# Patient Record
Sex: FEMALE | Race: BLACK OR AFRICAN AMERICAN | Employment: PART TIME | URBAN - METROPOLITAN AREA
[De-identification: names, ages, dates, MRNs, and addresses within clinical notes are randomized per-mention and may not be internally consistent; named-entity substitution may affect disease eponyms.]

---

## 2017-06-01 ENCOUNTER — OFFICE VISIT (OUTPATIENT)
Dept: FAMILY MEDICINE CLINIC | Age: 32
End: 2017-06-01

## 2017-06-01 VITALS
HEART RATE: 75 BPM | DIASTOLIC BLOOD PRESSURE: 82 MMHG | TEMPERATURE: 97.2 F | OXYGEN SATURATION: 99 % | RESPIRATION RATE: 20 BRPM | BODY MASS INDEX: 45.08 KG/M2 | HEIGHT: 62 IN | SYSTOLIC BLOOD PRESSURE: 119 MMHG | WEIGHT: 245 LBS

## 2017-06-01 DIAGNOSIS — E10.9 TYPE 1 DIABETES MELLITUS WITHOUT COMPLICATION (HCC): ICD-10-CM

## 2017-06-01 DIAGNOSIS — T45.1X5A CARDIOMYOPATHY DUE TO CHEMOTHERAPY (HCC): ICD-10-CM

## 2017-06-01 DIAGNOSIS — L30.9 DERMATITIS: ICD-10-CM

## 2017-06-01 DIAGNOSIS — C50.919 MALIGNANT NEOPLASM OF FEMALE BREAST, UNSPECIFIED LATERALITY, UNSPECIFIED SITE OF BREAST: ICD-10-CM

## 2017-06-01 DIAGNOSIS — I42.7 CARDIOMYOPATHY DUE TO CHEMOTHERAPY (HCC): ICD-10-CM

## 2017-06-01 DIAGNOSIS — Z76.89 ENCOUNTER TO ESTABLISH CARE: Primary | ICD-10-CM

## 2017-06-01 RX ORDER — TRIAMCINOLONE ACETONIDE 1 MG/G
CREAM TOPICAL 2 TIMES DAILY
Qty: 45 G | Refills: 3 | Status: SHIPPED | OUTPATIENT
Start: 2017-06-01 | End: 2017-09-16 | Stop reason: ALTCHOICE

## 2017-06-01 RX ORDER — PEN NEEDLE, DIABETIC 32GX 5/32"
NEEDLE, DISPOSABLE MISCELLANEOUS
Refills: 2 | COMMUNITY
Start: 2017-04-28 | End: 2019-01-24 | Stop reason: SDUPTHER

## 2017-06-01 RX ORDER — INSULIN LISPRO 100 [IU]/ML
INJECTION, SOLUTION INTRAVENOUS; SUBCUTANEOUS
Refills: 1 | COMMUNITY
Start: 2017-03-15 | End: 2017-06-01 | Stop reason: SDUPTHER

## 2017-06-01 RX ORDER — INSULIN LISPRO 100 [IU]/ML
INJECTION, SOLUTION INTRAVENOUS; SUBCUTANEOUS
Qty: 1 PACKAGE | Refills: 1 | COMMUNITY
Start: 2017-06-01 | End: 2018-02-20

## 2017-06-01 RX ORDER — CHLORHEXIDINE GLUCONATE 1.2 MG/ML
RINSE ORAL
Refills: 2 | COMMUNITY
Start: 2017-05-07 | End: 2017-06-01 | Stop reason: ALTCHOICE

## 2017-06-01 RX ORDER — LISINOPRIL AND HYDROCHLOROTHIAZIDE 10; 12.5 MG/1; MG/1
TABLET ORAL
Refills: 4 | COMMUNITY
Start: 2017-05-16 | End: 2018-07-20

## 2017-06-01 RX ORDER — INSULIN GLARGINE 300 U/ML
INJECTION, SOLUTION SUBCUTANEOUS
Refills: 3 | COMMUNITY
Start: 2017-05-04 | End: 2018-04-30 | Stop reason: SDUPTHER

## 2017-06-01 RX ORDER — CLINDAMYCIN PHOSPHATE 10 MG/G
GEL TOPICAL
Refills: 3 | COMMUNITY
Start: 2017-04-23 | End: 2018-01-04

## 2017-06-01 NOTE — PATIENT INSTRUCTIONS

## 2017-06-01 NOTE — PROGRESS NOTES
HISTORY OF PRESENT ILLNESS  Avni Hernandez is a 27 y.o. female. HPI  Patient comes in today to reestablish care. Hx breast cancer. Followed at [facility] - Dr. Monik Adams (Dr. Cyndee Zamora). Last visit last week. Has not had MRI since diagnosed, has mammograms yearly, last Oct 2016. Did chemo and radiation treatments. Took ~1 year to finish treatments. Did not have menses for 1-2 years. Menses returned 2-3 years ago. Has \"chemo brain\" or \"chemo fog\". States she now has breast pain during menses. [place] [facility] [place] be switching back to Dr. Briana Cardozo. Last visit with Jass Tavarez in 2017, A1c 7.9%, prior to that she was in 9s%. States her insulin was changed in April. Areas on legs that itch. Uses Dove soap, dye free detergent. No Known Allergies    Past Medical History:   Diagnosis Date    Breast cancer [facility]) 2012    infiltrating ductal carcinoma    Cancer [facility])     breast    Cardiomyopathy due to chemotherapy ([address]     EF 20 %    Diabetes ([address]     Diabetes mellitus     Essential hypertension     H/O mammogram 2012    [facility] [phone]    History of sepsis 2013    after chemo    Hx of difficult intubation     resulting from sepsis in 2013.  Hypertension     Ne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 ([address]     Type I (juvenile type) diabetes mellitus without mention of complication, uncontrolled ([address]     diagnosed age 4    Unspecified vitamin D deficiency 2011       Past Surgical History:   Procedure Laterality Date    HX CYST INCISION AND DRAINAGE  2013    perirectal abscess    HX GYN       in     HX MASTECTOMY      HX OTHER SURGICAL      cyst removed under left arm    HX OTHER SURGICAL      port placement for chemo       Social History     Social History    Marital status:      Spouse name: N/A    Number of children: N/A    Years of education: N/A     Occupational History    Not on file.      Social History Main Topics  Smoking status: Never Smoker    Smokeless tobacco: Not on file    Alcohol use No    Drug use: No    Sexual activity: Yes     Partners: Male     Other Topics Concern    Not on file     Social History Narrative    No narrative on file       Family History   Problem Relation Age of Onset    Diabetes Brother      borderline Type 2    Diabetes Paternal Uncle     Diabetes Paternal Grandfather     Heart Disease Paternal Grandfather      MI in his 62s    Stroke Neg Hx        Current Outpatient Prescriptions   Medication Sig    TOUJEO SOLOSTAR 300 unit/mL (1.5 mL) inpn INJECT 42 UNITS UNDER THE SKIN ONCE DAILY    HUMALOG KWIKPEN 100 unit/mL kwikpen INJECT 10UNITS THREE TIMES DAILY BEFORE EACH MEAL. ADD ON CORRECTION FACTOR. MAX 15UNITS W/ EACH MEAL    clindamycin (CLINDAGEL) 1 % topical gel APPLY A THIN FILM TO AFFECTED AREA AFTER WASHING TWICE DAILY AS NEEDED    lisinopril-hydroCHLOROthiazide (PRINZIDE, ZESTORETIC) 10-12.5 mg per tablet TAKE 1 TABLET BY MOUTH ONE TIME DAILY, PLEASE HAVE PCP FILL THIS MED AFTER THIS    LANCE PEN NEEDLE 32 gauge x 5/32\" ndle USE AS DIRECTED TO INJECT INSULIN 4 TIMES DAILY    ibuprofen (ADVIL) 200 mg tablet Take  by mouth as needed.  ACCU-CHEK MARVIN PLUS METER Misc Use as directed    MULTIVIT WITH CALCIUM,IRON,MIN (ONE-A-DAY WOMENS FORMULA PO) Take  by mouth.  hydrocortisone (CORTAID) 0.5 % topical cream Apply  to affected area four (4) times daily as needed. use thin layer      No current facility-administered medications for this visit. Review of Systems   Constitutional: Negative for chills, diaphoresis, fever, malaise/fatigue and weight loss. HENT: Negative for congestion, ear pain, sore throat and tinnitus. Eyes: Negative for blurred vision and double vision. Respiratory: Negative for cough, sputum production, shortness of breath and wheezing. Cardiovascular: Negative for chest pain, palpitations and leg swelling.    Gastrointestinal: Negative for abdominal pain, blood in stool, constipation, diarrhea, nausea and vomiting. Genitourinary: Negative for dysuria, flank pain, frequency, hematuria and urgency. Musculoskeletal: Negative for back pain, joint pain and myalgias. Skin: Positive for itching and rash. Neurological: Negative for dizziness, tingling, sensory change, speech change, focal weakness and headaches. Psychiatric/Behavioral: Negative for depression. The patient is not nervous/anxious and does not have insomnia. Vitals:    06/01/17 1509   BP: 119/82   Pulse: 75   Resp: 20   Temp: 97.2 °F (36.2 °C)   TempSrc: Oral   SpO2: 99%   Weight: 245 lb (111.1 kg)   Height: 5' 2\" (1.575 m)     Physical Exam   Constitutional: She is oriented to person, place, and time. Vital signs are normal. She appears well-developed and well-nourished. She is cooperative. HENT:   Right Ear: Hearing, tympanic membrane, external ear and ear canal normal.   Left Ear: Hearing, tympanic membrane, external ear and ear canal normal.   Nose: Nose normal. Right sinus exhibits no maxillary sinus tenderness and no frontal sinus tenderness. Left sinus exhibits no maxillary sinus tenderness and no frontal sinus tenderness. Mouth/Throat: Uvula is midline, oropharynx is clear and moist and mucous membranes are normal. Mucous membranes are not pale and not dry. No oropharyngeal exudate, posterior oropharyngeal edema or posterior oropharyngeal erythema. Neck: No thyroid mass and no thyromegaly present. Cardiovascular: Normal rate, regular rhythm, S1 normal, S2 normal and normal heart sounds. No murmur heard. Pulses:       Radial pulses are 2+ on the right side, and 2+ on the left side. Dorsalis pedis pulses are 2+ on the right side, and 2+ on the left side. Posterior tibial pulses are 2+ on the right side, and 2+ on the left side. Pulmonary/Chest: Effort normal and breath sounds normal. She has no decreased breath sounds. She has no wheezes.  She has no rhonchi. She has no rales. Abdominal: Soft. Normal appearance and bowel sounds are normal. There is no hepatosplenomegaly. There is no tenderness. There is no CVA tenderness. Lymphadenopathy:        Head (right side): No submental, no submandibular, no tonsillar, no preauricular and no posterior auricular adenopathy present. Head (left side): No submental, no submandibular, no tonsillar, no preauricular and no posterior auricular adenopathy present. She has no cervical adenopathy. Right: No supraclavicular adenopathy present. Left: No supraclavicular adenopathy present. Neurological: She is alert and oriented to person, place, and time. Skin: Skin is warm, dry and intact. Rash (scaly papular rash noted on right lower leg) noted. Psychiatric: She has a normal mood and affect. Her speech is normal and behavior is normal. Thought content normal.   Vitals reviewed. ASSESSMENT and PLAN    ICD-10-CM ICD-9-CM    1. Encounter to establish care Z76.89 V65.8    2. Dermatitis L30.9 692.9 triamcinolone acetonide (KENALOG) 0.1 % topical cream   3. Type 1 diabetes mellitus without complication (HCC) V23.5 250.01    4. Malignant neoplasm of female breast, unspecified laterality, unspecified site of breast ([address] C50.919 174.9    5. Cardiomyopathy due to chemotherapy ([address] I42.7 425.9     T45. 1X5A E933.1      Encounter Diagnoses   Name Primary?     Encounter to establish care Yes    Dermatitis     Type 1 diabetes mellitus without complication (HCC)     Malignant neoplasm of female breast, unspecified laterality, unspecified site of breast ([address]     Cardiomyopathy due to chemotherapy ([address]      Orders Placed This Encounter    TOUJEO SOLOSTAR 300 unit/mL (1.5 mL) inpn    DISCONTD: HUMALOG KWIKPEN 100 unit/mL kwikpen    clindamycin (CLINDAGEL) 1 % topical gel    DISCONTD: chlorhexidine (PERIDEX) 0.12 % solution    lisinopril-hydroCHLOROthiazide (PRINZIDE, ZESTORETIC) 10-12.5 mg per tablet    LANCE PEN NEEDLE 32 gauge x 5/32\" ndle    HUMALOG KWIKPEN 100 unit/mL kwikpen    triamcinolone acetonide (KENALOG) 0.1 % topical cream     Diagnoses and all orders for this visit:    Encounter to establish care - will obtain [place] and [place] [facility]    Dermatitis  -     triamcinolone acetonide (KENALOG) 0.1 % topical cream; Apply  to affected area two (2) times a day. use thin layer    Type 1 diabetes mellitus without complication ([facility] - followed by [facility]    Malignant neoplasm of female breast, unspecified laterality, unspecified site of breast ([facility] - followed by heme/onc at [facility]    Cardiomyopathy due to chemotherapy [facility]) - followed by cardiology at [facility]      Follow-up Disposition:  Return in about 1 year (around 6/1/2018). I have reviewed the patient's allergies and made any necessary changes. Medical, procedural, social and family histories have been reviewed and updated as medically indicated. I have reconciled and/or revised patient medications in the EMR. I have discussed each diagnosis listed in this note with Jennifer Goldberg and/or their family. I have discussed treatment options and the risk/benefit analysis of those options, including safe use of medications and possible medication side effects. Through the use of shared decision making we have agreed to the above plan. The patient has received an after-visit summary and questions were answered concerning future plans. Ellen Ding, NATALI-C    This note will not be viewable in [employer].

## 2017-06-05 PROBLEM — E10.9 TYPE 1 DIABETES MELLITUS WITHOUT COMPLICATION (HCC): Status: ACTIVE | Noted: 2017-06-05

## 2017-09-16 ENCOUNTER — HOSPITAL ENCOUNTER (EMERGENCY)
Age: 32
Discharge: HOME | End: 2017-09-16
Attending: EMERGENCY MEDICINE | Admitting: EMERGENCY MEDICINE
Payer: COMMERCIAL

## 2017-09-16 VITALS
HEIGHT: 63 IN | DIASTOLIC BLOOD PRESSURE: 72 MMHG | BODY MASS INDEX: 43.91 KG/M2 | WEIGHT: 247.8 LBS | TEMPERATURE: 97.9 F | OXYGEN SATURATION: 94 % | SYSTOLIC BLOOD PRESSURE: 118 MMHG | RESPIRATION RATE: 16 BRPM | HEART RATE: 71 BPM

## 2017-09-16 DIAGNOSIS — L02.419 CELLULITIS AND ABSCESS OF LEG, EXCEPT FOOT: Primary | ICD-10-CM

## 2017-09-16 DIAGNOSIS — L03.119 CELLULITIS AND ABSCESS OF LEG, EXCEPT FOOT: Primary | ICD-10-CM

## 2017-09-16 PROCEDURE — 74011000250 HC RX REV CODE- 250: Performed by: NURSE PRACTITIONER

## 2017-09-16 PROCEDURE — 75810000289 HC I&D ABSCESS SIMP/COMP/MULT

## 2017-09-16 PROCEDURE — 74011250636 HC RX REV CODE- 250/636: Performed by: NURSE PRACTITIONER

## 2017-09-16 PROCEDURE — 96375 TX/PRO/DX INJ NEW DRUG ADDON: CPT

## 2017-09-16 PROCEDURE — 96365 THER/PROPH/DIAG IV INF INIT: CPT

## 2017-09-16 PROCEDURE — 77030019895 HC PCKNG STRP IODO -A

## 2017-09-16 PROCEDURE — 77030018836 HC SOL IRR NACL ICUM -A

## 2017-09-16 PROCEDURE — 74011000258 HC RX REV CODE- 258: Performed by: NURSE PRACTITIONER

## 2017-09-16 PROCEDURE — 99283 EMERGENCY DEPT VISIT LOW MDM: CPT

## 2017-09-16 RX ORDER — KETOROLAC TROMETHAMINE 30 MG/ML
15 INJECTION, SOLUTION INTRAMUSCULAR; INTRAVENOUS
Status: COMPLETED | OUTPATIENT
Start: 2017-09-16 | End: 2017-09-16

## 2017-09-16 RX ORDER — DOXYCYCLINE HYCLATE 100 MG
100 TABLET ORAL 2 TIMES DAILY
Qty: 20 TAB | Refills: 0 | Status: SHIPPED | OUTPATIENT
Start: 2017-09-16 | End: 2017-09-26

## 2017-09-16 RX ORDER — BUPIVACAINE HYDROCHLORIDE 5 MG/ML
10 INJECTION, SOLUTION EPIDURAL; INTRACAUDAL
Status: COMPLETED | OUTPATIENT
Start: 2017-09-16 | End: 2017-09-16

## 2017-09-16 RX ORDER — HYDROCODONE BITARTRATE AND ACETAMINOPHEN 5; 325 MG/1; MG/1
1 TABLET ORAL
Qty: 20 TAB | Refills: 0 | Status: SHIPPED | OUTPATIENT
Start: 2017-09-16 | End: 2018-01-04

## 2017-09-16 RX ADMIN — CEFTRIAXONE SODIUM 1 G: 1 INJECTION, POWDER, FOR SOLUTION INTRAMUSCULAR; INTRAVENOUS at 18:52

## 2017-09-16 RX ADMIN — BUPIVACAINE HYDROCHLORIDE 50 MG: 5 INJECTION, SOLUTION EPIDURAL; INTRACAUDAL at 17:58

## 2017-09-16 RX ADMIN — KETOROLAC TROMETHAMINE 15 MG: 30 INJECTION, SOLUTION INTRAMUSCULAR at 18:51

## 2017-09-16 NOTE — ED NOTES
Bedside shift change report given to Emily Newman RN (oncoming nurse) by Paulette Zacarias RN (offgoing nurse). Report included the following information ED Summary.

## 2017-09-16 NOTE — ED TRIAGE NOTES
PT ambulates to treatment room with steady gait. PT reports boil on inner left thigh that developed two days ago. PT reports HX of boils.  PT reports lump in groin area on left side as well

## 2017-09-16 NOTE — ED NOTES
Hourly rounding completed. IV placed by Aung Gorman. Toradol given via IV; antibiotic infusing via gravity. Toileting offered, ongoing plan of care discussed and pts concerns/questions addressed. Fay Iyer NP into perform procedure. Call bell within reach; will continue to monitor.

## 2017-09-16 NOTE — ED PROVIDER NOTES
HPI Comments: Simi Cason is a 28 yo BF presenting to ED via car with c/o car x boil on inner left thigh that developed two days ago. PT reports HX of boils. PT reports lump in groin area on left side as well. PCP: Tahir Banks, NP    There were no other complaints, changes, physical findings at this time. Past Medical History:  2012: Breast cancer [facility])      Comment: infiltrating ductal carcinoma  2012: Cancer ([address]      Comment: breast  No date: Cardiomyopathy due to chemotherapy ([address]      Comment: EF 20 %  No date: Diabetes ([address]  No date: Diabetes mellitus  No date: Essential hypertension  2012: H/O mammogram      Comment: [facility] [phone]  2013: History of sepsis      Comment: after chemo  No date: Hx of difficult intubation      Comment: resulting from sepsis in 2013. No date: Hypertension  No date: Ne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 ([address]  No date: Type I (juvenile type) diabetes mellitus witho*      Comment: diagnosed age 4  2011: Unspecified vitamin D deficiency      The history is provided by the patient. No  was used. Past Medical History:   Diagnosis Date    Breast cancer [facility]) 2012    infiltrating ductal carcinoma    Cancer [facility])     breast    Cardiomyopathy due to chemotherapy ([address]     EF 20 %    Diabetes ([address]     Diabetes mellitus     Essential hypertension     H/O mammogram 2012    [facility] [phone]    History of sepsis 2013    after chemo    Hx of difficult intubation     resulting from sepsis in 2013.       Hypertension     Ne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 ([address]     Type I (juvenile type) diabetes mellitus without mention of complication, uncontrolled     diagnosed age 4    Unspecified vitamin D deficiency 2011       Past Surgical History:   Procedure Laterality Date    HX CYST INCISION AND DRAINAGE  2013    perirectal abscess    HX GYN       in     HX MASTECTOMY      HX OTHER SURGICAL      cyst removed under left arm    HX OTHER SURGICAL      port placement for chemo         Family History:   Problem Relation Age of Onset    Diabetes Brother      borderline Type 2    Diabetes Paternal Uncle     Diabetes Paternal Grandfather     Heart Disease Paternal Grandfather      MI in his 62s    Stroke Neg Hx        Social History     Social History    Marital status:      Spouse name: N/A    Number of children: N/A    Years of education: N/A     Occupational History    Not on file. Social History Main Topics    Smoking status: Never Smoker    Smokeless tobacco: Not on file    Alcohol use No    Drug use: No    Sexual activity: Yes     Partners: Male     Other Topics Concern    Not on file     Social History Narrative         ALLERGIES: Review of patient's allergies indicates no known allergies. Review of Systems   Constitutional: Negative. Negative for chills, diaphoresis and fever. HENT: Negative. Negative for congestion, rhinorrhea and trouble swallowing. Eyes: Negative. Respiratory: Negative for shortness of breath. Cardiovascular: Negative. Gastrointestinal: Negative. Negative for abdominal pain, nausea and vomiting. Endocrine: Negative. Musculoskeletal: Negative for arthralgias, myalgias, neck pain and neck stiffness. Skin: Positive for wound. Negative for rash. Large abscess and cellulitic area to upper left inner thigh   Allergic/Immunologic: Negative. Neurological: Negative. Negative for dizziness, syncope, weakness and headaches. Hematological: Negative. Psychiatric/Behavioral: Negative. Vitals:    09/16/17 1804 09/16/17 1911   BP: (!) 182/100 118/72   Pulse: 92 71   Resp: 17 16   Temp: 98.3 °F (36.8 °C) 97.9 °F (36.6 °C)   SpO2: 98% 94%   Weight: 112.4 kg (247 lb 12.8 oz)    Height: 5' 3\" (1.6 m)             Physical Exam   Constitutional: She is oriented to person, place, and time.  Vital signs are normal. She appears well-developed and well-nourished. Non-toxic appearance. She does not have a sickly appearance. She does not appear ill. HENT:   Head: Normocephalic and atraumatic. Eyes: Conjunctivae, EOM and lids are normal. Pupils are equal, round, and reactive to light. Neck: Trachea normal, normal range of motion and full passive range of motion without pain. Neck supple. Cardiovascular: Normal rate, regular rhythm, normal heart sounds and normal pulses. Pulmonary/Chest: Effort normal and breath sounds normal.   Abdominal: Soft. Normal appearance and bowel sounds are normal.   Musculoskeletal: Normal range of motion. Left upper leg: She exhibits tenderness, swelling and edema. Legs:  Lymphadenopathy:        Left: Inguinal adenopathy present. Neurological: She is alert and oriented to person, place, and time. She has normal strength. GCS eye subscore is 4. GCS verbal subscore is 5. GCS motor subscore is 6. Skin: Skin is warm, dry and intact. Psychiatric: She has a normal mood and affect. Her speech is normal and behavior is normal. Judgment and thought content normal. Cognition and memory are normal.   Nursing note and vitals reviewed. MDM  Number of Diagnoses or Management Options  Cellulitis and abscess of leg, except foot: minor  Risk of Complications, Morbidity, and/or Mortality  Presenting problems: minimal  Diagnostic procedures: low  Management options: minimal    Patient Progress  Patient progress: stable    ED Course       Procedures      Procedure Note - Incision and Drainage:   7:08 PM  Performed by: Jodi Freeman FNP-BC  Complexity: complex  Skin prepped with Betadine. Sterile field established. Anesthesia achieved with 10 mLs of Lidocaine 1% with epinephrine and 0.5% Marcaine using a local infiltration. Abscess to left upper leg: left was incised with # 11 blade, and 5 mLs of bloody drainage was expressed. Wound probed and irrigated.  Area was packed using 1/2 inch iodoform gauze. Sterile dressing applied. Estimated blood loss: 10 ml's  The procedure took 1-15 minutes, and pt tolerated well. LABORATORY TESTS:  No results found for this or any previous visit (from the past 12 hour(s)). IMAGING RESULTS:    MEDICATIONS GIVEN:  Medications   cefTRIAXone (ROCEPHIN) 1 g in 0.9% sodium chloride (MBP/ADV) 50 mL (0 g IntraVENous IV Completed 9/16/17 1922)   bupivacaine (PF) (MARCAINE) 0.5 % (5 mg/mL) injection 50 mg (50 mg Peripheral Nerve Block Given by Provider 9/16/17 4778)   ketorolac (TORADOL) injection 15 mg (15 mg IntraVENous Given 9/16/17 4681)       IMPRESSION:  1. Cellulitis and abscess of leg, except foot        PLAN:  1. Doxycycline 100 mg PO x 10 days  2. Packing removal in 2 days (PCP or RTED)  3. F/U with PCP  Return to ED if worse    Discharge Note  7:10 PM  The patient is ready for discharge. The patient's signs, symptoms, diagnosis, and discharge instructions have been discussed and the patient has conveyed their understanding. The patient is to follow up as recommended or return to the ER should their symptoms worsen. Plan has been discussed and the patient is in agreement. [place] Freeman FNP-BC.

## 2017-09-16 NOTE — ED NOTES
The patient was discharged home by Linh James NP  in stable condition. The patient is alert and oriented, in no respiratory distress and discharge vital signs obtained. The patient's diagnosis, condition and treatment were explained. The patient expressed understanding. Two prescriptions given. No work/school note given. A discharge plan has been developed. A  was not involved in the process. Aftercare instructions were given. Pt ambulatory out of the ED.

## 2017-09-16 NOTE — DISCHARGE INSTRUCTIONS
Skin Abscess: Care Instructions  Your Care Instructions    A skin abscess is a bacterial infection that forms a pocket of pus. A boil is a kind of skin abscess. The doctor may have cut an opening in the abscess so that the pus can drain out. You may have gauze in the cut so that the abscess will stay open and keep draining. You may need antibiotics. You will need to follow up with your doctor to make sure the infection has gone away. The doctor has checked you carefully, but problems can develop later. If you notice any problems or new symptoms, get medical treatment right away. Follow-up care is a key part of your treatment and safety. Be sure to make and go to all appointments, and call your doctor if you are having problems. It's also a good idea to know your test results and keep a list of the medicines you take. How can you care for yourself at home? · Apply warm and dry compresses, a heating pad set on low, or a hot water bottle 3 or 4 times a day for pain. Keep a cloth between the heat source and your skin. · If your doctor prescribed antibiotics, take them as directed. Do not stop taking them just because you feel better. You need to take the full course of antibiotics. · Take pain medicines exactly as directed. ¨ If the doctor gave you a prescription medicine for pain, take it as prescribed. ¨ If you are not taking a prescription pain medicine, ask your doctor if you can take an over-the-counter medicine. · Keep your bandage clean and dry. Change the bandage whenever it gets wet or dirty, or at least one time a day. · If the abscess was packed with gauze:  ¨ Keep follow-up appointments to have the gauze changed or removed. If the doctor instructed you to remove the gauze, gently pull out all of the gauze when your doctor tells you to. ¨ After the gauze is removed, soak the area in warm water for 15 to 20 minutes 2 times a day, until the wound closes. When should you call for help?   Call your doctor now or seek immediate medical care if:  · You have signs of worsening infection, such as:  ¨ Increased pain, swelling, warmth, or redness. ¨ Red streaks leading from the infected skin. ¨ Pus draining from the wound. ¨ A fever. Watch closely for changes in your health, and be sure to contact your doctor if:  · You do not get better as expected. Where can you learn more? Go to http://kelly-wili.info/. Enter W141 in the search box to learn more about \"Skin Abscess: Care Instructions. \"  Current as of: October 13, 2016  Content Version: 11.3  © 2543-4930 [employer]. Care instructions adapted under license by [employer] (which disclaims liability or warranty for this information). If you have questions about a medical condition or this instruction, always ask your healthcare professional. [place] [address] any warranty or liability for your use of this information. Cellulitis: Care Instructions  Your Care Instructions    Cellulitis is a skin infection. It often occurs after a break in the skin from a scrape, cut, bite, or puncture, or after a rash. The doctor has checked you carefully, but problems can develop later. If you notice any problems or new symptoms, get medical treatment right away. Follow-up care is a key part of your treatment and safety. Be sure to make and go to all appointments, and call your doctor if you are having problems. It's also a good idea to know your test results and keep a list of the medicines you take. How can you care for yourself at home? · Take your antibiotics as directed. Do not stop taking them just because you feel better. You need to take the full course of antibiotics. · Prop up the infected area on pillows to reduce pain and swelling. Try to keep the area above the level of your heart as often as you can.   · If your doctor told you how to care for your wound, follow your doctor's instructions. If you did not get instructions, follow this general advice:  ¨ Wash the wound with clean water 2 times a day. Don't use hydrogen peroxide or alcohol, which can slow healing. ¨ You may cover the wound with a thin layer of petroleum jelly, such as Vaseline, and a nonstick bandage. ¨ Apply more petroleum jelly and replace the bandage as needed. · Be safe with medicines. Take pain medicines exactly as directed. ¨ If the doctor gave you a prescription medicine for pain, take it as prescribed. ¨ If you are not taking a prescription pain medicine, ask your doctor if you can take an over-the-counter medicine. To prevent cellulitis in the future  · Try to prevent cuts, scrapes, or other injuries to your skin. Cellulitis most often occurs where there is a break in the skin. · If you get a scrape, cut, mild burn, or bite, wash the wound with clean water as soon as you can to help avoid infection. Don't use hydrogen peroxide or alcohol, which can slow healing. · If you have swelling in your legs (edema), support stockings and good skin care may help prevent leg sores and cellulitis. · Take care of your feet, especially if you have diabetes or other conditions that increase the risk of infection. Wear shoes and socks. Do not go barefoot. If you have athlete's foot or other skin problems on your feet, talk to your doctor about how to treat them. When should you call for help? Call your doctor now or seek immediate medical care if:  · You have signs that your infection is getting worse, such as:  ¨ Increased pain, swelling, warmth, or redness. ¨ Red streaks leading from the area. ¨ Pus draining from the area. ¨ A fever. · You get a rash. Watch closely for changes in your health, and be sure to contact your doctor if:  · You are not getting better after 1 day (24 hours). · You do not get better as expected. Where can you learn more? Go to http://andrew.info/.   Nicholas Amaya in the search box to learn more about \"Cellulitis: Care Instructions. \"  Current as of: October 13, 2016  Content Version: 11.3  © 6787-0009 [employer]. Care instructions adapted under license by [employer] (which disclaims liability or warranty for this information). If you have questions about a medical condition or this instruction, always ask your healthcare professional. [address] any warranty or liability for your use of this information. We hope that we have addressed all of your medical concerns. The examination and treatment you received in the Emergency Department were for an emergent problem and were not intended as complete care. It is important that you follow up with your healthcare provider(s) for ongoing care. If your symptoms worsen or do not improve as expected, and you are unable to reach your usual health care provider(s), you should return to the Emergency Department. Today's healthcare is undergoing tremendous change, and patient satisfaction surveys are one of the many tools to assess the quality of medical care. You may receive a survey from the [employer] regarding your experience in the Emergency Department. I hope that your experience has been completely positive, particularly the medical care that I provided. As such, please participate in the survey; anything less than excellent does not meet my expectations or intentions. [address] and [facility] participate in nationally recognized quality of care measures. If your blood pressure is greater than 120/80, as reported below, we urge that you seek medical care to address the potential of high blood pressure, commonly known as hypertension. Hypertension can be hereditary or can be caused by certain medical conditions, pain, stress, or \"white coat syndrome. \"       Please make an appointment with your health care provider(s) for follow up of your Emergency Department visit. VITALS:   Patient Vitals for the past 8 hrs:   Temp Pulse Resp BP SpO2   09/16/17 1804 98.3 °F (36.8 °C) 92 17 (!) 182/100 98 %          Thank you for allowing us to provide you with medical care today. We realize that you have many choices for your emergency care needs. Please choose us in the future for any continued health care needs. ELMER Shepard [facility] 70: [phone]            No results found for this or any previous visit (from the past 24 hour(s)). No results found.

## 2017-09-18 ENCOUNTER — OFFICE VISIT (OUTPATIENT)
Dept: FAMILY MEDICINE CLINIC | Age: 32
End: 2017-09-18

## 2017-09-18 VITALS
OXYGEN SATURATION: 99 % | SYSTOLIC BLOOD PRESSURE: 111 MMHG | HEART RATE: 80 BPM | WEIGHT: 244.2 LBS | DIASTOLIC BLOOD PRESSURE: 66 MMHG | TEMPERATURE: 98.4 F | BODY MASS INDEX: 43.27 KG/M2 | HEIGHT: 63 IN | RESPIRATION RATE: 16 BRPM

## 2017-09-18 DIAGNOSIS — L02.91 ABSCESS: Primary | ICD-10-CM

## 2017-09-18 DIAGNOSIS — Z23 ENCOUNTER FOR IMMUNIZATION: ICD-10-CM

## 2017-09-18 RX ORDER — AMOXICILLIN 500 MG/1
CAPSULE ORAL
Refills: 0 | COMMUNITY
Start: 2017-06-16 | End: 2017-09-18 | Stop reason: ALTCHOICE

## 2017-09-18 NOTE — PROGRESS NOTES
Chief Complaint   Patient presents with    Wound Check     LEFT THIGH     Patient here for wound check - abscess [place] [facility] on 9/16/17.

## 2017-09-18 NOTE — PATIENT INSTRUCTIONS

## 2017-09-18 NOTE — PROGRESS NOTES
HISTORY OF PRESENT ILLNESS  Bee Busby is a 27 y.o. female. HPI    Pt of [facility] with diagnoses of hypertension, type 1 diabetes, and breast cancer, here for an acute visit. Patient was seen in the ED on 17 for a large abscess with surrounding cellulitis to anterior left thigh. The abscess with incised and drained and packing was placed, and patient was started on doxycycline 100mg BID x 10 days and advised to return/follow up in 2 days for packing removal.     Has had a history of recurrent abscesses, but last one this size was several years ago. Denies any fevers or chills since her visit to the ER and starting the doxycycline. Taking the doxycyline as prescribed and taking norco as needed for pain. States that drainage has been tapering off, though her  has been changing the dressing about 3 - 4x daily. Past Medical History:   Diagnosis Date    Breast cancer [facility]) 2012    infiltrating ductal carcinoma    Cancer [facility])     breast    Cardiomyopathy due to chemotherapy ([address]     EF 20 %    Diabetes ([address]     Diabetes mellitus     Essential hypertension     H/O mammogram 2012    [facility] [phone]    History of sepsis 2013    after chemo    Hx of difficult intubation     resulting from sepsis in 2013.       Hypertension     Ne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 ([address]     Type I (juvenile type) diabetes mellitus without mention of complication, uncontrolled     diagnosed age 4    Unspecified vitamin D deficiency 2011     Past Surgical History:   Procedure Laterality Date    HX CYST INCISION AND DRAINAGE  2013    perirectal abscess    HX GYN       in     HX MASTECTOMY      HX OTHER SURGICAL      cyst removed under left arm    HX OTHER SURGICAL      port placement for chemo     Family History   Problem Relation Age of Onset    Diabetes Brother      borderline Type 2    Diabetes Paternal Uncle     Diabetes Paternal Grandfather     Heart Disease Paternal Grandfather      MI in his 62s    Stroke Neg Hx      Social History     Social History    Marital status:      Spouse name: N/A    Number of children: N/A    Years of education: N/A     Social History Main Topics    Smoking status: Never Smoker    Smokeless tobacco: Never Used    Alcohol use No    Drug use: No    Sexual activity: Yes     Partners: Male     Other Topics Concern    None     Social History Narrative     Patient Active Problem List   Diagnosis Code    Uncontrolled type 1 diabetes mellitus ([facility] E10.65    Essential hypertension, benign I10    Encounter for long-term (current) use of other medications Z79.899    Encounter for long-term (current) use of insulin ([facility] Z79.4    Unspecified vitamin D deficiency E55.9    Abnormal thyroid blood test R94.6    Ne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 ([facility] G62.0, T45.1X5A    Cardiomyopathy due to chemotherapy ([facility] I42.7, T45.1X5A    Breast cancer ([facility] C50.919    Type 1 diabetes mellitus without complication ([facility] J77.5     Outpatient Encounter Prescriptions as of 9/18/2017   Medication Sig Dispense Refill    HYDROcodone-acetaminophen (NORCO) 5-325 mg per tablet Take 1 Tab by mouth every four (4) hours as needed for Pain. Max Daily Amount: 6 Tabs. 20 Tab 0    doxycycline (VIBRA-TABS) 100 mg tablet Take 1 Tab by mouth two (2) times a day for 10 days.  20 Tab 0    TOUJEO SOLOSTAR 300 unit/mL (1.5 mL) inpn INJECT 42 UNITS UNDER THE SKIN ONCE DAILY  3    clindamycin (CLINDAGEL) 1 % topical gel APPLY A THIN FILM TO AFFECTED AREA AFTER WASHING TWICE DAILY AS NEEDED  3    lisinopril-hydroCHLOROthiazide (PRINZIDE, ZESTORETIC) 10-12.5 mg per tablet TAKE 1 TABLET BY MOUTH ONE TIME DAILY, PLEASE HAVE PCP FILL THIS MED AFTER THIS  4    LANCE PEN NEEDLE 32 gauge x 5/32\" ndle USE AS DIRECTED TO INJECT INSULIN 4 TIMES DAILY  2    HUMALOG KWIKPEN 100 unit/mL kwikpen 3 units at breakfast, 3 units at lunch, 6 units at dinner *(with some correction) 1 Package 1    ibuprofen (ADVIL) 200 mg tablet Take 200 mg by mouth every six (6) hours as needed.  ACCU-CHEK MARVIN PLUS METER Misc Use as directed 1 Each 0    hydrocortisone (CORTAID) 0.5 % topical cream Apply  to affected area four (4) times daily as needed. use thin layer       [DISCONTINUED] amoxicillin (AMOXIL) 500 mg capsule TAKE 1 TABLET BY MOUTH EVERY 8 HOURS UNTIL GONE  0     No facility-administered encounter medications on file as of 9/18/2017. Visit Vitals    /66 (BP 1 Location: Left arm, BP Patient Position: Sitting)    Pulse 80    Temp 98.4 °F (36.9 °C) (Oral)    Resp 16    Ht 5' 3\" (1.6 m)    Wt 244 lb 3.2 oz (110.8 kg)    LMP 08/31/2017    SpO2 99%    BMI 43.26 kg/m2         Review of Systems   Constitutional: Negative for chills, fever and malaise/fatigue. Musculoskeletal: Positive for myalgias. Skin:        Draining abscess   Neurological: Negative for tingling. Physical Exam   Constitutional: She appears well-developed and well-nourished. No distress. HENT:   Head: Normocephalic and atraumatic. Cardiovascular: Normal rate, regular rhythm and normal heart sounds. Pulmonary/Chest: Effort normal.   Skin: She is not diaphoretic. Nursing note and vitals reviewed. ASSESSMENT and PLAN    ICD-10-CM ICD-9-CM    1. Abscess L02.91 682.9    2. Encounter for immunization Z23 V03.89 [place] IMMUNIZ ADMIN,1 SINGLE/COMB VAC/TOXOID      PNEUMOCOCCAL POLYSACCHARIDE VACCINE, 23-VALENT, ADULT OR IMMUNOSUPPRESSED PT DOSE,      INFLUENZA VIRUS VAC QUAD,SPLIT,PRESV FREE SYRINGE IM     1. Abscess  Packing removed from wound without incident; moderate amount of serosanguinous drainage present. Wound repacked with sterile packing and covered with gauze; patient tolerated well. Patient advised to finish her course of doxycycline, continue dressing changes as needed, and follow up in 2 days for packing removal/wound re-evaluation. May consider referral to surgery for evaluation for recurrent abscesses. Follow-up Disposition:  Return in about 2 days (around 9/20/2017) for packing removal & recheck.

## 2017-09-18 NOTE — MR AVS SNAPSHOT
Visit Information Date & Time Provider Department Dept. Phone Encounter #  
 9/18/2017  2:00 PM Vasyl Tim NP [facility] [phone] [id number] Follow-up Instructions Return in about 2 days (around 9/20/2017) for packing removal & recheck. Upcoming Health Maintenance Date Due Pneumococcal 19-64 Highest Risk (1 of 3 - PCV13) 1/19/2002 HEMOGLOBIN A1C Q6M 7/3/2012 MICROALBUMIN Q1 9/26/2012 LIPID PANEL Q1 9/26/2012 EYE EXAM RETINAL OR DILATED Q1 4/17/2014 PAP AKA CERVICAL CYTOLOGY 6/7/2014 FOOT EXAM Q1 7/17/2014 INFLUENZA AGE 9 TO ADULT 8/1/2017 DTaP/Tdap/Td series (2 - Td) 6/30/2020 Allergies as of 9/18/2017  Review Complete On: 9/18/2017 By: Vasyl Tim NP Severity Noted Reaction Type Reactions Codeine Medium 09/16/2017   Intolerance Nausea and Vomiting Current Immunizations  Reviewed on 1/5/2012 Name Date Influenza Vaccine (Quad) PF  Incomplete Pneumococcal Polysaccharide (PPSV-23)  Incomplete Not reviewed this visit You Were Diagnosed With   
  
 Codes Comments Abscess    -  Primary ICD-10-CM: L02.91 
ICD-9-CM: 682.9 Encounter for immunization     ICD-10-CM: T68 ICD-9-CM: V03.89 Vitals BP Pulse Temp Resp Height(growth percentile) Weight(growth percentile) 111/66 (BP 1 Location: Left arm, BP Patient Position: Sitting) 80 98.4 °F (36.9 °C) (Oral) 16 5' 3\" (1.6 m) 244 lb 3.2 oz (110.8 kg) LMP SpO2 BMI OB Status Smoking Status 08/31/2017 99% 43.26 kg/m2 Having regular periods Never Smoker BMI and BSA Data Body Mass Index Body Surface Area  
 43.26 kg/m 2 2.22 m 2 Preferred Pharmacy Pharmacy Name Phone [id number] [address] [phone] Your Updated Medication List  
  
   
This list is accurate as of: 9/18/17  2:33 PM.  Always use your most recent med list.  
  
  
  
  
 [address] Generic drug:  Blood-Glucose Meter Use as directed ADVIL 200 mg tablet Generic drug:  ibuprofen Take 200 mg by mouth every six (6) hours as needed. clindamycin 1 % topical gel Commonly known as:  CLINDAGEL  
APPLY A THIN FILM TO AFFECTED AREA AFTER WASHING TWICE DAILY AS NEEDED  
  
 doxycycline 100 mg tablet Commonly known as:  VIBRA-TABS Take 1 Tab by mouth two (2) times a day for 10 days. HumaLOG KwikPen 100 unit/mL kwikpen Generic drug:  insulin lispro 3 units at breakfast, 3 units at lunch, 6 units at dinner *(with some correction) HYDROcodone-acetaminophen 5-325 mg per tablet Commonly known as:  Maggie Fore Take 1 Tab by mouth every four (4) hours as needed for Pain. Max Daily Amount: 6 Tabs. hydrocortisone 0.5 % topical cream  
Commonly known as:  CORTAID Apply  to affected area four (4) times daily as needed. use thin layer  
  
 lisinopril-hydroCHLOROthiazide 10-12.5 mg per tablet Commonly known as:  PRINZIDE, ZESTORETIC  
TAKE 1 TABLET BY MOUTH ONE TIME DAILY, PLEASE HAVE PCP FILL THIS MED AFTER THIS Zandra Pen Needle 32 gauge x 5/32\" Ndle Generic drug:  Insulin Needles (Disposable) USE AS DIRECTED TO INJECT INSULIN 4 TIMES DAILY TOUJEO SOLOSTAR 300 unit/mL (1.5 mL) Inpn Generic drug:  insulin glargine INJECT 42 UNITS UNDER THE SKIN ONCE DAILY We Performed the Following INFLUENZA VIRUS VAC QUAD,SPLIT,PRESV FREE SYRINGE IM [id number] CPT(R)] PNEUMOCOCCAL POLYSACCHARIDE VACCINE, 23-VALENT, ADULT OR IMMUNOSUPPRESSED PT DOSE, [66890 CPT(R)] [place] IMMUNIZ ADMIN,1 SINGLE/COMB VAC/TOXOID [id number] CPT(R)] Follow-up Instructions Return in about 2 days (around 9/20/2017) for packing removal & recheck. Patient Instructions Skin Abscess: Care Instructions Your Care Instructions A skin abscess is a bacterial infection that forms a pocket of pus.  A boil is a kind of skin abscess. The doctor may have cut an opening in the abscess so that the pus can drain out. You may have gauze in the cut so that the abscess will stay open and keep draining. You may need antibiotics. You will need to follow up with your doctor to make sure the infection has gone away. The doctor has checked you carefully, but problems can develop later. If you notice any problems or new symptoms, get medical treatment right away. Follow-up care is a key part of your treatment and safety. Be sure to make and go to all appointments, and call your doctor if you are having problems. It's also a good idea to know your test results and keep a list of the medicines you take. How can you care for yourself at home? · Apply warm and dry compresses, a heating pad set on low, or a hot water bottle 3 or 4 times a day for pain. Keep a cloth between the heat source and your skin. · If your doctor prescribed antibiotics, take them as directed. Do not stop taking them just because you feel better. You need to take the full course of antibiotics. · Take pain medicines exactly as directed. ¨ If the doctor gave you a prescription medicine for pain, take it as prescribed. ¨ If you are not taking a prescription pain medicine, ask your doctor if you can take an over-the-counter medicine. · Keep your bandage clean and dry. Change the bandage whenever it gets wet or dirty, or at least one time a day. · If the abscess was packed with gauze: 
¨ Keep follow-up appointments to have the gauze changed or removed. If the doctor instructed you to remove the gauze, gently pull out all of the gauze when your doctor tells you to. ¨ After the gauze is removed, soak the area in warm water for 15 to 20 minutes 2 times a day, until the wound closes. When should you call for help? Call your doctor now or seek immediate medical care if: 
· You have signs of worsening infection, such as: ¨ Increased pain, swelling, warmth, or redness. ¨ Red streaks leading from the infected skin. ¨ Pus draining from the wound. ¨ A fever. Watch closely for changes in your health, and be sure to contact your doctor if: 
· You do not get better as expected. Where can you learn more? Go to http://kelly-wili.info/. Enter J037 in the search box to learn more about \"Skin Abscess: Care Instructions. \" Current as of: October 13, 2016 Content Version: 11.3 © 0046-4066 [employer]. Care instructions adapted under license by [employer] (which disclaims liability or warranty for this information). If you have questions about a medical condition or this instruction, always ask your healthcare professional. [address] any warranty or liability for your use of this information. Introducing [facility]! Dear Ginny Palma: Thank you for requesting a [employer] account. Our records indicate that you already have an active [employer] account. You can access your account anytime at [email or web address] Did you know that you can access your hospital and ER discharge instructions at any time in [employer]? You can also review all of your test results from your hospital stay or ER visit. Additional Information If you have questions, please visit the Frequently Asked Questions section of the [employer] website at [email or web address] Remember, [employer] is NOT to be used for urgent needs. For medical emergencies, dial 911. Now available from your iPhone and Android! Please provide this summary of care documentation to your next provider. Your primary care clinician is listed as [address] Ursula Pederson. If you have any questions after today's visit, please call [phone].

## 2017-09-20 ENCOUNTER — OFFICE VISIT (OUTPATIENT)
Dept: FAMILY MEDICINE CLINIC | Age: 32
End: 2017-09-20

## 2017-09-20 VITALS
SYSTOLIC BLOOD PRESSURE: 119 MMHG | HEIGHT: 63 IN | BODY MASS INDEX: 43.23 KG/M2 | RESPIRATION RATE: 20 BRPM | OXYGEN SATURATION: 98 % | HEART RATE: 77 BPM | DIASTOLIC BLOOD PRESSURE: 81 MMHG | WEIGHT: 244 LBS | TEMPERATURE: 97.5 F

## 2017-09-20 DIAGNOSIS — L72.3 INFECTED SEBACEOUS CYST: ICD-10-CM

## 2017-09-20 DIAGNOSIS — L08.9 INFECTED SEBACEOUS CYST: ICD-10-CM

## 2017-09-20 DIAGNOSIS — L02.91 ABSCESS: Primary | ICD-10-CM

## 2017-09-20 NOTE — PATIENT INSTRUCTIONS

## 2017-09-20 NOTE — PROGRESS NOTES
HISTORY OF PRESENT ILLNESS  Breanna Astudillo is a 27 y.o. female. HPI  Patient comes in today for follow up  Per Ernst Fischer NP note on 9/18/17: \"Patient was seen in the ED on 9/16/17 for a large abscess with surrounding cellulitis to anterior left thigh. The abscess with incised and drained and packing was placed, and patient was started on doxycycline 100mg BID x 10 days and advised to return/follow up in 2 days for packing removal.   Has had a history of recurrent abscesses, but last one this size was several years ago. Denies any fevers or chills since her visit to the ER and starting the doxycycline. Taking the doxycyline as prescribed and taking norco as needed for pain. States that drainage has been tapering off, though her  has been changing the dressing about 3 - 4x daily. \"  Packing was removed at that visit and repacked. Patient returns today for further evaluation of wound and repacking. Patient states she has not been doing warm compresses. Is taking doxycycline as prescribed. Discussed with patient referral to surgeon - patient interested. States she has a history of multiple cysts, most around inner/upper thighs. Allergies   Allergen Reactions    Codeine Nausea and Vomiting       Past Medical History:   Diagnosis Date    Breast cancer [facility]) Nov 2012    infiltrating ductal carcinoma    Cancer [facility]) 2012    breast    Cardiomyopathy due to chemotherapy ([address]     EF 20 %    Diabetes ([address]     Diabetes mellitus     Essential hypertension     H/O [facility] 11/2/2012    [facility] [phone]    History of sepsis 1/2013    after chemo    Hx of difficult intubation     resulting from sepsis in january 2013.       Hypertension     Ne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 ([address]     Type I (juvenile type) diabetes mellitus without mention of complication, uncontrolled     diagnosed age 4    Unspecified vitamin D deficiency 11/7/2011       Past Surgical History:   Procedure Laterality Date  HX CYST INCISION AND DRAINAGE  2013    perirectal abscess    HX GYN       in     HX MASTECTOMY      HX OTHER SURGICAL      cyst removed under left arm    HX OTHER SURGICAL      port placement for chemo       Social History     Social History    Marital status:      Spouse name: N/A    Number of children: N/A    Years of education: N/A     Occupational History    Not on file. Social History Main Topics    Smoking status: Never Smoker    Smokeless tobacco: Never Used    Alcohol use No    Drug use: No    Sexual activity: Yes     Partners: Male     Other Topics Concern    Not on file     Social History Narrative       Family History   Problem Relation Age of Onset    Diabetes Brother      borderline Type 2    Diabetes Paternal Uncle     Diabetes Paternal Grandfather     Heart Disease Paternal Grandfather      MI in his 62s    Stroke Neg Hx        Current Outpatient Prescriptions   Medication Sig    HYDROcodone-acetaminophen (NORCO) 5-325 mg per tablet Take 1 Tab by mouth every four (4) hours as needed for Pain. Max Daily Amount: 6 Tabs.  doxycycline (VIBRA-TABS) 100 mg tablet Take 1 Tab by mouth two (2) times a day for 10 days.  TOUJEO SOLOSTAR 300 unit/mL (1.5 mL) inpn INJECT 42 UNITS UNDER THE SKIN ONCE DAILY    clindamycin (CLINDAGEL) 1 % topical gel APPLY A THIN FILM TO AFFECTED AREA AFTER WASHING TWICE DAILY AS NEEDED    lisinopril-hydroCHLOROthiazide (PRINZIDE, ZESTORETIC) 10-12.5 mg per tablet TAKE 1 TABLET BY MOUTH ONE TIME DAILY, PLEASE HAVE PCP FILL THIS MED AFTER THIS    LANCE PEN NEEDLE 32 gauge x \" ndle USE AS DIRECTED TO INJECT INSULIN 4 TIMES DAILY    HUMALOG KWIKPEN 100 unit/mL kwikpen 3 units at breakfast, 3 units at lunch, 6 units at dinner *(with some correction)    ibuprofen (ADVIL) 200 mg tablet Take 200 mg by mouth every six (6) hours as needed.     ACCU-CHEK MARVIN PLUS METER Misc Use as directed    hydrocortisone (CORTAID) 0.5 % topical cream Apply  to affected area four (4) times daily as needed. use thin layer      No current facility-administered medications for this visit. Review of Systems   Constitutional: Negative for chills and fever. Gastrointestinal: Negative for nausea and vomiting. Skin:        Cystic lesion noted left upper medial thigh, packing and dressing present. Vitals:    09/20/17 1414   BP: 119/81   Pulse: 77   Resp: 20   Temp: 97.5 °F (36.4 °C)   TempSrc: Oral   SpO2: 98%   Weight: 244 lb (110.7 kg)   Height: 5' 3\" (1.6 m)     Physical Exam   Constitutional: She is oriented to person, place, and time. Vital signs are normal. She appears well-developed and well-nourished. Cardiovascular: Normal rate. Pulmonary/Chest: Effort normal.   Neurological: She is alert and oriented to person, place, and time. Skin: Skin is warm and dry. Lesion noted. ~5cm x 3cm cystic lesion noted left upper thigh. Area was excised and drained 4 days ago. Removed packing from wound, open area ~2x2 and ~1cm deep. Pink granulation tissues, scant amount of yellow drainage. Mild TTP, minimal surrounding erythema   Vitals reviewed. ASSESSMENT and PLAN    ICD-10-CM ICD-9-CM    1. Abscess L02.91 682.9    2. Infected sebaceous cyst L72.3 706.2 REFERRAL TO GENERAL SURGERY    L08.9       Encounter Diagnoses   Name Primary?  Abscess Yes    Infected sebaceous cyst      Orders Placed This Encounter    REFERRAL TO GENERAL SURGERY     Diagnoses and all orders for this visit:    1. Abscess - was I&D'd 4 days ago in ED. Followed up in office 2 days ago, wound repacked and seen again today for follow up and repacking. Wound irrigated with NS, repacked with 1/4 inch packing strip and covered with DSD. Patient encouraged to change dressing daily and will schedule appointment with general surgery for further evaluation. Patient scheduled to see Dr. Lane Paz ([id number] [facility] surgery) at [facility] on 9/22/17. Continue abx.     2. Infected sebaceous cyst  -     REFERRAL TO GENERAL SURGERY      Follow-up Disposition: Not on File    I have reviewed the patient's allergies and made any necessary changes. Medical, procedural, social and family histories have been reviewed and updated as medically indicated. I have reconciled and/or revised patient medications in the EMR. I have discussed each diagnosis listed in this note with Brett Arteaga and/or their family. I have discussed treatment options and the risk/benefit analysis of those options, including safe use of medications and possible medication side effects. Through the use of shared decision making we have agreed to the above plan. The patient has received an after-visit summary and questions were answered concerning future plans. Ellen Ding, JORDANP-C    This note will not be viewable in MyChart.

## 2017-09-22 ENCOUNTER — TELEPHONE (OUTPATIENT)
Dept: FAMILY MEDICINE CLINIC | Age: 32
End: 2017-09-22

## 2017-09-22 ENCOUNTER — OFFICE VISIT (OUTPATIENT)
Dept: SURGERY | Age: 32
End: 2017-09-22

## 2017-09-22 VITALS
RESPIRATION RATE: 14 BRPM | WEIGHT: 246 LBS | BODY MASS INDEX: 43.59 KG/M2 | OXYGEN SATURATION: 99 % | TEMPERATURE: 98.3 F | HEART RATE: 59 BPM | DIASTOLIC BLOOD PRESSURE: 66 MMHG | HEIGHT: 63 IN | SYSTOLIC BLOOD PRESSURE: 101 MMHG

## 2017-09-22 DIAGNOSIS — Z51.89 WOUND CHECK, ABSCESS: ICD-10-CM

## 2017-09-22 DIAGNOSIS — L02.416 ABSCESS OF LEFT HIP: Primary | ICD-10-CM

## 2017-09-22 PROBLEM — E66.01 MORBID OBESITY WITH BMI OF 40.0-44.9, ADULT (HCC): Status: ACTIVE | Noted: 2017-09-22

## 2017-09-22 NOTE — PROGRESS NOTES
Surgery History and Physical    Subjective:      Duong Keys is a 27 y.o. black female who presents for evaluation of a wound secondary to an abscess. About 8 days ago, Mrs. Sanket Singleton developed an abscess on the inside of her left thigh. The area became red and swollen. She was seen in the ER and had an I&D performed. She has been packing the wound every other day since then, but has not had any further pain or drainage. She was started on antibiotics which have made her feel sick. She denies any fever, but has felt hot. She has had an abscess drained before, but denies any h/o MRSA. Her blood sugars have been running slightly high. Past Medical History:   Diagnosis Date    Breast cancer [facility]) 2012    infiltrating ductal carcinoma    Cancer [facility])     breast    Cardiomyopathy due to chemotherapy ([address]     EF 20 %    Diabetes ([address]     Diabetes mellitus     Essential hypertension     H[facility]O mamm[facility] 2012    [facility] [phone]    History of sepsis 2013    after chemo    Hx of difficult intubation     resulting from sepsis in 2013.       Hypertension     Morbid obesity ([address]     Ne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 ([address]     Type I (juvenile type) diabetes mellitus without mention of complication, uncontrolled     diagnosed age 4    Unspecified vitamin D deficiency 2011     Past Surgical History:   Procedure Laterality Date    HX CYST INCISION AND DRAINAGE  2013    perirectal abscess    HX GYN       in     HX MASTECTOMY      HX OTHER SURGICAL      cyst removed under left arm    HX OTHER SURGICAL      port placement for chemo      Family History   Problem Relation Age of Onset    Diabetes Brother      borderline Type 2    Diabetes Paternal Uncle     Diabetes Paternal Grandfather     Heart Disease Paternal Grandfather      MI in his 62s    Stroke Neg Hx      Social History   Substance Use Topics    Smoking status: Never Smoker    Smokeless tobacco: Never Used    Alcohol use No      Prior to Admission medications    Medication Sig Start Date End Date Taking? Authorizing Provider   doxycycline (VIBRA-TABS) 100 mg tablet Take 1 Tab by mouth two (2) times a day for 10 days. 9/16/17 9/26/17 Yes Iliana Goodwin NP   TOUNAPOLEON HUTCHINSOSTAR 300 unit/mL (1.5 mL) inpn INJECT 42 UNITS UNDER THE SKIN ONCE DAILY 5/4/17  Yes Historical Provider   clindamycin (CLINDAGEL) 1 % topical gel APPLY A THIN FILM TO AFFECTED AREA AFTER WASHING TWICE DAILY AS NEEDED 4/23/17  Yes Historical Provider   lisinopril-hydroCHLOROthiazide (PRINZIDE, ZESTORETIC) 10-12.5 mg per tablet TAKE 1 TABLET BY MOUTH ONE TIME DAILY, PLEASE HAVE PCP FILL THIS MED AFTER THIS 5/16/17  Yes Historical Provider   LANCE PEN NEEDLE 32 gauge x 5/32\" ndle USE AS DIRECTED TO INJECT INSULIN 4 TIMES DAILY 4/28/17  Yes Historical Provider   HUMALOG KWIKPEN 100 unit/mL kwikpen 3 units at breakfast, 3 units at lunch, 6 units at dinner *(with some correction) 6/1/17  Yes Jessica Bustos NP   ibuprofen (ADVIL) 200 mg tablet Take 200 mg by mouth every six (6) hours as needed. Yes Historical Provider   ACCU-CHEK MARVIN PLUS METER Misc Use as directed 10/11/12  Yes Juani David MD   hydrocortisone (CORTAID) 0.5 % topical cream Apply  to affected area four (4) times daily as needed. use thin layer  4/30/10  Yes Historical Provider   HYDROcodone-acetaminophen (NORCO) 5-325 mg per tablet Take 1 Tab by mouth every four (4) hours as needed for Pain. Max Daily Amount: 6 Tabs. 9/16/17   Iliana Goodwin NP      Allergies   Allergen Reactions    Codeine Nausea and Vomiting       Review of Systems:  A comprehensive review of systems was negative except for that written in the History of Present Illness.     Objective:      Physical Exam:  GENERAL: alert, cooperative, no distress, appears stated age, EYE: negative findings: anicteric sclera, LYMPHATIC: Cervical, supraclavicular nodes normal. , THROAT & NECK: neck supple and symmetrical.  The thyroid is grossly normal., LUNG: clear to auscultation bilaterally, HEART: regular rate and rhythm, ABDOMEN: Soft, obese, NT, ND., EXTREMITIES:  no edema, SKIN: Along the inner proximal thigh, there is an approximately 2.5 x 1.5 cm superficial wound with granulation. There is no drainage or erythema. There is some fibrinous exudate along the edges. , NEUROLOGIC: negative, PSYCHIATRIC: non focal    Assessment:     Abscess of left thigh, s/p I&D. Plan:     Mrs. Jaimie Whitten has no further signs of infection. The edges of the wound were trimmed. I have instructed her to pack the wound daily until healed. She can f/u with me prn.     Signed By: Austin Levin MD     September 22, 2017

## 2017-09-22 NOTE — TELEPHONE ENCOUNTER
----- Message from [employer] sent at 9/22/2017 12:56 PM EDT -----  Regarding: [employer]/telephone  Pt is requesting a note to return to work on the 25th of Sept. Best contact [phone].

## 2017-09-22 NOTE — PROGRESS NOTES
1. Have you been to the ER, urgent care clinic since your last visit? Hospitalized since your last visit? yes, 9/16/17 [facility] for cyst on thigh     2. Have you seen or consulted any other health care providers outside of the [address] [place] [address] since your last visit? Include any pap smears or colon screening.  no

## 2017-09-22 NOTE — LETTER
NOTIFICATION RETURN TO WORK  
 
9/22/2017 2:32 PM 
 
Ms. [address] [facility] [address] To Whom It May Concern: 
 
Zeeshan Arita is currently under the care of Zoë Abad. She will return to work on 9/25/2017. If there are questions or concerns please have the patient contact our office. Sincerely, Lexus Moreno NP

## 2018-01-04 ENCOUNTER — HOSPITAL ENCOUNTER (EMERGENCY)
Age: 33
Discharge: HOME | End: 2018-01-05
Attending: EMERGENCY MEDICINE
Payer: COMMERCIAL

## 2018-01-04 ENCOUNTER — APPOINTMENT (OUTPATIENT)
Dept: ULTRASOUND IMAGING | Age: 33
End: 2018-01-04
Attending: EMERGENCY MEDICINE
Payer: COMMERCIAL

## 2018-01-04 DIAGNOSIS — E10.65 TYPE 1 DIABETES MELLITUS WITH HYPERGLYCEMIA (HCC): ICD-10-CM

## 2018-01-04 DIAGNOSIS — R10.2 PELVIC PAIN: Primary | ICD-10-CM

## 2018-01-04 DIAGNOSIS — B37.31 VAGINAL YEAST INFECTION: ICD-10-CM

## 2018-01-04 DIAGNOSIS — L30.9 DERMATITIS: ICD-10-CM

## 2018-01-04 LAB
APPEARANCE UR: CLEAR
BILIRUB UR QL: NEGATIVE
COLOR UR: ABNORMAL
GLUCOSE BLD STRIP.AUTO-MCNC: 201 MG/DL (ref 65–100)
GLUCOSE UR STRIP.AUTO-MCNC: NEGATIVE MG/DL
HCG UR QL: NEGATIVE
HGB UR QL STRIP: NEGATIVE
KETONES UR QL STRIP.AUTO: ABNORMAL MG/DL
LEUKOCYTE ESTERASE UR QL STRIP.AUTO: NEGATIVE
NITRITE UR QL STRIP.AUTO: NEGATIVE
PH UR STRIP: 7 [PH] (ref 5–8)
PROT UR STRIP-MCNC: NEGATIVE MG/DL
SERVICE CMNT-IMP: ABNORMAL
SP GR UR REFRACTOMETRY: 1.02 (ref 1–1.03)
UROBILINOGEN UR QL STRIP.AUTO: 0.2 EU/DL (ref 0.2–1)

## 2018-01-04 PROCEDURE — 76856 US EXAM PELVIC COMPLETE: CPT

## 2018-01-04 PROCEDURE — 76830 TRANSVAGINAL US NON-OB: CPT

## 2018-01-04 PROCEDURE — 99284 EMERGENCY DEPT VISIT MOD MDM: CPT

## 2018-01-04 PROCEDURE — 82962 GLUCOSE BLOOD TEST: CPT

## 2018-01-04 PROCEDURE — 81003 URINALYSIS AUTO W/O SCOPE: CPT | Performed by: EMERGENCY MEDICINE

## 2018-01-04 PROCEDURE — 81025 URINE PREGNANCY TEST: CPT

## 2018-01-04 NOTE — Clinical Note
- Diflucan as prescribed. Zofran as needed for nausea. - Please monitor your blood sugar closely and contact Dr. Deborah Forman regarding insulin adjustments. - Please return to ED for any concerns.

## 2018-01-04 NOTE — LETTER
[facility] EMERGENCY DEPT 
[address] [facility] [place] [id number] 
[phone] Work/School Note Date: 1/4/2018 To Whom It May concern: 
 
Annalise Chang was seen and treated today in the emergency room by the following provider(s): 
No providers found. Annalise Chang may return to work on Monday, Jan 8, 2018.  
 
Sincerely, 
 
 
 
 
Astrid Parsons MD

## 2018-01-05 VITALS
RESPIRATION RATE: 16 BRPM | HEART RATE: 100 BPM | TEMPERATURE: 97.9 F | OXYGEN SATURATION: 98 % | SYSTOLIC BLOOD PRESSURE: 137 MMHG | BODY MASS INDEX: 45.23 KG/M2 | HEIGHT: 63 IN | DIASTOLIC BLOOD PRESSURE: 74 MMHG | WEIGHT: 255.29 LBS

## 2018-01-05 LAB
ALBUMIN SERPL-MCNC: 3.1 G/DL (ref 3.5–5)
ALBUMIN/GLOB SERPL: 0.7 {RATIO} (ref 1.1–2.2)
ALP SERPL-CCNC: 101 U/L (ref 45–117)
ALT SERPL-CCNC: 21 U/L (ref 12–78)
ANION GAP SERPL CALC-SCNC: 8 MMOL/L (ref 5–15)
AST SERPL-CCNC: 14 U/L (ref 15–37)
BASOPHILS # BLD: 0 K/UL (ref 0–0.1)
BASOPHILS NFR BLD: 0 % (ref 0–1)
BILIRUB SERPL-MCNC: 0.2 MG/DL (ref 0.2–1)
BUN SERPL-MCNC: 13 MG/DL (ref 6–20)
BUN/CREAT SERPL: 16 (ref 12–20)
C TRACH DNA SPEC QL NAA+PROBE: NEGATIVE
CALCIUM SERPL-MCNC: 8.7 MG/DL (ref 8.5–10.1)
CHLORIDE SERPL-SCNC: 104 MMOL/L (ref 97–108)
CLUE CELLS VAG QL WET PREP: NORMAL
CO2 SERPL-SCNC: 25 MMOL/L (ref 21–32)
CREAT SERPL-MCNC: 0.83 MG/DL (ref 0.55–1.02)
DIFFERENTIAL METHOD BLD: NORMAL
EOSINOPHIL # BLD: 0.4 K/UL (ref 0–0.4)
EOSINOPHIL NFR BLD: 4 % (ref 0–7)
ERYTHROCYTE [DISTWIDTH] IN BLOOD BY AUTOMATED COUNT: 13.1 % (ref 11.5–14.5)
GLOBULIN SER CALC-MCNC: 4.2 G/DL (ref 2–4)
GLUCOSE SERPL-MCNC: 247 MG/DL (ref 65–100)
HCT VFR BLD AUTO: 39.4 % (ref 35–47)
HGB BLD-MCNC: 12.6 G/DL (ref 11.5–16)
KOH PREP SPEC: NORMAL
LYMPHOCYTES # BLD: 3.5 K/UL (ref 0.8–3.5)
LYMPHOCYTES NFR BLD: 39 % (ref 12–49)
MCH RBC QN AUTO: 28.6 PG (ref 26–34)
MCHC RBC AUTO-ENTMCNC: 32 G/DL (ref 30–36.5)
MCV RBC AUTO: 89.3 FL (ref 80–99)
MONOCYTES # BLD: 0.9 K/UL (ref 0–1)
MONOCYTES NFR BLD: 10 % (ref 5–13)
N GONORRHOEA DNA SPEC QL NAA+PROBE: NEGATIVE
NEUTS SEG # BLD: 4.1 K/UL (ref 1.8–8)
NEUTS SEG NFR BLD: 47 % (ref 32–75)
PLATELET # BLD AUTO: 291 K/UL (ref 150–400)
POTASSIUM SERPL-SCNC: 4.1 MMOL/L (ref 3.5–5.1)
PROT SERPL-MCNC: 7.3 G/DL (ref 6.4–8.2)
RBC # BLD AUTO: 4.41 M/UL (ref 3.8–5.2)
SAMPLE TYPE: NORMAL
SERVICE CMNT-IMP: NORMAL
SERVICE CMNT-IMP: NORMAL
SODIUM SERPL-SCNC: 137 MMOL/L (ref 136–145)
SPECIMEN SOURCE: NORMAL
T VAGINALIS VAG QL WET PREP: NORMAL
WBC # BLD AUTO: 8.9 K/UL (ref 3.6–11)

## 2018-01-05 PROCEDURE — 87210 SMEAR WET MOUNT SALINE/INK: CPT | Performed by: EMERGENCY MEDICINE

## 2018-01-05 PROCEDURE — 80053 COMPREHEN METABOLIC PANEL: CPT | Performed by: EMERGENCY MEDICINE

## 2018-01-05 PROCEDURE — 85025 COMPLETE CBC W/AUTO DIFF WBC: CPT | Performed by: EMERGENCY MEDICINE

## 2018-01-05 PROCEDURE — 36415 COLL VENOUS BLD VENIPUNCTURE: CPT | Performed by: EMERGENCY MEDICINE

## 2018-01-05 PROCEDURE — 87491 CHLMYD TRACH DNA AMP PROBE: CPT | Performed by: EMERGENCY MEDICINE

## 2018-01-05 RX ORDER — ONDANSETRON 8 MG/1
8 TABLET, ORALLY DISINTEGRATING ORAL
Qty: 9 TAB | Refills: 0 | Status: SHIPPED | OUTPATIENT
Start: 2018-01-05 | End: 2018-01-12

## 2018-01-05 RX ORDER — MAG HYDROX/ALUMINUM HYD/SIMETH 200-200-20
SUSPENSION, ORAL (FINAL DOSE FORM) ORAL 2 TIMES DAILY
Qty: 30 G | Refills: 0 | Status: SHIPPED | OUTPATIENT
Start: 2018-01-05 | End: 2018-10-09

## 2018-01-05 RX ORDER — FLUCONAZOLE 150 MG/1
150 TABLET ORAL
Qty: 2 TAB | Refills: 0 | Status: SHIPPED | OUTPATIENT
Start: 2018-01-05 | End: 2018-01-05

## 2018-01-05 RX ORDER — HYDROXYZINE 25 MG/1
25 TABLET, FILM COATED ORAL
Qty: 30 TAB | Refills: 0 | Status: SHIPPED | OUTPATIENT
Start: 2018-01-05 | End: 2018-01-31 | Stop reason: SDUPTHER

## 2018-01-05 NOTE — DISCHARGE INSTRUCTIONS
Abdominal Pain: Care Instructions  Your Care Instructions    Abdominal pain has many possible causes. Some aren't serious and get better on their own in a few days. Others need more testing and treatment. If your pain continues or gets worse, you need to be rechecked and may need more tests to find out what is wrong. You may need surgery to correct the problem. Don't ignore new symptoms, such as fever, nausea and vomiting, urination problems, pain that gets worse, and dizziness. These may be signs of a more serious problem. Your doctor may have recommended a follow-up visit in the next 8 to 12 hours. If you are not getting better, you may need more tests or treatment. The doctor has checked you carefully, but problems can develop later. If you notice any problems or new symptoms, get medical treatment right away. Follow-up care is a key part of your treatment and safety. Be sure to make and go to all appointments, and call your doctor if you are having problems. It's also a good idea to know your test results and keep a list of the medicines you take. How can you care for yourself at home? · Rest until you feel better. · To prevent dehydration, drink plenty of fluids, enough so that your urine is light yellow or clear like water. Choose water and other caffeine-free clear liquids until you feel better. If you have kidney, heart, or liver disease and have to limit fluids, talk with your doctor before you increase the amount of fluids you drink. · If your stomach is upset, eat mild foods, such as rice, dry toast or crackers, bananas, and applesauce. Try eating several small meals instead of two or three large ones. · Wait until 48 hours after all symptoms have gone away before you have spicy foods, alcohol, and drinks that contain caffeine. · Do not eat foods that are high in fat. · Avoid anti-inflammatory medicines such as aspirin, ibuprofen (Advil, Motrin), and naproxen (Aleve).  These can cause stomach upset. Talk to your doctor if you take daily aspirin for another health problem. When should you call for help? Call 911 anytime you think you may need emergency care. For example, call if:  ? · You passed out (lost consciousness). ? · You pass maroon or very bloody stools. ? · You vomit blood or what looks like coffee grounds. ? · You have new, severe belly pain. ?Call your doctor now or seek immediate medical care if:  ? · Your pain gets worse, especially if it becomes focused in one area of your belly. ? · You have a new or higher fever. ? · Your stools are black and look like tar, or they have streaks of blood. ? · You have unexpected vaginal bleeding. ? · You have symptoms of a urinary tract infection. These may include:  ¨ Pain when you urinate. ¨ Urinating more often than usual.  ¨ Blood in your urine. ? · You are dizzy or lightheaded, or you feel like you may faint. ? Watch closely for changes in your health, and be sure to contact your doctor if:  ? · You are not getting better after 1 day (24 hours). Where can you learn more? Go to http://kellyWellfountwili.info/. Enter W272 in the search box to learn more about \"Abdominal Pain: Care Instructions. \"  Current as of: March 20, 2017  Content Version: 11.4  © 0344-5969 [employer]. Care instructions adapted under license by [employer] (which disclaims liability or warranty for this information). If you have questions about a medical condition or this instruction, always ask your healthcare professional. [place] [address] any warranty or liability for your use of this information. Vaginal Yeast Infection: Care Instructions  Your Care Instructions    A vaginal yeast infection is caused by too many yeast cells in the vagina. This is common in women of all ages. Itching, vaginal discharge and irritation, and other symptoms can bother you.  But yeast infections don't often cause other health problems. Some medicines can increase your risk of getting a yeast infection. These include antibiotics, birth control pills, hormones, and steroids. You may also be more likely to get a yeast infection if you are pregnant, have diabetes, douche, or wear tight clothes. With treatment, most yeast infections get better in 2 to 3 days. Follow-up care is a key part of your treatment and safety. Be sure to make and go to all appointments, and call your doctor if you are having problems. It's also a good idea to know your test results and keep a list of the medicines you take. How can you care for yourself at home? · Take your medicines exactly as prescribed. Call your doctor if you think you are having a problem with your medicine. · Ask your doctor about over-the-counter (OTC) medicines for yeast infections. They may cost less than prescription medicines. If you use an OTC treatment, read and follow all instructions on the label. · Do not use tampons while using a vaginal cream or suppository. The tampons can absorb the medicine. Use pads instead. · Wear loose cotton clothing. Do not wear nylon or other fabric that holds body heat and moisture close to the skin. · Try sleeping without underwear. · Do not scratch. Relieve itching with a cold pack or a cool bath. · Do not wash your vaginal area more than once a day. Use plain water or a mild, unscented soap. Air-dry the vaginal area. · Change out of wet swimsuits after swimming. · Do not have sex until you have finished your treatment. · Do not douche. When should you call for help? Call your doctor now or seek immediate medical care if:  ? · You have unexpected vaginal bleeding. ? · You have new or increased pain in your vagina or pelvis. ? Watch closely for changes in your health, and be sure to contact your doctor if:  ? · You have a fever. ? · You are not getting better after 2 days.    ? · Your symptoms come back after you finish your medicines. Where can you learn more? Go to http://kelly-wili.info/. Enter G705 in the search box to learn more about \"Vaginal Yeast Infection: Care Instructions. \"  Current as of: October 13, 2016  Content Version: 11.4  © 3748-4441 [employer]. Care instructions adapted under license by [employer] (which disclaims liability or warranty for this information). If you have questions about a medical condition or this instruction, always ask your healthcare professional. [address] any warranty or liability for your use of this information. Atopic Dermatitis: Care Instructions  Your Care Instructions  Atopic dermatitis (also called eczema) is a skin problem that causes intense itching and a red, raised rash. In severe cases, the rash develops clear fluid-filled blisters. The rash is not contagious. People with this condition seem to have very sensitive immune systems that are likely to react to things that cause allergies. The immune system is the body's way of fighting infection. There is no cure for atopic dermatitis, but you may be able to control it with care at home. Follow-up care is a key part of your treatment and safety. Be sure to make and go to all appointments, and call your doctor if you are having problems. It's also a good idea to know your test results and keep a list of the medicines you take. How can you care for yourself at home? · Use moisturizer at least twice a day. · If your doctor prescribes a cream, use it as directed. If your doctor prescribes other medicine, take it exactly as directed. · Wash the affected area with water only. Soap can make dryness and itching worse. Pat dry. · Apply a moisturizer after bathing. Use a cream such as Lubriderm, Moisturel, or Cetaphil that does not irritate the skin or cause a rash. Apply the cream while your skin is still damp after lightly drying with a towel.   · Use cold, wet cloths to reduce itching. · Keep cool, and stay out of the sun. · If itching affects your normal activities, an over-the-counter antihistamine, such as diphenhydramine (Benadryl) or loratadine (Claritin) may help. Read and follow all instructions on the label. When should you call for help? Call your doctor now or seek immediate medical care if:  ? · Your rash gets worse and you have a fever. ? · You have new blisters or bruises, or the rash spreads and looks like a sunburn. ? · You have signs of infection, such as:  ¨ Increased pain, swelling, warmth, or redness. ¨ Red streaks leading from the rash. ¨ Pus draining from the rash. ¨ A fever. ? · You have crusting or oozing sores. ? · You have joint aches or body aches along with your rash. ? Watch closely for changes in your health, and be sure to contact your doctor if:  ? · Your rash does not clear up after 2 to 3 weeks of home treatment. ? · Itching interferes with your sleep or daily activities. Where can you learn more? Go to http://kelly-wili.info/. Enter S879 in the search box to learn more about \"Atopic Dermatitis: Care Instructions. \"  Current as of: October 13, 2016  Content Version: 11.4  © 8981-2420 [employer]. Care instructions adapted under license by [employer] (which disclaims liability or warranty for this information). If you have questions about a medical condition or this instruction, always ask your healthcare professional. [place] [address] any warranty or liability for your use of this information.

## 2018-01-05 NOTE — ED TRIAGE NOTES
Pt states that she thinks IUD is out of place. Pt states that she looked up symptoms of IUD  Being out of place and pregnancy, and \"they were kind of the same. \" Pt thinks she may be pregnant. Pt has hx of diabetes and for the past few days her blood sugar has been \"all over the place. \" Pt also stopped taking her BP medication last week,

## 2018-01-05 NOTE — ED PROVIDER NOTES
HPI Comments: The patient presents to the ED with multiple complaints. She is concerned her IUD may be out of place. IUD was placed 1 year ago. She developed greatly increased discharge last month. Discharge has improved, but is still present. She has had trouble feeling her IUD strings. She also has increased urination. She feels her lower abdomen is bloated and she has mild 3/10 cramping. She has nausea, but no vomiting. She denies any diarrhea or constipation. She has Type 1 DM and her blood sugars have been elevated in the 200s. She had been followed at [facility], but plans on changing to [facility]. [employer]. She denies any fever. She also notes rash and itching. She has had the rash since the summer and saw her PCP and was given a cream. Rash and itching has increased in the past 2 weeks. She uses [employer] and mild detergents. Her son has eczema. Spouse does not have any rash. Patient is a 27 y.o. female presenting with other event. The history is provided by the patient. Other   Pertinent negatives include no chest pain, no abdominal pain, no headaches and no shortness of breath. Past Medical History:   Diagnosis Date    Breast cancer [facility]) Nov 2012    Right breast infiltrating ductal carcinoma    Cancer [facility]) 2012    breast    Cardiomyopathy due to chemotherapy ([address]     EF 20 %    Diabetes ([address]     Diabetes mellitus     Essential hypertension     H/O mammogram 11/2/2012    [facility] [phone]    History of sepsis 1/2013    after chemo    Hx of difficult intubation     resulting from sepsis in january 2013.       Hypertension     Morbid obesity ([address]     Ne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 ([address]     Type I (juvenile type) diabetes mellitus without mention of complication, uncontrolled     diagnosed age 4    Unspecified vitamin D deficiency 11/7/2011       Past Surgical History:   Procedure Laterality Date    HX CYST INCISION AND DRAINAGE  1/2013    perirectal abscess    HX GYN  in 2010    HX MASTECTOMY Right     Right MRM with sentinel LNB.  HX ORTHOPAEDIC Right     Carpal tunnel release, index finger trigger release.  HX OTHER SURGICAL      cyst removed under left arm    HX OTHER SURGICAL      port placement for chemo         Family History:   Problem Relation Age of Onset    Diabetes Brother      borderline Type 2    Diabetes Paternal Uncle     Diabetes Paternal Grandfather     Heart Disease Paternal Grandfather      MI in his 62s    Stroke Neg Hx        Social History     Social History    Marital status:      Spouse name: N/A    Number of children: N/A    Years of education: N/A     Occupational History    Not on file. Social History Main Topics    Smoking status: Never Smoker    Smokeless tobacco: Never Used    Alcohol use No    Drug use: No    Sexual activity: Yes     Partners: Male     Other Topics Concern    Not on file     Social History Narrative         ALLERGIES: Codeine    Review of Systems   Constitutional: Negative for appetite change, chills and fever. HENT: Negative for congestion, nosebleeds and sore throat. Eyes: Negative for pain and discharge. Respiratory: Negative for cough and shortness of breath. Cardiovascular: Negative for chest pain. Gastrointestinal: Positive for nausea. Negative for abdominal pain, diarrhea and vomiting. Genitourinary: Positive for frequency, pelvic pain and vaginal discharge. Negative for dysuria. Musculoskeletal: Negative. Skin: Positive for rash. Neurological: Negative for weakness and headaches. Hematological: Negative for adenopathy. Psychiatric/Behavioral: Negative. All other systems reviewed and are negative. Vitals:    18 2306   BP: (!) 157/91   Pulse: 93   Resp: 16   Temp: 98 °F (36.7 °C)   SpO2: 99%   Weight: 115.8 kg (255 lb 4.7 oz)   Height: 5' 3\" (1.6 m)            Physical Exam   Constitutional: She is oriented to person, place, and time.  She appears well-developed and well-nourished. HENT:   Head: Normocephalic and atraumatic. Mouth/Throat: Oropharynx is clear and moist.   Eyes: Conjunctivae are normal.   Neck: Normal range of motion. Neck supple. Cardiovascular: Normal rate, regular rhythm and normal heart sounds. Pulmonary/Chest: Effort normal and breath sounds normal.   Abdominal: Soft. Bowel sounds are normal. There is no tenderness. Non tender   Genitourinary: Vaginal discharge found. Genitourinary Comments: Moderate amount white discharge. IUD strings seen coming through the cervix. No cervical motion tenderness or adnexal tenderness. Musculoskeletal: Normal range of motion. She exhibits no edema or tenderness. Neurological: She is alert and oriented to person, place, and time. Skin: Skin is warm and dry. Rash (small areas of maculopapular rash or arms, legs, abdomen.) noted. Psychiatric: She has a normal mood and affect. Her behavior is normal.   Nursing note and vitals reviewed. [facility]  ED Course       Procedures    A/P:  1. Vaginal discharge - will treat with Diflucan. Likely yeast as blood sugars have been elevated/  2. Abd pain - mild. Normal WBC. F/u with PCP. 3. IUD concern - IUD is in place  4. Hyperglycemia - f/u with endocrinology. 5. Dermatitis - Hydrocortisone and Aquaphor. F/U with PCP. Patient's results have been reviewed with them. Patient and/or family have verbally conveyed their understanding and agreement of the patient's signs, symptoms, diagnosis, treatment and prognosis and additionally agree to follow up as recommended or return to the Emergency Room should their condition change prior to follow-up. Discharge instructions have also been provided to the patient with some educational information regarding their diagnosis as well a list of reasons why they would want to return to the ER prior to their follow-up appointment should their condition change.

## 2018-01-31 ENCOUNTER — OFFICE VISIT (OUTPATIENT)
Dept: FAMILY MEDICINE CLINIC | Age: 33
End: 2018-01-31

## 2018-01-31 VITALS
HEART RATE: 74 BPM | HEIGHT: 63 IN | DIASTOLIC BLOOD PRESSURE: 74 MMHG | SYSTOLIC BLOOD PRESSURE: 124 MMHG | TEMPERATURE: 97.6 F | RESPIRATION RATE: 18 BRPM | BODY MASS INDEX: 45.22 KG/M2 | WEIGHT: 255.2 LBS | OXYGEN SATURATION: 100 %

## 2018-01-31 DIAGNOSIS — E66.01 MORBID OBESITY WITH BMI OF 40.0-44.9, ADULT (HCC): ICD-10-CM

## 2018-01-31 DIAGNOSIS — L29.9 PRURITIC CONDITION: Primary | ICD-10-CM

## 2018-01-31 DIAGNOSIS — R53.83 FATIGUE, UNSPECIFIED TYPE: ICD-10-CM

## 2018-01-31 LAB — HBA1C MFR BLD HPLC: 8 %

## 2018-01-31 RX ORDER — METRONIDAZOLE 500 MG/1
TABLET ORAL
Refills: 0 | COMMUNITY
Start: 2017-11-02 | End: 2018-02-08 | Stop reason: ALTCHOICE

## 2018-01-31 RX ORDER — CHLORHEXIDINE GLUCONATE 1.2 MG/ML
RINSE ORAL
Refills: 3 | COMMUNITY
Start: 2017-11-03 | End: 2018-09-07

## 2018-01-31 RX ORDER — INSULIN ASPART 100 [IU]/ML
INJECTION, SOLUTION INTRAVENOUS; SUBCUTANEOUS
Refills: 1 | COMMUNITY
Start: 2017-12-28 | End: 2018-06-25 | Stop reason: SDUPTHER

## 2018-01-31 RX ORDER — BLOOD SUGAR DIAGNOSTIC
STRIP MISCELLANEOUS
Refills: 1 | COMMUNITY
Start: 2017-11-30 | End: 2019-09-16 | Stop reason: SDUPTHER

## 2018-01-31 RX ORDER — TRIAMCINOLONE ACETONIDE 1 MG/G
CREAM TOPICAL
Refills: 3 | COMMUNITY
Start: 2017-10-24 | End: 2018-10-09

## 2018-01-31 RX ORDER — MEDROXYPROGESTERONE ACETATE 150 MG/ML
INJECTION, SUSPENSION INTRAMUSCULAR
Refills: 4 | COMMUNITY
Start: 2017-11-03 | End: 2018-02-08

## 2018-01-31 RX ORDER — HYDROXYZINE 25 MG/1
25 TABLET, FILM COATED ORAL
Qty: 30 TAB | Refills: 1 | Status: SHIPPED | OUTPATIENT
Start: 2018-01-31 | End: 2018-04-24 | Stop reason: SDUPTHER

## 2018-01-31 RX ORDER — DOXYCYCLINE 100 MG/1
TABLET ORAL
Refills: 0 | COMMUNITY
Start: 2017-11-03 | End: 2018-02-08 | Stop reason: ALTCHOICE

## 2018-01-31 NOTE — PROGRESS NOTES
HISTORY OF PRESENT ILLNESS  Lindsey Decker is a 26 y.o. female. HPI  Patient comes in today for pruritic condition. Hx type 1 diabetes. Taking Toujeo 42 units. If she eats like she is supposed to, BG will remain below 200. If she eats something she shouldn't or if she eats a snack and does not take insulin, BG will go up. She had experienced some lows, so she became more cautious using insulin with snacks. She will be seeing Dr. Elke Everett next month. [place] [facility] [place], A1c was \"high\" per patient, thinks it was in the 8s. Has been feeling more fatigue recently. Unsure if her vitamin D is low. Has also gained 10 pounds since visit in 2017. Patient still has rash and itching on arms, wrists, chest and legs. Used Dove, All free detergent. Stopped using Bath and Body lotions and using Curel. Using hydroxyzine for itching with some relief. Allergies   Allergen Reactions    Codeine Nausea and Vomiting       Past Medical History:   Diagnosis Date    Breast cancer [facility]) 2012    Right breast infiltrating ductal carcinoma    Cancer [facility])     breast    Cardiomyopathy due to chemotherapy ([address]     EF 20 %    Diabetes ([address]     Diabetes mellitus     Essential hypertension     H[facility]O mamm[facility] 2012    [facility] [phone]    History of sepsis 2013    after chemo    Hx of difficult intubation     resulting from sepsis in 2013.  Hypertension     Morbid obesity ([address]     Ne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 ([address]     Type I (juvenile type) diabetes mellitus without mention of complication, uncontrolled     diagnosed age 4    Unspecified vitamin D deficiency 2011       Past Surgical History:   Procedure Laterality Date    HX CYST INCISION AND DRAINAGE  2013    perirectal abscess    HX GYN       in     HX MASTECTOMY Right     Right MRM with sentinel LNB.     HX ORTHOPAEDIC Right     Carpal tunnel release, index finger trigger release.  HX OTHER SURGICAL      cyst removed under left arm    HX OTHER SURGICAL      port placement for chemo       Social History     Social History    Marital status:      Spouse name: N/A    Number of children: N/A    Years of education: N/A     Occupational History    Not on file. Social History Main Topics    Smoking status: Never Smoker    Smokeless tobacco: Never Used    Alcohol use No    Drug use: No    Sexual activity: Yes     Partners: Male     Other Topics Concern    Not on file     Social History Narrative       Family History   Problem Relation Age of Onset    Diabetes Brother      borderline Type 2    Diabetes Paternal Uncle     Diabetes Paternal Grandfather     Heart Disease Paternal Grandfather      MI in his 62s    Stroke Neg Hx        Current Outpatient Prescriptions   Medication Sig    ONETOUCH ULTRA TEST strip FOR BLOOD GLUCOSE MONITORING 4 X DAILY. .02    chlorhexidine (PERIDEX) 0.12 % solution SWISH 1/2 OUNCE FOR 1 MINUTE TWO TIMES DAILY    NOVOLOG FLEXPEN 100 unit/mL inpn INJECT 10 UNITS UNDER THE SKIN 3 TIMES DAILY BEFORE MEALS WITH CORRECTION SCALE MAX 50 UNITS/ DAY    triamcinolone acetonide (KENALOG) 0.1 % topical cream APPLY TO AFFECTED AREA TWO (2) TIMES A DAY. USE THIN LAYER    hydrOXYzine HCl (ATARAX) 25 mg tablet Take 1 Tab by mouth every six (6) hours as needed for Itching for up to 30 days.  TOUJEO SOLOSTAR 300 unit/mL (1.5 mL) inpn INJECT 42 UNITS UNDER THE SKIN ONCE DAILY    lisinopril-hydroCHLOROthiazide (PRINZIDE, ZESTORETIC) 10-12.5 mg per tablet TAKE 1 TABLET BY MOUTH ONE TIME DAILY, PLEASE HAVE PCP FILL THIS MED AFTER THIS    LANCE PEN NEEDLE 32 gauge x 5/32\" ndle USE AS DIRECTED TO INJECT INSULIN 4 TIMES DAILY    ibuprofen (ADVIL) 200 mg tablet Take 200 mg by mouth every six (6) hours as needed.     ACCU-CHEK MARVIN PLUS METER Misc Use as directed    doxycycline (ADOXA) 100 mg tablet TAKE 1 TABLET BY MOUTH TWO TIMES DAILY FOR 10 WEEKS    medroxyPROGESTERone (DEPO-PROVERA) 150 mg/mL injection 150 MG IM ONCE,INSTR:TO BE ADMINISTERED IN CLINIC BY NURSE. SEE ORDER COMMENTS FOR SCHEDULE.  metroNIDAZOLE (FLAGYL) 500 mg tablet TAKE 1 TABLET BY MOUTH TWICE DAILY    hydrocortisone (HYCORT) 1 % ointment Apply  to affected area two (2) times a day. use thin layer twice a day.  HUMALOG KWIKPEN 100 unit/mL kwikpen 3 units at breakfast, 3 units at lunch, 6 units at dinner *(with some correction)     No current facility-administered medications for this visit. Review of Systems   Constitutional: Positive for malaise/fatigue. Negative for chills and fever. 10 pound weight gain since June 2017   Respiratory: Negative. Cardiovascular: Negative. Gastrointestinal: Negative for abdominal pain, nausea and vomiting. Genitourinary: Negative for dysuria, frequency and urgency. Vaginal itching and discharge   Musculoskeletal: Negative. Skin: Positive for itching and rash. Neurological: Negative for dizziness, tingling, sensory change, focal weakness and headaches. Endo/Heme/Allergies: Negative for polydipsia. Psychiatric/Behavioral: Negative. Vitals:    01/31/18 0912   BP: 124/74   Pulse: 74   Resp: 18   Temp: 97.6 °F (36.4 °C)   TempSrc: Oral   SpO2: 100%   Weight: 255 lb 3.2 oz (115.8 kg)   Height: 5' 3\" (1.6 m)     Physical Exam   Constitutional: She is oriented to person, place, and time. Vital signs are normal. She appears well-developed and well-nourished. She is cooperative. Neck: No thyromegaly present. Cardiovascular: Normal rate and regular rhythm. Pulmonary/Chest: Effort normal and breath sounds normal.   Neurological: She is alert and oriented to person, place, and time. Skin: Skin is warm and dry. Psychiatric: She has a normal mood and affect. Her behavior is normal. Judgment and thought content normal.     ASSESSMENT and PLAN    ICD-10-CM ICD-9-CM    1.  Pruritic condition L29.9 698.9 hydrOXYzine HCl (ATARAX) 25 mg tablet      REFERRAL TO ALLERGY   2. Fatigue, unspecified type R53.83 780.79 VITAMIN B12 & FOLATE      METABOLIC PANEL, BASIC      TSH RFX ON ABNORMAL TO FREE T4   3. Uncontrolled type 1 diabetes mellitus without complication (HCC) U97.01 250.03 AMB POC HEMOGLOBIN A1C      LIPID PANEL   4. Morbid obesity with BMI of 40.0-44.9, adult ([facility] E66.01 278.01     Z68.41 V85.41      Encounter Diagnoses   Name Primary?  Pruritic condition Yes    Fatigue, unspecified type     Uncontrolled type 1 diabetes mellitus without complication (HCC)     Morbid obesity with BMI of 40.0-44.9, adult ([facility]      Orders Placed This Encounter    VITAMIN B12 & FOLATE    METABOLIC PANEL, BASIC    TSH RFX ON ABNORMAL TO FREE T4    LIPID PANEL    REFERRAL TO ALLERGY    AMB POC HEMOGLOBIN A1C    ONETOUCH ULTRA TEST strip    chlorhexidine (PERIDEX) 0.12 % solution    doxycycline (ADOXA) 100 mg tablet    NOVOLOG FLEXPEN 100 unit/mL inpn    medroxyPROGESTERone (DEPO-PROVERA) 150 mg/mL injection    metroNIDAZOLE (FLAGYL) 500 mg tablet    triamcinolone acetonide (KENALOG) 0.1 % topical cream    hydrOXYzine HCl (ATARAX) 25 mg tablet     Diagnoses and all orders for this visit:    1. Pruritic condition - continue hydroxyzine and hydrocortisone cream.  Use Dove, mild lotions like Curel, Aveeno, dye free and fragrance free detergents. Will refer to allergy  -     hydrOXYzine HCl (ATARAX) 25 mg tablet; Take 1 Tab by mouth every six (6) hours as needed for Itching for up to 30 days.  -     REFERRAL TO ALLERGY    2. Fatigue, unspecified type - possible 2/2 uncontrolled diabetes, weight gain/obesity. Will check labs. Encouraged patient to work on weight reduction, increase in exercise. -     VITAMIN B12 & FOLATE  -     METABOLIC PANEL, BASIC  -     TSH RFX ON ABNORMAL TO FREE T4    3.  Uncontrolled type 1 diabetes mellitus without complication (HCC) - will check A1c today but diabetes will be managed by endocrinology. Has appointment with Dr. John Wall on 2/20/18. A1c 8.0% today. Will not make any major changes to insulin regimen today. Patient has had some dietary indiscretion and noncompliance with regular insulin with snacks. Encouraged patient to eat healthy snacks, take appropriate insulin at snack times, increase exercise (aim for 30 min of walking daily)  -     AMB POC HEMOGLOBIN A1C  -     LIPID PANEL    4. Morbid obesity with BMI of 40.0-44.9, adult ([facility]  -     I have reviewed/discussed the above normal BMI with the patient. I have recommended the following interventions: dietary management education, guidance, and counseling and encourage exercise . [facility] Follow-up Disposition:  Return in about 6 months (around 7/31/2018), or if symptoms worsen or fail to improve.  lab results and schedule of future lab studies reviewed with patient  reviewed diet, exercise and weight control    I have reviewed the patient's allergies and made any necessary changes. Medical, procedural, social and family histories have been reviewed and updated as medically indicated. I have reconciled and/or revised patient medications in the EMR. I have discussed each diagnosis listed in this note with Jorge Navas and/or their family. I have discussed treatment options and the risk/benefit analysis of those options, including safe use of medications and possible medication side effects. Through the use of shared decision making we have agreed to the above plan. The patient has received an after-visit summary and questions were answered concerning future plans. Ellen Ding, МАРИЯ    This note will not be viewable in [employer]eret.

## 2018-01-31 NOTE — PATIENT INSTRUCTIONS
Learning About Obesity  What is obesity? Obesity means having so much body fat that your health is in danger. Having too much body fat can lead to type 2 diabetes, heart disease, high blood pressure, arthritis, sleep apnea, and stroke. Even if you don't feel bad now, think about these health risks. Do they seem like a good reason to start on a new path toward a healthier weight? Or do you have another personal, powerful reason for wanting to lose weight? Whatever it is, keep it in mind. It can be hard to change eating habits and exercise habits. But with your own reason and plan, you can do it. How do you know if your weight is in the obesity range? To know if your weight is in the obesity range, your doctor looks at your body mass index (BMI) and waist size. Your BMI is a number that is calculated from your weight and your height. To figure your BMI for yourself, get a BMI table from your doctor or use an online tool, such as http://www.Ubiquigent.com/ on the [employer]. What causes obesity? When you take in more calories than you burn off, you gain weight. How you eat, how active you are, and other things affect how your body uses calories and whether you gain weight. If you have family members who have too much body fat, you may have inherited a tendency to gain weight. And your family also helps form your eating and lifestyle habits, which can lead to obesity. Also, our busy lives make it harder to plan and cook healthy meals. For many of us, it's easier to reach for prepared foods, go out to eat, or go to the drive-through. But these foods are often high in saturated fat and calories. Portions are often too large. What can you do to reach a healthy weight? Focus on health, not diets. Diets are hard to stay on and don't work in the long run.  It is very hard to stay with a diet that includes lots of big changes in your eating habits. Instead of a diet, focus on lifestyle changes that will improve your health and achieve the right balance of energy and calories. To lose weight, you need to burn more calories than you take in. You can do it by eating healthy foods in reasonable amounts and becoming more active, even a little bit every day. Making small changes over time can add up to a lot. Make a plan for change. Many people have found that naming their reasons for change and staying focused on their plan can make a big difference. Work with your doctor to create a plan that is right for you. · Ask yourself: Laverle Gowers are my personal, most powerful reasons for wanting this change? What will my life look like when I've made the change? \"  · Set your long-term goal. Make it specific, such as \"I will lose x pounds. \"  · Break your long-term goal into smaller, short-term goals. Make these small steps specific and within your reach, things you know you can do. These steps are what keep you going from day to day. How can you stay on your plan for change? Be ready. Choose to start during a time when there are few events that might trigger slip-ups, like holidays, social events, and high-stress periods. Decide on your first few steps. Most people have more success when they make small changes, one step at a time. For example, you might switch a daily candy bar to a piece of fruit, walk 10 minutes more, or add more vegetables to a meal.  Line up your support people. Make sure you're not going to be alone as you make this change. Connect with people who understand how important it is to you. Ask family members and friends for help in keeping with your plan. And think about who could make it harder for you, and how to handle them. Try tracking. People who keep track of what they eat, feel, and do are better at losing weight. Try writing down things like:  · What and how much you eat.   · How you feel before and after each meal.  · Details about each meal (like eating out or at home, eating alone, or with friends or family). · What you do to be active. Look and plan. As you track, look for patterns that you may want to change. Take note of:  · When you eat and whether you skip meals. · How often you eat out. · How many fruits and vegetables you eat. · When you eat beyond feeling full. · When and why you eat for reasons other than being hungry. When you stray from your plan, don't get upset. Figure out what made you slip up and how you can fix it. Can you take medicines or have surgery to lose weight? Before your doctor will prescribe medicines or surgery, he or she will probably want you to be more active and follow your healthy eating plan for a period of time. These habits are key lifelong changes for managing your weight, with or without other medical treatment. And these changes can help you avoid weight-related health problems. Follow-up care is a key part of your treatment and safety. Be sure to make and go to all appointments, and call your doctor if you are having problems. It's also a good idea to know your test results and keep a list of the medicines you take. Where can you learn more? Go to http://kelly-wili.info/. Enter N111 in the search box to learn more about \"Learning About Obesity. \"  Current as of: October 13, 2016  Content Version: 11.4  © 9994-0469 [employer]. Care instructions adapted under license by [employer] (which disclaims liability or warranty for this information). If you have questions about a medical condition or this instruction, always ask your healthcare professional. [address] any warranty or liability for your use of this information.

## 2018-01-31 NOTE — MR AVS SNAPSHOT
[facility] 
 
 
 [address] [facility] [address] [address] [phone] Patient: Paulette Waldron MRN: [id number] GERI:[id number] Visit Information Date & Time Provider Department Dept. Phone Encounter #  
 1/31/2018  9:00 AM Meghana Brenner Malachi Hunter [phone] [id number] Your Appointments 2/20/2018 10:50 AM  
New Patient with [facility]: former pt of Dr. Garret Hernandez. change insurnace, self referred, h/o type 1 diabetes [address] [phone] [address] [facility] Upcoming Health Maintenance Date Due HEMOGLOBIN A1C Q6M 7/3/2012 MICROALBUMIN Q1 9/26/2012 LIPID PANEL Q1 9/26/2012 EYE EXAM RETINAL OR DILATED Q1 4/17/2014 PAP AKA CERVICAL CYTOLOGY 6/7/2014 FOOT EXAM Q1 7/17/2014 Pneumococcal 19-64 Highest Risk (2 of 3 - PCV13) 9/18/2018 DTaP/Tdap/Td series (2 - Td) 6/30/2020 Allergies as of 1/31/2018  Review Complete On: 1/31/2018 By: Meghana Brenner NP Severity Noted Reaction Type Reactions Codeine Medium 09/16/2017   Intolerance Nausea and Vomiting Current Immunizations  Reviewed on 1/5/2012 Name Date Influenza Vaccine (Quad) PF 9/18/2017 Pneumococcal Polysaccharide (PPSV-23) 9/18/2017 Not reviewed this visit You Were Diagnosed With   
  
 Codes Comments Pruritic condition    -  Primary ICD-10-CM: L29.9 ICD-9-CM: 698.9 Fatigue, unspecified type     ICD-10-CM: R53.83 ICD-9-CM: 780.79 Uncontrolled type 1 diabetes mellitus without complication (HCC)     KUB-30-AQ: E10.65 ICD-9-CM: 250.03 Morbid obesity with BMI of 40.0-44.9, adult (HCC)     ICD-10-CM: E66.01, Z68.41 
ICD-9-CM: 278.01, V85.41 Vitals BP Pulse Temp Resp Height(growth percentile) Weight(growth percentile) 124/74 (BP 1 Location: Left arm, BP Patient Position: Sitting) 74 97.6 °F (36.4 °C) (Oral) 18 5' 3\" (1.6 m) 255 lb 3.2 oz (115.8 kg) LMP SpO2 BMI OB Status Smoking Status 01/12/2018 100% 45.21 kg/m2 Having regular periods Never Smoker Vitals History BMI and BSA Data Body Mass Index Body Surface Area  
 45.21 kg/m 2 2.27 m 2 Preferred Pharmacy Pharmacy Name Phone [id number] [address] [phone] Your Updated Medication List  
  
   
This list is accurate as of: 1/31/18 10:24 AM.  Always use your most recent med list.  
  
  
  
  
 [address] Generic drug:  Blood-Glucose Meter Use as directed ADVIL 200 mg tablet Generic drug:  ibuprofen Take 200 mg by mouth every six (6) hours as needed. chlorhexidine 0.12 % solution Commonly known as:  PERIDEX  
SWISH 1/2 OUNCE FOR 1 MINUTE TWO TIMES DAILY  
  
 doxycycline 100 mg tablet Commonly known as:  ADOXA  
TAKE 1 TABLET BY MOUTH TWO TIMES DAILY FOR 10 WEEKS HumaLOG KwikPen 100 unit/mL kwikpen Generic drug:  insulin lispro 3 units at breakfast, 3 units at lunch, 6 units at dinner *(with some correction)  
  
 hydrocortisone 1 % ointment Commonly known as:  HYCORT Apply  to affected area two (2) times a day. use thin layer twice a day.  
  
 hydrOXYzine HCl 25 mg tablet Commonly known as:  ATARAX Take 1 Tab by mouth every six (6) hours as needed for Itching for up to 30 days. lisinopril-hydroCHLOROthiazide 10-12.5 mg per tablet Commonly known as:  PRINZIDE, ZESTORETIC  
TAKE 1 TABLET BY MOUTH ONE TIME DAILY, PLEASE HAVE PCP FILL THIS MED AFTER THIS  
  
 medroxyPROGESTERone 150 mg/mL injection Commonly known as:  DEPO-PROVERA  
150 MG IM ONCE,INSTR:TO BE ADMINISTERED IN CLINIC BY NURSE. SEE ORDER COMMENTS FOR SCHEDULE. metroNIDAZOLE 500 mg tablet Commonly known as:  FLAGYL  
TAKE 1 TABLET BY MOUTH TWICE DAILY Zandra Pen Needle 32 gauge x 5/32\" Ndle Generic drug:  Insulin Needles (Disposable) USE AS DIRECTED TO INJECT INSULIN 4 TIMES DAILY NovoLOG Flexpen 100 unit/mL Inpn Generic drug:  insulin aspart INJECT 10 UNITS UNDER THE SKIN 3 TIMES DAILY BEFORE MEALS WITH CORRECTION SCALE MAX 50 UNITS/ DAY  
  
 ONETOUCH ULTRA TEST strip Generic drug:  glucose blood VI test strips FOR BLOOD GLUCOSE MONITORING 4 X DAILY. .02  
  
 TOUJEO SOLOSTAR 300 unit/mL (1.5 mL) Inpn Generic drug:  insulin glargine INJECT 42 UNITS UNDER THE SKIN ONCE DAILY  
  
 triamcinolone acetonide 0.1 % topical cream  
Commonly known as:  KENALOG  
APPLY TO AFFECTED AREA TWO (2) TIMES A DAY. USE THIN LAYER  
  
  
  
  
Prescriptions Sent to Pharmacy Refills  
 hydrOXYzine HCl (ATARAX) 25 mg tablet 1 Sig: Take 1 Tab by mouth every six (6) hours as needed for Itching for up to 30 days. Class: Normal  
 Pharmacy: [facility]/pharmacy [address] [place] [address], [address] [place] [address] Ph #: [phone] Route: Oral  
  
We Performed the Following AMB POC HEMOGLOBIN A1C [88348 CPT(R)] LIPID PANEL [82321 CPT(R)] METABOLIC PANEL, BASIC [87742 CPT(R)] REFERRAL TO ALLERGY [REF5 Custom] Comments:  
 pruritis TSH RFX ON ABNORMAL TO FREE T4 [[id number] Custom] VITAMIN B12 & FOLATE [90567 CPT(R)] Referral Information Referral ID Referred By Referred To [id number] Katie SOL Not Available Visits Status Start Date End Date 1 New Request 1/31/18 1/31/19 If your referral has a status of pending review or denied, additional information will be sent to support the outcome of this decision. Patient Instructions Learning About Obesity What is obesity? Obesity means having so much body fat that your health is in danger. Having too much body fat can lead to type 2 diabetes, heart disease, high blood pressure, arthritis, sleep apnea, and stroke. Even if you don't feel bad now, think about these health risks. Do they seem like a good reason to start on a new path toward a healthier weight? Or do you have another personal, powerful reason for wanting to lose weight? Whatever it is, keep it in mind. It can be hard to change eating habits and exercise habits. But with your own reason and plan, you can do it. How do you know if your weight is in the obesity range? To know if your weight is in the obesity range, your doctor looks at your body mass index (BMI) and waist size. Your BMI is a number that is calculated from your weight and your height. To figure your BMI for yourself, get a BMI table from your doctor or use an online tool, such as http://www.tadeo.com/ on the [employer] of [facility]. What causes obesity? When you take in more calories than you burn off, you gain weight. How you eat, how active you are, and other things affect how your body uses calories and whether you gain weight. If you have family members who have too much body fat, you may have inherited a tendency to gain weight. And your family also helps form your eating and lifestyle habits, which can lead to obesity. Also, our busy lives make it harder to plan and cook healthy meals. For many of us, it's easier to reach for prepared foods, go out to eat, or go to the drive-through. But these foods are often high in saturated fat and calories. Portions are often too large. What can you do to reach a healthy weight? Focus on health, not diets. Diets are hard to stay on and don't work in the long run. It is very hard to stay with a diet that includes lots of big changes in your eating habits.  
Instead of a diet, focus on lifestyle changes that will improve your health and achieve the right balance of energy and calories. To lose weight, you need to burn more calories than you take in. You can do it by eating healthy foods in reasonable amounts and becoming more active, even a little bit every day. Making small changes over time can add up to a lot. Make a plan for change. Many people have found that naming their reasons for change and staying focused on their plan can make a big difference. Work with your doctor to create a plan that is right for you. · Ask yourself: Ness Hopping are my personal, most powerful reasons for wanting this change? What will my life look like when I've made the change? \" · Set your long-term goal. Make it specific, such as \"I will lose x pounds. \" 
· Break your long-term goal into smaller, short-term goals. Make these small steps specific and within your reach, things you know you can do. These steps are what keep you going from day to day. How can you stay on your plan for change? Be ready. Choose to start during a time when there are few events that might trigger slip-ups, like holidays, social events, and high-stress periods. Decide on your first few steps. Most people have more success when they make small changes, one step at a time. For example, you might switch a daily candy bar to a piece of fruit, walk 10 minutes more, or add more vegetables to a meal. 
Line up your support people. Make sure you're not going to be alone as you make this change. Connect with people who understand how important it is to you. Ask family members and friends for help in keeping with your plan. And think about who could make it harder for you, and how to handle them. Try tracking. People who keep track of what they eat, feel, and do are better at losing weight. Try writing down things like: · What and how much you eat. · How you feel before and after each meal. 
· Details about each meal (like eating out or at home, eating alone, or with friends or family). · What you do to be active. Look and plan. As you track, look for patterns that you may want to change. Take note of: · When you eat and whether you skip meals. · How often you eat out. · How many fruits and vegetables you eat. · When you eat beyond feeling full. · When and why you eat for reasons other than being hungry. When you stray from your plan, don't get upset. Figure out what made you slip up and how you can fix it. Can you take medicines or have surgery to lose weight? Before your doctor will prescribe medicines or surgery, he or she will probably want you to be more active and follow your healthy eating plan for a period of time. These habits are key lifelong changes for managing your weight, with or without other medical treatment. And these changes can help you avoid weight-related health problems. Follow-up care is a key part of your treatment and safety. Be sure to make and go to all appointments, and call your doctor if you are having problems. It's also a good idea to know your test results and keep a list of the medicines you take. Where can you learn more? Go to http://kelly-wili.info/. Enter N111 in the search box to learn more about \"Learning About Obesity. \" Current as of: October 13, 2016 Content Version: 11.4 © 5181-1846 Healthwise, Incorporated. Care instructions adapted under license by Fancloud (which disclaims liability or warranty for this information). If you have questions about a medical condition or this instruction, always ask your healthcare professional. [place] [address] any warranty or liability for your use of this information. Introducing [facility] & HEALTH SERVICES! Dear Don Garza: Thank you for requesting a [employer] account. Our records indicate that you already have an active [employer] account. You can access your account anytime at [email or web address] Did you know that you can access your hospital and ER discharge instructions at any time in Punchh? You can also review all of your test results from your hospital stay or ER visit. Additional Information If you have questions, please visit the Frequently Asked Questions section of the Punchh website at [email or web address] Remember, Punchh is NOT to be used for urgent needs. For medical emergencies, dial 911. Now available from your iPhone and Android! Please provide this summary of care documentation to your next provider. Your primary care clinician is listed as Via Ursula Pederson. If you have any questions after today's visit, please call [phone].

## 2018-01-31 NOTE — PROGRESS NOTES
Chief Complaint   Patient presents with    Itching     rash on chest, legs, arms, wrist     Pt also stated that itching occurs near her varicose veins. Pt has been using cortisone and atarax for itching. Pt states had recent eye examination at [facility]. RONAL signed.

## 2018-02-01 LAB
BUN SERPL-MCNC: 10 MG/DL (ref 6–20)
BUN/CREAT SERPL: 13 (ref 9–23)
CALCIUM SERPL-MCNC: 8.9 MG/DL (ref 8.7–10.2)
CHLORIDE SERPL-SCNC: 100 MMOL/L (ref 96–106)
CHOLEST SERPL-MCNC: 161 MG/DL (ref 100–199)
CO2 SERPL-SCNC: 23 MMOL/L (ref 18–29)
CREAT SERPL-MCNC: 0.75 MG/DL (ref 0.57–1)
FOLATE SERPL-MCNC: 16.3 NG/ML
GFR SERPLBLD CREATININE-BSD FMLA CKD-EPI: 104 ML/MIN/1.73
GFR SERPLBLD CREATININE-BSD FMLA CKD-EPI: 119 ML/MIN/1.73
GLUCOSE SERPL-MCNC: 215 MG/DL (ref 65–99)
HDLC SERPL-MCNC: 63 MG/DL
INTERPRETATION, 910389: NORMAL
LDLC SERPL CALC-MCNC: 85 MG/DL (ref 0–99)
POTASSIUM SERPL-SCNC: 4.4 MMOL/L (ref 3.5–5.2)
SODIUM SERPL-SCNC: 138 MMOL/L (ref 134–144)
TRIGL SERPL-MCNC: 63 MG/DL (ref 0–149)
TSH SERPL DL<=0.005 MIU/L-ACNC: 0.66 UIU/ML (ref 0.45–4.5)
VIT B12 SERPL-MCNC: 673 PG/ML (ref 232–1245)
VLDLC SERPL CALC-MCNC: 13 MG/DL (ref 5–40)

## 2018-02-08 ENCOUNTER — OFFICE VISIT (OUTPATIENT)
Dept: FAMILY MEDICINE CLINIC | Age: 33
End: 2018-02-08

## 2018-02-08 VITALS
WEIGHT: 257 LBS | SYSTOLIC BLOOD PRESSURE: 121 MMHG | HEIGHT: 63 IN | RESPIRATION RATE: 16 BRPM | TEMPERATURE: 97.6 F | BODY MASS INDEX: 45.54 KG/M2 | DIASTOLIC BLOOD PRESSURE: 80 MMHG | HEART RATE: 72 BPM | OXYGEN SATURATION: 98 %

## 2018-02-08 DIAGNOSIS — S16.1XXA STRAIN OF NECK MUSCLE, INITIAL ENCOUNTER: Primary | ICD-10-CM

## 2018-02-08 NOTE — MR AVS SNAPSHOT
[address] [facility] 
 
 
 [address] [facility] [address] [address] 
[phone] Patient: Milagro Camilo MRN: [id number] Visit Information Date & Time Provider Department Dept. Phone Encounter #  
 2/8/2018 12:00 PM Lea Britt NP [facility] [phone] [id number] Follow-up Instructions Return if symptoms worsen or fail to improve. Your Appointments 2/20/2018 10:50 AM  
New Patient with Wynn Siemens, MD [facility] Dandy Adriana) Appt Note: former pt of Dr. Garrett Bartlett. change insurnace, self referred, h/o type 1 diabetes [address] [address] [address] [facility] Upcoming Health Maintenance Date Due MICROALBUMIN Q1 9/26/2012 EYE EXAM RETINAL OR DILATED Q1 4/17/2014 PAP AKA CERVICAL CYTOLOGY 6/7/2014 FOOT EXAM Q1 7/17/2014 HEMOGLOBIN A1C Q6M 7/31/2018 Pneumococcal 19-64 Highest Risk (2 of 3 - PCV13) 9/18/2018 LIPID PANEL Q1 1/31/2019 DTaP/Tdap/Td series (2 - Td) 6/30/2020 Allergies as of 2/8/2018  Review Complete On: 2/8/2018 By: Lea Britt NP Severity Noted Reaction Type Reactions Codeine Medium 09/16/2017   Intolerance Nausea and Vomiting Current Immunizations  Reviewed on 1/5/2012 Name Date Influenza Vaccine (Quad) PF 9/18/2017 Pneumococcal Polysaccharide (PPSV-23) 9/18/2017 Not reviewed this visit You Were Diagnosed With   
  
 Codes Comments Strain of neck muscle, initial encounter    -  Primary ICD-10-CM: S16Missael Reynolds ICD-9-CM: 162. 0 Vitals BP Pulse Temp Resp Height(growth percentile) Weight(growth percentile) 121/80 (BP 1 Location: Left arm, BP Patient Position: Sitting) 72 97.6 °F (36.4 °C) (Oral) 16 5' 3\" (1.6 m) 257 lb (116.6 kg) LMP SpO2 BMI OB Status Smoking Status 01/12/2018 (Exact Date) 98% 45.53 kg/m2 Having regular periods Never Smoker Vitals History BMI and BSA Data Body Mass Index Body Surface Area 45.53 kg/m 2 2.28 m 2 Preferred Pharmacy Pharmacy Name Phone [id number] [place], [address] [place] [address] [phone] Your Updated Medication List  
  
   
This list is accurate as of: 2/8/18 12:34 PM.  Always use your most recent med list.  
  
  
  
  
 [address] Generic drug:  Blood-Glucose Meter Use as directed ADVIL 200 mg tablet Generic drug:  ibuprofen Take 200 mg by mouth every six (6) hours as needed. chlorhexidine 0.12 % solution Commonly known as:  PERIDEX  
SWISH 1/2 OUNCE FOR 1 MINUTE TWO TIMES DAILY HumaLOG KwikPen 100 unit/mL kwikpen Generic drug:  insulin lispro 3 units at breakfast, 3 units at lunch, 6 units at dinner *(with some correction)  
  
 hydrocortisone 1 % ointment Commonly known as:  HYCORT Apply  to affected area two (2) times a day. use thin layer twice a day.  
  
 hydrOXYzine HCl 25 mg tablet Commonly known as:  ATARAX Take 1 Tab by mouth every six (6) hours as needed for Itching for up to 30 days. lisinopril-hydroCHLOROthiazide 10-12.5 mg per tablet Commonly known as:  PRINZIDE, ZESTORETIC  
TAKE 1 TABLET BY MOUTH ONE TIME DAILY, PLEASE HAVE PCP FILL THIS MED AFTER THIS Zandra Pen Needle 32 gauge x 5/32\" Ndle Generic drug:  Insulin Needles (Disposable) USE AS DIRECTED TO INJECT INSULIN 4 TIMES DAILY NovoLOG Flexpen 100 unit/mL Inpn Generic drug:  insulin aspart INJECT 10 UNITS UNDER THE SKIN 3 TIMES DAILY BEFORE MEALS WITH CORRECTION SCALE MAX 50 UNITS/ DAY  
  
 ONETOUCH ULTRA TEST strip Generic drug:  glucose blood VI test strips FOR BLOOD GLUCOSE MONITORING 4 X DAILY. .02  
  
 TOUJEO SOLOSTAR 300 unit/mL (1.5 mL) Inpn Generic drug:  insulin glargine INJECT 42 UNITS UNDER THE SKIN ONCE DAILY  
  
 triamcinolone acetonide 0.1 % topical cream  
Commonly known as:  KENALOG  
APPLY TO AFFECTED AREA TWO (2) TIMES A DAY. USE THIN LAYER Follow-up Instructions Return if symptoms worsen or fail to improve. Patient Instructions Neck Strain or Sprain: Rehab Exercises Your Care Instructions Here are some examples of typical rehabilitation exercises for your condition. Start each exercise slowly. Ease off the exercise if you start to have pain. Your doctor or physical therapist will tell you when you can start these exercises and which ones will work best for you. How to do the exercises Neck rotation 1. Sit in a firm chair, or stand up straight. 2. Keeping your chin level, turn your head to the right, and hold for 15 to 30 seconds. 3. Turn your head to the left and hold for 15 to 30 seconds. 4. Repeat 2 to 4 times to each side. Neck stretches 1. Look straight ahead, and tip your right ear to your right shoulder. Do not let your left shoulder rise up as you tip your head to the right. 2. Hold for 15 to 30 seconds. 3. Tilt your head to the left. Do not let your right shoulder rise up as you tip your head to the left. 4. Hold for 15 to 30 seconds. 5. Repeat 2 to 4 times to each side. Forward neck flexion 1. Sit in a firm chair, or stand up straight. 2. Bend your head forward. 3. Hold for 15 to 30 seconds. 4. Repeat 2 to 4 times. Lateral (side) bend strengthening 1. With your right hand, place your first two fingers on your right temple. 2. Start to bend your head to the side while using gentle pressure from your fingers to keep your head from bending. 3. Hold for about 6 seconds. 4. Repeat 8 to 12 times. 5. Switch hands and repeat the same exercise on your left side. Forward bend strengthening 1. Place your first two fingers of either hand on your forehead. 2. Start to bend your head forward while using gentle pressure from your fingers to keep your head from bending. 3. Hold for about 6 seconds. 4. Repeat 8 to 12 times. Neutral position strengthening 1. Using one hand, place your fingertips on the back of your head at the top of your neck. 2. Start to bend your head backward while using gentle pressure from your fingers to keep your head from bending. 3. Hold for about 6 seconds. 4. Repeat 8 to 12 times. Chin tuck 1. Lie on the floor with a rolled-up towel under your neck. Your head should be touching the floor. 2. Slowly bring your chin toward your chest. 
3. Hold for a count of 6, and then relax for up to 10 seconds. 4. Repeat 8 to 12 times. Follow-up care is a key part of your treatment and safety. Be sure to make and go to all appointments, and call your doctor if you are having problems. It's also a good idea to know your test results and keep a list of the medicines you take. Where can you learn more? Go to http://kelly-wili.info/. Enter M679 in the search box to learn more about \"Neck Strain or Sprain: Rehab Exercises. \" Current as of: March 21, 2017 Content Version: 11.4 © 3577-1569 Healthwise, Incorporated. Care instructions adapted under license by Ulabox (which disclaims liability or warranty for this information). If you have questions about a medical condition or this instruction, always ask your healthcare professional. [place] [address] any warranty or liability for your use of this information. Introducing [facility] & HEALTH SERVICES! Dear Santiagoisra Locke: Thank you for requesting a Pufetto account. Our records indicate that you already have an active Pufetto account. You can access your account anytime at [email or web address] Did you know that you can access your hospital and ER discharge instructions at any time in Pufetto?   You can also review all of your test results from your hospital stay or ER visit. Additional Information If you have questions, please visit the Frequently Asked Questions section of the Hypios website at [email or web address] Remember, Hypios is NOT to be used for urgent needs. For medical emergencies, dial 911. Now available from your iPhone and Android! Please provide this summary of care documentation to your next provider. Your primary care clinician is listed as Via Ursula Pederson. If you have any questions after today's visit, please call [phone].

## 2018-02-08 NOTE — PATIENT INSTRUCTIONS
Neck Strain or Sprain: Rehab Exercises  Your Care Instructions  Here are some examples of typical rehabilitation exercises for your condition. Start each exercise slowly. Ease off the exercise if you start to have pain. Your doctor or physical therapist will tell you when you can start these exercises and which ones will work best for you. How to do the exercises  Neck rotation    1. Sit in a firm chair, or stand up straight. 2. Keeping your chin level, turn your head to the right, and hold for 15 to 30 seconds. 3. Turn your head to the left and hold for 15 to 30 seconds. 4. Repeat 2 to 4 times to each side. Neck stretches    1. Look straight ahead, and tip your right ear to your right shoulder. Do not let your left shoulder rise up as you tip your head to the right. 2. Hold for 15 to 30 seconds. 3. Tilt your head to the left. Do not let your right shoulder rise up as you tip your head to the left. 4. Hold for 15 to 30 seconds. 5. Repeat 2 to 4 times to each side. Forward neck flexion    1. Sit in a firm chair, or stand up straight. 2. Bend your head forward. 3. Hold for 15 to 30 seconds. 4. Repeat 2 to 4 times. Lateral (side) bend strengthening    1. With your right hand, place your first two fingers on your right temple. 2. Start to bend your head to the side while using gentle pressure from your fingers to keep your head from bending. 3. Hold for about 6 seconds. 4. Repeat 8 to 12 times. 5. Switch hands and repeat the same exercise on your left side. Forward bend strengthening    1. Place your first two fingers of either hand on your forehead. 2. Start to bend your head forward while using gentle pressure from your fingers to keep your head from bending. 3. Hold for about 6 seconds. 4. Repeat 8 to 12 times. Neutral position strengthening    1. Using one hand, place your fingertips on the back of your head at the top of your neck.   2. Start to bend your head backward while using gentle pressure from your fingers to keep your head from bending. 3. Hold for about 6 seconds. 4. Repeat 8 to 12 times. Chin tuck    1. Lie on the floor with a rolled-up towel under your neck. Your head should be touching the floor. 2. Slowly bring your chin toward your chest.  3. Hold for a count of 6, and then relax for up to 10 seconds. 4. Repeat 8 to 12 times. Follow-up care is a key part of your treatment and safety. Be sure to make and go to all appointments, and call your doctor if you are having problems. It's also a good idea to know your test results and keep a list of the medicines you take. Where can you learn more? Go to http://kelly-wili.info/. Enter M679 in the search box to learn more about \"Neck Strain or Sprain: Rehab Exercises. \"  Current as of: March 21, 2017  Content Version: 11.4  © 4043-1220 Healthwise, Incorporated. Care instructions adapted under license by Ziegler (which disclaims liability or warranty for this information). If you have questions about a medical condition or this instruction, always ask your healthcare professional. [address] any warranty or liability for your use of this information.

## 2018-02-08 NOTE — PROGRESS NOTES
HISTORY OF PRESENT ILLNESS  Shaina Felton is a 26 y.o. female. HPI  Pt of [facility]rientos here for an acute visit with CC of left neck/head pain. Had a Paragard inserted January 2017. Since that time, she has had a number of \"strange\" body issues. She has had a lot of itching all over, and is going to see an allergist for this next week. Her periods have also been \"off\" for the last several months. Was supposed to see her OB/GYN (Dr. Marleen Mann) today, but her period came on 2 days ago, so she cancelled her appt. Has been having nausea, abdominal pain, dizziness, and a \"brain fog\" along with some pain in the back of her neck and back pain, which started a couple of days ago. She is wondering if she has a copper allergy. Her symptoms started a couple of months after getting her IUD inserted. Has taken home pregnancy tests, which have all been negative. Had an ultrasound in the ED which showed that her IUD was in place and her UPT was negative. Rescheduled with her OB/GYN for next week. Pain in her neck started yesterday, which has progressed to a headache; however, she had stopped taking her blood pressure medication for a few days. Has restarted taking her BP medication, and it has improved. Only painful now when she touches it. No known trauma to the area. No numbness or tingling. NO visual changes. No fevers or chills. Has some postural lightheadedness. Past Medical History:   Diagnosis Date    Breast cancer [facility]) Nov 2012    Right breast infiltrating ductal carcinoma    Cancer [facility]) 2012    breast    Cardiomyopathy due to chemotherapy ([address]     EF 20 %    Diabetes ([address]     Diabetes mellitus     Essential hypertension     H/O mammogram 11/2/2012    [facility] [phone]    History of sepsis 1/2013    after chemo    Hx of difficult intubation     resulting from sepsis in january 2013.       Hypertension     Morbid obesity ([address]     Ne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 ([address]     Type I (juvenile type) diabetes mellitus without mention of complication, uncontrolled     diagnosed age 4    Unspecified vitamin D deficiency 2011     Past Surgical History:   Procedure Laterality Date    HX CYST INCISION AND DRAINAGE  2013    perirectal abscess    HX GYN       in     HX MASTECTOMY Right     Right MRM with sentinel LNB.  HX ORTHOPAEDIC Right     Carpal tunnel release, index finger trigger release.  HX OTHER SURGICAL      cyst removed under left arm    HX OTHER SURGICAL      port placement for chemo     Family History   Problem Relation Age of Onset    Diabetes Brother      borderline Type 2    Diabetes Paternal Uncle     Diabetes Paternal Grandfather     Heart Disease Paternal Grandfather      MI in his 62s    Stroke Neg Hx      Social History     Social History    Marital status:      Spouse name: N/A    Number of children: N/A    Years of education: N/A     Social History Main Topics    Smoking status: Never Smoker    Smokeless tobacco: Never Used    Alcohol use No    Drug use: No    Sexual activity: Yes     Partners: Male     Other Topics Concern    None     Social History Narrative    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   Will be attending school full-time     Patient Active Problem List   Diagnosis Code    Uncontrolled type 1 diabetes mellitus ([facility] E10.65    Essential hypertension, benign I10    Encounter for long-term (current) use of other medications Z79.899    Encounter for long-term (current) use of insulin ([facility] Z79.4    Unspecified vitamin D deficiency E55.9    Abnormal thyroid blood test R94.6    Ne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 ([address] G62.0, T45.1X5A    Cardiomyopathy due to chemotherapy ([facility] I42.7, T45.1X5A    Breast cancer ([facility] C50.919    Type 1 diabetes mellitus without complication ([address] M38.2    Morbid obesity with BMI of 40.0-44.9, adult ([facility] E66.01, Z68.41    Wound check, abscess Z51.89 Outpatient Encounter Prescriptions as of 2/8/2018   Medication Sig Dispense Refill    ONETOUCH ULTRA TEST strip FOR BLOOD GLUCOSE MONITORING 4 X DAILY. .02  1    NOVOLOG FLEXPEN 100 unit/mL inpn INJECT 10 UNITS UNDER THE SKIN 3 TIMES DAILY BEFORE MEALS WITH CORRECTION SCALE MAX 50 UNITS/ DAY  1    TOUJEO SOLOSTAR 300 unit/mL (1.5 mL) inpn INJECT 42 UNITS UNDER THE SKIN ONCE DAILY  3    lisinopril-hydroCHLOROthiazide (PRINZIDE, ZESTORETIC) 10-12.5 mg per tablet TAKE 1 TABLET BY MOUTH ONE TIME DAILY, PLEASE HAVE PCP FILL THIS MED AFTER THIS  4    LANCE PEN NEEDLE 32 gauge x 5/32\" ndle USE AS DIRECTED TO INJECT INSULIN 4 TIMES DAILY  2    ACCU-CHEK MARVIN PLUS METER Misc Use as directed 1 Each 0    chlorhexidine (PERIDEX) 0.12 % solution SWISH 1/2 OUNCE FOR 1 MINUTE TWO TIMES DAILY  3    triamcinolone acetonide (KENALOG) 0.1 % topical cream APPLY TO AFFECTED AREA TWO (2) TIMES A DAY. USE THIN LAYER  3    hydrOXYzine HCl (ATARAX) 25 mg tablet Take 1 Tab by mouth every six (6) hours as needed for Itching for up to 30 days. 30 Tab 1    [DISCONTINUED] doxycycline (ADOXA) 100 mg tablet TAKE 1 TABLET BY MOUTH TWO TIMES DAILY FOR 10 WEEKS  0    [DISCONTINUED] medroxyPROGESTERone (DEPO-PROVERA) 150 mg/mL injection 150 MG IM ONCE,INSTR:TO BE ADMINISTERED IN CLINIC BY NURSE. SEE ORDER COMMENTS FOR SCHEDULE.  4    [DISCONTINUED] metroNIDAZOLE (FLAGYL) 500 mg tablet TAKE 1 TABLET BY MOUTH TWICE DAILY  0    hydrocortisone (HYCORT) 1 % ointment Apply  to affected area two (2) times a day. use thin layer twice a day. 30 g 0    HUMALOG KWIKPEN 100 unit/mL kwikpen 3 units at breakfast, 3 units at lunch, 6 units at dinner *(with some correction) 1 Package 1    ibuprofen (ADVIL) 200 mg tablet Take 200 mg by mouth every six (6) hours as needed. No facility-administered encounter medications on file as of 2/8/2018.       Visit Vitals    /80 (BP 1 Location: Left arm, BP Patient Position: Sitting)    Pulse 72    Temp 97.6 °F (36.4 °C) (Oral)    Resp 16    Ht 5' 3\" (1.6 m)    Wt 257 lb (116.6 kg)    LMP 01/12/2018 (Exact Date)  Comment: IUD    SpO2 98%    BMI 45.53 kg/m2         Review of Systems   Constitutional: Negative for chills, fever and malaise/fatigue. HENT: Negative for congestion, ear pain and sore throat. Eyes: Negative for blurred vision, double vision, photophobia, pain, discharge and redness. Musculoskeletal: Positive for neck pain. Negative for back pain, falls and myalgias. Neurological: Positive for headaches. Negative for tingling, tremors, sensory change, speech change and weakness. Physical Exam   Constitutional: She is oriented to person, place, and time. She appears well-developed and well-nourished. No distress. HENT:   Head: Normocephalic and atraumatic. Cardiovascular: Normal rate, regular rhythm and normal heart sounds. Pulmonary/Chest: Effort normal and breath sounds normal. No respiratory distress. She has no wheezes. Musculoskeletal:   Left paraspinal cervical muscle mildly TTP; full ROM of neck; negative meningeal signs   Neurological: She is alert and oriented to person, place, and time. No cranial nerve deficit. Coordination normal.   Skin: Skin is warm and dry. She is not diaphoretic. Psychiatric: She has a normal mood and affect. Her behavior is normal. Judgment normal.   Nursing note and vitals reviewed. ASSESSMENT and PLAN    ICD-10-CM ICD-9-CM    1. Strain of neck muscle, initial encounter S16. 1XXA 847.0      Neck pain has largely resolved, but current symptoms sound like muscle strain; normal exam today. Will treat symptomatically with stretches, topical therapy, massage, heat. Patient advised to call her OB/GYN and try to follow up sooner if possible. If patient is unable to get in and/or is concerned that her IUD is out of place, she is to contact the office and I will order an ultrasound.      Follow up if symptoms worsen or fail to improve. Follow-up Disposition:  Return if symptoms worsen or fail to improve.

## 2018-02-20 ENCOUNTER — OFFICE VISIT (OUTPATIENT)
Dept: ENDOCRINOLOGY | Age: 33
End: 2018-02-20

## 2018-02-20 VITALS
HEART RATE: 83 BPM | HEIGHT: 63 IN | DIASTOLIC BLOOD PRESSURE: 93 MMHG | WEIGHT: 257.2 LBS | BODY MASS INDEX: 45.57 KG/M2 | SYSTOLIC BLOOD PRESSURE: 154 MMHG

## 2018-02-20 DIAGNOSIS — I10 ESSENTIAL HYPERTENSION, BENIGN: ICD-10-CM

## 2018-02-20 DIAGNOSIS — R79.89 ABNORMAL THYROID BLOOD TEST: ICD-10-CM

## 2018-02-20 DIAGNOSIS — E55.9 VITAMIN D DEFICIENCY: ICD-10-CM

## 2018-02-20 DIAGNOSIS — E10.9 TYPE 1 DIABETES MELLITUS WITHOUT COMPLICATION (HCC): Primary | ICD-10-CM

## 2018-02-20 NOTE — LETTER
5/29/2018 8:54 AM 
 
Ms. [address] [facility] [id number] Please go to [facility] and have your labs repeated sometime in the 1-2 weeks prior to your upcoming visit with me. Elli Newberry to allow at least 2 days at the minimum to ensure I get the results in time for your visit. Marry dhillon is already in their system and be sure to ask to have labs drawn that are under Dr. Skinny Brannon name. Lynn Lynch will review the results at your follow up visit.   
 
 
 
 
Sincerely, 
 
 
Arianne Perez MD

## 2018-02-20 NOTE — PROGRESS NOTES
Chief Complaint   Patient presents with    Diabetes     pcp and pharmacy confirmed    Diabetic Foot Exam     due    Other     consent form signed to obtain eye report     History of Present Illness: Sonja Riojas is a 26 y.o. female who is a new patient for evaluation of diabetes but I previously saw her for diabetes with last visit in Nov 2012 when she was Deanna Moura (got  in July 2017). Since that visit, has not had any recurrence of her breast cancer. Was getting all of her DM care at [facility] over the past 5 years but now that her  has [employer] she is coming back to see me. Current regimen is toujeo 42 units in the morning and novolog 1:8 for carbs and adds 1:50> 150 for correction. Checks blood sugars 3-4 times per day and readings run  as long as she is counting carbs properly and not overdoing on carbs but can have readings in the 60-70s several times a week in the morning. Most recent Hgb A1c was 8% in 1/18 and had been in the 7% range for a lot of the past 5 years. A typical day is as follows:  - breakfast: eggs, hubbard or a smoothie with berries and OJ and yogurt = 40 grams  - lunch: salad with grilled chicken and tangerines or chili but no crackers, burger wrapped and lettuce and cheese  - dinner: beef roast with potatoes and carrots, greens, corn bread, not much rice, has cut out tomatoes and peanuts after seeing an allergist, can have fettuccine with chicken and broccoli  - beverages: water, diet tea, diet coke  - snacks: almonds and cheese and sunflower seeds, occ berries  Not currently exercising but hopes to get back to this. Hasn't been taking her lisinopril/hctz on a regular basis for fear that she was possibly pregnant but she has had negative pregnancy tests. Still having frequent urination and nausea and dizzy when standing and feeling bloated. No history of vascular disease. No history of retinopathy, neuropathy, or nephropathy.   Last eye exam was 2017     Past Medical History:   Diagnosis Date    Breast cancer [facility]) 2012    Right breast infiltrating ductal carcinoma    Cardiomyopathy due to chemotherapy ([facility]     EF 20 %    Essential hypertension     History of sepsis 2013    after chemo    Hx of difficult intubation     resulting from sepsis in 2013.  Hypertension     Morbid obesity ([facility]     Ne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 ([facility]     Type I (juvenile type) diabetes mellitus without mention of complication, uncontrolled     diagnosed age 4    Unspecified vitamin D deficiency 2011     Past Surgical History:   Procedure Laterality Date    HX CYST INCISION AND DRAINAGE  2013    perirectal abscess    HX GYN       in     HX MASTECTOMY Right     Right MRM with sentinel LNB.  HX ORTHOPAEDIC Right     Carpal tunnel release, index finger trigger release.  HX OTHER SURGICAL      cyst removed under left arm    HX OTHER SURGICAL      port placement for chemo     Current Outpatient Prescriptions   Medication Sig    ONETOUCH ULTRA TEST strip FOR BLOOD GLUCOSE MONITORING 4 X DAILY. .02    chlorhexidine (PERIDEX) 0.12 % solution SWISH 1/2 OUNCE FOR 1 MINUTE TWO TIMES DAILY    NOVOLOG FLEXPEN 100 unit/mL inpn INJECT 10 UNITS UNDER THE SKIN 3 TIMES DAILY BEFORE MEALS WITH CORRECTION SCALE MAX 50 UNITS/ DAY    triamcinolone acetonide (KENALOG) 0.1 % topical cream APPLY TO AFFECTED AREA TWO (2) TIMES A DAY. USE THIN LAYER    hydrOXYzine HCl (ATARAX) 25 mg tablet Take 1 Tab by mouth every six (6) hours as needed for Itching for up to 30 days.  hydrocortisone (HYCORT) 1 % ointment Apply  to affected area two (2) times a day. use thin layer twice a day.     TOUJEO SOLOSTAR 300 unit/mL (1.5 mL) inpn INJECT 42 UNITS UNDER THE SKIN ONCE DAILY    lisinopril-hydroCHLOROthiazide (PRINZIDE, ZESTORETIC) 10-12.5 mg per tablet TAKE 1 TABLET BY MOUTH ONE TIME DAILY, PLEASE HAVE PCP FILL THIS MED AFTER THIS  LANCE PEN NEEDLE 32 gauge x 5/32\" ndle USE AS DIRECTED TO INJECT INSULIN 4 TIMES DAILY    ibuprofen (ADVIL) 200 mg tablet Take 200 mg by mouth every six (6) hours as needed. No current facility-administered medications for this visit. Allergies   Allergen Reactions    Codeine Nausea and Vomiting     Family History   Problem Relation Age of Onset    Diabetes Brother      borderline Type 2    Diabetes Paternal Uncle     Diabetes Paternal Grandfather     Heart Disease Paternal Grandfather      MI in his 62s    Hypertension Mother     Heart Disease Father     Stroke Neg Hx      Social History     Social History    Marital status:      Spouse name: N/A    Number of children: N/A    Years of education: N/A     Occupational History    Not on file. Social History Main Topics    Smoking status: Never Smoker    Smokeless tobacco: Never Used    Alcohol use No    Drug use: No    Sexual activity: Yes     Partners: Male     Other Topics Concern    Not on file     Social History Narrative    Lives in [place] with her  and 10 yo son. Got  in July 2017. Currently in [address] [place] [address] for a masters in [employer] [employer]. Review of Systems:  - Constitutional Symptoms: no fevers, chills, (+) 30 lb weight gain over the past 5 years  - Eyes: no blurry vision or double vision  - Cardiovascular: no chest pain or palpitations  - Respiratory: no cough or shortness of breath  - Gastrointestinal: no dysphagia, some abdominal pain and bloating and nausea  - Musculoskeletal: (+) back pain  - Integumentary: no rashes  - Neurological: no numbness, tingling, some headaches with being off lisinopril  - Psychiatric: no depression or anxiety  - Endocrine: no heat or cold intolerance, no polyuria, (+) polydipsia    Physical Examination:  Blood pressure (!) 154/93, pulse 83, height 5' 3\" (1.6 m), weight 257 lb 3.2 oz (116.7 kg).   - General: pleasant, no distress, good eye contact  - HEENT: no exopthalmos, no periorbital edema, no scleral/conjunctival injection, EOMI, no lid lag or stare  - Neck: supple, no thyromegaly, masses, lymph nodes, or carotid bruits, no supraclavicular or dorsocervical fat pads  - Cardiovascular: regular, normal rate, normal S1 and S2, no murmurs/rubs/gallops,  - Respiratory: clear to auscultation bilaterally  - Gastrointestinal: soft, nontender, nondistended, no masses, no hepatosplenomegaly  - Musculoskeletal: no proximal muscle weakness in upper or lower extremities  - Integumentary: no acanthosis nigricans, no abdominal striae, no rashes, no edema, no foot ulcers  - Neurological: see foot exam  - Psychiatric: normal mood and affect         Diabetic foot exam performed by Nakul Duran MD     Measurement  Response Nurse Comment Physician Comment   Monofilament  R - normal sensation with micro filament  L - normal sensation with micro filament     Pulse DP R - 2+ (normal)  L - 2+ (normal)     Vibration R - normal  L - normal     Structural deformity R - None  L - None     Skin Integrity / Deformity R - Mild - callus  L - Mild - callus        Reviewed by:               Data Reviewed:   Component      Latest Ref Rng & Units 1/31/2018 1/31/2018 1/31/2018 1/31/2018          10:06 AM 10:06 AM 10:06 AM 10:06 AM   Glucose      65 - 99 mg/dL   215 (H)    BUN      6 - 20 mg/dL   10    Creatinine      0.57 - 1.00 mg/dL   0.75    GFR est non-AA      >59 mL/min/1.73   104    GFR est AA      >59 mL/min/1.73   119    BUN/Creatinine ratio      9 - 23   13    Sodium      134 - 144 mmol/L   138    Potassium      3.5 - 5.2 mmol/L   4.4    Chloride      96 - 106 mmol/L   100    CO2      18 - 29 mmol/L   23    Calcium      8.7 - 10.2 mg/dL   8.9    Cholesterol, total      100 - 199 mg/dL 161      Triglyceride      0 - 149 mg/dL 63      HDL Cholesterol      >39 mg/dL 63      VLDL, calculated      5 - 40 mg/dL 13      LDL, calculated      0 - 99 mg/dL 85      Hemoglobin A1c (POC)      %    8.0   TSH 0.450 - 4.500 uIU/mL  0.660         Assessment/Plan:     1) Type 1 DM uncontrolled: Her most recent Hgb A1c was 8% in 1/18 (she was at [facility] from 11/12 to 1/18 and states A1c values were in the 7-8% range) down from 8.8% in January 2012 stable from 8.9% in September 2011 up from 7.5% in May 2010. I think she is on too much basal insulin leading to fasting hypoglycemia so will decrease her toujeo. - decrease toujeo to 38 units in the morning  - Take Novolog 1:8 for CHO ratio with meal  - Take Novolog 1:50 for > 150 during the day   - Check bs 4x/day  - optho UTD 7/17--will obtain report  - foot exam done 2/18  - microalbumin nl 9/11--repeat today  - LDL 85 in 1/18  - check Hgb A1c and bmp prior to next visit      2) HTN NOS (401.9): her BP was above goal < 140/90 but hasn't been taking meds regularly   - cont lisinopril/HCTZ 10/12.5 mg daily    3) Abnormal thyroid blood test: her TSH was slightly low at 0.293 in September 2011. Clinically no symptoms of hyperthyroidism and repeat was normal at 0.453 in January 2012 and 0.66 in 1/18.  - repeat TSH in 1/19    4) Vitamin D deficiency: Level was 11.1 in 9/11. Started on ergo at that time and level up to 24 in January 2012. Hasn't been on any vitamin D recently  - check Vitamin D 25-OH level today and prior to next visit      Patient Instructions   1) Please call [phone] to reset your [employer] password. 2) Decrease your toujeo to 38 units daily to see if this keeps your morning sugars between . If still having readings under 80 more than 2 times a week, cut back by 2 more units as needed. 3) Your blood pressure is elevated off the lisinopril so I would go back on this. 4) Let me know when you need refills on any of your meds. 5) I will check your vitamin D and urine protein and e-mail you the results.     6) I will send you a reminder e-mail 3-4 weeks prior to your next visit and you will have the order already in the [employer] system so you can just go sometime in the 3-7 days before the next visit to have your labs drawn. Follow-up Disposition:  Return in about 4 months (around 6/20/2018). Copy sent to: Yury Hartley NP as PCP - General (Nurse Practitioner)  Srikanth Barrientos MD (Obstetrics & Gynecology)  Raul Mortensen MD (Surgical Oncology)  Baljit Baldwin MD (Hematology and Oncology)    Lab follow up: 2/21/18  Component      Latest Ref Rng & Units 2/20/2018 2/20/2018           3:41 PM  3:41 PM   Creatinine, urine      Not Estab. mg/dL 148.8    Microalbumin, urine      Not Estab. ug/mL 3.6    Microalbumin/Creat. Ratio      0.0 - 30.0 2.4    VITAMIN D, 25-HYDROXY      30.0 - 100.0 ng/mL  25.9 (L)     Sent her the following message through [employer]:  Microalbumin/creatinine ratio is a marker of the amount of protein in your urine. Goal is less than 30. Your value is normal. This indicates that your kidneys are not being affected by your uncontrolled diabetes and/or blood pressure. Continue to take lisinopril/hctz to help protect your kidneys from the effects of diabetes and high blood pressure.  ========  Your vitamin D level is 25.9 which is low. Goal is over 30.   Please  a bottle of 2000 units of over the counter vitamin D and take 2 tabs daily or if you can find a bottle of 5000 units, take one daily to get your level to goal.

## 2018-02-20 NOTE — PATIENT INSTRUCTIONS
1) Please call [phone] to reset your [employer] password. 2) Decrease your toujeo to 38 units daily to see if this keeps your morning sugars between . If still having readings under 80 more than 2 times a week, cut back by 2 more units as needed. 3) Your blood pressure is elevated off the lisinopril so I would go back on this. 4) Let me know when you need refills on any of your meds. 5) I will check your vitamin D and urine protein and e-mail you the results. 6) I will send you a reminder e-mail 3-4 weeks prior to your next visit and you will have the order already in the [employer] system so you can just go sometime in the 3-7 days before the next visit to have your labs drawn.

## 2018-02-20 NOTE — MR AVS SNAPSHOT
[address] [facility] [address] [address] [phone] Patient: Colt Can MRN:  [id number] Visit Information Date & Time Provider Department Dept. Phone Encounter #  
 2/20/2018 10:50 AM Willi Jean-Baptiste MD [facility] [id number] Follow-up Instructions Return in about 4 months (around 6/20/2018). Your Appointments 8/9/2018 11:00 AM  
ROUTINE CARE with Simona Garcia NP [facility] Appt Note: routine follow up  
 [address] [facility] [address] [address] [phone] [address] [facility] [address] Upcoming Health Maintenance Date Due MICROALBUMIN Q1 9/26/2012 EYE EXAM RETINAL OR DILATED Q1 4/17/2014 PAP AKA CERVICAL CYTOLOGY 6/7/2014 FOOT EXAM Q1 7/17/2014 HEMOGLOBIN A1C Q6M 7/31/2018 Pneumococcal 19-64 Highest Risk (2 of 3 - PCV13) 9/18/2018 LIPID PANEL Q1 1/31/2019 DTaP/Tdap/Td series (2 - Td) 6/30/2020 Allergies as of 2/20/2018  Review Complete On: 2/20/2018 By: Willi Jean-Baptiste MD  
  
 Severity Noted Reaction Type Reactions Codeine Medium 09/16/2017   Intolerance Nausea and Vomiting Current Immunizations  Reviewed on 1/5/2012 Name Date Influenza Vaccine (Quad) PF 9/18/2017 Pneumococcal Polysaccharide (PPSV-23) 9/18/2017 Not reviewed this visit You Were Diagnosed With   
  
 Codes Comments Type 1 diabetes mellitus without complication (HCC)    -  Primary ICD-10-CM: E10.9 ICD-9-CM: 250.01 Essential hypertension, benign     ICD-10-CM: I10 
ICD-9-CM: 401.1 Vitamin D deficiency     ICD-10-CM: E55.9 ICD-9-CM: 268.9 Abnormal thyroid blood test     ICD-10-CM: R94.6 ICD-9-CM: 794.5 Vitals BP Pulse Height(growth percentile) Weight(growth percentile) BMI OB Status (!) 154/93 83 5' 3\" (1.6 m) 257 lb 3.2 oz (116.7 kg) 45.56 kg/m2 Having regular periods Smoking Status Never Smoker Vitals History BMI and BSA Data Body Mass Index Body Surface Area 45.56 kg/m 2 2.28 m 2 Preferred Pharmacy Pharmacy Name Phone [id number] [address] [phone] Your Updated Medication List  
  
   
This list is accurate as of: 2/20/18 12:13 PM.  Always use your most recent med list. ADVIL 200 mg tablet Generic drug:  ibuprofen Take 200 mg by mouth every six (6) hours as needed. chlorhexidine 0.12 % solution Commonly known as:  PERIDEX  
SWISH 1/2 OUNCE FOR 1 MINUTE TWO TIMES DAILY  
  
 hydrocortisone 1 % ointment Commonly known as:  HYCORT Apply  to affected area two (2) times a day. use thin layer twice a day.  
  
 hydrOXYzine HCl 25 mg tablet Commonly known as:  ATARAX Take 1 Tab by mouth every six (6) hours as needed for Itching for up to 30 days. lisinopril-hydroCHLOROthiazide 10-12.5 mg per tablet Commonly known as:  PRINZIDE, ZESTORETIC  
TAKE 1 TABLET BY MOUTH ONE TIME DAILY, PLEASE HAVE PCP FILL THIS MED AFTER THIS Zandra Pen Needle 32 gauge x 5/32\" Ndle Generic drug:  Insulin Needles (Disposable) USE AS DIRECTED TO INJECT INSULIN 4 TIMES DAILY NovoLOG Flexpen U-100 Insulin 100 unit/mL Inpn Generic drug:  insulin aspart U-100 Inject 1:8 for carbs + 1 unit for 50 > 150 for correction ONETOUCH ULTRA TEST strip Generic drug:  glucose blood VI test strips FOR BLOOD GLUCOSE MONITORING 4 X DAILY. .02  
  
 TOUJEO SOLOSTAR U-300 INSULIN 300 unit/mL (1.5 mL) Inpn Generic drug:  insulin glargine INJECT 38 UNITS UNDER THE SKIN ONCE DAILY  
  
 triamcinolone acetonide 0.1 % topical cream  
Commonly known as:  KENALOG  
APPLY TO AFFECTED AREA TWO (2) TIMES A DAY. USE THIN LAYER We Performed the Following COLLECTION VENOUS BLOOD,VENIPUNCTURE X8781989 CPT(R)]  DIABETES FOOT EXAM [7 Custom] MICROALBUMIN, UR, RAND W/ MICROALBUMIN/CREA RATIO [id number] CPT(R)] NM HANDLG&/OR CONVEY OF SPEC FOR TR OFFICE TO LAB [52032 CPT(R)] VITAMIN D, 25 HYDROXY [id number] CPT(R)] Follow-up Instructions Return in about 4 months (around 6/20/2018). Patient Instructions 1) Please call [phone] to reset your Moreboats password and let me know who your oncologist is so I can send them a note. 2) Decrease your toujeo to 38 units daily to see if this keeps your morning sugars between . If still having readings under 80 more than 2 times a week, cut back by 2 more units as needed. 3) Your blood pressure is elevated off the lisinopril so I would go back on this. 4) Let me know when you need refills on any of your meds. 5) I will check your vitamin D and urine protein and e-mail you the results. 6) I will send you a reminder e-mail 3-4 weeks prior to your next visit and you will have the order already in the labcoflikdate system so you can just go sometime in the 3-7 days before the next visit to have your labs drawn. Introducing [facility] & HEALTH SERVICES! Dear Edith Byers: Thank you for requesting a [employer] Project account. Our records indicate that you already have an active Cord Project account. You can access your account anytime at [email or web address] Did you know that you can access your hospital and ER discharge instructions at any time in Cord Project? You can also review all of your test results from your hospital stay or ER visit. Additional Information If you have questions, please visit the Frequently Asked Questions section of the Cord Project website at [email or web address] Remember, Cord Project is NOT to be used for urgent needs. For medical emergencies, dial 911. Now available from your iPhone and Android! Please provide this summary of care documentation to your next provider. Your primary care clinician is listed as Via Ursula Pederson. If you have any questions after today's visit, please call [phone].

## 2018-02-21 LAB
25(OH)D3+25(OH)D2 SERPL-MCNC: 25.9 NG/ML (ref 30–100)
ALBUMIN/CREAT UR: 2.4 (ref 0–30)
CREAT UR-MCNC: 148.8 MG/DL
MICROALBUMIN UR-MCNC: 3.6 UG/ML

## 2018-02-21 RX ORDER — CHOLECALCIFEROL (VITAMIN D3) 125 MCG
4000 CAPSULE ORAL DAILY
COMMUNITY
End: 2021-04-23

## 2018-04-24 DIAGNOSIS — L29.9 PRURITIC CONDITION: ICD-10-CM

## 2018-04-24 RX ORDER — HYDROXYZINE 25 MG/1
TABLET, FILM COATED ORAL
Qty: 30 TAB | Refills: 1 | Status: SHIPPED | OUTPATIENT
Start: 2018-04-24 | End: 2018-07-05 | Stop reason: SDUPTHER

## 2018-04-30 RX ORDER — INSULIN GLARGINE 300 U/ML
INJECTION, SOLUTION SUBCUTANEOUS
Qty: 4.5 ML | Refills: 3 | Status: SHIPPED | OUTPATIENT
Start: 2018-04-30 | End: 2018-05-01 | Stop reason: CLARIF

## 2018-05-01 ENCOUNTER — TELEPHONE (OUTPATIENT)
Dept: ENDOCRINOLOGY | Age: 33
End: 2018-05-01

## 2018-05-01 RX ORDER — INSULIN DEGLUDEC 100 U/ML
INJECTION, SOLUTION SUBCUTANEOUS
Qty: 15 ML | Refills: 11 | Status: SHIPPED | OUTPATIENT
Start: 2018-05-01 | End: 2018-06-25 | Stop reason: SDUPTHER

## 2018-05-01 NOTE — TELEPHONE ENCOUNTER
Sent her the following message through [employer]: We received word that the tresiba was covered in place of the toujeo. You can go to this website to get a co-pay card to hopefully get the prescription for $15 for 2 years:    [email or web address]

## 2018-05-01 NOTE — TELEPHONE ENCOUNTER
----- Message from Jooix. Junie Eric sent at 4/30/2018  5:36 PM EDT -----  Regarding: RE: Prescription Question  Contact: [phone]  Hehubert Pantoja,  My insurance is requiring that you fill out a form so that I can get the tuojeo. My other diabetes doctor had to do this as well so that my insurance would approve of me getting this medicine. So I will not be able to get the medicine today. The pharmacy said it normally takes about 2 days to process after you send the form back to the insurance. What should I do until I get the insulin? [place]uylkill Primes  ----- Message -----  From: Sydney Mariscal MD  Sent: 4/30/2018  4:46 PM EDT  To: Dee Lopez  Subject: RE: Prescription Question    I just signed this so you should be able to get this from the pharmacy today.    ----- Message -----     From: Jooix. John     Sent: 4/30/2018  2:49 PM EDT       To: Sydney Mariscal MD  Subject: Prescription Question    Good afternoon Dr. Abraham Pantoja,  I ran out of my toujeo yesterday. I took 15 units yesterday before eating breakfast and forgot about taking the rest when I got home. I was at [employer] at the time. Then this morning I found out I had none at all. Could you send it to my [facility] pharmacy on [address]? I have not had any toujeo today at all. I called the pharmacy this morning and they said they would call ELMER Galvan but I haven't heard anything yet.  Sorry, I know I should have emailed you first!    Thanks,  Fermín Millan

## 2018-06-14 ENCOUNTER — TELEPHONE (OUTPATIENT)
Dept: ENDOCRINOLOGY | Age: 33
End: 2018-06-14

## 2018-06-14 NOTE — TELEPHONE ENCOUNTER
----- Message from Oriana Lemus sent at 6/14/2018  2:37 PM EDT -----  Patient called me back this afternoon and scheduled her non-fasting lab for 6/21/18 at 2:00 PM.  Thank you.      ----- Message -----     From: Ryan Taylor MD     Sent: 6/10/2018   5:13 PM       To: Oriana Lemus    Please call her to arrange a non-fasting lab appointment prior to her visit on 6/25/18 as she owes a bill at [address] [place] [address].  Thanks.

## 2018-06-21 ENCOUNTER — LAB ONLY (OUTPATIENT)
Dept: ENDOCRINOLOGY | Age: 33
End: 2018-06-21

## 2018-06-21 NOTE — PROGRESS NOTES
Several of the nurses were unable to obtain blood so will have Mini Babcock try on 6/25/18 at her visit.

## 2018-06-25 ENCOUNTER — OFFICE VISIT (OUTPATIENT)
Dept: ENDOCRINOLOGY | Age: 33
End: 2018-06-25

## 2018-06-25 VITALS
DIASTOLIC BLOOD PRESSURE: 87 MMHG | BODY MASS INDEX: 46 KG/M2 | HEART RATE: 79 BPM | SYSTOLIC BLOOD PRESSURE: 134 MMHG | WEIGHT: 259.6 LBS | OXYGEN SATURATION: 100 % | HEIGHT: 63 IN

## 2018-06-25 DIAGNOSIS — I10 ESSENTIAL HYPERTENSION, BENIGN: ICD-10-CM

## 2018-06-25 DIAGNOSIS — E55.9 VITAMIN D DEFICIENCY: ICD-10-CM

## 2018-06-25 DIAGNOSIS — R79.89 ABNORMAL THYROID BLOOD TEST: ICD-10-CM

## 2018-06-25 LAB — HBA1C MFR BLD HPLC: 8.2 %

## 2018-06-25 RX ORDER — INSULIN ASPART 100 [IU]/ML
INJECTION, SOLUTION INTRAVENOUS; SUBCUTANEOUS
Refills: 1 | COMMUNITY
Start: 2018-06-25 | End: 2018-07-20 | Stop reason: SDUPTHER

## 2018-06-25 RX ORDER — INSULIN DEGLUDEC 100 U/ML
INJECTION, SOLUTION SUBCUTANEOUS
Qty: 15 ML | Refills: 11
Start: 2018-06-25 | End: 2018-07-20 | Stop reason: SDUPTHER

## 2018-06-25 NOTE — PROGRESS NOTES
Chief Complaint   Patient presents with    Follow-up     4 month f/u    Diabetes     History of Present Illness: Geronimo Joe is a 26 y.o. female here for follow up of diabetes. Weight up 2 lbs since last visit in 2/18. We had to change from toujeo to tresiba for insurance in 5/18 and with this switch hasn't had any overnight lows that were unexpected and still using 38 units daily. Checking 3 times daily and fasting sugars are between 100-150 but more are between 130-150. Does have some snacks during the day and doesn't dose for these and then when it's meal time, has seen readings over 200 before the meal.  Sometimes will give more insulin than she needs based on the carbs and this can lead to lows. Has been dosing 1:6 for carbs since last visit but has been correcting 2:50 >150. Has been taking 4000 units of D daily. Has been taking lis/hctz daily. Having more itching that started prior to insulin switch and has started using zyrtec but not consistently so advised her to try the gel caps to use every night at bedtime. Current Outpatient Prescriptions   Medication Sig    NOVOLOG FLEXPEN U-100 INSULIN 100 unit/mL inpn Inject 1:6 for carbs + 1 unit for 50 > 150 for correction    insulin degludec (TRESIBA FLEXTOUCH U-100) 100 unit/mL (3 mL) inpn Inject 38 units once daily--replaces toujeo    hydrOXYzine HCl (ATARAX) 25 mg tablet TAKE 1 TAB BY MOUTH EVERY SIX (6) HOURS AS NEEDED FOR ITCHING FOR UP TO 30 DAYS.  cholecalciferol, vitamin D3, (VITAMIN D3) 2,000 unit tab Take 4,000-5,000 Units by mouth daily.  ONETOUCH ULTRA TEST strip FOR BLOOD GLUCOSE MONITORING 4 X DAILY. .02    chlorhexidine (PERIDEX) 0.12 % solution SWISH 1/2 OUNCE FOR 1 MINUTE TWO TIMES DAILY    triamcinolone acetonide (KENALOG) 0.1 % topical cream APPLY TO AFFECTED AREA TWO (2) TIMES A DAY. USE THIN LAYER    hydrocortisone (HYCORT) 1 % ointment Apply  to affected area two (2) times a day.  use thin layer twice a day.    lisinopril-hydroCHLOROthiazide (PRINZIDE, ZESTORETIC) 10-12.5 mg per tablet TAKE 1 TABLET BY MOUTH ONE TIME DAILY, PLEASE HAVE PCP FILL THIS MED AFTER THIS    LANCE PEN NEEDLE 32 gauge x 5/32\" ndle USE AS DIRECTED TO INJECT INSULIN 4 TIMES DAILY    ibuprofen (ADVIL) 200 mg tablet Take 200 mg by mouth every six (6) hours as needed. No current facility-administered medications for this visit. Allergies   Allergen Reactions    Codeine Nausea and Vomiting     Review of Systems:  - Eyes: no blurry vision or double vision  - Cardiovascular: no chest pain  - Respiratory: no shortness of breath  - Musculoskeletal: no myalgias  - Neurological: no numbness/tingling in extremities    Physical Examination:  Blood pressure 134/87, pulse 79, height 5' 3\" (1.6 m), weight 259 lb 9.6 oz (117.8 kg), last menstrual period 06/21/2018, SpO2 100 %. - General: pleasant, no distress, good eye contact   - Neck: no carotid bruits  - Cardiovascular: regular, normal rate, nl s1 and s2, no m/r/g,   - Respiratory: clear bilaterally  - Integumentary: no edema,   - Psychiatric: normal mood and affect    Data Reviewed:   - POC A1c 8.2%    Assessment/Plan:     1) Type 1 DM uncontrolled: Her most recent Hgb A1c was 8.2% in 6/18 up from 8% in 1/18 (she was at [facility] from 11/12 to 1/18 and states A1c values were in the 7-8% range) down from 8.8% in January 2012 stable from 8.9% in September 2011 up from 7.5% in May 2010. Her fasting sugars are too high so will slightly increase her tresiba.   Also made sure she is not overcorrecting and printed out a new scale to follow for novolog  - increase tresiba to 40 units in the morning  - Take Novolog 1:8 for CHO ratio with meal  - Take Novolog 1:50 for > 150 during the day   - Check bs 4x/day  - optho UTD 7/17--will obtain report  - foot exam done 2/18  - microalbumin nl 2/18  - LDL 85 in 1/18  - check Hgb A1c and cmp and microalbumin prior to next visit      2) HTN NOS (401.9): her BP was at goal < 140/90  - cont lisinopril/HCTZ 10/12.5 mg daily    3) Abnormal thyroid blood test: her TSH was slightly low at 0.293 in September 2011. Clinically no symptoms of hyperthyroidism and repeat was normal at 0.453 in January 2012 and 0.66 in 1/18.  - repeat TSH prior to next visit    4) Vitamin D deficiency: Level was 11.1 in 9/11. Started on ergo at that time and level up to 24 in January 2012. Still 25.9 in 2/18 so advised to take 4000 units daily  - check Vitamin D 25-OH level prior to next visit  - cont vitamin D 4000 units daily        Patient Instructions   1) Try taking novolog for high sugars as listed below:  Blood sugar            151-200  1 units      201-250  2 units     251-300  3 units      301-350  4 units      351-400  5 units  401-450  6 units  451-500  7 units  501-550  8 units  Over 5500  9 units    2) Your A1c is 8.2%, up slightly from 8% at last check. 3) Increase the tresiba to 40 units daily to see if this keeps your fasting sugar between .    4) Try not to snack on anything with sugar between meals and after dinner as this will cause your blood sugar to be higher the next morning and before meals. You can try sugar free jello or pudding or raw veggies or nuts as these won't cause your sugar to spike overnight. 5) Consider using zytec liquid gels 1 at bedtime for itching. Follow-up Disposition:  Return in about 5 months (around 11/25/2018) for fasting labs. Copy sent to:   Yashira Hernandez NP as PCP - General (Nurse Practitioner)  Patrice Fitzpatrick MD (Obstetrics & Gynecology)  Jennifer Talavera MD (Surgical Oncology)  Aimee Brady MD (Hematology and Oncology)

## 2018-06-25 NOTE — PROGRESS NOTES
Chief Complaint   Patient presents with    Follow-up     4 month f/u    Diabetes       1. Have you been to the ER, urgent care clinic since your last visit? Hospitalized since your last visit? no    2. Have you seen or consulted any other health care providers outside of the [address] [place] [address] since your last visit? Include any pap smears or colon screening.  no

## 2018-06-25 NOTE — MR AVS SNAPSHOT
[address] [facility] [address] [id number] [phone] Patient: Audrey Ovalle MRN:  [id number] Visit Information Date & Time Provider Department Dept. Phone Encounter #  
 6/25/2018  3:30 PM Sydney Mariscal, [address] [place] [address] Diabetes and Endocrinology [phone] [id number] Follow-up Instructions Return in about 5 months (around 11/25/2018) for fasting labs. Your Appointments 8/9/2018 11:00 AM  
ROUTINE CARE with Saskia Cantor NP [facility]) Appt Note: routine follow up  
 [address] [facility] [id number] [phone] [address] Upcoming Health Maintenance Date Due  
 PAP AKA CERVICAL CYTOLOGY 6/7/2014 EYE EXAM RETINAL OR DILATED Q1 7/6/2018 HEMOGLOBIN A1C Q6M 7/31/2018 Influenza Age 5 to Adult 8/1/2018 Pneumococcal 19-64 Highest Risk (2 of 3 - PCV13) 9/18/2018 LIPID PANEL Q1 1/31/2019 FOOT EXAM Q1 2/20/2019 MICROALBUMIN Q1 2/20/2019 DTaP/Tdap/Td series (2 - Td) 6/30/2020 Allergies as of 6/25/2018  Review Complete On: 6/25/2018 By: Sydney Mariscal MD  
  
 Severity Noted Reaction Type Reactions Codeine Medium 09/16/2017   Intolerance Nausea and Vomiting Current Immunizations  Reviewed on 1/5/2012 Name Date Influenza Vaccine (Quad) PF 9/18/2017 Pneumococcal Polysaccharide (PPSV-23) 9/18/2017 Not reviewed this visit You Were Diagnosed With   
  
 Codes Comments Uncontrolled type [facility] diabetes mellitus without complication [facility]    -  Primary ICD-10-CM: E10.65 ICD-9-CM: 250.03 Essential hypertension, benign     ICD-10-CM: I10 
ICD-9-CM: 401.1 Vitamin D deficiency     ICD-10-CM: E55.9 ICD-9-CM: 268.9 Abnormal thyroid blood test     ICD-10-CM: R94.6 ICD-9-CM: 794.5 Vitals BP Pulse Height(growth percentile) Weight(growth percentile) LMP SpO2  
 134/87 (BP 1 Location: Left arm, BP Patient Position: Sitting) 79 5' 3\" (1.6 m) 259 lb 9.6 oz (117.8 kg) 06/21/2018 100% BMI OB Status Smoking Status 45.99 kg/m2 Having regular periods Never Smoker Vitals History BMI and BSA Data Body Mass Index Body Surface Area 45.99 kg/m 2 2.29 m 2 Preferred Pharmacy Pharmacy Name Phone [id number]- [address] [phone] Your Updated Medication List  
  
   
This list is accurate as of 6/25/18  5:17 PM.  Always use your most recent med list. ADVIL 200 mg tablet Generic drug:  ibuprofen Take 200 mg by mouth every six (6) hours as needed. chlorhexidine 0.12 % solution Commonly known as:  PERIDEX  
SWISH 1/2 OUNCE FOR 1 MINUTE TWO TIMES DAILY  
  
 hydrocortisone 1 % ointment Commonly known as:  HYCORT Apply  to affected area two (2) times a day. use thin layer twice a day.  
  
 hydrOXYzine HCl 25 mg tablet Commonly known as:  ATARAX TAKE 1 TAB BY MOUTH EVERY SIX (6) HOURS AS NEEDED FOR ITCHING FOR UP TO 30 DAYS. insulin degludec 100 unit/mL (3 mL) Inpn Commonly known as:  TRESIBA FLEXTOUCH U-100 Inject 40 units once daily--replaces toujeo--Dose change 6/25/18--updated med list--did not send prescription to the pharmacy  
  
 lisinopril-hydroCHLOROthiazide 10-12.5 mg per tablet Commonly known as:  PRINZIDE, ZESTORETIC  
TAKE 1 TABLET BY MOUTH ONE TIME DAILY, PLEASE HAVE PCP FILL THIS MED AFTER THIS Zandra Pen Needle 32 gauge x 5/32\" Ndle Generic drug:  Insulin Needles (Disposable) USE AS DIRECTED TO INJECT INSULIN 4 TIMES DAILY NovoLOG Flexpen U-100 Insulin 100 unit/mL Inpn Generic drug:  insulin aspart U-100 Inject 1:6 for carbs + 1 unit for 50 > 150 for correction ONETOUCH ULTRA TEST strip Generic drug:  glucose blood VI test strips FOR BLOOD GLUCOSE MONITORING 4 X DAILY. .02  
  
 triamcinolone acetonide 0.1 % topical cream  
Commonly known as:  KENALOG  
APPLY TO AFFECTED AREA TWO (2) TIMES A DAY. USE THIN LAYER  
  
 VITAMIN D3 2,000 unit Tab Generic drug:  cholecalciferol (vitamin D3) Take 4,000-5,000 Units by mouth daily. We Performed the Following AMB POC HEMOGLOBIN A1C [71708 CPT(R)] Follow-up Instructions Return in about 5 months (around 11/25/2018) for fasting labs. Patient Instructions 1) Try taking novolog for high sugars as listed below: 
Blood sugar 151-200  1 units 201-250  2 units 251-300  3 units 301-350  4 units 351-400  5 units 401-450  6 units 451-500  7 units 501-550  8 units Over 5500  9 units 2) Your A1c is 8.2%, up slightly from 8% at last check. 3) Increase the tresiba to 40 units daily to see if this keeps your fasting sugar between . 
 
4) Try not to snack on anything with sugar between meals and after dinner as this will cause your blood sugar to be higher the next morning and before meals. You can try sugar free jello or pudding or raw veggies or nuts as these won't cause your sugar to spike overnight. 5) Consider using zytec liquid gels 1 at bedtime for itching. Introducing \[facility]\"" & HEALTH SERVICES! Dear Sunita Oliva: Thank you for requesting a Beststudy account. Our records indicate that you already have an active Beststudy account. You can access your account anytime at [email or web address] Did you know that you can access your hospital and ER discharge instructions at any time in Beststudy? You can also review all of your test results from your hospital stay or ER visit. Additional Information If you have questions, please visit the Frequently Asked Questions section of the Beststudy website at [email or web address] Remember, [employer] is NOT to be used for urgent needs. For medical emergencies, dial 911. Now available from your iPhone and Android! Please provide this summary of care documentation to your next provider. Your primary care clinician is listed as Via Ursula Pederson. If you have any questions after today's visit, please call [phone].

## 2018-06-25 NOTE — PATIENT INSTRUCTIONS
1) Try taking novolog for high sugars as listed below:  Blood sugar            151-200  1 units      201-250  2 units     251-300  3 units      301-350  4 units      351-400  5 units  401-450  6 units  451-500  7 units  501-550  8 units  Over 5500  9 units    2) Your A1c is 8.2%, up slightly from 8% at last check. 3) Increase the tresiba to 40 units daily to see if this keeps your fasting sugar between .    4) Try not to snack on anything with sugar between meals and after dinner as this will cause your blood sugar to be higher the next morning and before meals. You can try sugar free jello or pudding or raw veggies or nuts as these won't cause your sugar to spike overnight. 5) Consider using zytec liquid gels 1 at bedtime for itching.

## 2018-07-05 DIAGNOSIS — L29.9 PRURITIC CONDITION: ICD-10-CM

## 2018-07-05 RX ORDER — HYDROXYZINE 25 MG/1
TABLET, FILM COATED ORAL
Qty: 30 TAB | Refills: 1 | Status: SHIPPED | OUTPATIENT
Start: 2018-07-05 | End: 2018-10-09

## 2018-07-05 NOTE — TELEPHONE ENCOUNTER
Last Visit: 2/8/18-Barry  Next Appt: 8/9/18Junaid  Previous Refill Encounter: 4/24/18-30-1 refill    Requested Prescriptions     Pending Prescriptions Disp Refills    hydrOXYzine HCl (ATARAX) 25 mg tablet 30 Tab 1

## 2018-07-20 ENCOUNTER — OFFICE VISIT (OUTPATIENT)
Dept: ENDOCRINOLOGY | Age: 33
End: 2018-07-20

## 2018-07-20 ENCOUNTER — TELEPHONE (OUTPATIENT)
Dept: ENDOCRINOLOGY | Age: 33
End: 2018-07-20

## 2018-07-20 VITALS
BODY MASS INDEX: 46.32 KG/M2 | DIASTOLIC BLOOD PRESSURE: 73 MMHG | SYSTOLIC BLOOD PRESSURE: 129 MMHG | WEIGHT: 261.4 LBS | HEIGHT: 63 IN | HEART RATE: 74 BPM

## 2018-07-20 DIAGNOSIS — R79.89 ABNORMAL THYROID BLOOD TEST: ICD-10-CM

## 2018-07-20 DIAGNOSIS — I10 ESSENTIAL HYPERTENSION, BENIGN: ICD-10-CM

## 2018-07-20 DIAGNOSIS — E55.9 VITAMIN D DEFICIENCY: ICD-10-CM

## 2018-07-20 RX ORDER — INSULIN DEGLUDEC 100 U/ML
INJECTION, SOLUTION SUBCUTANEOUS
Qty: 15 ML | Refills: 11
Start: 2018-07-20 | End: 2018-07-25 | Stop reason: SDUPTHER

## 2018-07-20 RX ORDER — INSULIN ASPART 100 [IU]/ML
INJECTION, SOLUTION INTRAVENOUS; SUBCUTANEOUS
Refills: 1 | COMMUNITY
Start: 2018-07-20 | End: 2018-09-11 | Stop reason: SDUPTHER

## 2018-07-20 NOTE — PROGRESS NOTES
Chief Complaint   Patient presents with    Diabetes     History of Present Illness: Tonja Grant is a 26 y.o. female here for follow up of diabetes. Weight up 2 lbs since last visit in 6/18. She is here for a sooner visit as she just found out she is about 4 weeks pregnant and saw Dr. Felipe Haney yesterday. Had been taking the 38 units of tresiba and this dose was working after last visit to keep her sugars under 100 fasting but starting on Monday was having higher sugars and some nausea likely due to the pregnancy and she went back to 40 units every morning for the past few days. Fasting sugar was 206 this morning. Had chicken and shrimp and 3 small slices of orange for dinner and 2 sugar free popsicles last night. Had eggs and turkey sausage and a small bowl of berries and took 9 units of novolog as she went back to 1:25 > 150 for correction but her sugar was still 254 by lunch. Had some leftovers for lunch and a cereal bar and took 10 units of novolog. Stopped her lisinopril/hctz and was changed to some dose of labetalol by Dr. Felipe Haney yesterday and will let me know the dose. Current Outpatient Prescriptions   Medication Sig    hydrOXYzine HCl (ATARAX) 25 mg tablet TAKE 1 TAB BY MOUTH EVERY SIX (6) HOURS AS NEEDED FOR ITCHING FOR UP TO 30 DAYS.    NOVOLOG FLEXPEN U-100 INSULIN 100 unit/mL inpn Inject 1:6 for carbs + 1 unit for 50 > 150 for correction    insulin degludec (TRESIBA FLEXTOUCH U-100) 100 unit/mL (3 mL) inpn Inject 40 units once daily--replaces toujeo--Dose change 6/25/18--updated med list--did not send prescription to the pharmacy    cholecalciferol, vitamin D3, (VITAMIN D3) 2,000 unit tab Take 4,000-5,000 Units by mouth daily.  ONETOUCH ULTRA TEST strip FOR BLOOD GLUCOSE MONITORING 4 X DAILY. .02    triamcinolone acetonide (KENALOG) 0.1 % topical cream APPLY TO AFFECTED AREA TWO (2) TIMES A DAY.  USE THIN LAYER    LANCE PEN NEEDLE 32 gauge x 5/32\" ndle USE AS DIRECTED TO INJECT INSULIN 4 TIMES DAILY    chlorhexidine (PERIDEX) 0.12 % solution SWISH 1/2 OUNCE FOR 1 MINUTE TWO TIMES DAILY    hydrocortisone (HYCORT) 1 % ointment Apply  to affected area two (2) times a day. use thin layer twice a day.  ibuprofen (ADVIL) 200 mg tablet Take 200 mg by mouth every six (6) hours as needed. No current facility-administered medications for this visit. Allergies   Allergen Reactions    Codeine Nausea and Vomiting     Review of Systems:  - Eyes: no blurry vision or double vision  - Cardiovascular: no chest pain  - Respiratory: no shortness of breath  - Musculoskeletal: no myalgias  - Neurological: no numbness/tingling in extremities    Physical Examination:  Blood pressure 129/73, pulse 74, height 5' 3\" (1.6 m), weight 261 lb 6.4 oz (118.6 kg), last menstrual period 06/21/2018.  - General: pleasant, no distress, good eye contact   - Neck: no carotid bruits  - Cardiovascular: regular, normal rate, nl s1 and s2, no m/r/g,   - Respiratory: clear bilaterally  - Integumentary: no edema,   - Psychiatric: normal mood and affect    Data Reviewed:   - none new for review    Assessment/Plan:     1) Type 1 DM uncontrolled: Her most recent Hgb A1c was 8.2% in 6/18 up from 8% in 1/18 (she was at [facility] to 1/18 and states A1c values were in the 7-8% range) down from 8.8% in January 2012 stable from 8.9% in September 2011 up from 7.5% in May 2010. We discussed goals for Type 1 in pregnancy to try and get her fasting sugars  as best as possible and not over 130 during the day. Will increase her tresiba and novolog as below.   - increase tresiba to 42 units in the morning  - Take Novolog 1:5 for CHO ratio with meal  - Take Novolog 1:25 for > 125 during the day   - Check bs 4x/day  - optho UTD 7/17  - foot exam done 2/18  - microalbumin nl 2/18  - LDL 85 in 1/18  - check Hgb A1c at next visit      2) HTN NOS (401.9): her BP was at goal < 140/90 while on labetalol and she'll clarify the dose  - cont labetalol bid    3) Abnormal thyroid blood test: her TSH was slightly low at 0.293 in September 2011. Clinically no symptoms of hyperthyroidism and repeat was normal at 0.453 in January 2012 and 0.66 in 1/18.  - repeat TSH at next visit if not done by Dr. Thomas Lopez    4) Vitamin D deficiency: Level was 11.1 in 9/11. Started on ergo at that time and level up to 24 in January 2012. Still 25.9 in 2/18 so advised to take 4000 units daily  - check Vitamin D 25-OH level at next visit  - cont vitamin D 4000 units daily        Patient Instructions   1) Tomorrow try 42 units of tresiba. 2) For food, try 1 unit for 5 grams of carbs. 3) For correction, try 1 unit for 25 points over 125. Blood sugar            126-150 1  151-175 2  176-200 3  201-225 4  226-250 5  251-275 6  276-300 7    4) Give me an update on Tuesday with how this going over [facility]. 5) Let me know what dose of labetalol you are taking so I can add to your med list.        Follow-up Disposition:  Return for 8/21/18 at 8:50am.    Copy sent to:   Rob Hollis NP as PCP - General (Nurse Practitioner)  Dallas Wilks MD (Obstetrics & Gynecology)  Jodi Delgadillo MD (Surgical Oncology)  Adalid Jefferson MD (Hematology and Oncology)

## 2018-07-20 NOTE — MR AVS SNAPSHOT
[address] [facility] [address] [id number] [phone] Patient: Hero Birmingham MRN:  [id number] Visit Information Date & Time Provider Department Dept. Phone Encounter #  
 7/20/2018  1:50 PM Javier Kohler [facility] Diabetes and Endocrinology [id number] Follow-up Instructions Return for 8/21/18 at 8:50am.  
  
Your Appointments 8/9/2018 11:00 AM  
ROUTINE CARE with Cecelia William NP [address]) Appt Note: routine follow up  
 [address] [facility] [id number]  
[phone] [address] [facility] [address]  
  
    
 11/27/2018  9:00 AM  
LAB with MD Raine Kohler Diabetes and Endocrinology [address] [place] [address]) [facility] [address] [id number] [address] [place] [address]  
  
    
 12/7/2018  9:50 AM  
Follow Up with MD Raine Kohler Diabetes and Endocrinology [address] [place] [address]) Appt Note: 5 month f/u    [facility] [address] [id number] [address] [place] [address] Upcoming Health Maintenance Date Due  
 PAP AKA CERVICAL CYTOLOGY 6/7/2014 EYE EXAM RETINAL OR DILATED Q1 7/6/2018 Influenza Age 5 to Adult 8/1/2018 Pneumococcal 19-64 Highest Risk (2 of 3 - PCV13) 9/18/2018 HEMOGLOBIN A1C Q6M 12/25/2018 LIPID PANEL Q1 1/31/2019 FOOT EXAM Q1 2/20/2019 MICROALBUMIN Q1 2/20/2019 DTaP/Tdap/Td series (2 - Td) 6/30/2020 Allergies as of 7/20/2018  Review Complete On: 7/20/2018 By: Germaine Birmingham MD  
  
 Severity Noted Reaction Type Reactions Codeine Medium 09/16/2017   Intolerance Nausea and Vomiting Current Immunizations  Reviewed on 1/5/2012 Name Date Influenza Vaccine (Quad) PF 9/18/2017 Pneumococcal Polysaccharide (PPSV-23) 9/18/2017 Not reviewed this visit You Were Diagnosed With   
  
 Codes Comments Uncontrolled type 1 diabetes mellitus without complication ([facility]    -  Primary ICD-10-CM: E10.65 ICD-9-CM: 250.03 Essential hypertension, benign     ICD-10-CM: I10 
ICD-9-CM: 401.1 Vitamin D deficiency     ICD-10-CM: E55.9 ICD-9-CM: 268.9 Abnormal thyroid blood test     ICD-10-CM: R94.6 ICD-9-CM: 794.5 Vitals BP Pulse Height(growth percentile) Weight(growth percentile) LMP BMI  
 129/73 (BP 1 Location: Left arm, BP Patient Position: Sitting) 74 5' 3\" (1.6 m) 261 lb 6.4 oz (118.6 kg) 06/21/2018 46.3 kg/m2 OB Status Smoking Status Having regular periods Never Smoker BMI and BSA Data Body Mass Index Body Surface Area  
 46.3 kg/m 2 2.3 m 2 Preferred Pharmacy Pharmacy Name Phone [id number]- [address] [phone] Your Updated Medication List  
  
   
This list is accurate as of 7/20/18  2:42 PM.  Always use your most recent med list. ADVIL 200 mg tablet Generic drug:  ibuprofen Take 200 mg by mouth every six (6) hours as needed. chlorhexidine 0.12 % solution Commonly known as:  PERIDEX  
SWISH 1/2 OUNCE FOR 1 MINUTE TWO TIMES DAILY  
  
 hydrocortisone 1 % ointment Commonly known as:  HYCORT Apply  to affected area two (2) times a day. use thin layer twice a day.  
  
 hydrOXYzine HCl 25 mg tablet Commonly known as:  ATARAX TAKE 1 TAB BY MOUTH EVERY SIX (6) HOURS AS NEEDED FOR ITCHING FOR UP TO 30 DAYS. insulin degludec 100 unit/mL (3 mL) Inpn Commonly known as:  TRESIBA FLEXTOUCH U-100 Inject 42 units once daily--replaces toujeo--Dose change 7/20/18--updated med list--did not send prescription to the pharmacy Zandra Pen Needle 32 gauge x 5/32\" Ndletty Generic drug:  Insulin Needles (Disposable) USE AS DIRECTED TO INJECT INSULIN 4 TIMES DAILY NovoLOG Flexpen U-100 Insulin 100 unit/mL Inpn Generic drug:  insulin aspart U-100 Inject 1:5 for carbs + 1 unit for 25 > 125 for correction ONETOUCH ULTRA TEST strip Generic drug:  glucose blood VI test strips FOR BLOOD GLUCOSE MONITORING 4 X DAILY. .02  
  
 triamcinolone acetonide 0.1 % topical cream  
Commonly known as:  KENALOG  
APPLY TO AFFECTED AREA TWO (2) TIMES A DAY. USE THIN LAYER  
  
 VITAMIN D3 2,000 unit Tab Generic drug:  cholecalciferol (vitamin D3) Take 4,000-5,000 Units by mouth daily. Follow-up Instructions Return for 8/21/18 at 8:50am.  
  
  
Patient Instructions 1) Tomorrow try 42 units of tresiba. 2) For food, try 1 unit for 5 grams of carbs. 3) For correction, try 1 unit for 25 points over 125. Blood sugar 126-150 1 
151-175 2 
176-200 3 
201-225 4 
226-250 5 251-275 6 
276-300 7 4) Give me an update on Tuesday with how this going over [facility]. 5) Let me know what dose of labetalol you are taking so I can add to your med list. 
 
 
 
 
  
Introducing [facility]! Dear Vanesa Jaramillo: Thank you for requesting a [facility] account. Our records indicate that you already have an active [facility] account. You can access your account anytime at [email or web address] Did you know that you can access your hospital and ER discharge instructions at any time in [facility]? You can also review all of your test results from your hospital stay or ER visit. Additional Information If you have questions, please visit the Frequently Asked Questions section of the [employer] website at [email or web address] Remember, [facility] is NOT to be used for urgent needs. For medical emergencies, dial 911. Now available from your iPhone and Android! Please provide this summary of care documentation to your next provider. Your primary care clinician is listed as [facility] Ursula Pederson. If you have any questions after today's visit, please call [phone].

## 2018-07-20 NOTE — TELEPHONE ENCOUNTER
Saira Later,     Please call her at [phone] and see if she can come at 12:10, 1:50 or 3:10 today for a visit so we can discuss this more. Thanks.

## 2018-07-20 NOTE — TELEPHONE ENCOUNTER
----- Message from [employer]. Nat Cerda sent at 7/20/2018 10:02 AM EDT -----  Regarding: Prescription Question  Contact: [phone]  Yinka Mr. Rachael Sims I found out I am pregnant this week. I saw Dr. Stacy Kilgore yesterday. I am 4 weeks along. I am struggling with high blood sugars in the 200s which I know is bad for the baby. What can I do to change my insulin regiment? I will be away from my computer for a while. Please call my cell at [phone]. Thanks!     Patricia Morley

## 2018-07-20 NOTE — PATIENT INSTRUCTIONS
1) Tomorrow try 42 units of tresiba. 2) For food, try 1 unit for 5 grams of carbs. 3) For correction, try 1 unit for 25 points over 125. Blood sugar            126-150 1  151-175 2  176-200 3  201-225 4  226-250 5  251-275 6  276-300 7    4) Give me an update on Tuesday with how this going over [facility].       5) Let me know what dose of labetalol you are taking so I can add to your med list.

## 2018-07-25 RX ORDER — LABETALOL 100 MG/1
TABLET, FILM COATED ORAL
Refills: 5 | COMMUNITY
Start: 2018-07-19 | End: 2018-12-13

## 2018-07-25 RX ORDER — PRENATAL 56/IRON/FOLIC AC/DHA 35-5-1 MG
CAPSULE ORAL
Refills: 5 | COMMUNITY
Start: 2018-07-19 | End: 2018-09-07

## 2018-07-25 RX ORDER — ONDANSETRON 8 MG/1
TABLET, ORALLY DISINTEGRATING ORAL
Refills: 5 | COMMUNITY
Start: 2018-07-19 | End: 2019-03-14

## 2018-07-25 RX ORDER — INSULIN DEGLUDEC 100 U/ML
INJECTION, SOLUTION SUBCUTANEOUS
Qty: 15 ML | Refills: 11 | COMMUNITY
Start: 2018-07-25 | End: 2018-08-28 | Stop reason: SDUPTHER

## 2018-08-28 ENCOUNTER — TELEPHONE (OUTPATIENT)
Dept: ENDOCRINOLOGY | Age: 33
End: 2018-08-28

## 2018-08-28 RX ORDER — INSULIN DEGLUDEC 100 U/ML
INJECTION, SOLUTION SUBCUTANEOUS
Qty: 15 ML | Refills: 11
Start: 2018-08-28 | End: 2018-09-07 | Stop reason: SDUPTHER

## 2018-08-28 NOTE — TELEPHONE ENCOUNTER
----- Message from Chapishawa Tejada sent at 8/28/2018  3:59 PM EDT -----  Regarding: FW: Visit Follow-Up Question  Contact: [phone]      ----- Message -----     From: Melissa Thomas     Sent: 8/28/2018   2:44 PM       To: [facility] Nurse Pool  Subject: Visit Dustin Jade,  I an not financially able to see you right away but I want you to know I had a really bad insulin attack yesterday. The ambulance came to the house after my son went for help. I thank God for him because if he did not get help I could be in a worse state. I remember checking my sugar before laying down and it was 279. So I took 7 units and I went to sleep. I'm currently taking 52 Tresiba and 1:5 carb ratio. Yet i took 48 [place] today. What should I do? ... I'm definitely going to make sure I check my sugar and eat something before I lay down for now on.       Inessarafael Davis

## 2018-08-28 NOTE — TELEPHONE ENCOUNTER
Called and spoke with pt. She is currently 10 weeks pregnant. A few weeks ago she was having a lot of pain from her tooth and thins this was raising up her sugar. She increased her tresiba up to 52 units but did find that the carb ratio of 1:4 that Dr. Fozia Lomeli recommended over mychart was too aggressive so she went back to 1:5.  Yesterday her sugar was 257 and she was correcting for this without food and based on her scale she should need 6.1 units but she took 7 units and it's possible that this extra unit plus her body now being more sensitive to insulin after her teeth issue has resolved led to the low. I advised her to stay on 50 units of tresiba the next few days and if still having low sugars, then go down to 48 units but keep her novolog the same and bring a detailed log to her visit on 9/7/18. she voiced understanding of this plan.

## 2018-08-29 ENCOUNTER — OFFICE VISIT (OUTPATIENT)
Dept: FAMILY MEDICINE CLINIC | Age: 33
End: 2018-08-29

## 2018-08-29 VITALS
OXYGEN SATURATION: 99 % | SYSTOLIC BLOOD PRESSURE: 109 MMHG | HEART RATE: 82 BPM | TEMPERATURE: 97.5 F | WEIGHT: 271.6 LBS | DIASTOLIC BLOOD PRESSURE: 74 MMHG | BODY MASS INDEX: 48.12 KG/M2 | RESPIRATION RATE: 18 BRPM | HEIGHT: 63 IN

## 2018-08-29 DIAGNOSIS — I10 ESSENTIAL HYPERTENSION, BENIGN: Primary | ICD-10-CM

## 2018-08-29 DIAGNOSIS — Z23 ENCOUNTER FOR IMMUNIZATION: ICD-10-CM

## 2018-08-29 DIAGNOSIS — Z85.3 HISTORY OF BREAST CANCER: ICD-10-CM

## 2018-08-29 DIAGNOSIS — E10.9 TYPE 1 DIABETES MELLITUS WITHOUT COMPLICATION (HCC): ICD-10-CM

## 2018-08-29 DIAGNOSIS — Z3A.10 10 WEEKS GESTATION OF PREGNANCY: ICD-10-CM

## 2018-08-29 DIAGNOSIS — E66.01 MORBID OBESITY WITH BMI OF 40.0-44.9, ADULT (HCC): ICD-10-CM

## 2018-08-29 RX ORDER — CLINDAMYCIN PHOSPHATE 10 MG/G
GEL TOPICAL
COMMUNITY
Start: 2017-01-31 | End: 2018-10-09

## 2018-08-29 RX ORDER — BISACODYL 5 MG
TABLET, DELAYED RELEASE (ENTERIC COATED) ORAL
Refills: 1 | COMMUNITY
Start: 2018-08-09 | End: 2018-11-08

## 2018-08-29 NOTE — MR AVS SNAPSHOT
[address] [facility], [place], operated by [facility] 
 
 
 [address] [facility] [address] [address] 
[phone] Patient: Brandon Henderson MRN: [id number] NLO:[id number] Visit Information Date & Time Provider Department Dept. Phone Encounter #  
 8/29/2018 10:00 AM Jovanni Khan [phone] [id number] Follow-up Instructions Return in about 1 year (around 8/29/2019), or if symptoms worsen or fail to improve. Your Appointments 9/7/2018  4:10 PM  
Follow Up with Bradley Scott MD  
[facility] [address] [place] [address]) Appt Note: f/u   ok per provider; f/u ok per provider [employer] [phone] [address] [place] [address] Road  
  
    
 11/27/2018  9:00 AM  
LAB with Bradley Scott MD  
[facility] [address] [place] [address]) [employer] [address] [phone] [address] [place] [address] Road  
  
    
 12/7/2018  9:50 AM  
Follow Up with Bradley Scott MD  
[facility] [address] [place] [address]) Appt Note: 5 month f/u    Di[facility] [employer] [address] [phone] Upcoming Health Maintenance Date Due  
 PAP AKA CERVICAL CYTOLOGY 6/7/2014 EYE EXAM RETINAL OR DILATED Q1 7/6/2018 Influenza Age 5 to Adult 8/1/2018 Pneumococcal 19-64 Highest Risk (2 of 3 - PCV13) 9/18/2018 HEMOGLOBIN A1C Q6M 12/25/2018 LIPID PANEL Q1 1/31/2019 FOOT EXAM Q1 2/20/2019 MICROALBUMIN Q1 2/20/2019 DTaP/Tdap/Td series (2 - Td) 6/30/2020 Allergies as of 8/29/2018  Review Complete On: 8/29/2018 By: Jaymie Ornelas Severity Noted Reaction Type Reactions Codeine Medium 09/16/2017   Intolerance Nausea and Vomiting Current Immunizations  Reviewed on 1/5/2012 Name Date Influenza Vaccine (Quad) PF  Incomplete, 9/18/2017 Pneumococcal Polysaccharide (PPSV-23) 9/18/2017 Not reviewed this visit You Were Diagnosed With   
  
 Codes Comments Encounter for immunization    -  Primary ICD-10-CM: G59 ICD-9-CM: V03.89 Vitals BP Pulse Temp Resp Height(growth percentile) Weight(growth percentile) 109/74 (BP 1 Location: Left arm, BP Patient Position: Sitting) 82 97.5 °F (36.4 °C) (Oral) 18 5' 3\" (1.6 m) 271 lb 9.6 oz (123.2 kg) SpO2 BMI OB Status Smoking Status 99% 48.11 kg/m2 Having regular periods Never Smoker Vitals History BMI and BSA Data Body Mass Index Body Surface Area  
 48.11 kg/m 2 2.34 m 2 Preferred Pharmacy Pharmacy Name Phone [id number] [phone] Your Updated Medication List  
  
   
This list is accurate as of 8/29/18 11:35 AM.  Always use your most recent med list. ADVIL 200 mg tablet Generic drug:  ibuprofen Take 200 mg by mouth every six (6) hours as needed. bisacodyl 5 mg EC tablet Commonly known as:  DULCOLAX TAKE 1 TABLET BY MOUTH EVERY DAY  
  
 chlorhexidine 0.12 % solution Commonly known as:  PERIDEX  
SWISH 1/2 OUNCE FOR 1 MINUTE TWO TIMES DAILY  
  
 clindamycin 1 % topical gel Commonly known as:  CLINDAGEL Apply  to affected area. hydrocortisone 1 % ointment Commonly known as:  HYCORT Apply  to affected area two (2) times a day. use thin layer twice a day.  
  
 hydrOXYzine HCl 25 mg tablet Commonly known as:  ATARAX TAKE 1 TAB BY MOUTH EVERY SIX (6) HOURS AS NEEDED FOR ITCHING FOR UP TO 30 DAYS. insulin degludec 100 unit/mL (3 mL) Inpn Commonly known as:  TRESIBA FLEXTOUCH U-100 Inject 50 units once daily--replaces toujeo--Dose change 8/28/18--updated med list--did not send prescription to the pharmacy labetalol 100 mg tablet Commonly known as:  NORMODYNE  
TAKE 1 TABLET BY MOUTH TWICE A DAY Zandra Pen Needle 32 gauge x 5/32\" Ndle Generic drug:  Insulin Needles (Disposable) USE AS DIRECTED TO INJECT INSULIN 4 TIMES DAILY NovoLOG Flexpen U-100 Insulin 100 unit/mL Inpn Generic drug:  insulin aspart U-100 Inject 1:5 for carbs + 1 unit for 25 > 125 for correction OB COMPLETE PETITE 35 mg iron-5 mg iron-1 mg Cap Generic drug:  PNV #56-iron-folic acid-dha  
TAKE 1 CAPSULE BY MOUTH EVERY DAY  
  
 ondansetron 8 mg disintegrating tablet Commonly known as:  ZOFRAN ODT  
TAKE AS DIRECTED * ONETOUCH ULTRA TEST strip Generic drug:  glucose blood VI test strips FOR BLOOD GLUCOSE MONITORING 4 X DAILY. .02  
  
 * blood glucose test strip Generic drug:  glucose blood VI test strips  
blood glucose strips, See Instructions, #: 400 EA, 1 Refill(s), Pharmacy: [place] [id number] [facility]  
  
 TOUJEO SOLOSTAR U-300 INSULIN 300 unit/mL (1.5 mL) Inpn Generic drug:  insulin glargine 42 Units, SQ, daily, # 12 mL, 3 Refills, Pharmacy: [facility]  
  
 triamcinolone acetonide 0.1 % topical cream  
Commonly known as:  KENALOG  
APPLY TO AFFECTED AREA TWO (2) TIMES A DAY. USE THIN LAYER  
  
 VITAMIN D3 2,000 unit Tab Generic drug:  cholecalciferol (vitamin D3) Take 4,000-5,000 Units by mouth daily. * Notice: This list has 2 medication(s) that are the same as other medications prescribed for you. Read the directions carefully, and ask your doctor or other care provider to review them with you. We Performed the Following INFLUENZA VIRUS VAC QUAD,SPLIT,PRESV FREE SYRINGE IM [id number] CPT(R)] Follow-up Instructions Return in about 1 year (around 8/29/2019), or if symptoms worsen or fail to improve. Patient Instructions Vaccine Information Statement Influenza (Flu) Vaccine (Inactivated or Recombinant): What you need to know Many Vaccine Information Statements are available in [place]ien and other languages. See www.immunize.org/vis Hojas de Información Sobre Vacunas están disponibles en Español y en muchos otros idiomas. Visite www.immunize.org/vis 1. Why get vaccinated? Influenza (flu) is a contagious disease that spreads around the [place] every year, usually between October and May. Flu is caused by influenza viruses, and is spread mainly by coughing, sneezing, and close contact. Anyone can get flu. Flu strikes suddenly and can last several days. Symptoms vary by age, but can include: 
 fever/chills  sore throat  muscle aches  fatigue  cough  headache  runny or stuffy nose Flu can also lead to pneumonia and blood infections, and cause diarrhea and seizures in children. If you have a medical condition, such as heart or lung disease, flu can make it worse. Flu is more dangerous for some people. Infants and young children, people 72years of age and older, pregnant women, and people with certain health conditions or a weakened immune system are at greatest risk. Each year thousands of people in the [facility] die from flu, and many more are hospitalized. Flu vaccine can: 
 keep you from getting flu, 
 make flu less severe if you do get it, and 
 keep you from spreading flu to your family and other people. 2. Inactivated and recombinant flu vaccines A dose of flu vaccine is recommended every flu season. Children 6 months through 6years of age may need two doses during the same flu season. Everyone else needs only one dose each flu season. Some inactivated flu vaccines contain a very small amount of a mercury-based preservative called thimerosal. Studies have not shown thimerosal in vaccines to be harmful, but flu vaccines that do not contain thimerosal are available. There is no live flu virus in flu shots. They cannot cause the flu. There are many flu viruses, and they are always changing. Each year a new flu vaccine is made to protect against three or four viruses that are likely to cause disease in the upcoming flu season. But even when the vaccine doesnt exactly match these viruses, it may still provide some protection Flu vaccine cannot prevent: 
 flu that is caused by a virus not covered by the vaccine, or 
 illnesses that look like flu but are not. It takes about 2 weeks for protection to develop after vaccination, and protection lasts through the flu season. 3. Some people should not get this vaccine Tell the person who is giving you the vaccine:  If you have any severe, life-threatening allergies. If you ever had a life-threatening allergic reaction after a dose of flu vaccine, or have a severe allergy to any part of this vaccine, you may be advised not to get vaccinated. Most, but not all, types of flu vaccine contain a small amount of egg protein.  If you ever had Guillain-Barré Syndrome (also called GBS). Some people with a history of GBS should not get this vaccine. This should be discussed with your doctor.  If you are not feeling well. It is usually okay to get flu vaccine when you have a mild illness, but you might be asked to come back when you feel better. 4. Risks of a vaccine reaction With any medicine, including vaccines, there is a chance of reactions. These are usually mild and go away on their own, but serious reactions are also possible. Most people who get a flu shot do not have any problems with it. Minor problems following a flu shot include:  
 soreness, redness, or swelling where the shot was given  hoarseness  sore, red or itchy eyes  cough  fever  aches  headache  itching  fatigue If these problems occur, they usually begin soon after the shot and last 1 or 2 days. More serious problems following a flu shot can include the following:  There may be a small increased risk of Guillain-Barré Syndrome (GBS) after inactivated flu vaccine. This risk has been estimated at 1 or 2 additional cases per million people vaccinated. This is much lower than the risk of severe complications from flu, which can be prevented by flu vaccine.  Young children who get the flu shot along with pneumococcal vaccine (PCV13) and/or DTaP vaccine at the same time might be slightly more likely to have a seizure caused by fever. Ask your doctor for more information. Tell your doctor if a child who is getting flu vaccine has ever had a seizure. Problems that could happen after any injected vaccine:  People sometimes faint after a medical procedure, including vaccination. Sitting or lying down for about 15 minutes can help prevent fainting, and injuries caused by a fall. Tell your doctor if you feel dizzy, or have vision changes or ringing in the ears.  Some people get severe pain in the shoulder and have difficulty moving the arm where a shot was given. This happens very rarely.  Any medication can cause a severe allergic reaction. Such reactions from a vaccine are very rare, estimated at about 1 in a million doses, and would happen within a few minutes to a few hours after the vaccination. As with any medicine, there is a very remote chance of a vaccine causing a serious injury or death. The safety of vaccines is always being monitored. For more information, visit: www.cdc.gov/vaccinesafety/ 
 
5. What if there is a serious reaction? What should I look for?  Look for anything that concerns you, such as signs of a severe allergic reaction, very high fever, or unusual behavior. Signs of a severe allergic reaction can include hives, swelling of the face and throat, difficulty breathing, a fast heartbeat, dizziness, and weakness  usually within a few minutes to a few hours after the vaccination. What should I do?  If you think it is a severe allergic reaction or other emergency that cant wait, call 9-1-1 and get the person to the nearest hospital. Otherwise, call your doctor.  Reactions should be reported to the Vaccine Adverse Event Reporting System (VAERS). Your doctor should file this report, or you can do it yourself through  the VAERS web site at www.vaers. [facility].gov, or by calling 7-126.154.4251. VAERS does not give medical advice. 6. The National Vaccine Injury Compensation Program 
 
The [facility] Vaccine Injury Compensation Program (VICP) is a federal program that was created to compensate people who may have been injured by certain vaccines. Persons who believe they may have been injured by a vaccine can learn about the program and about filing a claim by calling 2-538.685.5262 or visiting the [employer] website at www.[facility].gov/vaccinecompensation. There is a time limit to file a claim for compensation. 7. How can I learn more?  Ask your healthcare provider. He or she can give you the vaccine package insert or suggest other sources of information.  Call your local or state health department.  Contact the Centers for Disease Control and Prevention (CDC): 
- Call 8-246.832.7708 (1-800-CDC-INFO) or 
- Visit CDCs website at www.cdc.gov/flu Vaccine Information Statement Inactivated Influenza Vaccine 8/7/2015 
[address] [address] [facility] and [employer] Centers for Disease Control and Prevention Office Use Only [facility]! Dear Latonya Whipple: Thank you for requesting a [employer] account. Our records indicate that you already have an active [employer] account. You can access your account anytime at https://[email or web address]. [email or web address] Did you know that you can access your hospital and ER discharge instructions at any time in [employer]? You can also review all of your test results from your hospital stay or ER visit. Additional Information If you have questions, please visit the Frequently Asked Questions section of the [employer] website at [email or web address] Remember, [employer] is NOT to be used for urgent needs. For medical emergencies, dial 911. Now available from your iPhone and Android! Please provide this summary of care documentation to your next provider. Your primary care clinician is listed as Via Ursula Pederson. If you have any questions after today's visit, please call [phone].

## 2018-08-29 NOTE — PROGRESS NOTES
Chief Complaint   Patient presents with    Hypertension     Patient is 10 weeks pregnant currently. 1. Have you been to the ER, urgent care clinic since your last visit? Hospitalized since your last visit? No    2. Have you seen or consulted any other health care providers outside of the [facility] since your last visit? Include any pap smears or colon screening.  No

## 2018-08-29 NOTE — PROGRESS NOTES
HISTORY OF PRESENT ILLNESS  Calixto Mehta is a 26 y.o. female. HPI  Patient comes in today for follow up HTN. Patient is ~10 weeks pregnant with second child. Has a son who is 10yo. States she has been having more difficulty with this pregnancy, more nausea. Taking Zofran for nausea. GYN - Dr. Sanket Young. [facility] 3/23/19. Hx HTN, used to take Prinizide. Was discontinued due to pregnancy and she was started on Labetalol. States she is tolerating medication, but only taking once daily. Does not monitor BP at home. Had cavity in wisdom tooth, eventually had removed. States infection drove blood sugars up temporarily. Did have a hypoglycemic episode where her son called EMS, BG was in 25s. She is followed by Dr. Berny Gallego for diabetes, has been in touch with him regarding blood sugars. Last A1c reading was 8.2% in 2018. 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 No complaints today. Allergies   Allergen Reactions    Codeine Nausea and Vomiting       Past Medical History:   Diagnosis Date    Breast cancer [facility]) 2012    Right breast infiltrating ductal carcinoma    Cardiomyopathy due to chemotherapy ([address]     EF 20 %    Essential hypertension     History of sepsis 2013    after chemo    Hx of difficult intubation     resulting from sepsis in 2013.  Hypertension     Morbid obesity ([address]     Ne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 ([address]     Type I (juvenile type) diabetes mellitus without mention of complication, uncontrolled     diagnosed age 4    Unspecified vitamin D deficiency 2011       Past Surgical History:   Procedure Laterality Date    HX CYST INCISION AND DRAINAGE  2013    perirectal abscess    HX GYN       in     HX MASTECTOMY Right     Right MRM with sentinel LNB.  HX ORTHOPAEDIC Right     Carpal tunnel release, index finger trigger release.     HX OTHER SURGICAL      cyst removed under left arm    HX OTHER SURGICAL      port placement for chemo    HX WISDOM TEETH EXTRACTION  08/15/2018       Social History     Social History    Marital status:      Spouse name: N/A    Number of children: N/A    Years of education: N/A     Occupational History    Not on file. Social History Main Topics    Smoking status: Never Smoker    Smokeless tobacco: Never Used    Alcohol use No    Drug use: No    Sexual activity: Yes     Partners: Male     Other Topics Concern    Not on file     Social History Narrative    Lives in [place] with her  and 8 yo son. Got  in July 2017. Currently in [address] [place] [address],[place] [address] for a masters in [place]. Family History   Problem Relation Age of Onset    Diabetes Brother      borderline Type 2    Diabetes Paternal Uncle     Diabetes Paternal Grandfather     Heart Disease Paternal Grandfather      MI in his 62s    Hypertension Mother     Heart Disease Father     Stroke Neg Hx        Current Outpatient Prescriptions   Medication Sig    glucose blood VI test strips (BLOOD GLUCOSE TEST) strip   blood glucose strips, See Instructions, #: 400 EA, 1 Refill(s), Pharmacy: [place] [id number] [facility]    clindamycin (CLINDAGEL) 1 % topical gel Apply  to affected area.  bisacodyl (DULCOLAX) 5 mg EC tablet TAKE 1 TABLET BY MOUTH EVERY DAY    insulin degludec (TRESIBA FLEXTOUCH U-100) 100 unit/mL (3 mL) inpn Inject 50 units once daily--replaces toujeo--Dose change 8/28/18--updated med list--did not send prescription to the pharmacy    labetalol (NORMODYNE) 100 mg tablet TAKE 1 TABLET BY MOUTH TWICE A DAY    ondansetron (ZOFRAN ODT) 8 mg disintegrating tablet TAKE AS DIRECTED    NOVOLOG FLEXPEN U-100 INSULIN 100 unit/mL inpn Inject 1:5 for carbs + 1 unit for 25 > 125 for correction    cholecalciferol, vitamin D3, (VITAMIN D3) 2,000 unit tab Take 4,000-5,000 Units by mouth daily.  ONETOUCH ULTRA TEST strip FOR BLOOD GLUCOSE MONITORING 4 X DAILY.  .02    hydrocortisone (HYCORT) 1 % ointment Apply  to affected area two (2) times a day. use thin layer twice a day.  LANCE PEN NEEDLE 32 gauge x 5/32\" ndle USE AS DIRECTED TO INJECT INSULIN 4 TIMES DAILY    insulin glargine (TOUJEO SOLOSTAR U-300 INSULIN) 300 unit/mL (1.5 mL) inpn   42 Units, SQ, daily, # 12 mL, 3 Refills, Pharmacy: [place] [id number] [facility]    OB COMPLETE PETITE 35 mg iron-5 mg iron-1 mg cap TAKE 1 CAPSULE BY MOUTH EVERY DAY    hydrOXYzine HCl (ATARAX) 25 mg tablet TAKE 1 TAB BY MOUTH EVERY SIX (6) HOURS AS NEEDED FOR ITCHING FOR UP TO 30 DAYS.  chlorhexidine (PERIDEX) 0.12 % solution SWISH 1/2 OUNCE FOR 1 MINUTE TWO TIMES DAILY    triamcinolone acetonide (KENALOG) 0.1 % topical cream APPLY TO AFFECTED AREA TWO (2) TIMES A DAY. USE THIN LAYER    ibuprofen (ADVIL) 200 mg tablet Take 200 mg by mouth every six (6) hours as needed. No current facility-administered medications for this visit. Review of Systems   Constitutional: Negative for chills, diaphoresis, fever, malaise/fatigue and weight loss. HENT: Negative for congestion, ear pain, sore throat and tinnitus. Eyes: Negative for blurred vision and double vision. Respiratory: Negative for cough, sputum production, shortness of breath and wheezing. Cardiovascular: Negative for chest pain, palpitations and leg swelling. Gastrointestinal: Positive for nausea. Negative for abdominal pain, blood in stool, constipation, diarrhea and vomiting. Genitourinary: Negative for dysuria, flank pain, frequency, hematuria and urgency. Musculoskeletal: Negative for back pain, joint pain and myalgias. Skin: Negative. Neurological: Negative for dizziness, tingling, sensory change, speech change, focal weakness and headaches. Psychiatric/Behavioral: Negative for depression. The patient is not nervous/anxious and does not have insomnia.       Vitals:    08/29/18 1036   BP: 109/74   Pulse: 82   Resp: 18   Temp: 97.5 °F (36.4 °C)   TempSrc: Oral   SpO2: 99%   Weight: 271 lb 9.6 oz (123.2 kg)   Height: 5' 3\" (1.6 m)     Physical Exam   Constitutional: She is oriented to person, place, and time. Vital signs are normal. She appears well-developed and well-nourished. She is cooperative. HENT:   Right Ear: Hearing, tympanic membrane, external ear and ear canal normal.   Left Ear: Hearing, tympanic membrane, external ear and ear canal normal.   Nose: Nose normal. Right sinus exhibits no maxillary sinus tenderness and no frontal sinus tenderness. Left sinus exhibits no maxillary sinus tenderness and no frontal sinus tenderness. Mouth/Throat: Uvula is midline, oropharynx is clear and moist and mucous membranes are normal. Mucous membranes are not pale and not dry. No oropharyngeal exudate, posterior oropharyngeal edema or posterior oropharyngeal erythema. Neck: No thyroid mass and no thyromegaly present. Cardiovascular: Normal rate, regular rhythm, S1 normal, S2 normal and normal heart sounds. No murmur heard. Pulses:       Radial pulses are 2+ on the right side, and 2+ on the left side. Dorsalis pedis pulses are 2+ on the right side, and 2+ on the left side. Posterior tibial pulses are 2+ on the right side, and 2+ on the left side. Pulmonary/Chest: Effort normal and breath sounds normal. She has no decreased breath sounds. She has no wheezes. She has no rhonchi. She has no rales. Abdominal: Soft. Normal appearance and bowel sounds are normal. There is no hepatosplenomegaly. There is no tenderness. There is no CVA tenderness. abd obese, exam limited by body habitus   Lymphadenopathy:        Head (right side): No submental, no submandibular, no tonsillar, no preauricular and no posterior auricular adenopathy present. Head (left side): No submental, no submandibular, no tonsillar, no preauricular and no posterior auricular adenopathy present. She has no cervical adenopathy.         Right: No supraclavicular adenopathy present. Left: No supraclavicular adenopathy present. Neurological: She is alert and oriented to person, place, and time. Skin: Skin is warm, dry and intact. Psychiatric: She has a normal mood and affect. Her speech is normal and behavior is normal. Thought content normal.   Vitals reviewed. ASSESSMENT and PLAN    ICD-10-CM ICD-9-CM    1. Essential hypertension, benign I10 401.1    2. Type 1 diabetes mellitus without complication (HCC) D36.3 250.01    3. 10 weeks gestation of pregnancy Z3A.10 V22.2    4. Morbid obesity with BMI of 40.0-44.9, adult ([facility] E66.01 278.01     Z68.41 V85.41    5. History of breast cancer Z85.3 V10.3    6. Encounter for immunization Z23 V03.89 INFLUENZA VIRUS [address]chez IM     Encounter Diagnoses   Name Primary?  Essential hypertension, benign Yes    Type 1 diabetes mellitus without complication (HCC)     10 weeks gestation of pregnancy     Morbid obesity with BMI of 40.0-44.9, adult ([facility])     History of breast cancer     Encounter for immunization      Orders Placed This Encounter    Influenza virus vaccine (QUADRIVALENT PRES FREE SYRINGE) IM (05722)    insulin glargine (TOUJEO SOLOSTAR U-300 INSULIN) 300 unit/mL (1.5 mL) inpn    glucose blood VI test strips (BLOOD GLUCOSE TEST) strip    clindamycin (CLINDAGEL) 1 % topical gel    bisacodyl (DULCOLAX) 5 mg EC tablet     Diagnoses and all orders for this visit:    1. Essential hypertension, benign - stable. On labetalol due to pregnancy. Only taking once daily. Discussed duration of medication, encouraged patient to take second dose of med daily, obtain BP cuff for home BP monitoring. 2. Type 1 diabetes mellitus without complication ([facility] - managed by Dr. Kiran Paul. Patient has appointment in Sept 2018    3. 10 weeks gestation of pregnancy - managed by OB/GYN    4. Morbid obesity with BMI of 40.0-44.9, adult ([facility]  -     I have reviewed/discussed the above normal BMI with the patient. I have recommended the following interventions: dietary management education, guidance, and counseling . .      5. History of breast cancer    6. Encounter for immunization  -     Influenza virus vaccine (QUADRIVALENT PRES FREE SYRINGE) IM (87770)      Follow-up Disposition:  Return in about 1 year (around 8/29/2019), or if symptoms worsen or fail to improve. reviewed diet, exercise and weight control    I have reviewed the patient's allergies and made any necessary changes. Medical, procedural, social and family histories have been reviewed and updated as medically indicated. I have reconciled and/or revised patient medications in the EMR. I have discussed each diagnosis listed in this note with Gabriella Kunz and/or their family. I have discussed treatment options and the risk/benefit analysis of those options, including safe use of medications and possible medication side effects. Through the use of shared decision making we have agreed to the above plan. The patient has received an after-visit summary and questions were answered concerning future plans. Ellen Ding, NATALI-C    This note will not be viewable in [employer].

## 2018-08-29 NOTE — PATIENT INSTRUCTIONS
Vaccine Information Statement    Influenza (Flu) Vaccine (Inactivated or Recombinant): What you need to know    Many Vaccine Information Statements are available in Syriac and other languages. See www.immunize.org/vis  Hojas de Información Sobre Vacunas están disponibles en Español y en muchos otros idiomas. Visite www.immunize.org/vis    1. Why get vaccinated? Influenza (flu) is a contagious disease that spreads around the [place] every year, usually between October and May. Flu is caused by influenza viruses, and is spread mainly by coughing, sneezing, and close contact. Anyone can get flu. Flu strikes suddenly and can last several days. Symptoms vary by age, but can include:   fever/chills   sore throat   muscle aches   fatigue   cough   headache    runny or stuffy nose    Flu can also lead to pneumonia and blood infections, and cause diarrhea and seizures in children. If you have a medical condition, such as heart or lung disease, flu can make it worse. Flu is more dangerous for some people. Infants and young children, people 72years of age and older, pregnant women, and people with certain health conditions or a weakened immune system are at greatest risk. Each year thousands of people in the [facility] die from flu, and many more are hospitalized. Flu vaccine can:   keep you from getting flu,   make flu less severe if you do get it, and   keep you from spreading flu to your family and other people. 2. Inactivated and recombinant flu vaccines    A dose of flu vaccine is recommended every flu season. Children 6 months through 6years of age may need two doses during the same flu season. Everyone else needs only one dose each flu season.        Some inactivated flu vaccines contain a very small amount of a mercury-based preservative called thimerosal. Studies have not shown thimerosal in vaccines to be harmful, but flu vaccines that do not contain thimerosal are available. There is no live flu virus in flu shots. They cannot cause the flu. There are many flu viruses, and they are always changing. Each year a new flu vaccine is made to protect against three or four viruses that are likely to cause disease in the upcoming flu season. But even when the vaccine doesnt exactly match these viruses, it may still provide some protection    Flu vaccine cannot prevent:   flu that is caused by a virus not covered by the vaccine, or   illnesses that look like flu but are not. It takes about 2 weeks for protection to develop after vaccination, and protection lasts through the flu season. 3. Some people should not get this vaccine    Tell the person who is giving you the vaccine:     If you have any severe, life-threatening allergies. If you ever had a life-threatening allergic reaction after a dose of flu vaccine, or have a severe allergy to any part of this vaccine, you may be advised not to get vaccinated. Most, but not all, types of flu vaccine contain a small amount of egg protein.  If you ever had Guillain-Barré Syndrome (also called GBS). Some people with a history of GBS should not get this vaccine. This should be discussed with your doctor.  If you are not feeling well. It is usually okay to get flu vaccine when you have a mild illness, but you might be asked to come back when you feel better. 4. Risks of a vaccine reaction    With any medicine, including vaccines, there is a chance of reactions. These are usually mild and go away on their own, but serious reactions are also possible. Most people who get a flu shot do not have any problems with it.      Minor problems following a flu shot include:    soreness, redness, or swelling where the shot was given     hoarseness   sore, red or itchy eyes   cough   fever   aches   headache   itching   fatigue  If these problems occur, they usually begin soon after the shot and last 1 or 2 days. More serious problems following a flu shot can include the following:     There may be a small increased risk of Guillain-Barré Syndrome (GBS) after inactivated flu vaccine. This risk has been estimated at 1 or 2 additional cases per million people vaccinated. This is much lower than the risk of severe complications from flu, which can be prevented by flu vaccine.  Young children who get the flu shot along with pneumococcal vaccine (PCV13) and/or DTaP vaccine at the same time might be slightly more likely to have a seizure caused by fever. Ask your doctor for more information. Tell your doctor if a child who is getting flu vaccine has ever had a seizure. Problems that could happen after any injected vaccine:      People sometimes faint after a medical procedure, including vaccination. Sitting or lying down for about 15 minutes can help prevent fainting, and injuries caused by a fall. Tell your doctor if you feel dizzy, or have vision changes or ringing in the ears.  Some people get severe pain in the shoulder and have difficulty moving the arm where a shot was given. This happens very rarely.  Any medication can cause a severe allergic reaction. Such reactions from a vaccine are very rare, estimated at about 1 in a million doses, and would happen within a few minutes to a few hours after the vaccination. As with any medicine, there is a very remote chance of a vaccine causing a serious injury or death. The safety of vaccines is always being monitored. For more information, visit: www.cdc.gov/vaccinesafety/    5. What if there is a serious reaction? What should I look for?  Look for anything that concerns you, such as signs of a severe allergic reaction, very high fever, or unusual behavior.     Signs of a severe allergic reaction can include hives, swelling of the face and throat, difficulty breathing, a fast heartbeat, dizziness, and weakness - usually within a few minutes to a few hours after the vaccination. What should I do?  If you think it is a severe allergic reaction or other emergency that cant wait, call 9-1-1 and get the person to the nearest hospital. Otherwise, call your doctor.  Reactions should be reported to the Vaccine Adverse Event Reporting System (VAERS). Your doctor should file this report, or you can do it yourself through  the VAERS web site at www.vaers. [facility].gov, or by calling 1-997.652.6965. VAERS does not give medical advice. 6. The National Vaccine Injury Compensation Program    The [facility] Vaccine Injury Compensation Program (VICP) is a federal program that was created to compensate people who may have been injured by certain vaccines. Persons who believe they may have been injured by a vaccine can learn about the program and about filing a claim by calling 1-628.436.5897 or visiting the [employer] website at www.[facility].gov/vaccinecompensation. There is a time limit to file a claim for compensation. 7. How can I learn more?  Ask your healthcare provider. He or she can give you the vaccine package insert or suggest other sources of information.  Call your local or state health department.  Contact the Centers for Disease Control and Prevention (CDC):  - Call 1-941.324.2351 (1-800-CDC-INFO) or  - Visit CDCs website at www.cdc.gov/flu    Vaccine Information Statement   Inactivated Influenza Vaccine   8/7/2015  [address] [place] [id number]    Department of Health and Human Services  Centers for Disease Control and Prevention    Office Use Only

## 2018-09-07 ENCOUNTER — OFFICE VISIT (OUTPATIENT)
Dept: ENDOCRINOLOGY | Age: 33
End: 2018-09-07

## 2018-09-07 VITALS
HEIGHT: 63 IN | WEIGHT: 266.2 LBS | BODY MASS INDEX: 47.17 KG/M2 | HEART RATE: 87 BPM | SYSTOLIC BLOOD PRESSURE: 130 MMHG | DIASTOLIC BLOOD PRESSURE: 81 MMHG

## 2018-09-07 DIAGNOSIS — I10 ESSENTIAL HYPERTENSION, BENIGN: ICD-10-CM

## 2018-09-07 DIAGNOSIS — E55.9 VITAMIN D DEFICIENCY: ICD-10-CM

## 2018-09-07 DIAGNOSIS — R79.89 ABNORMAL THYROID BLOOD TEST: ICD-10-CM

## 2018-09-07 DIAGNOSIS — E10.9 TYPE 1 DIABETES MELLITUS WITHOUT COMPLICATION (HCC): Primary | ICD-10-CM

## 2018-09-07 LAB — HBA1C MFR BLD HPLC: 7.5 %

## 2018-09-07 RX ORDER — FLASH GLUCOSE SENSOR
KIT MISCELLANEOUS
Qty: 3 EACH | Refills: 11 | Status: SHIPPED | OUTPATIENT
Start: 2018-09-07 | End: 2018-10-05 | Stop reason: SDUPTHER

## 2018-09-07 RX ORDER — INSULIN DEGLUDEC 100 U/ML
INJECTION, SOLUTION SUBCUTANEOUS
Qty: 15 ML | Refills: 11
Start: 2018-09-07 | End: 2018-10-09 | Stop reason: SDUPTHER

## 2018-09-07 RX ORDER — FLASH GLUCOSE SENSOR
KIT MISCELLANEOUS
Qty: 1 KIT | Refills: 0 | Status: SHIPPED | OUTPATIENT
Start: 2018-09-07 | End: 2018-11-16 | Stop reason: SDUPTHER

## 2018-09-07 NOTE — PATIENT INSTRUCTIONS
1) Your A1c is 7.5% down from 8.2% in June and goal will be as close to 6.5% or less during pregnancy without having a lot of low sugars. 2) Take an additional 5 units of tresiba today and then tomorrow go up to 55 units in the morning. Stay on the same doses of novolog. Give me an update on Tuesday with your sugars. 3) You can try the freestyle cassi to help monitor your blood sugar more closely if your insurance will cover this. You will apply a new sensor every 10 days and be sure to try and wave the reader over the sensor 3 times a day if possible to know how your sugar is running over the previous 8 hours and the sensor will store up to 90 days of data. Try to check up to 6 times daily before and 2 hours after each meal so you can see the effect of food on your sugar.

## 2018-09-07 NOTE — PROGRESS NOTES
Chief Complaint   Patient presents with    Diabetes     pcp and pharmacy confirmed    Other     consent form signed to obtain eye report     History of Present Illness: Marleny Hill is a 26 y.o. female here for follow up of diabetes. Weight up 5 lbs since last visit in 7/18. Has been taking labetalol 100 mg bid. Will be 12 weeks pregnant tomorrow. Since our phone call last week, has stayed on 50 units of tresiba in the morning and dosing 1:5 for carbs along with 1:25 > 125 for correction. Her fasting sugars remain elevated in the 150-200 range and during the day are mostly 140-230. Has had a few lose in the 60-80s but not consistently. Current Outpatient Prescriptions   Medication Sig    pedi multivit no.19/folic acid (CHILDREN'S MULTI-VIT GUMMIES PO) Take  by mouth. 2 daily    glucose blood VI test strips (BLOOD GLUCOSE TEST) strip   blood glucose strips, See Instructions, #: 400 EA, 1 Refill(s), Pharmacy: [place] [id number] [facility]    clindamycin (CLINDAGEL) 1 % topical gel Apply  to affected area.  bisacodyl (DULCOLAX) 5 mg EC tablet TAKE 1 TABLET BY MOUTH EVERY DAY    insulin degludec (TRESIBA FLEXTOUCH U-100) 100 unit/mL (3 mL) inpn Inject 50 units once daily--replaces toujeo--Dose change 8/28/18--updated med list--did not send prescription to the pharmacy    labetalol (NORMODYNE) 100 mg tablet TAKE 1 TABLET BY MOUTH TWICE A DAY    ondansetron (ZOFRAN ODT) 8 mg disintegrating tablet TAKE AS DIRECTED    NOVOLOG FLEXPEN U-100 INSULIN 100 unit/mL inpn Inject 1:5 for carbs + 1 unit for 25 > 125 for correction    hydrOXYzine HCl (ATARAX) 25 mg tablet TAKE 1 TAB BY MOUTH EVERY SIX (6) HOURS AS NEEDED FOR ITCHING FOR UP TO 30 DAYS.  cholecalciferol, vitamin D3, (VITAMIN D3) 2,000 unit tab Take 4,000-5,000 Units by mouth daily.  ONETOUCH ULTRA TEST strip FOR BLOOD GLUCOSE MONITORING 4 X DAILY.  .02    triamcinolone acetonide (KENALOG) 0.1 % topical cream APPLY TO AFFECTED AREA TWO (2) TIMES A DAY. USE THIN LAYER    hydrocortisone (HYCORT) 1 % ointment Apply  to affected area two (2) times a day. use thin layer twice a day.  LANCE PEN NEEDLE 32 gauge x 5/32\" ndle USE AS DIRECTED TO INJECT INSULIN 4 TIMES DAILY     No current facility-administered medications for this visit. Allergies   Allergen Reactions    Codeine Nausea and Vomiting     Review of Systems:  - Eyes: no blurry vision or double vision  - Cardiovascular: no chest pain  - Respiratory: no shortness of breath  - Musculoskeletal: no myalgias  - Neurological: no numbness/tingling in extremities    Physical Examination:  Blood pressure 130/81, pulse 87, height 5' 3\" (1.6 m), weight 266 lb 3.2 oz (120.7 kg). - General: pleasant, no distress, good eye contact   - Neck: no carotid bruits  - Cardiovascular: regular, normal rate, nl s1 and s2, no m/r/g,   - Respiratory: clear bilaterally  - Integumentary: no edema,   - Psychiatric: normal mood and affect    Data Reviewed:   Component      Latest Ref Rng & Units 9/7/2018          11:08 AM   Hemoglobin A1c (POC)      % 7.5       Assessment/Plan:     1) Type 1 DM uncontrolled: Her most recent Hgb A1c was 7.5% in 9/18 down from 8.2% in 6/18 up from 8% in 1/18 (she was at [facility] from [facility]12 to 1/18 and states A1c values were in the 7-8% range) down from 8.8% in January 2012 stable from 8.9% in September 2011 up from 7.5% in May 2010. We discussed goals for Type 1 in pregnancy to try and get her fasting sugars  as best as possible and not over 130 during the day. Will increase her tresiba and send me an update next week.   - increase tresiba to 55 units in the morning  - Take Novolog 1:5 for CHO ratio with meal  - Take Novolog 1:25 for > 125 during the day   - Check bs 4x/day  - optho UTD 7/17  - foot exam done 2/18  - microalbumin nl 2/18  - LDL 85 in 1/18  - check Hgb A1c at next visit      2) HTN NOS (401.9): her BP was at goal < 140/90   - cont labetalol 100 mg bid    3) Abnormal thyroid blood test: her TSH was slightly low at 0.293 in September 2011. Clinically no symptoms of hyperthyroidism and repeat was normal at 0.453 in January 2012 and 0.66 in 1/18.  - repeat TSH at next visit since not done by Dr. Juancarlos Martinez on 8/19/18    4) Vitamin D deficiency: Level was 11.1 in 9/11. Started on ergo at that time and level up to 24 in January 2012. Still 25.9 in 2/18 so advised to take 4000 units daily  - check Vitamin D 25-OH level at next visit  - cont vitamin D 4000 units daily      Patient Instructions   1) Your A1c is 7.5% down from 8.2% in June and goal will be as close to 6.5% or less during pregnancy without having a lot of low sugars. 2) Take an additional 5 units of tresiba today and then tomorrow go up to 55 units in the morning. Stay on the same doses of novolog. Give me an update on Tuesday with your sugars. 3) You can try the freestyle cassi to help monitor your blood sugar more closely if your insurance will cover this. You will apply a new sensor every 10 days and be sure to try and wave the reader over the sensor 3 times a day if possible to know how your sugar is running over the previous 8 hours and the sensor will store up to 90 days of data. Try to check up to 6 times daily before and 2 hours after each meal so you can see the effect of food on your sugar. Follow-up Disposition:  Return for 10/9/18 at 8:50am.    Copy sent to:   Isela Tolbert NP as PCP - General (Nurse Practitioner)  Eva Guadalupe MD (Obstetrics & Gynecology)  Anirudh Gipson MD (Surgical Oncology)  Luis Kirby MD (Hematology and Oncology)

## 2018-09-07 NOTE — MR AVS SNAPSHOT
[address] [facility] [address] [id number] [phone] Patient: Deepika Pedroza MRN:  [id number] Visit Information Date & Time Provider Department Dept. Phone Encounter #  
 9/7/2018 10:30 AM Paulette Cano, [facility] Diabetes and Endocrinology [phone] Follow-up Instructions Return for 10/9/18 at 8:50am.  
  
Your Appointments 11/27/2018  9:00 AM  
LAB with MD Hebert Tolbert Diabetes and Endocrinology [facility]) [address] [address] [address]  
  
    
 12/7/2018  9:50 AM  
Follow Up with MD Nery Tolbertton Diabetes and Endocrinology [facility]) Appt Note: 5 month f/u    Diabetes [address] [address] [address] Upcoming Health Maintenance Date Due  
 PAP AKA CERVICAL CYTOLOGY 6/7/2014 EYE EXAM RETINAL OR DILATED Q1 7/6/2018 HEMOGLOBIN A1C Q6M 12/25/2018 LIPID PANEL Q1 1/31/2019 FOOT EXAM Q1 2/20/2019 MICROALBUMIN Q1 2/20/2019 DTaP/Tdap/Td series (2 - Td) 6/30/2020 Allergies as of 9/7/2018  Review Complete On: 9/7/2018 By: Paulette Cano MD  
  
 Severity Noted Reaction Type Reactions Codeine Medium 09/16/2017   Intolerance Nausea and Vomiting Current Immunizations  Reviewed on 1/5/2012 Name Date Influenza Vaccine 9/15/2017 12:00 AM, 12/16/2015 12:00 AM, 10/10/2014 12:00 AM, 12/4/2013 12:00 AM  
 Influenza Vaccine (Quad) PF 8/29/2018, 9/18/2017, 11/11/2016 12:00 AM  
 Pneumococcal Polysaccharide (PPSV-23) 9/18/2017 Not reviewed this visit You Were Diagnosed With   
  
 Codes Comments Type 1 diabetes mellitus without complication (HCC)    -  Primary ICD-10-CM: E10.9 ICD-9-CM: 250.01 Vitals BP Pulse Height(growth percentile) Weight(growth percentile) BMI OB Status 130/81 87 5' 3\" (1.6 m) 266 lb 3.2 oz (120.7 kg) 47.16 kg/m2 Having regular periods Smoking Status Never Smoker BMI and BSA Data Body Mass Index Body Surface Area  
 47.16 kg/m 2 2.32 m 2 Preferred Pharmacy Pharmacy Name Phone [facility]/PHARMACY [id number]- [address] [phone] Your Updated Medication List  
  
   
This list is accurate as of 9/7/18 11:20 AM.  Always use your most recent med list.  
  
  
  
  
 bisacodyl 5 mg EC tablet Commonly known as:  DULCOLAX TAKE 1 TABLET BY [address] MULTI-VIT GUMMIES PO Take  by mouth. 2 daily  
  
 clindamycin 1 % topical gel Commonly known as:  CLINDAGEL Apply  to affected area. FREESTYLE NASREEN READER Misc Generic drug:  flash glucose scanning reader Use as directed with freestyle sensors FREESTYLE NASREEN SENSOR Kit Generic drug:  flash glucose sensor Use 1 sensor every 10 days as directed  
  
 hydrocortisone 1 % ointment Commonly known as:  HYCORT Apply  to affected area two (2) times a day. use thin layer twice a day.  
  
 hydrOXYzine HCl 25 mg tablet Commonly known as:  ATARAX TAKE 1 TAB BY MOUTH EVERY SIX (6) HOURS AS NEEDED FOR ITCHING FOR UP TO 30 DAYS. insulin degludec 100 unit/mL (3 mL) Inpn Commonly known as:  TRESIBA FLEXTOUCH U-100 Inject 50 units once daily--replaces toujeo--Dose change 8/28/18--updated med list--did not send prescription to the pharmacy  
  
 labetalol 100 mg tablet Commonly known as:  NORMODYNE  
TAKE 1 TABLET BY MOUTH TWICE A DAY Zandra Pen Needle 32 gauge x 5/32\" Ndle Generic drug:  Insulin Needles (Disposable) USE AS DIRECTED TO INJECT INSULIN 4 TIMES DAILY NovoLOG Flexpen U-100 Insulin 100 unit/mL Inpn Generic drug:  insulin aspart U-100 Inject 1:5 for carbs + 1 unit for 25 > 125 for correction  
  
 ondansetron 8 mg disintegrating tablet Commonly known as:  ZOFRAN ODT  
TAKE AS DIRECTED * ONETOUCH ULTRA TEST strip Generic drug:  glucose blood VI test strips FOR BLOOD GLUCOSE MONITORING 4 X DAILY. .02  
  
 * blood glucose test strip Generic drug:  glucose blood VI test strips  
blood glucose strips, See Instructions, #: 400 EA, 1 Refill(s), Pharmacy: [place] [id number] [facility]  
  
 triamcinolone acetonide 0.1 % topical cream  
Commonly known as:  KENALOG  
APPLY TO AFFECTED AREA TWO (2) TIMES A DAY. USE THIN LAYER  
  
 VITAMIN D3 2,000 unit Tab Generic drug:  cholecalciferol (vitamin D3) Take 4,000-5,000 Units by mouth daily. * Notice: This list has 2 medication(s) that are the same as other medications prescribed for you. Read the directions carefully, and ask your doctor or other care provider to review them with you. Prescriptions Sent to Pharmacy Refills FREESTYLE NASREEN SENSOR kit 11 Sig: Use 1 sensor every 10 days as directed Class: Normal  
 [id number] [address] Ph #: [phone] FREESTYLE NASREEN READER misc 0 Sig: Use as directed with freestyle sensors Class: Normal  
 [id number] [address] Ph #: [phone] We Performed the Following AMB POC HEMOGLOBIN A1C [02288 CPT(R)] Follow-up Instructions Return for 10/9/18 at 8:50am.  
  
  
Patient Instructions 1) Your A1c is 7.5% down from 8.2% in June and goal will be as close to 6.5% or less during pregnancy without having a lot of low sugars. 2) Take an additional 5 units of tresiba today and then tomorrow go up to 55 units in the morning. Stay on the same doses of novolog. Give me an update on Tuesday with your sugars. 3) You can try the freestyle cassi to help monitor your blood sugar more closely if your insurance will cover this. You will apply a new sensor every 10 days and be sure to try and wave the reader over the sensor 3 times a day if possible to know how your sugar is running over the previous 8 hours and the sensor will store up to 90 days of data. Try to check up to 6 times daily before and 2 hours after each meal so you can see the effect of food on your sugar. Introducing [facility] & HEALTH SERVICES! Dear Cecilio Parra: Thank you for requesting a [employer] account. Our records indicate that you already have an active [employer] account. You can access your account anytime at [email or web address] Did you know that you can access your hospital and ER discharge instructions at any time in [employer]? You can also review all of your test results from your hospital stay or ER visit. Additional Information If you have questions, please visit the Frequently Asked Questions section of the [employer] website at [email or web address] Remember, [employer] is NOT to be used for urgent needs. For medical emergencies, dial 911. Now available from your iPhone and Android! Please provide this summary of care documentation to your next provider. Your primary care clinician is listed as [facility] Ursula Pederson. If you have any questions after today's visit, please call [phone].

## 2018-09-10 ENCOUNTER — PATIENT MESSAGE (OUTPATIENT)
Dept: ENDOCRINOLOGY | Age: 33
End: 2018-09-10

## 2018-09-11 RX ORDER — INSULIN ASPART 100 [IU]/ML
INJECTION, SOLUTION INTRAVENOUS; SUBCUTANEOUS
Qty: 15 ML | Refills: 11 | Status: SHIPPED | OUTPATIENT
Start: 2018-09-11 | End: 2018-10-09 | Stop reason: SDUPTHER

## 2018-09-11 NOTE — TELEPHONE ENCOUNTER
From: Rain Ambrose  To: Billy Peter MD  Sent: 9/10/2018 7:30 PM EDT  Subject: Chick Ambroserika Cano. I am out of refills for my novolog. I need this medicine as soon as possible. I only have one pen with less than half full. Thanks! Mrs. Mathur Cousin    Also I will send you my sugars tomorrow. I love my new meter!

## 2018-10-05 DIAGNOSIS — E10.9 TYPE 1 DIABETES MELLITUS WITHOUT COMPLICATION (HCC): ICD-10-CM

## 2018-10-05 RX ORDER — FLASH GLUCOSE SENSOR
KIT MISCELLANEOUS
Qty: 3 KIT | Refills: 11 | Status: SHIPPED | OUTPATIENT
Start: 2018-10-05 | End: 2019-03-19 | Stop reason: ALTCHOICE

## 2018-10-09 ENCOUNTER — OFFICE VISIT (OUTPATIENT)
Dept: ENDOCRINOLOGY | Age: 33
End: 2018-10-09

## 2018-10-09 VITALS
WEIGHT: 275.2 LBS | HEART RATE: 88 BPM | HEIGHT: 63 IN | BODY MASS INDEX: 48.76 KG/M2 | DIASTOLIC BLOOD PRESSURE: 74 MMHG | SYSTOLIC BLOOD PRESSURE: 132 MMHG

## 2018-10-09 DIAGNOSIS — R79.89 ABNORMAL THYROID BLOOD TEST: ICD-10-CM

## 2018-10-09 DIAGNOSIS — E55.9 VITAMIN D DEFICIENCY: ICD-10-CM

## 2018-10-09 DIAGNOSIS — E10.9 TYPE 1 DIABETES MELLITUS WITHOUT COMPLICATION (HCC): Primary | ICD-10-CM

## 2018-10-09 DIAGNOSIS — I10 ESSENTIAL HYPERTENSION, BENIGN: ICD-10-CM

## 2018-10-09 LAB — HBA1C MFR BLD HPLC: 6.8 %

## 2018-10-09 RX ORDER — INSULIN DEGLUDEC 100 U/ML
INJECTION, SOLUTION SUBCUTANEOUS
Qty: 15 ML | Refills: 11
Start: 2018-10-09 | End: 2018-11-08

## 2018-10-09 RX ORDER — INSULIN ASPART 100 [IU]/ML
INJECTION, SOLUTION INTRAVENOUS; SUBCUTANEOUS
Qty: 30 ML | Refills: 11 | Status: SHIPPED | OUTPATIENT
Start: 2018-10-09 | End: 2018-10-24

## 2018-10-09 NOTE — PATIENT INSTRUCTIONS
1) Your A1c is down to 6.8% from 7.5%. 2) Try dosing the novolog 1 unit for 4 grams of carbs to help with spikes after meals. If you are having lows in between meals or overnight, then decrease the tresiba back to 54 units as you may not need as much.

## 2018-10-09 NOTE — MR AVS SNAPSHOT
[address] [facility] [address] [id number] [phone] Patient: Griselda Hewitt MRN:  [id number] Visit Information Date & Time Provider Department Dept. Phone Encounter #  
 10/9/2018  8:50 AM MD [facility] Endocrinology [phone] [id number] Follow-up Instructions Return for 11/15/18 at 1:30pm.  
  
Your Appointments 12/7/2018  9:50 AM  
Follow Up with MD [facility] Diabetes and Endocrinology [address]) Appt Note: 5 month f/u    Diabetes [address] [address] [address] [facility] Upcoming Health Maintenance Date Due  
 PAP AKA CERVICAL CYTOLOGY 6/7/2014 EYE EXAM RETINAL OR DILATED Q1 7/6/2018 LIPID PANEL Q1 1/31/2019 FOOT EXAM Q1 2/20/2019 MICROALBUMIN Q1 2/20/2019 HEMOGLOBIN A1C Q6M 3/7/2019 DTaP/Tdap/Td series (2 - Td) 6/30/2020 Allergies as of 10/9/2018  Review Complete On: 10/9/2018 By: Rae Castañeda MD  
  
 Severity Noted Reaction Type Reactions Codeine Medium 09/16/2017   Intolerance Nausea and Vomiting Current Immunizations  Reviewed on 1/5/2012 Name Date Influenza Vaccine 9/15/2017 12:00 AM, 12/16/2015 12:00 AM, 10/10/2014 12:00 AM, 12/4/2013 12:00 AM  
 Influenza Vaccine (Quad) PF 8/29/2018, 9/18/2017, 11/11/2016 12:00 AM  
 Pneumococcal Polysaccharide (PPSV-23) 9/18/2017 Not reviewed this visit You Were Diagnosed With   
  
 Codes Comments Type 1 diabetes mellitus without complication (HCC)    -  Primary ICD-10-CM: E10.9 ICD-9-CM: 250.01 Essential hypertension, benign     ICD-10-CM: I10 
ICD-9-CM: 401.1 Vitamin D deficiency     ICD-10-CM: E55.9 ICD-9-CM: 268.9 Abnormal thyroid blood test     ICD-10-CM: R79.89 ICD-9-CM: 790.6 Vitals BP Pulse Height(growth percentile) Weight(growth percentile) BMI OB Status 132/74 88 5' 3\" (1.6 m) 275 lb 3.2 oz (124.8 kg) 48.75 kg/m2 Having regular periods Smoking Status Never Smoker Vitals History BMI and BSA Data Body Mass Index Body Surface Area 48.75 kg/m 2 2.36 m 2 Preferred Pharmacy Pharmacy Name Phone [id number]- [address] [phone] Your Updated Medication List  
  
   
This list is accurate as of 10/9/18  9:59 AM.  Always use your most recent med list.  
  
  
  
  
 bisacodyl 5 mg EC tablet Commonly known as:  DULCOLAX TAKE 1 TABLET BY [address] MULTI-VIT GUMMIES PO Take  by mouth. 2 daily FREESTYLE NASREEN 10 DAY READER Misc Generic drug:  flash glucose scanning reader Use as directed with freestyle sensors FREESTYLE NASREEN 10 DAY SENSOR Kit Generic drug:  flash glucose sensor USE 1 SENSOR EVERY 10 DAYS AS DIRECTED  
  
 insulin degludec 100 unit/mL (3 mL) Inpn Commonly known as:  TRESIBA FLEXTOUCH U-100 Inject 57 units once daily--replaces toujeo--Dose change 10/9/18--updated med list--did not send prescription to the pharmacy  
  
 labetalol 100 mg tablet Commonly known as:  NORMODYNE  
TAKE 1 TABLET BY MOUTH TWICE A DAY Zandra Pen Needle 32 gauge x 5/32\" Ndle Generic drug:  Insulin Needles (Disposable) USE AS DIRECTED TO INJECT INSULIN 4 TIMES DAILY NovoLOG Flexpen U-100 Insulin 100 unit/mL Inpn Generic drug:  insulin aspart U-100 Inject 1:4 for carbs + 1 unit for 25 > 125 for correction. Max 100 units per day now that she is pregnant  
  
 ondansetron 8 mg disintegrating tablet Commonly known as:  ZOFRAN ODT  
TAKE AS DIRECTED * ONETOUCH ULTRA TEST strip Generic drug:  glucose blood VI test strips FOR BLOOD GLUCOSE MONITORING 4 X DAILY. .02  
  
 * blood glucose test strip Generic drug:  glucose blood VI test strips  
blood glucose strips, See Instructions, #: 400 EA, 1 Refill(s), Pharmacy: [facility] [id number] [facility]  
  
 VITAMIN D3 2,000 unit Tab Generic drug:  cholecalciferol (vitamin D3) Take 4,000-5,000 Units by mouth daily. * Notice: This list has 2 medication(s) that are the same as other medications prescribed for you. Read the directions carefully, and ask your doctor or other care provider to review them with you. Prescriptions Sent to Pharmacy Refills NOVOLOG FLEXPEN U-100 INSULIN 100 unit/mL inpn 11 Sig: Inject 1:4 for carbs + 1 unit for 25 > 125 for correction. Max 100 units per day now that she is pregnant Class: Normal  
 Pharmacy: [facility]/pharmacy #[id number]- [place], [address] [place] [address] Ph #: [phone] We Performed the Following AMB POC HEMOGLOBIN A1C [78013 CPT(R)] TSH 3RD GENERATION [16666 CPT(R)] VITAMIN D, 25 HYDROXY [id number] CPT(R)] Follow-up Instructions Return for 11/15/18 at 1:30pm. To-Do List   
 10/19/2018 9:30 AM  
  Appointment with ULTRASOUND 1 [facility] at [address] [place] [address] [phone] ([phone]) Patient Instructions 1) Your A1c is down to 6.8% from 7.5%. 2) Try dosing the novolog 1 unit for 4 grams of carbs to help with spikes after meals. If you are having lows in between meals or overnight, then decrease the tresiba back to 54 units as you may not need as much. Introducing [facility] & HEALTH SERVICES! Dear Heather Stevenson: Thank you for requesting a [employer] account. Our records indicate that you already have an active [employer] account. You can access your account anytime at [email or web address] Did you know that you can access your hospital and ER discharge instructions at any time in [employer]? You can also review all of your test results from your hospital stay or ER visit. Additional Information If you have questions, please visit the Frequently Asked Questions section of the [employer] website at [email or web address] Remember, [employer] is NOT to be used for urgent needs. For medical emergencies, dial 911. Now available from your iPhone and Android! Please provide this summary of care documentation to your next provider. Your primary care clinician is listed as [facility] Ursula Pederson. If you have any questions after today's visit, please call [phone].

## 2018-10-09 NOTE — PROGRESS NOTES
Chief Complaint   Patient presents with    Diabetes     pcp and pharmacy confirmed    Other     eye exam is due     History of Present Illness: Arti Holly is a 26 y.o. female here for follow up of diabetes. Weight up 9 lbs since last visit in 9/18. Currently is 16 weeks pregnant with a girl and found out through blood testing. Will be having a gender reveal party next week. Has been using the freestyle nasreen to help her gain better control of her sugars. Has been checking her sugar against her nasreen reader and sometimes it is right on and other times her meter will read higher than the reader. Review of her reader shows she is often having spikes over 150 after meals and other times can keep her sugars between  during the day. Does have some intermittent lows. Has not been taking any vitamin D and is just taking a pnv. Current Outpatient Prescriptions   Medication Sig    insulin degludec (TRESIBA FLEXTOUCH U-100) 100 unit/mL (3 mL) inpn Inject 57 units once daily--replaces toujeo--Dose change 10/9/18--updated med list--did not send prescription to the pharmacy    FREESTYLE NASREEN 10 DAY SENSOR kit USE 1 SENSOR EVERY 10 DAYS AS DIRECTED    NOVOLOG FLEXPEN U-100 INSULIN 100 unit/mL inpn Inject 1:5 for carbs + 1 unit for 25 > 125 for correction. Max 50 units per day    pedi multivit no.19/folic acid (CHILDREN'S MULTI-VIT GUMMIES PO) Take  by mouth. 2 daily    FREESTYLE NASREEN READER misc Use as directed with freestyle sensors    glucose blood VI test strips (BLOOD GLUCOSE TEST) strip   blood glucose strips, See Instructions, #: 400 EA, 1 Refill(s), Pharmacy: [facility] [id number] [facility]    bisacodyl (DULCOLAX) 5 mg EC tablet TAKE 1 TABLET BY MOUTH EVERY DAY    labetalol (NORMODYNE) 100 mg tablet TAKE 1 TABLET BY MOUTH TWICE A DAY    ondansetron (ZOFRAN ODT) 8 mg disintegrating tablet TAKE AS DIRECTED    ONETOUCH ULTRA TEST strip FOR BLOOD GLUCOSE MONITORING 4 X DAILY.  .02    LANCE PEN NEEDLE 32 gauge x 5/32\" ndle USE AS DIRECTED TO INJECT INSULIN 4 TIMES DAILY    cholecalciferol, vitamin D3, (VITAMIN D3) 2,000 unit tab Take 4,000-5,000 Units by mouth daily. No current facility-administered medications for this visit. Allergies   Allergen Reactions    Codeine Nausea and Vomiting     Review of Systems:  - Eyes: no blurry vision or double vision  - Cardiovascular: no chest pain  - Respiratory: no shortness of breath  - Musculoskeletal: no myalgias  - Neurological: no numbness/tingling in extremities    Physical Examination:  Blood pressure 132/74, pulse 88, height 5' 3\" (1.6 m), weight 275 lb 3.2 oz (124.8 kg). - General: pleasant, no distress, good eye contact   - Neck: no carotid bruits  - Cardiovascular: regular, normal rate, nl s1 and s2, no m/r/g,   - Respiratory: clear bilaterally  - Integumentary: no edema,   - Psychiatric: normal mood and affect    Data Reviewed:   Component      Latest Ref Rng & Units 10/9/2018           9:42 AM   Hemoglobin A1c (POC)      % 6.8       Assessment/Plan:     1) Type 1 DM uncontrolled: Her most recent Hgb A1c was 6.8% in 10/18 down from 7.5% in 9/18 down from 8.2% in 6/18 up from 8% in 1/18 (she was at [facility] from 11/12 to 1/18 and states A1c values were in the 7-8% range) down from 8.8% in January 2012 stable from 8.9% in September 2011 up from 7.5% in May 2010. We discussed goals for Type 1 in pregnancy to try and get her fasting sugars  as best as possible and not over 130 during the day. She is spiking too much after meals so will make her carb ratio more aggressive.   - cont tresiba 57 units in the morning  - Take Novolog 1:4 for CHO ratio with meal  - Take Novolog 1:25 for > 125 during the day   - Check bs 4x/day  - optho UTD 7/17  - foot exam done 2/18  - microalbumin nl 2/18  - LDL 85 in 1/18  - check Hgb A1c at next visit      2) HTN NOS (401.9): her BP was at goal < 140/90   - cont labetalol 100 mg bid    3) Abnormal thyroid blood test: her TSH was slightly low at 0.293 in September 2011. Clinically no symptoms of hyperthyroidism and repeat was normal at 0.453 in January 2012 and 0.66 in 1/18.  - repeat TSH today since not done by Dr. Freddie Dave on 8/19/18    4) Vitamin D deficiency: Level was 11.1 in 9/11. Started on ergo at that time and level up to 24 in January 2012. Still 25.9 in 2/18 so advised to take 4000 units daily but has not been doing this  - check Vitamin D 25-OH level today  - off vitamin D 4000 units daily        Patient Instructions   1) Your A1c is down to 6.8% from 7.5%. 2) Try dosing the novolog 1 unit for 4 grams of carbs to help with spikes after meals. If you are having lows in between meals or overnight, then decrease the tresiba back to 54 units as you may not need as much. Follow-up Disposition:  Return for 11/15/18 at 1:30pm.    Copy sent to: Lisandro Purcell NP as PCP - General (Nurse Practitioner)  Coni Mcdermott MD (Obstetrics & Gynecology)  Kaitlin Boyd MD (Surgical Oncology)  Ann Garcia MD (Hematology and Oncology)      Lab follow up: 10/11/18  Component      Latest Ref Rng & Units 10/9/2018 10/9/2018          10:00 AM 10:00 AM   TSH      0.450 - 4.500 uIU/mL  0.703   VITAMIN D, 25-HYDROXY      30.0 - 100.0 ng/mL 27.4 (L)      Sent her the following message through [employer]:  TSH is a thyroid test.  Your level is normal so you don't have any problem with your thyroid at this time that needs further evaluation or treatment.   -------------------------------------------------------------------------------------------------------------------  Your vitamin D level is 27 which is low. Goal is over 30.   Please be sure to take 4000 units of vitamin D daily to get your levels to goal.

## 2018-10-10 LAB
25(OH)D3+25(OH)D2 SERPL-MCNC: 27.4 NG/ML (ref 30–100)
TSH SERPL DL<=0.005 MIU/L-ACNC: 0.7 UIU/ML (ref 0.45–4.5)

## 2018-10-15 ENCOUNTER — TELEPHONE (OUTPATIENT)
Dept: ENDOCRINOLOGY | Age: 33
End: 2018-10-15

## 2018-10-15 NOTE — TELEPHONE ENCOUNTER
Please call pt to let her know she has an unread message in [address]     TSH is a thyroid test.  Your level is normal so you don't have any problem with your thyroid at this time that needs further evaluation or treatment.   -------------------------------------------------------------------------------------------------------------------   Your vitamin D level is 27 which is low.  Goal is over 30.  Please be sure to take 4000 units of vitamin D daily to get your levels to goal.

## 2018-10-19 ENCOUNTER — HOSPITAL ENCOUNTER (OUTPATIENT)
Dept: PERINATAL CARE | Age: 33
Discharge: HOME | End: 2018-10-19
Attending: OBSTETRICS & GYNECOLOGY
Payer: COMMERCIAL

## 2018-10-19 PROCEDURE — 76815 OB US LIMITED FETUS(S): CPT | Performed by: OBSTETRICS & GYNECOLOGY

## 2018-10-24 ENCOUNTER — TELEPHONE (OUTPATIENT)
Dept: ENDOCRINOLOGY | Age: 33
End: 2018-10-24

## 2018-10-24 RX ORDER — INSULIN ASPART 100 [IU]/ML
INJECTION, SOLUTION INTRAVENOUS; SUBCUTANEOUS
Qty: 30 ML | Refills: 11
Start: 2018-10-24 | End: 2018-11-08

## 2018-10-24 NOTE — TELEPHONE ENCOUNTER
Pt notified of message per Dr. aCtie Hogue and voiced understanding of what was read to her. She stated that she is doing better.

## 2018-10-24 NOTE — TELEPHONE ENCOUNTER
Mrs Delores Morales paged last night to update Dr Sierra Ambrose that she had a severe hypoglycemic episode yesterday afternoon. She was taken to [facility] via ambulance. Last week carb ratio was lowered from 5 to 4. She reports glucoses being lower in the afternoon for several days leading up to yesterday    Yesterday she was busy in the morning doing  work, taking patients to appointments, etc.  Glucose was 84 prior to lunch. She describes eating a ham sandwich, yogurt and some chips for lunch, then having a Cuban around 1 pm on her way home. When she got home she was tired and went to take a nap. Her son came home from school and could not get in the house (she typically opens door for him). Police were called and had to break in the house. She recalls waking up in ambulance    Recommendations:  Recommend she change carb ratio back to 5 at lunchtime. For now, continue carb ratio of 4 with breakfast and supper. Will forward this to Dr Sierra Ambrose so he is aware. He may recommend additional changes.

## 2018-10-24 NOTE — TELEPHONE ENCOUNTER
Can you call to check on her and let her know I agree with Dr. Raj Madden recommendation to change her carb ratio at lunch back to 5. Have her update me in the next week over mychart with how her sugars are doing.

## 2018-11-08 ENCOUNTER — OFFICE VISIT (OUTPATIENT)
Dept: ENDOCRINOLOGY | Age: 33
End: 2018-11-08

## 2018-11-08 VITALS
HEIGHT: 63 IN | BODY MASS INDEX: 50.71 KG/M2 | HEART RATE: 86 BPM | SYSTOLIC BLOOD PRESSURE: 126 MMHG | DIASTOLIC BLOOD PRESSURE: 70 MMHG | WEIGHT: 286.2 LBS

## 2018-11-08 DIAGNOSIS — I10 ESSENTIAL HYPERTENSION, BENIGN: ICD-10-CM

## 2018-11-08 DIAGNOSIS — E55.9 VITAMIN D DEFICIENCY: ICD-10-CM

## 2018-11-08 DIAGNOSIS — R79.89 ABNORMAL THYROID BLOOD TEST: ICD-10-CM

## 2018-11-08 DIAGNOSIS — E10.9 TYPE 1 DIABETES MELLITUS WITHOUT COMPLICATION (HCC): Primary | ICD-10-CM

## 2018-11-08 LAB — HBA1C MFR BLD HPLC: 7.2 %

## 2018-11-08 RX ORDER — INSULIN DEGLUDEC 100 U/ML
INJECTION, SOLUTION SUBCUTANEOUS
Qty: 15 ML | Refills: 11
Start: 2018-11-08 | End: 2018-12-13

## 2018-11-08 RX ORDER — INSULIN ASPART 100 [IU]/ML
INJECTION, SOLUTION INTRAVENOUS; SUBCUTANEOUS
Qty: 30 ML | Refills: 11
Start: 2018-11-08 | End: 2018-12-13

## 2018-11-08 NOTE — PROGRESS NOTES
Chief Complaint   Patient presents with    Diabetes     PCP and Pharmacy confirmed    Other     Eye exam complete. Awaiting paperwork. History of Present Illness: Salena Payan is a 26 y.o. female here for follow up of diabetes. Weight up 11 lbs since last visit in 10/18. Currently 20 5/7 weeks pregnant with a girl. She has been slowly going up on her tresiba and is up to 58 units daily. She has adjusted her carb ratio back to 1:4 for all meals as she had been doing 1:5 for lunch after having a low after lunch on 10/23/18 that resulted in an ER visit to [facility].  Has not had any other severe lows aside from this one since last visit. She is still using the freestyle cassi to help see trends with her sugars. She does have times where she will overcorrect for lows that then lead to spikes over 200. Majority of her readings are between . Has been taking 2000 of D3 2 tabs daily since last visit. Current Outpatient Medications   Medication Sig    insulin degludec (TRESIBA FLEXTOUCH U-100) 100 unit/mL (3 mL) inpn Inject 58 units once daily--Dose change 11/8/18--updated med list--did not send prescription to the pharmacy    NOVOLOG FLEXPEN U-100 INSULIN 100 unit/mL inpn Inject 1:4 for carbs for breakfast, lunch, dinner + 1 unit for 25 > 125 for correction. Max 100 units per day now that she is pregnant--Dose change 11/8/18--updated med list--did not send prescription to the pharmacy    FREESTYLE CASSI 10 DAY SENSOR kit USE 1 SENSOR EVERY 10 DAYS AS DIRECTED    pedi multivit no.19/folic acid (CHILDREN'S MULTI-VIT GUMMIES PO) Take  by mouth.  2 daily    FREESTYLE CASSI READER misc Use as directed with freestyle sensors    glucose blood VI test strips (BLOOD GLUCOSE TEST) strip   blood glucose strips, See Instructions, #: 400 EA, 1 Refill(s), Pharmacy: [facility] [id number] [facility]    labetalol (NORMODYNE) 100 mg tablet TAKE 1 TABLET BY MOUTH TWICE A DAY    ondansetron (ZOFRAN ODT) 8 mg disintegrating tablet TAKE AS DIRECTED    cholecalciferol, vitamin D3, (VITAMIN D3) 2,000 unit tab Take 4,000 Units by mouth daily.  ONETOUCH ULTRA TEST strip FOR BLOOD GLUCOSE MONITORING 4 X DAILY. .02    LANCE PEN NEEDLE 32 gauge x 5/32\" ndle USE AS DIRECTED TO INJECT INSULIN 4 TIMES DAILY     No current facility-administered medications for this visit. Allergies   Allergen Reactions    Codeine Nausea and Vomiting     Review of Systems:  - Eyes: no blurry vision or double vision  - Cardiovascular: no chest pain  - Respiratory: no shortness of breath  - Musculoskeletal: no myalgias  - Neurological: no numbness/tingling in extremities    Physical Examination:  Blood pressure 126/70, pulse 86, height 5' 3\" (1.6 m), weight 286 lb 3.2 oz (129.8 kg). - General: pleasant, no distress, good eye contact   - Neck: no carotid bruits  - Cardiovascular: regular, normal rate, nl s1 and s2, no m/r/g,   - Respiratory: clear bilaterally  - Integumentary: no edema,   - Psychiatric: normal mood and affect    Data Reviewed:   Component      Latest Ref Rng & Units 11/8/2018           3:40 PM   Hemoglobin A1c (POC)      % 7.2       Assessment/Plan:     1) Type 1 DM uncontrolled: Her most recent Hgb A1c was 7.2% in 11/18 up from 6.8% in 10/18 down from 7.5% in 9/18 down from 8.2% in 6/18 up from 8% in 1/18 (she was at [facility] from 11/12 to 1/18 and states A1c values were in the 7-8% range) down from 8.8% in January 2012 stable from 8.9% in September 2011 up from 7.5% in May 2010. We discussed goals for Type 1 in pregnancy to try and get her fasting sugars  as best as possible and not over 130 during the day. She will need to have progressive increases in her tresiba during pregnancy due to insulin resistance so gave her parameters on how to titrate as below.   - cont tresiba 58 units in the morning  - Take Novolog 1:4 for CHO ratio with meal  - Take Novolog 1:25 for > 125 during the day   - Check bs 4x/day  - optho UTD 7/17  - foot exam done 2/18  - microalbumin nl 2/18  - LDL 85 in 1/18  - check Hgb A1c at next visit      2) HTN NOS (401.9): her BP was at goal < 140/90   - cont labetalol 100 mg bid    3) Abnormal thyroid blood test: her TSH was slightly low at 0.293 in September 2011. Clinically no symptoms of hyperthyroidism and repeat was normal at 0.453 in January 2012 and 0.66 in 1/18 and 0.70 in 10/18  - repeat TSH today since not done by Dr. Gregory Mackey on 8/19/18    4) Vitamin D deficiency: Level was 11.1 in 9/11. Started on ergo at that time and level up to 24 in January 2012. Still 25.9 in 2/18 so advised to take 4000 units daily but has not been doing this and level was 27.4 in 10/18  - check Vitamin D 25-OH level in 1/19  - off vitamin D 4000 units daily        Patient Instructions   1) Your A1c was 7.2% up slightly from 6.8% last month. 2) Keep your tresiba and novolog the same for now. 3) If you are finding that your fasting sugar is staying over 110 more than 3 days in a row, then go up by 2 units of tresiba every 3 days to get to the dose that keeps you under 110. You will need to slowly increase this as the pregnancy goes on due to insulin resistance from the hormones in pregnancy. Follow-up Disposition:  Return for 12/13/18 at 2:30pm.    Copy sent to:   Jovan Colon NP as PCP - General (Nurse Practitioner)  Abhijeet Lei MD (Obstetrics & Gynecology)  Anayeli Lee MD (Surgical Oncology)  Vikas Fortune MD (Hematology and Oncology)

## 2018-11-08 NOTE — PATIENT INSTRUCTIONS
1) Your A1c was 7.2% up slightly from 6.8% last month. 2) Keep your tresiba and novolog the same for now. 3) If you are finding that your fasting sugar is staying over 110 more than 3 days in a row, then go up by 2 units of tresiba every 3 days to get to the dose that keeps you under 110. You will need to slowly increase this as the pregnancy goes on due to insulin resistance from the hormones in pregnancy.

## 2018-11-16 ENCOUNTER — HOSPITAL ENCOUNTER (OUTPATIENT)
Dept: PERINATAL CARE | Age: 33
Discharge: HOME | End: 2018-11-16
Attending: OBSTETRICS & GYNECOLOGY
Payer: COMMERCIAL

## 2018-11-16 DIAGNOSIS — E10.9 TYPE 1 DIABETES MELLITUS WITHOUT COMPLICATION (HCC): ICD-10-CM

## 2018-11-16 PROCEDURE — 76811 OB US DETAILED SNGL FETUS: CPT | Performed by: OBSTETRICS & GYNECOLOGY

## 2018-11-16 RX ORDER — FLASH GLUCOSE SENSOR
KIT MISCELLANEOUS
Qty: 1 EACH | Refills: 0 | Status: SHIPPED | OUTPATIENT
Start: 2018-11-16 | End: 2019-03-19 | Stop reason: ALTCHOICE

## 2018-12-13 ENCOUNTER — OFFICE VISIT (OUTPATIENT)
Dept: ENDOCRINOLOGY | Age: 33
End: 2018-12-13

## 2018-12-13 VITALS
BODY MASS INDEX: 51.6 KG/M2 | WEIGHT: 291.2 LBS | SYSTOLIC BLOOD PRESSURE: 123 MMHG | HEART RATE: 101 BPM | DIASTOLIC BLOOD PRESSURE: 63 MMHG | HEIGHT: 63 IN

## 2018-12-13 DIAGNOSIS — R79.89 ABNORMAL THYROID BLOOD TEST: ICD-10-CM

## 2018-12-13 DIAGNOSIS — E10.9 TYPE 1 DIABETES MELLITUS WITHOUT COMPLICATION (HCC): ICD-10-CM

## 2018-12-13 DIAGNOSIS — E10.65 UNCONTROLLED TYPE 1 DIABETES MELLITUS WITH HYPERGLYCEMIA (HCC): Primary | ICD-10-CM

## 2018-12-13 DIAGNOSIS — E55.9 VITAMIN D DEFICIENCY: ICD-10-CM

## 2018-12-13 DIAGNOSIS — I10 ESSENTIAL HYPERTENSION, BENIGN: ICD-10-CM

## 2018-12-13 LAB — HBA1C MFR BLD HPLC: 7.1 %

## 2018-12-13 RX ORDER — NIFEDIPINE 60 MG/1
60 TABLET, EXTENDED RELEASE ORAL DAILY
Refills: 5 | COMMUNITY
Start: 2018-11-30 | End: 2019-12-03

## 2018-12-13 RX ORDER — INSULIN DEGLUDEC 100 U/ML
INJECTION, SOLUTION SUBCUTANEOUS
Qty: 15 ML | Refills: 11
Start: 2018-12-13 | End: 2019-01-17

## 2018-12-13 RX ORDER — INSULIN ASPART 100 [IU]/ML
INJECTION, SOLUTION INTRAVENOUS; SUBCUTANEOUS
Qty: 30 ML | Refills: 11
Start: 2018-12-13 | End: 2019-02-25

## 2018-12-13 NOTE — PROGRESS NOTES
Chief Complaint   Patient presents with    Diabetes     pcp and pharmacy confirmed     History of Present Illness: Katie Ireland is a 26 y.o. female here for follow up of diabetes. Weight up 5 lbs since last visit in 11/18. Dr. Freddie Dave changed her from labetalol to nifedipine as she was forgetting the evening dose of labetalol frequently and BP is controlled today. Will be 26 weeks pregnant with a girl this weekend. Due to see Dr. Freddie Dave next week. Has progressively increased her tresiba from 58 units at last visit up to 68 units daily as of about a week ago and still dosing novolog 1:4 for carbs. Still using the freestyle cassi. Does have a lot of nausea but not much vomiting. Still using freestyle cassi and scans her sensor 3-5 times per day. Fasting sugars are mostly in the  range. Can have some lows under 80 in the afternoon especially when she is working more and plans to stop working sometime in the next month as Dr. Freddie Dave has told her he wants her to stop working. Does still have some rebound highs over 180 after correcting for lows. Current Outpatient Medications   Medication Sig    PNV ZF.11/[id number] fum/folic ac (PRENATAL PO) Take  by mouth.  insulin degludec (TRESIBA FLEXTOUCH U-100) 100 unit/mL (3 mL) inpn Inject 68 units once daily--Dose change 12/13/18--updated med list--did not send prescription to the pharmacy    [employer] Flush CASSI 10 DAY READER misc USE AS DIRECTED WITH FREESTYLE SENSORS    NOVOLOG FLEXPEN U-100 INSULIN 100 unit/mL inpn Inject 1:4 for carbs for breakfast, lunch, dinner + 1 unit for 25 > 125 for correction.   Max 100 units per day now that she is pregnant--Dose change 11/8/18--updated med list--did not send prescription to the pharmacy    [email or web address] CASSI 10 DAY SENSOR kit USE 1 SENSOR EVERY 10 DAYS AS DIRECTED    ondansetron (ZOFRAN ODT) 8 mg disintegrating tablet TAKE AS DIRECTED    cholecalciferol, vitamin D3, (VITAMIN D3) 2,000 unit tab Take 4,000 Units by mouth daily.  LANCE PEN NEEDLE 32 gauge x 5/32\" ndle USE AS DIRECTED TO INJECT INSULIN 4 TIMES DAILY    NIFEdipine ER (PROCARDIA XL) 60 mg ER tablet TAKE 1 TABLET BY MOUTH EVERY DAY    ONETOUCH ULTRA TEST strip FOR BLOOD GLUCOSE MONITORING 4 X DAILY. .02     No current facility-administered medications for this visit. Allergies   Allergen Reactions    Codeine Nausea and Vomiting     Review of Systems:  - Eyes: no blurry vision or double vision  - Cardiovascular: no chest pain  - Respiratory: no shortness of breath  - Musculoskeletal: no myalgias  - Neurological: no numbness/tingling in extremities    Physical Examination:  Blood pressure 123/63, pulse (!) 101, height 5' 3\" (1.6 m), weight 291 lb 3.2 oz (132.1 kg). - General: pleasant, no distress, good eye contact   - Neck: no carotid bruits  - Cardiovascular: regular, normal rate, nl s1 and s2, no m/r/g,   - Respiratory: clear bilaterally  - Integumentary: no edema,   - Psychiatric: normal mood and affect    Data Reviewed:   Component      Latest Ref Rng & Units 12/13/2018           3:15 PM   Hemoglobin A1c (POC)      % 7.1       Assessment/Plan:     1) Type 1 DM uncontrolled: Her most recent Hgb A1c was 7.1% in 12/18 down from 7.2% in 11/18 up from 6.8% in 10/18 down from 7.5% in 9/18 down from 8.2% in 6/18 up from 8% in 1/18 (she was at [facility] from 11/12 to 1/18 and states A1c values were in the 7-8% range) down from 8.8% in January 2012 stable from 8.9% in September 2011 up from 7.5% in May 2010. We discussed goals for Type 1 in pregnancy to try and get her fasting sugars  as best as possible and not over 130 during the day. She will need to have progressive increases in her tresiba during pregnancy due to insulin resistance so gave her parameters on how to titrate as below. Will make her novolog dose at lunch less aggressive to avoid lows.   - cont tresiba 58 units in the morning  - Take Novolog 1:4 for CHO ratio for breakfast and dinner and 1:5 for lunch  - Take Novolog 1:25 for > 125 during the day   - Check bs 4x/day  - optho UTD 7/17  - foot exam done 2/18  - microalbumin nl 2/18  - LDL 85 in 1/18  - check Hgb A1c at next visit      2) HTN NOS (401.9): her BP was at goal < 140/90   - cont labetalol 100 mg bid    3) Abnormal thyroid blood test: her TSH was slightly low at 0.293 in September 2011. Clinically no symptoms of hyperthyroidism and repeat was normal at 0.453 in January 2012 and 0.66 in 1/18 and 0.70 in 10/18  - repeat TSH after delivery    4) Vitamin D deficiency: Level was 11.1 in 9/11. Started on ergo at that time and level up to 24 in January 2012. Still 25.9 in 2/18 so advised to take 4000 units daily but has not been doing this and level was 27.4 in 10/18  - check Vitamin D 25-OH level in 1/19  - cont vitamin D 4000 units daily    Patient Instructions   1) Your A1c is 7.1% down from 7.2% at last check. 2) Your tresiba dose looks good for now but you are having more lows in the afternoon. Plan on dosing 1:5 for lunch to avoid lows in the afternoon. 3) If you are finding that your fasting sugar is staying over 110 more than 3 days in a row, then go up by 2 units of tresiba every 3 days to get to the dose that keeps you under 110. You will need to slowly increase this as the pregnancy goes on due to insulin resistance from the hormones in pregnancy. Follow-up Disposition:  Return for 1/1719 at 10:30am.    Copy sent to:   Joe Muñoz NP as PCP - General (Nurse Practitioner)  Cinthia Springer MD (Obstetrics & Gynecology)  Ion Smith MD (Surgical Oncology)  Hailey Espinoza MD (Hematology and Oncology)

## 2018-12-13 NOTE — PATIENT INSTRUCTIONS
1) Your A1c is 7.1% down from 7.2% at last check. 2) Your tresiba dose looks good for now but you are having more lows in the afternoon. Plan on dosing 1:5 for lunch to avoid lows in the afternoon. 3) If you are finding that your fasting sugar is staying over 110 more than 3 days in a row, then go up by 2 units of tresiba every 3 days to get to the dose that keeps you under 110. You will need to slowly increase this as the pregnancy goes on due to insulin resistance from the hormones in pregnancy.

## 2018-12-14 ENCOUNTER — HOSPITAL ENCOUNTER (OUTPATIENT)
Dept: PERINATAL CARE | Age: 33
Discharge: HOME | End: 2018-12-14
Attending: OBSTETRICS & GYNECOLOGY
Payer: COMMERCIAL

## 2018-12-14 PROCEDURE — 76816 OB US FOLLOW-UP PER FETUS: CPT | Performed by: OBSTETRICS & GYNECOLOGY

## 2019-01-16 ENCOUNTER — HOSPITAL ENCOUNTER (OUTPATIENT)
Dept: PERINATAL CARE | Age: 34
Discharge: HOME | End: 2019-01-16
Attending: OBSTETRICS & GYNECOLOGY
Payer: COMMERCIAL

## 2019-01-16 PROCEDURE — 76816 OB US FOLLOW-UP PER FETUS: CPT | Performed by: OBSTETRICS & GYNECOLOGY

## 2019-01-17 ENCOUNTER — OFFICE VISIT (OUTPATIENT)
Dept: ENDOCRINOLOGY | Age: 34
End: 2019-01-17

## 2019-01-17 VITALS
BODY MASS INDEX: 51.77 KG/M2 | HEART RATE: 88 BPM | SYSTOLIC BLOOD PRESSURE: 119 MMHG | WEIGHT: 292.2 LBS | DIASTOLIC BLOOD PRESSURE: 67 MMHG | HEIGHT: 63 IN

## 2019-01-17 DIAGNOSIS — R79.89 ABNORMAL THYROID BLOOD TEST: ICD-10-CM

## 2019-01-17 DIAGNOSIS — I10 ESSENTIAL HYPERTENSION, BENIGN: ICD-10-CM

## 2019-01-17 DIAGNOSIS — E55.9 VITAMIN D DEFICIENCY: ICD-10-CM

## 2019-01-17 DIAGNOSIS — E10.65 UNCONTROLLED TYPE 1 DIABETES MELLITUS WITH HYPERGLYCEMIA (HCC): Primary | ICD-10-CM

## 2019-01-17 LAB — HBA1C MFR BLD HPLC: 7.1 %

## 2019-01-17 RX ORDER — INSULIN DEGLUDEC 100 U/ML
INJECTION, SOLUTION SUBCUTANEOUS
Qty: 30 ML | Refills: 11 | Status: SHIPPED | OUTPATIENT
Start: 2019-01-17 | End: 2019-02-18

## 2019-01-17 RX ORDER — INSULIN DEGLUDEC 100 U/ML
INJECTION, SOLUTION SUBCUTANEOUS
Qty: 15 ML | Refills: 11
Start: 2019-01-17 | End: 2019-01-17

## 2019-01-17 RX ORDER — BISACODYL 5 MG
5 TABLET, DELAYED RELEASE (ENTERIC COATED) ORAL
COMMUNITY
Start: 2018-11-30 | End: 2019-03-14

## 2019-01-17 NOTE — PATIENT INSTRUCTIONS
1) Your A1c is stable at 7.1% but we want to get this as close to 6.5% or less and you are having more spikes over 200 that are keeping your A1c up.    2) Go home and take an additional 4 units of tresiba and starting tomorrow, increase to 76 units daily. 3) Keep your novolog the same for now and do your best to not underestimate carb counting which will lead to spikes. 4) Your blood pressure looks great.

## 2019-01-17 NOTE — PROGRESS NOTES
Chief Complaint   Patient presents with    Diabetes     History of Present Illness: Abel Rae is a 26 y.o. female here for follow up of diabetes. Weight up 1 lbs since last visit in 12/18. Currently almost 31 weeks pregnant with a girl. Has been up to 72 units of tresiba the past 2 weeks. Still taking procardia everyday. Went to the high risk doctor yesterday, Dr. Cindy Clark, and her growth is at the 75th %. He recommended an ECHO due her history of CHF with adriamycin for her breast cancer and is waiting to have this scheduled. Has been dosing novolog 1:4 for breakfast and dinner but 1:5 for lunch and this has helped with frequency of lows in the afternoon. Fasting sugars are mostly in the  range. Does have spikes over 200 frequently after meals that may be due to not counting carbs quite right as other times still can have some lows in the afternoon. Stopped working on 12/15/18. Current Outpatient Medications   Medication Sig    pediatric multivitamin no. 101 (KIDS' GUMMY PO) Take  by mouth.  bisacodyl (GENTLE LAXATIVE) 5 mg EC tablet Take 5 mg by mouth.  insulin degludec (TRESIBA FLEXTOUCH U-100) 100 unit/mL (3 mL) inpn Inject 72 units once daily--Dose change 1/17/19--updated med list--did not send prescription to the pharmacy    NIFEdipine ER (PROCARDIA XL) 60 mg ER tablet TAKE 1 TABLET BY MOUTH EVERY DAY    NOVOLOG FLEXPEN U-100 INSULIN 100 unit/mL inpn Inject 1:4 for carbs for breakfast and dinner and 1:5 for lunch + 1 unit for 25 > 125 for correction.   Max 100 units per day now that she is pregnant--Dose change 12/13/18--updated med list--did not send prescription to the pharmacy    FREESTYLE NASREEN 10 DAY READER misc USE AS DIRECTED WITH FREESTYLE SENSORS    FREESTYLE NASREEN 10 DAY SENSOR kit USE 1 SENSOR EVERY 10 DAYS AS DIRECTED    ondansetron (ZOFRAN ODT) 8 mg disintegrating tablet TAKE AS DIRECTED    cholecalciferol, vitamin D3, (VITAMIN D3) 2,000 unit tab Take 4,000 Units by mouth daily.  ONETOUCH ULTRA TEST strip FOR BLOOD GLUCOSE MONITORING 4 X DAILY. .02    LANCE PEN NEEDLE 32 gauge x 5/32\" ndle USE AS DIRECTED TO INJECT INSULIN 4 TIMES DAILY     No current facility-administered medications for this visit. Allergies   Allergen Reactions    Codeine Nausea and Vomiting     Review of Systems:  - Eyes: no blurry vision or double vision  - Cardiovascular: no chest pain  - Respiratory: no shortness of breath  - Musculoskeletal: no myalgias  - Neurological: no numbness/tingling in extremities    Physical Examination:  Blood pressure 119/67, pulse 88, height 5' 3\" (1.6 m), weight 292 lb 3.2 oz (132.5 kg). - General: pleasant, no distress, good eye contact   - Neck: no carotid bruits  - Cardiovascular: regular, normal rate, nl s1 and s2, no m/r/g,   - Respiratory: clear bilaterally  - Integumentary: no edema,   - Psychiatric: normal mood and affect    Data Reviewed:   Component      Latest Ref Rng & Units 1/17/2019          10:58 AM   Hemoglobin A1c (POC)      % 7.1       Assessment/Plan:     1) Type 1 DM uncontrolled: Her most recent Hgb A1c was 7.1% in 1/19 stable from 12/18 down from 7.2% in 11/18 up from 6.8% in 10/18 down from 7.5% in 9/18 down from 8.2% in 6/18 up from 8% in 1/18 (she was at [facility] from 11/12 to 1/18 and states A1c values were in the 7-8% range) down from 8.8% in January 2012 stable from 8.9% in September 2011 up from 7.5% in May 2010. We discussed goals for Type 1 in pregnancy to try and get her fasting sugars  as best as possible and not over 130 during the day. She is still having more spikes than I would like so will further increase her tresiba.   - increase tresiba to 76 units in the morning  - Take Novolog 1:4 for CHO ratio for breakfast and dinner and 1:5 for lunch  - Take Novolog 1:25 for > 125 during the day   - Check bs 4x/day  - optho UTD 7/17  - foot exam done 2/18  - microalbumin nl 2/18  - LDL 85 in 1/18  - check Hgb A1c at next visit      2) HTN NOS (401.9): her BP was at goal < 140/90   - cont procardia 60 mg daily    3) Abnormal thyroid blood test: her TSH was slightly low at 0.293 in September 2011. Clinically no symptoms of hyperthyroidism and repeat was normal at 0.453 in January 2012 and 0.66 in 1/18 and 0.70 in 10/18  - repeat TSH after delivery    4) Vitamin D deficiency: Level was 11.1 in 9/11. Started on ergo at that time and level up to 24 in January 2012. Still 25.9 in 2/18 so advised to take 4000 units daily but has not been doing this and level was 27.4 in 10/18  - check Vitamin D 25-OH level in 1/19  - cont vitamin D 4000 units daily    Patient Instructions   1) Your A1c is stable at 7.1% but we want to get this as close to 6.5% or less and you are having more spikes over 200 that are keeping your A1c up.    2) Go home and take an additional 4 units of tresiba and starting tomorrow, increase to 76 units daily. 3) Keep your novolog the same for now and do your best to not underestimate carb counting which will lead to spikes. 4) Your blood pressure looks great. Follow-up Disposition:  Return for 2/21/19 at 10:30am.    Copy sent to:   Siomara Vasques NP as PCP - General (Nurse Practitioner)  Patience Gonsalez MD (Obstetrics & Gynecology)  Lj Tomas MD (Surgical Oncology)  Eleazar Rincon MD (Hematology and Oncology)  Dr. Caroline Fitzpatrick

## 2019-01-24 RX ORDER — PEN NEEDLE, DIABETIC 32GX 5/32"
NEEDLE, DISPOSABLE MISCELLANEOUS
Qty: 400 PEN NEEDLE | Refills: 3 | Status: SHIPPED | OUTPATIENT
Start: 2019-01-24 | End: 2021-01-15 | Stop reason: SDUPTHER

## 2019-01-29 ENCOUNTER — TELEPHONE (OUTPATIENT)
Dept: ENDOCRINOLOGY | Age: 34
End: 2019-01-29

## 2019-01-29 ENCOUNTER — HOSPITAL ENCOUNTER (OUTPATIENT)
Age: 34
Setting detail: OBSERVATION
Discharge: HOME | End: 2019-01-30
Attending: SPECIALIST | Admitting: SPECIALIST
Payer: COMMERCIAL

## 2019-01-29 PROBLEM — O13.3 PREGNANCY INDUCED HYPERTENSION, THIRD TRIMESTER: Status: ACTIVE | Noted: 2019-01-29

## 2019-01-29 LAB
ALBUMIN SERPL-MCNC: 2.2 G/DL (ref 3.5–5)
ALBUMIN/GLOB SERPL: 0.5 {RATIO} (ref 1.1–2.2)
ALP SERPL-CCNC: 141 U/L (ref 45–117)
ALT SERPL-CCNC: 19 U/L (ref 12–78)
ANION GAP SERPL CALC-SCNC: 6 MMOL/L (ref 5–15)
AST SERPL-CCNC: 23 U/L (ref 15–37)
BILIRUB SERPL-MCNC: 0.3 MG/DL (ref 0.2–1)
BUN SERPL-MCNC: 6 MG/DL (ref 6–20)
BUN/CREAT SERPL: 9 (ref 12–20)
CALCIUM SERPL-MCNC: 8.5 MG/DL (ref 8.5–10.1)
CHLORIDE SERPL-SCNC: 106 MMOL/L (ref 97–108)
CO2 SERPL-SCNC: 24 MMOL/L (ref 21–32)
CREAT SERPL-MCNC: 0.66 MG/DL (ref 0.55–1.02)
CREAT UR-MCNC: 294 MG/DL
ERYTHROCYTE [DISTWIDTH] IN BLOOD BY AUTOMATED COUNT: 13.6 % (ref 11.5–14.5)
GLOBULIN SER CALC-MCNC: 4.6 G/DL (ref 2–4)
GLUCOSE BLD STRIP.AUTO-MCNC: 108 MG/DL (ref 65–100)
GLUCOSE BLD STRIP.AUTO-MCNC: 130 MG/DL (ref 65–100)
GLUCOSE SERPL-MCNC: 132 MG/DL (ref 65–100)
HCT VFR BLD AUTO: 33.2 % (ref 35–47)
HGB BLD-MCNC: 10.8 G/DL (ref 11.5–16)
MCH RBC QN AUTO: 28.9 PG (ref 26–34)
MCHC RBC AUTO-ENTMCNC: 32.5 G/DL (ref 30–36.5)
MCV RBC AUTO: 88.8 FL (ref 80–99)
NRBC # BLD: 0 K/UL (ref 0–0.01)
NRBC BLD-RTO: 0 PER 100 WBC
PLATELET # BLD AUTO: 256 K/UL (ref 150–400)
PMV BLD AUTO: 10.9 FL (ref 8.9–12.9)
POTASSIUM SERPL-SCNC: 3.8 MMOL/L (ref 3.5–5.1)
PROT SERPL-MCNC: 6.8 G/DL (ref 6.4–8.2)
PROT UR-MCNC: 45 MG/DL (ref 0–11.9)
PROT/CREAT UR-RTO: 0.2
RBC # BLD AUTO: 3.74 M/UL (ref 3.8–5.2)
SERVICE CMNT-IMP: ABNORMAL
SERVICE CMNT-IMP: ABNORMAL
SODIUM SERPL-SCNC: 136 MMOL/L (ref 136–145)
WBC # BLD AUTO: 8.6 K/UL (ref 3.6–11)

## 2019-01-29 PROCEDURE — 85027 COMPLETE CBC AUTOMATED: CPT

## 2019-01-29 PROCEDURE — 74011250637 HC RX REV CODE- 250/637: Performed by: SPECIALIST

## 2019-01-29 PROCEDURE — 80053 COMPREHEN METABOLIC PANEL: CPT

## 2019-01-29 PROCEDURE — 84156 ASSAY OF PROTEIN URINE: CPT

## 2019-01-29 PROCEDURE — 59025 FETAL NON-STRESS TEST: CPT

## 2019-01-29 PROCEDURE — 99218 HC RM OBSERVATION: CPT

## 2019-01-29 PROCEDURE — 36415 COLL VENOUS BLD VENIPUNCTURE: CPT

## 2019-01-29 PROCEDURE — 82962 GLUCOSE BLOOD TEST: CPT

## 2019-01-29 RX ORDER — ACETAMINOPHEN 500 MG
1000 TABLET ORAL
COMMUNITY
End: 2019-06-25

## 2019-01-29 RX ORDER — DEXTROSE 50 % IN WATER (D50W) INTRAVENOUS SYRINGE
12.5-25 AS NEEDED
Status: DISCONTINUED | OUTPATIENT
Start: 2019-01-29 | End: 2019-01-30 | Stop reason: HOSPADM

## 2019-01-29 RX ORDER — INSULIN GLARGINE 100 [IU]/ML
76 INJECTION, SOLUTION SUBCUTANEOUS DAILY
Status: DISCONTINUED | OUTPATIENT
Start: 2019-01-30 | End: 2019-01-30 | Stop reason: HOSPADM

## 2019-01-29 RX ORDER — MAGNESIUM SULFATE 100 %
4 CRYSTALS MISCELLANEOUS AS NEEDED
Status: DISCONTINUED | OUTPATIENT
Start: 2019-01-29 | End: 2019-01-30 | Stop reason: HOSPADM

## 2019-01-29 RX ORDER — SODIUM CHLORIDE 0.9 % (FLUSH) 0.9 %
5-40 SYRINGE (ML) INJECTION EVERY 8 HOURS
Status: DISCONTINUED | OUTPATIENT
Start: 2019-01-29 | End: 2019-01-30 | Stop reason: HOSPADM

## 2019-01-29 RX ORDER — SODIUM CHLORIDE 0.9 % (FLUSH) 0.9 %
5-40 SYRINGE (ML) INJECTION AS NEEDED
Status: DISCONTINUED | OUTPATIENT
Start: 2019-01-29 | End: 2019-01-30 | Stop reason: HOSPADM

## 2019-01-29 RX ORDER — ACETAMINOPHEN 500 MG
1000 TABLET ORAL
Status: DISCONTINUED | OUTPATIENT
Start: 2019-01-29 | End: 2019-01-30 | Stop reason: HOSPADM

## 2019-01-29 RX ORDER — INSULIN LISPRO 100 [IU]/ML
INJECTION, SOLUTION INTRAVENOUS; SUBCUTANEOUS
Status: DISCONTINUED | OUTPATIENT
Start: 2019-01-29 | End: 2019-01-30 | Stop reason: HOSPADM

## 2019-01-29 RX ADMIN — ACETAMINOPHEN 1000 MG: 500 TABLET ORAL at 17:48

## 2019-01-29 RX ADMIN — Medication 10 ML: at 18:00

## 2019-01-29 NOTE — ROUTINE PROCESS
1- Pt was sent from the office by Dr Arnoldo Santacruz for preeclampsia work up. Pt is  GA 32/3. Pt has Type 1 diabetes and breast CA survivor in . VS stable. C/O H/A 6/10 denies blurred vision and epigastric pain. Labs collected and sent. - Waiting for Dr Nico Ochoa consult. Dr Arnoldo Santacruz was called lab results given. Order obtained for Tylenol and diet. 200- Dr Antonio Silva called, pt's SBAR given. MD talked to pt and called me back and orders given to hold insulin tonight since pt is nauseated and not eating much. Coverage order given for 2 hrs PP if >130.  1900- Pt had an emesis after eating dinner. Offered Ginger ale and crackers. 2100- Pt is resting. No further N/V, mild H/A off and on.  no need for coverage. 24 hour urine collection in progress. 2300- SBAR report to Sweta Cordoba RN care turned over at this time.

## 2019-01-29 NOTE — CONSULTS
Endocrinology Consult    Asked by Dr. Henri Ramirez to see this clinic patient of mine for management of diabetes. This consult is purely from chart review and speaking with patient over the phone and speaking with her nurse, Josh Jang. She states she took her normal dose of tresiba 76 units this morning and didn't feel like eating very much for breakfast so she didn't take any novolog and her sugar went up to 156 after eating so she took 2 units of novolog for correction at that time. She has had a headache and nausea this afternoon and apparently her BP was up at Dr. Kristine Hsu office so [facility] So far her labs and urine have been normal and her BP has been < 130/70 but she does still have a headache and some nausea. Her sugar was 108 prior to dinner and she ordered some salmon and broccoli and cream of chicken soup and apples but isn't sure if she'll be able to eat this. I told her to be safe, I won't give her any humalog for the meal and will just check her sugar 2 hours after she eats to see if it's over 130 and if so, will give her some correction humalog at that time. Brianna told me they plan on watching her overnight so I will order her 76 units of lantus to be given tomorrow morning. I will check in my phone tomorrow morning as I'll be in the [facility]. My partner, Dr. Semaj Torres, is on call for me tonight should any questions arise. Please don't hesitate to call [phone] with further questions.     Leonard Urbina MD

## 2019-01-29 NOTE — TELEPHONE ENCOUNTER
Brianna, with [facility], called in a hospital consult for this patient. Brianna can be reached at:  [phone].     Room # 4762  Seen For:  To rule out [facility]  Referring Doctor:    Dr. Constantine Carvalho

## 2019-01-30 VITALS
HEIGHT: 63 IN | DIASTOLIC BLOOD PRESSURE: 72 MMHG | RESPIRATION RATE: 18 BRPM | OXYGEN SATURATION: 99 % | WEIGHT: 293 LBS | TEMPERATURE: 98.5 F | BODY MASS INDEX: 51.91 KG/M2 | HEART RATE: 92 BPM | SYSTOLIC BLOOD PRESSURE: 127 MMHG

## 2019-01-30 LAB
COLLECT DURATION TIME UR: 24 HR
PROT 24H UR-MRATE: 360 MG/24HR
SPECIMEN VOL ?TM UR: 2400 ML

## 2019-01-30 PROCEDURE — 59025 FETAL NON-STRESS TEST: CPT

## 2019-01-30 PROCEDURE — 74011250637 HC RX REV CODE- 250/637: Performed by: SPECIALIST

## 2019-01-30 PROCEDURE — 74011636637 HC RX REV CODE- 636/637: Performed by: INTERNAL MEDICINE

## 2019-01-30 PROCEDURE — 74011250637 HC RX REV CODE- 250/637: Performed by: OBSTETRICS & GYNECOLOGY

## 2019-01-30 PROCEDURE — 99218 HC RM OBSERVATION: CPT

## 2019-01-30 RX ORDER — PROMETHAZINE HYDROCHLORIDE 25 MG/1
25 TABLET ORAL
Status: DISCONTINUED | OUTPATIENT
Start: 2019-01-30 | End: 2019-01-30 | Stop reason: CLARIF

## 2019-01-30 RX ORDER — INSULIN LISPRO 100 [IU]/ML
6 INJECTION, SOLUTION INTRAVENOUS; SUBCUTANEOUS ONCE
Status: COMPLETED | OUTPATIENT
Start: 2019-01-30 | End: 2019-01-30

## 2019-01-30 RX ORDER — ONDANSETRON 4 MG/1
8 TABLET, ORALLY DISINTEGRATING ORAL
Status: DISCONTINUED | OUTPATIENT
Start: 2019-01-30 | End: 2019-01-30 | Stop reason: HOSPADM

## 2019-01-30 RX ORDER — PROMETHAZINE HYDROCHLORIDE 25 MG/1
25 TABLET ORAL
Status: DISCONTINUED | OUTPATIENT
Start: 2019-01-30 | End: 2019-01-30 | Stop reason: HOSPADM

## 2019-01-30 RX ADMIN — INSULIN LISPRO 5 UNITS: 100 INJECTION, SOLUTION INTRAVENOUS; SUBCUTANEOUS at 14:18

## 2019-01-30 RX ADMIN — INSULIN LISPRO 6 UNITS: 100 INJECTION, SOLUTION INTRAVENOUS; SUBCUTANEOUS at 12:03

## 2019-01-30 RX ADMIN — ONDANSETRON 8 MG: 4 TABLET, ORALLY DISINTEGRATING ORAL at 08:57

## 2019-01-30 RX ADMIN — ACETAMINOPHEN 1000 MG: 500 TABLET ORAL at 00:17

## 2019-01-30 RX ADMIN — INSULIN GLARGINE 76 UNITS: 100 INJECTION, SOLUTION SUBCUTANEOUS at 08:57

## 2019-01-30 NOTE — DISCHARGE INSTRUCTIONS
Weeks 30 to 32 of Your Pregnancy: Care Instructions  Your Care Instructions    You have made it to the final months of your pregnancy. By now, your baby is really starting to look like a baby, with hair and plump skin. As you enter the final weeks of pregnancy, the reality of having a baby may start to set in. This is the time to settle on a name, get your household in order, set up a safe nursery, and find quality  if needed. Doing these things in advance will allow you to focus on caring for and enjoying your new baby. You may also want to have a tour of your hospital's labor and delivery unit to get a better idea of what to expect while you are in the hospital.  During these last months, it is very important to take good care of yourself and pay attention to what your body needs. If your doctor says it is okay for you to work, don't push yourself too hard. Use the tips provided in this care sheet to ease heartburn and care for varicose veins. If you haven't already had the Tdap shot during this pregnancy, talk to your doctor about getting it. It will help protect your  against pertussis infection. Follow-up care is a key part of your treatment and safety. Be sure to make and go to all appointments, and call your doctor if you are having problems. It's also a good idea to know your test results and keep a list of the medicines you take. How can you care for yourself at home? Pay attention to your baby's movements  · You should feel your baby move several times every day. · Your baby now turns less, and kicks and jabs more. · Your baby sleeps 20 to 45 minutes at a time and is more active at certain times of day. · If your doctor wants you to count your baby's kicks:  ? Empty your bladder, and lie on your side or relax in a comfortable chair. ? Write down your start time. ? Pay attention only to your baby's movements. Count any movement except hiccups. ?  After you have counted 10 movements, write down your stop time. ? Write down how many minutes it took for your baby to move 10 times. ? If an hour goes by and you have not recorded 10 movements, have something to eat or drink and then count for another hour. If you do not record 10 movements in either hour, call your doctor. Ease heartburn  · Eat small, frequent meals. · Do not eat chocolate, peppermint, or very spicy foods. Avoid drinks with caffeine, such as coffee, tea, and sodas. · Avoid bending over or lying down after meals. · Talk a short walk after you eat. · If heartburn is a problem at night, do not eat for 2 hours before bedtime. · Take antacids like Mylanta, Maalox, Rolaids, or Tums. Do not take antacids that have sodium bicarbonate. Care for varicose veins  · Varicose veins are blood vessels that stretch out with the extra blood during pregnancy. Your legs may ache or throb. Most varicose veins will go away after the birth. · Avoid standing for long periods of time. Sit with your legs crossed at the ankles, not the knees. · Sit with your feet propped up. · Avoid tight clothing or stockings. Wear support hose. · Exercise regularly. Try walking for at least 30 minutes a day. Where can you learn more? Go to http://kelly-wili.info/. Enter Q544 in the search box to learn more about \"Weeks 30 to 32 of Your Pregnancy: Care Instructions. \"  Current as of: September 5, 2018  Content Version: 11.9  © 9465-3324 [employer]. Care instructions adapted under license by [employer] (which disclaims liability or warranty for this information). If you have questions about a medical condition or this instruction, always ask your healthcare professional. [place] [address] any warranty or liability for your use of this information.        High Blood Pressure in Pregnancy: Care Instructions  Your Care Instructions    High blood pressure (hypertension) means that the force of blood against your artery walls is too strong. Mild high blood pressure during pregnancy is not usually dangerous. Your doctor will probably just want to watch you closely. But when blood pressure is very high, it can reduce oxygen to your baby. This can affect how well your baby grows. High blood pressure also means that you are at higher risk for:  · Preeclampsia. This is a problem that includes high blood pressure and damage to your liver or kidneys. It can also reduce how much oxygen your baby gets. In some cases, it leads to eclampsia. Eclampsia causes seizures. · Placental abruption. This is a problem when the placenta separates from the uterus before birth. It prevents the baby from getting enough oxygen and nutrients. Sometimes it can cause death for the baby and the mother. To prevent problems for you or your baby, you will have to check your blood pressure often. You will do this until after your baby is born. If your blood pressure rises suddenly or is very high during your pregnancy, your doctor may prescribe medicines. They can usually control blood pressure. If your blood pressure affects your or your baby's health, your doctor may need to deliver your baby early. After your baby is born, your blood pressure will probably improve. But sometimes blood pressure problems continue after birth. Follow-up care is a key part of your treatment and safety. Be sure to make and go to all appointments, and call your doctor if you are having problems. It's also a good idea to know your test results and keep a list of the medicines you take. How can you care for yourself at home? · Take and write down your blood pressure at home if your doctor tells you to. · Take your medicines exactly as prescribed. Call your doctor if you think you are having a problem with your medicine. · Do not smoke. If you need help quitting, talk to your doctor about stop-smoking programs and medicines.  These can increase your chances of quitting for good. · Do not gain too much weight during your pregnancy. Talk to your doctor about how much weight gain is healthy. · Eat a healthy diet. · If your doctor says it's okay, get regular exercise. Walking or swimming several times a week can be healthy for you and your baby. · Reduce stress, and find time to relax. This is very important if you continue to work or have a busy schedule. It's also important if you have small children at home. When should you call for help? Call 911 anytime you think you may need emergency care. For example, call if:    · You passed out (lost consciousness).     · You have a seizure.    Call your doctor now or seek immediate medical care if:    · You have symptoms of preeclampsia, such as:  ? Sudden swelling of your face, hands, or feet. ? New vision problems (such as dimness or blurring). ? A severe headache.     · Your blood pressure is higher than it should be or rises suddenly.     · You have new nausea or vomiting.     · You think that you are in labor.     · You have pain in your belly or pelvis.    Watch closely for changes in your health, and be sure to contact your doctor if:    · You gain weight rapidly. Where can you learn more? Go to http://kelly-wili.info/. Enter [id number] in the search box to learn more about \"High Blood Pressure in Pregnancy: Care Instructions. \"  Current as of: September 5, 2018  Content Version: 11.9  © 4216-2320 [employer]. Care instructions adapted under license by [employer] (which disclaims liability or warranty for this information). If you have questions about a medical condition or this instruction, always ask your healthcare professional. [place] [address] any warranty or liability for your use of this information.

## 2019-01-30 NOTE — PROGRESS NOTES
1530: 24 urine complete. I have reviewed discharge instructions with the patient and spouse. The patient and spouse verbalized understanding. Pt to discharge in NAD with spouse.

## 2019-01-30 NOTE — ROUTINE PROCESS
1145- SBAR report obtained from Blu SOL RN assumed care. BS 2hours . Dr Lieutenant Abbasi was notified will order 6 units of Humalog for coverage. 1300- Pt is C/O SOB after talking on the phone, eating. O2 sat 99% lungs clear. Dr Aarti Jiménez ordered for pt to ambulate in the hallway with Pulse Ox and monitor saturation. 1300- Pt ambulated in hallway with pulse ox. saturation remained at 95-98%. No SOB noted or reported. Pt enc to sit in chair for comfort.

## 2019-01-30 NOTE — H&P
History & Physical    Name: Nara Sousa MRN: [id number]  SSN: [id number]    YOB: 1985  Age: 37 y.o. Sex: female      Subjective:     Reason for Admission:  Pregnancy and PIH    History of Present Illness: Ms. Veronica Rinaldi is a 37 y.o.  female with an estimated gestational age of 28w1d with Estimated Date of Delivery: 3/23/19. Patient complains of mild nausea/vomiting and headache for 1 days. Pregnancy has been complicated by see prenatal; hx of chf, dm, obesity. Patient denies abdominal pain  , contractions, fever, pelvic pressure, right upper quadrant pain   and swelling. OB History    Para Term  AB Living   2 1       1   SAB TAB Ectopic Molar Multiple Live Births                    # Outcome Date GA Lbr Jovanni/2nd Weight Sex Delivery Anes PTL Lv   2 Current            1 Para                 Past Medical History:   Diagnosis Date    Breast cancer ([address] 2012    Right breast infiltrating ductal carcinoma    Cardiomyopathy due to chemotherapy ([address]     EF 20 %    Essential hypertension     Gestational hypertension     History of sepsis 2013    after chemo    Hx of difficult intubation     resulting from sepsis in 2013.  Hypertension     Morbid obesity ([address]     Ne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 ([address]     Type I (juvenile type) diabetes mellitus without mention of complication, uncontrolled     diagnosed age 4    Unspecified vitamin D deficiency 2011     Past Surgical History:   Procedure Laterality Date    BREAST SURGERY PROCEDURE UNLISTED      R Breast Mastectomy    HX  SECTION      HX CYST INCISION AND DRAINAGE  2013    perirectal abscess    HX GYN       in     HX MASTECTOMY Right     Right MRM with sentinel LNB.  HX ORTHOPAEDIC Right     Carpal tunnel release, index finger trigger release.     HX OTHER SURGICAL      cyst removed under left arm    HX OTHER SURGICAL      port placement for chemo  HX WISDOM TEETH EXTRACTION  08/15/2018     Social History     Occupational History    Not on file   Tobacco Use    Smoking status: Never Smoker    Smokeless tobacco: Never Used   Substance and Sexual Activity    Alcohol use: No    Drug use: No    Sexual activity: Yes     Partners: Male      Family History   Problem Relation Age of Onset    Diabetes Brother         borderline Type 2    Diabetes Paternal Uncle     Diabetes Paternal Grandfather     Heart Disease Paternal Grandfather         MI in his 62s    Hypertension Mother     Heart Disease Father     Stroke Neg Hx        Allergies   Allergen Reactions    Codeine Nausea and Vomiting     Prior to Admission medications    Medication Sig Start Date End Date Taking? Authorizing Provider   acetaminophen (TYLENOL EXTRA STRENGTH) 500 mg tablet Take 1,000 mg by mouth every six (6) hours as needed for Pain. Yes Provider, Historical   LANCE PEN NEEDLE 32 gauge x 5/32\" ndle USE AS DIRECTED TO INJECT INSULIN 4 TIMES DAILY 1/24/19  Yes Leanne Khalil MD   pediatric multivitamin no. 101 (KIDS' GUMMY PO) Take  by mouth. Yes Provider, Historical   bisacodyl (GENTLE LAXATIVE) 5 mg EC tablet Take 5 mg by mouth. 11/30/18  Yes Provider, Historical   insulin degludec (TRESIBA FLEXTOUCH U-100) 100 unit/mL (3 mL) inpn Inject 76 units once daily and increase as directed up to 100 units per day as she is pregnant 1/17/19  Yes Leanne Khalil MD   NIFEdipine ER (PROCARDIA XL) 60 mg ER tablet TAKE 1 TABLET BY MOUTH EVERY DAY 11/30/18  Yes Provider, Historical   NOVOLOG FLEXPEN U-100 INSULIN 100 unit/mL inpn Inject 1:4 for carbs for breakfast and dinner and 1:5 for lunch + 1 unit for 25 > 125 for correction.   Max 100 units per day now that she is pregnant--Dose change 12/13/18--updated med list--did not send prescription to the pharmacy 12/13/18  Yes Leanne Khalil MD   ondansetron (ZOFRAN ODT) 8 mg disintegrating tablet TAKE AS DIRECTED 7/19/18  Yes Provider, Historical   cholecalciferol, vitamin D3, (VITAMIN D3) 2,000 unit tab Take 4,000 Units by mouth daily. Yes Provider, Historical   ONETOUCH ULTRA TEST strip FOR BLOOD GLUCOSE MONITORING 4 X DAILY. .02 17  Yes Provider, Historical   FREESTYLE NASREEN 10 DAY READER misc USE AS DIRECTED WITH FREESTYLE SENSORS 18   Rosamaria Trujillo MD   FREESTYLE NASREEN 10 DAY SENSOR kit USE 1 SENSOR EVERY 10 DAYS AS DIRECTED 10/5/18   Rosamaria Trujillo MD        Review of Systems:  A comprehensive review of systems was negative except for that written in the History of Present Illness. Objective:     Vitals:    Vitals:    19 2234 19 0007 19 0012 19 0017   BP: 124/69 102/53     Pulse: 81 88     Resp:  18     Temp:  97.8 °F (36.6 °C)     SpO2: 98% 98% 98% 98%   Weight:       Height:          Temp (24hrs), Av.9 °F (36.6 °C), Min:97.7 °F (36.5 °C), Max:98.3 °F (36.8 °C)    BP  Min: 102/53  Max: 126/69     Physical Exam:  Patient without distress. Abdomen: soft, nontender  Fundus: soft and non tender  Lower Extremities:  - Edema 1+     Membranes:  Intact  Uterine Activity:  None  Fetal Heart Rate:  Reactive       Lab/Data Review:  Recent Results (from the past 24 hour(s))   METABOLIC PANEL, COMPREHENSIVE    Collection Time: 19  3:56 PM   Result Value Ref Range    Sodium 136 136 - 145 mmol/L    Potassium 3.8 3.5 - 5.1 mmol/L    Chloride 106 97 - 108 mmol/L    CO2 24 21 - 32 mmol/L    Anion gap 6 5 - 15 mmol/L    Glucose 132 (H) 65 - 100 mg/dL    BUN 6 6 - 20 MG/DL    Creatinine 0.66 0.55 - 1.02 MG/DL    BUN/Creatinine ratio 9 (L) 12 - 20      GFR est AA >60 >60 ml/min/1.73m2    GFR est non-AA >60 >60 ml/min/1.73m2    Calcium 8.5 8.5 - 10.1 MG/DL    Bilirubin, total 0.3 0.2 - 1.0 MG/DL    ALT (SGPT) 19 12 - 78 U/L    AST (SGOT) 23 15 - 37 U/L    Alk.  phosphatase 141 (H) 45 - 117 U/L    Protein, total 6.8 6.4 - 8.2 g/dL    Albumin 2.2 (L) 3.5 - 5.0 g/dL    Globulin 4.6 (H) 2.0 - 4.0 g/dL    A-G Ratio 0.5 (L) 1.1 - 2.2     CBC W/O DIFF    Collection Time: 01/29/19  3:56 PM   Result Value Ref Range    WBC 8.6 3.6 - 11.0 K/uL    RBC 3.74 (L) 3.80 - 5.20 M/uL    HGB 10.8 (L) 11.5 - 16.0 g/dL    HCT 33.2 (L) 35.0 - 47.0 %    MCV 88.8 80.0 - 99.0 FL    MCH 28.9 26.0 - 34.0 PG    MCHC 32.5 30.0 - 36.5 g/dL    RDW 13.6 11.5 - 14.5 %    PLATELET 798 702 - 622 K/uL    MPV 10.9 8.9 - 12.9 FL    NRBC 0.0 0  WBC    ABSOLUTE NRBC 0.00 0.00 - 0.01 K/uL   PROTEIN/CREATININE RATIO, URINE    Collection Time: 01/29/19  3:56 PM   Result Value Ref Range    Protein, urine random 45 (H) 0.0 - 11.9 mg/dL    Creatinine, urine 294.00 mg/dL    Protein/Creat. urine Ratio 0.2     GLUCOSE, POC    Collection Time: 01/29/19  5:02 PM   Result Value Ref Range    Glucose (POC) 108 (H) 65 - 100 mg/dL    Performed by Jan Melton, POC    Collection Time: 01/29/19  9:06 PM   Result Value Ref Range    Glucose (POC) 130 (H) 65 - 100 mg/dL    Performed by Cheikh Mention and Plan: Active Problems:    Pregnancy induced hypertension, third trimester (1/29/2019)       Rule out pre-e  1. Labs as above and wnl. Normal protein cr ratio. 24 hour urine in progress. Bps wnl normal limits. Discussed likely discharge after 24 hour urine is complete  2. DM - see endocrine notes; good control inpatient. Continue endocrine's recommendations  3. SOB - hx of CHF s/p medication (chemo / hx of breast cancer); pt has a cardiology appt today (will reschedule). Some complaints of SOB. Normal pulse ox. Will have pt ambulate. Will check sats will walking. Possible consult if abnormal.  No present complaints of chest pain. No cough or respiratory symptoms and aerating without distress this morning. May be a normal symptom of 3rd trimester pregnancy.   4.  Pt to follow up with MFM and attending ob/gyn within a week upon discharge      Signed By:  Esperanza Park MD     January 30, 2019

## 2019-01-30 NOTE — PROGRESS NOTES
0425 Pt called out stating blood sugar 65 per pt own meter. Orange juice, peanut butter and crackers given. Pt states feels better with snack. Will continue to monitor. 0455 Pt called out recheck of blood sugar 80 per pt own meter. Pt requesting ice. Ice taken to pt. Pt states she feels better. Will continue to monitor.

## 2019-01-30 NOTE — PROGRESS NOTES
0710: Bedside report received from QUINTON Navarrete. Care assumed at this time. Pt sleeping in bed.    0800: Assessment performed. Plan discussed for the day. 8292: Pt states blood sugar was 151 with home glucometer. Advised with use our glucometer to record next time. Understanding voiced. Meal tray at bedside. Zofran 8 mg given. & Pt admin lantus 76 units in abdomen. No needs voiced at this time. 1035: Placed on EFM for NST. .    1130: 2 hr PP blood sugar was 259. LM with Rycarley to advise. 1135: Bedside shift change report given to ITZ Cary RN by MAAME Guerra RN. Care turned over at this time. Hourly Rounding performed by QUINTON.   Rosi Sena RN

## 2019-01-30 NOTE — PROGRESS NOTES
Endocrinology Progress Note    Called into pt's room and spoke with her. She states she ended up throwing up her dinner last night and her sugar was 130 last night and didn't require any correction humalog. She ended up having a low sugar down to 65 as recorded on her Hanson and was treated with OJ and pb crackers and then up to 80 and felt better. This morning it was up to 151 on her cassi. She was given some zofran and this has helped with her nausea. She was given the 76 units of lantus this morning. She is completing the 24 hour urine and will be done around 3:30pm today and then his hopeful to be discharged. She rescheduled her ECHO at [facility] from today until Monday. I told her her that I won't make any changes to her home insulin regimen and she should continue this as directed if she is discharged later today and to call me with any questions. she voiced understanding of this plan. Please don't hesitate to call [phone] with further questions.     Mason Aleman MD

## 2019-01-30 NOTE — PROGRESS NOTES
Assumed care. poc explained. FOB in. Rm. No complaint at this time. 2355. C/o frontal h/a. Requested for tylenol. 0000. poc explained. Assess. Done. nst started. 0017. poc explained. Tylenol given. 0600. Resting quietly in bed with eyes closed, asleep.    0730. Bedside report given to oncoming nurse and care turned over.

## 2019-01-30 NOTE — PROGRESS NOTES
Endocrinology Progress Note    Received a call from Josh Jang that pt's pp glucose is 259 so I ordered for her to receive 6 units of humalog now.     Elisabeth Winn MD

## 2019-01-31 ENCOUNTER — HOSPITAL ENCOUNTER (OUTPATIENT)
Dept: PERINATAL CARE | Age: 34
Discharge: HOME | End: 2019-01-31
Attending: OBSTETRICS & GYNECOLOGY
Payer: COMMERCIAL

## 2019-01-31 PROCEDURE — 76818 FETAL BIOPHYS PROFILE W/NST: CPT | Performed by: OBSTETRICS & GYNECOLOGY

## 2019-02-07 ENCOUNTER — HOSPITAL ENCOUNTER (OUTPATIENT)
Dept: PERINATAL CARE | Age: 34
Discharge: HOME | End: 2019-02-07
Attending: OBSTETRICS & GYNECOLOGY
Payer: COMMERCIAL

## 2019-02-07 PROCEDURE — 76818 FETAL BIOPHYS PROFILE W/NST: CPT | Performed by: OBSTETRICS & GYNECOLOGY

## 2019-02-12 ENCOUNTER — HOSPITAL ENCOUNTER (EMERGENCY)
Age: 34
Discharge: HOME | End: 2019-02-12
Attending: SPECIALIST | Admitting: SPECIALIST
Payer: COMMERCIAL

## 2019-02-12 VITALS
HEIGHT: 63 IN | HEART RATE: 76 BPM | DIASTOLIC BLOOD PRESSURE: 59 MMHG | RESPIRATION RATE: 16 BRPM | BODY MASS INDEX: 51.91 KG/M2 | OXYGEN SATURATION: 100 % | WEIGHT: 293 LBS | SYSTOLIC BLOOD PRESSURE: 115 MMHG | TEMPERATURE: 98.2 F

## 2019-02-12 PROCEDURE — 75810000275 HC EMERGENCY DEPT VISIT NO LEVEL OF CARE

## 2019-02-12 PROCEDURE — 99283 EMERGENCY DEPT VISIT LOW MDM: CPT

## 2019-02-12 PROCEDURE — 59025 FETAL NON-STRESS TEST: CPT

## 2019-02-12 NOTE — DISCHARGE INSTRUCTIONS
Patient Education   Patient Education   Patient Education        Pregnancy Precautions: Care Instructions  Your Care Instructions    There is no sure way to prevent labor before your due date ( labor) or to prevent most other pregnancy problems. But there are things you can do to increase your chances of a healthy pregnancy. Go to your appointments, follow your doctor's advice, and take good care of yourself. Eat well, and exercise (if your doctor agrees). And make sure to drink plenty of water. Follow-up care is a key part of your treatment and safety. Be sure to make and go to all appointments, and call your doctor if you are having problems. It's also a good idea to know your test results and keep a list of the medicines you take. How can you care for yourself at home? · Make sure you go to your prenatal appointments. At each visit, your doctor will check your blood pressure. Your doctor will also check to see if you have protein in your urine. High blood pressure and protein in urine are signs of preeclampsia. This condition can be dangerous for you and your baby. · Drink plenty of fluids, enough so that your urine is light yellow or clear like water. Dehydration can cause contractions. If you have kidney, heart, or liver disease and have to limit fluids, talk with your doctor before you increase the amount of fluids you drink. · Tell your doctor right away if you notice any symptoms of an infection, such as:  ? Burning when you urinate. ? A foul-smelling discharge from your vagina. ? Vaginal itching. ? Unexplained fever. ? Unusual pain or soreness in your uterus or lower belly. · Eat a balanced diet. Include plenty of foods that are high in calcium and iron. ? Foods high in calcium include milk, cheese, yogurt, almonds, and broccoli. ? Foods high in iron include red meat, shellfish, poultry, eggs, beans, raisins, whole-grain bread, and leafy green vegetables. · Do not smoke.  If you need help quitting, talk to your doctor about stop-smoking programs and medicines. These can increase your chances of quitting for good. · Do not drink alcohol or use illegal drugs. · Follow your doctor's directions about activity. Your doctor will let you know how much, if any, exercise you can do. · Ask your doctor if you can have sex. If you are at risk for early labor, your doctor may ask you to not have sex. · Take care to prevent falls. During pregnancy, your joints are loose, and your balance is off. Sports such as bicycling, skiing, or in-line skating can increase your risk of falling. And don't ride horses or motorcycles, dive, water ski, scuba dive, or parachute jump while you are pregnant. · Avoid getting very hot. Do not use saunas or hot tubs. Avoid staying out in the sun in hot weather for long periods. Take acetaminophen (Tylenol) to lower a high fever. · Do not take any over-the-counter or herbal medicines or supplements without talking to your doctor or pharmacist first.  When should you call for help? Call 911 anytime you think you may need emergency care. For example, call if:    · You passed out (lost consciousness).     · You have severe vaginal bleeding.     · You have severe pain in your belly or pelvis.     · You have had fluid gushing or leaking from your vagina and you know or think the umbilical cord is bulging into your vagina. If this happens, immediately get down on your knees so your rear end (buttocks) is higher than your head. This will decrease the pressure on the cord until help arrives.   [facility] your doctor now or seek immediate medical care if:    · You have signs of preeclampsia, such as:  ? Sudden swelling of your face, hands, or feet. ? New vision problems (such as dimness or blurring).   ? A severe headache.     · You have any vaginal bleeding.     · You have belly pain or cramping.     · You have a fever.     · You have had regular contractions (with or without pain) for an hour. This means that you have 8 or more within 1 hour or 4 or more in 20 minutes after you change your position and drink fluids.     · You have a sudden release of fluid from your vagina.     · You have low back pain or pelvic pressure that does not go away.     · You notice that your baby has stopped moving or is moving much less than normal.    Watch closely for changes in your health, and be sure to contact your doctor if you have any problems. Where can you learn more? Go to http://kelly-wili.info/. Enter [id number] in the search box to learn more about \"Pregnancy Precautions: Care Instructions. \"  Current as of: September 5, 2018  Content Version: 11.9  © 9828-2847 [employer]. Care instructions adapted under license by [employer] (which disclaims liability or warranty for this information). If you have questions about a medical condition or this instruction, always ask your healthcare professional. [place] [address] any warranty or liability for your use of this information. Counting Your Baby's Kicks: Care Instructions  Your Care Instructions    Counting your baby's kicks is one way your doctor can tell that your baby is healthy. Most women--especially in a first pregnancy--feel their baby move for the first time between 16 and 22 weeks. The movement may feel like flutters rather than kicks. Your baby may move more at certain times of the day. When you are active, you may notice less kicking than when you are resting. At your prenatal visits, your doctor will ask whether the baby is active. In your last trimester, your doctor may ask you to count the number of times you feel your baby move. Follow-up care is a key part of your treatment and safety. Be sure to make and go to all appointments, and call your doctor if you are having problems. It's also a good idea to know your test results and keep a list of the medicines you take.   How do you count fetal kicks? · A common method of checking your baby's movement is to count the number of kicks or moves you feel in 1 hour. Ten movements (such as kicks, flutters, or rolls) in 1 hour are normal. Some doctors suggest that you count in the morning until you get to 10 movements. Then you can quit for that day and start again the next day. · Pick your baby's most active time of day to count. This may be any time from morning to evening. · If you do not feel 10 movements in an hour, your baby may be sleeping. Wait for the next hour and count again. When should you call for help? Call your doctor now or seek immediate medical care if:    · You noticed that your baby has stopped moving or is moving much less than normal.    Watch closely for changes in your health, and be sure to contact your doctor if you have any problems. Where can you learn more? Go to http://kelly-wili.info/. Enter X062 in the search box to learn more about \"Counting Your Baby's Kicks: Care Instructions. \"  Current as of: September 5, 2018  Content Version: 11.9  © 7907-3658 [employer]. Care instructions adapted under license by [employer] (which disclaims liability or warranty for this information). If you have questions about a medical condition or this instruction, always ask your healthcare professional. [place] [address] any warranty or liability for your use of this information. High Blood Pressure in Pregnancy: Care Instructions  Your Care Instructions    High blood pressure (hypertension) means that the force of blood against your artery walls is too strong. Mild high blood pressure during pregnancy is not usually dangerous. Your doctor will probably just want to watch you closely. But when blood pressure is very high, it can reduce oxygen to your baby. This can affect how well your baby grows.   High blood pressure also means that you are at higher risk for:  · Preeclampsia. This is a problem that includes high blood pressure and damage to your liver or kidneys. It can also reduce how much oxygen your baby gets. In some cases, it leads to eclampsia. Eclampsia causes seizures. · Placental abruption. This is a problem when the placenta separates from the uterus before birth. It prevents the baby from getting enough oxygen and nutrients. Sometimes it can cause death for the baby and the mother. To prevent problems for you or your baby, you will have to check your blood pressure often. You will do this until after your baby is born. If your blood pressure rises suddenly or is very high during your pregnancy, your doctor may prescribe medicines. They can usually control blood pressure. If your blood pressure affects your or your baby's health, your doctor may need to deliver your baby early. After your baby is born, your blood pressure will probably improve. But sometimes blood pressure problems continue after birth. Follow-up care is a key part of your treatment and safety. Be sure to make and go to all appointments, and call your doctor if you are having problems. It's also a good idea to know your test results and keep a list of the medicines you take. How can you care for yourself at home? · Take and write down your blood pressure at home if your doctor tells you to. · Take your medicines exactly as prescribed. Call your doctor if you think you are having a problem with your medicine. · Do not smoke. If you need help quitting, talk to your doctor about stop-smoking programs and medicines. These can increase your chances of quitting for good. · Do not gain too much weight during your pregnancy. Talk to your doctor about how much weight gain is healthy. · Eat a healthy diet. · If your doctor says it's okay, get regular exercise. Walking or swimming several times a week can be healthy for you and your baby. · Reduce stress, and find time to relax. This is very important if you continue to work or have a busy schedule. It's also important if you have small children at home. When should you call for help? Call 911 anytime you think you may need emergency care. For example, call if:    · You passed out (lost consciousness).     · You have a seizure.    Call your doctor now or seek immediate medical care if:    · You have symptoms of preeclampsia, such as:  ? Sudden swelling of your face, hands, or feet. ? New vision problems (such as dimness or blurring). ? A severe headache.     · Your blood pressure is higher than it should be or rises suddenly.     · You have new nausea or vomiting.     · You think that you are in labor.     · You have pain in your belly or pelvis.    Watch closely for changes in your health, and be sure to contact your doctor if:    · You gain weight rapidly. Where can you learn more? Go to http://kelly-wili.info/. Enter [phone] in the search box to learn more about \"High Blood Pressure in Pregnancy: Care Instructions. \"  Current as of: September 5, 2018  Content Version: 11.9  © 9757-5125 [employer]. Care instructions adapted under license by [employer] (which disclaims liability or warranty for this information). If you have questions about a medical condition or this instruction, always ask your healthcare professional. [address] any warranty or liability for your use of this information.

## 2019-02-12 NOTE — ED TRIAGE NOTES
Hypertension reading from [facility], 35 weeks pregnant, bilateral feet and hand swelling. Dr. Maggie Schmidt MD sent for re eval. Back pain x 1 day. Denies d/c.

## 2019-02-12 NOTE — PROGRESS NOTES
Pt arrived from ED via w/c @ 34.3 weeks gestation. Brien Ojeda Has multiple c/o; elevated BP @ [facility], swelling and hands and feet, and generalized aches and pains. Has bilateral groin pain when walking. Denies h/a, visual disturbances, or epigastric pain. Pt does minimal activity at home due to \"difficulty moving around and SOB. BMI = 53  Chronic HTN  Pt takes Nifedipine daily, took this am  Type 1 DM  Hx Breast CA with right sided mastectomy  Hx cardiac failure. Has not had recent cardiac echo. Cancelled appt. Hx sepsis  Voided urine dark yellow. /63  , accels to 160+, no decels  [place]:  No contractions and none reported by pt. Above reported to Dr. Taya Antonio. Order received that pt can go home and follow-up in office when NST is reactive. Also discussed with Dr. Taya Antonio where pt will have scheduled repeat c/s. MD stated that he will most likely arrange for her to have c/s at [facility] or [facility], possibly [facility]  NST reviewed by Dr. Keren Emmanuel. Pt has scheduled appt with Dr. Taya Antonio on 2/15 and will discuss where she will have repeat c/s. Discharge instructions reviewed re:  Kick counts, s/s labor, s/s pre e. Discharged via w/c to car.

## 2019-02-13 ENCOUNTER — HOSPITAL ENCOUNTER (OUTPATIENT)
Dept: PERINATAL CARE | Age: 34
Discharge: HOME | End: 2019-02-13
Attending: OBSTETRICS & GYNECOLOGY
Payer: COMMERCIAL

## 2019-02-13 PROCEDURE — 76818 FETAL BIOPHYS PROFILE W/NST: CPT | Performed by: OBSTETRICS & GYNECOLOGY

## 2019-02-13 PROCEDURE — 76816 OB US FOLLOW-UP PER FETUS: CPT | Performed by: OBSTETRICS & GYNECOLOGY

## 2019-02-18 ENCOUNTER — TELEPHONE (OUTPATIENT)
Dept: ENDOCRINOLOGY | Age: 34
End: 2019-02-18

## 2019-02-18 RX ORDER — INSULIN DEGLUDEC 100 U/ML
INJECTION, SOLUTION SUBCUTANEOUS
Qty: 30 ML | Refills: 11
Start: 2019-02-18 | End: 2019-02-25

## 2019-02-18 NOTE — TELEPHONE ENCOUNTER
Patient is having blood sugar issues and is scheduled to have a  this 19. She would appreciate a call back please at  [phone] before she has the baby.

## 2019-02-18 NOTE — TELEPHONE ENCOUNTER
Patient stated that she is having her  this Wednesday and she was calling to let Dr. Jigna Merchant that she has been having some low sugar readings so she reduced her Tresiba to 74 units. She wanted to know if she should do anything else. Prior to the decrease she was having lows every day but since the decrease it is mostly in the mornings. This morning her sugar was 51 so she drank some orange juice and it went up to 66 and now it is 128      158 pm  203 5:46 pm  151 12:43 pm  69 8:00 am      164 5 pm  127 1:19  139 7:26 am  31 5:15 am  71 5:38      215  35 2:02 am  58 at 7:00 pm    gabby

## 2019-02-18 NOTE — TELEPHONE ENCOUNTER
No I'm sorry about the confusion, she can start the lower dose of 66 units tomorrow morning. If she is having a  on Wednesday, then she can hold her dose on Wednesday morning. We will cancel her appointment on 19 and I will see her in the hospital to manage her diabetes after she delivers and we'll reschedule it once she is discharged.

## 2019-02-18 NOTE — TELEPHONE ENCOUNTER
Patient stated she takes I the tresiba in the am and would like to know if you want her to switch it to pm.

## 2019-02-19 ENCOUNTER — HOSPITAL ENCOUNTER (OUTPATIENT)
Dept: OTHER | Age: 34
Discharge: HOME | End: 2019-02-19
Payer: COMMERCIAL

## 2019-02-19 LAB
ALBUMIN SERPL-MCNC: 2.1 G/DL (ref 3.5–5)
ALBUMIN/GLOB SERPL: 0.5 {RATIO} (ref 1.1–2.2)
ALP SERPL-CCNC: 167 U/L (ref 45–117)
ALT SERPL-CCNC: 14 U/L (ref 12–78)
ANION GAP SERPL CALC-SCNC: 11 MMOL/L (ref 5–15)
AST SERPL-CCNC: 18 U/L (ref 15–37)
BILIRUB SERPL-MCNC: 0.2 MG/DL (ref 0.2–1)
BUN SERPL-MCNC: 8 MG/DL (ref 6–20)
BUN/CREAT SERPL: 11 (ref 12–20)
CALCIUM SERPL-MCNC: 8.2 MG/DL (ref 8.5–10.1)
CHLORIDE SERPL-SCNC: 106 MMOL/L (ref 97–108)
CO2 SERPL-SCNC: 20 MMOL/L (ref 21–32)
CREAT SERPL-MCNC: 0.74 MG/DL (ref 0.55–1.02)
ERYTHROCYTE [DISTWIDTH] IN BLOOD BY AUTOMATED COUNT: 13.6 % (ref 11.5–14.5)
GLOBULIN SER CALC-MCNC: 3.9 G/DL (ref 2–4)
GLUCOSE SERPL-MCNC: 176 MG/DL (ref 65–100)
HCT VFR BLD AUTO: 30.7 % (ref 35–47)
HGB BLD-MCNC: 9.8 G/DL (ref 11.5–16)
MCH RBC QN AUTO: 28.6 PG (ref 26–34)
MCHC RBC AUTO-ENTMCNC: 31.9 G/DL (ref 30–36.5)
MCV RBC AUTO: 89.5 FL (ref 80–99)
NRBC # BLD: 0 K/UL (ref 0–0.01)
NRBC BLD-RTO: 0 PER 100 WBC
PLATELET # BLD AUTO: 269 K/UL (ref 150–400)
PMV BLD AUTO: 11.2 FL (ref 8.9–12.9)
POTASSIUM SERPL-SCNC: 3.9 MMOL/L (ref 3.5–5.1)
PROT SERPL-MCNC: 6 G/DL (ref 6.4–8.2)
RBC # BLD AUTO: 3.43 M/UL (ref 3.8–5.2)
SODIUM SERPL-SCNC: 137 MMOL/L (ref 136–145)
WBC # BLD AUTO: 9.6 K/UL (ref 3.6–11)

## 2019-02-19 PROCEDURE — 86900 BLOOD TYPING SEROLOGIC ABO: CPT

## 2019-02-19 PROCEDURE — 85027 COMPLETE CBC AUTOMATED: CPT

## 2019-02-19 PROCEDURE — 36415 COLL VENOUS BLD VENIPUNCTURE: CPT

## 2019-02-19 PROCEDURE — 80053 COMPREHEN METABOLIC PANEL: CPT

## 2019-02-20 ENCOUNTER — ANESTHESIA (OUTPATIENT)
Dept: LABOR AND DELIVERY | Age: 34
End: 2019-02-20
Payer: COMMERCIAL

## 2019-02-20 ENCOUNTER — ANESTHESIA EVENT (OUTPATIENT)
Dept: LABOR AND DELIVERY | Age: 34
End: 2019-02-20
Payer: COMMERCIAL

## 2019-02-20 ENCOUNTER — HOSPITAL ENCOUNTER (INPATIENT)
Age: 34
LOS: 3 days | Discharge: HOME | End: 2019-02-23
Attending: SPECIALIST | Admitting: SPECIALIST
Payer: COMMERCIAL

## 2019-02-20 DIAGNOSIS — Z98.891 S/P CESAREAN SECTION: Primary | ICD-10-CM

## 2019-02-20 PROBLEM — O13.9 PREGNANCY INDUCED HYPERTENSION: Status: ACTIVE | Noted: 2019-02-20

## 2019-02-20 LAB
ABO + RH BLD: NORMAL
BLOOD GROUP ANTIBODIES SERPL: NORMAL
GLUCOSE BLD STRIP.AUTO-MCNC: 101 MG/DL (ref 65–100)
GLUCOSE BLD STRIP.AUTO-MCNC: 105 MG/DL (ref 65–100)
SERVICE CMNT-IMP: ABNORMAL
SERVICE CMNT-IMP: ABNORMAL
SPECIMEN EXP DATE BLD: NORMAL

## 2019-02-20 PROCEDURE — A4300 CATH IMPL VASC ACCESS PORTAL: HCPCS

## 2019-02-20 PROCEDURE — 65410000002 HC RM PRIVATE OB

## 2019-02-20 PROCEDURE — 36415 COLL VENOUS BLD VENIPUNCTURE: CPT

## 2019-02-20 PROCEDURE — 74011250636 HC RX REV CODE- 250/636: Performed by: SPECIALIST

## 2019-02-20 PROCEDURE — 74011250636 HC RX REV CODE- 250/636

## 2019-02-20 PROCEDURE — 74011000250 HC RX REV CODE- 250: Performed by: SPECIALIST

## 2019-02-20 PROCEDURE — 74011250637 HC RX REV CODE- 250/637: Performed by: SPECIALIST

## 2019-02-20 PROCEDURE — 74011250636 HC RX REV CODE- 250/636: Performed by: ANESTHESIOLOGY

## 2019-02-20 PROCEDURE — 77030034849

## 2019-02-20 PROCEDURE — 77030012890

## 2019-02-20 PROCEDURE — 76010000392 HC C SECN EA ADDL 0.5 HR: Performed by: SPECIALIST

## 2019-02-20 PROCEDURE — 74011000250 HC RX REV CODE- 250

## 2019-02-20 PROCEDURE — 76060000078 HC EPIDURAL ANESTHESIA: Performed by: SPECIALIST

## 2019-02-20 PROCEDURE — 76010000391 HC C SECN FIRST 1 HR: Performed by: SPECIALIST

## 2019-02-20 PROCEDURE — 82962 GLUCOSE BLOOD TEST: CPT

## 2019-02-20 PROCEDURE — 75410000003 HC RECOV DEL/VAG/CSECN EA 0.5 HR: Performed by: SPECIALIST

## 2019-02-20 PROCEDURE — 4A0HXCZ MEASUREMENT OF PRODUCTS OF CONCEPTION, CARDIAC RATE, EXTERNAL APPROACH: ICD-10-PCS | Performed by: SPECIALIST

## 2019-02-20 RX ORDER — EPHEDRINE SULFATE 50 MG/ML
INJECTION, SOLUTION INTRAVENOUS
Status: DISPENSED
Start: 2019-02-20 | End: 2019-02-20

## 2019-02-20 RX ORDER — IBUPROFEN 400 MG/1
800 TABLET ORAL EVERY 8 HOURS
Status: DISCONTINUED | OUTPATIENT
Start: 2019-02-21 | End: 2019-02-23 | Stop reason: HOSPADM

## 2019-02-20 RX ORDER — NALOXONE HYDROCHLORIDE 0.4 MG/ML
0.4 INJECTION, SOLUTION INTRAMUSCULAR; INTRAVENOUS; SUBCUTANEOUS AS NEEDED
Status: DISCONTINUED | OUTPATIENT
Start: 2019-02-20 | End: 2019-02-23 | Stop reason: HOSPADM

## 2019-02-20 RX ORDER — CEFAZOLIN SODIUM 1 G/3ML
INJECTION, POWDER, FOR SOLUTION INTRAMUSCULAR; INTRAVENOUS AS NEEDED
Status: DISCONTINUED | OUTPATIENT
Start: 2019-02-20 | End: 2019-02-20 | Stop reason: HOSPADM

## 2019-02-20 RX ORDER — MORPHINE SULFATE 10 MG/ML
6 INJECTION, SOLUTION INTRAMUSCULAR; INTRAVENOUS
Status: ACTIVE | OUTPATIENT
Start: 2019-02-20 | End: 2019-02-21

## 2019-02-20 RX ORDER — OXYTOCIN/RINGER'S LACTATE 20/1000 ML
PLASTIC BAG, INJECTION (ML) INTRAVENOUS
Status: DISCONTINUED | OUTPATIENT
Start: 2019-02-20 | End: 2019-02-20 | Stop reason: HOSPADM

## 2019-02-20 RX ORDER — SODIUM CHLORIDE 0.9 % (FLUSH) 0.9 %
5-40 SYRINGE (ML) INJECTION EVERY 8 HOURS
Status: DISCONTINUED | OUTPATIENT
Start: 2019-02-20 | End: 2019-02-20 | Stop reason: HOSPADM

## 2019-02-20 RX ORDER — HYDROCHLOROTHIAZIDE 25 MG/1
25 TABLET ORAL 2 TIMES DAILY
Status: DISCONTINUED | OUTPATIENT
Start: 2019-02-20 | End: 2019-02-23 | Stop reason: HOSPADM

## 2019-02-20 RX ORDER — ZOLPIDEM TARTRATE 5 MG/1
5 TABLET ORAL
Status: DISCONTINUED | OUTPATIENT
Start: 2019-02-20 | End: 2019-02-23 | Stop reason: HOSPADM

## 2019-02-20 RX ORDER — NALBUPHINE HYDROCHLORIDE 10 MG/ML
2.5 INJECTION, SOLUTION INTRAMUSCULAR; INTRAVENOUS; SUBCUTANEOUS
Status: DISPENSED | OUTPATIENT
Start: 2019-02-20 | End: 2019-02-21

## 2019-02-20 RX ORDER — PHENYLEPHRINE 10 MG/250 ML(40 MCG/ML)IN 0.9 % SOD.CHLORIDE INTRAVENOUS
Status: DISPENSED
Start: 2019-02-20 | End: 2019-02-20

## 2019-02-20 RX ORDER — SODIUM CHLORIDE, SODIUM LACTATE, POTASSIUM CHLORIDE, CALCIUM CHLORIDE 600; 310; 30; 20 MG/100ML; MG/100ML; MG/100ML; MG/100ML
140 INJECTION, SOLUTION INTRAVENOUS CONTINUOUS
Status: DISCONTINUED | OUTPATIENT
Start: 2019-02-20 | End: 2019-02-21

## 2019-02-20 RX ORDER — SODIUM CHLORIDE 0.9 % (FLUSH) 0.9 %
5-40 SYRINGE (ML) INJECTION EVERY 8 HOURS
Status: DISCONTINUED | OUTPATIENT
Start: 2019-02-20 | End: 2019-02-23 | Stop reason: HOSPADM

## 2019-02-20 RX ORDER — OXYTOCIN/RINGER'S LACTATE 20/1000 ML
PLASTIC BAG, INJECTION (ML) INTRAVENOUS
Status: COMPLETED
Start: 2019-02-20 | End: 2019-02-20

## 2019-02-20 RX ORDER — CEFAZOLIN SODIUM/WATER 2 G/20 ML
2 SYRINGE (ML) INTRAVENOUS ONCE
Status: DISCONTINUED | OUTPATIENT
Start: 2019-02-20 | End: 2019-02-20 | Stop reason: HOSPADM

## 2019-02-20 RX ORDER — PROPOFOL 10 MG/ML
INJECTION, EMULSION INTRAVENOUS AS NEEDED
Status: DISCONTINUED | OUTPATIENT
Start: 2019-02-20 | End: 2019-02-20 | Stop reason: HOSPADM

## 2019-02-20 RX ORDER — LIDOCAINE HYDROCHLORIDE AND EPINEPHRINE 20; 5 MG/ML; UG/ML
INJECTION, SOLUTION EPIDURAL; INFILTRATION; INTRACAUDAL; PERINEURAL
Status: COMPLETED
Start: 2019-02-20 | End: 2019-02-20

## 2019-02-20 RX ORDER — MORPHINE SULFATE 0.5 MG/ML
INJECTION, SOLUTION EPIDURAL; INTRATHECAL; INTRAVENOUS AS NEEDED
Status: DISCONTINUED | OUTPATIENT
Start: 2019-02-20 | End: 2019-02-20 | Stop reason: HOSPADM

## 2019-02-20 RX ORDER — KETOROLAC TROMETHAMINE 30 MG/ML
30 INJECTION, SOLUTION INTRAMUSCULAR; INTRAVENOUS
Status: DISPENSED | OUTPATIENT
Start: 2019-02-20 | End: 2019-02-21

## 2019-02-20 RX ORDER — MORPHINE SULFATE 10 MG/ML
10 INJECTION, SOLUTION INTRAMUSCULAR; INTRAVENOUS
Status: ACTIVE | OUTPATIENT
Start: 2019-02-20 | End: 2019-02-21

## 2019-02-20 RX ORDER — CEFAZOLIN SODIUM/WATER 2 G/20 ML
SYRINGE (ML) INTRAVENOUS
Status: DISPENSED
Start: 2019-02-20 | End: 2019-02-20

## 2019-02-20 RX ORDER — SODIUM CHLORIDE 0.9 % (FLUSH) 0.9 %
5-40 SYRINGE (ML) INJECTION AS NEEDED
Status: DISCONTINUED | OUTPATIENT
Start: 2019-02-20 | End: 2019-02-20 | Stop reason: HOSPADM

## 2019-02-20 RX ORDER — LIDOCAINE HYDROCHLORIDE AND EPINEPHRINE 20; 5 MG/ML; UG/ML
INJECTION, SOLUTION EPIDURAL; INFILTRATION; INTRACAUDAL; PERINEURAL AS NEEDED
Status: DISCONTINUED | OUTPATIENT
Start: 2019-02-20 | End: 2019-02-20 | Stop reason: HOSPADM

## 2019-02-20 RX ORDER — OXYCODONE AND ACETAMINOPHEN 5; 325 MG/1; MG/1
1 TABLET ORAL
Status: DISCONTINUED | OUTPATIENT
Start: 2019-02-20 | End: 2019-02-23 | Stop reason: HOSPADM

## 2019-02-20 RX ORDER — SODIUM CHLORIDE 0.9 % (FLUSH) 0.9 %
5-40 SYRINGE (ML) INJECTION AS NEEDED
Status: DISCONTINUED | OUTPATIENT
Start: 2019-02-20 | End: 2019-02-23 | Stop reason: HOSPADM

## 2019-02-20 RX ORDER — ONDANSETRON 2 MG/ML
INJECTION INTRAMUSCULAR; INTRAVENOUS AS NEEDED
Status: DISCONTINUED | OUTPATIENT
Start: 2019-02-20 | End: 2019-02-20 | Stop reason: HOSPADM

## 2019-02-20 RX ORDER — SIMETHICONE 80 MG
80 TABLET,CHEWABLE ORAL AS NEEDED
Status: DISCONTINUED | OUTPATIENT
Start: 2019-02-20 | End: 2019-02-23 | Stop reason: HOSPADM

## 2019-02-20 RX ORDER — ONDANSETRON 2 MG/ML
4 INJECTION INTRAMUSCULAR; INTRAVENOUS
Status: DISCONTINUED | OUTPATIENT
Start: 2019-02-20 | End: 2019-02-20

## 2019-02-20 RX ORDER — FENTANYL CITRATE 50 UG/ML
INJECTION, SOLUTION INTRAMUSCULAR; INTRAVENOUS AS NEEDED
Status: DISCONTINUED | OUTPATIENT
Start: 2019-02-20 | End: 2019-02-20 | Stop reason: HOSPADM

## 2019-02-20 RX ORDER — KETOROLAC TROMETHAMINE 30 MG/ML
INJECTION, SOLUTION INTRAMUSCULAR; INTRAVENOUS AS NEEDED
Status: DISCONTINUED | OUTPATIENT
Start: 2019-02-20 | End: 2019-02-20 | Stop reason: HOSPADM

## 2019-02-20 RX ORDER — ACETAMINOPHEN 10 MG/ML
INJECTION, SOLUTION INTRAVENOUS AS NEEDED
Status: DISCONTINUED | OUTPATIENT
Start: 2019-02-20 | End: 2019-02-20 | Stop reason: HOSPADM

## 2019-02-20 RX ORDER — ONDANSETRON 2 MG/ML
4 INJECTION INTRAMUSCULAR; INTRAVENOUS
Status: DISCONTINUED | OUTPATIENT
Start: 2019-02-20 | End: 2019-02-23 | Stop reason: HOSPADM

## 2019-02-20 RX ORDER — SODIUM CHLORIDE, SODIUM LACTATE, POTASSIUM CHLORIDE, CALCIUM CHLORIDE 600; 310; 30; 20 MG/100ML; MG/100ML; MG/100ML; MG/100ML
INJECTION, SOLUTION INTRAVENOUS
Status: DISCONTINUED | OUTPATIENT
Start: 2019-02-20 | End: 2019-02-20 | Stop reason: HOSPADM

## 2019-02-20 RX ORDER — MISOPROSTOL 100 UG/1
TABLET ORAL AS NEEDED
Status: DISCONTINUED | OUTPATIENT
Start: 2019-02-20 | End: 2019-02-20 | Stop reason: HOSPADM

## 2019-02-20 RX ORDER — SODIUM CHLORIDE, SODIUM LACTATE, POTASSIUM CHLORIDE, CALCIUM CHLORIDE 600; 310; 30; 20 MG/100ML; MG/100ML; MG/100ML; MG/100ML
1000 INJECTION, SOLUTION INTRAVENOUS CONTINUOUS
Status: DISCONTINUED | OUTPATIENT
Start: 2019-02-20 | End: 2019-02-20 | Stop reason: HOSPADM

## 2019-02-20 RX ADMIN — PROCHLORPERAZINE EDISYLATE 10 MG: 5 INJECTION INTRAMUSCULAR; INTRAVENOUS at 22:40

## 2019-02-20 RX ADMIN — Medication 999 ML/HR: at 10:57

## 2019-02-20 RX ADMIN — PROPOFOL 30 MG: 10 INJECTION, EMULSION INTRAVENOUS at 11:15

## 2019-02-20 RX ADMIN — NALBUPHINE HYDROCHLORIDE 2.5 MG: 10 INJECTION, SOLUTION INTRAMUSCULAR; INTRAVENOUS; SUBCUTANEOUS at 22:40

## 2019-02-20 RX ADMIN — ACETAMINOPHEN 1000 MG: 10 INJECTION, SOLUTION INTRAVENOUS at 10:57

## 2019-02-20 RX ADMIN — KETOROLAC TROMETHAMINE 30 MG: 30 INJECTION, SOLUTION INTRAMUSCULAR; INTRAVENOUS at 11:26

## 2019-02-20 RX ADMIN — ONDANSETRON 4 MG: 2 INJECTION INTRAMUSCULAR; INTRAVENOUS at 18:42

## 2019-02-20 RX ADMIN — SODIUM CHLORIDE, SODIUM LACTATE, POTASSIUM CHLORIDE, CALCIUM CHLORIDE: 600; 310; 30; 20 INJECTION, SOLUTION INTRAVENOUS at 10:31

## 2019-02-20 RX ADMIN — HYDROCHLOROTHIAZIDE 25 MG: 25 TABLET ORAL at 12:34

## 2019-02-20 RX ADMIN — ONDANSETRON 4 MG: 2 INJECTION INTRAMUSCULAR; INTRAVENOUS at 13:56

## 2019-02-20 RX ADMIN — KETOROLAC TROMETHAMINE 30 MG: 30 INJECTION, SOLUTION INTRAMUSCULAR at 17:31

## 2019-02-20 RX ADMIN — LIDOCAINE HYDROCHLORIDE AND EPINEPHRINE 5 ML: 20; 5 INJECTION, SOLUTION EPIDURAL; INFILTRATION; INTRACAUDAL; PERINEURAL at 09:55

## 2019-02-20 RX ADMIN — SODIUM CHLORIDE, SODIUM LACTATE, POTASSIUM CHLORIDE, AND CALCIUM CHLORIDE 150 ML/HR: 600; 310; 30; 20 INJECTION, SOLUTION INTRAVENOUS at 23:38

## 2019-02-20 RX ADMIN — CEFAZOLIN SODIUM 2 G: 1 INJECTION, POWDER, FOR SOLUTION INTRAMUSCULAR; INTRAVENOUS at 10:41

## 2019-02-20 RX ADMIN — SODIUM CHLORIDE, SODIUM LACTATE, POTASSIUM CHLORIDE, AND CALCIUM CHLORIDE 150 ML/HR: 600; 310; 30; 20 INJECTION, SOLUTION INTRAVENOUS at 11:58

## 2019-02-20 RX ADMIN — PROPOFOL 30 MG: 10 INJECTION, EMULSION INTRAVENOUS at 11:12

## 2019-02-20 RX ADMIN — PROPOFOL 30 MG: 10 INJECTION, EMULSION INTRAVENOUS at 11:18

## 2019-02-20 RX ADMIN — LIDOCAINE HYDROCHLORIDE AND EPINEPHRINE 3 ML: 20; 5 INJECTION, SOLUTION EPIDURAL; INFILTRATION; INTRACAUDAL; PERINEURAL at 10:07

## 2019-02-20 RX ADMIN — LIDOCAINE HYDROCHLORIDE AND EPINEPHRINE 5 ML: 20; 5 INJECTION, SOLUTION EPIDURAL; INFILTRATION; INTRACAUDAL; PERINEURAL at 09:49

## 2019-02-20 RX ADMIN — ONDANSETRON 4 MG: 2 INJECTION INTRAMUSCULAR; INTRAVENOUS at 10:57

## 2019-02-20 RX ADMIN — PROPOFOL 30 MG: 10 INJECTION, EMULSION INTRAVENOUS at 11:06

## 2019-02-20 RX ADMIN — FENTANYL CITRATE 100 MCG: 50 INJECTION, SOLUTION INTRAMUSCULAR; INTRAVENOUS at 10:39

## 2019-02-20 RX ADMIN — LIDOCAINE HYDROCHLORIDE AND EPINEPHRINE 5 ML: 20; 5 INJECTION, SOLUTION EPIDURAL; INFILTRATION; INTRACAUDAL; PERINEURAL at 09:52

## 2019-02-20 RX ADMIN — PROPOFOL 30 MG: 10 INJECTION, EMULSION INTRAVENOUS at 11:08

## 2019-02-20 RX ADMIN — MORPHINE SULFATE 5 MG: 0.5 INJECTION, SOLUTION EPIDURAL; INTRATHECAL; INTRAVENOUS at 11:26

## 2019-02-20 NOTE — ROUTINE PROCESS
TRANSFER - IN REPORT:    Verbal report received from NOE Ortega RN(name) on Gianna Devon  being received from L&D(unit) for routine progression of care      Report consisted of patients Situation, Background, Assessment and   Recommendations(SBAR). Information from the following report(s) SBAR, Kardex, Procedure Summary, Intake/Output, MAR and Recent Results was reviewed with the receiving nurse. Opportunity for questions and clarification was provided. Assessment completed upon patients arrival to unit and care assumed.

## 2019-02-20 NOTE — ANESTHESIA PROCEDURE NOTES
Epidural Block    Start time: 2/20/2019 9:40 AM  End time: 2/20/2019 9:52 AM  Performed by: Tuan Ramos MD  Authorized by: Tuan Ramos MD     Pre-Procedure  Indication: primary anesthetic    Preanesthetic Checklist: patient identified, risks and benefits discussed, anesthesia consent, site marked, patient being monitored, timeout performed and anesthesia consent    Timeout Time: 09:40        Epidural:   Patient position:  Seated  Prep region:  Lumbar  Prep: Patient draped and DuraPrep    Location:  L2-3    Needle and Epidural Catheter:   Needle Type:  Tuohy  Needle Gauge:  17 G  Injection Technique:  Loss of resistance using air  Attempts:  1  Catheter Size:  19 G  Events: no blood with aspiration, no cerebrospinal fluid with aspiration, no paresthesia and negative aspiration test    Test Dose:  Bupivacaine 0.25% and negative    Assessment:   Catheter Secured:  Tegaderm and tape  Insertion:  Uncomplicated  Patient tolerance:  Patient tolerated the procedure well with no immediate complications

## 2019-02-20 NOTE — LACTATION NOTE
This note was copied from a baby's chart. Mother has history of diabetes, obesity, breast cancer in right breast with mastectomy. Mother has left breast.  She has stated that she would like to attempt breastfeeding and hopes to continue as long as possible. Breastfeeding attempt made with 35 week infant. Baby became stressed before attempting to latch, having nasal flaring, increased work of breathing, spitting, and coughing. Baby taken back to warmer pulse ox 88, rising only to 90 with very mild retractions and nasal flaring. Labor and delivery nurse and charge nurse called to assess. Baby examined and NICU nurse called to assess. Mother and I discussed the feeding goals and challenges of breastfeeding  infant and with one breast.  We discussed setting up a pump as well. Mother does not feel well enough at this time to attempt pumping.

## 2019-02-20 NOTE — ANESTHESIA PREPROCEDURE EVALUATION
Anesthetic History     Increased risk of difficult airway          Review of Systems / Medical History  Patient summary reviewed, nursing notes reviewed and pertinent labs reviewed    Pulmonary  Within defined limits                 Neuro/Psych   Within defined limits           Cardiovascular    Hypertension      CHF          Comments: EF 20%  LE swelling  SOB   GI/Hepatic/Renal  Within defined limits              Endo/Other    Diabetes    Morbid obesity and cancer     Other Findings            Physical Exam    Airway  Mallampati: II  TM Distance: > 6 cm  Neck ROM: normal range of motion   Mouth opening: Normal     Cardiovascular  Regular rate and rhythm,  S1 and S2 normal,  no murmur, click, rub, or gallop             Dental  No notable dental hx       Pulmonary  Breath sounds clear to auscultation               Abdominal  GI exam deferred       Other Findings            Anesthetic Plan    ASA: 4  Anesthesia type: epidural      Post-op pain plan if not by surgeon: epidural opioid      Anesthetic plan and risks discussed with: Patient

## 2019-02-20 NOTE — LACTATION NOTE
This note was copied from a baby's chart. Mom continues to be nauseous and have vomiting. I assisted with waking infant and holding infant at breast while she latched. Infant latched deeply (with use of sweat ease) and sucked consistently for 5 minutes before falling asleep. Mom continued to do skin to skin with infant for a few minutes before vomiting again. She was dosed with zofran for nausea by her nurse. I discussed with mom the benefits of pumping following nursing to stimulate lactogenesis II. Mom unable to pump at this time due to nausea. Pump placed in room to be set up and use instructions provided when mom feels better. Reviewed effects/risks of late  birth on initiation of breastfeeding including infant's sleepiness, ineffective or missed breastfeedings, infant's decreased stamina to sustain prolonged latch and effective breastfeeding, decreased energy reserves related to low birth wt and inability to stimulate milk supply. Recommended interventions include skin to skin bonding at breast, hand expression of colostrum as infant rests at breast and initiation of breastfeeding on demand as infant is able, initiation of pumping regimen as mom is able; complement/supplement feeding if medically indicated and ordered by pediatrician. 2100 Assisted mom with latching infant with mom lying back, due to her continued nausea. Infant latched in a semi prone position, (requiring full support at breast) due to mom's positioning. Infant latched readily, rhythmic, consistent sucking noted. Following the nursing session, I set mom up with the single breast pump. She will pump for 15 minutes following nursing sessions for additional stimulation to facilitate lactogenesis II.  I suggested that mom skip a pumping session during the night so that she can try to get some rest.

## 2019-02-20 NOTE — PROGRESS NOTES
0830 pt arrived for scheduled C/S. Pt states blood sugar is 89 based off her test.    0940 epidural being placed in labor room by Dr Kyle Piper. 1000 Dr Geoff Rodarte on phone, states he is not in hospital and will be late. He is informed that pt is already dosed with her epidural. States that Dr Artem Messina will try to come up to unit as soon as possible. 1025 No doctor present at this time but Dr Geoff Rodarte called and states he will be on unit in 5 minutes and to head back to OR. No orders are in system at this time. Standard dose of ancef pulled out of system and taken back to OR to be given by anesthesia. 1130 incision closed and Dr Geoff Rodarte preparing to exit the OR. Nurses request to use a wound vac on pt's incision and he declines. Orders also requested for blood sugars and insulin for pt.    1247 orders noted for blood sugars on pt but no insulin. Dr Geoff Rodarte called and insulin orders requested again. He states that he needs to see more blood sugars on pt before he puts in orders for insulin.

## 2019-02-20 NOTE — H&P
History & Physical    Name: Sherwin Terry MRN: [id number]  SSN: [id number]    YOB: 1985  Age: 37 y.o. Sex: female      Subjective:     Estimated Date of Delivery: 3/23/19  OB History    Para Term  AB Living   2 1       1   SAB TAB Ectopic Molar Multiple Live Births                    # Outcome Date GA Lbr Jovanni/2nd Weight Sex Delivery Anes PTL Lv   2 Current            1 Para                   Ms. Thom Lopez admitted with pregnancy at 35w4d for  section due to [facility], uncontrolled DM. Prenatal course was complicated by PIH, DM, obesity, previous c/s. Please see prenatal records for details. Past Medical History:   Diagnosis Date    Breast cancer [facility]) 2012    Right breast infiltrating ductal carcinoma    Cardiomyopathy due to chemotherapy ([address]     EF 20 %    Essential hypertension     Gestational hypertension     History of sepsis 2013    after chemo    Hx of difficult intubation     resulting from sepsis in 2013.  Hypertension     Morbid obesity ([address]     Ne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 ([address]     Type I (juvenile type) diabetes mellitus without mention of complication, uncontrolled     diagnosed age 4    Unspecified vitamin D deficiency 2011     Past Surgical History:   Procedure Laterality Date    BREAST SURGERY PROCEDURE UNLISTED      R Breast Mastectomy    HX  SECTION      HX CYST INCISION AND DRAINAGE  2013    perirectal abscess    HX GYN       in     HX MASTECTOMY Right     Right MRM with sentinel LNB.  HX ORTHOPAEDIC Right     Carpal tunnel release, index finger trigger release.     HX OTHER SURGICAL      cyst removed under left arm    HX OTHER SURGICAL      port placement for chemo    HX WISDOM TEETH EXTRACTION  08/15/2018     Social History     Occupational History    Not on file   Tobacco Use    Smoking status: Never Smoker    Smokeless tobacco: Never Used   Substance and Sexual Activity  Alcohol use: No    Drug use: No    Sexual activity: Yes     Partners: Male     Family History   Problem Relation Age of Onset    Diabetes Brother         borderline Type 2    Diabetes Paternal Uncle     Diabetes Paternal Grandfather     Heart Disease Paternal Grandfather         MI in his 62s    Hypertension Mother     Heart Disease Father     Stroke Neg Hx        Allergies   Allergen Reactions    Codeine Nausea and Vomiting     Prior to Admission medications    Medication Sig Start Date End Date Taking? Authorizing Provider   insulin degludec (TRESIBA FLEXTOUCH U-100) 100 unit/mL (3 mL) inpn Inject 66 units once daily and increase as directed up to 100 units per day as she is pregnant--Dose change 2/18/19--updated med list--did not send prescription to the pharmacy 2/18/19   Sandrine Shah MD   acetaminophen (TYLENOL EXTRA STRENGTH) 500 mg tablet Take 1,000 mg by mouth every six (6) hours as needed for Pain. Provider, Historical   LANCE PEN NEEDLE 32 gauge x 5/32\" ndle USE AS DIRECTED TO INJECT INSULIN 4 TIMES DAILY 1/24/19   Sandrine Shah MD   pediatric multivitamin no. 101 (KIDS' GUMMY PO) Take  by mouth. Provider, Historical   bisacodyl (GENTLE LAXATIVE) 5 mg EC tablet Take 5 mg by mouth. 11/30/18   Provider, Historical   NIFEdipine ER (PROCARDIA XL) 60 mg ER tablet TAKE 1 TABLET BY MOUTH EVERY DAY 11/30/18   Provider, Historical   NOVOLOG FLEXPEN U-100 INSULIN 100 unit/mL inpn Inject 1:4 for carbs for breakfast and dinner and 1:5 for lunch + 1 unit for 25 > 125 for correction.   Max 100 units per day now that she is pregnant--Dose change 12/13/18--updated med list--did not send prescription to the pharmacy 12/13/18   Sandrine Shah MD   FREESTYLE NASREEN 10 DAY READER misc USE AS DIRECTED WITH FREESTYLE SENSORS 11/16/18   Sandrine Shah MD   FREESTYLE NASREEN 10 DAY SENSOR kit USE 1 SENSOR EVERY 10 DAYS AS DIRECTED 10/5/18   Sandrine Shah MD   ondansetron (ZOFRAN ODT) 8 mg disintegrating tablet TAKE AS DIRECTED 18   Provider, Historical   cholecalciferol, vitamin D3, (VITAMIN D3) 2,000 unit tab Take 4,000 Units by mouth daily. Provider, Historical   ONETOUCH ULTRA TEST strip FOR BLOOD GLUCOSE MONITORING 4 X DAILY. .02 17   Provider, Historical        Review of Systems: A comprehensive review of systems was negative except for that written in the History of Present Illness. Objective:     Vitals:  Vitals:    19 0837   Temp: 98.1 °F (36.7 °C)        Physical Exam:  Deferred  Membranes:  Intact  Fetal Heart Rate: Reactive    Prenatal Labs:   Lab Results   Component Value Date/Time    ABO/Rh(D) B POSITIVE 2019 11:58 AM    Rubella, External Immune 2018    GrBStrep, External neg 2009    HBsAg, External Negative 2018    HIV, External Negative 2018    RPR, External Negative 2018    Gonorrhea, External neg 12/10/2009    Chlamydia, External neg 12/10/2009    ABO,Rh b pos 12/10/2009         Impression/Plan:     Plan:  Admit for  section. Group B Strep was see prenatal. Discussed the risks of surgery including the risks of bleeding, infection, deep vein thrombosis, and surgical injuries to internal organs including but not limited to the bowels, bladder, rectum, and female reproductive organs. The patient understands the risks; any and all questions were answered to the patient's satisfaction.     Signed By:  David Hernandez MD     2019

## 2019-02-20 NOTE — ANESTHESIA POSTPROCEDURE EVALUATION
Post-Anesthesia Evaluation and Assessment    Patient: Yecenia Ramirez MRN: [id number]  SSN: [id number]    YOB: 1985  Age: 37 y.o. Sex: female      I have evaluated the patient and they are stable and ready for discharge from the PACU. Cardiovascular Function/Vital Signs  Visit Vitals  /75   Pulse 81   Temp 36.7 °C (98.1 °F)   Resp 22   SpO2 100%   Breastfeeding? No       Patient is status post Spinal anesthesia for Procedure(s):   SECTION. Nausea/Vomiting: None    Postoperative hydration reviewed and adequate. Pain:  Pain Scale 1: Numeric (0 - 10) (19 1028)  Pain Intensity 1: 0 (19 1028)   Managed    Neurological Status: At baseline    Mental Status, Level of Consciousness: Alert and  oriented to person, place, and time    Pulmonary Status:   O2 Device: Room air (19 1132)   Adequate oxygenation and airway patent    Complications related to anesthesia: None    Post-anesthesia assessment completed. No concerns    Signed By: Byron Brown MD     2019              Procedure(s):   SECTION. <BSHSIANPOST>    Visit Vitals  /75   Pulse 81   Temp 36.7 °C (98.1 °F)   Resp 22   SpO2 100%   Breastfeeding?  No

## 2019-02-20 NOTE — ROUTINE PROCESS
Bedside and Verbal shift change report given to ITZ Unger RN (oncoming nurse) by Lazaor Galeas RN (offgoing nurse). Report included the following information SBAR, Intake/Output, MAR and Recent Results.

## 2019-02-21 ENCOUNTER — APPOINTMENT (OUTPATIENT)
Dept: GENERAL RADIOLOGY | Age: 34
End: 2019-02-21
Attending: HOSPITALIST
Payer: COMMERCIAL

## 2019-02-21 LAB
APPEARANCE UR: CLEAR
BACTERIA URNS QL MICRO: NEGATIVE /HPF
BASOPHILS # BLD: 0 K/UL (ref 0–0.1)
BASOPHILS NFR BLD: 0 % (ref 0–1)
BILIRUB UR QL: NEGATIVE
COLOR UR: ABNORMAL
DIFFERENTIAL METHOD BLD: ABNORMAL
EOSINOPHIL # BLD: 0 K/UL (ref 0–0.4)
EOSINOPHIL NFR BLD: 0 % (ref 0–7)
EPITH CASTS URNS QL MICRO: ABNORMAL /LPF
ERYTHROCYTE [DISTWIDTH] IN BLOOD BY AUTOMATED COUNT: 13.5 % (ref 11.5–14.5)
GLUCOSE BLD STRIP.AUTO-MCNC: 100 MG/DL (ref 65–100)
GLUCOSE BLD STRIP.AUTO-MCNC: 172 MG/DL (ref 65–100)
GLUCOSE BLD STRIP.AUTO-MCNC: 173 MG/DL (ref 65–100)
GLUCOSE BLD STRIP.AUTO-MCNC: 205 MG/DL (ref 65–100)
GLUCOSE BLD STRIP.AUTO-MCNC: 40 MG/DL (ref 65–100)
GLUCOSE BLD STRIP.AUTO-MCNC: 41 MG/DL (ref 65–100)
GLUCOSE BLD STRIP.AUTO-MCNC: 48 MG/DL (ref 65–100)
GLUCOSE BLD STRIP.AUTO-MCNC: 54 MG/DL (ref 65–100)
GLUCOSE BLD STRIP.AUTO-MCNC: 55 MG/DL (ref 65–100)
GLUCOSE BLD STRIP.AUTO-MCNC: 56 MG/DL (ref 65–100)
GLUCOSE BLD STRIP.AUTO-MCNC: 57 MG/DL (ref 65–100)
GLUCOSE BLD STRIP.AUTO-MCNC: 61 MG/DL (ref 65–100)
GLUCOSE BLD STRIP.AUTO-MCNC: 63 MG/DL (ref 65–100)
GLUCOSE BLD STRIP.AUTO-MCNC: 69 MG/DL (ref 65–100)
GLUCOSE BLD STRIP.AUTO-MCNC: 82 MG/DL (ref 65–100)
GLUCOSE BLD STRIP.AUTO-MCNC: 91 MG/DL (ref 65–100)
GLUCOSE BLD STRIP.AUTO-MCNC: 97 MG/DL (ref 65–100)
GLUCOSE UR STRIP.AUTO-MCNC: NEGATIVE MG/DL
HCT VFR BLD AUTO: 31.6 % (ref 35–47)
HGB BLD-MCNC: 10.3 G/DL (ref 11.5–16)
HGB UR QL STRIP: ABNORMAL
HYALINE CASTS URNS QL MICRO: ABNORMAL /LPF (ref 0–5)
IMM GRANULOCYTES # BLD AUTO: 0.1 K/UL (ref 0–0.04)
IMM GRANULOCYTES NFR BLD AUTO: 0 % (ref 0–0.5)
KETONES UR QL STRIP.AUTO: NEGATIVE MG/DL
LEUKOCYTE ESTERASE UR QL STRIP.AUTO: NEGATIVE
LYMPHOCYTES # BLD: 1.3 K/UL (ref 0.8–3.5)
LYMPHOCYTES NFR BLD: 10 % (ref 12–49)
MCH RBC QN AUTO: 28.9 PG (ref 26–34)
MCHC RBC AUTO-ENTMCNC: 32.6 G/DL (ref 30–36.5)
MCV RBC AUTO: 88.8 FL (ref 80–99)
MONOCYTES # BLD: 1 K/UL (ref 0–1)
MONOCYTES NFR BLD: 9 % (ref 5–13)
NEUTS SEG # BLD: 9.7 K/UL (ref 1.8–8)
NEUTS SEG NFR BLD: 80 % (ref 32–75)
NITRITE UR QL STRIP.AUTO: NEGATIVE
NRBC # BLD: 0 K/UL (ref 0–0.01)
NRBC BLD-RTO: 0 PER 100 WBC
PH UR STRIP: 7 [PH] (ref 5–8)
PLATELET # BLD AUTO: 273 K/UL (ref 150–400)
PMV BLD AUTO: 11.1 FL (ref 8.9–12.9)
PROT UR STRIP-MCNC: NEGATIVE MG/DL
RBC # BLD AUTO: 3.56 M/UL (ref 3.8–5.2)
RBC #/AREA URNS HPF: ABNORMAL /HPF (ref 0–5)
SERVICE CMNT-IMP: ABNORMAL
SERVICE CMNT-IMP: NORMAL
SP GR UR REFRACTOMETRY: 1.01 (ref 1–1.03)
UROBILINOGEN UR QL STRIP.AUTO: 1 EU/DL (ref 0.2–1)
WBC # BLD AUTO: 12 K/UL (ref 3.6–11)
WBC URNS QL MICRO: ABNORMAL /HPF (ref 0–4)

## 2019-02-21 PROCEDURE — 85025 COMPLETE CBC W/AUTO DIFF WBC: CPT

## 2019-02-21 PROCEDURE — 74011250636 HC RX REV CODE- 250/636: Performed by: SPECIALIST

## 2019-02-21 PROCEDURE — 65410000002 HC RM PRIVATE OB

## 2019-02-21 PROCEDURE — 74018 RADEX ABDOMEN 1 VIEW: CPT

## 2019-02-21 PROCEDURE — 74011258636 HC RX REV CODE- 258/636: Performed by: HOSPITALIST

## 2019-02-21 PROCEDURE — 81001 URINALYSIS AUTO W/SCOPE: CPT

## 2019-02-21 PROCEDURE — 74011250637 HC RX REV CODE- 250/637: Performed by: SPECIALIST

## 2019-02-21 PROCEDURE — 82962 GLUCOSE BLOOD TEST: CPT

## 2019-02-21 PROCEDURE — 74011250636 HC RX REV CODE- 250/636: Performed by: ANESTHESIOLOGY

## 2019-02-21 PROCEDURE — 74011250637 HC RX REV CODE- 250/637: Performed by: ADVANCED PRACTICE MIDWIFE

## 2019-02-21 PROCEDURE — 74011636637 HC RX REV CODE- 636/637: Performed by: ADVANCED PRACTICE MIDWIFE

## 2019-02-21 PROCEDURE — 83036 HEMOGLOBIN GLYCOSYLATED A1C: CPT

## 2019-02-21 PROCEDURE — 74011000250 HC RX REV CODE- 250: Performed by: SPECIALIST

## 2019-02-21 PROCEDURE — 36415 COLL VENOUS BLD VENIPUNCTURE: CPT

## 2019-02-21 RX ORDER — INSULIN GLARGINE 100 [IU]/ML
33 INJECTION, SOLUTION SUBCUTANEOUS
Status: DISCONTINUED | OUTPATIENT
Start: 2019-02-21 | End: 2019-02-23

## 2019-02-21 RX ORDER — DEXTROSE 50 % IN WATER (D50W) INTRAVENOUS SYRINGE
12.5-25 AS NEEDED
Status: DISCONTINUED | OUTPATIENT
Start: 2019-02-21 | End: 2019-02-21 | Stop reason: SDUPTHER

## 2019-02-21 RX ORDER — INSULIN LISPRO 100 [IU]/ML
INJECTION, SOLUTION INTRAVENOUS; SUBCUTANEOUS
Status: DISCONTINUED | OUTPATIENT
Start: 2019-02-21 | End: 2019-02-21

## 2019-02-21 RX ORDER — INSULIN LISPRO 100 [IU]/ML
INJECTION, SOLUTION INTRAVENOUS; SUBCUTANEOUS
Status: DISCONTINUED | OUTPATIENT
Start: 2019-02-21 | End: 2019-02-22

## 2019-02-21 RX ORDER — SODIUM CHLORIDE, SODIUM LACTATE, POTASSIUM CHLORIDE, CALCIUM CHLORIDE 600; 310; 30; 20 MG/100ML; MG/100ML; MG/100ML; MG/100ML
140 INJECTION, SOLUTION INTRAVENOUS CONTINUOUS
Status: DISCONTINUED | OUTPATIENT
Start: 2019-02-21 | End: 2019-02-21

## 2019-02-21 RX ORDER — DEXTROSE 50 % IN WATER (D50W) INTRAVENOUS SYRINGE
12.5-25 AS NEEDED
Status: DISCONTINUED | OUTPATIENT
Start: 2019-02-21 | End: 2019-02-23 | Stop reason: HOSPADM

## 2019-02-21 RX ORDER — MAGNESIUM SULFATE 100 %
4 CRYSTALS MISCELLANEOUS AS NEEDED
Status: DISCONTINUED | OUTPATIENT
Start: 2019-02-21 | End: 2019-02-21 | Stop reason: SDUPTHER

## 2019-02-21 RX ORDER — DEXTROSE MONOHYDRATE AND SODIUM CHLORIDE 5; .9 G/100ML; G/100ML
100 INJECTION, SOLUTION INTRAVENOUS CONTINUOUS
Status: DISCONTINUED | OUTPATIENT
Start: 2019-02-21 | End: 2019-02-22

## 2019-02-21 RX ORDER — SODIUM CHLORIDE, SODIUM LACTATE, POTASSIUM CHLORIDE, CALCIUM CHLORIDE 600; 310; 30; 20 MG/100ML; MG/100ML; MG/100ML; MG/100ML
125 INJECTION, SOLUTION INTRAVENOUS CONTINUOUS
Status: DISCONTINUED | OUTPATIENT
Start: 2019-02-21 | End: 2019-02-22

## 2019-02-21 RX ORDER — MAGNESIUM SULFATE 100 %
4 CRYSTALS MISCELLANEOUS AS NEEDED
Status: DISCONTINUED | OUTPATIENT
Start: 2019-02-21 | End: 2019-02-23 | Stop reason: HOSPADM

## 2019-02-21 RX ADMIN — ONDANSETRON 4 MG: 2 INJECTION INTRAMUSCULAR; INTRAVENOUS at 12:47

## 2019-02-21 RX ADMIN — SODIUM CHLORIDE, SODIUM LACTATE, POTASSIUM CHLORIDE, AND CALCIUM CHLORIDE 150 ML/HR: 600; 310; 30; 20 INJECTION, SOLUTION INTRAVENOUS at 11:52

## 2019-02-21 RX ADMIN — NALBUPHINE HYDROCHLORIDE 2.5 MG: 10 INJECTION, SOLUTION INTRAMUSCULAR; INTRAVENOUS; SUBCUTANEOUS at 04:25

## 2019-02-21 RX ADMIN — KETOROLAC TROMETHAMINE 30 MG: 30 INJECTION, SOLUTION INTRAMUSCULAR at 06:48

## 2019-02-21 RX ADMIN — Medication 16 G: at 15:05

## 2019-02-21 RX ADMIN — INSULIN LISPRO 13.75 UNITS: 100 INJECTION, SOLUTION INTRAVENOUS; SUBCUTANEOUS at 11:12

## 2019-02-21 RX ADMIN — INSULIN GLARGINE 33 UNITS: 100 INJECTION, SOLUTION SUBCUTANEOUS at 11:43

## 2019-02-21 RX ADMIN — Medication 10 ML: at 17:11

## 2019-02-21 RX ADMIN — ONDANSETRON 4 MG: 2 INJECTION INTRAMUSCULAR; INTRAVENOUS at 17:11

## 2019-02-21 RX ADMIN — HYDROCHLOROTHIAZIDE 25 MG: 25 TABLET ORAL at 08:59

## 2019-02-21 RX ADMIN — Medication 16 G: at 15:20

## 2019-02-21 RX ADMIN — HYDROCHLOROTHIAZIDE 25 MG: 25 TABLET ORAL at 21:50

## 2019-02-21 RX ADMIN — DEXTROSE MONOHYDRATE AND SODIUM CHLORIDE 100 ML/HR: 5; .9 INJECTION, SOLUTION INTRAVENOUS at 19:49

## 2019-02-21 RX ADMIN — IBUPROFEN 800 MG: 400 TABLET ORAL at 21:50

## 2019-02-21 RX ADMIN — PROCHLORPERAZINE EDISYLATE 10 MG: 5 INJECTION INTRAMUSCULAR; INTRAVENOUS at 06:47

## 2019-02-21 NOTE — OP NOTES
[address]   OPERATIVE REPORT    Name:  Sunita Tracy  MR#:  [id number]  :  1985  ACCOUNT #:  [de-identified]  DATE OF SERVICE:  2019      PREOPERATIVE DIAGNOSES:  Pregnancy-induced hypertension, pre-gestational diabetes, 35  weeks and 4 days gestation. POSTOPERATIVE DIAGNOSIS:  _____. PROCEDURE PERFORMED:  Repeat low-transverse  section. SURGEON:  Maribel Santiago MD.    ASSISTANT:  _____    ANESTHESIA:  _____. COMPLICATIONS:  _____. SPECIMENS REMOVED:  _____. IMPLANTS:  _____. ESTIMATED BLOOD LOSS:  500 mL. FINDINGS:  There was delivery of a viable infant. The uterus, tubes, ovaries, and  placenta all appeared grossly normal.    DESCRIPTION OF PROCEDURE:  The patient was taken to the operating room and placed on  the operating room table. After adequate anesthesia was obtained, she was placed in  the left lateral tilt position. A Mclean catheter was placed. The abdomen was  prepped. The patient was draped in the usual sterile fashion. A low-transverse  incision was made in the skin with the knife and carried down to the fascia with the  knife. The fascia was nicked in the midline and the fascial incision was extended  bilaterally using Dawson scissors. The fascia was  from the rectus muscles  using a combination of sharp and blunt dissection. The peritoneum was entered  bluntly. A bladder flap was created and a low-transverse incision was made in the  uterus with Metzenbaum scissors. This incision was extended bilaterally using blunt  dissection. The head was delivered without difficulty as well as the shoulders and  the rest of the body. The cord was clamped and cut. The infant was handed to  awaiting personnel. The placenta was delivered manually with the findings as above. The uterus was exteriorized and the uterine cavity was swept with a moist laparotomy  sponge.   The uterine incision was closed using a running interlocking suture of 0  Vicryl. 600 mcg of Cytotec was placed intrauterine before uterine closure. The  second layer was used to imbricate. There was good hemostasis. The uterus was  placed back into the pelvis. Hemostasis was assured. Attention was turned to the  peritoneum, which was closed using 2-0 Vicryl. The fascia was closed using a running  suture of #1 Vicryl. The subcutaneous layer was re-approximated using interrupted  sutures of 2-0 chromic. Bleeders were controlled using the Bovie. The incision was  irrigated. The skin was closed using Insorb staples and a sterile dressing was  applied. There was clear urine draining from the Mclean at the end of the procedure. All sponge, needle, and instrument counts were correct x2 at the end of the  procedure. The patient tolerated the procedure well and was taken to the  postanesthesia care unit in satisfactory condition. PATHOLOGY:  None.         MD CHRISTOPHER Delong/[id number]  D:  02/20/2019 13:03  T:  02/21/2019 0:30  JOB #:  [id number]

## 2019-02-21 NOTE — PROGRESS NOTES
Post-Operative Day Number 1 Progress Note    Patient doing well post-op day 1 from  delivery without significant complaints. Pain controlled on current medication. Voiding without difficulty, normal lochia. She adalberto very sleepy this morning but states   N/v and pain are improved. Vitals:    Patient Vitals for the past 8 hrs:   BP Temp Pulse Resp   19 0613 129/79 98.7 °F (37.1 °C) 88 14   19 0243 113/71 98.9 °F (37.2 °C) 96 14     Temp (24hrs), Av.3 °F (36.8 °C), Min:98 °F (36.7 °C), Max:98.9 °F (37.2 °C)      Vital signs stable, afebrile. Exam:  Patient without distress. Abdomen soft, fundus firm at level of umbilicus, non tender. Incision dry and clean without erythema. Lower extremities are negative for swelling, cords or tenderness. Lab/Data Review: All lab results for the last 24 hours reviewed. Assessment and Plan:  Patient appears to be having uncomplicated post- course. Continue routine post-op care and maternal education. Pt type 1 DM, will get patient's home regimen when she is more lucid.

## 2019-02-21 NOTE — DIABETES MGMT
DTC Consult Note    Recommendations/ Comments:     Spoke with Dr. Agnieszka Gregory in regards hypoglycemia today and plan is to d/c her mealtime insulin and add Humalog for correction, high sensitivity scale     If appropriate, consider:  Decreasing Lantus to 20 units to prevent hypoglycemia, due to poor po intake, n/v    Current hospital DM medication: Humalog ICR 1:4 with breakfast and dinner and 1:5 with lunch and Lantus 33 units daily with breakfast      Consult received for:  [x]             Assessment of home management    Consult initiated today, I was unable to complete consult due to patient feeling nauseous. DTC will complete consult tomorrow. Chart reviewed and initial evaluation complete on [place] K John. Patient is a 37 y.o. female with hx Type 1 Diabetes (diagnosed at age 4) on Tresiba 66 units daily (last dose taken on Tuesday morning), Novolog ICR 1:4 with breakfast and dinner and 1:5 with lunch, BG target 125 mg/dl, ISF 1:25 at home. DM medications before pregnancy: Tresiba (40 units but she is not sure), Novolog ICR 1:6 with all meals   She sees an endocrinologist, Dr. Saira Elizondo     BG monitoring at home 4 times per day. Pt reports to eat 3 meals a day, avoids sweet beverages. States that her A1c before pregnancy was 7.7%    DTC will see pt again tomorrow to complete consult as able. A1c:   Lab Results   Component Value Date/Time    Hemoglobin A1c 8.8 (H) 01/03/2012 03:56 PM       Recent Glucose Results:   Lab Results   Component Value Date/Time    GLUCPOC 173 (H) 02/21/2019 11:11 AM    GLUCPOC 172 (H) 02/21/2019 08:55 AM    GLUCPOC 91 02/21/2019 04:57 AM        Lab Results   Component Value Date/Time    Creatinine 0.74 02/19/2019 11:58 AM     CrCl cannot be calculated (Unknown ideal weight. ). Active Orders   Diet    DIET GESTATIONAL DIABETIC 2500KCAL        PO intake: No data found. Will continue to follow as needed. Thank you.   Talita Restrepo RD, [address]  Pager: [phone]     Time spent: 15 min

## 2019-02-21 NOTE — PROGRESS NOTES
Called by RN who states that patient now POD 1 s/p LTCS has been n/v during the day and hasn't eaten. She reports she checked her BG and it was 56. Patient was given 4oz of orange juice, which she tolerated. Repeat BG after 15 minutes was 57. RN advised no insulin protocol order for patient. D/w Dr. Dale Crump and will order corrective protocol for now and to follow unit protocol for corrective measures. F/u with RN to advise of protocol and to treat BG to 80 then give peanut butter/crackers and encourage patient to eat as patient has not eaten all day. RN verbalized understanding.

## 2019-02-21 NOTE — ROUTINE PROCESS
Bedside SBAR received from [facility], RN.    1146: Patient blood sugar 48. Gave 4 oz of orange juice for patient to drink. Will recheck blood sugar in 15 mintues at 1925.    1930: Blood sugar 55. Gave patient 4 oz of juice and will recheck in 15 minutes at 80.

## 2019-02-21 NOTE — ROUTINE PROCESS
1930:Bedside and Verbal shift change report given to DAVION Buckley (oncoming nurse) by ITZ Silva (offgoing nurse). Report included the following information SBAR.     0233: Pt BG 56; pt given 4 oz orange juice and will recheck BG in ~ 15 min.    0312: Pt BG still 57; per protocol will give another 4 oz of juice and recheck sugar. 0319: Spoke with Brett Montaño; CNM to contact MD and have insulin/diabetes order set placed. **0348: Brett Montaño called; orders placed for D50, glucose tablets,and glucagon. Per Eusebio Sanches will pass on information about pt BG to primary MD and then they can place additional orders as needed. 0330: Pt given another 4 oz of juice. 0355: Pt BG 63; pt given another 4 oz of juice vs milk as she states she cannot have cow's milk r/t her \"HS\".    0413: Pt BG 69; will give another 4oz of juice and recheck sugar. 0457: Pt BG 91; pt currently sleeping (likely r/t nubain), but given peanut butter w/saltine crackers at bedside to eat. Will wake pt up by 0620.    0730: Due to pt N+V and low BG; pt has been unable to ambulate to bathroom or have Mclean removed. Spoke with Olga oSmmers; pt is to keep Mclean until she is more alert and able to get OOB. Will notify day RN. Also, spoke with Tanner Elizondo regarding insulin orders for pt. Per Deloise Moritz note \"will get patient's home regimen when she is more lucid\".

## 2019-02-21 NOTE — LACTATION NOTE
This note was copied from a baby's chart. Asked to help this mom get the baby latched. Mom states the baby had been latching and nursing well and at the last feeding she fed for 30 minutes. Mom was not feeling well yesterday and required a lot of help getting baby latched and then holding the baby at the breast.     This morning mom had taken medication for nausea and itching and so she was very sleepy. Baby was very sleepy on mom. I took baby off of mom and woke her up. I was then able to get her latched deeply in the football hold. Baby had a strong rhythmic suck and I heard a couple swallows. Mom kept falling asleep with the baby at the breast. I stood by her side for the whole feeding. She nursed for 14 minutes. I recommended that mom attempt to feed every 2-3 hours and if she can she should pump after nursing to stimulate her breasts. Mom will call out as needed for breast feeding assistance. 36 - Mom is still very sleepy and needs a lot of help with breast feeding. Baby nursed for a couple minutes around 0930 and then I was able to get the baby to nurse 5 more minutes at [phone]. I helped mom get the pump on and recommended that she pump for 15 minutes after nursing.

## 2019-02-21 NOTE — ROUTINE PROCESS
0800:  Bedside and Verbal shift change report given to Binh Jeronimo RN  (oncoming nurse) by Magdi Rodrigez. Adriana Reynolds RN (offgoing nurse). Report included the following information SBAR.     0945:  Patient's glucose 172. Patient able to give RN information about her home insulin regimen. Notified Blanca Rubin of glucose and that there are no current insulin orders for patient. Per midwife request, copy of patients home regimen faxed to [facility] office so that orders can be made. Currently awaiting orders. 1112:  Patient given 13.75 units of Humalog for 1:4 carb ratio with total of 55gm of carbs. 1130: Spoke with Dr. Lizette Felix for Hospitalist regarding consult. Dr. Mendel Magana should be coming to floor to see patient. 1215: Called and spoke with Dr. Lizette Felix for medical hospitalists again notifying that hospitalist has still not come to see patient. Dr. Lizette Felix stated that he was to call NP that was to see patient directly. 1400:  Dr. Mendel Magana in to see patient. 1504: Patient called out saying she felt like her BG was low. Glucose 41. 40 on recheck. 4 Glucose tabs given. 1520: Glucose now 61. Patient states she feels a little better. 4 more Glucose tabs given. 1521:  Radiology in to do Xray on patient. 1538: Glucose now 100.

## 2019-02-22 LAB
ALBUMIN SERPL-MCNC: 1.9 G/DL (ref 3.5–5)
ALBUMIN/GLOB SERPL: 0.4 {RATIO} (ref 1.1–2.2)
ALP SERPL-CCNC: 138 U/L (ref 45–117)
ALT SERPL-CCNC: 16 U/L (ref 12–78)
ANION GAP SERPL CALC-SCNC: 7 MMOL/L (ref 5–15)
AST SERPL-CCNC: 32 U/L (ref 15–37)
BASOPHILS # BLD: 0 K/UL (ref 0–0.1)
BASOPHILS NFR BLD: 0 % (ref 0–1)
BILIRUB SERPL-MCNC: 0.2 MG/DL (ref 0.2–1)
BUN SERPL-MCNC: 5 MG/DL (ref 6–20)
BUN/CREAT SERPL: 7 (ref 12–20)
CALCIUM SERPL-MCNC: 8.6 MG/DL (ref 8.5–10.1)
CHLORIDE SERPL-SCNC: 107 MMOL/L (ref 97–108)
CO2 SERPL-SCNC: 25 MMOL/L (ref 21–32)
CREAT SERPL-MCNC: 0.7 MG/DL (ref 0.55–1.02)
DIFFERENTIAL METHOD BLD: ABNORMAL
EOSINOPHIL # BLD: 0.3 K/UL (ref 0–0.4)
EOSINOPHIL NFR BLD: 2 % (ref 0–7)
ERYTHROCYTE [DISTWIDTH] IN BLOOD BY AUTOMATED COUNT: 13.5 % (ref 11.5–14.5)
EST. AVERAGE GLUCOSE BLD GHB EST-MCNC: 157 MG/DL
GLOBULIN SER CALC-MCNC: 4.5 G/DL (ref 2–4)
GLUCOSE BLD STRIP.AUTO-MCNC: 112 MG/DL (ref 65–100)
GLUCOSE BLD STRIP.AUTO-MCNC: 138 MG/DL (ref 65–100)
GLUCOSE BLD STRIP.AUTO-MCNC: 245 MG/DL (ref 65–100)
GLUCOSE BLD STRIP.AUTO-MCNC: 306 MG/DL (ref 65–100)
GLUCOSE BLD STRIP.AUTO-MCNC: 312 MG/DL (ref 65–100)
GLUCOSE BLD STRIP.AUTO-MCNC: 71 MG/DL (ref 65–100)
GLUCOSE BLD STRIP.AUTO-MCNC: 75 MG/DL (ref 65–100)
GLUCOSE SERPL-MCNC: 95 MG/DL (ref 65–100)
HBA1C MFR BLD: 7.1 % (ref 4.2–6.3)
HCT VFR BLD AUTO: 29.4 % (ref 35–47)
HGB BLD-MCNC: 9.6 G/DL (ref 11.5–16)
IMM GRANULOCYTES # BLD AUTO: 0 K/UL (ref 0–0.04)
IMM GRANULOCYTES NFR BLD AUTO: 0 % (ref 0–0.5)
LYMPHOCYTES # BLD: 2.7 K/UL (ref 0.8–3.5)
LYMPHOCYTES NFR BLD: 23 % (ref 12–49)
MCH RBC QN AUTO: 28.9 PG (ref 26–34)
MCHC RBC AUTO-ENTMCNC: 32.7 G/DL (ref 30–36.5)
MCV RBC AUTO: 88.6 FL (ref 80–99)
MONOCYTES # BLD: 1.3 K/UL (ref 0–1)
MONOCYTES NFR BLD: 12 % (ref 5–13)
NEUTS SEG # BLD: 7.2 K/UL (ref 1.8–8)
NEUTS SEG NFR BLD: 63 % (ref 32–75)
NRBC # BLD: 0 K/UL (ref 0–0.01)
NRBC BLD-RTO: 0 PER 100 WBC
PLATELET # BLD AUTO: 258 K/UL (ref 150–400)
PMV BLD AUTO: 10.7 FL (ref 8.9–12.9)
POTASSIUM SERPL-SCNC: 3.6 MMOL/L (ref 3.5–5.1)
PROT SERPL-MCNC: 6.4 G/DL (ref 6.4–8.2)
RBC # BLD AUTO: 3.32 M/UL (ref 3.8–5.2)
SERVICE CMNT-IMP: ABNORMAL
SERVICE CMNT-IMP: NORMAL
SERVICE CMNT-IMP: NORMAL
SODIUM SERPL-SCNC: 139 MMOL/L (ref 136–145)
WBC # BLD AUTO: 11.5 K/UL (ref 3.6–11)

## 2019-02-22 PROCEDURE — 65410000002 HC RM PRIVATE OB

## 2019-02-22 PROCEDURE — 82962 GLUCOSE BLOOD TEST: CPT

## 2019-02-22 PROCEDURE — 85025 COMPLETE CBC W/AUTO DIFF WBC: CPT

## 2019-02-22 PROCEDURE — 74011250636 HC RX REV CODE- 250/636: Performed by: HOSPITALIST

## 2019-02-22 PROCEDURE — 74011636637 HC RX REV CODE- 636/637: Performed by: FAMILY MEDICINE

## 2019-02-22 PROCEDURE — 74011258636 HC RX REV CODE- 258/636: Performed by: HOSPITALIST

## 2019-02-22 PROCEDURE — 77030011255 HC DSG AQUACEL AG BMS -A

## 2019-02-22 PROCEDURE — 74011250637 HC RX REV CODE- 250/637: Performed by: SPECIALIST

## 2019-02-22 PROCEDURE — 80053 COMPREHEN METABOLIC PANEL: CPT

## 2019-02-22 PROCEDURE — 36415 COLL VENOUS BLD VENIPUNCTURE: CPT

## 2019-02-22 RX ORDER — INSULIN LISPRO 100 [IU]/ML
INJECTION, SOLUTION INTRAVENOUS; SUBCUTANEOUS
Status: DISCONTINUED | OUTPATIENT
Start: 2019-02-22 | End: 2019-02-23 | Stop reason: HOSPADM

## 2019-02-22 RX ADMIN — SODIUM CHLORIDE, POTASSIUM CHLORIDE, SODIUM LACTATE AND CALCIUM CHLORIDE 125 ML/HR: 600; 310; 30; 20 INJECTION, SOLUTION INTRAVENOUS at 04:57

## 2019-02-22 RX ADMIN — IBUPROFEN 800 MG: 400 TABLET ORAL at 23:30

## 2019-02-22 RX ADMIN — HYDROCHLOROTHIAZIDE 25 MG: 25 TABLET ORAL at 11:38

## 2019-02-22 RX ADMIN — INSULIN LISPRO 4 UNITS: 100 INJECTION, SOLUTION INTRAVENOUS; SUBCUTANEOUS at 18:36

## 2019-02-22 RX ADMIN — Medication 10 ML: at 15:52

## 2019-02-22 RX ADMIN — INSULIN LISPRO 2 UNITS: 100 INJECTION, SOLUTION INTRAVENOUS; SUBCUTANEOUS at 14:02

## 2019-02-22 RX ADMIN — INSULIN LISPRO 1 UNITS: 100 INJECTION, SOLUTION INTRAVENOUS; SUBCUTANEOUS at 00:42

## 2019-02-22 RX ADMIN — DEXTROSE MONOHYDRATE AND SODIUM CHLORIDE 100 ML/HR: 5; .9 INJECTION, SOLUTION INTRAVENOUS at 02:26

## 2019-02-22 RX ADMIN — IBUPROFEN 800 MG: 400 TABLET ORAL at 14:07

## 2019-02-22 RX ADMIN — INSULIN LISPRO 2 UNITS: 100 INJECTION, SOLUTION INTRAVENOUS; SUBCUTANEOUS at 23:30

## 2019-02-22 RX ADMIN — IBUPROFEN 800 MG: 400 TABLET ORAL at 06:06

## 2019-02-22 RX ADMIN — HYDROCHLOROTHIAZIDE 25 MG: 25 TABLET ORAL at 21:28

## 2019-02-22 NOTE — PROGRESS NOTES
Hospitalist Progress Note  Antonieta Morel MD  Answering service: [phone] from in house phone        Date of Service:  2019  NAME:  Johanna Merritt  :  1985  MRN:  [id number]      Admission Summary:   Consulted for hypoglycemia  36 y/o F with  h/o IDDM type1, s/p  . She was on intensive insulin therapy during her pregnancy. She was on Tresiba 60 units once daily, was increased to 100 units during her pregnancy. She was also using 1:4 to 1:5 carb correction Humalog. Pt noted with hypoglycemia a/w nausea,vomiting. Interval history / Subjective:   No vomiting ,had one episode last evening, nausea controlled ,has abdominal cramps  Able to have meals today, +flatus + BM voiding     Assessment & Plan:     Persistent nausea and vomiting. improved  -chronically predelivery  -KUB is negative. - Continue symptomatic treatment.  -tolerating po  -d/c IVF . Diabetes with hypoglycemia.   -hgb a1c 7.1   -had increased need for insulin during pregnancy. -post delivery, her oral intake is limited due to  vomiting.    - off D50 ,BG stable  -Accu-Cheks with no hypoglycemia, accu with rising BG   -lantus, 33u am, based on accu check monitorin. .   - Post delivery management per Obstetric Surgery. Our team would follow the patient    Code status: full      Care Plan discussed with: Patient/Family       Hospital Problems  Date Reviewed: 2019          Codes Class Noted POA    Pregnancy induced hypertension ICD-10-CM: O13.9  ICD-9-CM: 642.30  2019 Unknown                Review of Systems:   A comprehensive review of systems was negative except for that written in the HPI. Vital Signs:    Last 24hrs VS reviewed since prior progress note.  Most recent are:  Visit Vitals  /80 (BP 1 Location: Left arm, BP Patient Position: Sitting)   Pulse 80   Temp 98.3 °F (36.8 °C)   Resp 16   Wt 131.8 kg (290 lb 9.6 oz)   SpO2 99% Breastfeeding? No   BMI 51.48 kg/m²         Intake/Output Summary (Last 24 hours) at 2/22/2019 1627  Last data filed at 2/22/2019 1622  Gross per 24 hour   Intake 5489 ml   Output 6025 ml   Net -536 ml        Physical Examination:             Constitutional:  No acute distress, cooperative, pleasant    ENT:  Oral mucous moist, oropharynx benign. Neck supple,    Resp:  CTA bilaterally. No wheezing/rhonchi/rales. No accessory muscle use   CV:  Regular rhythm, normal rate, no murmurs, gallops, rubs    GI:  Soft, non distended, non tender. normoactive bowel sounds, no hepatosplenomegaly     Musculoskeletal:  No edema, warm, 2+ pulses throughout    Neurologic:  Moves all extremities. AAOx3, CN II-XII reviewed     Psych:  Good insight, Not anxious nor agitated. Data Review:    Review and/or order of clinical lab test      Labs:     Recent Labs     02/22/19  0611 02/21/19  0511   WBC 11.5* 12.0*   HGB 9.6* 10.3*   HCT 29.4* 31.6*    273     Recent Labs     02/22/19  0611      K 3.6      CO2 25   BUN 5*   CREA 0.70   GLU 95   CA 8.6     Recent Labs     02/22/19  0611   SGOT 32   ALT 16   *   TBILI 0.2   TP 6.4   ALB 1.9*   GLOB 4.5*     No results for input(s): INR, PTP, APTT in the last 72 hours. No lab exists for component: INREXT   No results for input(s): FE, TIBC, PSAT, FERR in the last 72 hours. Lab Results   Component Value Date/Time    Folate 16.3 01/31/2018 10:06 AM      No results for input(s): PH, PCO2, PO2 in the last 72 hours. No results for input(s): CPK, CKNDX, TROIQ in the last 72 hours.     No lab exists for component: CPKMB  Lab Results   Component Value Date/Time    Cholesterol, total 161 01/31/2018 10:06 AM    HDL Cholesterol 63 01/31/2018 10:06 AM    LDL, calculated 85 01/31/2018 10:06 AM    Triglyceride 63 01/31/2018 10:06 AM     Lab Results   Component Value Date/Time    Glucose (POC) 245 (H) 02/22/2019 01:51 PM    Glucose (POC) 112 (H) 02/22/2019 09:27 AM Glucose (POC) 75 02/22/2019 09:08 AM    Glucose (POC) 71 02/22/2019 08:22 AM    Glucose (POC) 138 (H) 02/22/2019 04:34 AM     Lab Results   Component Value Date/Time    Color YELLOW/STRAW 02/21/2019 03:51 PM    Appearance CLEAR 02/21/2019 03:51 PM    Specific gravity 1.008 02/21/2019 03:51 PM    Specific gravity 1.020 01/04/2018 11:48 PM    pH (UA) 7.0 02/21/2019 03:51 PM    Protein NEGATIVE  02/21/2019 03:51 PM    Glucose NEGATIVE  02/21/2019 03:51 PM    Ketone NEGATIVE  02/21/2019 03:51 PM    Bilirubin NEGATIVE  02/21/2019 03:51 PM    Urobilinogen 1.0 02/21/2019 03:51 PM    Nitrites NEGATIVE  02/21/2019 03:51 PM    Leukocyte Esterase NEGATIVE  02/21/2019 03:51 PM    Epithelial cells FEW 02/21/2019 03:51 PM    Bacteria NEGATIVE  02/21/2019 03:51 PM    WBC 0-4 02/21/2019 03:51 PM    RBC 0-5 02/21/2019 03:51 PM         Medications Reviewed:     Current Facility-Administered Medications   Medication Dose Route Frequency    insulin lispro (HUMALOG) injection   SubCUTAneous AC&HS    glucose chewable tablet 16 g  4 Tab Oral PRN    dextrose (D50W) injection syrg 12.5-25 g  12.5-25 g IntraVENous PRN    glucagon (GLUCAGEN) injection 1 mg  1 mg IntraMUSCular PRN    insulin glargine (LANTUS) injection 33 Units  33 Units SubCUTAneous DAILY WITH BREAKFAST    dextrose 5% and 0.9% NaCl infusion  100 mL/hr IntraVENous CONTINUOUS    lactated Ringers infusion  125 mL/hr IntraVENous CONTINUOUS    naloxone (NARCAN) injection 0.4 mg  0.4 mg IntraVENous PRN    sodium chloride (NS) flush 5-40 mL  5-40 mL IntraVENous Q8H    sodium chloride (NS) flush 5-40 mL  5-40 mL IntraVENous PRN    naloxone (NARCAN) injection 0.4 mg  0.4 mg IntraVENous PRN    simethicone (MYLICON) tablet 80 mg  80 mg Oral PRN    measles, mumps & rubella Vacc (PF) (M-M-R II) injection 0.5 mL  0.5 mL SubCUTAneous PRIOR TO DISCHARGE    zolpidem (AMBIEN) tablet 5 mg  5 mg Oral QHS PRN    ibuprofen (MOTRIN) tablet 800 mg  800 mg Oral Q8H    oxyCODONE-acetaminophen (PERCOCET) 5-325 mg per tablet 1 Tab  1 Tab Oral Q4H PRN    hydroCHLOROthiazide (HYDRODIURIL) tablet 25 mg  25 mg Oral BID    ondansetron (ZOFRAN) injection 4 mg  4 mg IntraVENous Q4H PRN    prochlorperazine (COMPAZINE) with saline injection 10 mg  10 mg IntraVENous Q6H PRN     ______________________________________________________________________  EXPECTED LENGTH OF STAY: - - -  ACTUAL LENGTH OF STAY:          2                 Quintin Burch MD

## 2019-02-22 NOTE — CONSULTS
[address]    Name:  Didier Rodrigez  MR#:  [id number]  :  1985  ACCOUNT #:  [de-identified]  DATE OF SERVICE:  2019      REASON FOR CONSULTATION:  Diabetes treatment. HISTORY OF PRESENT ILLNESS:  I have seen, examined, and evaluated this 40-year-old who had  delivery yesterday, per her request for diabetes management. The patient has type 1 diabetes. She was on intensive insulin therapy during her pregnancy. She was on Tresiba 60 units once daily, was increased to 100 units during her pregnancy. She was also using 1:4 to 1:5 carb correction Humalog. Since yesterday, she was hypoglycemic. Her blood sugar yesterday was as low as 101 and early this morning dipped to 56, down to 54, continued until 5 o'clock when it came up to 91. By breakfast, it was 172, pre-lunch it was 173. She has received half dose of her home Lantus 33 units this morning before we were consulted, and since afternoon, her blood sugar is again dropping. However, she is eating clears and she is tolerating. She has been nauseated and throwing up. No abdominal pain. She still had the Mclean earlier and has noted decreasing output, barely above 20. The patient, besides the persistent vomiting, denied any headache, blurry vision, denied chest pressure, denied epigastric pain, denied any fever or chills or cough. She has Mclean catheter in place. Did not pass gas or move bowels since surgery. She is breastfeeding. HOME MEDICATIONS:    Medication Documentation Review Audit     Reviewed by Jet Jaramillo MD (Anesthesiologist) on 19 at 0919    Medication Sig Documenting Provider Last Dose Status Taking?   acetaminophen (TYLENOL EXTRA STRENGTH) 500 mg tablet Take 1,000 mg by mouth every six (6) hours as needed for Pain. Provider, Historical  Active    bisacodyl (GENTLE LAXATIVE) 5 mg EC tablet Take 5 mg by mouth.  Provider, Historical  Active    cholecalciferol, vitamin D3, (VITAMIN D3) 2,000 unit tab Take 4,000 Units by mouth daily. Provider, Historical  Active    FREESTYLE NASREEN 10 DAY READER misc USE AS DIRECTED WITH FREESTYLE SENSORS Sandrine Shah MD  Active    FREESTYLE NASREEN 10 DAY SENSOR kit USE 1 SENSOR EVERY 10 DAYS AS DIRECTED Sandrine Shah MD  Active    insulin degludec (TRESIBA FLEXTOUCH U-100) 100 unit/mL (3 mL) inpn Inject 66 units once daily and increase as directed up to 100 units per day as she is pregnant--Dose change 2/18/19--updated med list--did not send prescription to the pharmacy Sandrine Shah MD  Active    LANCE PEN NEEDLE 32 gauge x 5/32\" ndle USE AS DIRECTED TO INJECT INSULIN 4 TIMES DAILY Sandrine Shah MD  Active    NIFEdipine ER (PROCARDIA XL) 60 mg ER tablet TAKE 1 TABLET BY MOUTH EVERY DAY Provider, Historical  Active    NOVOLOG FLEXPEN U-100 INSULIN 100 unit/mL inpn Inject 1:4 for carbs for breakfast and dinner and 1:5 for lunch + 1 unit for 25 > 125 for correction. Max 100 units per day now that she is pregnant--Dose change 12/13/18--updated med list--did not send prescription to the pharmacy Sandrine Shah MD  Active    ondansetron (ZOFRAN ODT) 8 mg disintegrating tablet TAKE AS DIRECTED Provider, Historical  Active            Med Note (Randy Shaffer   Wed Jul 25, 2018 10:27 PM) Received from: External Pharmacy Received Sig: TAKE AS DIRECTED   ONETOUCH ULTRA TEST strip FOR BLOOD GLUCOSE MONITORING 4 X DAILY. .02 Provider, Historical  Active            Med Note ([place]   Wed Jan 31, 2018  9:18 AM) Received from: External Pharmacy   pediatric multivitamin no. 101 (KIDS' GUMMY PO) Take  by mouth. Provider, Historical  Active                     ALLERGIES:  CODEINE. SOCIAL HISTORY:  She denied smoking or alcohol abuse. REVIEW OF SYSTEMS:  Discussed in the HPI. The rest is negative. FAMILY MEDICAL HISTORY:  Diabetes in her brother, paternal uncle, and paternal grandfather.   Heart disease in paternal grandfather. Hypertension in her mother. Heart disease in her father. PHYSICAL EXAMINATION:  GENERAL:  During my exam past noon, the patient was in distress with nausea and vomiting. Otherwise, she is not in any cardiorespiratory distress. VITAL SIGNS:  Stable. HEENT:  No pallor or jaundice. LUNGS:  Clear. ABDOMEN:  Hypoactive, nontender. EXTREMITIES:  Trace edema. CNS:  Alert and oriented x4. LABORATORY DATA:  Reviewed. CBC:  WBC 12. Serum chemistry:  CO2 of 20. BUN and creatinine are normal.  Ordered KUB, that was negative for obstruction. ASSESSMENT AND PLAN:  1. Persistent nausea and vomiting. She had this chronically predelivery. Exam is benign. KUB is negative. Continue symptomatic treatment. If persistent, she may need GI consult. We will defer to primary team.  2.  Diabetes with hypoglycemia. The patient had increased need for insulin during pregnancy. Now postdelivery, her oral intake is limited. She is vomiting. She has received half dose of home lantus,Lantus 33 units and she is hypoglycemic I have instituted frequent Accu-Cheks. The patient is eating and tolerating orally with improved nausea. I have put in D5 and I have spoken with the nurse twice and instructing them to Accu-Chek frequently. If the sugar is up at 80, to hold off the D5. If it is under 80, to run the D5. Otherwise, run the Ringer's lactate. I have also made asked nurse for them to call our service prior to the morning Lantus injection. We will check the A1c. Post delivery management per Obstetric Surgery. Our team would follow the patient. Thank you for letting us to be involved in the care of your patient on behalf of the hospitalist service.         MD MOSES [id number]  D:  02/21/2019 21:04  T:  02/22/2019 5:21  JOB #:  [id number]

## 2019-02-22 NOTE — CONSULTS
Consult dictated: [id number]    A/p    Type I IDDM with hypoglycemia: see orders. Persistent nausea and vomiting. KUB negative    Valeria Lao MD

## 2019-02-22 NOTE — DIABETES MGMT
DTC Consult Note    Recommendations/ Comments: Noted pt POD 2 . noted pt with low BG yesterday. Lantus 33 units given for basal needs. Pt with BG of 71 mg/dL this am while on D5 and LR. antipciate lower basal needs given pt post partum now and also breast feeding. If appropriate, please consider:  1. Holding Lantus today and resuming Lantus tomorrow am at 15 units. 2. Consider adjusting correction to normal sensitivity if holding basal insulin today  3. If BG trends upward today, consider discontinuing D5       Current hospital DM medication: lispro correction - high sensitivity     Consult received for:  [x]             Assessment of home management                []      Medication Recommendations                []             Meter/monitoring     []             Insulin instruction     []             New diagnosis     []             Outpatient education     []             Insulin pump patient     []             Insulin infusion     []             DKA/HHS    Chart reviewed and initial evaluation complete on Dee Lopez. Patient is a 37 y.o. female with hx Type 1 Diabetes (diagnosed at age 4) on Tresiba 66 units daily (last dose taken on Tuesday morning), Novolog ICR 1:4 with breakfast and dinner and 1:5 with lunch, BG target 125 mg/dl, ISF 1:25 at home. Pt shared that her Remonia Moore was recently decreased to 66 units as pt was having low BG throughout the day . PT has been checking BG before meals using Freestyle Mitchell CGM ; fasting BG  < 80 mg/dL , pre lunch BG of 128 mg/dL and pre-dinner BG of 88 mg/dL per pt. Pt shared that she reduced her CHO ratio at lunch because she was having low BG from 1 - 5 pm. Pt sees endocrinologist Dr. Alvaro Estes for BG management, reports last hgb A1c was 7.1% . Pt denies missing basal insulin dose and may miss CHO coverage 1x every 2 months. Pt was injecting insulin in abdomen but was directed to inject in arm by endo to see if she has better absorption.  Pt unsure if there have been any improvements since injecting in arm  - \"haven't taken the time to really notice\". Pt shared that she feels comfortable in counting CHO but that \" I am not perfect at it\". Pt shared that she consumes 3 meals daily and a typical meal may include tuna fish with 8 crackers and fruit cup or tuna sandwich with fruit cup. Pt shared that she can't tolerate flour or milk or yogurt. Pt plans to breast feed baby. DM medications before pregnancy: Tresiba (40 units but she is not sure), Novolog ICR 1:6 with all meals         Assessed and instructed patient on the following:   ·  blood sugar goals, illness management, nutrition, referred to Diabetes Educator and site rotation  · Educator discussed how insulin needs can be decreased/much lower up to 48 hours after pregnancy; discussed importance in continuing to check BG and that if pt is waking up with low BG after pregnancy then basal insulin needs to be reduced (discussed low BG while pregnancy is < 60 mg/dL ); discussed that pt will require increased intake needs to help provided adequate nutrition while breast feeding. Discussed importance in consuming at least breakfast, lunch and dinner.  Discussed using MyPlate method at meals and incorporating more nonstachy vegetables at meals to help reduce overintake of higher kcal items; discussed avoiding sugary beverages and to f/u with endocrinologist  · Educator briefly discussed limiting excess fat intake as pt had questions regarding meal planning for weight loss - discussed limiting fried foods and consuming grilled, baked, or broiled proteins, and reducing intake of mayonnaise (as pt reported she loves to use persaud)     Encouraged the following:   dietary modifications: consume 3 meals daily of balanced meals, aim for 60 -80 g of CHO per meal due to increased needs while breastfeeding, incorporate more non-starchy vegetables in at meals to help reduce over intake of higher bernard foods, regular blood sugar monitoring: continue to check pre-meal , try to check 2 hour post meal on occasion as well ; encouraged f/u with endocrinologist to adjust insulin needs as these my vary a bit since no longer pregnant and since breast feeding    Provided patient with the following: [x]             Survival skills education materials               []             Insulin education materials               []             CHO counting education materials               [x]             Outpatient DTC contact number               []             Glucometer                     A1c:   Lab Results   Component Value Date/Time    Hemoglobin A1c 7.1 (H) 02/21/2019 09:15 PM       Recent Glucose Results:   Lab Results   Component Value Date/Time    GLU 95 02/22/2019 06:11 AM    GLUCPOC 112 (H) 02/22/2019 09:27 AM    GLUCPOC 75 02/22/2019 09:08 AM    GLUCPOC 71 02/22/2019 08:22 AM        Lab Results   Component Value Date/Time    Creatinine 0.70 02/22/2019 06:11 AM     Estimated Creatinine Clearance: 147.7 mL/min (based on SCr of 0.7 mg/dL). Active Orders   Diet    DIET GESTATIONAL DIABETIC 2500KCAL        PO intake: No data found. Will continue to follow as needed. Thank you.     Socrates Barillas RD  [facility]        Time spent: 15 minutes

## 2019-02-22 NOTE — ROUTINE PROCESS
1930:Bedside and Verbal shift change report given to DAVION Monroe (oncoming nurse) by [facility] (offgoing nurse). Report included the following information SBAR.     0023: Paged on call hospitalist for clarification of insulin orders for pt as BG is 205 and current insulin orders are for [facility] coverage only; awaiting callback. 0033: Dr. Anu Pham returned call; will change sliding scale insulin to AC HS.    0100: Mclean removed. Pt ambulated to bathroom with nursing assistance x 1 with no complications. Pt voided small amount; DTV by 0700.    0434: Pt has had two BG above 80; per day RN, MD wanted fluids switched fluids back to LR once there were two BG above 80. Will change IV fluids. 6990: Pt ambulated to bathroom with nursing assistance x 1 with no complications. Pt voided; check void complete. Pt now up ad kia.

## 2019-02-22 NOTE — PROGRESS NOTES
Post-Operative Day Number 2 Progress Note    Patient doing well post-op day from  delivery without significant complaints today. Pain controlled on current medication. Voiding without difficulty, normal lochia. No longer having nausea and vomiting and blood sugars are in better control. Vitals:    Patient Vitals for the past 8 hrs:   BP Temp Pulse Resp   19 0523 122/79 97.8 °F (36.6 °C) 70 14     Temp (24hrs), Av.6 °F (37 °C), Min:97.8 °F (36.6 °C), Max:99 °F (37.2 °C)      Vital signs stable, afebrile. Exam:  Patient without distress. Abdomen soft, fundus firm at level of umbilicus, non tender. Incision dry and clean without erythema. Lower extremities are negative for swelling, cords or tenderness. Lab/Data Review: All lab results for the last 24 hours reviewed. Assessment and Plan:  Patient appears to be having uncomplicated post- course. Continue routine post-op care and maternal education. Plan for discharge tomorrow if pt remains stable.

## 2019-02-22 NOTE — LACTATION NOTE
This note was copied from a baby's chart. Mom states baby has been nursing well today,  deep latch obtained, mother is comfortable, baby feeding vigorously with rhythmic suck and breathe pattern. Mom has been pumping today to stimulate her breasts. She has been getting a small amount of colostrum at each pumping. Baby's weight is down slightly this afternoon with a weight loss of 10%. Pediatric hospitalist notified. She recommended that mom continue breast feeding and pumping but she should begin supplementing with 15 cc's of formula after each nursing session. Mom has agreed to supplement but would like to use a syringe to prevent nipple confusion.

## 2019-02-23 VITALS
WEIGHT: 289.2 LBS | SYSTOLIC BLOOD PRESSURE: 135 MMHG | BODY MASS INDEX: 51.23 KG/M2 | HEART RATE: 94 BPM | DIASTOLIC BLOOD PRESSURE: 78 MMHG | OXYGEN SATURATION: 99 % | RESPIRATION RATE: 16 BRPM | TEMPERATURE: 98.3 F

## 2019-02-23 LAB
GLUCOSE BLD STRIP.AUTO-MCNC: 146 MG/DL (ref 65–100)
SERVICE CMNT-IMP: ABNORMAL

## 2019-02-23 PROCEDURE — 74011250636 HC RX REV CODE- 250/636: Performed by: SPECIALIST

## 2019-02-23 PROCEDURE — 82962 GLUCOSE BLOOD TEST: CPT

## 2019-02-23 PROCEDURE — 90715 TDAP VACCINE 7 YRS/> IM: CPT | Performed by: SPECIALIST

## 2019-02-23 PROCEDURE — 74011636637 HC RX REV CODE- 636/637: Performed by: ADVANCED PRACTICE MIDWIFE

## 2019-02-23 PROCEDURE — 74011250637 HC RX REV CODE- 250/637: Performed by: SPECIALIST

## 2019-02-23 RX ORDER — INSULIN GLARGINE 100 [IU]/ML
INJECTION, SOLUTION SUBCUTANEOUS
Qty: 1 VIAL | Refills: 3 | Status: SHIPPED | OUTPATIENT
Start: 2019-02-23 | End: 2019-02-25 | Stop reason: ALTCHOICE

## 2019-02-23 RX ORDER — INSULIN GLARGINE 100 [IU]/ML
15 INJECTION, SOLUTION SUBCUTANEOUS DAILY
Status: DISCONTINUED | OUTPATIENT
Start: 2019-02-23 | End: 2019-02-23

## 2019-02-23 RX ORDER — IBUPROFEN 800 MG/1
800 TABLET ORAL EVERY 8 HOURS
Qty: 30 TAB | Refills: 1 | Status: SHIPPED | OUTPATIENT
Start: 2019-02-23 | End: 2020-06-29 | Stop reason: SDUPTHER

## 2019-02-23 RX ORDER — OXYCODONE AND ACETAMINOPHEN 5; 325 MG/1; MG/1
1 TABLET ORAL
Qty: 30 TAB | Refills: 0 | Status: SHIPPED | OUTPATIENT
Start: 2019-02-23 | End: 2019-03-14

## 2019-02-23 RX ORDER — INSULIN GLARGINE 100 [IU]/ML
15 INJECTION, SOLUTION SUBCUTANEOUS DAILY
Status: DISCONTINUED | OUTPATIENT
Start: 2019-02-24 | End: 2019-02-23 | Stop reason: HOSPADM

## 2019-02-23 RX ADMIN — IBUPROFEN 800 MG: 400 TABLET ORAL at 09:05

## 2019-02-23 RX ADMIN — INSULIN GLARGINE 33 UNITS: 100 INJECTION, SOLUTION SUBCUTANEOUS at 09:13

## 2019-02-23 RX ADMIN — TETANUS TOXOID, REDUCED DIPHTHERIA TOXOID AND ACELLULAR PERTUSSIS VACCINE, ADSORBED 0.5 ML: 5; 2.5; 8; 8; 2.5 SUSPENSION INTRAMUSCULAR at 11:01

## 2019-02-23 RX ADMIN — HYDROCHLOROTHIAZIDE 25 MG: 25 TABLET ORAL at 09:07

## 2019-02-23 NOTE — LACTATION NOTE
This note was copied from a baby's chart. I did not see infant at breast. Mom says infant is getting deeper latches and denies nipple pain; feels like milk is coming in. Baby nursing well and has improved throughout post partum stay, deep latch maintained, mother is comfortable, milk is in transition, baby feeding vigorously with rhythmic suck, swallow, breathe pattern, with audible swallowing, and evident milk transfer,  baby is asleep following feeding. Baby is feeding on demand, voiding and stools present as appropriate over the last 24 hours. Mom encouraged to alternate positions and use pillows for support. Breasts may become engorged when milk \"comes in\". How milk is made / normal phases of milk production, supply and demand discussed. Taught care of engorged breasts - frequent breastfeeding encouraged, warm compresses and breast massage ac. Then nurse the baby or pump. Apply cold compresses pc x 15 minutes a few times a day for swelling or discomfort. May need to do this care for a couple of days. Discussed prevention and treatment of mastitis. Breastfeeding Support Group  [employer] [facility] meets the 1st and 3rd Tuesday of each month in the [facility] from 10:00-11:00, (located behind [facility] on the first floor) Meetings are facilitated by board certified lactation consultants and all breastfeeding moms and their infants are invited. All lactation teaching completed and questions answered. Mom will also use resources at [facility]. 44 Mom is waiting for pump from insurance. Given hand pump which she will use with hand expression/massage. Also, informed pumps are available through [facility].  She will follow up at her appointment

## 2019-02-23 NOTE — PROGRESS NOTES
Pt did not receive lantus yesterday due to blood glucose levels in the 70s. Pt reported not eating much during this time due to nausea which has resolved. With resumption of diet, pt had highs in the 300's with sliding scale correction. 140s' this morning. Will give morning lantus. Offerred patient an additional day to manage glucose levels and regimen. Pt declined. She states she was high yesterday because lantus was not given. Pt has a very good understanding of her diabetes and insulin requirements. She has an endocrinologist and has their number. Pt instructed to call endocrinologist for any lows and highs. Pt to follow up in the office in one week. After discussion, pt and physician are comfortable with discharge and glycemic follow up.

## 2019-02-23 NOTE — DISCHARGE SUMMARY
Obstetrical Discharge Summary     Name: Claudette Barragan MRN: [id number]  SSN: [id number]    YOB: 1985  Age: 37 y.o. Sex: female      Allergies: Codeine    Admit Date: 2019    Discharge Date: 2019     Admitting Physician: Mirian Osei MD     Attending Physician:  Harriet Don MD     * Admission Diagnoses: Pregnancy induced hypertension [O13.9]    * Discharge Diagnoses:   Information for the patient's :  Sophia Santillan [[id number]]   Delivery of a 3.351 kg female infant via , Low Transverse on 2019 at 10:56 AM  by . Apgars were 8 and 7. Additional Diagnoses:   Hospital Problems as of 2019 Date Reviewed: 2019          Codes Class Noted - Resolved POA    Pregnancy induced hypertension ICD-10-CM: O13.9  ICD-9-CM: 642.30  2019 - Present Unknown             Lab Results   Component Value Date/Time    ABO/Rh(D) B POSITIVE 2019 11:58 AM    Rubella, External Immune 2018    GrBStrep, External neg 2009    ABO,Rh b pos 12/10/2009      Immunization History   Administered Date(s) Administered    Influenza Vaccine 2013, 10/10/2014, 2015, 09/15/2017    Influenza Vaccine (Quad) PF 2016, 2017, 2018    Pneumococcal Polysaccharide (PPSV-23) 2017       * Procedures: c/s  Procedure(s):   SECTION           * Discharge Condition: good    * Hospital Course: Normal hospital course following the delivery. * Disposition: Home    Discharge Medications:   Current Discharge Medication List      START taking these medications    Details   ibuprofen (MOTRIN) 800 mg tablet Take 1 Tab by mouth every eight (8) hours.   Qty: 30 Tab, Refills: 1    Associated Diagnoses: S/P  section      insulin glargine (LANTUS) 100 unit/mL injection Inject 33 units subcutaneously once daily  Qty: 1 Vial, Refills: 3    Associated Diagnoses: S/P  section      oxyCODONE-acetaminophen (PERCOCET) 5-325 mg per tablet Take 1 Tab by mouth every four (4) hours as needed. Max Daily Amount: 6 Tabs. Qty: 30 Tab, Refills: 0    Associated Diagnoses: S/P  section         CONTINUE these medications which have NOT CHANGED    Details   acetaminophen (TYLENOL EXTRA STRENGTH) 500 mg tablet Take 1,000 mg by mouth every six (6) hours as needed for Pain. LANCE PEN NEEDLE 32 gauge x 5/32\" ndle USE AS DIRECTED TO INJECT INSULIN 4 TIMES DAILY  Qty: 400 Pen Needle, Refills: 3      pediatric multivitamin no. 101 (KIDS' GUMMY PO) Take  by mouth.      bisacodyl (GENTLE LAXATIVE) 5 mg EC tablet Take 5 mg by mouth. FREESTYLE NASREEN 10 DAY READER misc USE AS DIRECTED WITH FREESTYLE SENSORS  Qty: 1 Each, Refills: 0    Associated Diagnoses: Type 1 diabetes mellitus without complication (HCC)      FREESTYLE NASREEN 10 DAY SENSOR kit USE 1 SENSOR EVERY 10 DAYS AS DIRECTED  Qty: 3 Kit, Refills: 11    Associated Diagnoses: Type 1 diabetes mellitus without complication (HCC)      ondansetron (ZOFRAN ODT) 8 mg disintegrating tablet TAKE AS DIRECTED  Refills: 5      cholecalciferol, vitamin D3, (VITAMIN D3) 2,000 unit tab Take 4,000 Units by mouth daily. ONETOUCH ULTRA TEST strip FOR BLOOD GLUCOSE MONITORING 4 X DAILY. .02  Refills: 1             * Follow-up Care/Patient Instructions:   Activity: Activity as tolerated, No driving for 2 weeks, No sex for 6 weeks, No driving while on analgesics and No heavy lifting for 6 weeks  Diet: Regular Diet  Wound Care: Keep wound clean and dry    Follow-up Information     Follow up With Specialties Details Why Contact Info    Breastfeeding Support Group Lactation Services   Meets  And  Each Month From 10:00-11:00  [id number] [address]  (Located Behind [address] an appointment as soon as possible for a visit  [phone]           Signed By:  Darrius Marx MD     2019

## 2019-02-23 NOTE — ROUTINE PROCESS
Bedside shift change report given to Papo Krishnamurthy RN (oncoming nurse) by Clemente Arias RN (offgoing nurse). Report included the following information SBAR, Kardex, Intake/Output, MAR, Accordion and Recent Results.        2250: went to take pt's sugar and pt in the bathroom, will take when she is done

## 2019-02-23 NOTE — DISCHARGE INSTRUCTIONS
Patient Education         Section: What to Expect at Home  Your Recovery    A  section, or , is surgery to deliver your baby through a cut, called an incision, that the doctor makes in your lower belly and uterus. You may have some pain in your lower belly and need pain medicine for 1 to 2 weeks. You can expect some vaginal bleeding for several weeks. You will probably need about 6 weeks to fully recover. It is important to take it easy while the incision is healing. Avoid heavy lifting, strenuous activities, or exercises that strain the belly muscles while you are recovering. Ask a family member or friend for help with housework, cooking, and shopping. This care sheet gives you a general idea about how long it will take for you to recover. But each person recovers at a different pace. Follow the steps below to get better as quickly as possible. How can you care for yourself at home? Activity    · Rest when you feel tired. Getting enough sleep will help you recover.     · Try to walk each day. Start by walking a little more than you did the day before. Bit by bit, increase the amount you walk. Walking boosts blood flow and helps prevent pneumonia, constipation, and blood clots.     · Avoid strenuous activities, such as bicycle riding, jogging, weightlifting, and aerobic exercise, for 6 weeks or until your doctor says it is okay.     · Until your doctor says it is okay, do not lift anything heavier than your baby.     · Do not do sit-ups or other exercises that strain the belly muscles for 6 weeks or until your doctor says it is okay.     · Hold a pillow over your incision when you cough or take deep breaths. This will support your belly and decrease your pain.     · You may shower as usual. Pat the incision dry when you are done.     · You will have some vaginal bleeding. Wear sanitary pads.  Do not douche or use tampons until your doctor says it is okay.     · Ask your doctor when you can drive again.     · You will probably need to take at least 6 weeks off work. It depends on the type of work you do and how you feel.     · Ask your doctor when it is okay for you to have sex. Diet    · You can eat your normal diet. If your stomach is upset, try bland, low-fat foods like plain rice, broiled chicken, toast, and yogurt.     · Drink plenty of fluids (unless your doctor tells you not to).     · You may notice that your bowel movements are not regular right after your surgery. This is common. Try to avoid constipation and straining with bowel movements. You may want to take a fiber supplement every day. If you have not had a bowel movement after a couple of days, ask your doctor about taking a mild laxative.     · If you are breastfeeding, limit alcohol. Alcohol can cause a lack of energy and other health problems for the baby when a breastfeeding woman drinks heavily. It can also get in the way of a mom's ability to feed her baby or to care for the child in other ways. There isn't a lot of research about exactly how much alcohol can harm a baby. Having no alcohol is the safest choice for your baby. If you choose to have a drink now and then, have only one drink, and limit the number of occasions that you have a drink. Wait to breastfeed at least 2 hours after you have a drink to reduce the amount of alcohol the baby may get in the milk. Medicines    · Your doctor will tell you if and when you can restart your medicines. He or she will also give you instructions about taking any new medicines.     · If you take blood thinners, such as warfarin (Coumadin), clopidogrel (Plavix), or aspirin, be sure to talk to your doctor. He or she will tell you if and when to start taking those medicines again. Make sure that you understand exactly what your doctor wants you to do.     · Take pain medicines exactly as directed. ? If the doctor gave you a prescription medicine for pain, take it as prescribed. ?  If you are not taking a prescription pain medicine, ask your doctor if you can take an over-the-counter medicine.     · If you think your pain medicine is making you sick to your stomach:  ? Take your medicine after meals (unless your doctor has told you not to). ? Ask your doctor for a different pain medicine.     · If your doctor prescribed antibiotics, take them as directed. Do not stop taking them just because you feel better. You need to take the full course of antibiotics. Incision care    · If you have strips of tape on the incision, leave the tape on for a week or until it falls off.     · Wash the area daily with warm, soapy water, and pat it dry. Don't use hydrogen peroxide or alcohol, which can slow healing. You may cover the area with a gauze bandage if it weeps or rubs against clothing. Change the bandage every day.     · Keep the area clean and dry. Other instructions    · If you breastfeed your baby, you may be more comfortable while you are healing if you place the baby so that he or she is not resting on your belly. Try tucking your baby under your arm, with his or her body along the side you will be feeding on. Support your baby's upper body with your arm. With that hand you can control your baby's head to bring his or her mouth to your breast. This is sometimes called the football hold. Follow-up care is a key part of your treatment and safety. Be sure to make and go to all appointments, and call your doctor if you are having problems. It's also a good idea to know your test results and keep a list of the medicines you take. When should you call for help? Call 911 anytime you think you may need emergency care.  For example, call if:    · You passed out (lost consciousness).     · You have chest pain, are short of breath, or cough up blood.    Call your doctor now or seek immediate medical care if:    · You have pain that does not get better after you take pain medicine.     · You have severe vaginal bleeding.     · You are dizzy or lightheaded, or you feel like you may faint.     · You have new or worse pain in your belly or pelvis.     · You have loose stitches, or your incision comes open.     · You have symptoms of infection, such as:  ? Increased pain, swelling, warmth, or redness. ? Red streaks leading from the incision. ? Pus draining from the incision. ? A fever.     · You have symptoms of a blood clot in your leg (called a deep vein thrombosis), such as:  ? Pain in your calf, back of the knee, thigh, or groin. ? Redness and swelling in your leg or groin.    Watch closely for changes in your health, and be sure to contact your doctor if:    · You do not get better as expected. Where can you learn more? Go to http://kelly-wili.info/. Enter M806 in the search box to learn more about \" Section: What to Expect at Home. \"  Current as of: 2018  Content Version: 11.9  © 6531-8256 [employer]. Care instructions adapted under license by [employer] (which disclaims liability or warranty for this information). If you have questions about a medical condition or this instruction, always ask your healthcare professional. [place] [address] any warranty or liability for your use of this information. Breastfeeding Support Group  [employer] [facility] meets the  and  of each month in the [email or web address] [email or web address] [facility] from 10:00-11:00, (located behind [facility] on the first floor) Meetings are facilitated by board certified lactation consultants and all breastfeeding moms and their infants are invited.

## 2019-02-23 NOTE — LACTATION NOTE
This note was copied from a baby's chart. At the time of my visit, infant was finishing a feeding. Mom states infant had nursed for 15 minutes and that she could hear gulping. Mom states her breast is feeling firmer. Following feeding, infant is asleep. Mom will supplement with formula per order.

## 2019-02-25 RX ORDER — INSULIN ASPART 100 [IU]/ML
INJECTION, SOLUTION INTRAVENOUS; SUBCUTANEOUS
Qty: 30 ML | Refills: 11
Start: 2019-02-25 | End: 2019-03-19

## 2019-02-25 RX ORDER — INSULIN DEGLUDEC 100 U/ML
INJECTION, SOLUTION SUBCUTANEOUS
Qty: 30 ML | Refills: 11
Start: 2019-02-25 | End: 2019-03-14

## 2019-03-14 ENCOUNTER — OFFICE VISIT (OUTPATIENT)
Dept: ENDOCRINOLOGY | Age: 34
End: 2019-03-14

## 2019-03-14 VITALS
WEIGHT: 276.2 LBS | HEART RATE: 88 BPM | DIASTOLIC BLOOD PRESSURE: 78 MMHG | SYSTOLIC BLOOD PRESSURE: 125 MMHG | BODY MASS INDEX: 48.94 KG/M2 | HEIGHT: 63 IN

## 2019-03-14 DIAGNOSIS — R79.89 ABNORMAL THYROID BLOOD TEST: ICD-10-CM

## 2019-03-14 DIAGNOSIS — E55.9 VITAMIN D DEFICIENCY: ICD-10-CM

## 2019-03-14 DIAGNOSIS — E10.65 UNCONTROLLED TYPE 1 DIABETES MELLITUS WITH HYPERGLYCEMIA (HCC): Primary | ICD-10-CM

## 2019-03-14 DIAGNOSIS — I10 ESSENTIAL HYPERTENSION, BENIGN: ICD-10-CM

## 2019-03-14 RX ORDER — INSULIN DEGLUDEC 100 U/ML
INJECTION, SOLUTION SUBCUTANEOUS
Qty: 30 ML | Refills: 11
Start: 2019-03-14 | End: 2019-03-19

## 2019-03-14 RX ORDER — HYDROCHLOROTHIAZIDE 25 MG/1
TABLET ORAL
Refills: 5 | COMMUNITY
Start: 2019-02-26 | End: 2019-06-25

## 2019-03-14 NOTE — PATIENT INSTRUCTIONS
1) Starting tomorrow, decrease your tresiba to 34 units to see if this helps with lows overnight and during the day while breastfeeding. Goal is to wake up between . If still having lows over night or during the day under 80 more than 2 times a week, further decrease tresiba by 2 units as needed. 2) If you start having lows just after eating, then your novolog is too much and try 1:8 for food. 3) Decrease the nifedipine back to just 1 tab in the morning only and try taking just 1 tab of hctz (fluid pill) in the morning only. If you have certain days where you are having more swelling, then you can take the 2nd dose in the afternoon or evening.

## 2019-03-14 NOTE — PROGRESS NOTES
Chief Complaint   Patient presents with    Diabetes     pcp and pharmacy confirmed     History of Present Illness: Claybon Dandy is a 37 y.o. female here for follow up of diabetes. Weight down 16 lbs since last visit in 1/19. Her daughter, Mario Moore was born via section on 2/20/19 at [facility]. Since our e-mail after delivery, has been taking tresiba 38 units in the morning and dosing novolog 1:6 for carbs and 1:50 > 150 for correction. She is currently breastfeeding. Having a lot of lows in the 40-70s both overnight and during the day and her 30 day avg is in the 130s and most readings during the day are between  and has a few over 200. Has been taking nifedipine 60 mg bid and hctz 25 mg twice daily as her BP spiked after delivery. She has had continued swelling on bid nifedipine but better on the bid hctz but willing to try cutting back on nifedipine and hctz to once daily to see if BP and swelling will still be controlled. Will be having her tubes tied in the future. Current Outpatient Medications   Medication Sig    hydroCHLOROthiazide (HYDRODIURIL) 25 mg tablet TAKE 1 TABLET BY MOUTH TWICE A DAY    insulin degludec (TRESIBA FLEXTOUCH U-100) 100 unit/mL (3 mL) inpn Inject 38units once daily and increase as directed up to 100 units per day--Dose change 2/25/19--updated med list--did not send prescription to the pharmacy    NOVOLOG FLEXPEN U-100 INSULIN 100 unit/mL inpn Inject 1:6 for carbs + 1 unit for 50 > 150 for correction. Max 100 units per day now that she is pregnant--Dose change 2/25/19--updated med list--did not send prescription to the pharmacy    ibuprofen (MOTRIN) 800 mg tablet Take 1 Tab by mouth every eight (8) hours.  acetaminophen (TYLENOL EXTRA STRENGTH) 500 mg tablet Take 1,000 mg by mouth every six (6) hours as needed for Pain.  LANCE PEN NEEDLE 32 gauge x 5/32\" ndle USE AS DIRECTED TO INJECT INSULIN 4 TIMES DAILY    pediatric multivitamin no. 101 (KIDS' GUMMY PO) Take  by mouth.  NIFEdipine ER (PROCARDIA XL) 60 mg ER tablet Take 60 mg by mouth two (2) times a day.  FREESTYLE NASREEN 10 DAY READER misc USE AS DIRECTED WITH FREESTYLE SENSORS    FREESTYLE NASREEN 10 DAY SENSOR kit USE 1 SENSOR EVERY 10 DAYS AS DIRECTED    cholecalciferol, vitamin D3, (VITAMIN D3) 2,000 unit tab Take 4,000 Units by mouth daily.  ONETOUCH ULTRA TEST strip FOR BLOOD GLUCOSE MONITORING 4 X DAILY. .02     No current facility-administered medications for this visit. Allergies   Allergen Reactions    Codeine Nausea and Vomiting     Review of Systems:  - Eyes: no blurry vision or double vision  - Cardiovascular: no chest pain  - Respiratory: no shortness of breath  - Musculoskeletal: no myalgias  - Neurological: no numbness/tingling in extremities    Physical Examination:  Blood pressure 125/78, pulse 88, height 5' 3\" (1.6 m), weight 276 lb 3.2 oz (125.3 kg), unknown if currently breastfeeding.  - General: pleasant, no distress, good eye contact   - Neck: no carotid bruits  - Cardiovascular: regular, normal rate, nl s1 and s2, no m/r/g,   - Respiratory: clear bilaterally  - Integumentary: trace non-pitting edema,   - Psychiatric: normal mood and affect    Data Reviewed:   Component      Latest Ref Rng & Units 2/21/2019           9:15 PM   Hemoglobin A1c, (calculated)      4.2 - 6.3 % 7.1 (H)   Est. average glucose      mg/dL 157       Assessment/Plan:     1) Type 1 DM uncontrolled: Her most recent Hgb A1c was 7.1% in 2/19 stable from 1/19 stable from 12/18 down from 7.2% in 11/18 up from 6.8% in 10/18 down from 7.5% in 9/18 down from 8.2% in 6/18 up from 8% in 1/18 (she was at [facility] from 11/12 to 1/18 and states A1c values were in the 7-8% range) down from 8.8% in January 2012 stable from 8.9% in September 2011 up from 7.5% in May 2010. She is having more lows since delivery and due to breastfeeding so will cut back on her tresiba.   - decrease tresiba to 34 units in the morning  - Take Novolog 1:6 for CHO ratio for meals  - Take Novolog 1:50 for > 150 during the day   - Check bs 4x/day due to fluctuating sugars  - cont freestyle cassi  - optho UTD 7/17  - foot exam done 2/18  - microalbumin nl 2/18  - LDL 85 in 1/18  - check Hgb A1c, cmp, lipids and microalbumin prior to next visit        2) HTN NOS (401.9): her BP was at goal < 140/90 but will try cutting back on nifedipine to help with swelling and hopefully can get by with less hctz too.  - decrease hctz to 25 mg once daily  - decrease procardia 60 mg once daily    3) Abnormal thyroid blood test: her TSH was slightly low at 0.293 in September 2011. Clinically no symptoms of hyperthyroidism and repeat was normal at 0.453 in January 2012 and 0.66 in 1/18 and 0.70 in 10/18  - repeat TSH prior to next visit      4) Vitamin D deficiency: Level was 11.1 in 9/11. Started on ergo at that time and level up to 24 in January 2012. Still 25.9 in 2/18 so advised to take 4000 units daily but has not been doing this and level was 27.4 in 10/18  - check Vitamin D 25-OH level prior to next visit  - cont vitamin D 4000 units daily      Patient Instructions   1) Starting tomorrow, decrease your tresiba to 34 units to see if this helps with lows overnight and during the day while breastfeeding. Goal is to wake up between . If still having lows over night or during the day under 80 more than 2 times a week, further decrease tresiba by 2 units as needed. 2) If you start having lows just after eating, then your novolog is too much and try 1:8 for food. 3) Decrease the nifedipine back to just 1 tab in the morning only and try taking just 1 tab of hctz (fluid pill) in the morning only. If you have certain days where you are having more swelling, then you can take the 2nd dose in the afternoon or evening. Follow-up Disposition:  Return for 6/25/19 at 9:10am.    Copy sent to:   Murphy Gruber NP as PCP - General (Nurse Practitioner)  Armando Alanis MD (Obstetrics & Gynecology)  Kaylee Yeh MD (Surgical Oncology)  Raman Garcia MD (Hematology and Oncology)

## 2019-03-14 NOTE — LETTER
6/3/2019 6:23 PM 
 
Ms. [address] [facility] [id number] Please go to [facility] and have your labs repeated sometime in the 1-2 weeks prior to your upcoming visit with me. Gema Needy to allow at least 2 days at the minimum to ensure I get the results in time for your visit. Annmarie dhillon is already in their system and be sure to ask to have labs drawn that are under Dr. Beka Leslie name. Fabian Lozada you have the actual lab order that I may have given you (check your glove compartment or other safe spot where you may keep your medical papers), please bring this to the lab just to be safe in case [facility]'s computer system is down. Freddie Luu will review the results at your follow up visit. Please fast for your labs.  Don't eat anything after midnight. Be prepared to give a urine sample to test for any effect of the diabetes on your kidneys.   
 
 
 
 
 
Sincerely, 
 
 
Jose Juan Loza MD

## 2019-03-18 ENCOUNTER — ANESTHESIA EVENT (OUTPATIENT)
Dept: LABOR AND DELIVERY | Age: 34
End: 2019-03-18

## 2019-03-18 ENCOUNTER — ANESTHESIA (OUTPATIENT)
Dept: LABOR AND DELIVERY | Age: 34
End: 2019-03-18

## 2019-03-19 ENCOUNTER — TELEPHONE (OUTPATIENT)
Dept: ENDOCRINOLOGY | Age: 34
End: 2019-03-19

## 2019-03-19 RX ORDER — FLASH GLUCOSE SCANNING READER
EACH MISCELLANEOUS
Qty: 1 EACH | Refills: 0 | Status: SHIPPED | OUTPATIENT
Start: 2019-03-19 | End: 2021-11-01

## 2019-03-19 RX ORDER — INSULIN DEGLUDEC 100 U/ML
INJECTION, SOLUTION SUBCUTANEOUS
Qty: 30 ML | Refills: 11
Start: 2019-03-19 | End: 2019-06-25

## 2019-03-19 RX ORDER — INSULIN ASPART 100 [IU]/ML
INJECTION, SOLUTION INTRAVENOUS; SUBCUTANEOUS
Qty: 30 ML | Refills: 11
Start: 2019-03-19 | End: 2019-06-25

## 2019-03-19 RX ORDER — FLASH GLUCOSE SENSOR
KIT MISCELLANEOUS
Qty: 2 KIT | Refills: 11 | Status: SHIPPED | OUTPATIENT
Start: 2019-03-19 | End: 2020-03-17

## 2019-03-19 NOTE — TELEPHONE ENCOUNTER
Regarding: Prescription Question  Contact: [phone]  ----- Message from [address] [place] [address] [address], Generic sent at 3/19/2019  6:20 PM EDT -----    John Andrea,  I would like to change to the 14 day freestyle cassi. What do i need to do? Just recently I used up all my sensors within 12 days because of problems with one coming off and one not being detected or calibrated correctly. So I am currently using a meter.      Priti Garcia

## 2019-06-06 ENCOUNTER — TELEPHONE (OUTPATIENT)
Dept: PHARMACY | Age: 34
End: 2019-06-06

## 2019-06-11 ENCOUNTER — OFFICE VISIT (OUTPATIENT)
Dept: FAMILY MEDICINE CLINIC | Age: 34
End: 2019-06-11

## 2019-06-11 VITALS
OXYGEN SATURATION: 100 % | HEIGHT: 63 IN | BODY MASS INDEX: 47.48 KG/M2 | RESPIRATION RATE: 16 BRPM | WEIGHT: 268 LBS | TEMPERATURE: 97.5 F | SYSTOLIC BLOOD PRESSURE: 129 MMHG | DIASTOLIC BLOOD PRESSURE: 73 MMHG | HEART RATE: 79 BPM

## 2019-06-11 DIAGNOSIS — I10 ESSENTIAL HYPERTENSION, BENIGN: ICD-10-CM

## 2019-06-11 DIAGNOSIS — T45.1X5A CARDIOMYOPATHY DUE TO CHEMOTHERAPY (HCC): ICD-10-CM

## 2019-06-11 DIAGNOSIS — Z85.3 HISTORY OF BREAST CANCER: ICD-10-CM

## 2019-06-11 DIAGNOSIS — E10.9 TYPE 1 DIABETES MELLITUS WITHOUT COMPLICATION (HCC): ICD-10-CM

## 2019-06-11 DIAGNOSIS — G56.02 CARPAL TUNNEL SYNDROME ON LEFT: ICD-10-CM

## 2019-06-11 DIAGNOSIS — I42.7 CARDIOMYOPATHY DUE TO CHEMOTHERAPY (HCC): ICD-10-CM

## 2019-06-11 DIAGNOSIS — E66.01 MORBID OBESITY (HCC): Primary | ICD-10-CM

## 2019-06-11 RX ORDER — ACETAMINOPHEN AND CODEINE PHOSPHATE 120; 12 MG/5ML; MG/5ML
SOLUTION ORAL
COMMUNITY
End: 2020-01-08 | Stop reason: ALTCHOICE

## 2019-06-11 RX ORDER — CETIRIZINE HCL 10 MG
10 TABLET ORAL AS NEEDED
COMMUNITY
End: 2022-09-13

## 2019-06-11 NOTE — PATIENT INSTRUCTIONS
Carpal Tunnel Syndrome: Care Instructions  Your Care Instructions    Carpal tunnel syndrome is a nerve problem. It can cause tingling, numbness, weakness, or pain in the fingers, thumb, and hand. The median nerve and several tough tissues called tendons run through a space in the wrist called the carpal tunnel. The repeated hand motions used in work and some hobbies and sports can put pressure on the nerve. Pregnancy and several conditions, including diabetes, arthritis, and an underactive thyroid, also can cause carpal tunnel syndrome. You may be able to limit an activity or do it differently to reduce your symptoms. You also can take other steps to feel better. If your symptoms are mild, 1 to 2 weeks of home treatment are likely to ease your pain. Surgery is needed only if other treatments do not work. Follow-up care is a key part of your treatment and safety. Be sure to make and go to all appointments, and call your doctor if you are having problems. It's also a good idea to know your test results and keep a list of the medicines you take. How can you care for yourself at home? · If possible, stop or reduce the activity that causes your symptoms. If you cannot stop the activity, take frequent breaks to rest and stretch or change hand positions to do a task. Try switching hands, such as when using a computer mouse. · Try to avoid bending or twisting your wrists. · Ask your doctor if you can take an over-the-counter pain medicine, such as acetaminophen (Tylenol), ibuprofen (Advil, Motrin), or naproxen (Aleve). Be safe with medicines. Read and follow all instructions on the label. · If your doctor prescribes corticosteroid medicine to help reduce pain and swelling, take it exactly as prescribed. Call your doctor if you think you are having a problem with your medicine. · Put ice or a cold pack on your wrist for 10 to 20 minutes at a time to ease pain.  Put a thin cloth between the ice and your skin.  · If your doctor or your physical or occupational therapist tells you to wear a wrist splint, wear it as directed to keep your wrist in a neutral position. This also eases pressure on your median nerve. · Ask your doctor whether you should have physical or occupational therapy to learn how to do tasks differently. · Try a yoga class to stretch your muscles and build strength in your hands and wrists. Yoga has been shown to ease carpal tunnel symptoms. To prevent carpal tunnel  · When working at a computer, keep your hands and wrists in line with your forearms. Hold your elbows close to your sides. Take a break every 10 to 15 minutes. · Try these exercises:  ? Warm up: Rotate your wrist up, down, and from side to side. Repeat this 4 times. Stretch your fingers far apart, relax them, then stretch them again. Repeat 4 times. Stretch your thumb by pulling it back gently, holding it, and then releasing it. Repeat 4 times. ? Prayer stretch: Start with your palms together in front of your chest just below your chin. Slowly lower your hands toward your waistline while keeping your hands close to your stomach and your palms together until you feel a mild to moderate stretch under your forearms. Hold for 10 to 20 seconds. Repeat 4 times. ? Wrist flexor stretch: Hold your arm in front of you with your palm up. Bend your wrist, pointing your hand toward the floor. With your other hand, gently bend your wrist further until you feel a mild to moderate stretch in your forearm. Hold for 10 to 20 seconds. Repeat 4 times. ? Wrist extensor stretch: Repeat the steps for the wrist flexor stretch, but begin with your extended hand palm down. · Squeeze a rubber exercise ball several times a day to keep your hands and fingers strong. · Avoid holding objects (such as a book) in one position for a long time. When possible, use your whole hand to grasp an object.  Using just the thumb and index finger can put stress on the wrist.  · Do not smoke. It can make this condition worse by reducing blood flow to the median nerve. If you need help quitting, talk to your doctor about stop-smoking programs and medicines. These can increase your chances of quitting for good. When should you call for help? Watch closely for changes in your health, and be sure to contact your doctor if:    · Your pain or other problems do not get better with home care.     · You want more information about physical or occupational therapy.     · You have side effects of your corticosteroid medicine, such as:  ? Weight gain. ? Mood changes. ? Trouble sleeping. ? Bruising easily.     · You have any other problems with your medicine. Where can you learn more? Go to http://kelly-wili.info/. Enter R432 in the search box to learn more about \"Carpal Tunnel Syndrome: Care Instructions. \"  Current as of: September 20, 2018  Content Version: 11.9  © 9537-4267 [employer]. Care instructions adapted under license by [employer] (which disclaims liability or warranty for this information). If you have questions about a medical condition or this instruction, always ask your healthcare professional. [place] [address] any warranty or liability for your use of this information. Carpal Tunnel Syndrome: Exercises  Your Care Instructions  Here are some examples of typical rehabilitation exercises for your condition. Start each exercise slowly. Ease off the exercise if you start to have pain. Your doctor or your physical or occupational therapist will tell you when you can start these exercises and which ones will work best for you. Warm-up stretches  When you no longer have pain or numbness, you can do exercises to help prevent carpal tunnel syndrome from coming back. Do not do any stretch or movement that is uncomfortable or painful. 1. Rotate your wrist up, down, and from side to side.  Repeat 4 times.  2. Stretch your fingers far apart. Relax them, and then stretch them again. Repeat 4 times. 3. Stretch your thumb by pulling it back gently, holding it, and then releasing it. Repeat 4 times. How to do the exercises  Prayer stretch    1. Start with your palms together in front of your chest just below your chin. 2. Slowly lower your hands toward your waistline, keeping your hands close to your stomach and your palms together until you feel a mild to moderate stretch under your forearms. 3. Hold for at least 15 to 30 seconds. Repeat 2 to 4 times. Wrist flexor stretch    1. Extend your arm in front of you with your palm up. 2. Bend your wrist, pointing your hand toward the floor. 3. With your other hand, gently bend your wrist farther until you feel a mild to moderate stretch in your forearm. 4. Hold for at least 15 to 30 seconds. Repeat 2 to 4 times. Wrist extensor stretch    1. Repeat steps 1 through 4 of the stretch above, but begin with your extended hand palm down. Follow-up care is a key part of your treatment and safety. Be sure to make and go to all appointments, and call your doctor if you are having problems. It's also a good idea to know your test results and keep a list of the medicines you take. Where can you learn more? Go to http://kelly-wili.info/. Enter R418 in the search box to learn more about \"Carpal Tunnel Syndrome: Exercises. \"  Current as of: September 20, 2018  Content Version: 11.9  © 4611-6649 [employer]. Care instructions adapted under license by [employer] (which disclaims liability or warranty for this information). If you have questions about a medical condition or this instruction, always ask your healthcare professional. [place] [address] any warranty or liability for your use of this information.

## 2019-06-11 NOTE — PROGRESS NOTES
HISTORY OF PRESENT ILLNESS  Deepika Pedroza is a 37 y.o. female. HPI  Patient comes in today for weight management. Daughter was born 19 via , was taken a month early due to patient's hx of heart failure in past, HTN. Diabetes. Hx breast cancer in , had right breast mastectomy. Patient states she is interested in having right breast reconstruction. Does not have a date yet. Pt needs an Echo, controlled A1C, and BMI of 34 before she can have surgery. .  Followed by endocrinology for diabetes, last visit 3/14/19. Last A1c 7.1% on 19. Has appointment with Dr. Kiran Paul on 19  Hx of cardiomyopathy after chemotherapy. Needs to have updated echo prior to surgery  Allergies   Allergen Reactions    Codeine Nausea and Vomiting       Past Medical History:   Diagnosis Date    Breast cancer [facility]) 2012    Right breast infiltrating ductal carcinoma    Cardiomyopathy due to chemotherapy ([address]     EF 20 %    Essential hypertension     Gestational hypertension     History of sepsis 2013    after chemo    Hx of difficult intubation     resulting from sepsis in 2013.  Hypertension     Morbid obesity ([address]     Ne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 ([address]     Type I (juvenile type) diabetes mellitus without mention of complication, uncontrolled     diagnosed age 4    Unspecified vitamin D deficiency 2011       Past Surgical History:   Procedure Laterality Date    BREAST SURGERY PROCEDURE UNLISTED      R Breast Mastectomy    HX  SECTION      HX CYST INCISION AND DRAINAGE  2013    perirectal abscess    HX GYN       in     HX MASTECTOMY Right     Right MRM with sentinel LNB.  HX ORTHOPAEDIC Right     Carpal tunnel release, index finger trigger release.     HX OTHER SURGICAL      cyst removed under left arm    HX OTHER SURGICAL      port placement for chemo    HX WISDOM TEETH EXTRACTION  08/15/2018       Social History Socioeconomic History    Marital status:      Spouse name: Not on file    Number of children: Not on file    Years of education: Not on file    Highest education level: Not on file   Occupational History    Not on file   Social Needs    Financial resource strain: Not on file    Food insecurity:     Worry: Not on file     Inability: Not on file    Transportation needs:     Medical: Not on file     Non-medical: Not on file   Tobacco Use    Smoking status: Never Smoker    Smokeless tobacco: Never Used   Substance and Sexual Activity    Alcohol use: No    Drug use: No    Sexual activity: Yes     Partners: Male   Lifestyle    Physical activity:     Days per week: Not on file     Minutes per session: Not on file    Stress: Not on file   Relationships    Social connections:     Talks on phone: Not on file     Gets together: Not on file     Attends Taoist service: Not on file     Active member of club or organization: Not on file     Attends meetings of clubs or organizations: Not on file     Relationship status: Not on file    Intimate partner violence:     Fear of current or ex partner: Not on file     Emotionally abused: Not on file     Physically abused: Not on file     Forced sexual activity: Not on file   Other Topics Concern     Service Not Asked    Blood Transfusions Not Asked    Caffeine Concern Not Asked    Occupational Exposure Not Asked   Basia Kiran Hazards Not Asked    Sleep Concern Not Asked    Stress Concern Not Asked    Weight Concern Not Asked    Special Diet Not Asked    Back Care Not Asked    Exercise Not Asked    Bike Helmet Not Asked   [address] Not Asked    Self-Exams Not Asked   Social History Narrative    Lives in [facility] with her  and 8 yo son. Got  in July 2017. Currently in [address] [place] [address] for a masters in [employer].         Family History   Problem Relation Age of Onset    Diabetes Brother         borderline Type 2    Diabetes Paternal Uncle     Diabetes Paternal Grandfather     Heart Disease Paternal Grandfather         MI in his 62s    Hypertension Mother     Heart Disease Father     Stroke Neg Hx        Current Outpatient Medications   Medication Sig    cetirizine (ZYRTEC) 10 mg tablet Take 10 mg by mouth.  norethindrone (JOEY) 0.35 mg tab Take  by mouth.  FREESTYLE NASREEN 14 DAY READER misc Use as directed    FREESTYLE NASREEN 14 DAY SENSOR kit Use as directed every 14 days    insulin degludec (TRESIBA FLEXTOUCH U-100) 100 unit/mL (3 mL) inpn Inject 32 units once daily and increase as directed up to 100 units per day--Dose change 3/19/19--updated med list--did not send prescription to the pharmacy    NOVOLOG FLEXPEN U-100 INSULIN 100 unit/mL inpn Inject 1:8 for carbs + 1 unit for 50 > 150 for correction. Max 100 units per day now that she is pregnant--Dose change 3/19/19--updated med list--did not send prescription to the pharmacy (Patient taking differently: Inject 1:6 for carbs + 1 unit for 50 > 150 for correction. Max 100 units per day now that she is pregnant--Dose change 3/19/19--updated med list--did not send prescription to the pharmacy)    hydroCHLOROthiazide (HYDRODIURIL) 25 mg tablet TAKE 1 TABLET DAILY--MAY TAKE 2ND DOSE IF NEEDED IN PM    ibuprofen (MOTRIN) 800 mg tablet Take 1 Tab by mouth every eight (8) hours.  acetaminophen (TYLENOL EXTRA STRENGTH) 500 mg tablet Take 1,000 mg by mouth every six (6) hours as needed for Pain.  LANCE PEN NEEDLE 32 gauge x 5/32\" ndle USE AS DIRECTED TO INJECT INSULIN 4 TIMES DAILY    pediatric multivitamin no. 101 (KIDS' GUMMY PO) Take  by mouth.  NIFEdipine ER (PROCARDIA XL) 60 mg ER tablet Take 60 mg by mouth daily.  cholecalciferol, vitamin D3, (VITAMIN D3) 2,000 unit tab Take 4,000 Units by mouth daily.  ONETOUCH ULTRA TEST strip FOR BLOOD GLUCOSE MONITORING 4 X DAILY. .02     No current facility-administered medications for this visit.       Review of Systems   Constitutional: Negative for chills, diaphoresis, fever, malaise/fatigue and weight loss. HENT: Negative for congestion, ear pain, sore throat and tinnitus. Eyes: Negative for blurred vision and double vision. Respiratory: Negative for cough, sputum production, shortness of breath and wheezing. Cardiovascular: Negative for chest pain, palpitations and leg swelling. Gastrointestinal: Negative for abdominal pain, blood in stool, constipation, diarrhea, nausea and vomiting. Genitourinary: Negative for dysuria, flank pain, frequency, hematuria and urgency. Musculoskeletal: Positive for joint pain (bilateral wrist pain, left worse than right). Negative for back pain and myalgias. Skin: Negative for itching and rash. Neurological: Negative for dizziness, tingling, sensory change, speech change, focal weakness and headaches. Psychiatric/Behavioral: Negative for depression. The patient is not nervous/anxious and does not have insomnia. Vitals:    06/11/19 1013   BP: 129/73   Pulse: 79   Resp: 16   Temp: 97.5 °F (36.4 °C)   TempSrc: Oral   SpO2: 100%   Weight: 268 lb (121.6 kg)   Height: 5' 3\" (1.6 m)     Physical Exam   Constitutional: She is oriented to person, place, and time. Vital signs are normal. She appears well-developed and well-nourished. She is cooperative. HENT:   Right Ear: Hearing, tympanic membrane, external ear and ear canal normal.   Left Ear: Hearing, tympanic membrane, external ear and ear canal normal.   Nose: Nose normal.   Mouth/Throat: Uvula is midline, oropharynx is clear and moist and mucous membranes are normal.   Neck: No thyromegaly present. Cardiovascular: Normal rate, regular rhythm, S1 normal, S2 normal and normal heart sounds. No murmur heard. Pulses:       Dorsalis pedis pulses are 2+ on the right side, and 2+ on the left side. No edema noted   Pulmonary/Chest: Effort normal and breath sounds normal.   Abdominal: Soft.  Normal appearance and bowel sounds are normal. There is no hepatosplenomegaly. There is no tenderness. Musculoskeletal: Normal range of motion. Positive phalen's on left   Lymphadenopathy:     She has no cervical adenopathy. Right: No supraclavicular adenopathy present. Left: No supraclavicular adenopathy present. Neurological: She is alert and oriented to person, place, and time. Skin: Skin is warm, dry and intact. Psychiatric: She has a normal mood and affect. Her speech is normal and behavior is normal. Thought content normal.   Vitals reviewed. ASSESSMENT and PLAN    ICD-10-CM ICD-9-CM    1. Morbid obesity ([address] E66.01 278.01 REFERRAL TO NUTRITION      ECHO ADULT COMPLETE   2. Type 1 diabetes mellitus without complication (HCC) D19.4 250.01    3. History of breast cancer Z85.3 V10.3    4. Carpal tunnel syndrome on left G56.02 354.0 REFERRAL TO HAND SURGERY   5. Essential hypertension, benign I10 401.1    6. Cardiomyopathy due to chemotherapy ([address] I42.7 425.9     T45. 1X5A E933.1      Encounter Diagnoses   Name Primary?  Morbid obesity ([address] Yes    Type 1 diabetes mellitus without complication ([address]     History of breast cancer     Carpal tunnel syndrome on left     Essential hypertension, benign     Cardiomyopathy due to chemotherapy ([address]      Orders Placed This Encounter    REFERRAL TO NUTRITION    REFERRAL TO HAND SURGERY    cetirizine (ZYRTEC) 10 mg tablet    norethindrone (JOEY) 0.35 mg tab     Diagnoses and all orders for this visit:    1. Morbid obesity ([address] -   -     REFERRAL TO NUTRITION - patient to make appointment with Dr. Raad Courtney for medical weight loss. Patient informed I would not prescribe any weight loss medications at this time due to her breastfeeding. Encouraged use of [place] kye to help with weight loss, join weight watchers. Increase exercise. Follow up in 3 months.  -     ECHO ADULT COMPLETE; Future    2.  Type 1 diabetes mellitus without complication [facility]) - managed by endocrinology, has appt on 6/25/19    3. History of breast cancer - interested in breast reconstructive surgery, needs to have BMI <34, controlled diabetes and have echo on file    4. Carpal tunnel syndrome on left  -     REFERRAL TO HAND SURGERY    5. Essential hypertension, benign - stable    6. Cardiomyopathy due to chemotherapy [facility])            I have reviewed the patient's allergies and made any necessary changes. Medical, procedural, social and family histories have been reviewed and updated as medically indicated. I have reconciled and/or revised patient medications in the EMR. I have discussed each diagnosis listed in this note with Tonja Grant and/or their family. I have discussed treatment options and the risk/benefit analysis of those options, including safe use of medications and possible medication side effects. Through the use of shared decision making we have agreed to the above plan. The patient has received an after-visit summary and questions were answered concerning future plans. Ellen Ding, JORDANP-C    This note will not be viewable in [facility].

## 2019-06-11 NOTE — PROGRESS NOTES
Chief Complaint   Patient presents with    Weight Management     Pt having right breast reconstruction. Pt needs an Echo, controlled A1C, and BMI of 34 before she can have surgery. 1. Have you been to the ER, urgent care clinic since your last visit? Hospitalized since your last visit? No    2. Have you seen or consulted any other health care providers outside of the [address] [place] [address] since your last visit? Include any pap smears or colon screening.  No    Health Maintenance Due   Topic Date Due    PAP AKA CERVICAL CYTOLOGY  06/07/2014    LIPID PANEL Q1  01/31/2019    MICROALBUMIN Q1  02/20/2019    FOOT EXAM Q1  02/21/2019    EYE EXAM RETINAL OR DILATED  07/06/2019

## 2019-06-14 ENCOUNTER — HOSPITAL ENCOUNTER (EMERGENCY)
Age: 34
Discharge: HOME | End: 2019-06-15
Attending: EMERGENCY MEDICINE
Payer: COMMERCIAL

## 2019-06-14 DIAGNOSIS — K52.9 GASTROENTERITIS, ACUTE: Primary | ICD-10-CM

## 2019-06-14 DIAGNOSIS — E87.6 HYPOKALEMIA: ICD-10-CM

## 2019-06-14 LAB
BASOPHILS # BLD: 0 K/UL (ref 0–0.1)
BASOPHILS NFR BLD: 0 % (ref 0–1)
DIFFERENTIAL METHOD BLD: NORMAL
EOSINOPHIL # BLD: 0.1 K/UL (ref 0–0.4)
EOSINOPHIL NFR BLD: 2 % (ref 0–7)
ERYTHROCYTE [DISTWIDTH] IN BLOOD BY AUTOMATED COUNT: 14.2 % (ref 11.5–14.5)
HCT VFR BLD AUTO: 39.9 % (ref 35–47)
HGB BLD-MCNC: 12.6 G/DL (ref 11.5–16)
IMM GRANULOCYTES # BLD AUTO: 0 K/UL (ref 0–0.04)
IMM GRANULOCYTES NFR BLD AUTO: 0 % (ref 0–0.5)
LYMPHOCYTES # BLD: 2.9 K/UL (ref 0.8–3.5)
LYMPHOCYTES NFR BLD: 42 % (ref 12–49)
MCH RBC QN AUTO: 28 PG (ref 26–34)
MCHC RBC AUTO-ENTMCNC: 31.6 G/DL (ref 30–36.5)
MCV RBC AUTO: 88.7 FL (ref 80–99)
MONOCYTES # BLD: 0.7 K/UL (ref 0–1)
MONOCYTES NFR BLD: 10 % (ref 5–13)
NEUTS SEG # BLD: 3.2 K/UL (ref 1.8–8)
NEUTS SEG NFR BLD: 46 % (ref 32–75)
NRBC # BLD: 0 K/UL (ref 0–0.01)
NRBC BLD-RTO: 0 PER 100 WBC
PLATELET # BLD AUTO: 374 K/UL (ref 150–400)
PMV BLD AUTO: 10.1 FL (ref 8.9–12.9)
RBC # BLD AUTO: 4.5 M/UL (ref 3.8–5.2)
WBC # BLD AUTO: 6.9 K/UL (ref 3.6–11)

## 2019-06-14 PROCEDURE — 80053 COMPREHEN METABOLIC PANEL: CPT

## 2019-06-14 PROCEDURE — 99284 EMERGENCY DEPT VISIT MOD MDM: CPT

## 2019-06-14 PROCEDURE — 85025 COMPLETE CBC W/AUTO DIFF WBC: CPT

## 2019-06-14 PROCEDURE — 83735 ASSAY OF MAGNESIUM: CPT

## 2019-06-14 PROCEDURE — 74011250636 HC RX REV CODE- 250/636: Performed by: EMERGENCY MEDICINE

## 2019-06-14 PROCEDURE — 81001 URINALYSIS AUTO W/SCOPE: CPT

## 2019-06-14 PROCEDURE — 83690 ASSAY OF LIPASE: CPT

## 2019-06-14 PROCEDURE — 96374 THER/PROPH/DIAG INJ IV PUSH: CPT

## 2019-06-14 PROCEDURE — 84100 ASSAY OF PHOSPHORUS: CPT

## 2019-06-14 PROCEDURE — 36415 COLL VENOUS BLD VENIPUNCTURE: CPT

## 2019-06-14 PROCEDURE — 96361 HYDRATE IV INFUSION ADD-ON: CPT

## 2019-06-14 RX ORDER — ONDANSETRON 2 MG/ML
8 INJECTION INTRAMUSCULAR; INTRAVENOUS
Status: COMPLETED | OUTPATIENT
Start: 2019-06-14 | End: 2019-06-14

## 2019-06-14 RX ADMIN — SODIUM CHLORIDE 1000 ML: 900 INJECTION, SOLUTION INTRAVENOUS at 23:57

## 2019-06-14 RX ADMIN — ONDANSETRON 8 MG: 2 INJECTION INTRAMUSCULAR; INTRAVENOUS at 23:57

## 2019-06-15 VITALS
HEIGHT: 63 IN | TEMPERATURE: 98 F | OXYGEN SATURATION: 96 % | HEART RATE: 68 BPM | WEIGHT: 268 LBS | BODY MASS INDEX: 47.48 KG/M2 | RESPIRATION RATE: 16 BRPM | SYSTOLIC BLOOD PRESSURE: 137 MMHG | DIASTOLIC BLOOD PRESSURE: 88 MMHG

## 2019-06-15 LAB
ALBUMIN SERPL-MCNC: 3.3 G/DL (ref 3.5–5)
ALBUMIN/GLOB SERPL: 0.8 {RATIO} (ref 1.1–2.2)
ALP SERPL-CCNC: 115 U/L (ref 45–117)
ALT SERPL-CCNC: 24 U/L (ref 12–78)
ANION GAP SERPL CALC-SCNC: 1 MMOL/L (ref 5–15)
APPEARANCE UR: CLEAR
AST SERPL-CCNC: 21 U/L (ref 15–37)
BACTERIA URNS QL MICRO: NEGATIVE /HPF
BILIRUB SERPL-MCNC: 0.4 MG/DL (ref 0.2–1)
BILIRUB UR QL: NEGATIVE
BUN SERPL-MCNC: 8 MG/DL (ref 6–20)
BUN/CREAT SERPL: 10 (ref 12–20)
CALCIUM SERPL-MCNC: 8.1 MG/DL (ref 8.5–10.1)
CHLORIDE SERPL-SCNC: 108 MMOL/L (ref 97–108)
CO2 SERPL-SCNC: 30 MMOL/L (ref 21–32)
COLOR UR: ABNORMAL
CREAT SERPL-MCNC: 0.83 MG/DL (ref 0.55–1.02)
EPITH CASTS URNS QL MICRO: ABNORMAL /LPF
GLOBULIN SER CALC-MCNC: 4.4 G/DL (ref 2–4)
GLUCOSE SERPL-MCNC: 138 MG/DL (ref 65–100)
GLUCOSE UR STRIP.AUTO-MCNC: NEGATIVE MG/DL
HCG UR QL: NEGATIVE
HGB UR QL STRIP: NEGATIVE
HYALINE CASTS URNS QL MICRO: ABNORMAL /LPF (ref 0–5)
KETONES UR QL STRIP.AUTO: ABNORMAL MG/DL
LEUKOCYTE ESTERASE UR QL STRIP.AUTO: NEGATIVE
LIPASE SERPL-CCNC: 96 U/L (ref 73–393)
MAGNESIUM SERPL-MCNC: 2 MG/DL (ref 1.6–2.4)
NITRITE UR QL STRIP.AUTO: NEGATIVE
PH UR STRIP: 6 [PH] (ref 5–8)
PHOSPHATE SERPL-MCNC: 2.4 MG/DL (ref 2.6–4.7)
POTASSIUM SERPL-SCNC: 3.3 MMOL/L (ref 3.5–5.1)
PROT SERPL-MCNC: 7.7 G/DL (ref 6.4–8.2)
PROT UR STRIP-MCNC: 30 MG/DL
RBC #/AREA URNS HPF: ABNORMAL /HPF (ref 0–5)
SODIUM SERPL-SCNC: 139 MMOL/L (ref 136–145)
SP GR UR REFRACTOMETRY: >1.03 (ref 1–1.03)
UA: UC IF INDICATED,UAUC: ABNORMAL
UROBILINOGEN UR QL STRIP.AUTO: 1 EU/DL (ref 0.2–1)
WBC URNS QL MICRO: ABNORMAL /HPF (ref 0–4)

## 2019-06-15 PROCEDURE — 81025 URINE PREGNANCY TEST: CPT

## 2019-06-15 PROCEDURE — 74011250637 HC RX REV CODE- 250/637: Performed by: EMERGENCY MEDICINE

## 2019-06-15 RX ORDER — ONDANSETRON 8 MG/1
8 TABLET, ORALLY DISINTEGRATING ORAL
Qty: 10 TAB | Refills: 0 | Status: SHIPPED | OUTPATIENT
Start: 2019-06-15 | End: 2019-06-15

## 2019-06-15 RX ORDER — POTASSIUM CHLORIDE 750 MG/1
TABLET, FILM COATED, EXTENDED RELEASE ORAL
Status: DISCONTINUED
Start: 2019-06-15 | End: 2019-06-15 | Stop reason: HOSPADM

## 2019-06-15 RX ORDER — ONDANSETRON 8 MG/1
8 TABLET, ORALLY DISINTEGRATING ORAL
Qty: 10 TAB | Refills: 0 | Status: SHIPPED | OUTPATIENT
Start: 2019-06-15 | End: 2020-06-29 | Stop reason: ALTCHOICE

## 2019-06-15 RX ORDER — POTASSIUM CHLORIDE 750 MG/1
40 TABLET, FILM COATED, EXTENDED RELEASE ORAL
Status: COMPLETED | OUTPATIENT
Start: 2019-06-15 | End: 2019-06-15

## 2019-06-15 RX ORDER — POTASSIUM CHLORIDE 20 MEQ/1
20 TABLET, EXTENDED RELEASE ORAL DAILY
Qty: 10 TAB | Refills: 0 | Status: SHIPPED | OUTPATIENT
Start: 2019-06-15 | End: 2019-06-15

## 2019-06-15 RX ORDER — POTASSIUM CHLORIDE 20 MEQ/1
20 TABLET, EXTENDED RELEASE ORAL DAILY
Qty: 10 TAB | Refills: 0 | Status: SHIPPED | OUTPATIENT
Start: 2019-06-15 | End: 2019-06-25

## 2019-06-15 RX ADMIN — POTASSIUM CHLORIDE 40 MEQ: 750 TABLET, FILM COATED, EXTENDED RELEASE ORAL at 01:07

## 2019-06-15 NOTE — ED TRIAGE NOTES
\"I have been nauseous since last Wednesday and this Wednesday I started having diarrhea. It's been going on ever since then. \" Patient denies fever or specific abdominal pain. Denies recent antibiotic use.

## 2019-06-15 NOTE — DISCHARGE INSTRUCTIONS
Patient Education        Gastroenteritis: Care Instructions  Your Care Instructions    Gastroenteritis is an illness that may cause nausea, vomiting, and diarrhea. It is sometimes called \"stomach flu. \" It can be caused by bacteria or a virus. You will probably begin to feel better in 1 to 2 days. In the meantime, get plenty of rest and make sure you do not become dehydrated. Dehydration occurs when your body loses too much fluid. Follow-up care is a key part of your treatment and safety. Be sure to make and go to all appointments, and call your doctor if you are having problems. It's also a good idea to know your test results and keep a list of the medicines you take. How can you care for yourself at home? · If your doctor prescribed antibiotics, take them as directed. Do not stop taking them just because you feel better. You need to take the full course of antibiotics. · Drink plenty of fluids to prevent dehydration, enough so that your urine is light yellow or clear like water. Choose water and other caffeine-free clear liquids until you feel better. If you have kidney, heart, or liver disease and have to limit fluids, talk with your doctor before you increase your fluid intake. · Drink fluids slowly, in frequent, small amounts, because drinking too much too fast can cause vomiting. · Begin eating mild foods, such as dry toast, yogurt, applesauce, bananas, and rice. Avoid spicy, hot, or high-fat foods, and do not drink alcohol or caffeine for a day or two. Do not drink milk or eat ice cream until you are feeling better. How to prevent gastroenteritis  · Keep hot foods hot and cold foods cold. · Do not eat meats, dressings, salads, or other foods that have been kept at room temperature for more than 2 hours. · Use a thermometer to check your refrigerator. It should be between 34°F and 40°F.  · Defrost meats in the refrigerator or microwave, not on the kitchen counter.   · Keep your hands and your kitchen clean. Wash your hands, cutting boards, and countertops with hot soapy water frequently. · Cook meat until it is well done. · Do not eat raw eggs or uncooked sauces made with raw eggs. · Do not take chances. If food looks or tastes spoiled, throw it out. When should you call for help? Call 911 anytime you think you may need emergency care. For example, call if:    · You vomit blood or what looks like coffee grounds.     · You passed out (lost consciousness).     · You pass maroon or very bloody stools.    Call your doctor now or seek immediate medical care if:    · You have severe belly pain.     · You have signs of needing more fluids. You have sunken eyes, a dry mouth, and pass only a little dark urine.     · You feel like you are going to faint.     · You have increased belly pain that does not go away in 1 to 2 days.     · You have new or increased nausea, or you are vomiting.     · You have a new or higher fever.     · Your stools are black and tarlike or have streaks of blood.    Watch closely for changes in your health, and be sure to contact your doctor if:    · You are dizzy or lightheaded.     · You urinate less than usual, or your urine is dark yellow or brown.     · You do not feel better with each day that goes by. Where can you learn more? Go to http://kelly-wili.info/. Enter N142 in the search box to learn more about \"Gastroenteritis: Care Instructions. \"  Current as of: July 30, 2018  Content Version: 11.9  © 5555-7241 [employer]wise, Incorporated. Care instructions adapted under license by [employer] (which disclaims liability or warranty for this information). If you have questions about a medical condition or this instruction, always ask your healthcare professional. [place] [address] any warranty or liability for your use of this information.

## 2019-06-15 NOTE — ED PROVIDER NOTES
37 y.o. female with extensive past medical history, please see list, significant for DM I, CA (Breast), HTN, Cardiomyopathy (2nd to Chemo), Neuropathy (2nd to Chemo) and phsx of R Mastectomy and  who presents ambulatory to the ED with chief complaint of N/V, onset about a week ago, w/ associated diarrhea (onset 2 days ago), w/o resolution, prompting the visit to the ED tonight. Pt reports  2019, but denies abd pain, CP, SOB, fever/chills, BLE pain/swelling, and any known sick contacts with similar sx. There are no other acute medical concerns at this time. Negative Tobacco use; Negative EtOH use; Negative Illicit Drug Abuse       PCP: Sugar Minor NP    Note written by Delisa Vanessa, as dictated by Greta Jiménez MD 11:32 PM     The history is provided by the patient and medical records. No  was used. Past Medical History:   Diagnosis Date    Breast cancer [facility]) 2012    Right breast infiltrating ductal carcinoma    Cardiomyopathy due to chemotherapy ([address]     EF 20 %    Essential hypertension     Gestational hypertension     History of sepsis 2013    after chemo    Hx of difficult intubation     resulting from sepsis in 2013.  Hypertension     Morbid obesity ([address]     Ne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 ([address]     Type I (juvenile type) diabetes mellitus without mention of complication, uncontrolled     diagnosed age 4    Unspecified vitamin D deficiency 2011       Past Surgical History:   Procedure Laterality Date    BREAST SURGERY PROCEDURE UNLISTED      R Breast Mastectomy    HX  SECTION      HX CYST INCISION AND DRAINAGE  2013    perirectal abscess    HX GYN       in     HX MASTECTOMY Right     Right MRM with sentinel LNB.  HX ORTHOPAEDIC Right     Carpal tunnel release, index finger trigger release.     HX OTHER SURGICAL      cyst removed under left arm    HX OTHER SURGICAL      port placement for chemo    HX WISDOM TEETH EXTRACTION  08/15/2018         Family History:   Problem Relation Age of Onset    Diabetes Brother         borderline Type 2    Diabetes Paternal Uncle     Diabetes Paternal Grandfather     Heart Disease Paternal Grandfather         MI in his 62s    Hypertension Mother     Heart Disease Father     Stroke Neg Hx        Social History     Socioeconomic History    Marital status:      Spouse name: Not on file    Number of children: Not on file    Years of education: Not on file    Highest education level: Not on file   Occupational History    Not on file   Social Needs    Financial resource strain: Not on file    Food insecurity:     Worry: Not on file     Inability: Not on file    Transportation needs:     Medical: Not on file     Non-medical: Not on file   Tobacco Use    Smoking status: Never Smoker    Smokeless tobacco: Never Used   Substance and Sexual Activity    Alcohol use: No    Drug use: No    Sexual activity: Yes     Partners: Male   Lifestyle    Physical activity:     Days per week: Not on file     Minutes per session: Not on file    Stress: Not on file   Relationships    Social connections:     Talks on phone: Not on file     Gets together: Not on file     Attends Spiritism service: Not on file     Active member of club or organization: Not on file     Attends meetings of clubs or organizations: Not on file     Relationship status: Not on file    Intimate partner violence:     Fear of current or ex partner: Not on file     Emotionally abused: Not on file     Physically abused: Not on file     Forced sexual activity: Not on file   Other Topics Concern   [address] Service Not Asked    Blood Transfusions Not Asked    Caffeine Concern Not Asked    Occupational Exposure Not Asked    Hobby Hazards Not Asked    Sleep Concern Not Asked    Stress Concern Not Asked    Weight Concern Not Asked    Special Diet Not Asked  Back Care Not Asked    Exercise Not Asked    Bike Helmet Not Asked   [address] [facility] Not Asked    Self-Exams Not Asked   Social History Narrative    Lives in [place] with her  and 10 yo son. Got  in July 2017. Currently in [address] [place] [address] for a masters in [facility] [facility]. ALLERGIES: Codeine    Review of Systems   Constitutional: Negative for chills and fever. Respiratory: Negative for shortness of breath. Cardiovascular: Negative for chest pain and leg swelling. Gastrointestinal: Positive for diarrhea, nausea and vomiting. Negative for abdominal pain. Musculoskeletal: Negative for back pain. All other systems reviewed and are negative. Vitals:    06/14/19 2247   BP: 126/80   Pulse: 68   Resp: 16   Temp: 98 °F (36.7 °C)   SpO2: 99%   Weight: 121.6 kg (268 lb)   Height: 5' 3\" (1.6 m)            Physical Exam   Nursing note and vitals reviewed. CONSTITUTIONAL: Well-appearing; well-nourished; in no apparent distress  HEAD: Normocephalic; atraumatic  EYES: PERRL; EOM intact; conjunctiva and sclera are clear bilaterally. ENT: No rhinorrhea; normal pharynx with no tonsillar hypertrophy; mucous membranes pink/moist, no erythema, no exudate. NECK: Supple; non-tender; no cervical lymphadenopathy  CARD: Normal S1, S2; no murmurs, rubs, or gallops. Regular rate and rhythm. RESP: Normal respiratory effort; breath sounds clear and equal bilaterally; no wheezes, rhonchi, or rales. ABD: Normal bowel sounds; non-distended; non-tender; no palpable organomegaly, no masses, no bruits. Back Exam: Normal inspection; no vertebral point tenderness, no CVA tenderness. Normal range of motion. EXT: Normal ROM in all four extremities; non-tender to palpation; no swelling or deformity; distal pulses are normal, no edema. SKIN: Warm; dry; no rash.   NEURO:Alert and oriented x 3, coherent, MATTHEW-XII grossly intact, sensory and motor are non-focal.        MDM  Number of Diagnoses or Management Options  Gastroenteritis, acute:   Hypokalemia:   Diagnosis management comments: Assessment: Acute gastroenteritis and dehydration - rule out electrolyte abnormality/ dehydration      Plan: Lab/ IV fluid/ serial exam/ antiemetic/ p.o. challenge/ Monitor and Reevaluate. Amount and/or Complexity of Data Reviewed  Clinical lab tests: ordered and reviewed  Tests in the radiology section of CPT®: ordered and reviewed  Tests in the medicine section of CPT®: reviewed and ordered  Discussion of test results with the performing providers: yes  Decide to obtain previous medical records or to obtain history from someone other than the patient: yes  Obtain history from someone other than the patient: yes  Review and summarize past medical records: yes  Discuss the patient with other providers: yes  Independent visualization of images, tracings, or specimens: yes    Risk of Complications, Morbidity, and/or Mortality  Presenting problems: moderate  Diagnostic procedures: moderate  Management options: moderate           Procedures     Progress Note:   Pt has been reexamined by Kirsten Myers MD. Pt is feeling much better. Symptoms have improved. All available results have been reviewed with pt and any available family. Pt understands sx, dx, and tx in ED. Care plan has been outlined and questions have been answered. Pt is ready to go home. Will send home on Gastroenteritis and hypokalemia instructions. Oral rehydration Education. Prescription for K-Dur and Zofran. Outpatient referral with PCP as needed. Written by Kirsten Myers MD,7:54 AM    .   .

## 2019-06-15 NOTE — ED TRIAGE NOTES
Patient reports having a baby in February and menses returned in April. She takes a low Estrogen birth control pill but states she has missed a few of them. She has not had bleeding since menses in April. She is currently breastfeeding.

## 2019-06-16 RX ORDER — HYDROCORTISONE 1 %
CREAM (GRAM) TOPICAL
Qty: 56 G | Refills: 2 | Status: SHIPPED | OUTPATIENT
Start: 2019-06-16 | End: 2020-04-13

## 2019-06-17 ENCOUNTER — TELEPHONE (OUTPATIENT)
Dept: FAMILY MEDICINE CLINIC | Age: 34
End: 2019-06-17

## 2019-06-17 NOTE — TELEPHONE ENCOUNTER
Left Message for patient to return call to office. Pt scheduled with Dr. Donn John 6/25/2019 at 10 AM. D:[phone]  Address: [address] [place] [address] Dr. Laura Jama  [address]

## 2019-06-21 ENCOUNTER — TELEPHONE (OUTPATIENT)
Dept: ENDOCRINOLOGY | Age: 34
End: 2019-06-21

## 2019-06-21 LAB
25(OH)D3+25(OH)D2 SERPL-MCNC: 47.5 NG/ML (ref 30–100)
ALBUMIN SERPL-MCNC: 3.8 G/DL (ref 3.5–5.5)
ALBUMIN/CREAT UR: 8.4 MG/G CREAT (ref 0–30)
ALBUMIN/GLOB SERPL: 1.2 {RATIO} (ref 1.2–2.2)
ALP SERPL-CCNC: 102 IU/L (ref 39–117)
ALT SERPL-CCNC: 14 IU/L (ref 0–32)
AST SERPL-CCNC: 14 IU/L (ref 0–40)
BILIRUB SERPL-MCNC: <0.2 MG/DL (ref 0–1.2)
BUN SERPL-MCNC: 7 MG/DL (ref 6–20)
BUN/CREAT SERPL: 9 (ref 9–23)
CALCIUM SERPL-MCNC: 9.3 MG/DL (ref 8.7–10.2)
CHLORIDE SERPL-SCNC: 103 MMOL/L (ref 96–106)
CHOLEST SERPL-MCNC: 128 MG/DL (ref 100–199)
CO2 SERPL-SCNC: 26 MMOL/L (ref 20–29)
CREAT SERPL-MCNC: 0.76 MG/DL (ref 0.57–1)
CREAT UR-MCNC: 230.8 MG/DL
EST. AVERAGE GLUCOSE BLD GHB EST-MCNC: 177 MG/DL
GLOBULIN SER CALC-MCNC: 3.1 G/DL (ref 1.5–4.5)
GLUCOSE SERPL-MCNC: 160 MG/DL (ref 65–99)
HBA1C MFR BLD: 7.8 % (ref 4.8–5.6)
HDLC SERPL-MCNC: 55 MG/DL
INTERPRETATION, 910389: NORMAL
LDLC SERPL CALC-MCNC: 65 MG/DL (ref 0–99)
Lab: NORMAL
MICROALBUMIN UR-MCNC: 19.3 UG/ML
POTASSIUM SERPL-SCNC: 4.5 MMOL/L (ref 3.5–5.2)
PROT SERPL-MCNC: 6.9 G/DL (ref 6–8.5)
SODIUM SERPL-SCNC: 141 MMOL/L (ref 134–144)
TRIGL SERPL-MCNC: 38 MG/DL (ref 0–149)
TSH SERPL DL<=0.005 MIU/L-ACNC: 0.54 UIU/ML (ref 0.45–4.5)
VLDLC SERPL CALC-MCNC: 8 MG/DL (ref 5–40)

## 2019-06-21 NOTE — TELEPHONE ENCOUNTER
I sent her a [place] message yesterday that she hasn't read:    I received a fax that a prior authorization is needed for your freestyle cassi 14 day sensors.  Is this true? Joyce Jones you not been able to get these since I wrote for them in March or has something changed with your insurance? Hill Brightly me know when you have a chance.

## 2019-06-21 NOTE — TELEPHONE ENCOUNTER
I spoke with patient and she stated that her insurance changed in May  to [address]. She stated she will e-mail the data from her new insurance card.

## 2019-06-21 NOTE — TELEPHONE ENCOUNTER
Calling to check on prior auth for [facility] 14 day reader. It was faxed on yesterday she will be faxed again today. Please advise.

## 2019-06-22 NOTE — TELEPHONE ENCOUNTER
Regarding: RE:freestyle cassi  Contact: [phone]  ----- Message from [address] [place] [address] 951, [facility] sent at 6/21/2019 11:04 PM EDT -----    Yes my insurance has changed. I have [place]. Elizabeth instructed me to provide my information. Member ID: [id number]   [address] [place] [address] Road number: [id number]  Group Number: [id number]  BC/BS Plan: [id number]  RxBIN: [id number]  RxPCN: [id number]  RxGRP: [id number]  Member services # [id number]  Provider Services # [id number]    [address] [place] [address]. [address] [facility]    Let me know if she needs any other info. Thanks,   Karl Rausch  ----- Message -----  From: Helena Pradhan MD  Sent: 6/20/2019  7:06 PM EDT  To: Katy Cai  Subject: freestyle cassi  I received a fax that a prior authorization is needed for your freestyle cassi 14 day sensors. Is this true? Have you not been able to get these since I wrote for them in March or has something changed with your insurance? Let me know when you have a chance.

## 2019-06-24 NOTE — TELEPHONE ENCOUNTER
Patient has [facility] which needs to be called to do prior auth  I confirmed that she has an active account by calling and speaking with Tony Arteaga.

## 2019-06-25 ENCOUNTER — OFFICE VISIT (OUTPATIENT)
Dept: ENDOCRINOLOGY | Age: 34
End: 2019-06-25

## 2019-06-25 VITALS
HEART RATE: 78 BPM | BODY MASS INDEX: 46.35 KG/M2 | WEIGHT: 261.6 LBS | SYSTOLIC BLOOD PRESSURE: 128 MMHG | RESPIRATION RATE: 18 BRPM | DIASTOLIC BLOOD PRESSURE: 76 MMHG | OXYGEN SATURATION: 98 % | HEIGHT: 63 IN

## 2019-06-25 DIAGNOSIS — E10.65 UNCONTROLLED TYPE 1 DIABETES MELLITUS WITH HYPERGLYCEMIA (HCC): Primary | ICD-10-CM

## 2019-06-25 DIAGNOSIS — I10 ESSENTIAL HYPERTENSION, BENIGN: ICD-10-CM

## 2019-06-25 DIAGNOSIS — R79.89 ABNORMAL THYROID BLOOD TEST: ICD-10-CM

## 2019-06-25 DIAGNOSIS — E55.9 VITAMIN D DEFICIENCY: ICD-10-CM

## 2019-06-25 RX ORDER — INSULIN ASPART 100 [IU]/ML
INJECTION, SOLUTION INTRAVENOUS; SUBCUTANEOUS
Qty: 30 ML | Refills: 11
Start: 2019-06-25 | End: 2019-12-03

## 2019-06-25 RX ORDER — INSULIN ASPART 100 [IU]/ML
INJECTION, SOLUTION INTRAVENOUS; SUBCUTANEOUS
Qty: 30 ML | Refills: 11
Start: 2019-06-25 | End: 2019-06-25

## 2019-06-25 RX ORDER — INSULIN DEGLUDEC 100 U/ML
INJECTION, SOLUTION SUBCUTANEOUS
Qty: 30 ML | Refills: 11
Start: 2019-06-25 | End: 2019-12-03

## 2019-06-25 NOTE — PROGRESS NOTES
Jackelyn Kaye is a 37 y.o. female     Chief Complaint   Patient presents with    Diabetes     3 month follow up    Hypertension     3 month follow up       Visit Vitals  /76 (BP 1 Location: Left arm, BP Patient Position: Sitting)   Pulse 78   Resp 18   Ht 5' 3\" (1.6 m)   Wt 261 lb 9.6 oz (118.7 kg)   LMP 06/16/2019   SpO2 98%   BMI 46.34 kg/m²       Health Maintenance Due   Topic Date Due    PAP AKA CERVICAL CYTOLOGY  06/07/2014    FOOT EXAM Q1  02/21/2019    EYE EXAM RETINAL OR DILATED  07/06/2019       1. Have you been to the ER, urgent care clinic since your last visit? Hospitalized since your last visit? Yes seen at [facility] on 6/14/19 for nausea and diarrhea. 2. Have you seen or consulted any other health care providers outside of the [address] [place] [address] since your last visit? Include any pap smears or colon screening.  No

## 2019-06-25 NOTE — TELEPHONE ENCOUNTER
**LATE ENTRY--PHONE CALL TOOK PLACE ON 6/24/19 AT 7:25PM**    I spoke with Kalee Monday ELADIA in the [facility] PA department and initiated a PA for the freestyle cassi sensors. I was told I would hear a response in 24 hours. Reference # [id number].

## 2019-06-25 NOTE — PATIENT INSTRUCTIONS
1) Dose your novolog at 1:8 for carbs to see if this helps with lows after breastfeeding. 2) Your liver and kidney and urine and thyroid and vitamin D are normal and your blood pressure is at goal.    3) We will just draw your A1c at your next visit.

## 2019-06-25 NOTE — PROGRESS NOTES
Chief Complaint   Patient presents with    Diabetes     3 month follow up    Hypertension     3 month follow up     History of Present Illness: Marleny Hill is a 37 y.o. female here for follow up of diabetes. Weight down 15 lbs since last visit in 3/19. During the month of June has not had a sensor in place as the one she inserted only lasted a couple days and her insurance just changed and I am awaiting a PA for the sensors at this time. She has been taking 34 units of tresiba per day and has been dosing her novolog at 1:6 for carbs and over the past week, her sugars have been more stable but still needs to eat a snack to prevent lows after breast feeding. Fasting sugars are in the  range. Has been on the lower dose of nifedipine and blood pressure is controlled and was able to come off the hctz. Will need breast reconstruction surgery in the future but needs to lose more weight and have her A1c better controlled. Current Outpatient Medications   Medication Sig    multivit,calc,mins/iron/folic (ONE-A-DAY WOMENS FORMULA PO) Take  by mouth daily.  insulin degludec (TRESIBA FLEXTOUCH U-100) 100 unit/mL (3 mL) inpn Inject 34 units once daily and increase as directed up to 100 units per day--Dose change 6/25/19--updated med list--did not send prescription to the pharmacy    NOVOLOG FLEXPEN U-100 INSULIN 100 unit/mL (3 mL) inpn Inject 1:6 for carbs + 1 unit for 50 > 150 for correction. Max 100 units per day now that she is pregnant--Dose change 3/19/19--updated med list--did not send prescription to the pharmacy    hydrocortisone (CORTAID) 1 % topical cream APPLY TO THE EFFECTED AREA 2 TIMES A DAY. USE THIN LAYER TWICE A DAY.  ondansetron (ZOFRAN ODT) 8 mg disintegrating tablet Take 1 Tab by mouth every eight (8) hours as needed for Nausea.  cetirizine (ZYRTEC) 10 mg tablet Take 10 mg by mouth.  norethindrone (JOEY) 0.35 mg tab Take  by mouth.     ibuprofen (MOTRIN) 800 mg tablet Take 1 Tab by mouth every eight (8) hours.  LANCE PEN NEEDLE 32 gauge x 5/32\" ndle USE AS DIRECTED TO INJECT INSULIN 4 TIMES DAILY    NIFEdipine ER (PROCARDIA XL) 60 mg ER tablet Take 60 mg by mouth daily.  cholecalciferol, vitamin D3, (VITAMIN D3) 2,000 unit tab Take 4,000 Units by mouth daily.  ONETOUCH ULTRA TEST strip FOR BLOOD GLUCOSE MONITORING 4 X DAILY. .02    FREESTYLE NASREEN 14 DAY READER misc Use as directed    FREESTYLE NASREEN 14 DAY SENSOR kit Use as directed every 14 days     No current facility-administered medications for this visit. Allergies   Allergen Reactions    Codeine Nausea and Vomiting     Review of Systems:  - Eyes: no blurry vision or double vision  - Cardiovascular: no chest pain  - Respiratory: no shortness of breath  - Musculoskeletal: no myalgias  - Neurological: no numbness/tingling in extremities    Physical Examination:  Blood pressure 128/76, pulse 78, resp.  rate 18, height 5' 3\" (1.6 m), weight 261 lb 9.6 oz (118.7 kg), last menstrual period 06/16/2019, SpO2 98 %, currently breastfeeding.  - General: pleasant, no distress, good eye contact   - Neck: no carotid bruits  - Cardiovascular: regular, normal rate, nl s1 and s2, no m/r/g,   - Respiratory: clear bilaterally  - Integumentary: no edema,   - Psychiatric: normal mood and affect    Diabetic foot exam:     Left Foot:   Visual Exam: normal    Pulse DP: 2+ (normal)   Filament test: normal sensation    Vibratory sensation: normal      Right Foot:   Visual Exam: normal    Pulse DP: 2+ (normal)   Filament test: normal sensation    Vibratory sensation: normal        Data Reviewed:   Component      Latest Ref Rng & Units 6/20/2019 6/20/2019 6/20/2019 6/20/2019           9:21 AM  9:21 AM  9:21 AM  9:21 AM   Glucose      65 - 99 mg/dL  160 (H)     BUN      6 - 20 mg/dL  7     Creatinine      0.57 - 1.00 mg/dL  0.76     GFR est non-AA      >59 mL/min/1.73  101     GFR est AA      >59 mL/min/1.73  117 BUN/Creatinine ratio      9 - 23  9     Sodium      134 - 144 mmol/L  141     Potassium      3.5 - 5.2 mmol/L  4.5     Chloride      96 - 106 mmol/L  103     CO2      20 - 29 mmol/L  26     Calcium      8.7 - 10.2 mg/dL  9.3     Protein, total      6.0 - 8.5 g/dL  6.9     Albumin      3.5 - 5.5 g/dL  3.8     GLOBULIN, TOTAL      1.5 - 4.5 g/dL  3.1     A-G Ratio      1.2 - 2.2  1.2     Bilirubin, total      0.0 - 1.2 mg/dL  <0.2     Alk. phosphatase      39 - 117 IU/L  102     AST      0 - 40 IU/L  14     ALT (SGPT)      0 - 32 IU/L  14     Cholesterol, total      100 - 199 mg/dL 128      Triglyceride      0 - 149 mg/dL 38      HDL Cholesterol      >39 mg/dL 55      VLDL, calculated      5 - 40 mg/dL 8      LDL, calculated      0 - 99 mg/dL 65      Creatinine, urine      Not Estab. mg/dL    230.8   Microalbumin, urine      Not Estab. ug/mL    19.3   Microalbumin/Creat. Ratio      0.0 - 30.0 mg/g creat    8.4   VITAMIN D, 25-HYDROXY      30.0 - 100.0 ng/mL   47.5      Component      Latest Ref Rng & Units 6/20/2019 6/20/2019           9:21 AM  9:21 AM   Hemoglobin A1c, (calculated)      4.8 - 5.6 % 7.8 (H)    Estimated average glucose      mg/dL 177    TSH      0.450 - 4.500 uIU/mL  0.542       Assessment/Plan:     1) Type 1 DM uncontrolled: Her most recent Hgb A1c was 7.8% in 6/19 up from 7.1% in 2/19 stable from 1/19 stable from 12/18 down from 7.2% in 11/18 up from 6.8% in 10/18 down from 7.5% in 9/18 down from 8.2% in 6/18 up from 8% in 1/18 (she was at [facility] from 11/12 to 1/18 and states A1c values were in the 7-8% range) down from 8.8% in January 2012 stable from 8.9% in September 2011 up from 7.5% in May 2010.   She is having more lows due to breastfeeding so will dose novolog less aggressively  - cont tresiba 34 units in the morning  - Take Novolog 1:8 for CHO ratio for meals  - Take Novolog 1:50 for > 150 during the day   - Check bs 4x/day due to fluctuating sugars  - cont freestyle cassi  - rosa UTD 7/17  - foot exam done 6/19  - microalbumin nl 6/19  - LDL 65 6/19  - check Hgb A1c, cmp, lipids and microalbumin in 6/20  - check POC A1c at next visit        2) HTN NOS (401.9): her BP was at goal < 140/90   - cont procardia 60 mg once daily    3) Abnormal thyroid blood test: her TSH was slightly low at 0.293 in September 2011. Clinically no symptoms of hyperthyroidism and repeat was normal at 0.453 in January 2012 and 0.66 in 1/18 and 0.70 in 10/18 and 0.54 in 6/19  - will not continue to follow in the future    4) Vitamin D deficiency: Level was 11.1 in 9/11. Started on ergo at that time and level up to 24 in January 2012. Still 25.9 in 2/18 so advised to take 4000 units daily but has not been doing this and level was 27.4 in 10/18 and 47 in 6/19  - check Vitamin D 25-OH level in 6/20  - cont vitamin D 4000 units daily    Patient Instructions   1) Dose your novolog at 1:8 for carbs to see if this helps with lows after breastfeeding. 2) Your liver and kidney and urine and thyroid and vitamin D are normal and your blood pressure is at goal.    3) We will just draw your A1c at your next visit. Follow-up and Dispositions    · Return in about 5 months (around 11/25/2019). Copy sent to:   Johnny Tian NP as PCP - General (Nurse Practitioner)  Margarita Sellers MD (Obstetrics & Gynecology)  Lisbeth Cooney MD (Surgical Oncology)  Creighton Severin, MD (Hematology and Oncology)

## 2019-06-26 NOTE — TELEPHONE ENCOUNTER
Sent her the following message through [employer]:    I submitted an authorization for the freestyle cassi sensors and received notification that this has been approved so you should be able to go to the pharmacy to pick this med up. Please let me know if you have any trouble getting this filled.

## 2019-07-23 NOTE — ROUTINE PROCESS
sbar report from DAVION Castaneda RN  BS 71 4oz juice given BS rechecked in 15 min 75 4 oz juice given BS rechecked 112  Dr Shonna Varela called.  Diabetic treatment center came  Agreed by both to hold lantis insulin today, and cover with S/S insulin  Pt tolerating diet well today with out N&V. Cystoscopy was performed and both ureters were visualized to be pulsing urine. The bladder wall was intact and normal     She was sent to the recovery room in good condition. Instrument, sponge, and needle counts were correct prior to  closure and at the conclusion of the case.

## 2019-08-28 ENCOUNTER — OFFICE VISIT (OUTPATIENT)
Dept: FAMILY MEDICINE CLINIC | Age: 34
End: 2019-08-28

## 2019-08-28 VITALS
BODY MASS INDEX: 47.45 KG/M2 | DIASTOLIC BLOOD PRESSURE: 87 MMHG | SYSTOLIC BLOOD PRESSURE: 143 MMHG | OXYGEN SATURATION: 98 % | TEMPERATURE: 98.4 F | RESPIRATION RATE: 22 BRPM | HEART RATE: 76 BPM | WEIGHT: 267.8 LBS | HEIGHT: 63 IN

## 2019-08-28 DIAGNOSIS — R40.0 DAYTIME SOMNOLENCE: ICD-10-CM

## 2019-08-28 DIAGNOSIS — R06.83 SNORING: Primary | ICD-10-CM

## 2019-08-28 DIAGNOSIS — G47.8 UNREFRESHED BY SLEEP: ICD-10-CM

## 2019-08-28 DIAGNOSIS — E66.01 MORBID OBESITY WITH BMI OF 40.0-44.9, ADULT (HCC): ICD-10-CM

## 2019-08-28 NOTE — PROGRESS NOTES
Weight Loss Progress Note: Initial Physician Visit      Primitivo Donato is a 37 y.o. female  who is here for her Initial Evaluation for the medical bariatric care. CC: I want breast reconstruction  She had breast cancer 6 years ago  She wants reconstruction and they told her she needs to lose weight first    She takes 36 units of tresiba  Also has short acting  For each meal     Weight History  Current weight 267 and BMI is Body mass index is 47.44 kg/m². Goal weight 210 first goal  Highest weight   (See weight gain time line scanned into media section of chart)    Weight loss History  How many weight loss attempts have you had? Which program were you most successful doing? Significant Medical History    Have you ever taken appetite suppressants? no   If yes: Rx or OTC? If yes; Any negative side effects? Ever diagnosed with sleep apnea or put on CPAP no    Ever had bariatric surgery: no    Pregnant or planning on becoming pregnant w/in 6 months: no    *    Significant Psychosocial History   Any history of drug abuse or dependence: no  Current Major Lifestyle Changes: no  Any potential unsupportive people: no      History of binge eating disorder or anorexia : no   If yes, are you currently being treated no    Social History  Social History     Tobacco Use    Smoking status: Never Smoker    Smokeless tobacco: Never Used   Substance Use Topics    Alcohol use: No         Do you drink any EtOH?  no   If so, how much? Do you have upcoming any travel in the next 6 weeks?  no   If so, what do you have planned? Exercise  How many days a week do you currently exercise?  0 days  Have you ever been told by a physician not to exercise?  no      Objective  Visit Vitals  /87   Pulse 76   Temp 98.4 °F (36.9 °C) (Oral)   Resp 22   Ht 5' 3\" (1.6 m)   Wt 267 lb 12.8 oz (121.5 kg)   LMP 08/23/2019   SpO2 98%   Breastfeeding?  No   BMI 47.44 kg/m²       Waist Circumference: See Weight Management Doc Flowsheet  Neck Circumference: See Weight Management Doc Flowsheet  Percent Body Fat: See Weight Management Doc Flowsheet  Weight Metrics 8/28/2019 8/28/2019 6/25/2019 6/14/2019 6/11/2019 3/18/2019 3/14/2019   Weight - 267 lb 12.8 oz 261 lb 9.6 oz 268 lb 268 lb - 276 lb 3.2 oz   Neck Circ (inches) 15 - - - - - -   Waist Measure Inches 47 - - - - - -   BMI - 47.44 kg/m2 46.34 kg/m2 47.47 kg/m2 47.47 kg/m2 54.38 kg/m2 48.93 kg/m2         Labs: done June, redo after she decides if she is doing vlcd    Review of Systems  Complete ROS negative except where noted above      Physical Exam    Vital Signs Reviewed  Weight Management Metrics Reviewed    Vital Signs Reviewed  Appearance: Obese, A&O x 3, NAD  HEENT:  NC/AT, EOMI, PERRL, No scleral icterus, malampatti score:  Skin:    Skin tags - no   Acanthosis Nigricans - no  Neck:  No nodes, thyromegaly no  Heart:  RRR without M/R/G  Lungs:  CTAB, no rhonchi, rales, or wheezes with good air exchange   Abdomen:  Non-tender, pos bowel sounds; hepatomegaly - no  Ext:  No C/C/E        Assessment & Plan  Diagnoses and all orders for this visit:    1. Snoring  -     SLEEP MEDICINE REFERRAL    2. Unrefreshed by sleep  -     SLEEP MEDICINE REFERRAL    3. Daytime somnolence  -     SLEEP MEDICINE REFERRAL    4. Morbid obesity with BMI of 40.0-44.9, adult ([facility] [address]    Diet Plan: go to orientation tor vlcd      Activity Plan: depends on which plan she decides on, more exercise if LCD    Medication Plan no changes     Based on his history and exam, Primitivo Donato is a good candidate for the Non-[facility] Weight Loss Program           There are no Patient Instructions on file for this visit. Follow-up and Dispositions    · Return for orientation for vlcd. Over 50% of the 30 minutes face to face time with Dee consisted of counseling & coordinating and/or discussing treatment plans in reference to her obesity The primary encounter diagnosis was Snoring.  Diagnoses of Unrefreshed by sleep, Daytime somnolence, and Morbid obesity with BMI of 40.0-44.9, adult [facility]) were also pertinent to this visit.

## 2019-08-28 NOTE — PROGRESS NOTES
1. Have you been to the ER, urgent care clinic since your last visit? Hospitalized since your last visit? No    2. Have you seen or consulted any other health care providers outside of the [address] [place] [address] since your last visit? Include any pap smears or colon screening.  No\    Chief Complaint   Patient presents with    Weight Management     Body Weight: 267.8  Body Fat%: 46.7  Muscle Mass Weight: 47.1  Body Water Weight: 62.1  Basal Metabolic Rate: 2943  BMI: 47.44

## 2019-09-15 DIAGNOSIS — E10.9 TYPE 1 DIABETES MELLITUS WITHOUT COMPLICATION (HCC): Primary | ICD-10-CM

## 2019-09-16 NOTE — TELEPHONE ENCOUNTER
From: Preston Coles  Sent: 9/15/2019 7:51 AM EDT  Subject: Medication Renewal Request    Preston Coles would like a refill of the following medications: Other - one touch ultra strips- I need a new prescription from Dr. Delta Kulkarni. My freestyle sensor both came off and I have no other way to check my sugars.      Preferred pharmacy: [id number]- [address]

## 2019-09-27 DIAGNOSIS — E10.9 TYPE 1 DIABETES MELLITUS WITHOUT COMPLICATION (HCC): ICD-10-CM

## 2019-09-27 RX ORDER — BLOOD SUGAR DIAGNOSTIC
STRIP MISCELLANEOUS
Qty: 50 STRIP | Refills: 11 | Status: SHIPPED | OUTPATIENT
Start: 2019-09-27 | End: 2022-03-28

## 2019-09-27 NOTE — TELEPHONE ENCOUNTER
9/27/2019  10:16 AM      [facility] pharmacist called stating that a prior auth is needed for      [facility] ULTRA BLUE TEST STRIP strip    [id number]- [place], [address] [facility]      Thanks

## 2019-09-27 NOTE — TELEPHONE ENCOUNTER
Spoke with pt to make her aware that her test strip were stent to her pharmacy last week. Pt stated that she was aware of that, but when she went to  the rx she was told that the onetouch was no longer covered by her plan and that a prior auth was needed. She then stated that she only needed the test strips for when her cassi sensor fall off of her arm. Pt was made aware that PAs are generally done when pts are required to use a particular brand of test strips  for their insulin pumps. She was then made aware that he cassi device has test strips that she can use as well. Pt was made aware that the test strips can be purchased over the counter, but I will first see if they're  covered under her insurance plan.

## 2019-10-10 ENCOUNTER — TELEPHONE (OUTPATIENT)
Dept: ENDOCRINOLOGY | Age: 34
End: 2019-10-10

## 2019-10-15 ENCOUNTER — TELEPHONE (OUTPATIENT)
Dept: FAMILY MEDICINE CLINIC | Age: 34
End: 2019-10-15

## 2019-10-15 NOTE — TELEPHONE ENCOUNTER
----- Message from Cm Shannon sent at 10/15/2019  2:25 PM EDT -----  Regarding: [place] NP/Telephone  General Message/Vendor Calls    Caller's first and last name:      Reason for call: Pt has some paperwork that she would like to have completed by her PCP and wanted to know if there is a cost for this.        Callback required yes/no and why:Y      Best contact number(s): [phone]      Details to clarify the request:      Cm Shannon

## 2019-10-15 NOTE — TELEPHONE ENCOUNTER
Verified patient with two type of identifiers. Pt states had single page form for her [employer]. Provided pt with fax number to send form. Pt verbalized understanding.

## 2019-10-23 ENCOUNTER — OFFICE VISIT (OUTPATIENT)
Dept: FAMILY MEDICINE CLINIC | Age: 34
End: 2019-10-23

## 2019-10-23 VITALS
TEMPERATURE: 98 F | DIASTOLIC BLOOD PRESSURE: 88 MMHG | WEIGHT: 266.1 LBS | HEART RATE: 70 BPM | SYSTOLIC BLOOD PRESSURE: 140 MMHG | RESPIRATION RATE: 19 BRPM | OXYGEN SATURATION: 98 % | HEIGHT: 63 IN | BODY MASS INDEX: 47.15 KG/M2

## 2019-10-23 DIAGNOSIS — E10.9 TYPE 1 DIABETES MELLITUS WITHOUT COMPLICATION (HCC): Primary | ICD-10-CM

## 2019-10-23 DIAGNOSIS — Z13.220 SCREENING FOR HYPERLIPIDEMIA: ICD-10-CM

## 2019-10-23 DIAGNOSIS — Z85.3 HISTORY OF BREAST CANCER: ICD-10-CM

## 2019-10-23 DIAGNOSIS — I10 ESSENTIAL HYPERTENSION, BENIGN: ICD-10-CM

## 2019-10-23 DIAGNOSIS — Z13.6 SCREENING FOR ISCHEMIC HEART DISEASE: ICD-10-CM

## 2019-10-23 DIAGNOSIS — E66.01 MORBID OBESITY WITH BMI OF 40.0-44.9, ADULT (HCC): ICD-10-CM

## 2019-10-23 DIAGNOSIS — Z13.29 SCREENING FOR HYPOTHYROIDISM: ICD-10-CM

## 2019-10-23 NOTE — PROGRESS NOTES
[facility] Weight Loss Program Progress Note: Initial Physician Visit     Lori Jaramillo is a 37 y.o. female who is here for medical screening for entering the [facility] Weight Loss Program.     CC:Obesity      Weight History  I personally reviewed the Medical Screening Jane Floras completed by patient and scanned into media section of chart. It includes duration of their obesity, maximum weight, goal weight and weight gain time line (timing), all of which give the context of their obesity AND a Family History of their obesity. Is their Weight Loss Goal entered in to Comments? She needs breat reconstruction from right mastectomy for breast CA  She needs to lose  Weight in order to get reconstruction. The goal is BMI 32    Weight loss History  I personally reviewed the Medical Screening Jane Floras completed by the patient and scanned into media section of chart. It includes number of weight loss attempts, the weight loss program that patients was most successful using, and if they have any hx of anorectic medication use, including OTC supplements. This captures modifying factors. Significant Medical History  Past Medical History:   Diagnosis Date    Breast cancer [facility]) Nov 2012    Right breast infiltrating ductal carcinoma    Cardiomyopathy due to chemotherapy ([address]     EF 20 %    Essential hypertension     Gestational hypertension     History of sepsis 1/2013    after chemo    Hx of difficult intubation     resulting from sepsis in january 2013.  Hypertension     Morbid obesity ([address]     Ne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 ([address]     Type I (juvenile type) diabetes mellitus without mention of complication, uncontrolled     diagnosed age 4    Unspecified vitamin D deficiency 11/7/2011       I personally reviewed the Medical Screening Jane Floras completed by the patient and scanned into media section of chart.   This allows me to assess associated symptoms that are significant in the assessment of the patient's obesity and the patient's Past Medical History. Outpatient Medications Marked as Taking for the 10/23/19 encounter (Office Visit) with Jocy Porras MD   Medication Sig Dispense Refill    FREESTYLE PRECISION KIRK STRIPS strip Use as needed up to twice daily with [facility] reader to check blood sugar when having having problems with the [facility] sensors 50 Strip 11    ONETOUCH ULTRA BLUE TEST STRIP strip Test 4 times daily 400 Strip 3    multivit,calc,mins/iron/folic (ONE-A-DAY WOMENS FORMULA PO) Take  by mouth daily.  insulin degludec (TRESIBA FLEXTOUCH U-100) 100 unit/mL (3 mL) inpn Inject 34 units once daily and increase as directed up to 100 units per day--Dose change 6/25/19--updated med list--did not send prescription to the pharmacy 30 mL 11    NOVOLOG FLEXPEN U-100 INSULIN 100 unit/mL (3 mL) inpn Inject 1:8 for carbs + 1 unit for 50 > 150 for correction. Max 100 units per day now that she is pregnant--Dose change 6/25/19--updated med list--did not send prescription to the pharmacy 30 mL 11    hydrocortisone (CORTAID) 1 % topical cream APPLY TO THE EFFECTED AREA 2 TIMES A DAY. USE THIN LAYER TWICE A DAY. 56 g 2    ondansetron (ZOFRAN ODT) 8 mg disintegrating tablet Take 1 Tab by mouth every eight (8) hours as needed for Nausea. 10 Tab 0    cetirizine (ZYRTEC) 10 mg tablet Take 10 mg by mouth.  norethindrone (JOEY) 0.35 mg tab Take  by mouth.  FREESTYLE NASREEN 14 DAY READER misc Use as directed 1 Each 0    FREESTYLE NASREEN 14 DAY SENSOR kit Use as directed every 14 days 2 Kit 11    ibuprofen (MOTRIN) 800 mg tablet Take 1 Tab by mouth every eight (8) hours. 30 Tab 1    LANCE PEN NEEDLE 32 gauge x 5/32\" ndle USE AS DIRECTED TO INJECT INSULIN 4 TIMES DAILY 400 Pen Needle 3    NIFEdipine ER (PROCARDIA XL) 60 mg ER tablet Take 60 mg by mouth daily. 5    cholecalciferol, vitamin D3, (VITAMIN D3) 2,000 unit tab Take 4,000 Units by mouth daily. SLEEP:       Significant Psychosocial History   Has a doctor every diagnosed with Binge Eating Disorder, Bulemia or Anorexia? : no     Compliance  Upcoming Travel? no    Social History  Social History     Tobacco Use    Smoking status: Never Smoker    Smokeless tobacco: Never Used   Substance Use Topics    Alcohol use: No       Exercise  I personally reviewed the Medical Screening Adán Lubin completed by the patient and scanned into media section of chart. Review of Systems  See HPI        Objective  Visit Vitals  /88   Pulse 70   Temp 98 °F (36.7 °C) (Oral)   Resp 19   Ht 5' 3\" (1.6 m)   Wt 266 lb 1.6 oz (120.7 kg)   LMP 09/15/2019   SpO2 98%   BMI 47.14 kg/m²         Weight Metrics 10/23/2019 10/23/2019 8/28/2019 8/28/2019 6/25/2019 6/14/2019 6/11/2019   Weight - 266 lb 1.6 oz - 267 lb 12.8 oz 261 lb 9.6 oz 268 lb 268 lb   Neck Circ (inches) 16 - 15 - - - -   Waist Measure Inches 46 - 47 - - - -   BMI - 47.14 kg/m2 - 47.44 kg/m2 46.34 kg/m2 47.47 kg/m2 47.47 kg/m2       Labs: See  labs scanned into Media section or in lab section of record      Physical Exam    Vital Signs Reviewed  Weight Management Metrics Reviewed    Appearance: well  HEENT:  Scleral icterus?  no  Neck:  Thyromegaly or nodules? no  Mouth: Large tongue no  Heart:  RRR  Lungs:  clear  Abdomen:     Hepatomegaly? no   Striae present? no  Skin:    Acne?  no   Hirsutism? no   Skin tags? no   Acanthosis Nigricans?  no  Ext:  Edema? no      Assessment & Plan  Encounter Diagnoses   Name Primary?  Type 1 diabetes mellitus without complication (HCC) Yes    Morbid obesity with BMI of 40.0-44.9, adult ([facility]     Screening for ischemic heart disease     Essential hypertension, benign     History of breast cancer     Screening for hyperlipidemia     Screening for hypothyroidism        1. labs reviewed w/ patient  2.  EKG reviewed w/ patient:   Personally reviewed by me, NSR, No abnormalities,    QTc = 409 sec (Upper limit is 440 for males & 460 for females)      3. Medication changes include:     Based on his history, labs and EKG, Becca Lui is  a good candidate for the [facility]     25 min of the 45 minutes face to face time with Dee consisted of counseling & coordinating and/or discussing treatment plans in reference to her obesity The primary encounter diagnosis was Type 1 diabetes mellitus without complication ([facility] Diagnoses of Morbid obesity with BMI of 40.0-44.9, adult ([facility] Screening for ischemic heart disease, Essential hypertension, benign, History of breast cancer, Screening for hyperlipidemia, and Screening for hypothyroidism were also pertinent to this visit.

## 2019-10-23 NOTE — PROGRESS NOTES
1. Have you been to the ER, urgent care clinic since your last visit? Hospitalized since your last visit? No    2. Have you seen or consulted any other health care providers outside of the [address] [place] [address] since your last visit? Include any pap smears or colon screening.      Chief Complaint   Patient presents with    Weight Management     initial MSWL     Body Weight: 266.1  Body Fat%: 46.5  Muscle Mass Weight: 46.8  Body Water Weight: 96.1  Basal Metabolic Rate: 9017  BMI: 47.14

## 2019-10-24 LAB
ALBUMIN SERPL-MCNC: 3.5 G/DL (ref 3.5–5.5)
ALBUMIN/GLOB SERPL: 1.1 {RATIO} (ref 1.2–2.2)
ALP SERPL-CCNC: 97 IU/L (ref 39–117)
ALT SERPL-CCNC: 13 IU/L (ref 0–32)
AST SERPL-CCNC: 16 IU/L (ref 0–40)
BILIRUB SERPL-MCNC: <0.2 MG/DL (ref 0–1.2)
BUN SERPL-MCNC: 15 MG/DL (ref 6–20)
BUN/CREAT SERPL: 19 (ref 9–23)
CALCIUM SERPL-MCNC: 9 MG/DL (ref 8.7–10.2)
CHLORIDE SERPL-SCNC: 103 MMOL/L (ref 96–106)
CHOLEST SERPL-MCNC: 183 MG/DL (ref 100–199)
CO2 SERPL-SCNC: 23 MMOL/L (ref 20–29)
CREAT SERPL-MCNC: 0.81 MG/DL (ref 0.57–1)
EST. AVERAGE GLUCOSE BLD GHB EST-MCNC: 200 MG/DL
GLOBULIN SER CALC-MCNC: 3.3 G/DL (ref 1.5–4.5)
GLUCOSE SERPL-MCNC: 262 MG/DL (ref 65–99)
HBA1C MFR BLD: 8.6 % (ref 4.8–5.6)
HDLC SERPL-MCNC: 60 MG/DL
INTERPRETATION, 910389: NORMAL
LDLC SERPL CALC-MCNC: 106 MG/DL (ref 0–99)
Lab: NORMAL
MAGNESIUM SERPL-MCNC: 1.8 MG/DL (ref 1.6–2.3)
POTASSIUM SERPL-SCNC: 4.3 MMOL/L (ref 3.5–5.2)
PROT SERPL-MCNC: 6.8 G/DL (ref 6–8.5)
SODIUM SERPL-SCNC: 140 MMOL/L (ref 134–144)
TRIGL SERPL-MCNC: 85 MG/DL (ref 0–149)
TSH SERPL DL<=0.005 MIU/L-ACNC: 0.5 UIU/ML (ref 0.45–4.5)
URATE SERPL-MCNC: 3 MG/DL (ref 2.5–7.1)
VLDLC SERPL CALC-MCNC: 17 MG/DL (ref 5–40)

## 2019-11-01 ENCOUNTER — CLINICAL SUPPORT (OUTPATIENT)
Dept: BARIATRICS/WEIGHT MGMT | Age: 34
End: 2019-11-01

## 2019-11-01 DIAGNOSIS — E66.01 MORBID OBESITY WITH BMI OF 40.0-44.9, ADULT (HCC): ICD-10-CM

## 2019-11-01 DIAGNOSIS — E10.9 TYPE 1 DIABETES MELLITUS WITHOUT COMPLICATION (HCC): Primary | ICD-10-CM

## 2019-11-05 VITALS
SYSTOLIC BLOOD PRESSURE: 158 MMHG | HEART RATE: 71 BPM | DIASTOLIC BLOOD PRESSURE: 88 MMHG | WEIGHT: 257.3 LBS | BODY MASS INDEX: 45.58 KG/M2

## 2019-11-05 NOTE — PROGRESS NOTES
Progress Note: Weekly Education Class in the [facility] Weight Loss Program   Is there anything that you or the patient needs to let Dr Elizabeth Tomlin know about? no  Over the past week, have you experienced any side-effects? Weakness, fatigue, lightheadedness, headache, nausea    Yoshi Durán is a 37 y.o. female who is enrolled in [facility]    Visit Vitals  /88   Pulse 71   Wt 257 lb 4.8 oz (116.7 kg)   BMI 45.58 kg/m²     Weight Metrics 11/1/2019 10/23/2019 10/23/2019 8/28/2019 8/28/2019 6/25/2019 6/14/2019   Weight 257 lb 4.8 oz - 266 lb 1.6 oz - 267 lb 12.8 oz 261 lb 9.6 oz 268 lb   Neck Circ (inches) - 16 - 15 - - -   Waist Measure Inches - 46 - 47 - - -   BMI 45.58 kg/m2 - 47.14 kg/m2 - 47.44 kg/m2 46.34 kg/m2 47.47 kg/m2         Have you received any other medical care this week? no  If yes, where and for what? Have you had any change in your medications since your last visit? Changed Tresiba dose from 30 to 15 units, incrase Tresiba to 17, then increased to 19  If yes what? Did you have any problems adhering to the program last week? Day 2 I messed up the chicken biscuit recipe by making it with baking soda rather than baking powder. I also dropped the pan and to spoon the mix back in before baking it. So I ate 2 baked chicken thighs with the skin. If yes, please explain:       Eating Habits Over Last Week:  Did you take in 64 oz of non-caloric fluids?  no     Did you consume your 4 meal replacements each day? no       Physical Activity Over the Past Week:    Aerobic exercise: - min  Resistance exercise: - workouts / week

## 2019-11-08 ENCOUNTER — CLINICAL SUPPORT (OUTPATIENT)
Dept: BARIATRICS/WEIGHT MGMT | Age: 34
End: 2019-11-08

## 2019-11-08 DIAGNOSIS — E10.9 TYPE 1 DIABETES MELLITUS WITHOUT COMPLICATION (HCC): Primary | ICD-10-CM

## 2019-11-08 DIAGNOSIS — E66.01 MORBID OBESITY WITH BMI OF 40.0-44.9, ADULT (HCC): ICD-10-CM

## 2019-11-11 VITALS
HEART RATE: 83 BPM | WEIGHT: 255.1 LBS | DIASTOLIC BLOOD PRESSURE: 81 MMHG | BODY MASS INDEX: 45.19 KG/M2 | SYSTOLIC BLOOD PRESSURE: 149 MMHG

## 2019-11-11 NOTE — PROGRESS NOTES
Progress Note: Weekly Education Class in the [facility] Loss Program   Is there anything that you or the patient needs to let Dr Katelyn Coy know about? no  Over the past week, have you experienced any side-effects? I went to an interview believing I would be out at a decent time. However it was 5 hours long and I did not have my product with me. Emelia Kaye is a 37 y.o. female who is enrolled in [facility]    Visit Vitals  /81   Pulse 83   Wt 255 lb 1.6 oz (115.7 kg)   BMI 45.19 kg/m²     Weight Metrics 11/8/2019 11/1/2019 10/23/2019 10/23/2019 8/28/2019 8/28/2019 6/25/2019   Weight 255 lb 1.6 oz 257 lb 4.8 oz - 266 lb 1.6 oz - 267 lb 12.8 oz 261 lb 9.6 oz   Neck Circ (inches) - - 16 - 15 - -   Waist Measure Inches - - 46 - 47 - -   BMI 45.19 kg/m2 45.58 kg/m2 - 47.14 kg/m2 - 47.44 kg/m2 46.34 kg/m2         Have you received any other medical care this week? Yes, my sugars have been running high in the 200's almost always I gradually increased my [place]  If yes, where and for what? Have you had any change in your medications since your last visit? Tresiba from 25, 28, 30, 32 units  If yes what? Did you have any problems adhering to the program last week? Became hungry after [employer] Sunday, gave in to fast food at [employer]. Had boneless chix breast 3 strips total fo satisfy hunger pains and/or desire to eat  If yes, please explain:       Eating Habits Over Last Week:  Did you take in 64 oz of non-caloric fluids?  no     Did you consume your 4 meal replacements each day? no       Physical Activity Over the Past Week:    Aerobic exercise: - min  Resistance exercise: - workouts / week

## 2019-11-12 ENCOUNTER — TELEPHONE (OUTPATIENT)
Dept: FAMILY MEDICINE CLINIC | Age: 34
End: 2019-11-12

## 2019-11-12 NOTE — TELEPHONE ENCOUNTER
The patient was in today for triage; the patient is requesting a return call from the nurse regarding her blood sugars; last 7 days are: 12:00am to 6:00am average is 129; 6:00am to 12:00pm average is 147; 12:00pm to 6:00pm average is 228; 6:00pm to midnight average is 250; overall average is 176 for the seven days; the best contact number for the patient is [phone]; thank you

## 2019-11-12 NOTE — PROGRESS NOTES
Nurse note from patient's weekly VLCD / LCD / Maintenance class was reviewed. Pertinent medical concerns were:  Needs more water. Avoid snacking on white foods     BP Readings from Last 3 Encounters:   11/11/19 149/81   11/05/19 158/88   10/23/19 140/88       Weight Metrics 11/8/2019 11/1/2019 10/23/2019 10/23/2019 8/28/2019 8/28/2019 6/25/2019   Weight 255 lb 1.6 oz 257 lb 4.8 oz - 266 lb 1.6 oz - 267 lb 12.8 oz 261 lb 9.6 oz   Neck Circ (inches) - - 16 - 15 - -   Waist Measure Inches - - 46 - 47 - -   BMI 45.19 kg/m2 45.58 kg/m2 - 47.14 kg/m2 - 47.44 kg/m2 46.34 kg/m2       Current Outpatient Medications   Medication Sig Dispense Refill    FREESTCause.it PRECISION KIRK STRIPS strip Use as needed up to twice daily with [facility] reader to check blood sugar when having having problems with the [facility] sensors 50 Strip 11    ONETOUCH ULTRA BLUE TEST STRIP strip Test 4 times daily 400 Strip 3    multivit,calc,mins/iron/folic (ONE-A-DAY WOMENS FORMULA PO) Take  by mouth daily.  insulin degludec (TRESIBA FLEXTOUCH U-100) 100 unit/mL (3 mL) inpn Inject 34 units once daily and increase as directed up to 100 units per day--Dose change 6/25/19--updated med list--did not send prescription to the pharmacy 30 mL 11    NOVOLOG FLEXPEN U-100 INSULIN 100 unit/mL (3 mL) inpn Inject 1:8 for carbs + 1 unit for 50 > 150 for correction. Max 100 units per day now that she is pregnant--Dose change 6/25/19--updated med list--did not send prescription to the pharmacy 30 mL 11    hydrocortisone (CORTAID) 1 % topical cream APPLY TO THE EFFECTED AREA 2 TIMES A DAY. USE THIN LAYER TWICE A DAY. 56 g 2    ondansetron (ZOFRAN ODT) 8 mg disintegrating tablet Take 1 Tab by mouth every eight (8) hours as needed for Nausea. 10 Tab 0    cetirizine (ZYRTEC) 10 mg tablet Take 10 mg by mouth.  norethindrone (JOEY) 0.35 mg tab Take  by mouth.       FREESTYLE NASREEN 14 DAY READER [facility] Use as directed 1 Each 0    FREESTYLE NASREEN 14 DAY SENSOR kit Use as directed every 14 days 2 Kit 11    ibuprofen (MOTRIN) 800 mg tablet Take 1 Tab by mouth every eight (8) hours. 30 Tab 1    LANCE PEN NEEDLE 32 gauge x 5/32\" ndle USE AS DIRECTED TO INJECT INSULIN 4 TIMES DAILY 400 Pen Needle 3    NIFEdipine ER (PROCARDIA XL) 60 mg ER tablet Take 60 mg by mouth daily. 5    cholecalciferol, vitamin D3, (VITAMIN D3) 2,000 unit tab Take 4,000 Units by mouth daily.

## 2019-11-14 ENCOUNTER — TELEPHONE (OUTPATIENT)
Dept: FAMILY MEDICINE CLINIC | Age: 34
End: 2019-11-14

## 2019-11-14 NOTE — TELEPHONE ENCOUNTER
Spoke with pt and she confirmed the only outside food that she is consuming is chicken. She has already increased her insulin to 30 units due to to elevated BS readings and the reading improved a little but still in the low 200's to upper 100's. Advised pt to continue the 30 units being that she already increased but to keep diary of outside foods and log BS readings and bring them to her appt on 11/20/19. Pt verbalized understanding and no further questions.

## 2019-11-14 NOTE — TELEPHONE ENCOUNTER
Question: is she snaking on foods outside of our program in the afternoon/evening? If there is anything eaten off program stick to protein  Keep strict food diary and bring in.  Keep inslin dose as it is right now

## 2019-11-15 ENCOUNTER — CLINICAL SUPPORT (OUTPATIENT)
Dept: BARIATRICS/WEIGHT MGMT | Age: 34
End: 2019-11-15

## 2019-11-15 ENCOUNTER — HOSPITAL ENCOUNTER (OUTPATIENT)
Dept: LAB | Age: 34
Discharge: HOME | End: 2019-11-15

## 2019-11-15 DIAGNOSIS — E10.9 TYPE 1 DIABETES MELLITUS WITHOUT COMPLICATION (HCC): Primary | ICD-10-CM

## 2019-11-15 DIAGNOSIS — E66.01 MORBID OBESITY WITH BMI OF 40.0-44.9, ADULT (HCC): ICD-10-CM

## 2019-11-15 DIAGNOSIS — I10 ESSENTIAL HYPERTENSION, BENIGN: ICD-10-CM

## 2019-11-15 DIAGNOSIS — R63.4 RAPID WEIGHT LOSS: ICD-10-CM

## 2019-11-15 DIAGNOSIS — E66.01 MORBID OBESITY WITH BMI OF 40.0-44.9, ADULT (HCC): Primary | ICD-10-CM

## 2019-11-15 LAB
ALBUMIN SERPL-MCNC: 3.1 G/DL (ref 3.5–5)
ALBUMIN/GLOB SERPL: 0.8 {RATIO} (ref 1.1–2.2)
ALP SERPL-CCNC: 113 U/L (ref 45–117)
ALT SERPL-CCNC: 20 U/L (ref 12–78)
ANION GAP SERPL CALC-SCNC: 6 MMOL/L (ref 5–15)
AST SERPL-CCNC: 11 U/L (ref 15–37)
BILIRUB SERPL-MCNC: 0.4 MG/DL (ref 0.2–1)
BUN SERPL-MCNC: 15 MG/DL (ref 6–20)
BUN/CREAT SERPL: 17 (ref 12–20)
CALCIUM SERPL-MCNC: 8.9 MG/DL (ref 8.5–10.1)
CHLORIDE SERPL-SCNC: 103 MMOL/L (ref 97–108)
CO2 SERPL-SCNC: 30 MMOL/L (ref 21–32)
CREAT SERPL-MCNC: 0.87 MG/DL (ref 0.55–1.02)
GLOBULIN SER CALC-MCNC: 3.9 G/DL (ref 2–4)
GLUCOSE SERPL-MCNC: 203 MG/DL (ref 65–100)
MAGNESIUM SERPL-MCNC: 1.7 MG/DL (ref 1.6–2.4)
POTASSIUM SERPL-SCNC: 4.3 MMOL/L (ref 3.5–5.1)
PROT SERPL-MCNC: 7 G/DL (ref 6.4–8.2)
SODIUM SERPL-SCNC: 139 MMOL/L (ref 136–145)
URATE SERPL-MCNC: 3 MG/DL (ref 2.6–6)

## 2019-11-20 ENCOUNTER — OFFICE VISIT (OUTPATIENT)
Dept: FAMILY MEDICINE CLINIC | Age: 34
End: 2019-11-20

## 2019-11-20 VITALS
HEART RATE: 69 BPM | DIASTOLIC BLOOD PRESSURE: 85 MMHG | TEMPERATURE: 98.3 F | BODY MASS INDEX: 44.31 KG/M2 | HEIGHT: 63 IN | OXYGEN SATURATION: 97 % | RESPIRATION RATE: 20 BRPM | SYSTOLIC BLOOD PRESSURE: 132 MMHG | WEIGHT: 250.1 LBS

## 2019-11-20 DIAGNOSIS — I10 ESSENTIAL HYPERTENSION, BENIGN: ICD-10-CM

## 2019-11-20 DIAGNOSIS — E10.9 TYPE 1 DIABETES MELLITUS WITHOUT COMPLICATION (HCC): Primary | ICD-10-CM

## 2019-11-20 DIAGNOSIS — E66.01 MORBID OBESITY WITH BMI OF 40.0-44.9, ADULT (HCC): ICD-10-CM

## 2019-11-20 NOTE — PROGRESS NOTES
[facility] Progress Note:   F/up Physician Visit    CC: Weight Management      Ratna Askew is a 37 y.o. female who is here for her  f/up physician visit for the VLCD / LCD Program.  She is now getting tresiba at 30 units a day and sugars are still in the 200s while on our supplements  She is also needing 4-6 units of the novolog  She is drinking water but is mostly sprkling artificially sweetened water        Weight Metrics 11/22/2019 11/20/2019 11/20/2019 11/15/2019 11/8/2019 11/1/2019 10/23/2019   Weight 249 lb 12.8 oz - 250 lb 1.6 oz 252 lb 6.4 oz 255 lb 1.6 oz 257 lb 4.8 oz -   Neck Circ (inches) - 14 - - - - 16   Waist Measure Inches - 41 - - - - 46   BMI 44.25 kg/m2 - 44.3 kg/m2 44.71 kg/m2 45.19 kg/m2 45.58 kg/m2 -         Outpatient Medications Marked as Taking for the 11/20/19 encounter (Office Visit) with Raul Chan MD   Medication Sig Dispense Refill    FREESTYLE PRECISION KIRK STRIPS strip Use as needed up to twice daily with [facility] reader to check blood sugar when having having problems with the [facility] sensors 50 Strip 11    ONETOUCH ULTRA BLUE TEST STRIP strip Test 4 times daily 400 Strip 3    multivit,calc,mins/iron/folic (ONE-A-DAY WOMENS FORMULA PO) Take  by mouth daily.  insulin degludec (TRESIBA FLEXTOUCH U-100) 100 unit/mL (3 mL) inpn Inject 34 units once daily and increase as directed up to 100 units per day--Dose change 6/25/19--updated med list--did not send prescription to the pharmacy 30 mL 11    NOVOLOG FLEXPEN U-100 INSULIN 100 unit/mL (3 mL) inpn Inject 1:8 for carbs + 1 unit for 50 > 150 for correction. Max 100 units per day now that she is pregnant--Dose change 6/25/19--updated med list--did not send prescription to the pharmacy 30 mL 11    hydrocortisone (CORTAID) 1 % topical cream APPLY TO THE EFFECTED AREA 2 TIMES A DAY. USE THIN LAYER TWICE A DAY.  56 g 2    ondansetron (ZOFRAN ODT) 8 mg disintegrating tablet Take 1 Tab by mouth every eight (8) hours as needed for Nausea. 10 Tab 0    cetirizine (ZYRTEC) 10 mg tablet Take 10 mg by mouth.  norethindrone (JOEY) 0.35 mg tab Take  by mouth.  FREESTYLE NASREEN 14 DAY READER misc Use as directed 1 Each 0    FREESTYLE NASREEN 14 DAY SENSOR kit Use as directed every 14 days 2 Kit 11    ibuprofen (MOTRIN) 800 mg tablet Take 1 Tab by mouth every eight (8) hours. 30 Tab 1    LANCE PEN NEEDLE 32 gauge x 5/32\" ndle USE AS DIRECTED TO INJECT INSULIN 4 TIMES DAILY 400 Pen Needle 3    NIFEdipine ER (PROCARDIA XL) 60 mg ER tablet Take 60 mg by mouth daily. 5    cholecalciferol, vitamin D3, (VITAMIN D3) 2,000 unit tab Take 4,000 Units by mouth daily. Participation   Did you attend clinic and class last week? yes    Review of Systems  Since your last visit, have you experienced any complications? no  If yes, please list:       Are you taking an appetite suppressant? no  If so, is there any Chest Pain, Palpitations or Dizziness? BP Readings from Last 3 Encounters:   11/22/19 146/75   11/20/19 132/85   11/22/19 144/77       SLEEP:7    Have you received any other medical care this week? no  If yes, where and for what? Have you discontinued or changed any medicine or dose of your medicine since your last visit with Dr Reinaldo Frankel? no  If yes, where and for what? Diet  How many ounces of calorie-free liquids did you consume each day?  40 oz    How many meal replacements did you take each day? *4    Did you have any problems adhering to the program?  no If yes, please explain:      Exercise  Aerobic exercise: 0 min  Resistance exercise: 0 workouts / week  Any discomfort?  no     If yes, where? Objective  Visit Vitals  /85   Pulse 69   Temp 98.3 °F (36.8 °C) (Oral)   Resp 20   Ht 5' 3\" (1.6 m)   Wt 250 lb 1.6 oz (113.4 kg)   LMP 11/19/2019   SpO2 97%   BMI 44.30 kg/m²     Patient's last menstrual period was 11/19/2019.       Physical Exam  Appearance: well,   Mental:A&O x 3, NAD  H:NC/AT,  EENT:   EOMI, PERRL, No scleral icterus  Neck: no bruit or JVD  Lung: clear, No W/R  ABD: soft, active, nontender  Ext:  no Edema  Neuro: nonfocal  Assessment / Plan    Encounter Diagnoses   Name Primary?  Type 1 diabetes mellitus without complication ([facility] Yes    Morbid obesity with BMI of 40.0-44.9, adult ([facility]     Essential hypertension, benign      Diagnoses and all orders for this visit:    1. Type 1 diabetes mellitus without complication (HCC)  -     HEMOGLOBIN A1C WITH EAG; Future  The amount of insulin should be too much for just our meal replacements, she must be eating some other sugars  I asked her to record everything and we will discuss more at next visit   meanwhile keep checking tid and use the humalog as indicated  2. Morbid obesity with BMI of 40.0-44.9, adult (HCC)  -     METABOLIC PANEL, COMPREHENSIVE; Future  -     HEMOGLOBIN A1C WITH EAG; Future  -     MAGNESIUM; Future  -     URIC ACID; Future    3. Essential hypertension, benign  -     METABOLIC PANEL, COMPREHENSIVE; Future  -     MAGNESIUM; Future      1. Weight management improved   Progress was reviewed with patient    2. Labs    Latest results reviewed with patient   Lab slip given to pt for f/up  labs      Over 20 minutes of the 30 minutes face to face time with Dee consisted of counseling & coordinating and/or discussing treatment plans in reference to her obesity The primary encounter diagnosis was Type 1 diabetes mellitus without complication ([facility] Diagnoses of Morbid obesity with BMI of 40.0-44.9, adult ([facility] and Essential hypertension, benign were also pertinent to this visit.

## 2019-11-20 NOTE — PROGRESS NOTES
1. Have you been to the ER, urgent care clinic since your last visit? Hospitalized since your last visit? No    2. Have you seen or consulted any other health care providers outside of the [address] [place] [address] since your last visit? Include any pap smears or colon screening.  Surgical oncology    Chief Complaint   Patient presents with    Weight Management     MSWL     Body Weight: 250.1  Body Fat%: 45.1  Muscle Mass Weight: 43.9  Body Water Weight: 20.8  Basal Metabolic Rate: 4096  BMI: 44.30

## 2019-11-22 ENCOUNTER — CLINICAL SUPPORT (OUTPATIENT)
Dept: BARIATRICS/WEIGHT MGMT | Age: 34
End: 2019-11-22

## 2019-11-22 VITALS
SYSTOLIC BLOOD PRESSURE: 146 MMHG | BODY MASS INDEX: 44.25 KG/M2 | WEIGHT: 249.8 LBS | HEART RATE: 69 BPM | DIASTOLIC BLOOD PRESSURE: 75 MMHG

## 2019-11-22 VITALS
SYSTOLIC BLOOD PRESSURE: 144 MMHG | WEIGHT: 252.4 LBS | DIASTOLIC BLOOD PRESSURE: 77 MMHG | HEART RATE: 78 BPM | BODY MASS INDEX: 44.71 KG/M2

## 2019-11-22 DIAGNOSIS — E66.01 MORBID OBESITY WITH BMI OF 40.0-44.9, ADULT (HCC): ICD-10-CM

## 2019-11-22 DIAGNOSIS — E10.9 TYPE 1 DIABETES MELLITUS WITHOUT COMPLICATION (HCC): Primary | ICD-10-CM

## 2019-11-22 NOTE — PROGRESS NOTES
Progress Note: Weekly Education Class in the [facility] Weight Loss Program   Is there anything that you or the patient needs to let Dr Gracia Beebe know about? no  Over the past week, have you experienced any side-effects? no    sulmaqueta Santiago is a 37 y.o. female who is enrolled in [facility]    Visit Vitals  /77   Pulse 78   Wt 252 lb 6.4 oz (114.5 kg)   BMI 44.71 kg/m²     Weight Metrics 11/20/2019 11/20/2019 11/15/2019 11/8/2019 11/1/2019 10/23/2019 10/23/2019   Weight - 250 lb 1.6 oz 252 lb 6.4 oz 255 lb 1.6 oz 257 lb 4.8 oz - 266 lb 1.6 oz   Neck Circ (inches) 14 - - - - 16 -   Waist Measure Inches 41 - - - - 46 -   BMI - 44.3 kg/m2 44.71 kg/m2 45.19 kg/m2 45.58 kg/m2 - 47.14 kg/m2         Have you received any other medical care this week? dental  If yes, where and for what? Have you had any change in your medications since your last visit? Concerned about sugars continuing in the 200's. Is this Okay? I have contacted my endocrinologist.  If yes what? Did you have any problems adhering to the program last week? I'm struggling with taking meal completely out of diet. Yet I haven't been eating any extra carbs except sugarless gum and drink with 2-5 carbs. One day I ate candy  If yes, please explain:       Eating Habits Over Last Week:  Did you take in 64 oz of non-caloric fluids? yes     Did you consume your 4 meal replacements each day?  yes       Physical Activity Over the Past Week:    Aerobic exercise: 0 min  Resistance exercise: 0 workouts / week

## 2019-11-22 NOTE — PROGRESS NOTES
Progress Note: Weekly Education Class in the [facility] Loss Program   Is there anything that you or the patient needs to let Dr Soraya Zamora know about? no  Over the past week, have you experienced any side-effects? Fatigue, diarrhea, constipation, low blood sugar symptoms but its high sugar instead    Dianne Orosco is a 37 y.o. female who is enrolled in [facility]    Visit Vitals  /75   Pulse 69   Wt 249 lb 12.8 oz (113.3 kg)   LMP 11/19/2019   BMI 44.25 kg/m²     Weight Metrics 11/22/2019 11/20/2019 11/20/2019 11/15/2019 11/8/2019 11/1/2019 10/23/2019   Weight 249 lb 12.8 oz - 250 lb 1.6 oz 252 lb 6.4 oz 255 lb 1.6 oz 257 lb 4.8 oz -   Neck Circ (inches) - 14 - - - - 16   Waist Measure Inches - 41 - - - - 46   BMI 44.25 kg/m2 - 44.3 kg/m2 44.71 kg/m2 45.19 kg/m2 45.58 kg/m2 -         Have you received any other medical care this week? no  If yes, where and for what? Have you had any change in your medications since your last visit? no  If yes what? Did you have any problems adhering to the program last week?  cooked kristy whitehead with grilled chix, I let temptation get to me and ate a half cup of pasta and also started period on this day  If yes, please explain:       Eating Habits Over Last Week:  Did you take in 64 oz of non-caloric fluids? no     Did you consume your 4 meal replacements each day?  yes       Physical Activity Over the Past Week:    Aerobic exercise: - min  Resistance exercise: - workouts / week

## 2019-12-02 NOTE — PROGRESS NOTES
Nurse note from patient's weekly VLCD / LCD / Maintenance class was reviewed. Pertinent medical concerns were:   none     BP Readings from Last 3 Encounters:   11/22/19 146/75   11/20/19 132/85   11/22/19 144/77       Weight Metrics 11/22/2019 11/20/2019 11/20/2019 11/15/2019 11/8/2019 11/1/2019 10/23/2019   Weight 249 lb 12.8 oz - 250 lb 1.6 oz 252 lb 6.4 oz 255 lb 1.6 oz 257 lb 4.8 oz -   Neck Circ (inches) - 14 - - - - 16   Waist Measure Inches - 41 - - - - 46   BMI 44.25 kg/m2 - 44.3 kg/m2 44.71 kg/m2 45.19 kg/m2 45.58 kg/m2 -       Current Outpatient Medications   Medication Sig Dispense Refill    FREESTYLE PRECISION KIRK STRIPS strip Use as needed up to twice daily with [facility] reader to check blood sugar when having having problems with the [facility] sensors 50 Strip 11    ONETOUCH ULTRA BLUE TEST STRIP strip Test 4 times daily 400 Strip 3    multivit,calc,mins/iron/folic (ONE-A-DAY WOMENS FORMULA PO) Take  by mouth daily.  insulin degludec (TRESIBA FLEXTOUCH U-100) 100 unit/mL (3 mL) inpn Inject 34 units once daily and increase as directed up to 100 units per day--Dose change 6/25/19--updated med list--did not send prescription to the pharmacy 30 mL 11    NOVOLOG FLEXPEN U-100 INSULIN 100 unit/mL (3 mL) inpn Inject 1:8 for carbs + 1 unit for 50 > 150 for correction. Max 100 units per day now that she is pregnant--Dose change 6/25/19--updated med list--did not send prescription to the pharmacy 30 mL 11    hydrocortisone (CORTAID) 1 % topical cream APPLY TO THE EFFECTED AREA 2 TIMES A DAY. USE THIN LAYER TWICE A DAY. 56 g 2    ondansetron (ZOFRAN ODT) 8 mg disintegrating tablet Take 1 Tab by mouth every eight (8) hours as needed for Nausea. 10 Tab 0    cetirizine (ZYRTEC) 10 mg tablet Take 10 mg by mouth.  norethindrone (JOEY) 0.35 mg tab Take  by mouth.       FREESTYLE NASREEN 14 DAY READER misc Use as directed 1 Each 0    FREESTYLE NASREEN 14 DAY SENSOR kit Use as directed every 14 days 2 Kit 11  ibuprofen (MOTRIN) 800 mg tablet Take 1 Tab by mouth every eight (8) hours. 30 Tab 1    LANCE PEN NEEDLE 32 gauge x 5/32\" ndle USE AS DIRECTED TO INJECT INSULIN 4 TIMES DAILY 400 Pen Needle 3    NIFEdipine ER (PROCARDIA XL) 60 mg ER tablet Take 60 mg by mouth daily. 5    cholecalciferol, vitamin D3, (VITAMIN D3) 2,000 unit tab Take 4,000 Units by mouth daily.

## 2019-12-03 ENCOUNTER — OFFICE VISIT (OUTPATIENT)
Dept: ENDOCRINOLOGY | Age: 34
End: 2019-12-03

## 2019-12-03 VITALS
BODY MASS INDEX: 43.98 KG/M2 | HEIGHT: 63 IN | SYSTOLIC BLOOD PRESSURE: 143 MMHG | OXYGEN SATURATION: 99 % | HEART RATE: 69 BPM | DIASTOLIC BLOOD PRESSURE: 82 MMHG | WEIGHT: 248.2 LBS | RESPIRATION RATE: 16 BRPM

## 2019-12-03 DIAGNOSIS — I10 ESSENTIAL HYPERTENSION, BENIGN: ICD-10-CM

## 2019-12-03 DIAGNOSIS — E55.9 VITAMIN D DEFICIENCY: ICD-10-CM

## 2019-12-03 DIAGNOSIS — E10.9 TYPE 1 DIABETES MELLITUS WITHOUT COMPLICATION (HCC): Primary | ICD-10-CM

## 2019-12-03 DIAGNOSIS — R79.89 ABNORMAL THYROID BLOOD TEST: ICD-10-CM

## 2019-12-03 RX ORDER — INSULIN ASPART 100 [IU]/ML
INJECTION, SOLUTION INTRAVENOUS; SUBCUTANEOUS
Qty: 30 ML | Refills: 11
Start: 2019-12-03 | End: 2020-02-26

## 2019-12-03 RX ORDER — INSULIN DEGLUDEC 100 U/ML
INJECTION, SOLUTION SUBCUTANEOUS
Qty: 30 ML | Refills: 11
Start: 2019-12-03 | End: 2020-04-13

## 2019-12-03 RX ORDER — LISINOPRIL 20 MG/1
20 TABLET ORAL DAILY
Qty: 90 TAB | Refills: 3 | Status: SHIPPED | OUTPATIENT
Start: 2019-12-03 | End: 2020-04-13

## 2019-12-03 NOTE — PROGRESS NOTES
Lab Results   Component Value Date/Time    Hemoglobin A1c 8.6 (H) 10/23/2019 02:33 PM    Hemoglobin A1c 7.8 (H) 06/20/2019 09:21 AM    Hemoglobin A1c 7.1 (H) 02/21/2019 09:15 PM

## 2019-12-03 NOTE — PROGRESS NOTES
Chief Complaint   Patient presents with    Diabetes     History of Present Illness: Anel Burch is a 37 y.o. female here for follow up of diabetes. Weight down 13 lbs since last visit in 6/19 but has actually lost 18 lbs since 10/23/19 when she started working with Dr. Miriam Bishop. Initially was told to decrease her insulin in half but then contacted me that her sugars were running in the 200-300s so I advised going back up on the tresiba and currently is taking 32 units every morning. She was drinking diet sodas prior to her visit with Dr. Miriam Bishop on 11/20/19 and her sugars were still in the 200s and that day she stopped and was just drinking water and then they came down into the 100s until she had a dental procedure before Thanksgiving and was put on amoxicillin and will finish this over the next 2 days. Has gone back to drinking some diet drinks like Crystal light after this procedure and then has seen sugars back in the 200s more frequently. Has been dosing novolog 1:10 for carbs  Fasting sugar was 85 this morning. Still using the freestyle mitchell. Daughter is 9 month and stopped breast feeding around 6 months. BP has not been as controlled on the nifedipine and now that she is done breastfeeding we agreed to go back on lisinopril without hctz as she is on the low carb diet of about 40-50 carbs per day. Current Outpatient Medications   Medication Sig    levonorgestrel (MIRENA) 20 mcg/24 hours (5 yrs) 52 mg IUD 1 Device by IntraUTERine route once.  FREESTYLE PRECISION KIRK STRIPS strip Use as needed up to twice daily with [facility] reader to check blood sugar when having having problems with the [facility] sensors    ONETOUCH ULTRA BLUE TEST STRIP strip Test 4 times daily    multivit,calc,mins/iron/folic (ONE-A-DAY WOMENS FORMULA PO) Take  by mouth daily.     insulin degludec (TRESIBA FLEXTOUCH U-100) 100 unit/mL (3 mL) inpn Inject 34 units once daily and increase as directed up to 100 units per day--Dose change 6/25/19--updated med list--did not send prescription to the pharmacy    NOVOLOG FLEXPEN U-100 INSULIN 100 unit/mL (3 mL) inpn Inject 1:8 for carbs + 1 unit for 50 > 150 for correction. Max 100 units per day now that she is pregnant--Dose change 6/25/19--updated med list--did not send prescription to the pharmacy    hydrocortisone (CORTAID) 1 % topical cream APPLY TO THE EFFECTED AREA 2 TIMES A DAY. USE THIN LAYER TWICE A DAY.  ondansetron (ZOFRAN ODT) 8 mg disintegrating tablet Take 1 Tab by mouth every eight (8) hours as needed for Nausea.  norethindrone (JOEY) 0.35 mg tab Take  by mouth.  FREESTYLE NASREEN 14 DAY READER misc Use as directed    FREESTYLE NASREEN 14 DAY SENSOR kit Use as directed every 14 days    ibuprofen (MOTRIN) 800 mg tablet Take 1 Tab by mouth every eight (8) hours.  LANCE PEN NEEDLE 32 gauge x 5/32\" ndle USE AS DIRECTED TO INJECT INSULIN 4 TIMES DAILY    NIFEdipine ER (PROCARDIA XL) 60 mg ER tablet Take 60 mg by mouth daily.  cholecalciferol, vitamin D3, (VITAMIN D3) 2,000 unit tab Take 4,000 Units by mouth daily.  cetirizine (ZYRTEC) 10 mg tablet Take 10 mg by mouth. No current facility-administered medications for this visit. Allergies   Allergen Reactions    Codeine Nausea and Vomiting     Review of Systems:  - Eyes: no blurry vision or double vision  - Cardiovascular: no chest pain  - Respiratory: no shortness of breath  - Musculoskeletal: no myalgias  - Neurological: no numbness/tingling in extremities    Physical Examination:  Blood pressure 143/82, pulse 69, resp. rate 16, height 5' 3\" (1.6 m), weight 248 lb 3.2 oz (112.6 kg), last menstrual period 11/19/2019, SpO2 99 %.   Repeat manually 144/90 in left arm.  - General: pleasant, no distress, good eye contact   - Neck: no carotid bruits  - Cardiovascular: regular, normal rate, nl s1 and s2, no m/r/g,   - Respiratory: clear bilaterally  - Integumentary: no edema,   - Psychiatric: normal mood and affect    Data Reviewed:   Component      Latest Ref Rng & Units 10/23/2019 10/23/2019 10/23/2019 10/23/2019           2:33 PM  2:33 PM  2:33 PM  2:33 PM   Glucose      65 - 99 mg/dL    262 (H)   BUN      6 - 20 mg/dL    15   Creatinine      0.57 - 1.00 mg/dL    0.81   GFR est non-AA      >59 mL/min/1.73    94   GFR est AA      >59 mL/min/1.73    108   BUN/Creatinine ratio      9 - 23    19   Sodium      134 - 144 mmol/L    140   Potassium      3.5 - 5.2 mmol/L    4.3   Chloride      96 - 106 mmol/L    103   CO2      20 - 29 mmol/L    23   Calcium      8.7 - 10.2 mg/dL    9.0   Protein, total      6.0 - 8.5 g/dL    6.8   Albumin      3.5 - 5.5 g/dL    3.5   GLOBULIN, TOTAL      1.5 - 4.5 g/dL    3.3   A-G Ratio      1.2 - 2.2    1.1 (L)   Bilirubin, total      0.0 - 1.2 mg/dL    <0.2   Alk. phosphatase      39 - 117 IU/L    97   AST      0 - 40 IU/L    16   ALT (SGPT)      0 - 32 IU/L    13   Cholesterol, total      100 - 199 mg/dL   183    Triglyceride      0 - 149 mg/dL   85    HDL Cholesterol      >39 mg/dL   60    VLDL, calculated      5 - 40 mg/dL   17    LDL, calculated      0 - 99 mg/dL   106 (H)    Hemoglobin A1c, (calculated)      4.8 - 5.6 % 8.6 (H)      Estimated average glucose      mg/dL 200      TSH      0.450 - 4.500 uIU/mL  0.500       Assessment/Plan:     1) Type 1 DM uncontrolled: Her most recent Hgb A1c was 8.6% in 10/19 up from 7.8% in 6/19 up from 7.1% in 2/19 stable from 1/19 stable from 12/18 down from 7.2% in 11/18 up from 6.8% in 10/18 down from 7.5% in 9/18 down from 8.2% in 6/18 up from 8% in 1/18 (she was at [address] [place] [address] from 11/12 to 1/18 and states A1c values were in the 7-8% range) down from 8.8% in January 2012 stable from 8.9% in September 2011 up from 7.5% in May 2010. Sugars had been higher due to diet but is now losing weight so hope next one will be better.   - cont tresiba 32 units in the morning  - Take Novolog 1:10 for CHO ratio for meals  - Take Novolog 1:50 for > 150 during the day - Check bs 4x/day due to fluctuating sugars  - cont freestyle cassi  - optho UTD 7/17  - foot exam done 6/19  - microalbumin nl 6/19  - LDL 65 6/19 and 106 in 10/19  - check Hgb A1c, cmp, lipids and microalbumin in 6/20  - check POC A1c at next visit      2) HTN NOS (401.9): her BP was above goal < 140/90 so will go back to lisinopril now that she is done breastfeeding  - begin lisinopril 20 mg daily  - stop procardia 60 mg once daily    3) Abnormal thyroid blood test: her TSH was slightly low at 0.293 in September 2011. Clinically no symptoms of hyperthyroidism and repeat was normal at 0.453 in January 2012 and 0.66 in 1/18 and 0.70 in 10/18 and 0.54 in 6/19 and 0.5 in 10/19  - will not continue to follow in the future    4) Vitamin D deficiency: Level was 11.1 in 9/11. Started on ergo at that time and level up to 24 in January 2012. Still 25.9 in 2/18 so advised to take 4000 units daily but has not been doing this and level was 27.4 in 10/18 and 47 in 6/19  - check Vitamin D 25-OH level in 6/20  - cont vitamin D 4000 units daily    Patient Instructions   1) Take 36 units of tresiba tomorrow as you finish off the amoxicillin and then go back to 32 units on Thursday. 2) Stay on 1:10 for novolog. 3) Stop the nifedipine and we will go back on lisinopril 20 mg daily without the hctz as I want to avoid dehydration while on the low carb diet. This will be ready for  at the pharmacy today. Be aware of 2 side effects of this medication: 1) dry cough in 5-10%, 2) swelling of the lips/tongue (extremely rare, less than 0.5%, however it is an emergency and would require a visit to the ER)    4) I will check the computer to see if you have had labs either with Dr. Jonnathan Pascual or Alice Mcclendon prior to your next visit and if not, I'll arrange a lab draw. Follow-up and Dispositions    · Return in about 4 months (around 4/3/2020). Copy sent to:   Jonathan Herman NP as PCP - General (Nurse Practitioner)  Beka Sherman MD (Obstetrics & Gynecology)  Fabiola Echeverria MD (Surgical Oncology)  Sherwin Millan MD (Hematology and Oncology)  Dr. Ailyn Fitzpatrick via [facility]

## 2019-12-03 NOTE — PATIENT INSTRUCTIONS
1) Take 36 units of tresiba tomorrow as you finish off the amoxicillin and then go back to 32 units on Thursday. 2) Stay on 1:10 for novolog. 3) Stop the nifedipine and we will go back on lisinopril 20 mg daily without the hctz as I want to avoid dehydration while on the low carb diet. This will be ready for  at the pharmacy today. Be aware of 2 side effects of this medication: 1) dry cough in 5-10%, 2) swelling of the lips/tongue (extremely rare, less than 0.5%, however it is an emergency and would require a visit to the ER)    4) I will check the computer to see if you have had labs either with Dr. Welton Councilman or Rebecca Roger prior to your next visit and if not, I'll arrange a lab draw.

## 2019-12-03 NOTE — PROGRESS NOTES

## 2019-12-06 ENCOUNTER — CLINICAL SUPPORT (OUTPATIENT)
Dept: BARIATRICS/WEIGHT MGMT | Age: 34
End: 2019-12-06

## 2019-12-06 DIAGNOSIS — E10.9 TYPE 1 DIABETES MELLITUS WITHOUT COMPLICATION (HCC): Primary | ICD-10-CM

## 2019-12-06 DIAGNOSIS — E66.01 MORBID OBESITY WITH BMI OF 40.0-44.9, ADULT (HCC): ICD-10-CM

## 2019-12-10 VITALS
DIASTOLIC BLOOD PRESSURE: 88 MMHG | WEIGHT: 249.9 LBS | SYSTOLIC BLOOD PRESSURE: 165 MMHG | BODY MASS INDEX: 44.27 KG/M2 | HEART RATE: 79 BPM

## 2019-12-10 NOTE — PROGRESS NOTES
Progress Note: Weekly Education Class in the [facility]   Is there anything that you or the patient needs to let Dr Candie Humphries know about? no  Over the past week, have you experienced any side-effects? Weakness, fatigue, lightheadedness, headache, constipation    Iman Niranjan is a 37 y.o. female who is enrolled in [facility]    Visit Vitals  /88   Pulse 79   Wt 249 lb 14.4 oz (113.4 kg)   LMP 11/19/2019   BMI 44.27 kg/m²     Weight Metrics 12/6/2019 12/3/2019 11/22/2019 11/20/2019 11/20/2019 11/15/2019 11/8/2019   Weight 249 lb 14.4 oz 248 lb 3.2 oz 249 lb 12.8 oz - 250 lb 1.6 oz 252 lb 6.4 oz 255 lb 1.6 oz   Neck Circ (inches) - - - 14 - - -   Waist Measure Inches - - - 41 - - -   BMI 44.27 kg/m2 43.97 kg/m2 44.25 kg/m2 - 44.3 kg/m2 44.71 kg/m2 45.19 kg/m2         Have you received any other medical care this week? Week of thanksgiving I had a wisdom tooth removed along with a cyst around it. Also saw my medical doctor Dr. Flaco Ashley for my Type I diabetes on 12/3/19  If yes, where and for what? Have you had any change in your medications since your last visit? Yes now taking lisinopril, tresiba 32 units, novolog 1:10 carb ration  If yes what? Did you have any problems adhering to the program last week? Very sick with tooth surgery this week headaches nausea   If yes, please explain:       Eating Habits Over Last Week:  Did you take in 64 oz of non-caloric fluids?  no     Did you consume your 4 meal replacements each day? no       Physical Activity Over the Past Week:    Aerobic exercise: 0 min  Resistance exercise: 0 workouts / week

## 2019-12-13 ENCOUNTER — CLINICAL SUPPORT (OUTPATIENT)
Dept: BARIATRICS/WEIGHT MGMT | Age: 34
End: 2019-12-13

## 2019-12-13 ENCOUNTER — HOSPITAL ENCOUNTER (OUTPATIENT)
Dept: LAB | Age: 34
Discharge: HOME | End: 2019-12-13

## 2019-12-13 DIAGNOSIS — E10.9 TYPE 1 DIABETES MELLITUS WITHOUT COMPLICATION (HCC): Primary | ICD-10-CM

## 2019-12-13 DIAGNOSIS — E66.01 MORBID OBESITY WITH BMI OF 40.0-44.9, ADULT (HCC): ICD-10-CM

## 2019-12-13 DIAGNOSIS — E10.9 TYPE 1 DIABETES MELLITUS WITHOUT COMPLICATION (HCC): ICD-10-CM

## 2019-12-13 DIAGNOSIS — I10 ESSENTIAL HYPERTENSION, BENIGN: ICD-10-CM

## 2019-12-13 LAB
ALBUMIN SERPL-MCNC: 3.5 G/DL (ref 3.5–5)
ALBUMIN/GLOB SERPL: 0.9 {RATIO} (ref 1.1–2.2)
ALP SERPL-CCNC: 108 U/L (ref 45–117)
ALT SERPL-CCNC: 22 U/L (ref 12–78)
ANION GAP SERPL CALC-SCNC: 5 MMOL/L (ref 5–15)
AST SERPL-CCNC: 12 U/L (ref 15–37)
BILIRUB SERPL-MCNC: 0.7 MG/DL (ref 0.2–1)
BUN SERPL-MCNC: 13 MG/DL (ref 6–20)
BUN/CREAT SERPL: 18 (ref 12–20)
CALCIUM SERPL-MCNC: 9 MG/DL (ref 8.5–10.1)
CHLORIDE SERPL-SCNC: 106 MMOL/L (ref 97–108)
CO2 SERPL-SCNC: 27 MMOL/L (ref 21–32)
CREAT SERPL-MCNC: 0.74 MG/DL (ref 0.55–1.02)
EST. AVERAGE GLUCOSE BLD GHB EST-MCNC: 212 MG/DL
GLOBULIN SER CALC-MCNC: 3.9 G/DL (ref 2–4)
GLUCOSE SERPL-MCNC: 238 MG/DL (ref 65–100)
HBA1C MFR BLD: 9 % (ref 4–5.6)
MAGNESIUM SERPL-MCNC: 1.8 MG/DL (ref 1.6–2.4)
POTASSIUM SERPL-SCNC: 4.4 MMOL/L (ref 3.5–5.1)
PROT SERPL-MCNC: 7.4 G/DL (ref 6.4–8.2)
SODIUM SERPL-SCNC: 138 MMOL/L (ref 136–145)
URATE SERPL-MCNC: 2.7 MG/DL (ref 2.6–6)

## 2019-12-17 VITALS
BODY MASS INDEX: 43.03 KG/M2 | WEIGHT: 242.9 LBS | SYSTOLIC BLOOD PRESSURE: 156 MMHG | HEART RATE: 68 BPM | DIASTOLIC BLOOD PRESSURE: 87 MMHG

## 2019-12-18 ENCOUNTER — OFFICE VISIT (OUTPATIENT)
Dept: FAMILY MEDICINE CLINIC | Age: 34
End: 2019-12-18

## 2019-12-18 VITALS
HEIGHT: 63 IN | BODY MASS INDEX: 42.54 KG/M2 | WEIGHT: 240.1 LBS | SYSTOLIC BLOOD PRESSURE: 155 MMHG | HEART RATE: 73 BPM | OXYGEN SATURATION: 95 % | TEMPERATURE: 98 F | DIASTOLIC BLOOD PRESSURE: 84 MMHG | RESPIRATION RATE: 19 BRPM

## 2019-12-18 DIAGNOSIS — I10 ESSENTIAL HYPERTENSION, BENIGN: ICD-10-CM

## 2019-12-18 DIAGNOSIS — E66.01 MORBID OBESITY WITH BMI OF 40.0-44.9, ADULT (HCC): ICD-10-CM

## 2019-12-18 DIAGNOSIS — E10.9 TYPE 1 DIABETES MELLITUS WITHOUT COMPLICATION (HCC): Primary | ICD-10-CM

## 2019-12-18 NOTE — PROGRESS NOTES
[facility] Progress Note:   F/up Physician Visit    CC: Weight Management      Becca Lui is a 37 y.o. female who is here for her  f/up physician visit for the VLCD / LCD Program.      She had elizabeth party 13th December  Then started taking cold meds 14 and 15th, the blood sugar readings started getting in the upper 200s and 300s      Weight Metrics 12/18/2019 12/18/2019 12/13/2019 12/6/2019 12/3/2019 11/22/2019 11/20/2019   Weight - 240 lb 1.6 oz 242 lb 14.4 oz 249 lb 14.4 oz 248 lb 3.2 oz 249 lb 12.8 oz -   Neck Circ (inches) - - - - - - 14   Waist Measure Inches 43 - - - - - 41   BMI - 42.53 kg/m2 43.03 kg/m2 44.27 kg/m2 43.97 kg/m2 44.25 kg/m2 -         Outpatient Medications Marked as Taking for the 12/18/19 encounter (Office Visit) with Raad Weber MD   Medication Sig Dispense Refill    levonorgestrel (MIRENA) 20 mcg/24 hours (5 yrs) 52 mg IUD 1 Device by IntraUTERine route once.  lisinopril (PRINIVIL, ZESTRIL) 20 mg tablet Take 1 Tab by mouth daily. Replaces nifedipine for blood pressure 90 Tab 3    insulin degludec (TRESIBA FLEXTOUCH U-100) 100 unit/mL (3 mL) inpn Inject 32 units once daily and increase as directed up to 100 units per day--Dose change 12/3/19--updated med list--did not send prescription to the pharmacy 30 mL 11    NOVOLOG FLEXPEN U-100 INSULIN 100 unit/mL (3 mL) inpn Inject 1:10 for carbs + 1 unit for 50 > 150 for correction. Max 100 units per day--Dose change 12/3/19--updated med list--did not send prescription to the pharmacy 30 mL 11    FREESTYLE PRECISION KIRK STRIPS strip Use as needed up to twice daily with [facility] reader to check blood sugar when having having problems with the [facility] sensors 50 Strip 11    ONETOUCH ULTRA BLUE TEST STRIP strip Test 4 times daily 400 Strip 3    multivit,calc,mins/iron/folic (ONE-A-DAY WOMENS FORMULA PO) Take  by mouth daily.       hydrocortisone (CORTAID) 1 % topical cream APPLY TO THE EFFECTED AREA 2 TIMES A DAY. USE THIN LAYER TWICE A DAY. 56 g 2    ondansetron (ZOFRAN ODT) 8 mg disintegrating tablet Take 1 Tab by mouth every eight (8) hours as needed for Nausea. 10 Tab 0    cetirizine (ZYRTEC) 10 mg tablet Take 10 mg by mouth.  norethindrone (JOEY) 0.35 mg tab Take  by mouth.  FREESTYLE NASREEN 14 DAY READER misc Use as directed 1 Each 0    FREESTYLE NASREEN 14 DAY SENSOR kit Use as directed every 14 days 2 Kit 11    ibuprofen (MOTRIN) 800 mg tablet Take 1 Tab by mouth every eight (8) hours. 30 Tab 1    LANCE PEN NEEDLE 32 gauge x 5/32\" ndle USE AS DIRECTED TO INJECT INSULIN 4 TIMES DAILY 400 Pen Needle 3    cholecalciferol, vitamin D3, (VITAMIN D3) 2,000 unit tab Take 4,000 Units by mouth daily. Participation   Did you attend clinic and class last week? no    Review of Systems  Since your last visit, have you experienced any complications? no  If yes, please list:       Are you taking an appetite suppressant? no  If so, is there any Chest Pain, Palpitations or Dizziness? BP Readings from Last 3 Encounters:   12/18/19 155/84   12/17/19 156/87   12/10/19 165/88       SLEEP:    Have you received any other medical care this week? no  If yes, where and for what? Have you discontinued or changed any medicine or dose of your medicine since your last visit with Dr Pardeep Subramanian? no  If yes, where and for what? Diet  How many ounces of calorie-free liquids did you consume each day? 64 oz    How many meal replacements did you take each day? 4    Did you have any problems adhering to the program?  no If yes, please explain:      Exercise  Aerobic exercise: 0 min  Resistance exercise: 0 workouts / week  Any discomfort?  no     If yes, where? Objective  Visit Vitals  /84   Pulse 73   Temp 98 °F (36.7 °C) (Oral)   Resp 19   Ht 5' 3\" (1.6 m)   Wt 240 lb 1.6 oz (108.9 kg)   LMP 12/10/2019   SpO2 95%   BMI 42.53 kg/m²     Patient's last menstrual period was 12/10/2019.       Physical Exam  Appearance: well,   Mental:A&O x 3, NAD  H:NC/AT,  EENT:   EOMI, PERRL, No scleral icterus  Neck: no bruit or JVD  Lung: clear, No W/R  CV: RRR  ABD: soft, active, nontender  Ext:  no Edema  Neuro: nonfocal  Assessment / Plan    Encounter Diagnoses   Name Primary?  Type 1 diabetes mellitus without complication ([facility] Yes    Morbid obesity with BMI of 40.0-44.9, adult ([facility]     Essential hypertension, benign      Diagnoses and all orders for this visit:    1. Type 1 diabetes mellitus without complication (HCC)  Try 34 units of tresiba and try no humalog until Friday. If still higher than 200 start 1 unit w each shake  2. Morbid obesity with BMI of 40.0-44.9, adult (HCC)  Lost 2 lbs the last 5 days  Doing well  3. Essential hypertension, benign  bp a little above goal  Recheck in 1 week keep working down the pounds    1. Weight management improved   Progress was reviewed with patient    2. Labs    Latest results reviewed with patient   Lab slip given to pt for f/up  labs        Over 20 minutes of the 30 minutes face to face time with Dee consisted of counseling & coordinating and/or discussing treatment plans in reference to her obesity The primary encounter diagnosis was Type 1 diabetes mellitus without complication ([facility] Diagnoses of Morbid obesity with BMI of 40.0-44.9, adult ([facility] and Essential hypertension, benign were also pertinent to this visit.

## 2019-12-18 NOTE — PATIENT INSTRUCTIONS
Try 34 units of tresiba and try no humalog until Friday.  If still higher than 200 start 1 unit short acting insulin  w each shake

## 2019-12-20 ENCOUNTER — CLINICAL SUPPORT (OUTPATIENT)
Dept: BARIATRICS/WEIGHT MGMT | Age: 34
End: 2019-12-20

## 2019-12-20 VITALS
SYSTOLIC BLOOD PRESSURE: 154 MMHG | HEART RATE: 78 BPM | DIASTOLIC BLOOD PRESSURE: 89 MMHG | WEIGHT: 239.6 LBS | BODY MASS INDEX: 42.44 KG/M2

## 2019-12-20 DIAGNOSIS — E10.9 TYPE 1 DIABETES MELLITUS WITHOUT COMPLICATION (HCC): Primary | ICD-10-CM

## 2019-12-20 DIAGNOSIS — E66.01 MORBID OBESITY WITH BMI OF 40.0-44.9, ADULT (HCC): ICD-10-CM

## 2019-12-20 RX ORDER — INSULIN PUMP SYRINGE, 3 ML
EACH MISCELLANEOUS
Qty: 1 KIT | Refills: 0 | Status: SHIPPED | OUTPATIENT
Start: 2019-12-20

## 2019-12-20 RX ORDER — LANCETS
EACH MISCELLANEOUS
Qty: 400 EACH | Refills: 3 | Status: SHIPPED | OUTPATIENT
Start: 2019-12-20

## 2019-12-21 NOTE — PROGRESS NOTES
Nurse note from patient's weekly VLCD / LCD / Maintenance class was reviewed. Pertinent medical concerns were:   Need more water     BP Readings from Last 3 Encounters:   12/20/19 154/89   12/18/19 155/84   12/17/19 156/87       Weight Metrics 12/20/2019 12/18/2019 12/18/2019 12/13/2019 12/6/2019 12/3/2019 11/22/2019   Weight 239 lb 9.6 oz - 240 lb 1.6 oz 242 lb 14.4 oz 249 lb 14.4 oz 248 lb 3.2 oz 249 lb 12.8 oz   Neck Circ (inches) - - - - - - -   Waist Measure Inches - 43 - - - - -   BMI 42.44 kg/m2 - 42.53 kg/m2 43.03 kg/m2 44.27 kg/m2 43.97 kg/m2 44.25 kg/m2       Current Outpatient Medications   Medication Sig Dispense Refill    Blood-Glucose Meter monitoring kit Check blood sugar four times a day 1 Kit 0    glucose blood VI test strips (BLOOD GLUCOSE TEST) strip Use to check blood sugar four times a day. 400 Strip 3    lancets misc Use to check blood sugar four times a day. 400 Each 3    levonorgestrel (MIRENA) 20 mcg/24 hours (5 yrs) 52 mg IUD 1 Device by IntraUTERine route once.  lisinopril (PRINIVIL, ZESTRIL) 20 mg tablet Take 1 Tab by mouth daily. Replaces nifedipine for blood pressure 90 Tab 3    insulin degludec (TRESIBA FLEXTOUCH U-100) 100 unit/mL (3 mL) inpn Inject 32 units once daily and increase as directed up to 100 units per day--Dose change 12/3/19--updated med list--did not send prescription to the pharmacy 30 mL 11    NOVOLOG FLEXPEN U-100 INSULIN 100 unit/mL (3 mL) inpn Inject 1:10 for carbs + 1 unit for 50 > 150 for correction. Max 100 units per day--Dose change 12/3/19--updated med list--did not send prescription to the pharmacy 30 mL 11    FREESTYLE PRECISION KIRK STRIPS strip Use as needed up to twice daily with [facility] reader to check blood sugar when having having problems with the [facility] sensors 50 Strip 11    ONETOUCH ULTRA BLUE TEST STRIP strip Test 4 times daily 400 Strip 3    multivit,calc,mins/iron/folic (ONE-A-DAY WOMENS FORMULA PO) Take  by mouth daily.       hydrocortisone (CORTAID) 1 % topical cream APPLY TO THE EFFECTED AREA 2 TIMES A DAY. USE THIN LAYER TWICE A DAY. 56 g 2    ondansetron (ZOFRAN ODT) 8 mg disintegrating tablet Take 1 Tab by mouth every eight (8) hours as needed for Nausea. 10 Tab 0    cetirizine (ZYRTEC) 10 mg tablet Take 10 mg by mouth.  norethindrone (JOEY) 0.35 mg tab Take  by mouth.  FREESTYLE NASREEN 14 DAY READER misc Use as directed 1 Each 0    FREESTYLE NASREEN 14 DAY SENSOR kit Use as directed every 14 days 2 Kit 11    ibuprofen (MOTRIN) 800 mg tablet Take 1 Tab by mouth every eight (8) hours. 30 Tab 1    LANCE PEN NEEDLE 32 gauge x 5/32\" ndle USE AS DIRECTED TO INJECT INSULIN 4 TIMES DAILY 400 Pen Needle 3    cholecalciferol, vitamin D3, (VITAMIN D3) 2,000 unit tab Take 4,000 Units by mouth daily.

## 2019-12-23 ENCOUNTER — DOCUMENTATION ONLY (OUTPATIENT)
Dept: FAMILY MEDICINE CLINIC | Age: 34
End: 2019-12-23

## 2019-12-23 NOTE — PROGRESS NOTES
Patient was in office on 12/20/19 for triage and c/o still experiencing elevated glucose readings in the high 300-400's. Pt believed it was being caused by the OTC Sudafed she was taking for cold symptoms. Pt glucose was checked in office and the reading was 182 in office. Dr. Gracia Beebe notified of pt concerns and she advised pt to increase Tresiba to 36 units and Novolog to 2 units with each supplement meal. Advised pt to continue to monitor BS readings and notify the office if no improvement. Also, advised pt per Dr. Gracia Beebe to continue taking Sudafed for cold symptoms.

## 2019-12-30 NOTE — PROGRESS NOTES
Nurse note from patient's weekly VLCD / LCD / Maintenance class was reviewed. Pertinent medical concerns were:   none     BP Readings from Last 3 Encounters:   12/20/19 154/89   12/18/19 155/84   12/17/19 156/87       Weight Metrics 12/20/2019 12/18/2019 12/18/2019 12/13/2019 12/6/2019 12/3/2019 11/22/2019   Weight 239 lb 9.6 oz - 240 lb 1.6 oz 242 lb 14.4 oz 249 lb 14.4 oz 248 lb 3.2 oz 249 lb 12.8 oz   Neck Circ (inches) - - - - - - -   Waist Measure Inches - 43 - - - - -   BMI 42.44 kg/m2 - 42.53 kg/m2 43.03 kg/m2 44.27 kg/m2 43.97 kg/m2 44.25 kg/m2       Current Outpatient Medications   Medication Sig Dispense Refill    Blood-Glucose Meter monitoring kit Check blood sugar four times a day 1 Kit 0    glucose blood VI test strips (BLOOD GLUCOSE TEST) strip Use to check blood sugar four times a day. 400 Strip 3    lancets misc Use to check blood sugar four times a day. 400 Each 3    levonorgestrel (MIRENA) 20 mcg/24 hours (5 yrs) 52 mg IUD 1 Device by IntraUTERine route once.  lisinopril (PRINIVIL, ZESTRIL) 20 mg tablet Take 1 Tab by mouth daily. Replaces nifedipine for blood pressure 90 Tab 3    insulin degludec (TRESIBA FLEXTOUCH U-100) 100 unit/mL (3 mL) inpn Inject 32 units once daily and increase as directed up to 100 units per day--Dose change 12/3/19--updated med list--did not send prescription to the pharmacy 30 mL 11    NOVOLOG FLEXPEN U-100 INSULIN 100 unit/mL (3 mL) inpn Inject 1:10 for carbs + 1 unit for 50 > 150 for correction. Max 100 units per day--Dose change 12/3/19--updated med list--did not send prescription to the pharmacy 30 mL 11    FREESTYLE PRECISION KIRK STRIPS strip Use as needed up to twice daily with [facility] reader to check blood sugar when having having problems with the [facility] sensors 50 Strip 11    ONETOUCH ULTRA BLUE TEST STRIP strip Test 4 times daily 400 Strip 3    multivit,calc,mins/iron/folic (ONE-A-DAY WOMENS FORMULA PO) Take  by mouth daily.       hydrocortisone (CORTAID) 1 % topical cream APPLY TO THE EFFECTED AREA 2 TIMES A DAY. USE THIN LAYER TWICE A DAY. 56 g 2    ondansetron (ZOFRAN ODT) 8 mg disintegrating tablet Take 1 Tab by mouth every eight (8) hours as needed for Nausea. 10 Tab 0    cetirizine (ZYRTEC) 10 mg tablet Take 10 mg by mouth.  norethindrone (JOEY) 0.35 mg tab Take  by mouth.  FREESTYLE NASREEN 14 DAY READER misc Use as directed 1 Each 0    FREESTYLE NASREEN 14 DAY SENSOR kit Use as directed every 14 days 2 Kit 11    ibuprofen (MOTRIN) 800 mg tablet Take 1 Tab by mouth every eight (8) hours. 30 Tab 1    LANCE PEN NEEDLE 32 gauge x 5/32\" ndle USE AS DIRECTED TO INJECT INSULIN 4 TIMES DAILY 400 Pen Needle 3    cholecalciferol, vitamin D3, (VITAMIN D3) 2,000 unit tab Take 4,000 Units by mouth daily.

## 2020-01-03 ENCOUNTER — CLINICAL SUPPORT (OUTPATIENT)
Dept: BARIATRICS/WEIGHT MGMT | Age: 35
End: 2020-01-03

## 2020-01-03 VITALS
SYSTOLIC BLOOD PRESSURE: 137 MMHG | BODY MASS INDEX: 42.58 KG/M2 | DIASTOLIC BLOOD PRESSURE: 84 MMHG | HEART RATE: 73 BPM | WEIGHT: 240.4 LBS

## 2020-01-03 DIAGNOSIS — E66.01 MORBID OBESITY WITH BMI OF 40.0-44.9, ADULT (HCC): ICD-10-CM

## 2020-01-03 DIAGNOSIS — E10.9 TYPE 1 DIABETES MELLITUS WITHOUT COMPLICATION (HCC): Primary | ICD-10-CM

## 2020-01-07 DIAGNOSIS — R63.4 RAPID WEIGHT LOSS: ICD-10-CM

## 2020-01-07 DIAGNOSIS — I10 ESSENTIAL HYPERTENSION, BENIGN: ICD-10-CM

## 2020-01-07 DIAGNOSIS — E10.9 TYPE 1 DIABETES MELLITUS WITHOUT COMPLICATION (HCC): Primary | ICD-10-CM

## 2020-01-07 DIAGNOSIS — E66.01 MORBID OBESITY WITH BMI OF 40.0-44.9, ADULT (HCC): ICD-10-CM

## 2020-01-08 ENCOUNTER — OFFICE VISIT (OUTPATIENT)
Dept: FAMILY MEDICINE CLINIC | Age: 35
End: 2020-01-08

## 2020-01-08 VITALS
OXYGEN SATURATION: 99 % | HEART RATE: 71 BPM | HEIGHT: 63 IN | TEMPERATURE: 96.3 F | DIASTOLIC BLOOD PRESSURE: 61 MMHG | RESPIRATION RATE: 16 BRPM | BODY MASS INDEX: 42.13 KG/M2 | SYSTOLIC BLOOD PRESSURE: 115 MMHG | WEIGHT: 237.8 LBS

## 2020-01-08 DIAGNOSIS — Z23 ENCOUNTER FOR IMMUNIZATION: ICD-10-CM

## 2020-01-08 DIAGNOSIS — M26.621 TMJ TENDERNESS, RIGHT: ICD-10-CM

## 2020-01-08 DIAGNOSIS — E10.9 TYPE 1 DIABETES MELLITUS WITHOUT COMPLICATION (HCC): Primary | ICD-10-CM

## 2020-01-08 DIAGNOSIS — Z85.3 HISTORY OF BREAST CANCER: ICD-10-CM

## 2020-01-08 DIAGNOSIS — E66.01 MORBID OBESITY WITH BMI OF 40.0-44.9, ADULT (HCC): ICD-10-CM

## 2020-01-08 DIAGNOSIS — I10 ESSENTIAL HYPERTENSION, BENIGN: ICD-10-CM

## 2020-01-08 DIAGNOSIS — M65.30 TRIGGER FINGER, UNSPECIFIED FINGER, UNSPECIFIED LATERALITY: ICD-10-CM

## 2020-01-08 NOTE — PROGRESS NOTES
HISTORY OF PRESENT ILLNESS  Javon Dickey is a 37 y.o. female. HPI  Patient comes in today for follow up. Had injections in hands for trigger finger. Would like referral back to hand specialist as she is now having trigger finger in left index finger and right middle finger. Followed by Dr. Don Maya for weight management, started in Oct 2019. Last visit 19. Has lost ~30 pounds. Was having elevated BG - increase Tresiba to 36 units and Novolog to 2 units with each supplement meal.  States she is still having lows noted on her Freestyle Mitchell at night. She is not waking up from hypoglycemia. She is not having a snack before bedtime. Patient to discuss with Dr. Iman Faith an acceptable snack to prevent overnight lows. Daughter was born 19 via , was taken a month early due to patient's hx of heart failure in past, HTN. Diabetes. Hx breast cancer in , had right breast mastectomy. Patient states she was interested in having right breast reconstruction, not sure if she wants to do now. She needed to lose weight before they would consider surgery. Followed by endocrinology for diabetes, last visit 12/3/19. Last A1c 9% on 19, which is increased from 8.6% on 10/23/19. Has appointment with Dr. Jacinda Zhang on 2020  Hx of cardiomyopathy after chemotherapy. Allergies   Allergen Reactions    Codeine Nausea and Vomiting       Past Medical History:   Diagnosis Date    Breast cancer [facility]) 2012    Right breast infiltrating ductal carcinoma    Cardiomyopathy due to chemotherapy ([address]     EF 20 %    Essential hypertension     Gestational hypertension     History of sepsis 2013    after chemo    Hx of difficult intubation     resulting from sepsis in 2013.       Hypertension     Morbid obesity ([address]     Ne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 ([address]     Type I (juvenile type) diabetes mellitus without mention of complication, uncontrolled     diagnosed age 4  Unspecified vitamin D deficiency 2011       Past Surgical History:   Procedure Laterality Date    BREAST SURGERY PROCEDURE UNLISTED      R Breast Mastectomy    HX  SECTION      HX CYST INCISION AND DRAINAGE  2013    perirectal abscess    HX GYN       in     HX MASTECTOMY Right     Right MRM with sentinel LNB.  HX ORTHOPAEDIC Right     Carpal tunnel release, index finger trigger release.     HX OTHER SURGICAL      cyst removed under left arm    HX OTHER SURGICAL      port placement for chemo    HX WISDOM TEETH EXTRACTION  08/15/2018       Social History     Socioeconomic History    Marital status:      Spouse name: Not on file    Number of children: Not on file    Years of education: Not on file    Highest education level: Not on file   Occupational History    Not on file   Social Needs    Financial resource strain: Not on file    Food insecurity:     Worry: Not on file     Inability: Not on file    Transportation needs:     Medical: Not on file     Non-medical: Not on file   Tobacco Use    Smoking status: Never Smoker    Smokeless tobacco: Never Used   Substance and Sexual Activity    Alcohol use: No    Drug use: No    Sexual activity: Yes     Partners: Male   Lifestyle    Physical activity:     Days per week: Not on file     Minutes per session: Not on file    Stress: Not on file   Relationships    Social connections:     Talks on phone: Not on file     Gets together: Not on file     Attends Alevism service: Not on file     Active member of club or organization: Not on file     Attends meetings of clubs or organizations: Not on file     Relationship status: Not on file    Intimate partner violence:     Fear of current or ex partner: Not on file     Emotionally abused: Not on file     Physically abused: Not on file     Forced sexual activity: Not on file   Other Topics Concern   [address] Service Not Asked    Blood Transfusions Not Asked   [address] [place] [address] Caffeine Concern Not Asked    Occupational Exposure Not Asked    Hobby Hazards Not Asked    Sleep Concern Not Asked    Stress Concern Not Asked    Weight Concern Not Asked    Special Diet Not Asked    Back Care Not Asked    Exercise Not Asked    Bike Helmet Not Asked   [address] Not Asked    Self-Exams Not Asked   Social History Narrative    Lives in [place] with her  and 6 yo son. Got  in July 2017. Currently in [address] [place] [address],[place] [address] for a masters in [employer]. Family History   Problem Relation Age of Onset    Diabetes Brother         borderline Type 2    Diabetes Paternal Uncle     Diabetes Paternal Grandfather     Heart Disease Paternal Grandfather         MI in his 62s    Hypertension Mother     Heart Disease Father     Stroke Neg Hx        Current Outpatient Medications   Medication Sig    Blood-Glucose Meter monitoring kit Check blood sugar four times a day    glucose blood VI test strips (BLOOD GLUCOSE TEST) strip Use to check blood sugar four times a day.  lancets misc Use to check blood sugar four times a day.  levonorgestrel (MIRENA) 20 mcg/24 hours (5 yrs) 52 mg IUD 1 Device by IntraUTERine route once.  lisinopril (PRINIVIL, ZESTRIL) 20 mg tablet Take 1 Tab by mouth daily. Replaces nifedipine for blood pressure    insulin degludec (TRESIBA FLEXTOUCH U-100) 100 unit/mL (3 mL) inpn Inject 32 units once daily and increase as directed up to 100 units per day--Dose change 12/3/19--updated med list--did not send prescription to the pharmacy    NOVOLOG FLEXPEN U-100 INSULIN 100 unit/mL (3 mL) inpn Inject 1:10 for carbs + 1 unit for 50 > 150 for correction.   Max 100 units per day--Dose change 12/3/19--updated med list--did not send prescription to the pharmacy    FREESTYLE PRECISION KIRK STRIPS strip Use as needed up to twice daily with [facility] reader to check blood sugar when having having problems with the [place] sensors    ONETOUCH ULTRA BLUE TEST STRIP strip Test 4 times daily    multivit,calc,mins/iron/folic (ONE-A-DAY WOMENS FORMULA PO) Take  by mouth daily.  hydrocortisone (CORTAID) 1 % topical cream APPLY TO THE EFFECTED AREA 2 TIMES A DAY. USE THIN LAYER TWICE A DAY.  ondansetron (ZOFRAN ODT) 8 mg disintegrating tablet Take 1 Tab by mouth every eight (8) hours as needed for Nausea.  cetirizine (ZYRTEC) 10 mg tablet Take 10 mg by mouth.  FREESTYLE NASREEN 14 DAY READER misc Use as directed    FREESTYLE NASREEN 14 DAY SENSOR kit Use as directed every 14 days    ibuprofen (MOTRIN) 800 mg tablet Take 1 Tab by mouth every eight (8) hours.  LANCE PEN NEEDLE 32 gauge x 5/32\" ndle USE AS DIRECTED TO INJECT INSULIN 4 TIMES DAILY    cholecalciferol, vitamin D3, (VITAMIN D3) 2,000 unit tab Take 4,000 Units by mouth daily. No current facility-administered medications for this visit. Review of Systems   Constitutional: Negative for chills, diaphoresis, fever, malaise/fatigue and weight loss. HENT: Negative for congestion, ear pain, sore throat and tinnitus. Right jaw pain   Eyes: Negative for blurred vision and double vision. Respiratory: Negative for cough, sputum production, shortness of breath and wheezing. Cardiovascular: Negative for chest pain, palpitations and leg swelling. Gastrointestinal: Negative for abdominal pain, blood in stool, constipation, diarrhea, nausea and vomiting. Genitourinary: Negative for dysuria, flank pain, frequency, hematuria and urgency. Musculoskeletal: Negative for back pain, joint pain and myalgias. Trigger finger bilaterally   Skin: Negative for itching and rash. Neurological: Negative for dizziness, tingling, sensory change, speech change, focal weakness and headaches. Psychiatric/Behavioral: Negative for depression. The patient is not nervous/anxious and does not have insomnia.       Vitals:    01/08/20 0904   BP: 115/61   Pulse: 71   Resp: 16   Temp: 96.3 °F (35.7 °C)   TempSrc: Oral   SpO2: 99%   Weight: 237 lb 12.8 oz (107.9 kg)   Height: 5' 3\" (1.6 m)     Physical Exam  Vitals signs reviewed. Constitutional:       Appearance: Normal appearance. She is well-developed. HENT:      Head:      Jaw: There is normal jaw occlusion. Tenderness (on right TMJ) present. No trismus, swelling or pain on movement. Right Ear: Hearing, tympanic membrane, ear canal and external ear normal.      Left Ear: Hearing, tympanic membrane, ear canal and external ear normal.      Nose: Nose normal.      Mouth/Throat:      Pharynx: Uvula midline. Neck:      Thyroid: No thyromegaly. Cardiovascular:      Rate and Rhythm: Normal rate and regular rhythm. Pulses:           Dorsalis pedis pulses are 2+ on the right side and 2+ on the left side. Heart sounds: Normal heart sounds, S1 normal and S2 normal. No murmur. Comments: No edema noted  Pulmonary:      Effort: Pulmonary effort is normal.      Breath sounds: Normal breath sounds. Abdominal:      General: Bowel sounds are normal.      Palpations: Abdomen is soft. Tenderness: There is no tenderness. Musculoskeletal: Normal range of motion. Comments: Positive phalen's on left   Lymphadenopathy:      Cervical: No cervical adenopathy. Upper Body:      Right upper body: No supraclavicular adenopathy. Left upper body: No supraclavicular adenopathy. Skin:     General: Skin is warm and dry. Neurological:      Mental Status: She is alert and oriented to person, place, and time. Psychiatric:         Speech: Speech normal.         Behavior: Behavior normal. Behavior is cooperative. Thought Content: Thought content normal.         ASSESSMENT and PLAN    ICD-10-CM ICD-9-CM    1. Type 1 diabetes mellitus without complication (HCC) Z12.0 250.01    2. Essential hypertension, benign I10 401.1    3. Morbid obesity with BMI of 40.0-44.9, adult [facility] E66.01 278.01     Z68.41 V85.41    4. History of breast cancer Z85.3 V10.3    5.  Trigger finger, unspecified finger, unspecified laterality M65.30 727.03    6. TMJ tenderness, right M26.621 524.62    7. Encounter for immunization Z23 V03.89 INFLUENZA VIRUS 72 Rufe Sánchez IM     Encounter Diagnoses   Name Primary?  Type 1 diabetes mellitus without complication (HCC) Yes    Essential hypertension, benign     Morbid obesity with BMI of 40.0-44.9, adult (HCC)     History of breast cancer     Trigger finger, unspecified finger, unspecified laterality     TMJ tenderness, right     Encounter for immunization      Orders Placed This Encounter    Influenza virus vaccine (QUADRIVALENT PRES FREE SYRINGE) IM ([id number])     Diagnoses and all orders for this visit:    1. Type 1 diabetes mellitus without complication (HCC)  -     Followed by endocrinology. She is having lows at night since being on liquid low carb diet program.  Patient to discuss bedtime snack options with Dr. Meme Candelaria to prevent overnight lows. Has follow up with Dr. Jossy Johnston in April 2020    2. Essential hypertension, benign - stable. 3. Morbid obesity with BMI of 40.0-44.9, adult [facility])  -     Being followed for medical weight loss with Dr. Monik Leon. Has lost 30 pounds since starting program.  Congratulated patient on successful weight loss, continue program.    4. History of breast cancer - followed by [facility]    5. Trigger finger, unspecified finger, unspecified laterality - patient to schedule with hand surgery    6. TMJ tenderness, right - OTC NSAID such as aleve, warm compresses. Schedule to see dentist for panoramic films    7. Encounter for immunization  -     FLU VACCINE      Follow-up and Dispositions    · Return in about 6 months (around 7/8/2020), or if symptoms worsen or fail to improve. I have reviewed the patient's allergies and made any necessary changes. Medical, procedural, social and family histories have been reviewed and updated as medically indicated.  I have reconciled and/or revised patient medications in the EMR. I have discussed each diagnosis listed in this note with Calixto Mehta and/or their family. I have discussed treatment options and the risk/benefit analysis of those options, including safe use of medications and possible medication side effects. Through the use of shared decision making we have agreed to the above plan. The patient has received an after-visit summary and questions were answered concerning future plans. Ellen Ding, JORDANP-C    This note will not be viewable in [employer].

## 2020-01-08 NOTE — PROGRESS NOTES
The blood sugar result was a little worse than the prior one.  The true test will be the one we do in march  The liver and kidney are fine  Come in 2-3 days prior to the next visit to do the  monthly labs

## 2020-01-08 NOTE — LETTER
NOTIFICATION RETURN TO WORK 
 
1/8/2020 9:54 AM 
 
[address]. [address] [facility] [id number] To Whom It May Concern: 
 
Sherrell Saez is currently under the care of Πορταριά Milind. She was seen in the office today, 1/8/2020. If there are questions or concerns please have the patient contact our office. Sincerely, Jamshid Banegas NP

## 2020-01-08 NOTE — PATIENT INSTRUCTIONS
Contact Us  [facility]   [address] [place] [address] Cortez Bunn. [address] [facility] [place]   (p): [phone] (f): [phone]  Hours  Monday: Surgery  Tuesday: 8am-5 a.m. Office  Wednesday: 8-11 p.m. Office  Afternoon Surgery  Thursday: 2 pm-5 pm Office  7:30 a.m.-2 p.m. Surgery  Friday: 8 am-11:15 a.m. Afternoon Surgery       Temporomandibular Disorder: Care Instructions  Your Care Instructions    Temporomandibular (TM) disorders are a problem with the muscles and joints that connect your jaw to your skull. They cause pain when you open your mouth, chew, or yawn. You may feel this pain on one or both sides. TM disorders are often caused by tight jaw muscles. The tightness can be caused by clenching or grinding your teeth. This may happen when you have a lot of stress in your life. If you lower your stress, you may be able to stop clenching or grinding your teeth. This will help relax your jaw and reduce your pain. You may also be able to do some things at home to feel better. But if none of this works, your doctor may prescribe medicine to help relax your muscles and control the pain. Follow-up care is a key part of your treatment and safety. Be sure to make and go to all appointments, and call your doctor if you are having problems. It's also a good idea to know your test results and keep a list of the medicines you take. How can you care for yourself at home? · Put a warm, moist cloth or heating pad set on low on your jaw. Do this for 10 to 20 minutes at a time. Put a thin cloth between the heating pad and your skin. · Avoid hard or chewy foods that cause your jaws to work very hard. Examples include popcorn, jerky, tough meats, chewy breads, gum, and raw apples and carrots. · Choose softer foods that are easy to chew. These include eggs, yogurt, and soup. · Cut your food into small pieces. Chew slowly.   · If your jaw gets too painful to chew, or if it locks, you may need to puree your food for a few days or weeks. · To relax your jaw, repeat this exercise for a few minutes every morning and evening. Watch yourself in a mirror. Gently open and close your mouth. Move your jaw straight up and down. But don't do this if it makes your pain worse. · Get at least 30 minutes of exercise on most days of the week to relieve stress. Walking is a good choice. You also may want to do other activities, such as running, swimming, cycling, or playing tennis or team sports. · Do not:  ? Hold a phone between your shoulder and your jaw. ? Open your mouth all the way, like when you sing loudly or yawn. ? Clench or grind your teeth, bite your lips, or chew your fingernails. ? Clench things such as pens, pipes, or cigars between your teeth. When should you call for help? Call your doctor now or seek immediate medical care if:    · Your jaw is locked open or shut or it is hard to move your jaw.    Watch closely for changes in your health, and be sure to contact your doctor if:    · Your jaw pain gets worse.     · Your face is swollen.     · You do not get better as expected. Where can you learn more? Go to http://kelly-wili.info/. Enter R211 in the search box to learn more about \"Temporomandibular Disorder: Care Instructions. \"  Current as of: October 3, 2018  Content Version: 12.2  © 4322-7770 [employer]. Care instructions adapted under license by [employer] (which disclaims liability or warranty for this information). If you have questions about a medical condition or this instruction, always ask your healthcare professional. [place] [address] any warranty or liability for your use of this information. Vaccine Information Statement    Influenza (Flu) Vaccine (Inactivated or Recombinant): What You Need to Know    Many Vaccine Information Statements are available in Welsh and other languages.  See www.immunize.org/tierra Samuel de información sobre vacunas están disponibles en español y en muchos otros idiomas. Visite www.immunize.org/vis    1. Why get vaccinated? Influenza vaccine can prevent influenza (flu). Flu is a contagious disease that spreads around the [place] every year, usually between October and May. Anyone can get the flu, but it is more dangerous for some people. Infants and young children, people 72years of age and older, pregnant women, and people with certain health conditions or a weakened immune system are at greatest risk of flu complications. Pneumonia, bronchitis, sinus infections and ear infections are examples of flu-related complications. If you have a medical condition, such as heart disease, cancer or diabetes, flu can make it worse. Flu can cause fever and chills, sore throat, muscle aches, fatigue, cough, headache, and runny or stuffy nose. Some people may have vomiting and diarrhea, though this is more common in children than adults. Each year thousands of people in the [facility] die from flu, and many more are hospitalized. Flu vaccine prevents millions of illnesses and flu-related visits to the doctor each year. 2. Influenza vaccines     CDC recommends everyone 10months of age and older get vaccinated every flu season. Children 6 months through 6years of age may need 2 doses during a single flu season. Everyone else needs only 1 dose each flu season. It takes about 2 weeks for protection to develop after vaccination. There are many flu viruses, and they are always changing. Each year a new flu vaccine is made to protect against three or four viruses that are likely to cause disease in the upcoming flu season. Even when the vaccine doesnt exactly match these viruses, it may still provide some protection. Influenza vaccine does not cause flu. Influenza vaccine may be given at the same time as other vaccines.     3. Talk with your health care provider    Tell your vaccine provider if the person getting the vaccine:   Has had an allergic reaction after a previous dose of influenza vaccine, or has any severe, life-threatening allergies.  Has ever had Guillain-Barré Syndrome (also called GBS). In some cases, your health care provider may decide to postpone influenza vaccination to a future visit. People with minor illnesses, such as a cold, may be vaccinated. People who are moderately or severely ill should usually wait until they recover before getting influenza vaccine. Your health care provider can give you more information. 4. Risks of a reaction     Soreness, redness, and swelling where shot is given, fever, muscle aches, and headache can happen after influenza vaccine.  There may be a very small increased risk of Guillain-Barré Syndrome (GBS) after inactivated influenza vaccine (the flu shot). Sofi Battles children who get the flu shot along with pneumococcal vaccine (PCV13), and/or DTaP vaccine at the same time might be slightly more likely to have a seizure caused by fever. Tell your health care provider if a child who is getting flu vaccine has ever had a seizure. People sometimes faint after medical procedures, including vaccination. Tell your provider if you feel dizzy or have vision changes or ringing in the ears. As with any medicine, there is a very remote chance of a vaccine causing a severe allergic reaction, other serious injury, or death. 5. What if there is a serious problem? An allergic reaction could occur after the vaccinated person leaves the clinic. If you see signs of a severe allergic reaction (hives, swelling of the face and throat, difficulty breathing, a fast heartbeat, dizziness, or weakness), call 9-1-1 and get the person to the nearest hospital.    For other signs that concern you, call your health care provider. Adverse reactions should be reported to the Vaccine Adverse Event Reporting System (VAERS).  Your health care provider will usually file this report, or you can do it yourself. Visit the VAERS website at www.vaers. [facility].gov or call 6-987.918.2358. VAERS is only for reporting reactions, and [facility] staff do not give medical advice. 6. The National Vaccine Injury Compensation Program    The [facility] Vaccine Injury Compensation Program (VICP) is a federal program that was created to compensate people who may have been injured by certain vaccines. Visit the VICP website at www.[facility].gov/vaccinecompensation or call 3-871.999.1075 to learn about the program and about filing a claim. There is a time limit to file a claim for compensation. 7. How can I learn more?  Ask your health care provider.  Call your local or state health department.  Contact the Centers for Disease Control and Prevention (CDC):  - Call 4-652.310.1767 (1-800-CDC-INFO) or  - Visit CDCs influenza website at www.cdc.gov/flu    Vaccine Information Statement (Interim)  Inactivated Influenza Vaccine   8/15/2019  42 VEL Black [id number]   Department of Health and Human Services  Centers for Disease Control and Prevention    Office Use Only

## 2020-01-10 ENCOUNTER — CLINICAL SUPPORT (OUTPATIENT)
Dept: BARIATRICS/WEIGHT MGMT | Age: 35
End: 2020-01-10

## 2020-01-10 VITALS
DIASTOLIC BLOOD PRESSURE: 84 MMHG | WEIGHT: 238.2 LBS | BODY MASS INDEX: 42.2 KG/M2 | HEART RATE: 75 BPM | SYSTOLIC BLOOD PRESSURE: 147 MMHG

## 2020-01-10 DIAGNOSIS — E66.01 MORBID OBESITY WITH BMI OF 40.0-44.9, ADULT (HCC): ICD-10-CM

## 2020-01-10 DIAGNOSIS — E10.9 TYPE 1 DIABETES MELLITUS WITHOUT COMPLICATION (HCC): Primary | ICD-10-CM

## 2020-01-12 NOTE — PROGRESS NOTES
Nurse note from patient's weekly VLCD / LCD / Maintenance class was reviewed. Pertinent medical concerns were:   none     BP Readings from Last 3 Encounters:   01/10/20 147/84   01/08/20 115/61   01/03/20 137/84       Weight Metrics 1/10/2020 1/8/2020 1/3/2020 12/20/2019 12/18/2019 12/18/2019 12/13/2019   Weight 238 lb 3.2 oz 237 lb 12.8 oz 240 lb 6.4 oz 239 lb 9.6 oz - 240 lb 1.6 oz 242 lb 14.4 oz   Neck Circ (inches) - - - - - - -   Waist Measure Inches - - - - 43 - -   BMI 42.2 kg/m2 42.12 kg/m2 42.58 kg/m2 42.44 kg/m2 - 42.53 kg/m2 43.03 kg/m2       Current Outpatient Medications   Medication Sig Dispense Refill    Blood-Glucose Meter monitoring kit Check blood sugar four times a day 1 Kit 0    glucose blood VI test strips (BLOOD GLUCOSE TEST) strip Use to check blood sugar four times a day. 400 Strip 3    lancets misc Use to check blood sugar four times a day. 400 Each 3    levonorgestrel (MIRENA) 20 mcg/24 hours (5 yrs) 52 mg IUD 1 Device by IntraUTERine route once.  lisinopril (PRINIVIL, ZESTRIL) 20 mg tablet Take 1 Tab by mouth daily. Replaces nifedipine for blood pressure 90 Tab 3    insulin degludec (TRESIBA FLEXTOUCH U-100) 100 unit/mL (3 mL) inpn Inject 32 units once daily and increase as directed up to 100 units per day--Dose change 12/3/19--updated med list--did not send prescription to the pharmacy 30 mL 11    NOVOLOG FLEXPEN U-100 INSULIN 100 unit/mL (3 mL) inpn Inject 1:10 for carbs + 1 unit for 50 > 150 for correction. Max 100 units per day--Dose change 12/3/19--updated med list--did not send prescription to the pharmacy 30 mL 11    FREESTYLE PRECISION KIRK STRIPS strip Use as needed up to twice daily with [facility] reader to check blood sugar when having having problems with the [facility] sensors 50 Strip 11    ONETOUCH ULTRA BLUE TEST STRIP strip Test 4 times daily 400 Strip 3    multivit,calc,mins/iron/folic (ONE-A-DAY WOMENS FORMULA PO) Take  by mouth daily.       hydrocortisone (CORTAID) 1 % topical cream APPLY TO THE EFFECTED AREA 2 TIMES A DAY. USE THIN LAYER TWICE A DAY. 56 g 2    ondansetron (ZOFRAN ODT) 8 mg disintegrating tablet Take 1 Tab by mouth every eight (8) hours as needed for Nausea. 10 Tab 0    cetirizine (ZYRTEC) 10 mg tablet Take 10 mg by mouth.  FREESTYLE NASREEN 14 DAY READER misc Use as directed 1 Each 0    FREESTYLE NASREEN 14 DAY SENSOR kit Use as directed every 14 days 2 Kit 11    ibuprofen (MOTRIN) 800 mg tablet Take 1 Tab by mouth every eight (8) hours. 30 Tab 1    LANCE PEN NEEDLE 32 gauge x 5/32\" ndle USE AS DIRECTED TO INJECT INSULIN 4 TIMES DAILY 400 Pen Needle 3    cholecalciferol, vitamin D3, (VITAMIN D3) 2,000 unit tab Take 4,000 Units by mouth daily.

## 2020-01-17 ENCOUNTER — CLINICAL SUPPORT (OUTPATIENT)
Dept: BARIATRICS/WEIGHT MGMT | Age: 35
End: 2020-01-17

## 2020-01-17 VITALS
BODY MASS INDEX: 42.09 KG/M2 | SYSTOLIC BLOOD PRESSURE: 154 MMHG | WEIGHT: 237.6 LBS | HEART RATE: 67 BPM | DIASTOLIC BLOOD PRESSURE: 83 MMHG

## 2020-01-17 DIAGNOSIS — E10.9 TYPE 1 DIABETES MELLITUS WITHOUT COMPLICATION (HCC): Primary | ICD-10-CM

## 2020-01-17 DIAGNOSIS — E66.01 MORBID OBESITY WITH BMI OF 40.0-44.9, ADULT (HCC): ICD-10-CM

## 2020-01-20 LAB
ALBUMIN SERPL-MCNC: 3.8 G/DL (ref 3.5–5.5)
ALBUMIN/GLOB SERPL: 1.2 {RATIO} (ref 1.2–2.2)
ALP SERPL-CCNC: 93 IU/L (ref 39–117)
ALT SERPL-CCNC: 13 IU/L (ref 0–32)
AST SERPL-CCNC: 12 IU/L (ref 0–40)
BILIRUB SERPL-MCNC: <0.2 MG/DL (ref 0–1.2)
BUN SERPL-MCNC: 14 MG/DL (ref 6–20)
BUN/CREAT SERPL: 19 (ref 9–23)
CALCIUM SERPL-MCNC: 9.6 MG/DL (ref 8.7–10.2)
CHLORIDE SERPL-SCNC: 99 MMOL/L (ref 96–106)
CO2 SERPL-SCNC: 20 MMOL/L (ref 20–29)
CREAT SERPL-MCNC: 0.75 MG/DL (ref 0.57–1)
GLOBULIN SER CALC-MCNC: 3.2 G/DL (ref 1.5–4.5)
GLUCOSE SERPL-MCNC: 164 MG/DL (ref 65–99)
MAGNESIUM SERPL-MCNC: 1.7 MG/DL (ref 1.6–2.3)
POTASSIUM SERPL-SCNC: 4.6 MMOL/L (ref 3.5–5.2)
PROT SERPL-MCNC: 7 G/DL (ref 6–8.5)
SODIUM SERPL-SCNC: 137 MMOL/L (ref 134–144)
URATE SERPL-MCNC: 2.2 MG/DL (ref 2.5–7.1)

## 2020-01-22 ENCOUNTER — OFFICE VISIT (OUTPATIENT)
Dept: FAMILY MEDICINE CLINIC | Age: 35
End: 2020-01-22

## 2020-01-22 VITALS
WEIGHT: 229.3 LBS | DIASTOLIC BLOOD PRESSURE: 99 MMHG | RESPIRATION RATE: 16 BRPM | BODY MASS INDEX: 40.63 KG/M2 | HEIGHT: 63 IN | TEMPERATURE: 98 F | OXYGEN SATURATION: 98 % | SYSTOLIC BLOOD PRESSURE: 157 MMHG | HEART RATE: 58 BPM

## 2020-01-22 DIAGNOSIS — I10 ESSENTIAL HYPERTENSION, BENIGN: Primary | ICD-10-CM

## 2020-01-22 DIAGNOSIS — E10.9 TYPE 1 DIABETES MELLITUS WITHOUT COMPLICATION (HCC): ICD-10-CM

## 2020-01-22 DIAGNOSIS — R63.4 RAPID WEIGHT LOSS: ICD-10-CM

## 2020-01-22 DIAGNOSIS — E66.01 MORBID OBESITY WITH BMI OF 40.0-44.9, ADULT (HCC): ICD-10-CM

## 2020-01-22 NOTE — PROGRESS NOTES
Nurse note from patient's weekly VLCD / LCD / Maintenance class was reviewed. Pertinent medical concerns were:  none     BP Readings from Last 3 Encounters:   01/17/20 154/83   01/10/20 147/84   01/08/20 115/61       Weight Metrics 1/17/2020 1/10/2020 1/8/2020 1/3/2020 12/20/2019 12/18/2019 12/18/2019   Weight 237 lb 9.6 oz 238 lb 3.2 oz 237 lb 12.8 oz 240 lb 6.4 oz 239 lb 9.6 oz - 240 lb 1.6 oz   Neck Circ (inches) - - - - - - -   Waist Measure Inches - - - - - 43 -   BMI 42.09 kg/m2 42.2 kg/m2 42.12 kg/m2 42.58 kg/m2 42.44 kg/m2 - 42.53 kg/m2       Current Outpatient Medications   Medication Sig Dispense Refill    Blood-Glucose Meter monitoring kit Check blood sugar four times a day 1 Kit 0    glucose blood VI test strips (BLOOD GLUCOSE TEST) strip Use to check blood sugar four times a day. 400 Strip 3    lancets misc Use to check blood sugar four times a day. 400 Each 3    levonorgestrel (MIRENA) 20 mcg/24 hours (5 yrs) 52 mg IUD 1 Device by IntraUTERine route once.  lisinopril (PRINIVIL, ZESTRIL) 20 mg tablet Take 1 Tab by mouth daily. Replaces nifedipine for blood pressure 90 Tab 3    insulin degludec (TRESIBA FLEXTOUCH U-100) 100 unit/mL (3 mL) inpn Inject 32 units once daily and increase as directed up to 100 units per day--Dose change 12/3/19--updated med list--did not send prescription to the pharmacy 30 mL 11    NOVOLOG FLEXPEN U-100 INSULIN 100 unit/mL (3 mL) inpn Inject 1:10 for carbs + 1 unit for 50 > 150 for correction. Max 100 units per day--Dose change 12/3/19--updated med list--did not send prescription to the pharmacy 30 mL 11    FREESTYLE PRECISION KRIK STRIPS strip Use as needed up to twice daily with [facility] reader to check blood sugar when having having problems with the [facility] sensors 50 Strip 11    ONETOUCH ULTRA BLUE TEST STRIP strip Test 4 times daily 400 Strip 3    multivit,calc,mins/iron/folic (ONE-A-DAY WOMENS FORMULA PO) Take  by mouth daily.       hydrocortisone (CORTAID) 1 % topical cream APPLY TO THE EFFECTED AREA 2 TIMES A DAY. USE THIN LAYER TWICE A DAY. 56 g 2    ondansetron (ZOFRAN ODT) 8 mg disintegrating tablet Take 1 Tab by mouth every eight (8) hours as needed for Nausea. 10 Tab 0    cetirizine (ZYRTEC) 10 mg tablet Take 10 mg by mouth.  FREESTYLE NASREEN 14 DAY READER misc Use as directed 1 Each 0    FREESTYLE NASREEN 14 DAY SENSOR kit Use as directed every 14 days 2 Kit 11    ibuprofen (MOTRIN) 800 mg tablet Take 1 Tab by mouth every eight (8) hours. 30 Tab 1    LANCE PEN NEEDLE 32 gauge x 5/32\" ndle USE AS DIRECTED TO INJECT INSULIN 4 TIMES DAILY 400 Pen Needle 3    cholecalciferol, vitamin D3, (VITAMIN D3) 2,000 unit tab Take 4,000 Units by mouth daily.

## 2020-01-22 NOTE — PROGRESS NOTES
Deepika Pedroza is a 38 y.o. female , id x 2(name and ). Reviewed record, history, and  medications. Chief Complaint   Patient presents with    Weight Management     MSWL, discuss exercise    Diabetes     would like to update on BG. Vitals:    20 0830   BP: (!) 157/99   Pulse: (!) 58   Resp: 16   Temp: 98 °F (36.7 °C)   SpO2: 98%   Weight: 229 lb 4.8 oz (104 kg)   Height: 5' 3\" (1.6 m)   PainSc:   0 - No pain   LMP: 2020     * Body Weight: 229.3 ln  Body Fat%: 43.0%  Muscle Mass Weight: 40.2  Body Water Weight: 96.3%  Basal Metabolic Rate: 3760  BMI: 40.62     Coordination of Care Questionnaire:   1) Have you been to an emergency room, urgent care, or hospitalized since your last visit?   no       2. Have seen or consulted any other health care provider since your last visit? NO    3 most recent PHQ Screens 2020   Little interest or pleasure in doing things Not at all   Feeling down, depressed, irritable, or hopeless Not at all   Total Score PHQ 2 0       Patient is accompanied by self I have received verbal consent from Deepika Pedroza to discuss any/all medical information while they are present in the room.

## 2020-01-22 NOTE — PROGRESS NOTES
[facility] Progress Note:   F/up Physician Visit    CC: Weight Management      Javon Dickey is a 38 y.o. female who is here for her  f/up physician visit for the VLCD / LCD Program.      DM  34 units of tresiba- was getting lows in the middle of the night    And 2 unit novolog w each drink  Now fasting is 100    She started exercising last week  She did 4 days   She has started having chicken breast daily and sometimes eggs    Weight Metrics 1/22/2020 1/22/2020 1/17/2020 1/10/2020 1/8/2020 1/3/2020 12/20/2019   Weight - 229 lb 4.8 oz 237 lb 9.6 oz 238 lb 3.2 oz 237 lb 12.8 oz 240 lb 6.4 oz 239 lb 9.6 oz   Neck Circ (inches) 14 - - - - - -   Waist Measure Inches 41 - - - - - -   BMI - 40.62 kg/m2 42.09 kg/m2 42.2 kg/m2 42.12 kg/m2 42.58 kg/m2 42.44 kg/m2         Outpatient Medications Marked as Taking for the 1/22/20 encounter (Office Visit) with Papo Zuleta MD   Medication Sig Dispense Refill    Blood-Glucose Meter monitoring kit Check blood sugar four times a day 1 Kit 0    glucose blood VI test strips (BLOOD GLUCOSE TEST) strip Use to check blood sugar four times a day. 400 Strip 3    lancets misc Use to check blood sugar four times a day. 400 Each 3    levonorgestrel (MIRENA) 20 mcg/24 hours (5 yrs) 52 mg IUD 1 Device by IntraUTERine route once.  lisinopril (PRINIVIL, ZESTRIL) 20 mg tablet Take 1 Tab by mouth daily.  Replaces nifedipine for blood pressure 90 Tab 3    insulin degludec (TRESIBA FLEXTOUCH U-100) 100 unit/mL (3 mL) inpn Inject 32 units once daily and increase as directed up to 100 units per day--Dose change 12/3/19--updated med list--did not send prescription to the pharmacy (Patient taking differently: Inject 34 units once daily and increase as directed up to 100 units per day--Dose change 12/3/19--updated med list--did not send prescription to the pharmacy) 30 mL 11    NOVOLOG FLEXPEN U-100 INSULIN 100 unit/mL (3 mL) inpn Inject 1:10 for carbs + 1 unit for 50 > 150 for correction. Max 100 units per day--Dose change 12/3/19--updated med list--did not send prescription to the pharmacy 30 mL 11    FREESTYLE PRECISION KIRK STRIPS strip Use as needed up to twice daily with [facility] reader to check blood sugar when having having problems with the [facility] sensors 50 Strip 11    ONETOUCH ULTRA BLUE TEST STRIP strip Test 4 times daily 400 Strip 3    ondansetron (ZOFRAN ODT) 8 mg disintegrating tablet Take 1 Tab by mouth every eight (8) hours as needed for Nausea. 10 Tab 0    cetirizine (ZYRTEC) 10 mg tablet Take 10 mg by mouth.  FREESTYLE NASREEN 14 DAY READER misc Use as directed 1 Each 0    FREESTYLE NASREEN 14 DAY SENSOR kit Use as directed every 14 days 2 Kit 11    LANCE PEN NEEDLE 32 gauge x 5/32\" ndle USE AS DIRECTED TO INJECT INSULIN 4 TIMES DAILY 400 Pen Needle 3    cholecalciferol, vitamin D3, (VITAMIN D3) 2,000 unit tab Take 4,000 Units by mouth daily. Participation   Did you attend clinic and class last week? yes    Review of Systems  Since your last visit, have you experienced any complications? no  If yes, please list:     Are you taking an appetite suppressant? no  If so, is there any Chest Pain, Palpitations or Dizziness? BP Readings from Last 3 Encounters:   01/22/20 (!) 157/99   01/17/20 154/83   01/10/20 147/84       SLEEP:    Have you received any other medical care this week? no  If yes, where and for what? Have you discontinued or changed any medicine or dose of your medicine since your last visit with Dr Marilee Rodriguez? no  If yes, where and for what? Diet  How many ounces of calorie-free liquids did you consume each day?  40 oz    How many meal replacements did you take each day? 4-5    Did you have any problems adhering to the program?  no If yes, please explain:      Exercise  Aerobic exercise: 15 min  Resistance exercise: 4 workouts / week  Any discomfort?  no     If yes, where?       Objective  Visit Vitals  BP (!) 157/99   Pulse (!) 58   Temp 98 °F (36.7 °C)   Resp 16   Ht 5' 3\" (1.6 m)   Wt 229 lb 4.8 oz (104 kg)   LMP 01/20/2020 (Approximate)   SpO2 98%   BMI 40.62 kg/m²     Patient's last menstrual period was 01/20/2020 (approximate). Physical Exam  Appearance: well,   Mental:A&O x 3, NAD  H:NC/AT,  EENT:   EOMI, PERRL, No scleral icterus  Neck: no bruit or JVD  Lung: clear, No W/R  ABD: soft, active, nontender  Ext:  no Edema  Neuro: nonfocal  Assessment / Plan    Encounter Diagnoses   Name Primary?  Essential hypertension, benign Yes    Type 1 diabetes mellitus without complication ([facility] [address]     Morbid obesity with BMI of 40.0-44.9, adult (HCC)     Rapid weight loss      Diagnoses and all orders for this visit:    1. Essential hypertension, benign  -     METABOLIC PANEL, COMPREHENSIVE; Future  Not great control, bp up and down should cont to improve w weight loss  Recheck 1 week  2. Type 1 diabetes mellitus without complication ([facility])  Getting better control using some short acting w each shake  3. Morbid obesity with BMI of 40.0-44.9, adult (HCC)  -     METABOLIC PANEL, COMPREHENSIVE; Future  -     URIC ACID; Future  -     MAGNESIUM; Future  Started 267 and now 229. Started in august  4. Rapid weight loss  -     METABOLIC PANEL, COMPREHENSIVE; Future  -     URIC ACID; Future  -     MAGNESIUM; Future  In 4 months has lost 38 lbs. More than 2 lbs per week    1. Weight management improved   Progress was reviewed with patient    2. Labs    Latest results reviewed with patient   Lab slip given to pt for f/up  labs      Over 20 minutes of the 30 minutes face to face time with Dee consisted of counseling & coordinating and/or discussing treatment plans in reference to her obesity The primary encounter diagnosis was Essential hypertension, benign. Diagnoses of Type 1 diabetes mellitus without complication ([facility] [address] Morbid obesity with BMI of 40.0-44.9, adult ([facility] [address] and Rapid weight loss were also pertinent to this visit.

## 2020-01-31 ENCOUNTER — CLINICAL SUPPORT (OUTPATIENT)
Dept: BARIATRICS/WEIGHT MGMT | Age: 35
End: 2020-01-31

## 2020-01-31 VITALS — BODY MASS INDEX: 41.58 KG/M2 | WEIGHT: 234.7 LBS

## 2020-01-31 DIAGNOSIS — E10.9 TYPE 1 DIABETES MELLITUS WITHOUT COMPLICATION (HCC): Primary | ICD-10-CM

## 2020-01-31 DIAGNOSIS — E66.01 MORBID OBESITY WITH BMI OF 40.0-44.9, ADULT (HCC): ICD-10-CM

## 2020-02-07 ENCOUNTER — CLINICAL SUPPORT (OUTPATIENT)
Dept: BARIATRICS/WEIGHT MGMT | Age: 35
End: 2020-02-07

## 2020-02-07 VITALS
HEART RATE: 79 BPM | BODY MASS INDEX: 41.2 KG/M2 | DIASTOLIC BLOOD PRESSURE: 81 MMHG | SYSTOLIC BLOOD PRESSURE: 147 MMHG | WEIGHT: 232.6 LBS

## 2020-02-07 DIAGNOSIS — E10.9 TYPE 1 DIABETES MELLITUS WITHOUT COMPLICATION (HCC): Primary | ICD-10-CM

## 2020-02-07 DIAGNOSIS — E66.01 MORBID OBESITY WITH BMI OF 40.0-44.9, ADULT (HCC): ICD-10-CM

## 2020-02-07 NOTE — PROGRESS NOTES
Nurse note from patient's weekly VLCD / LCD / Maintenance class was reviewed. Pertinent medical concerns were:   none     BP Readings from Last 3 Encounters:   02/07/20 147/81   01/22/20 (!) 157/99   01/17/20 154/83       Weight Metrics 2/7/2020 1/31/2020 1/22/2020 1/22/2020 1/17/2020 1/10/2020 1/8/2020   Weight 232 lb 9.6 oz 234 lb 11.2 oz - 229 lb 4.8 oz 237 lb 9.6 oz 238 lb 3.2 oz 237 lb 12.8 oz   Neck Circ (inches) - - 14 - - - -   Waist Measure Inches - - 41 - - - -   BMI 41.2 kg/m2 41.58 kg/m2 - 40.62 kg/m2 42.09 kg/m2 42.2 kg/m2 42.12 kg/m2       Current Outpatient Medications   Medication Sig Dispense Refill    Blood-Glucose Meter monitoring kit Check blood sugar four times a day 1 Kit 0    glucose blood VI test strips (BLOOD GLUCOSE TEST) strip Use to check blood sugar four times a day. 400 Strip 3    lancets misc Use to check blood sugar four times a day. 400 Each 3    levonorgestrel (MIRENA) 20 mcg/24 hours (5 yrs) 52 mg IUD 1 Device by IntraUTERine route once.  lisinopril (PRINIVIL, ZESTRIL) 20 mg tablet Take 1 Tab by mouth daily. Replaces nifedipine for blood pressure 90 Tab 3    insulin degludec (TRESIBA FLEXTOUCH U-100) 100 unit/mL (3 mL) inpn Inject 32 units once daily and increase as directed up to 100 units per day--Dose change 12/3/19--updated med list--did not send prescription to the pharmacy (Patient taking differently: Inject 34 units once daily and increase as directed up to 100 units per day--Dose change 12/3/19--updated med list--did not send prescription to the pharmacy) 30 mL 11    NOVOLOG FLEXPEN U-100 INSULIN 100 unit/mL (3 mL) inpn Inject 1:10 for carbs + 1 unit for 50 > 150 for correction.   Max 100 units per day--Dose change 12/3/19--updated med list--did not send prescription to the pharmacy 30 mL 11    FREESTYLE PRECISION KIRK STRIPS strip Use as needed up to twice daily with [facility] reader to check blood sugar when having having problems with the [facility] sensors 50 Strip 11  ONETOUCH ULTRA BLUE TEST STRIP strip Test 4 times daily 400 Strip 3    multivit,calc,mins/iron/folic (ONE-A-DAY WOMENS FORMULA PO) Take  by mouth daily.  hydrocortisone (CORTAID) 1 % topical cream APPLY TO THE EFFECTED AREA 2 TIMES A DAY. USE THIN LAYER TWICE A DAY. 56 g 2    ondansetron (ZOFRAN ODT) 8 mg disintegrating tablet Take 1 Tab by mouth every eight (8) hours as needed for Nausea. 10 Tab 0    cetirizine (ZYRTEC) 10 mg tablet Take 10 mg by mouth.  FREESTYLE NASREEN 14 DAY READER misc Use as directed 1 Each 0    FREESTYLE NASREEN 14 DAY SENSOR kit Use as directed every 14 days 2 Kit 11    ibuprofen (MOTRIN) 800 mg tablet Take 1 Tab by mouth every eight (8) hours. (Patient not taking: Reported on 1/22/2020) 30 Tab 1    LANCE PEN NEEDLE 32 gauge x 5/32\" ndle USE AS DIRECTED TO INJECT INSULIN 4 TIMES DAILY 400 Pen Needle 3    cholecalciferol, vitamin D3, (VITAMIN D3) 2,000 unit tab Take 4,000 Units by mouth daily.

## 2020-02-07 NOTE — PROGRESS NOTES
Nurse note from patient's weekly VLCD / LCD / Maintenance class was reviewed. Pertinent medical concerns were:   none     BP Readings from Last 3 Encounters:   02/07/20 147/81   01/22/20 (!) 157/99   01/17/20 154/83       Weight Metrics 2/7/2020 1/31/2020 1/22/2020 1/22/2020 1/17/2020 1/10/2020 1/8/2020   Weight 232 lb 9.6 oz 234 lb 11.2 oz - 229 lb 4.8 oz 237 lb 9.6 oz 238 lb 3.2 oz 237 lb 12.8 oz   Neck Circ (inches) - - 14 - - - -   Waist Measure Inches - - 41 - - - -   BMI 41.2 kg/m2 41.58 kg/m2 - 40.62 kg/m2 42.09 kg/m2 42.2 kg/m2 42.12 kg/m2       Current Outpatient Medications   Medication Sig Dispense Refill    Blood-Glucose Meter monitoring kit Check blood sugar four times a day 1 Kit 0    glucose blood VI test strips (BLOOD GLUCOSE TEST) strip Use to check blood sugar four times a day. 400 Strip 3    lancets misc Use to check blood sugar four times a day. 400 Each 3    levonorgestrel (MIRENA) 20 mcg/24 hours (5 yrs) 52 mg IUD 1 Device by IntraUTERine route once.  lisinopril (PRINIVIL, ZESTRIL) 20 mg tablet Take 1 Tab by mouth daily. Replaces nifedipine for blood pressure 90 Tab 3    insulin degludec (TRESIBA FLEXTOUCH U-100) 100 unit/mL (3 mL) inpn Inject 32 units once daily and increase as directed up to 100 units per day--Dose change 12/3/19--updated med list--did not send prescription to the pharmacy (Patient taking differently: Inject 34 units once daily and increase as directed up to 100 units per day--Dose change 12/3/19--updated med list--did not send prescription to the pharmacy) 30 mL 11    NOVOLOG FLEXPEN U-100 INSULIN 100 unit/mL (3 mL) inpn Inject 1:10 for carbs + 1 unit for 50 > 150 for correction.   Max 100 units per day--Dose change 12/3/19--updated med list--did not send prescription to the pharmacy 30 mL 11    FREESTYLE PRECISION KIRK STRIPS strip Use as needed up to twice daily with [facility] reader to check blood sugar when having having problems with the [facility] sensors 50 Strip 11  ONETOUCH ULTRA BLUE TEST STRIP strip Test 4 times daily 400 Strip 3    multivit,calc,mins/iron/folic (ONE-A-DAY WOMENS FORMULA PO) Take  by mouth daily.  hydrocortisone (CORTAID) 1 % topical cream APPLY TO THE EFFECTED AREA 2 TIMES A DAY. USE THIN LAYER TWICE A DAY. 56 g 2    ondansetron (ZOFRAN ODT) 8 mg disintegrating tablet Take 1 Tab by mouth every eight (8) hours as needed for Nausea. 10 Tab 0    cetirizine (ZYRTEC) 10 mg tablet Take 10 mg by mouth.  FREESTYLE NASREEN 14 DAY READER misc Use as directed 1 Each 0    FREESTYLE NASREEN 14 DAY SENSOR kit Use as directed every 14 days 2 Kit 11    ibuprofen (MOTRIN) 800 mg tablet Take 1 Tab by mouth every eight (8) hours. (Patient not taking: Reported on 1/22/2020) 30 Tab 1    LANCE PEN NEEDLE 32 gauge x 5/32\" ndle USE AS DIRECTED TO INJECT INSULIN 4 TIMES DAILY 400 Pen Needle 3    cholecalciferol, vitamin D3, (VITAMIN D3) 2,000 unit tab Take 4,000 Units by mouth daily.

## 2020-02-07 NOTE — PROGRESS NOTES
Nurse note from patient's weekly VLCD / LCD / Maintenance class was reviewed. Pertinent medical concerns were:   none     BP Readings from Last 3 Encounters:   02/07/20 147/81   01/22/20 (!) 157/99   01/17/20 154/83       Weight Metrics 2/7/2020 1/31/2020 1/22/2020 1/22/2020 1/17/2020 1/10/2020 1/8/2020   Weight 232 lb 9.6 oz 234 lb 11.2 oz - 229 lb 4.8 oz 237 lb 9.6 oz 238 lb 3.2 oz 237 lb 12.8 oz   Neck Circ (inches) - - 14 - - - -   Waist Measure Inches - - 41 - - - -   BMI 41.2 kg/m2 41.58 kg/m2 - 40.62 kg/m2 42.09 kg/m2 42.2 kg/m2 42.12 kg/m2       Current Outpatient Medications   Medication Sig Dispense Refill    Blood-Glucose Meter monitoring kit Check blood sugar four times a day 1 Kit 0    glucose blood VI test strips (BLOOD GLUCOSE TEST) strip Use to check blood sugar four times a day. 400 Strip 3    lancets misc Use to check blood sugar four times a day. 400 Each 3    levonorgestrel (MIRENA) 20 mcg/24 hours (5 yrs) 52 mg IUD 1 Device by IntraUTERine route once.  lisinopril (PRINIVIL, ZESTRIL) 20 mg tablet Take 1 Tab by mouth daily. Replaces nifedipine for blood pressure 90 Tab 3    insulin degludec (TRESIBA FLEXTOUCH U-100) 100 unit/mL (3 mL) inpn Inject 32 units once daily and increase as directed up to 100 units per day--Dose change 12/3/19--updated med list--did not send prescription to the pharmacy (Patient taking differently: Inject 34 units once daily and increase as directed up to 100 units per day--Dose change 12/3/19--updated med list--did not send prescription to the pharmacy) 30 mL 11    NOVOLOG FLEXPEN U-100 INSULIN 100 unit/mL (3 mL) inpn Inject 1:10 for carbs + 1 unit for 50 > 150 for correction.   Max 100 units per day--Dose change 12/3/19--updated med list--did not send prescription to the pharmacy 30 mL 11    FREESTYLE PRECISION KIRK STRIPS strip Use as needed up to twice daily with [facility] reader to check blood sugar when having having problems with the [facility] sensors 50 Strip 11  ONETOUCH ULTRA BLUE TEST STRIP strip Test 4 times daily 400 Strip 3    multivit,calc,mins/iron/folic (ONE-A-DAY WOMENS FORMULA PO) Take  by mouth daily.  hydrocortisone (CORTAID) 1 % topical cream APPLY TO THE EFFECTED AREA 2 TIMES A DAY. USE THIN LAYER TWICE A DAY. 56 g 2    ondansetron (ZOFRAN ODT) 8 mg disintegrating tablet Take 1 Tab by mouth every eight (8) hours as needed for Nausea. 10 Tab 0    cetirizine (ZYRTEC) 10 mg tablet Take 10 mg by mouth.  FREESTYLE NASREEN 14 DAY READER misc Use as directed 1 Each 0    FREESTYLE NASREEN 14 DAY SENSOR kit Use as directed every 14 days 2 Kit 11    ibuprofen (MOTRIN) 800 mg tablet Take 1 Tab by mouth every eight (8) hours. (Patient not taking: Reported on 1/22/2020) 30 Tab 1    LANCE PEN NEEDLE 32 gauge x 5/32\" ndle USE AS DIRECTED TO INJECT INSULIN 4 TIMES DAILY 400 Pen Needle 3    cholecalciferol, vitamin D3, (VITAMIN D3) 2,000 unit tab Take 4,000 Units by mouth daily.

## 2020-02-13 ENCOUNTER — OFFICE VISIT (OUTPATIENT)
Dept: SLEEP MEDICINE | Age: 35
End: 2020-02-13

## 2020-02-13 VITALS
BODY MASS INDEX: 40.57 KG/M2 | OXYGEN SATURATION: 98 % | DIASTOLIC BLOOD PRESSURE: 78 MMHG | HEART RATE: 72 BPM | SYSTOLIC BLOOD PRESSURE: 132 MMHG | HEIGHT: 63 IN | WEIGHT: 229 LBS

## 2020-02-13 DIAGNOSIS — E66.01 MORBID OBESITY WITH BMI OF 40.0-44.9, ADULT (HCC): ICD-10-CM

## 2020-02-13 DIAGNOSIS — G47.33 OSA (OBSTRUCTIVE SLEEP APNEA): Primary | ICD-10-CM

## 2020-02-13 NOTE — PATIENT INSTRUCTIONS

## 2020-02-13 NOTE — PROGRESS NOTES
[address] [facility] [place]. [place], [address]  Tel.  [phone]  Fax. [address] [facility] [address]  [place], [address] [facility]  Tel.  [phone]  Fax. [phone] [address]   Tel.  [phone]  Fax. [phone]       Chief Complaint       Chief Complaint   Patient presents with    Sleep Problem     NP; ref Dr Pérez Martini; HTN, teeth grinding       HPI      Nasreen Adjutant is 38 y.o. female seen for evaluation of a sleep disorder. She is followed by Dr. Pérez Martini for medical weight reduction. 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 She normally retires at 6: 27 and awakens at 6 AM.  She may awaken at times during the night. She has been told of snoring. She notes daytime fatigue more prominent when she is riding as a passenger at which time she will easily doze. She may also experience fatigue when driving. She denies vivid dreaming or nightmares, sleep talking or sleepwalking, nocturnal incontinence, abnormal arm or leg movements, hypnagogic hallucinations, sleep paralysis or cataplexy. The patient has not undergone diagnostic testing for the current problems. [place] Sleepiness Score: 8       Allergies   Allergen Reactions    Codeine Nausea and Vomiting       Current Outpatient Medications   Medication Sig Dispense Refill    Blood-Glucose Meter monitoring kit Check blood sugar four times a day 1 Kit 0    glucose blood VI test strips (BLOOD GLUCOSE TEST) strip Use to check blood sugar four times a day. 400 Strip 3    lancets misc Use to check blood sugar four times a day. 400 Each 3    levonorgestrel (MIRENA) 20 mcg/24 hours (5 yrs) 52 mg IUD 1 Device by IntraUTERine route once.  lisinopril (PRINIVIL, ZESTRIL) 20 mg tablet Take 1 Tab by mouth daily.  Replaces nifedipine for blood pressure 90 Tab 3    insulin degludec (TRESIBA FLEXTOUCH U-100) 100 unit/mL (3 mL) inpn Inject 32 units once daily and increase as directed up to 100 units per day--Dose change 12/3/19--updated med list--did not send prescription to the pharmacy (Patient taking differently: Inject 34 units once daily and increase as directed up to 100 units per day--Dose change 12/3/19--updated med list--did not send prescription to the pharmacy) 30 mL 11    NOVOLOG FLEXPEN U-100 INSULIN 100 unit/mL (3 mL) inpn Inject 1:10 for carbs + 1 unit for 50 > 150 for correction. Max 100 units per day--Dose change 12/3/19--updated med list--did not send prescription to the pharmacy 30 mL 11    FREESTYLE PRECISION KIRK STRIPS strip Use as needed up to twice daily with [employer] reader to check blood sugar when having having problems with the [employer] sensors 50 Strip 11    ONETOUCH ULTRA BLUE TEST STRIP strip Test 4 times daily 400 Strip 3    multivit,calc,mins/iron/folic (ONE-A-DAY WOMENS FORMULA PO) Take  by mouth daily.  ondansetron (ZOFRAN ODT) 8 mg disintegrating tablet Take 1 Tab by mouth every eight (8) hours as needed for Nausea. 10 Tab 0    FREESTYLE NASREEN 14 DAY READER misc Use as directed 1 Each 0    FREESTYLE NASREEN 14 DAY SENSOR kit Use as directed every 14 days 2 Kit 11    ibuprofen (MOTRIN) 800 mg tablet Take 1 Tab by mouth every eight (8) hours. 30 Tab 1    LANCE PEN NEEDLE 32 gauge x 5/32\" ndle USE AS DIRECTED TO INJECT INSULIN 4 TIMES DAILY 400 Pen Needle 3    cholecalciferol, vitamin D3, (VITAMIN D3) 2,000 unit tab Take 4,000 Units by mouth daily.  hydrocortisone (CORTAID) 1 % topical cream APPLY TO THE EFFECTED AREA 2 TIMES A DAY. USE THIN LAYER TWICE A DAY. 56 g 2    cetirizine (ZYRTEC) 10 mg tablet Take 10 mg by mouth.           She  has a past medical history of Breast cancer [facility]) (Nov 2012), Cardiomyopathy due to chemotherapy [facility]), Essential hypertension, Gestational hypertension, History of sepsis (1/2013), difficult intubation, Hypertension, Morbid obesity ([address] Ne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 ([facility] Type I (juvenile type) diabetes mellitus without mention of complication, uncontrolled, and Unspecified vitamin D deficiency (2011). She also has no past medical history of Abnormal Pap smear, Abnormal Papanicolaou smear of cervix, Acquired hypothyroidism, Anemia, Asthma, Asthma, Chlamydia, Complication of anesthesia, Genital herpes, Gestational diabetes, Gonorrhea, Heart abnormalities, Herpes gestationis, Herpes simplex without mention of complication, Human immunodeficiency virus (HIV) disease ([facility] OTHER MEDICAL, Infertility, Infertility, female, Kidney disease, Liver disease, Nicotine vapor product user, Non-nicotine vapor product user, Phlebitis and thrombophlebitis of unspecified site, Pituitary disorder ([facility] Polycystic disease, ovaries, Postpartum depression, Psychiatric problem, Rhesus isoimmunization unspecified as to episode of care in pregnancy, Sickle cell trait syndrome ([facility] [facility] Sickle-cell disease, unspecified, Syphilis, Systemic lupus erythematosus ([facility] [address] [facility] Thyroid activity decreased, Trauma, Unspecified breast disorder, Unspecified diseases of blood and blood-forming organs, or Unspecified epilepsy without mention of intractable epilepsy. She  has a past surgical history that includes hx mastectomy (Right); hx cyst incision and drainage (2013); hx gyn; hx orthopaedic (Right); hx other surgical; hx other surgical; hx wisdom teeth extraction (08/15/2018); pr breast surgery procedure unlisted (); and hx  section. She family history includes Diabetes in her brother, paternal grandfather, and paternal uncle; Heart Disease in her father and paternal grandfather; Hypertension in her mother. She  reports that she has never smoked. She has never used smokeless tobacco. She reports that she does not drink alcohol or use drugs. Review of Systems:  Review of Systems   Constitutional: Negative for chills and fever. HENT: Positive for tinnitus. Negative for hearing loss.     Eyes: Negative for blurred vision and double vision. Respiratory: Negative for cough and shortness of breath. Cardiovascular: Negative for chest pain and palpitations. Gastrointestinal: Negative for abdominal pain and heartburn. Genitourinary: Negative for frequency and urgency. Musculoskeletal: Negative for back pain and neck pain. Skin: Negative for rash. Neurological: Positive for headaches. Psychiatric/Behavioral: Negative for depression. Objective:     Visit Vitals  /78   Pulse 72   Ht 5' 3\" (1.6 m)   Wt 229 lb (103.9 kg)   LMP 01/20/2020 (Approximate)   SpO2 98%   BMI 40.57 kg/m²     Body mass index is 40.57 kg/m². General:   Conversant, cooperative   Eyes:  Pupils equal and reactive, no nystagmus   Oropharynx:   Mallampati score II, tongue scalloped, narrow posterior airway   Tonsils:     Neck:   No carotid bruits; Neck circ. in \"inches\": 15.25   Chest/Lungs:  Clear on auscultation    CVS:  Normal rate, regular rhythm   Skin:  Warm to touch; no obvious rashes   Neuro:  Speech fluent, face symmetrical, tongue movement normal   Psych:  Normal affect,  normal countenance        Assessment:       ICD-10-CM ICD-9-CM    1. JASMINA (obstructive sleep apnea) G47.33 327.23    2. Morbid obesity with BMI of 40.0-44.9, adult ([facility] E66.01 278.01     Z68.41 V85.41      Potential sleep disordered breathing. Patient has a long soft palate and narrow posterior airway; potentially, events are more prominent when supine and/or in REM sleep. She will be evaluated with a home sleep test.  Results will be reviewed with her. Plan:     No orders of the defined types were placed in this encounter. * Patient has a history and examination consistent with the diagnosis of sleep apnea. *Home sleep testing was ordered for initial evaluation. * She was provided information on sleep apnea including corresponding risk factors and the importance of proper treatment.    * Treatment options if indicated were reviewed today.      Instructions:    o The patient would benefit from weight reduction measures. o Do not engage in activities requiring a normal degree of alertness if fatigue is present. o The patient understands that untreated or undertreated sleep apnea could impair judgement and the ability to function normally during the day.  o Call or return if symptoms worsen or persist.          Kiko Bonds MD, FAASM  Electronically signed 02/13/20       This note was created using voice recognition software. Despite editing, there may be syntax errors. This note will not be viewable in [address]

## 2020-02-14 ENCOUNTER — HOSPITAL ENCOUNTER (OUTPATIENT)
Dept: LAB | Age: 35
Discharge: HOME | End: 2020-02-14

## 2020-02-14 ENCOUNTER — CLINICAL SUPPORT (OUTPATIENT)
Dept: BARIATRICS/WEIGHT MGMT | Age: 35
End: 2020-02-14

## 2020-02-14 VITALS
HEART RATE: 80 BPM | DIASTOLIC BLOOD PRESSURE: 90 MMHG | WEIGHT: 230.8 LBS | BODY MASS INDEX: 40.88 KG/M2 | SYSTOLIC BLOOD PRESSURE: 165 MMHG

## 2020-02-14 DIAGNOSIS — E10.9 TYPE 1 DIABETES MELLITUS WITHOUT COMPLICATION (HCC): Primary | ICD-10-CM

## 2020-02-14 DIAGNOSIS — E66.01 MORBID OBESITY WITH BMI OF 40.0-44.9, ADULT (HCC): ICD-10-CM

## 2020-02-14 DIAGNOSIS — I10 ESSENTIAL HYPERTENSION, BENIGN: ICD-10-CM

## 2020-02-14 DIAGNOSIS — R63.4 RAPID WEIGHT LOSS: ICD-10-CM

## 2020-02-17 ENCOUNTER — DOCUMENTATION ONLY (OUTPATIENT)
Dept: SLEEP MEDICINE | Age: 35
End: 2020-02-17

## 2020-02-18 LAB
ALBUMIN SERPL-MCNC: 3.7 G/DL (ref 3.8–4.8)
ALBUMIN/GLOB SERPL: 1.1 {RATIO} (ref 1.2–2.2)
ALP SERPL-CCNC: 95 IU/L (ref 39–117)
ALT SERPL-CCNC: 11 IU/L (ref 0–32)
AST SERPL-CCNC: 14 IU/L (ref 0–40)
BILIRUB SERPL-MCNC: 0.3 MG/DL (ref 0–1.2)
BUN SERPL-MCNC: 13 MG/DL (ref 6–20)
BUN/CREAT SERPL: 16 (ref 9–23)
CALCIUM SERPL-MCNC: 9.1 MG/DL (ref 8.7–10.2)
CHLORIDE SERPL-SCNC: 104 MMOL/L (ref 96–106)
CO2 SERPL-SCNC: 24 MMOL/L (ref 20–29)
CREAT SERPL-MCNC: 0.81 MG/DL (ref 0.57–1)
GLOBULIN SER CALC-MCNC: 3.3 G/DL (ref 1.5–4.5)
GLUCOSE SERPL-MCNC: 142 MG/DL (ref 65–99)
MAGNESIUM SERPL-MCNC: 1.8 MG/DL (ref 1.6–2.3)
POTASSIUM SERPL-SCNC: 4.5 MMOL/L (ref 3.5–5.2)
PROT SERPL-MCNC: 7 G/DL (ref 6–8.5)
SODIUM SERPL-SCNC: 140 MMOL/L (ref 134–144)
URATE SERPL-MCNC: 2.4 MG/DL (ref 2.5–7.1)

## 2020-02-19 ENCOUNTER — OFFICE VISIT (OUTPATIENT)
Dept: FAMILY MEDICINE CLINIC | Age: 35
End: 2020-02-19

## 2020-02-19 VITALS
HEART RATE: 76 BPM | OXYGEN SATURATION: 99 % | DIASTOLIC BLOOD PRESSURE: 88 MMHG | BODY MASS INDEX: 39.95 KG/M2 | SYSTOLIC BLOOD PRESSURE: 137 MMHG | RESPIRATION RATE: 18 BRPM | WEIGHT: 225.5 LBS | HEIGHT: 63 IN | TEMPERATURE: 98 F

## 2020-02-19 DIAGNOSIS — R63.4 RAPID WEIGHT LOSS: ICD-10-CM

## 2020-02-19 DIAGNOSIS — E66.01 MORBID OBESITY WITH BMI OF 40.0-44.9, ADULT (HCC): ICD-10-CM

## 2020-02-19 DIAGNOSIS — I10 ESSENTIAL HYPERTENSION, BENIGN: Primary | ICD-10-CM

## 2020-02-19 DIAGNOSIS — E10.9 TYPE 1 DIABETES MELLITUS WITHOUT COMPLICATION (HCC): ICD-10-CM

## 2020-02-19 NOTE — PROGRESS NOTES
1. Have you been to the ER, urgent care clinic since your last visit? Hospitalized since your last visit? No    2. Have you seen or consulted any other health care providers outside of the [address] [place] [address] since your last visit? Include any pap smears or colon screening.  No     Chief Complaint   Patient presents with    Weight Management     Body Weight: 225.5lb  Body Fat: 42.6  BMI: 39.6  Water: 43.1%    Visit Vitals  /88 (BP 1 Location: Left arm, BP Patient Position: Sitting)   Pulse 76   Temp 98 °F (36.7 °C) (Oral)   Resp 18   Ht 5' 3\" (1.6 m)   Wt 225 lb 8 oz (102.3 kg)   SpO2 99%   BMI 39.95 kg/m²

## 2020-02-19 NOTE — PROGRESS NOTES
[facility] Progress Note:   F/up Physician Visit    CC: Weight Management      Thomas Pulido is a 38 y.o. female who is here for her  f/up physician visit for the VLCD / LCD Program.     She only gets high blood sugars when she forgets the 2 units of humalog  They are in the 300s when that happens  Most are in the 100s  Has gotten 2-3 days a week of exercise. Last week only got one day  She had a few fastings at 60-80      Weight Metrics 2/19/2020 2/19/2020 2/14/2020 2/13/2020 2/7/2020 1/31/2020 1/22/2020   Weight - 225 lb 8 oz 230 lb 12.8 oz 229 lb 232 lb 9.6 oz 234 lb 11.2 oz -   Neck Circ (inches) 14 - - - - - 14   Waist Measure Inches 41 - - - - - 41   BMI - 39.95 kg/m2 40.88 kg/m2 40.57 kg/m2 41.2 kg/m2 41.58 kg/m2 -         Outpatient Medications Marked as Taking for the 2/19/20 encounter (Office Visit) with Darell Dunbar MD   Medication Sig Dispense Refill    levonorgestrel (MIRENA) 20 mcg/24 hours (5 yrs) 52 mg IUD 1 Device by IntraUTERine route once.  lisinopril (PRINIVIL, ZESTRIL) 20 mg tablet Take 1 Tab by mouth daily. Replaces nifedipine for blood pressure 90 Tab 3    insulin degludec (TRESIBA FLEXTOUCH U-100) 100 unit/mL (3 mL) inpn Inject 32 units once daily and increase as directed up to 100 units per day--Dose change 12/3/19--updated med list--did not send prescription to the pharmacy (Patient taking differently: Inject 34 units once daily and increase as directed up to 100 units per day--Dose change 12/3/19--updated med list--did not send prescription to the pharmacy) 30 mL 11    NOVOLOG FLEXPEN U-100 INSULIN 100 unit/mL (3 mL) inpn Inject 1:10 for carbs + 1 unit for 50 > 150 for correction. Max 100 units per day--Dose change 12/3/19--updated med list--did not send prescription to the pharmacy 30 mL 11    hydrocortisone (CORTAID) 1 % topical cream APPLY TO THE EFFECTED AREA 2 TIMES A DAY. USE THIN LAYER TWICE A DAY.  56 g 2    ondansetron (ZOFRAN ODT) 8 mg disintegrating tablet Take 1 Tab by mouth every eight (8) hours as needed for Nausea. 10 Tab 0    ibuprofen (MOTRIN) 800 mg tablet Take 1 Tab by mouth every eight (8) hours. 30 Tab 1    cholecalciferol, vitamin D3, (VITAMIN D3) 2,000 unit tab Take 4,000 Units by mouth daily. Participation   Did you attend clinic and class last week? no    Review of Systems  Since your last visit, have you experienced any complications? no  If yes, please list:       Are you taking an appetite suppressant? no  If so, is there any Chest Pain, Palpitations or Dizziness? BP Readings from Last 3 Encounters:   02/19/20 137/88   02/14/20 165/90   02/13/20 132/78       SLEEP:    Have you received any other medical care this week? no  If yes, where and for what? Have you discontinued or changed any medicine or dose of your medicine since your last visit with Dr Renata Marx? no  If yes, where and for what? Diet  How many ounces of calorie-free liquids did you consume each day? 64 oz    How many meal replacements did you take each day? 4*    Did you have any problems adhering to the program?  no If yes, please explain:      Exercise  Aerobic exercise:  20-30  min  Resistance exercise:2-3  workouts / week  Any discomfort?  no     If yes, where? Objective  Visit Vitals  /88 (BP 1 Location: Left arm, BP Patient Position: Sitting)   Pulse 76   Temp 98 °F (36.7 °C) (Oral)   Resp 18   Ht 5' 3\" (1.6 m)   Wt 225 lb 8 oz (102.3 kg)   LMP 01/20/2020 (Approximate)   SpO2 99%   BMI 39.95 kg/m²     Patient's last menstrual period was 01/20/2020 (approximate). Physical Exam  Appearance: well,   Mental:A&O x 3, NAD  H:NC/AT,  EENT:   EOMI, PERRL, No scleral icterus  Neck: no bruit or JVD  Lung: clear, No W/R  ABD: soft, active, nontender  Ext:  no Edema  Neuro: nonfocal  Assessment / Plan    Encounter Diagnoses   Name Primary?     Essential hypertension, benign Yes    Morbid obesity with BMI of 40.0-44.9, adult ([facility]  Type 1 diabetes mellitus without complication (HCC)     Rapid weight loss      Diagnoses and all orders for this visit:    1. Essential hypertension, benign  Control is good  No changes in meds  2. Morbid obesity with BMI of 40.0-44.9, adult ([facility]  -     HEMOGLOBIN A1C WITH EAG; Future  -     METABOLIC PANEL, COMPREHENSIVE; Future  -     URIC ACID; Future  -     MAGNESIUM; Future    3. Type 1 diabetes mellitus without complication (HCC)  -     HEMOGLOBIN A1C WITH EAG; Future  The endocrine doc is checking the a1c so I did not include it. It has not been done yet so I included in next months labs. If it does get doen I will take it out  According to her report none have been dangerously low and most in the one hundreds  Cont to monitor, no skipping meals. Use the insulin as we discussed, 4 units w each meal replacement. 4. Rapid weight loss  -     HEMOGLOBIN A1C WITH EAG; Future  -     METABOLIC PANEL, COMPREHENSIVE; Future  -     URIC ACID; Future  -     MAGNESIUM; Future      1. Weight management improved   Progress was reviewed with patient    2. Labs    Latest results reviewed with patient   Lab slip given to pt for f/up  labs      Over 20 minutes of the 30 minutes face to face time with Dee consisted of counseling & coordinating and/or discussing treatment plans in reference to her obesity The primary encounter diagnosis was Essential hypertension, benign. Diagnoses of Morbid obesity with BMI of 40.0-44.9, adult ([address] Type 1 diabetes mellitus without complication ([address] and Rapid weight loss were also pertinent to this visit.

## 2020-02-26 ENCOUNTER — CLINICAL SUPPORT (OUTPATIENT)
Dept: BARIATRICS/WEIGHT MGMT | Age: 35
End: 2020-02-26

## 2020-02-26 DIAGNOSIS — E66.01 MORBID OBESITY WITH BMI OF 40.0-44.9, ADULT (HCC): ICD-10-CM

## 2020-02-26 DIAGNOSIS — E10.9 TYPE 1 DIABETES MELLITUS WITHOUT COMPLICATION (HCC): Primary | ICD-10-CM

## 2020-02-26 RX ORDER — INSULIN ASPART 100 [IU]/ML
INJECTION, SOLUTION INTRAVENOUS; SUBCUTANEOUS
Qty: 15 ML | Refills: 11 | Status: SHIPPED | OUTPATIENT
Start: 2020-02-26 | End: 2020-04-13

## 2020-02-27 VITALS — WEIGHT: 230.3 LBS | SYSTOLIC BLOOD PRESSURE: 137 MMHG | DIASTOLIC BLOOD PRESSURE: 78 MMHG | BODY MASS INDEX: 40.8 KG/M2

## 2020-02-29 ENCOUNTER — TELEPHONE (OUTPATIENT)
Dept: SLEEP MEDICINE | Age: 35
End: 2020-02-29

## 2020-02-29 NOTE — TELEPHONE ENCOUNTER
HSAT demonstrated normal AHI of 0/h associated with minimal SaO2 of 89%. Snoring during 59.7% of the study. Recommendation: Patient may benefit from ENT evaluation given prominent snoring without associated sleep disordered breathing. Sleep technologist: Please review HSAT results with the patient.

## 2020-03-03 ENCOUNTER — DOCUMENTATION ONLY (OUTPATIENT)
Dept: SLEEP MEDICINE | Age: 35
End: 2020-03-03

## 2020-03-03 NOTE — PROGRESS NOTES
This note is being routed to Dr. Sita Portillo. Sleep Medicine consult note and sleep study report in patient's chart for review.     Thank you for the referral.

## 2020-03-05 ENCOUNTER — TELEPHONE (OUTPATIENT)
Dept: FAMILY MEDICINE CLINIC | Age: 35
End: 2020-03-05

## 2020-03-05 NOTE — TELEPHONE ENCOUNTER
Verified patient with two type of identifiers. Pt states she has a new \"lump\" that started developing 2 weeks ago. Pt states her physical therpaist informed her to go to the dentist and dentist wants a CT scan that is not approved. Pt to come into office 3/6/20 at 10 AM to be evaluated. Pt verbalized understanding.

## 2020-03-06 ENCOUNTER — TELEPHONE (OUTPATIENT)
Dept: FAMILY MEDICINE CLINIC | Age: 35
End: 2020-03-06

## 2020-03-06 ENCOUNTER — OFFICE VISIT (OUTPATIENT)
Dept: FAMILY MEDICINE CLINIC | Age: 35
End: 2020-03-06

## 2020-03-06 ENCOUNTER — CLINICAL SUPPORT (OUTPATIENT)
Dept: BARIATRICS/WEIGHT MGMT | Age: 35
End: 2020-03-06

## 2020-03-06 VITALS
OXYGEN SATURATION: 99 % | DIASTOLIC BLOOD PRESSURE: 72 MMHG | HEART RATE: 70 BPM | RESPIRATION RATE: 16 BRPM | TEMPERATURE: 97.2 F | SYSTOLIC BLOOD PRESSURE: 140 MMHG

## 2020-03-06 VITALS
HEART RATE: 79 BPM | BODY MASS INDEX: 40.07 KG/M2 | SYSTOLIC BLOOD PRESSURE: 156 MMHG | WEIGHT: 226.2 LBS | DIASTOLIC BLOOD PRESSURE: 91 MMHG

## 2020-03-06 DIAGNOSIS — E66.01 MORBID OBESITY WITH BMI OF 40.0-44.9, ADULT (HCC): ICD-10-CM

## 2020-03-06 DIAGNOSIS — R68.84 PAIN IN LOWER JAW: ICD-10-CM

## 2020-03-06 DIAGNOSIS — R22.0 RIGHT FACIAL SWELLING: Primary | ICD-10-CM

## 2020-03-06 DIAGNOSIS — E10.9 TYPE 1 DIABETES MELLITUS WITHOUT COMPLICATION (HCC): Primary | ICD-10-CM

## 2020-03-06 RX ORDER — IBUPROFEN 800 MG/1
800 TABLET ORAL
Qty: 60 TAB | Refills: 2 | Status: SHIPPED | OUTPATIENT
Start: 2020-03-06 | End: 2020-06-29 | Stop reason: ALTCHOICE

## 2020-03-06 RX ORDER — PENICILLIN V POTASSIUM 500 MG/1
TABLET, FILM COATED ORAL 4 TIMES DAILY
COMMUNITY
End: 2020-09-24

## 2020-03-06 NOTE — TELEPHONE ENCOUNTER
----- Message from Reford Ades sent at 3/6/2020  3:17 PM EST -----  Regarding: NP [place]/Telephone  Patient return call    Caller's first and last name and relationship (if not the patient):      Best contact number(s):  [phone]    Whose call is being returned:  Prabha     Details to clarify the request:  Pt not sure    Reford Ades

## 2020-03-06 NOTE — PROGRESS NOTES
HISTORY OF PRESENT ILLNESS  Deepika Pedroza is a 38 y.o. female. HPI  Patient comes in today for pain and swelling along right jaw. States swelling started 2 weeks ago. Was going to PT for TMJ pain when it was noticed. She was able to open moth originally at 4mm when she started PT, got mouth to open to 36mm, now back to 24mm. Noticed swelling right side of face along jaw line. There is a firm nodule,  States swelling has gotten worse. Went to dentist 2 days ago, then referred to oral surgeon. Thought related to cyst she had removed iin 2019. Was also concerned about possible bone infection. Wanted her to have CT neck/face and her insurance requires prior [place]. She came her to have referral done and test ordered. She was started on PCN  No fever, chills, unexplained weight loss. Does have hx of breast cancer. Allergies   Allergen Reactions    Codeine Nausea and Vomiting       Past Medical History:   Diagnosis Date    Breast cancer [facility]) 2012    Right breast infiltrating ductal carcinoma    Cardiomyopathy due to chemotherapy ([address]     EF 20 %    Essential hypertension     Gestational hypertension     History of sepsis 2013    after chemo    Hx of difficult intubation     resulting from sepsis in 2013.  Hypertension     Morbid obesity ([address]     Ne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 ([address]     Type I (juvenile type) diabetes mellitus without mention of complication, uncontrolled     diagnosed age 4    Unspecified vitamin D deficiency 2011       Past Surgical History:   Procedure Laterality Date    BREAST SURGERY PROCEDURE UNLISTED      R Breast Mastectomy    HX  SECTION      HX CYST INCISION AND DRAINAGE  2013    perirectal abscess    HX GYN       in     HX MASTECTOMY Right     Right MRM with sentinel LNB.  HX ORTHOPAEDIC Right     Carpal tunnel release, index finger trigger release.     HX OTHER SURGICAL      cyst removed under left arm    HX OTHER SURGICAL      port placement for chemo    HX WISDOM TEETH EXTRACTION  08/15/2018       Social History     Socioeconomic History    Marital status:      Spouse name: Not on file    Number of children: Not on file    Years of education: Not on file    Highest education level: Not on file   Occupational History    Not on file   Social Needs    Financial resource strain: Not on file    Food insecurity:     Worry: Not on file     Inability: Not on file    Transportation needs:     Medical: Not on file     Non-medical: Not on file   Tobacco Use    Smoking status: Never Smoker    Smokeless tobacco: Never Used   Substance and Sexual Activity    Alcohol use: No    Drug use: No    Sexual activity: Yes     Partners: Male   Lifestyle    Physical activity:     Days per week: Not on file     Minutes per session: Not on file    Stress: Not on file   Relationships    Social connections:     Talks on phone: Not on file     Gets together: Not on file     Attends Buddhist service: Not on file     Active member of club or organization: Not on file     Attends meetings of clubs or organizations: Not on file     Relationship status: Not on file    Intimate partner violence:     Fear of current or ex partner: Not on file     Emotionally abused: Not on file     Physically abused: Not on file     Forced sexual activity: Not on file   Other Topics Concern     Service Not Asked    Blood Transfusions Not Asked    Caffeine Concern Not Asked    Occupational Exposure Not Asked   Yonatan Proud Hazards Not Asked    Sleep Concern Not Asked    Stress Concern Not Asked    Weight Concern Not Asked    Special Diet Not Asked    Back Care Not Asked    Exercise Not Asked    Bike Helmet Not Asked   [address] Not Asked    Self-Exams Not Asked   Social History Narrative    Lives in [place] with her  and 6 yo son. Got  in July 2017.   Currently in [address] [place] [address] for a masters in divinity. Family History   Problem Relation Age of Onset    Diabetes Brother         borderline Type 2    Diabetes Paternal Uncle     Diabetes Paternal Grandfather     Heart Disease Paternal Grandfather         MI in his 62s    Hypertension Mother     Heart Disease Father     Stroke Neg Hx        Current Outpatient Medications   Medication Sig    penicillin v potassium (VEETID) 500 mg tablet Take  by mouth four (4) times daily.  NOVOLOG FLEXPEN U-100 INSULIN 100 unit/mL (3 mL) inpn INJECT 1:5 FOR CARBS + 1 UNIT FOR 25 > 125 FOR CORRECTION. MAX 50 UNITS PER DAY    Blood-Glucose Meter monitoring kit Check blood sugar four times a day    glucose blood VI test strips (BLOOD GLUCOSE TEST) strip Use to check blood sugar four times a day.  lancets misc Use to check blood sugar four times a day.  levonorgestrel (MIRENA) 20 mcg/24 hours (5 yrs) 52 mg IUD 1 Device by IntraUTERine route once.  lisinopril (PRINIVIL, ZESTRIL) 20 mg tablet Take 1 Tab by mouth daily. Replaces nifedipine for blood pressure    insulin degludec (TRESIBA FLEXTOUCH U-100) 100 unit/mL (3 mL) inpn Inject 32 units once daily and increase as directed up to 100 units per day--Dose change 12/3/19--updated med list--did not send prescription to the pharmacy (Patient taking differently: Inject 34 units once daily and increase as directed up to 100 units per day--Dose change 12/3/19--updated med list--did not send prescription to the pharmacy)    FREESTYLE PRECISION KIRK STRIPS strip Use as needed up to twice daily with [facility] reader to check blood sugar when having having problems with the [place] sensors    ONETOUCH ULTRA BLUE TEST STRIP strip Test 4 times daily    multivit,calc,mins/iron/folic (ONE-A-DAY WOMENS FORMULA PO) Take  by mouth daily.  hydrocortisone (CORTAID) 1 % topical cream APPLY TO THE EFFECTED AREA 2 TIMES A DAY. USE THIN LAYER TWICE A DAY.     ondansetron (ZOFRAN ODT) 8 mg disintegrating tablet Take 1 Tab by mouth every eight (8) hours as needed for Nausea.  cetirizine (ZYRTEC) 10 mg tablet Take 10 mg by mouth.  FREESTYLE NASREEN 14 DAY READER misc Use as directed    FREESTYLE NASREEN 14 DAY SENSOR kit Use as directed every 14 days    ibuprofen (MOTRIN) 800 mg tablet Take 1 Tab by mouth every eight (8) hours.  LANCE PEN NEEDLE 32 gauge x 5/32\" ndle USE AS DIRECTED TO INJECT INSULIN 4 TIMES DAILY    cholecalciferol, vitamin D3, (VITAMIN D3) 2,000 unit tab Take 4,000 Units by mouth daily. No current facility-administered medications for this visit. Review of Systems   Constitutional: Negative for chills, diaphoresis, fever and weight loss. HENT: Positive for ear pain. Negative for congestion, sinus pain and sore throat. Right jaw pain and swelling   Respiratory: Negative. Cardiovascular: Negative. Vitals:    03/06/20 1006   BP: 140/72   Pulse: 70   Resp: 16   Temp: 97.2 °F (36.2 °C)   TempSrc: Oral   SpO2: 99%     Physical Exam  Constitutional:       Appearance: Normal appearance. She is obese. HENT:      Right Ear: Hearing, tympanic membrane, ear canal and external ear normal.      Left Ear: Hearing, tympanic membrane, ear canal and external ear normal.      Nose: Nose normal.      Mouth/Throat:      Lips: Pink. Mouth: Mucous membranes are moist.      Dentition: Normal dentition. No dental tenderness or gingival swelling. Pharynx: Oropharynx is clear. Uvula midline. Comments: Swelling noted along right jaw line, form, bony-like projection off angle of mandible. Swelling over parotid gland, TTP. No erythema  Cardiovascular:      Rate and Rhythm: Normal rate. Pulmonary:      Effort: Pulmonary effort is normal.   Skin:     General: Skin is warm and dry. Neurological:      Mental Status: She is alert and oriented to person, place, and time. ASSESSMENT and PLAN    ICD-10-CM ICD-9-CM    1.  Right facial swelling R22.0 784.2 ibuprofen (MOTRIN) 800 mg tablet      CT MAXILLOFACIAL W WO CONT      REFERRAL TO ENT-OTOLARYNGOLOGY   2. Pain in lower jaw R68.84 784.92 ibuprofen (MOTRIN) 800 mg tablet      CT MAXILLOFACIAL W WO CONT      REFERRAL TO ENT-OTOLARYNGOLOGY     Encounter Diagnoses   Name Primary?  Right facial swelling Yes    Pain in lower jaw      Orders Placed This Encounter    CT MAXILLOFACIAL W WO CONT    REFERRAL TO ENT-OTOLARYNGOLOGY    penicillin v potassium (VEETID) 500 mg tablet    ibuprofen (MOTRIN) 800 mg tablet     Diagnoses and all orders for this visit:    1. Right facial swelling - ?mass  ? abscess  ?bone infection  ?parotiditis. She has been on PCN prescribed by oral surgery. Will order CT maxillofacial and refer to ENT  -     ibuprofen (MOTRIN) 800 mg tablet; Take 1 Tab by mouth every six (6) hours as needed for Pain.  -     CT MAXILLOFACIAL W WO CONT; Future  -     REFERRAL TO ENT-OTOLARYNGOLOGY    2. Pain in lower jaw- ?mass  ? abscess  ?bone infection  ?parotiditis. She has been on PCN prescribed by oral surgery. Will order CT maxillofacial and refer to ENT  -     ibuprofen (MOTRIN) 800 mg tablet; Take 1 Tab by mouth every six (6) hours as needed for Pain.  -     CT MAXILLOFACIAL W WO CONT; Future  -     REFERRAL TO ENT-OTOLARYNGOLOGY            I have reviewed the patient's allergies and made any necessary changes. Medical, procedural, social and family histories have been reviewed and updated as medically indicated. I have reconciled and/or revised patient medications in the EMR. I have discussed each diagnosis listed in this note with Juan Alberto Overton and/or their family. I have discussed treatment options and the risk/benefit analysis of those options, including safe use of medications and possible medication side effects. Through the use of shared decision making we have agreed to the above plan. The patient has received an after-visit summary and questions were answered concerning future plans. Ellen Ding, FNP-C    This note will not be viewable in MyChart.

## 2020-03-06 NOTE — TELEPHONE ENCOUNTER
Verified patient with two type of identifiers. Informed pt per [facility], pt's insurance approved CT. Pt verbalized understanding.

## 2020-03-07 ENCOUNTER — HOSPITAL ENCOUNTER (OUTPATIENT)
Dept: CT IMAGING | Age: 35
Discharge: HOME | End: 2020-03-07
Payer: MEDICAID

## 2020-03-07 DIAGNOSIS — R68.84 PAIN IN LOWER JAW: ICD-10-CM

## 2020-03-07 DIAGNOSIS — R22.0 RIGHT FACIAL SWELLING: ICD-10-CM

## 2020-03-07 PROCEDURE — 74011636320 HC RX REV CODE- 636/320: Performed by: RADIOLOGY

## 2020-03-07 PROCEDURE — 70487 CT MAXILLOFACIAL W/DYE: CPT

## 2020-03-07 RX ADMIN — IOPAMIDOL 100 ML: 612 INJECTION, SOLUTION INTRAVENOUS at 13:18

## 2020-03-09 ENCOUNTER — TELEPHONE (OUTPATIENT)
Dept: FAMILY MEDICINE CLINIC | Age: 35
End: 2020-03-09

## 2020-03-09 NOTE — TELEPHONE ENCOUNTER
----- Message from Rob Hollis NP sent at 3/9/2020  7:50 AM EDT -----  Patient to see ENT on Wednesday 3/11/2020 - make sure patient keeps appointment

## 2020-03-09 NOTE — TELEPHONE ENCOUNTER
Verified patient with two type of identifiers. Informed pt of CT results and to keep ENT appt 3/11/20. Pt verbalized understanding.

## 2020-03-10 NOTE — TELEPHONE ENCOUNTER
Reviewed sleep study results with patient. She expressed understanding. Patient will be seeing an ENT 3/11/20. We will send her a copy of her results via [employer] for review during that appointment.

## 2020-03-13 ENCOUNTER — CLINICAL SUPPORT (OUTPATIENT)
Dept: BARIATRICS/WEIGHT MGMT | Age: 35
End: 2020-03-13

## 2020-03-13 VITALS — WEIGHT: 224.5 LBS | BODY MASS INDEX: 39.77 KG/M2

## 2020-03-13 DIAGNOSIS — E10.9 TYPE 1 DIABETES MELLITUS WITHOUT COMPLICATION (HCC): Primary | ICD-10-CM

## 2020-03-13 DIAGNOSIS — E66.01 MORBID OBESITY WITH BMI OF 40.0-44.9, ADULT (HCC): ICD-10-CM

## 2020-03-17 RX ORDER — FLASH GLUCOSE SENSOR
KIT MISCELLANEOUS
Qty: 2 KIT | Refills: 11 | Status: SHIPPED | OUTPATIENT
Start: 2020-03-17 | End: 2021-03-05

## 2020-03-23 NOTE — PROGRESS NOTES
Nurse note from patient's weekly VLCD / LCD / Maintenance class was reviewed. Pertinent medical concerns were:   none     BP Readings from Last 3 Encounters:   03/06/20 140/72   03/06/20 (!) 156/91   02/27/20 137/78       Weight Metrics 3/13/2020 3/6/2020 2/26/2020 2/19/2020 2/19/2020 2/14/2020 2/13/2020   Weight 224 lb 8 oz 226 lb 3.2 oz 230 lb 4.8 oz - 225 lb 8 oz 230 lb 12.8 oz 229 lb   Neck Circ (inches) - - - 14 - - -   Waist Measure Inches - - - 41 - - -   BMI 39.77 kg/m2 40.07 kg/m2 40.8 kg/m2 - 39.95 kg/m2 40.88 kg/m2 40.57 kg/m2       Current Outpatient Medications   Medication Sig Dispense Refill    FreeStyle Mitchell 14 Day Sensor kit USE AS DIRECTED EVERY 14 DAYS 2 Kit 11    penicillin v potassium (VEETID) 500 mg tablet Take  by mouth four (4) times daily.  ibuprofen (MOTRIN) 800 mg tablet Take 1 Tab by mouth every six (6) hours as needed for Pain. 60 Tab 2    NOVOLOG FLEXPEN U-100 INSULIN 100 unit/mL (3 mL) inpn INJECT 1:5 FOR CARBS + 1 UNIT FOR 25 > 125 FOR CORRECTION. MAX 50 UNITS PER DAY 15 mL 11    Blood-Glucose Meter monitoring kit Check blood sugar four times a day 1 Kit 0    glucose blood VI test strips (BLOOD GLUCOSE TEST) strip Use to check blood sugar four times a day. 400 Strip 3    lancets misc Use to check blood sugar four times a day. 400 Each 3    levonorgestrel (MIRENA) 20 mcg/24 hours (5 yrs) 52 mg IUD 1 Device by IntraUTERine route once.  lisinopril (PRINIVIL, ZESTRIL) 20 mg tablet Take 1 Tab by mouth daily.  Replaces nifedipine for blood pressure 90 Tab 3    insulin degludec (TRESIBA FLEXTOUCH U-100) 100 unit/mL (3 mL) inpn Inject 32 units once daily and increase as directed up to 100 units per day--Dose change 12/3/19--updated med list--did not send prescription to the pharmacy (Patient taking differently: Inject 34 units once daily and increase as directed up to 100 units per day--Dose change 12/3/19--updated med list--did not send prescription to the pharmacy) 30 mL 11    FREESTYLE PRECISION KIRK STRIPS strip Use as needed up to twice daily with [facility] reader to check blood sugar when having having problems with the [facility] sensors 50 Strip 11    ONETOUCH ULTRA BLUE TEST STRIP strip Test 4 times daily 400 Strip 3    multivit,calc,mins/iron/folic (ONE-A-DAY WOMENS FORMULA PO) Take  by mouth daily.  hydrocortisone (CORTAID) 1 % topical cream APPLY TO THE EFFECTED AREA 2 TIMES A DAY. USE THIN LAYER TWICE A DAY. 56 g 2    ondansetron (ZOFRAN ODT) 8 mg disintegrating tablet Take 1 Tab by mouth every eight (8) hours as needed for Nausea. 10 Tab 0    cetirizine (ZYRTEC) 10 mg tablet Take 10 mg by mouth.  FREESTYLE NASREEN 14 DAY READER [facility] Use as directed 1 Each 0    ibuprofen (MOTRIN) 800 mg tablet Take 1 Tab by mouth every eight (8) hours. 30 Tab 1    LANCE PEN NEEDLE 32 gauge x 5/32\" ndle USE AS DIRECTED TO INJECT INSULIN 4 TIMES DAILY 400 Pen Needle 3    cholecalciferol, vitamin D3, (VITAMIN D3) 2,000 unit tab Take 4,000 Units by mouth daily.

## 2020-03-23 NOTE — PROGRESS NOTES
Nurse note from patient's weekly VLCD / LCD / Maintenance class was reviewed. Pertinent medical concerns were:   none     BP Readings from Last 3 Encounters:   03/06/20 140/72   03/06/20 (!) 156/91   02/27/20 137/78       Weight Metrics 3/13/2020 3/6/2020 2/26/2020 2/19/2020 2/19/2020 2/14/2020 2/13/2020   Weight 224 lb 8 oz 226 lb 3.2 oz 230 lb 4.8 oz - 225 lb 8 oz 230 lb 12.8 oz 229 lb   Neck Circ (inches) - - - 14 - - -   Waist Measure Inches - - - 41 - - -   BMI 39.77 kg/m2 40.07 kg/m2 40.8 kg/m2 - 39.95 kg/m2 40.88 kg/m2 40.57 kg/m2       Current Outpatient Medications   Medication Sig Dispense Refill    FreeStyle [place] 14 Day Sensor kit USE AS DIRECTED EVERY 14 DAYS 2 Kit 11    penicillin v potassium (VEETID) 500 mg tablet Take  by mouth four (4) times daily.  ibuprofen (MOTRIN) 800 mg tablet Take 1 Tab by mouth every six (6) hours as needed for Pain. 60 Tab 2    NOVOLOG FLEXPEN U-100 INSULIN 100 unit/mL (3 mL) inpn INJECT 1:5 FOR CARBS + 1 UNIT FOR 25 > 125 FOR CORRECTION. MAX 50 UNITS PER DAY 15 mL 11    Blood-Glucose Meter monitoring kit Check blood sugar four times a day 1 Kit 0    glucose blood VI test strips (BLOOD GLUCOSE TEST) strip Use to check blood sugar four times a day. 400 Strip 3    lancets misc Use to check blood sugar four times a day. 400 Each 3    levonorgestrel (MIRENA) 20 mcg/24 hours (5 yrs) 52 mg IUD 1 Device by IntraUTERine route once.  lisinopril (PRINIVIL, ZESTRIL) 20 mg tablet Take 1 Tab by mouth daily.  Replaces nifedipine for blood pressure 90 Tab 3    insulin degludec (TRESIBA FLEXTOUCH U-100) 100 unit/mL (3 mL) inpn Inject 32 units once daily and increase as directed up to 100 units per day--Dose change 12/3/19--updated med list--did not send prescription to the pharmacy (Patient taking differently: Inject 34 units once daily and increase as directed up to 100 units per day--Dose change 12/3/19--updated med list--did not send prescription to the pharmacy) 30 mL 11    FREESTYLE PRECISION KIRK STRIPS strip Use as needed up to twice daily with [place] reader to check blood sugar when having having problems with the [place] sensors 50 Strip 11    ONETOUCH ULTRA BLUE TEST STRIP strip Test 4 times daily 400 Strip 3    multivit,calc,mins/iron/folic (ONE-A-DAY WOMENS FORMULA PO) Take  by mouth daily.  hydrocortisone (CORTAID) 1 % topical cream APPLY TO THE EFFECTED AREA 2 TIMES A DAY. USE THIN LAYER TWICE A DAY. 56 g 2    ondansetron (ZOFRAN ODT) 8 mg disintegrating tablet Take 1 Tab by mouth every eight (8) hours as needed for Nausea. 10 Tab 0    cetirizine (ZYRTEC) 10 mg tablet Take 10 mg by mouth.  FREESTYLE NASREEN 14 DAY READER [facility] Use as directed 1 Each 0    ibuprofen (MOTRIN) 800 mg tablet Take 1 Tab by mouth every eight (8) hours. 30 Tab 1    LANCE PEN NEEDLE 32 gauge x 5/32\" ndle USE AS DIRECTED TO INJECT INSULIN 4 TIMES DAILY 400 Pen Needle 3    cholecalciferol, vitamin D3, (VITAMIN D3) 2,000 unit tab Take 4,000 Units by mouth daily.

## 2020-03-30 NOTE — PROGRESS NOTES
Nurse note from patient's weekly VLCD / LCD / Maintenance class was reviewed. Pertinent medical concerns were:   none     BP Readings from Last 3 Encounters:   03/06/20 140/72   03/06/20 (!) 156/91   02/27/20 137/78       Weight Metrics 3/13/2020 3/6/2020 2/26/2020 2/19/2020 2/19/2020 2/14/2020 2/13/2020   Weight 224 lb 8 oz 226 lb 3.2 oz 230 lb 4.8 oz - 225 lb 8 oz 230 lb 12.8 oz 229 lb   Neck Circ (inches) - - - 14 - - -   Waist Measure Inches - - - 41 - - -   BMI 39.77 kg/m2 40.07 kg/m2 40.8 kg/m2 - 39.95 kg/m2 40.88 kg/m2 40.57 kg/m2       Current Outpatient Medications   Medication Sig Dispense Refill    FreeStyle Mitchell 14 Day Sensor kit USE AS DIRECTED EVERY 14 DAYS 2 Kit 11    penicillin v potassium (VEETID) 500 mg tablet Take  by mouth four (4) times daily.  ibuprofen (MOTRIN) 800 mg tablet Take 1 Tab by mouth every six (6) hours as needed for Pain. 60 Tab 2    NOVOLOG FLEXPEN U-100 INSULIN 100 unit/mL (3 mL) inpn INJECT 1:5 FOR CARBS + 1 UNIT FOR 25 > 125 FOR CORRECTION. MAX 50 UNITS PER DAY 15 mL 11    Blood-Glucose Meter monitoring kit Check blood sugar four times a day 1 Kit 0    glucose blood VI test strips (BLOOD GLUCOSE TEST) strip Use to check blood sugar four times a day. 400 Strip 3    lancets misc Use to check blood sugar four times a day. 400 Each 3    levonorgestrel (MIRENA) 20 mcg/24 hours (5 yrs) 52 mg IUD 1 Device by IntraUTERine route once.  lisinopril (PRINIVIL, ZESTRIL) 20 mg tablet Take 1 Tab by mouth daily.  Replaces nifedipine for blood pressure 90 Tab 3    insulin degludec (TRESIBA FLEXTOUCH U-100) 100 unit/mL (3 mL) inpn Inject 32 units once daily and increase as directed up to 100 units per day--Dose change 12/3/19--updated med list--did not send prescription to the pharmacy (Patient taking differently: Inject 34 units once daily and increase as directed up to 100 units per day--Dose change 12/3/19--updated med list--did not send prescription to the pharmacy) 30 mL 11    FREESTYLE PRECISION KIRK STRIPS strip Use as needed up to twice daily with [facility] reader to check blood sugar when having having problems with the [facility] sensors 50 Strip 11    ONETOUCH ULTRA BLUE TEST STRIP strip Test 4 times daily 400 Strip 3    multivit,calc,mins/iron/folic (ONE-A-DAY WOMENS FORMULA PO) Take  by mouth daily.  hydrocortisone (CORTAID) 1 % topical cream APPLY TO THE EFFECTED AREA 2 TIMES A DAY. USE THIN LAYER TWICE A DAY. 56 g 2    ondansetron (ZOFRAN ODT) 8 mg disintegrating tablet Take 1 Tab by mouth every eight (8) hours as needed for Nausea. 10 Tab 0    cetirizine (ZYRTEC) 10 mg tablet Take 10 mg by mouth.  FREESTYLE NASREEN 14 DAY READER [facility] Use as directed 1 Each 0    ibuprofen (MOTRIN) 800 mg tablet Take 1 Tab by mouth every eight (8) hours. 30 Tab 1    LANCE PEN NEEDLE 32 gauge x 5/32\" ndle USE AS DIRECTED TO INJECT INSULIN 4 TIMES DAILY 400 Pen Needle 3    cholecalciferol, vitamin D3, (VITAMIN D3) 2,000 unit tab Take 4,000 Units by mouth daily.

## 2020-04-13 ENCOUNTER — VIRTUAL VISIT (OUTPATIENT)
Dept: ENDOCRINOLOGY | Age: 35
End: 2020-04-13

## 2020-04-13 ENCOUNTER — VIRTUAL VISIT (OUTPATIENT)
Dept: FAMILY MEDICINE CLINIC | Age: 35
End: 2020-04-13

## 2020-04-13 DIAGNOSIS — E66.01 MORBID OBESITY WITH BMI OF 40.0-44.9, ADULT (HCC): ICD-10-CM

## 2020-04-13 DIAGNOSIS — E10.9 TYPE 1 DIABETES MELLITUS WITHOUT COMPLICATION (HCC): ICD-10-CM

## 2020-04-13 DIAGNOSIS — E10.9 TYPE 1 DIABETES MELLITUS WITHOUT COMPLICATION (HCC): Primary | ICD-10-CM

## 2020-04-13 DIAGNOSIS — M26.621 TMJ TENDERNESS, RIGHT: ICD-10-CM

## 2020-04-13 DIAGNOSIS — E55.9 VITAMIN D DEFICIENCY: ICD-10-CM

## 2020-04-13 DIAGNOSIS — I10 ESSENTIAL HYPERTENSION, BENIGN: Primary | ICD-10-CM

## 2020-04-13 DIAGNOSIS — I10 ESSENTIAL HYPERTENSION, BENIGN: ICD-10-CM

## 2020-04-13 DIAGNOSIS — R22.0 RIGHT FACIAL SWELLING: ICD-10-CM

## 2020-04-13 RX ORDER — INSULIN DEGLUDEC INJECTION 100 U/ML
INJECTION, SOLUTION SUBCUTANEOUS
Qty: 30 ML | Refills: 11
Start: 2020-04-13 | End: 2020-05-17

## 2020-04-13 RX ORDER — INSULIN ASPART 100 [IU]/ML
INJECTION, SOLUTION INTRAVENOUS; SUBCUTANEOUS
Qty: 15 ML | Refills: 11
Start: 2020-04-13 | End: 2020-04-13

## 2020-04-13 RX ORDER — INSULIN ASPART 100 [IU]/ML
INJECTION, SOLUTION INTRAVENOUS; SUBCUTANEOUS
Qty: 15 ML | Refills: 11
Start: 2020-04-13 | End: 2020-10-22

## 2020-04-13 RX ORDER — LISINOPRIL 40 MG/1
40 TABLET ORAL DAILY
Qty: 90 TAB | Refills: 3 | Status: SHIPPED | OUTPATIENT
Start: 2020-04-13 | End: 2021-03-05

## 2020-04-13 NOTE — LETTER
4/27/2020 7:10 PM 
 
Ms. Tony [facility] [id number] Since you haven't read the message I sent you on 4/13/20 through [address], I wanted to send you a letter with the message: 
 
1) Please decrease your tresiba to 30 units daily starting tomorrow and dose your novolog at 1 unit for 5 grams of carbs and 1 unit for every 50 points over 150 for correction. 2) If you have 2 or more readings unde 80 in a week, then decrease your tresiba by 2 units every week. 3) Increase your lisinopril to 40 mg daily. This will be ready for  at the pharmacy today.  Take 2 of the 20 mg tabs once daily until these run out and then take 1 of the 40 mg tabs once daily. 4) Start monitoring blood pressure about 2-3 times per week at alternating times either in the morning or evening after resting for 5 minutes and sitting upright in a chair with your arm at heart level. Please let me know if you are having readings over 140 on the top number or 90 on the bottom number. 5) Please come for a follow up visit on 10/22/20 at 8:50am in our [place] office. 6) I will mail you a lab slip.  Please put this in the glove compartment or other safe spot where you keep your medical papers and I will send you a reminder to have your labs drawn prior to next visit.   
 
 
 
 
 
 
Sincerely, 
 
 
Marisue Seip, MD

## 2020-04-13 NOTE — PATIENT INSTRUCTIONS
1) Please decrease your tresiba to 30 units daily starting tomorrow and dose your novolog at 1 unit for 5 grams of carbs and 1 unit for every 50 points over 150 for correction. 2) If you have 2 or more readings unde 80 in a week, then decrease your tresiba by 2 units every week. 3) Increase your lisinopril to 40 mg daily. This will be ready for  at the pharmacy today. Take 2 of the 20 mg tabs once daily until these run out and then take 1 of the 40 mg tabs once daily. 4) Start monitoring blood pressure about 2-3 times per week at alternating times either in the morning or evening after resting for 5 minutes and sitting upright in a chair with your arm at heart level. Please let me know if you are having readings over 140 on the top number or 90 on the bottom number. 5) Please come for a follow up visit on 10/22/20 at 8:50am in our [place] office. 6) I will mail you a lab slip. Please put this in the glove compartment or other safe spot where you keep your medical papers and I will send you a reminder to have your labs drawn prior to next visit.
normal...

## 2020-04-13 NOTE — PROGRESS NOTES
Chief Complaint   Patient presents with    Diabetes     pcp and pharmacy confirmed       **THIS IS A VIRTUAL VISIT VIA A VIDEO SYNCHRONOUS DISCUSSION. PATIENT AGREED TO HAVE THEIR CARE DELIVERED OVER A DOXY. ME VIDEO VISIT IN PLACE OF THEIR REGULARLY SCHEDULED OFFICE VISIT**    History of Present Illness: Marnie Del Cid is a 38 y.o. female here for follow up of diabetes. Since last visit she has been working on a very low carb diet until recently when Graham hit and now is just on a low carb diet and weight got as low as 224 lbs in 3/20 down from 248 at last visit with me in 12/19. She is currently up to 226 on her scales. She decreased her tresiba down to 30 units daily but despite this is still having times where her sugar is dropping low overnight if she doesn't eat a snack at bedtime. Has been keeping her fasting sugars under 130 but can spike in the 150-200 range after eating as she has been taking 3-4 units of novolog with meals. She stopped the nifedipine after last visit and has been taking lisinopril 20 mg daily but despite this is still having blood pressure readings over 005 systolic. Current Outpatient Medications   Medication Sig    insulin degludec [facility] FlexTouch U-100) 100 unit/mL (3 mL) inpn Inject 30 units once daily and increase as directed up to 100 units per day--Dose change 4/13/19--updated med list--did not send prescription to the pharmacy    NovoLOG Flexpen U-100 Insulin 100 unit/mL (3 mL) inpn INJECT 1:6 FOR CARBS + 1 UNIT FOR 50 > 150 FOR CORRECTION. MAX 50 UNITS PER DAY--Dose change 4/13/20--updated med list--did not send prescription to the pharmacy    FreeStyle Mitchell 14 Day Sensor kit USE AS DIRECTED EVERY 14 DAYS    ibuprofen (MOTRIN) 800 mg tablet Take 1 Tab by mouth every six (6) hours as needed for Pain.     Blood-Glucose Meter monitoring kit Check blood sugar four times a day    glucose blood VI test strips (BLOOD GLUCOSE TEST) strip Use to check blood sugar four times a day.  lancets misc Use to check blood sugar four times a day.  levonorgestrel (MIRENA) 20 mcg/24 hours (5 yrs) 52 mg IUD 1 Device by IntraUTERine route once.  lisinopril (PRINIVIL, ZESTRIL) 20 mg tablet Take 1 Tab by mouth daily. Replaces nifedipine for blood pressure    FREESTYLE PRECISION KIRK STRIPS strip Use as needed up to twice daily with [facility] reader to check blood sugar when having having problems with the Mitchell sensors    ondansetron (ZOFRAN ODT) 8 mg disintegrating tablet Take 1 Tab by mouth every eight (8) hours as needed for Nausea.  cetirizine (ZYRTEC) 10 mg tablet Take 10 mg by mouth as needed.  FREESTYLE MITCHELL 14 DAY READER misc Use as directed    ibuprofen (MOTRIN) 800 mg tablet Take 1 Tab by mouth every eight (8) hours.  cholecalciferol, vitamin D3, (VITAMIN D3) 2,000 unit tab Take 4,000 Units by mouth daily.  penicillin v potassium (VEETID) 500 mg tablet Take  by mouth four (4) times daily.  ONETOUCH ULTRA BLUE TEST STRIP strip Test 4 times daily    multivit,calc,mins/iron/folic (ONE-A-DAY WOMENS FORMULA PO) Take  by mouth daily.  LANCE PEN NEEDLE 32 gauge x 5/32\" ndle USE AS DIRECTED TO INJECT INSULIN 4 TIMES DAILY     No current facility-administered medications for this visit.       Allergies   Allergen Reactions    Codeine Nausea and Vomiting     Review of Systems: PER HPI    Physical Examination:  - GENERAL: NCAT, Appears well nourished   - EYES: EOMI, non-icteric, no proptosis   - Ear/Nose/Throat: NCAT, no visible inflammation or masses   - CARDIOVASCULAR: no cyanosis, no visible JVD   - RESPIRATORY: respiratory effort normal without any distress or labored breathing   - MUSCULOSKELETAL: Normal ROM of neck and upper extremities observed   - SKIN: No rash on face  - NEUROLOGIC:  No facial asymmetry (Cranial nerve 7 motor function), No gaze palsy   - PSYCHIATRIC: Normal affect, Normal insight and judgement       Data Reviewed:   Component      Latest Ref Rng & Units 2/14/2020 12/13/2019          12:00 AM  9:25 AM   Glucose      65 - 99 mg/dL 142 (H)    BUN      6 - 20 mg/dL 13    Creatinine      0.57 - 1.00 mg/dL 0.81    GFR est non-AA      >59 mL/min/1.73 93    GFR est AA      >59 mL/min/1.73 107    BUN/Creatinine ratio      9 - 23 16    Sodium      134 - 144 mmol/L 140    Potassium      3.5 - 5.2 mmol/L 4.5    Chloride      96 - 106 mmol/L 104    CO2      20 - 29 mmol/L 24    Calcium      8.7 - 10.2 mg/dL 9.1    Protein, total      6.0 - 8.5 g/dL 7.0    Albumin      3.8 - 4.8 g/dL 3.7 (L)    GLOBULIN, TOTAL      1.5 - 4.5 g/dL 3.3    A-G Ratio      1.2 - 2.2 1.1 (L)    Bilirubin, total      0.0 - 1.2 mg/dL 0.3    Alk. phosphatase      39 - 117 IU/L 95    AST      0 - 40 IU/L 14    ALT (SGPT)      0 - 32 IU/L 11    Hemoglobin A1c, (calculated)      4.0 - 5.6 %  9.0 (H)   Est. average glucose      mg/dL  212       Assessment/Plan:     1) Type 1 DM uncontrolled: Her most recent Hgb A1c was 9%in 12/19 up from 8.6% in 10/19 up from 7.8% in 6/19 up from 7.1% in 2/19 stable from 1/19 stable from 12/18 down from 7.2% in 11/18 up from 6.8% in 10/18 down from 7.5% in 9/18 down from 8.2% in 6/18 up from 8% in 1/18 (she was at [facility] from 11/12 to 1/18 and states A1c values were in the 7-8% range) down from 8.8% in January 2012 stable from 8.9% in September 2011 up from 7.5% in May 2010. Sugars are improving with weight loss and now is having overnight hypoglycemia so will decrease her tresiba. Will make her carb ratio a little more aggressive to help with spikes after meals.   - decrease tresiba to 30 units in the morning  - Take Novolog 1:5 for CHO ratio for meals  - Take Novolog 1:50 for > 150 during the day   - Check bs 4x/day due to fluctuating sugars  - cont freestyle cassi  - optho UTD 7/17  - foot exam done 6/19  - microalbumin nl 6/19  - LDL 65 6/19 and 106 in 10/19  - check Hgb A1c, cmp, lipids and microalbumin prior to next visit      2) HTN NOS (401.9): her BP was above goal < 140/90 so increase lisinopril  - increase lisinopril to 40 mg daily  - monitor home blood pressure readings      3) Vitamin D deficiency: Level was 11.1 in 9/11. Started on ergo at that time and level up to 24 in January 2012. Still 25.9 in 2/18 so advised to take 4000 units daily but has not been doing this and level was 27.4 in 10/18 and 47 in 6/19  - check Vitamin D 25-OH level prior to next visit  - cont vitamin D 4000 units daily    Patient Instructions   1) Please decrease your tresiba to 30 units daily starting tomorrow and dose your novolog at 1 unit for 5 grams of carbs and 1 unit for every 50 points over 150 for correction. 2) If you have 2 or more readings unde 80 in a week, then decrease your tresiba by 2 units every week. 3) Increase your lisinopril to 40 mg daily. This will be ready for  at the pharmacy today. Take 2 of the 20 mg tabs once daily until these run out and then take 1 of the 40 mg tabs once daily. 4) Start monitoring blood pressure about 2-3 times per week at alternating times either in the morning or evening after resting for 5 minutes and sitting upright in a chair with your arm at heart level. Please let me know if you are having readings over 140 on the top number or 90 on the bottom number. 5) Please come for a follow up visit on 10/22/20 at 8:50am in our [place] office. 6) I will mail you a lab slip. Please put this in the glove compartment or other safe spot where you keep your medical papers and I will send you a reminder to have your labs drawn prior to next visit. Follow-up and Dispositions    · Return for 10/22/20 at 8:50am.               Copy sent to:   Jonathan Avalos NP as PCP - General (Nurse Practitioner)  Marilin Ballard MD (Obstetrics & Gynecology)  Durenda Hodgkin, MD (Surgical Oncology)  Darell Lennon MD (Hematology and Oncology)  Dr. Rickey Ochoa via [facility]

## 2020-04-13 NOTE — PROGRESS NOTES
[facility] Progress Note:   F/up Physician Visit    Daniele Odonnell is a 38 y.o. female who was seen by synchronous (real-time) audio-video technology on 4/13/2020. Consent:  She and/or her healthcare decision maker is aware that this patient-initiated Telehealth encounter is a billable service, with coverage as determined by her insurance carrier. She is aware that she may receive a bill and has provided verbal consent to proceed: Yes    I was at home while conducting this encounter. She had sugery on CTS last week also fixed a trigger finger and removed a cyst  She also had pain TM joint and was going to PT when she developed a lump in the jaw  She had the lump biopsied and this was found to be inflammation  She is using one pp along with one of our replacements  Then having regular food but has been eating carbs   she admits she  is eating more than the program is suggesting  712  Subjective:   Daniele Odonnell was seen for No chief complaint on file. f/up physician visit for the VLCD / LCD Program.  Prior to Admission medications    Medication Sig Start Date End Date Taking? Authorizing Provider   FreeStyle Mitchell 14 Day Sensor kit USE AS DIRECTED EVERY 14 DAYS 3/17/20   Beverly Perez MD   penicillin v potassium (VEETID) 500 mg tablet Take  by mouth four (4) times daily. Provider, Historical   ibuprofen (MOTRIN) 800 mg tablet Take 1 Tab by mouth every six (6) hours as needed for Pain. 3/6/20   Janine Ding, NP   NOVOLOG FLEXPEN U-100 INSULIN 100 unit/mL (3 mL) inpn INJECT 1:5 FOR CARBS + 1 UNIT FOR 25 > 125 FOR CORRECTION. MAX 50 UNITS PER DAY 2/26/20   Beverly Perez MD   Blood-Glucose Meter monitoring kit Check blood sugar four times a day 12/20/19   Judith Elliott MD   glucose blood VI test strips (BLOOD GLUCOSE TEST) strip Use to check blood sugar four times a day. 12/20/19   Judith Elliott MD   lancets misc Use to check blood sugar four times a day. 12/20/19   Lucia Escamilla MD   levonorgestrel (MIRENA) 20 mcg/24 hours (5 yrs) 52 mg IUD 1 Device by IntraUTERine route once. Provider, Historical   lisinopril (PRINIVIL, ZESTRIL) 20 mg tablet Take 1 Tab by mouth daily. Replaces nifedipine for blood pressure 12/3/19   Subhash Haney MD   insulin degludec (TRESIBA FLEXTOUCH U-100) 100 unit/mL (3 mL) inpn Inject 32 units once daily and increase as directed up to 100 units per day--Dose change 12/3/19--updated med list--did not send prescription to the pharmacy  Patient taking differently: Inject 34 units once daily and increase as directed up to 100 units per day--Dose change 12/3/19--updated med list--did not send prescription to the pharmacy 12/3/19   Subhash Haney MD   FREESTYLE PRECISION KIRK STRIPS strip Use as needed up to twice daily with [facility] reader to check blood sugar when having having problems with the [facility] sensors 9/27/19   Subhash Haney MD   [facility] ULTRA BLUE TEST STRIP strip Test 4 times daily 9/16/19   Subhash Haney MD   multivit,calc,mins/iron/folic (ONE-A-DAY WOMENS FORMULA PO) Take  by mouth daily. Provider, Historical   hydrocortisone (CORTAID) 1 % topical cream APPLY TO THE EFFECTED AREA 2 TIMES A DAY. USE THIN LAYER TWICE A DAY. 6/16/19   Alexys Ding, NP   ondansetron (ZOFRAN ODT) 8 mg disintegrating tablet Take 1 Tab by mouth every eight (8) hours as needed for Nausea. 6/15/19   Roosevelt Garg MD   cetirizine (ZYRTEC) 10 mg tablet Take 10 mg by mouth. Provider, Historical   FREESTYLE NASREEN 14 DAY READER misc Use as directed 3/19/19   Subhash Haney MD   ibuprofen (MOTRIN) 800 mg tablet Take 1 Tab by mouth every eight (8) hours. 2/23/19   Jacob Steven MD   LANCE PEN NEEDLE 32 gauge x 5/32\" ndle USE AS DIRECTED TO INJECT INSULIN 4 TIMES DAILY 1/24/19   Subhash Haney MD   cholecalciferol, vitamin D3, (VITAMIN D3) 2,000 unit tab Take 4,000 Units by mouth daily.     Provider, Historical Allergies   Allergen Reactions    Codeine Nausea and Vomiting       Patient Active Problem List    Diagnosis Date Noted    Pregnancy induced hypertension 02/20/2019    Pregnancy induced hypertension, third trimester 01/29/2019    History of breast cancer 08/29/2018    Morbid obesity with BMI of 40.0-44.9, adult ([facility] [address] 09/22/2017    Wound check, abscess 09/22/2017    Type 1 diabetes mellitus without complication ([facility] 31/65/5837    Ne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 ([facility] [facility]     Cardiomyopathy due to chemotherapy ([facility] [facility]     Vitamin D deficiency 11/07/2011    Abnormal thyroid blood test 11/07/2011    Essential hypertension, benign 06/25/2010    Encounter for long-term (current) use of other medications 06/25/2010    Encounter for long-term (current) use of insulin ([facility] [address] 06/25/2010    Uncontrolled type 1 diabetes mellitus (HCC)      Current Outpatient Medications   Medication Sig Dispense Refill    FreeStyle Mitchell 14 Day Sensor kit USE AS DIRECTED EVERY 14 DAYS 2 Kit 11    penicillin v potassium (VEETID) 500 mg tablet Take  by mouth four (4) times daily.  ibuprofen (MOTRIN) 800 mg tablet Take 1 Tab by mouth every six (6) hours as needed for Pain. 60 Tab 2    NOVOLOG FLEXPEN U-100 INSULIN 100 unit/mL (3 mL) inpn INJECT 1:5 FOR CARBS + 1 UNIT FOR 25 > 125 FOR CORRECTION. MAX 50 UNITS PER DAY 15 mL 11    Blood-Glucose Meter monitoring kit Check blood sugar four times a day 1 Kit 0    glucose blood VI test strips (BLOOD GLUCOSE TEST) strip Use to check blood sugar four times a day. 400 Strip 3    lancets misc Use to check blood sugar four times a day. 400 Each 3    levonorgestrel (MIRENA) 20 mcg/24 hours (5 yrs) 52 mg IUD 1 Device by IntraUTERine route once.  lisinopril (PRINIVIL, ZESTRIL) 20 mg tablet Take 1 Tab by mouth daily.  Replaces nifedipine for blood pressure 90 Tab 3    insulin degludec (TRESIBA FLEXTOUCH U-100) 100 unit/mL (3 mL) inpn Inject 32 units once daily and increase as directed up to 100 units per day--Dose change 12/3/19--updated med list--did not send prescription to the pharmacy (Patient taking differently: Inject 34 units once daily and increase as directed up to 100 units per day--Dose change 12/3/19--updated med list--did not send prescription to the pharmacy) 30 mL 11    FREESTYLE PRECISION KIRK STRIPS strip Use as needed up to twice daily with [facility] reader to check blood sugar when having having problems with the [facility] sensors 50 Strip 11    ONETOUCH ULTRA BLUE TEST STRIP strip Test 4 times daily 400 Strip 3    multivit,calc,mins/iron/folic (ONE-A-DAY WOMENS FORMULA PO) Take  by mouth daily.  hydrocortisone (CORTAID) 1 % topical cream APPLY TO THE EFFECTED AREA 2 TIMES A DAY. USE THIN LAYER TWICE A DAY. 56 g 2    ondansetron (ZOFRAN ODT) 8 mg disintegrating tablet Take 1 Tab by mouth every eight (8) hours as needed for Nausea. 10 Tab 0    cetirizine (ZYRTEC) 10 mg tablet Take 10 mg by mouth.  FREESTYLE NASREEN 14 DAY READER misc Use as directed 1 Each 0    ibuprofen (MOTRIN) 800 mg tablet Take 1 Tab by mouth every eight (8) hours. 30 Tab 1    LANCE PEN NEEDLE 32 gauge x 5/32\" ndle USE AS DIRECTED TO INJECT INSULIN 4 TIMES DAILY 400 Pen Needle 3    cholecalciferol, vitamin D3, (VITAMIN D3) 2,000 unit tab Take 4,000 Units by mouth daily.          ROS      CC: Weight Management      Rodriguez Tong is a 38 y.o. female who is here for her      Weight Metrics 3/13/2020 3/6/2020 2/26/2020 2/19/2020 2/19/2020 2/14/2020 2/13/2020   Weight 224 lb 8 oz 226 lb 3.2 oz 230 lb 4.8 oz - 225 lb 8 oz 230 lb 12.8 oz 229 lb   Neck Circ (inches) - - - 14 - - -   Waist Measure Inches - - - 41 - - -   BMI 39.77 kg/m2 40.07 kg/m2 40.8 kg/m2 - 39.95 kg/m2 40.88 kg/m2 40.57 kg/m2               Participation   Did you attend clinic and class last week? no    Review of Systems  Since your last visit, have you experienced any complications? no  If yes, please list:       Are you taking an appetite suppressant? no  If so, is there any Chest Pain, Palpitations or Dizziness? BP Readings from Last 3 Encounters:   03/06/20 140/72   03/06/20 (!) 156/91   02/27/20 137/78       SLEEP:  Less than 6    Have you received any other medical care this week? no  If yes, where and for what? Have you discontinued or changed any medicine or dose of your medicine since your last visit with Dr Елена Alaniz? no  If yes, where and for what? Diet  How many ounces of calorie-free liquids did you consume each day? 64 oz    How many meal replacements did you take each day? 2    Did you have any problems adhering to the program?  no If yes, please explain:      Exercise  Aerobic exercise: walking  30 min  Resistance exercise: 0 workouts / week  Any discomfort?  no     If yes, where? Objective  There were no vitals taken for this visit. No LMP recorded. PHYSICAL EXAMINATION:  [ INSTRUCTIONS:  \"[x]\" Indicates a positive item  \"[]\" Indicates a negative item  -- DELETE ALL ITEMS NOT EXAMINED]  Vital Signs: (As obtained by patient/caregiver at home)  There were no vitals taken for this visit.      Constitutional: [x] Appears well-developed and well-nourished [x] No apparent distress      [] Abnormal -     Mental status: [x] Alert and awake  [x] Oriented to person/place/time [x] Able to follow commands    [] Abnormal -     Eyes:   EOM    [x]  Normal    [] Abnormal -   Sclera  [x]  Normal    [] Abnormal -          Discharge [x]  None visible   [] Abnormal -     HENT: [] Normocephalic, atraumatic  [] Abnormal - right madibular /ant auricular golf ball sized nodule  [x] Mouth/Throat: Mucous membranes are moist    External Ears [x] Normal  [] Abnormal -    Neck: [x] No visualized mass [] Abnormal -     Pulmonary/Chest: [x] Respiratory effort normal   [x] No visualized signs of difficulty breathing or respiratory distress        [] Abnormal -      Musculoskeletal:   [x] Normal gait with no signs of ataxia         [x] Normal range of motion of neck        [] Abnormal -  Healing surgical wound on palmar surface of left hand    Neurological:        [x] No Facial Asymmetry (Cranial nerve 7 motor function) (limited exam due to video visit)          [x] No gaze palsy        [] Abnormal -          Skin:        [x] No significant exanthematous lesions or discoloration noted on facial skin         [] Abnormal -            Psychiatric:       [x] Normal Affect [] Abnormal -        [x] No Hallucinations    Other pertinent observable physical exam findings:-      Assessment / Plan    Encounter Diagnoses   Name Primary?  Essential hypertension, benign Yes    Morbid obesity with BMI of 40.0-44.9, adult (HCC)     Type 1 diabetes mellitus without complication ([facility])     Right facial swelling     TMJ tenderness, right      Diagnoses and all orders for this visit:    1. Essential hypertension, benign  Needs bp cuff , she agrees to get it for the next visit  2. Morbid obesity with BMI of 40.0-44.9, adult ([facility]  Does not have a scale, no tape measure or bp cuff  She will get these for the next visit  3. Type 1 diabetes mellitus without complication ([facility]  She saw her pcp recently  Labs need to be updated but limited by the covid crisis  Will wait  4. Right facial swelling  On an antibiotic  I wonder if the diabetes is out of control and contributing    5. TMJ tenderness, right      1. Weight management  stable   Progress was reviewed with patient    2. Labs    Latest results reviewed with patient   Lab slip given to pt for f/up  labs    * The primary encounter diagnosis was Essential hypertension, benign. Diagnoses of Morbid obesity with BMI of 40.0-44.9, adult ([facility] [address] Type 1 diabetes mellitus without complication ([facility] [address] Right facial swelling, and TMJ tenderness, right were also pertinent to this visit.              Coding Help - Use CPT Codes 73849-80197, 20281-23923 for Established and New Patients respectively, either employing EM elements or Time rules. Other codes (example consult codes) may also apply. We discussed the expected course, resolution and complications of the diagnosis(es) in detail. Medication risks, benefits, costs, interactions, and alternatives were discussed as indicated. I advised her to contact the office if her condition worsens, changes or fails to improve as anticipated. She expressed understanding with the diagnosis(es) and plan. Pursuant to the emergency declaration under the [address] [place] [address], [facility] waiver authority and the [employer] General Act, this Virtual  Visit was conducted, with patient's consent, to reduce the patient's risk of exposure to COVID-19 and provide continuity of care for an established patient. Services were provided through a video synchronous discussion virtually to substitute for in-person clinic visit.     Munira Jang MD

## 2020-05-12 ENCOUNTER — VIRTUAL VISIT (OUTPATIENT)
Dept: FAMILY MEDICINE CLINIC | Age: 35
End: 2020-05-12

## 2020-05-12 VITALS — SYSTOLIC BLOOD PRESSURE: 125 MMHG | DIASTOLIC BLOOD PRESSURE: 86 MMHG | BODY MASS INDEX: 38.62 KG/M2 | WEIGHT: 218 LBS

## 2020-05-12 DIAGNOSIS — R22.0 RIGHT FACIAL SWELLING: ICD-10-CM

## 2020-05-12 DIAGNOSIS — I10 ESSENTIAL HYPERTENSION, BENIGN: Primary | ICD-10-CM

## 2020-05-12 DIAGNOSIS — E10.9 TYPE 1 DIABETES MELLITUS WITHOUT COMPLICATION (HCC): ICD-10-CM

## 2020-05-12 DIAGNOSIS — E66.01 MORBID OBESITY WITH BMI OF 40.0-44.9, ADULT (HCC): ICD-10-CM

## 2020-05-12 NOTE — PROGRESS NOTES
[facility] Progress Note:   F/up Physician Visit    Zeeshan Arita is a 38 y.o. female who was seen by synchronous (real-time) audio-video technology on 5/12/2020. Consent:  She and/or her healthcare decision maker is aware that this patient-initiated Telehealth encounter is a billable service, with coverage as determined by her insurance carrier. She is aware that she may receive a bill and has provided verbal consent to proceed: Yes    I was at home while conducting this encounter. DM  In 100s mostly  avg 173  She is out of product using slim fast  Eating beans and yogurt  She has forgotten to take her insulin    712eating beans and carbmaster   Subjective:   Zeeshan Arita was seen for No chief complaint on file. f/up physician visit for the VLCD / LCD Program.  Prior to Admission medications    Medication Sig Start Date End Date Taking? Authorizing Provider   insulin degludec Tamea Fuchs FlexTouch U-100) 100 unit/mL (3 mL) inpn Inject 30 units once daily and increase as directed up to 100 units per day--Dose change 4/13/19--updated med list--did not send prescription to the pharmacy 4/13/20   Ulyses Romberg, MD   NovoLOG Flexpen U-100 Insulin 100 unit/mL (3 mL) inpn INJECT 1:5 FOR CARBS + 1 UNIT FOR 50 > 150 FOR CORRECTION. MAX 50 UNITS PER DAY--Dose change 4/13/20--updated med list--did not send prescription to the pharmacy 4/13/20   Ulyses Romberg, MD   lisinopriL (PRINIVIL, ZESTRIL) 40 mg tablet Take 1 Tab by mouth daily. New higher dose replaces prior script on file for 20 mg tabs 4/13/20   Ulyses Romberg, MD   FreeStyle Mitchell 14 Day Sensor kit USE AS DIRECTED EVERY 14 DAYS 3/17/20   Ulyses Romberg, MD   penicillin v potassium (VEETID) 500 mg tablet Take  by mouth four (4) times daily. Provider, Historical   ibuprofen (MOTRIN) 800 mg tablet Take 1 Tab by mouth every six (6) hours as needed for Pain.  3/6/20   Audra Ding NP   Blood-Glucose Meter monitoring kit Check blood sugar four times a day 12/20/19   Laury Barrios MD   glucose blood VI test strips (BLOOD GLUCOSE TEST) strip Use to check blood sugar four times a day. 12/20/19   Laury Barrios MD   lancets misc Use to check blood sugar four times a day. 12/20/19   Laury Barrios MD   levonorgestrel (MIRENA) 20 mcg/24 hours (5 yrs) 52 mg IUD 1 Device by IntraUTERine route once. Provider, Historical   FREESTYLE PRECISION KIRK STRIPS strip Use as needed up to twice daily with [employer] reader to check blood sugar when having having problems with the [employer] sensors 9/27/19   Juan Francisco Parks MD   [facility] ULTRA BLUE TEST STRIP strip Test 4 times daily 9/16/19   Juan Francisco Parks MD   multivit,calc,mins/iron/folic (ONE-A-DAY WOMENS FORMULA PO) Take  by mouth daily. Provider, Historical   ondansetron (ZOFRAN ODT) 8 mg disintegrating tablet Take 1 Tab by mouth every eight (8) hours as needed for Nausea. 6/15/19   Mickey Evans MD   cetirizine (ZYRTEC) 10 mg tablet Take 10 mg by mouth as needed. Provider, Historical   FREESTYLE NASREEN 14 DAY READER misc Use as directed 3/19/19   Juan Francisco Parks MD   ibuprofen (MOTRIN) 800 mg tablet Take 1 Tab by mouth every eight (8) hours. 2/23/19   Milagros Steven MD   LANCE PEN NEEDLE 32 gauge x 5/32\" ndle USE AS DIRECTED TO INJECT INSULIN 4 TIMES DAILY 1/24/19   Juan Francisco Parks MD   cholecalciferol, vitamin D3, (VITAMIN D3) 2,000 unit tab Take 4,000 Units by mouth daily.     Provider, Historical     Allergies   Allergen Reactions    Codeine Nausea and Vomiting       Patient Active Problem List    Diagnosis Date Noted    Pregnancy induced hypertension 02/20/2019    Pregnancy induced hypertension, third trimester 01/29/2019    History of breast cancer 08/29/2018    Morbid obesity with BMI of 40.0-44.9, adult ([facility] [address] 09/22/2017    Wound check, abscess 09/22/2017    Type 1 diabetes mellitus without complication ([facility] [address] 41/29/0824    Ne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 ([facility] [address]     Cardiomyopathy due to chemotherapy ([facility] [address]     Vitamin D deficiency 11/07/2011    Abnormal thyroid blood test 11/07/2011    Essential hypertension, benign 06/25/2010    Encounter for long-term (current) use of other medications 06/25/2010    Encounter for long-term (current) use of insulin ([facility] [address] 06/25/2010    Uncontrolled type 1 diabetes mellitus (HCC)      Current Outpatient Medications   Medication Sig Dispense Refill    insulin degludec Aliyahuvenia Taylor FlexTouch U-100) 100 unit/mL (3 mL) inpn Inject 30 units once daily and increase as directed up to 100 units per day--Dose change 4/13/19--updated med list--did not send prescription to the pharmacy 30 mL 11    NovoLOG Flexpen U-100 Insulin 100 unit/mL (3 mL) inpn INJECT 1:5 FOR CARBS + 1 UNIT FOR 50 > 150 FOR CORRECTION. MAX 50 UNITS PER DAY--Dose change 4/13/20--updated med list--did not send prescription to the pharmacy 15 mL 11    lisinopriL (PRINIVIL, ZESTRIL) 40 mg tablet Take 1 Tab by mouth daily. New higher dose replaces prior script on file for 20 mg tabs 90 Tab 3    FreeStyle Mitchell 14 Day Sensor kit USE AS DIRECTED EVERY 14 DAYS 2 Kit 11    penicillin v potassium (VEETID) 500 mg tablet Take  by mouth four (4) times daily.  ibuprofen (MOTRIN) 800 mg tablet Take 1 Tab by mouth every six (6) hours as needed for Pain. 60 Tab 2    Blood-Glucose Meter monitoring kit Check blood sugar four times a day 1 Kit 0    glucose blood VI test strips (BLOOD GLUCOSE TEST) strip Use to check blood sugar four times a day. 400 Strip 3    lancets misc Use to check blood sugar four times a day. 400 Each 3    levonorgestrel (MIRENA) 20 mcg/24 hours (5 yrs) 52 mg IUD 1 Device by IntraUTERine route once.       FREESTYLE PRECISION KIRK STRIPS strip Use as needed up to twice daily with [facility] reader to check blood sugar when having having problems with the [facility] sensors 50 Strip 11    ONETOUCH ULTRA BLUE TEST STRIP strip Test 4 times daily 400 Strip 3    multivit,calc,mins/iron/folic (ONE-A-DAY WOMENS FORMULA PO) Take  by mouth daily.  ondansetron (ZOFRAN ODT) 8 mg disintegrating tablet Take 1 Tab by mouth every eight (8) hours as needed for Nausea. 10 Tab 0    cetirizine (ZYRTEC) 10 mg tablet Take 10 mg by mouth as needed.  FREESTYLE NASREEN 14 DAY READER misc Use as directed 1 Each 0    ibuprofen (MOTRIN) 800 mg tablet Take 1 Tab by mouth every eight (8) hours. 30 Tab 1    LANCE PEN NEEDLE 32 gauge x 5/32\" ndle USE AS DIRECTED TO INJECT INSULIN 4 TIMES DAILY 400 Pen Needle 3    cholecalciferol, vitamin D3, (VITAMIN D3) 2,000 unit tab Take 4,000 Units by mouth daily. MAGDA      CC: Weight Management      Nicole Strauss is a 38 y.o. female who is here for her      Weight Metrics 5/12/2020 3/13/2020 3/6/2020 2/26/2020 2/19/2020 2/19/2020 2/14/2020   Weight 218 lb 224 lb 8 oz 226 lb 3.2 oz 230 lb 4.8 oz - 225 lb 8 oz 230 lb 12.8 oz   Neck Circ (inches) - - - - 14 - -   Waist Measure Inches 41 - - - 41 - -   BMI 38.62 kg/m2 39.77 kg/m2 40.07 kg/m2 40.8 kg/m2 - 39.95 kg/m2 40.88 kg/m2               Participation   Did you attend clinic and class last week? no    Review of Systems  Since your last visit, have you experienced any complications? no  If yes, please list:     Are you taking an appetite suppressant? no  If so, is there any Chest Pain, Palpitations or Dizziness? BP Readings from Last 3 Encounters:   05/12/20 125/86   03/06/20 140/72   03/06/20 (!) 156/91       SLEEP: 6 hrs has a 3year old    Have you received any other medical care this week? no  If yes, where and for what? Have you discontinued or changed any medicine or dose of your medicine since your last visit with Dr Mauricio Causey? no  If yes, where and for what? Diet  How many ounces of calorie-free liquids did you consume each day? Not measuringoz    How many meal replacements did you take each day?  1-2 and 1 or 2 meals depending on how many drinks she has    Did you have any problems adhering to the program?  no If yes, please explain:      Exercise  Aerobic exercise: 30 min walk and then 30 min strength  Resistance exercise: 5 workouts / week  Any discomfort?  no     If yes, where? Objective  Visit Vitals  /86   Wt 218 lb (98.9 kg) Comment: on period   BMI 38.62 kg/m²     No LMP recorded.                     PHYSICAL EXAMINATION:  [ INSTRUCTIONS:  \"[x]\" Indicates a positive item  \"[]\" Indicates a negative item  -- DELETE ALL ITEMS NOT EXAMINED]  Vital Signs: (As obtained by patient/caregiver at home)  Visit Vitals  /86   Wt 218 lb (98.9 kg) Comment: on period   BMI 38.62 kg/m²        Constitutional: [x] Appears well-developed and well-nourished [x] No apparent distress      [] Abnormal -     Mental status: [x] Alert and awake  [x] Oriented to person/place/time [x] Able to follow commands    [] Abnormal -     Eyes:   EOM    [x]  Normal    [] Abnormal -   Sclera  [x]  Normal    [] Abnormal -          Discharge [x]  None visible   [] Abnormal -     HENT: [x] Normocephalic, atraumatic  [] Abnormal -   [x] Mouth/Throat: Mucous membranes are moist    External Ears [x] Normal  [] Abnormal -    Neck: [x] No visualized mass [] Abnormal -     Pulmonary/Chest: [x] Respiratory effort normal   [x] No visualized signs of difficulty breathing or respiratory distress        [] Abnormal -      Musculoskeletal:   [x] Normal gait with no signs of ataxia         [x] Normal range of motion of neck        [] Abnormal -     Neurological:        [x] No Facial Asymmetry (Cranial nerve 7 motor function) (limited exam due to video visit)          [x] No gaze palsy        [] Abnormal -          Skin:        [x] No significant exanthematous lesions or discoloration noted on facial skin         [] Abnormal -            Psychiatric:       [x] Normal Affect [] Abnormal -        [x] No Hallucinations    Other pertinent observable physical exam findings:-      Assessment / Plan    Encounter Diagnoses   Name Primary?  Essential hypertension, benign Yes    Morbid obesity with BMI of 40.0-44.9, adult (HCC)     Type 1 diabetes mellitus without complication (HCC)     Right facial swelling      Diagnoses and all orders for this visit:    1. Essential hypertension, benign  Much better bp now  2. Morbid obesity with BMI of 40.0-44.9, adult ([address]  She has not been using our products   the weight has decreased . She is on her period and the waist measure is unchanged   recheck when not on period next month  3. Type 1 diabetes mellitus without complication (HCC)  Not drinking enough water  The blood sugar results will likely change when she hydrtes better . Also still having more carbs than is healthy for a diabetic especially one trying to lose weight  I counseled her on the importance of consistent eating and not skipping meals but keeping carb intake low, the insulin dose should be lowered as the need decreases. 4. Right facial swelling  It opened up and drained. Is now gone    1. Weight management reasonably well controlled   Progress was reviewed with patient    2. Labs    Latest results reviewed with patient   Lab slip given to pt for f/up  labs       The primary encounter diagnosis was Essential hypertension, benign. Diagnoses of Morbid obesity with BMI of 40.0-44.9, adult ([address] Type 1 diabetes mellitus without complication ([address] and Right facial swelling were also pertinent to this visit. She has 2 lows per week   She has started adjusting the tresiba per direction of endocrine      Coding Help - Use CPT Codes 40829-83879, 66671-11062 for Established and New Patients respectively, either employing EM elements or Time rules. Other codes (example consult codes) may also apply. We discussed the expected course, resolution and complications of the diagnosis(es) in detail. Medication risks, benefits, costs, interactions, and alternatives were discussed as indicated.   I advised her to contact the office if her condition worsens, changes or fails to improve as anticipated. She expressed understanding with the diagnosis(es) and plan. Pursuant to the emergency declaration under the [facility], [facility] waiver authority and the [employer] Act, this Virtual  Visit was conducted, with patient's consent, to reduce the patient's risk of exposure to COVID-19 and provide continuity of care for an established patient. Services were provided through a video synchronous discussion virtually to substitute for in-person clinic visit.     Nance Closs, MD

## 2020-05-17 RX ORDER — INSULIN DEGLUDEC INJECTION 100 U/ML
INJECTION, SOLUTION SUBCUTANEOUS
Qty: 15 ML | Refills: 11 | Status: SHIPPED | OUTPATIENT
Start: 2020-05-17 | End: 2020-08-21

## 2020-06-01 NOTE — PROGRESS NOTES
Nurse note from patient's weekly VLCD / LCD / Maintenance class was reviewed. Pertinent medical concerns were:   none     BP Readings from Last 3 Encounters:   05/12/20 125/86   03/06/20 140/72   03/06/20 (!) 156/91       Weight Metrics 5/12/2020 3/13/2020 3/6/2020 2/26/2020 2/19/2020 2/19/2020 2/14/2020   Weight 218 lb 224 lb 8 oz 226 lb 3.2 oz 230 lb 4.8 oz - 225 lb 8 oz 230 lb 12.8 oz   Neck Circ (inches) - - - - 14 - -   Waist Measure Inches 41 - - - 41 - -   BMI 38.62 kg/m2 39.77 kg/m2 40.07 kg/m2 40.8 kg/m2 - 39.95 kg/m2 40.88 kg/m2       Current Outpatient Medications   Medication Sig Dispense Refill    insulin degludec Ghada Yaniv FlexTouch U-100) 100 unit/mL (3 mL) inpn INJECT 30 UNITS ONCE DAILY AND INCREASE AS DIRECTED UP TO 50 UNITS PER DAY 15 mL 11    NovoLOG Flexpen U-100 Insulin 100 unit/mL (3 mL) inpn INJECT 1:5 FOR CARBS + 1 UNIT FOR 50 > 150 FOR CORRECTION. MAX 50 UNITS PER DAY--Dose change 4/13/20--updated med list--did not send prescription to the pharmacy 15 mL 11    lisinopriL (PRINIVIL, ZESTRIL) 40 mg tablet Take 1 Tab by mouth daily. New higher dose replaces prior script on file for 20 mg tabs 90 Tab 3    FreeStyle Mitchell 14 Day Sensor kit USE AS DIRECTED EVERY 14 DAYS 2 Kit 11    penicillin v potassium (VEETID) 500 mg tablet Take  by mouth four (4) times daily.  ibuprofen (MOTRIN) 800 mg tablet Take 1 Tab by mouth every six (6) hours as needed for Pain. 60 Tab 2    Blood-Glucose Meter monitoring kit Check blood sugar four times a day 1 Kit 0    glucose blood VI test strips (BLOOD GLUCOSE TEST) strip Use to check blood sugar four times a day. 400 Strip 3    lancets misc Use to check blood sugar four times a day. 400 Each 3    levonorgestrel (MIRENA) 20 mcg/24 hours (5 yrs) 52 mg IUD 1 Device by IntraUTERine route once.       FREESTYLE PRECISION KIRK STRIPS strip Use as needed up to twice daily with [facility] reader to check blood sugar when having having problems with the [facility] sensors 50 Strip 11    ONETOUCH ULTRA BLUE TEST STRIP strip Test 4 times daily 400 Strip 3    multivit,calc,mins/iron/folic (ONE-A-DAY WOMENS FORMULA PO) Take  by mouth daily.  ondansetron (ZOFRAN ODT) 8 mg disintegrating tablet Take 1 Tab by mouth every eight (8) hours as needed for Nausea. 10 Tab 0    cetirizine (ZYRTEC) 10 mg tablet Take 10 mg by mouth as needed.  FREESTYLE NASREEN 14 DAY READER [facility] Use as directed 1 Each 0    ibuprofen (MOTRIN) 800 mg tablet Take 1 Tab by mouth every eight (8) hours. 30 Tab 1    LANCE PEN NEEDLE 32 gauge x 5/32\" ndle USE AS DIRECTED TO INJECT INSULIN 4 TIMES DAILY 400 Pen Needle 3    cholecalciferol, vitamin D3, (VITAMIN D3) 2,000 unit tab Take 4,000 Units by mouth daily.

## 2020-06-10 ENCOUNTER — VIRTUAL VISIT (OUTPATIENT)
Dept: FAMILY MEDICINE CLINIC | Age: 35
End: 2020-06-10

## 2020-06-10 DIAGNOSIS — E66.01 MORBID OBESITY WITH BMI OF 40.0-44.9, ADULT (HCC): ICD-10-CM

## 2020-06-10 DIAGNOSIS — E10.9 TYPE 1 DIABETES MELLITUS WITHOUT COMPLICATION (HCC): Primary | ICD-10-CM

## 2020-06-10 DIAGNOSIS — I10 ESSENTIAL HYPERTENSION, BENIGN: ICD-10-CM

## 2020-06-10 NOTE — PROGRESS NOTES
[facility] Progress Note:   F/up Physician Visit    Rashel Holm is a 38 y.o. female who was seen by synchronous (real-time) audio-video technology on 6/10/2020. Consent:  She and/or her healthcare decision maker is aware that this patient-initiated Telehealth encounter is a billable service, with coverage as determined by her insurance carrier. She is aware that she may receive a bill and has provided verbal consent to proceed: Yes    I was at home while conducting this encounter. Fasting 134 this morning   last night she had a low sugar  Most of the lows happen at night  She had overcorrected because she thught she was going to eat more extra stuff than she ended up eating    712  Subjective:   Rashel Holm was seen for Follow-up and Weight Management    f/up physician visit for the VLCD / LCD Program.  Prior to Admission medications    Medication Sig Start Date End Date Taking? Authorizing Provider   insulin degludec [place] FlexTouch U-100) 100 unit/mL (3 mL) inpn INJECT 30 UNITS ONCE DAILY AND INCREASE AS DIRECTED UP TO 50 UNITS PER DAY  Patient taking differently: INJECT 26 UNITS ONCE DAILY AND INCREASE AS DIRECTED UP TO 50 UNITS PER DAY 5/17/20  Yes Margarita Albarran MD   NovoLOG Flexpen U-100 Insulin 100 unit/mL (3 mL) inpn INJECT 1:5 FOR CARBS + 1 UNIT FOR 50 > 150 FOR CORRECTION. MAX 50 UNITS PER DAY--Dose change 4/13/20--updated med list--did not send prescription to the pharmacy 4/13/20  Yes Margarita Albarran MD   lisinopriL (PRINIVIL, ZESTRIL) 40 mg tablet Take 1 Tab by mouth daily. New higher dose replaces prior script on file for 20 mg tabs 4/13/20  Yes Margarita Albarran MD   ibuprofen (MOTRIN) 800 mg tablet Take 1 Tab by mouth every six (6) hours as needed for Pain. 3/6/20  Yes Adilia Ding NP   levonorgestrel (MIRENA) 20 mcg/24 hours (5 yrs) 52 mg IUD 1 Device by IntraUTERine route once.    Yes Provider, Historical   ondansetron (ZOFRAN ODT) 8 mg disintegrating tablet Take 1 Tab by mouth every eight (8) hours as needed for Nausea. 6/15/19  Yes Autumn Page MD   cetirizine (ZYRTEC) 10 mg tablet Take 10 mg by mouth as needed. Yes Provider, Historical   ibuprofen (MOTRIN) 800 mg tablet Take 1 Tab by mouth every eight (8) hours. 19  Yes Yuri Steven MD   FreeStyle [place] 14 Day Sensor kit USE AS DIRECTED EVERY 14 DAYS 3/17/20   Maryam Ascencio MD   penicillin v potassium (VEETID) 500 mg tablet Take  by mouth four (4) times daily. Not taking    Provider, Historical   Blood-Glucose Meter monitoring kit Check blood sugar four times a day 19   Abdiel Stokes MD   glucose blood VI test strips (BLOOD GLUCOSE TEST) strip Use to check blood sugar four times a day. 19   Abdiel Stokes MD   lancets misc Use to check blood sugar four times a day. 19   Abdiel Stoeks MD   FREESTYLE PRECISION KIRK STRIPS strip Use as needed up to twice daily with [facility] reader to check blood sugar when having having problems with the [facility] sensors 19   Maryam Ascencio MD   [facility] ULTRA BLUE TEST STRIP strip Test 4 times daily 19   Maryam Ascencio MD   multivit,calc,mins/iron/folic (ONE-A-DAY WOMENS FORMULA PO) Take  by mouth daily. Provider, Historical   FREESTYLE MITCHELL 14 DAY READER misc Use as directed 3/19/19   Maryam Ascencio MD   LANCE PEN NEEDLE 32 gauge x \" ndle USE AS DIRECTED TO INJECT INSULIN 4 TIMES DAILY 19   Maryam Ascencio MD   cholecalciferol, vitamin D3, (VITAMIN D3) 2,000 unit tab Take 4,000 Units by mouth daily.     Provider, Historical     Allergies   Allergen Reactions    Codeine Nausea and Vomiting       Patient Active Problem List    Diagnosis Date Noted    Pregnancy induced hypertension 2019    Pregnancy induced hypertension, third trimester 2019    History of breast cancer 2018    Morbid obesity with BMI of 40.0-44.9, adult ([facility] 2017    Wound check, abscess 2017  Type 1 diabetes mellitus without complication ([facility] 65/11/1619    Ne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 ([facility]     Cardiomyopathy due to chemotherapy ([facility]     Vitamin D deficiency 11/07/2011    Abnormal thyroid blood test 11/07/2011    Essential hypertension, benign 06/25/2010    Encounter for long-term (current) use of other medications 06/25/2010    Encounter for long-term (current) use of insulin ([facility] 06/25/2010    Uncontrolled type 1 diabetes mellitus (HCC)      Current Outpatient Medications   Medication Sig Dispense Refill    insulin degludec [place] FlexTouch U-100) 100 unit/mL (3 mL) inpn INJECT 30 UNITS ONCE DAILY AND INCREASE AS DIRECTED UP TO 50 UNITS PER DAY (Patient taking differently: INJECT 26 UNITS ONCE DAILY AND INCREASE AS DIRECTED UP TO 50 UNITS PER DAY) 15 mL 11    NovoLOG Flexpen U-100 Insulin 100 unit/mL (3 mL) inpn INJECT 1:5 FOR CARBS + 1 UNIT FOR 50 > 150 FOR CORRECTION. MAX 50 UNITS PER DAY--Dose change 4/13/20--updated med list--did not send prescription to the pharmacy 15 mL 11    lisinopriL (PRINIVIL, ZESTRIL) 40 mg tablet Take 1 Tab by mouth daily. New higher dose replaces prior script on file for 20 mg tabs 90 Tab 3    ibuprofen (MOTRIN) 800 mg tablet Take 1 Tab by mouth every six (6) hours as needed for Pain. 60 Tab 2    levonorgestrel (MIRENA) 20 mcg/24 hours (5 yrs) 52 mg IUD 1 Device by IntraUTERine route once.  ondansetron (ZOFRAN ODT) 8 mg disintegrating tablet Take 1 Tab by mouth every eight (8) hours as needed for Nausea. 10 Tab 0    cetirizine (ZYRTEC) 10 mg tablet Take 10 mg by mouth as needed.  ibuprofen (MOTRIN) 800 mg tablet Take 1 Tab by mouth every eight (8) hours. 30 Tab 1    FreeStyle [place] 14 Day Sensor kit USE AS DIRECTED EVERY 14 DAYS 2 Kit 11    penicillin v potassium (VEETID) 500 mg tablet Take  by mouth four (4) times daily.  Not taking      Blood-Glucose Meter monitoring kit Check blood sugar four times a day 1 Kit 0    glucose blood VI test strips (BLOOD GLUCOSE TEST) strip Use to check blood sugar four times a day. 400 Strip 3    lancets misc Use to check blood sugar four times a day. 400 Each 3    FREESTYLE PRECISION KIRK STRIPS strip Use as needed up to twice daily with [facility] reader to check blood sugar when having having problems with the [facility] sensors 50 Strip 11    ONETOUCH ULTRA BLUE TEST STRIP strip Test 4 times daily 400 Strip 3    multivit,calc,mins/iron/folic (ONE-A-DAY WOMENS FORMULA PO) Take  by mouth daily.  FREESTYLE [place] 14 DAY READER misc Use as directed 1 Each 0    LANCE PEN NEEDLE 32 gauge x 5/32\" ndle USE AS DIRECTED TO INJECT INSULIN 4 TIMES DAILY 400 Pen Needle 3    cholecalciferol, vitamin D3, (VITAMIN D3) 2,000 unit tab Take 4,000 Units by mouth daily. MAGDA      CC: Weight Management      Tyron Bower is a 38 y.o. female who is here for her      Weight Metrics 5/12/2020 3/13/2020 3/6/2020 2/26/2020 2/19/2020 2/19/2020 2/14/2020   Weight 218 lb 224 lb 8 oz 226 lb 3.2 oz 230 lb 4.8 oz - 225 lb 8 oz 230 lb 12.8 oz   Neck Circ (inches) - - - - 14 - -   Waist Measure Inches 41 - - - 41 - -   BMI 38.62 kg/m2 39.77 kg/m2 40.07 kg/m2 40.8 kg/m2 - 39.95 kg/m2 40.88 kg/m2         Outpatient Medications Marked as Taking for the 6/10/20 encounter (Virtual Visit) with Lorraine Monroe MD   Medication Sig Dispense Refill    insulin degludec [place] FlexTouch U-100) 100 unit/mL (3 mL) inpn INJECT 30 UNITS ONCE DAILY AND INCREASE AS DIRECTED UP TO 50 UNITS PER DAY (Patient taking differently: INJECT 26 UNITS ONCE DAILY AND INCREASE AS DIRECTED UP TO 50 UNITS PER DAY) 15 mL 11    NovoLOG Flexpen U-100 Insulin 100 unit/mL (3 mL) inpn INJECT 1:5 FOR CARBS + 1 UNIT FOR 50 > 150 FOR CORRECTION. MAX 50 UNITS PER DAY--Dose change 4/13/20--updated med list--did not send prescription to the pharmacy 15 mL 11    lisinopriL (PRINIVIL, ZESTRIL) 40 mg tablet Take 1 Tab by mouth daily.  New higher dose replaces prior script on file for 20 mg tabs 90 Tab 3    ibuprofen (MOTRIN) 800 mg tablet Take 1 Tab by mouth every six (6) hours as needed for Pain. 60 Tab 2    levonorgestrel (MIRENA) 20 mcg/24 hours (5 yrs) 52 mg IUD 1 Device by IntraUTERine route once.  ondansetron (ZOFRAN ODT) 8 mg disintegrating tablet Take 1 Tab by mouth every eight (8) hours as needed for Nausea. 10 Tab 0    cetirizine (ZYRTEC) 10 mg tablet Take 10 mg by mouth as needed.  ibuprofen (MOTRIN) 800 mg tablet Take 1 Tab by mouth every eight (8) hours. 30 Tab 1         Participation   Did you attend clinic and class last week? no    Review of Systems  Since your last visit, have you experienced any complications? no  If yes, please list:     Are you taking an appetite suppressant? no  If so, is there any Chest Pain, Palpitations or Dizziness? BP Readings from Last 3 Encounters:   05/12/20 125/86   03/06/20 140/72   03/06/20 (!) 156/91       SLEEP:    Have you received any other medical care this week? no  If yes, where and for what? Have you discontinued or changed any medicine or dose of your medicine since your last visit with Dr Paulino Gaucher? no  If yes, where and for what? Diet  How many ounces of calorie-free liquids did you consume each day? 64 oz    How many meal replacements did you take each day? Did you have any problems adhering to the program?  no If yes, please explain:      Exercise  Aerobic exercise:  60   Min once a week. Inconsistent lately  Resistance exercise:  workouts / week  Any discomfort?  no     If yes, where? Objective  There were no vitals taken for this visit. No LMP recorded. PHYSICAL EXAMINATION:  [ INSTRUCTIONS:  \"[x]\" Indicates a positive item  \"[]\" Indicates a negative item  -- DELETE ALL ITEMS NOT EXAMINED]  Vital Signs: (As obtained by patient/caregiver at home)  There were no vitals taken for this visit.      Constitutional: [x] Appears well-developed and well-nourished [x] No apparent distress      [] Abnormal -     Mental status: [x] Alert and awake  [x] Oriented to person/place/time [x] Able to follow commands    [] Abnormal -     Eyes:   EOM    [x]  Normal    [] Abnormal -   Sclera  [x]  Normal    [] Abnormal -          Discharge [x]  None visible   [] Abnormal -     HENT: [x] Normocephalic, atraumatic  [] Abnormal -   [x] Mouth/Throat: Mucous membranes are moist    External Ears [x] Normal  [] Abnormal -    Neck: [x] No visualized mass [] Abnormal -     Pulmonary/Chest: [x] Respiratory effort normal   [x] No visualized signs of difficulty breathing or respiratory distress        [] Abnormal -      Musculoskeletal:   [x] Normal gait with no signs of ataxia         [x] Normal range of motion of neck        [] Abnormal -     Neurological:        [x] No Facial Asymmetry (Cranial nerve 7 motor function) (limited exam due to video visit)          [x] No gaze palsy        [] Abnormal -          Skin:        [x] No significant exanthematous lesions or discoloration noted on facial skin         [] Abnormal -            Psychiatric:       [x] Normal Affect [] Abnormal -        [x] No Hallucinations    Other pertinent observable physical exam findings:-      Assessment / Plan    Encounter Diagnoses   Name Primary?  Type 1 diabetes mellitus without complication ([facility] Yes    Morbid obesity with BMI of 40.0-44.9, adult ([facility]     Essential hypertension, benign      Diagnoses and all orders for this visit:    1. Type 1 diabetes mellitus without complication (HCC)  We discussed the importance of routine. The lows are because the insulin dose is based on a1c mostly which is an average. The high days increase the average and lead to increase in dose of meds and then on days when she skips meals there is high risk of dangerous lows  Has also overcorrected on insulin on some of those days she had the lows  2.  Morbid obesity with BMI of 40.0-44.9, adult (HCC)  Down 2 lbs   keep working to get the sugar intake under control   in order to need the amount of insulin that she requires there must still be a good amount of carbs intake  3. Essential hypertension, benign  Borderline bp      1. Weight management improved   Progress was reviewed with patient    2. Labs    Latest results reviewed with patient   Lab slip given to pt for f/up  labs      The primary encounter diagnosis was Type 1 diabetes mellitus without complication ([facility] Diagnoses of Morbid obesity with BMI of 40.0-44.9, adult ([facility] and Essential hypertension, benign were also pertinent to this visit. Coding Help - Use CPT Codes 98064-60745, 31354-54741 for Established and New Patients respectively, either employing EM elements or Time rules. Other codes (example consult codes) may also apply. We discussed the expected course, resolution and complications of the diagnosis(es) in detail. Medication risks, benefits, costs, interactions, and alternatives were discussed as indicated. I advised her to contact the office if her condition worsens, changes or fails to improve as anticipated. She expressed understanding with the diagnosis(es) and plan. Pursuant to the emergency declaration under the [facility], [facility] waiver authority and the [employer] Act, this Virtual  Visit was conducted, with patient's consent, to reduce the patient's risk of exposure to COVID-19 and provide continuity of care for an established patient. Services were provided through a video synchronous discussion virtually to substitute for in-person clinic visit.     Moni Espinoza MD

## 2020-06-24 ENCOUNTER — TELEPHONE (OUTPATIENT)
Dept: FAMILY MEDICINE CLINIC | Age: 35
End: 2020-06-24

## 2020-06-24 NOTE — TELEPHONE ENCOUNTER
----- Message from Jose Martin Arteaga sent at 6/24/2020  7:46 AM EDT -----  Regarding: ELMER Ding/Telephone        Patient called complaining of lump in back of head and bad headache that came on suddenly. Seeking in office appointment. Transferred patient to answering service to page the doctor. BCN:  [phone].

## 2020-06-24 NOTE — TELEPHONE ENCOUNTER
Verified patient with two type of identifiers. Pt states yesterday started to have \"head pain\" at the back of her neck. head area and felt muscle spasms in the back of her neck. Pt had  rub the area dn he noticed a hard bump about the size of a zhanna. Pt states the pain feels like it is stabbing and is unaware if maybe she slept wrong. Pt has not checked BP. Pt instructed to lay down in a quiet dark area for the day and if no improvement to go to closest  to be evaluated. Pt verbalized understanding.

## 2020-06-24 NOTE — TELEPHONE ENCOUNTER
----- Message from Teresita Torres sent at 6/24/2020  7:12 AM EDT -----  Regarding: NP.Timur  Level 1/Escalated Issue      Caller's first and last name and relationship (if not the patient):Patient      Best contact number(s): [phone]      What are the symptoms: Lump on back of head and muscle spasms       Transfer successful - yes/no (include outcome): Yes      Transfer declined - yes/no (include reason): no       Was caller advised to seek appropriate level of care - yes/no: n/a      Details to clarify the request: Patient stated that she would like to speak with the nurse dealing with muscle spasm and lump on the back of her head . Pt would like a same day appt ,Patient was transferred to the answering service for on call doctor.          Teresita Torres

## 2020-06-26 ENCOUNTER — TELEPHONE (OUTPATIENT)
Dept: FAMILY MEDICINE CLINIC | Age: 35
End: 2020-06-26

## 2020-06-26 NOTE — TELEPHONE ENCOUNTER
----- Message from Anneliese Bertrand sent at 6/26/2020 10:20 AM EDT -----  Regarding: NP MALA / TELEPHONE  General Message/Vendor Calls    Pt reports that she went to the urgent care center and that they suggested a medication evaluation for HTN.         Callback required     Best contact number(s): [phone]            [place]

## 2020-06-26 NOTE — TELEPHONE ENCOUNTER
Verified patient with two type of identifiers. Pt states at  BP was 190/90 at its highest. Pt is feeling better today and has a BP of 130/90. Pt scheduled for VV 6/29 at 8 AM. Pt verbalized understanding.

## 2020-06-29 ENCOUNTER — VIRTUAL VISIT (OUTPATIENT)
Dept: FAMILY MEDICINE CLINIC | Age: 35
End: 2020-06-29

## 2020-06-29 DIAGNOSIS — E10.9 TYPE 1 DIABETES MELLITUS WITHOUT COMPLICATION (HCC): ICD-10-CM

## 2020-06-29 DIAGNOSIS — I10 ESSENTIAL HYPERTENSION, BENIGN: Primary | ICD-10-CM

## 2020-06-29 DIAGNOSIS — E66.01 SEVERE OBESITY (HCC): ICD-10-CM

## 2020-06-29 DIAGNOSIS — R51.9 ACUTE NONINTRACTABLE HEADACHE, UNSPECIFIED HEADACHE TYPE: ICD-10-CM

## 2020-06-29 RX ORDER — HYDROCHLOROTHIAZIDE 12.5 MG/1
1 TABLET ORAL DAILY
COMMUNITY
Start: 2020-06-24 | End: 2020-06-29 | Stop reason: SDUPTHER

## 2020-06-29 RX ORDER — CLONIDINE HYDROCHLORIDE 0.1 MG/1
1 TABLET ORAL AS NEEDED
COMMUNITY
Start: 2020-06-26 | End: 2021-11-01

## 2020-06-29 RX ORDER — HYDROCHLOROTHIAZIDE 12.5 MG/1
12.5 TABLET ORAL DAILY
Qty: 90 TAB | Refills: 2 | Status: SHIPPED | OUTPATIENT
Start: 2020-06-29 | End: 2021-06-07 | Stop reason: SDUPTHER

## 2020-06-29 RX ORDER — NAPROXEN 375 MG/1
375 TABLET ORAL 2 TIMES DAILY WITH MEALS
Qty: 60 TAB | Refills: 3 | Status: SHIPPED | OUTPATIENT
Start: 2020-06-29 | End: 2022-03-28

## 2020-06-29 NOTE — PROGRESS NOTES
Nicole Strauss is a 38 y.o. female who was seen by synchronous (real-time) audio-video technology on 6/29/2020. Consent: Nicole Strauss, who was seen by synchronous (real-time) audio-video technology, and/or her healthcare decision maker, is aware that this patient-initiated, Telehealth encounter on 6/29/2020 is a billable service, with coverage as determined by her insurance carrier. She is aware that she may receive a bill and has provided verbal consent to proceed: Yes. Assessment & Plan:     Diagnoses and all orders for this visit:    1. Essential hypertension, benign - patient needs to monitor sodium in diet, avoid using seasonings with sodium, she has also been eating pork sausage every day. Continue HCTX 12.5mg for now, monitor BP daily, continue to work on diet and exercise, weight reduction. Hopefully can get patient off HCTZ with some diet changes and additional weight loss. -     hydroCHLOROthiazide (HYDRODIURIL) 12.5 mg tablet; Take 1 Tab by mouth daily. 2. Acute nonintractable headache, unspecified headache type - ontinue Naproxen prn    3. Type 1 diabetes mellitus without complication ([address] - managed by endocrinology. Patient struggling with some highs and lows with dieting. Trying to determine how to treat highs without causing too many lows. 4. Severe obesity ([address] - followed by Dr. Jerome Martinez for weight management. Follow-up and Dispositions    · Return in about 3 months (around 9/29/2020). I spent at least 30 minutes on this visit with this established patient. 712  Subjective:   Nicole Strauss is a 38 y.o. female who was seen for Hypertension    weight down to 215lb!! She is doing 1-2 meal replacements daily and 1-2 meals daily. She tends to fall out of routine t times, eating foods that she should not eat.       On Tuesday, was out shopping and started feeling pain in back of head and shooting down neck.   Thought it was related to going off keto diet for 1 day.  Noticed /90. Went to ED on Wednesday evening - BP went down to 155/89. Prescribed HCTZ. Taking lisinopril 40mg. Went back to ED on Friday, Pt states at  BP was 190/90 at its highest. Given clonidine prn. Has been using more salt. 138/83 this morning. 133/87 last night before bed. Taking Naproxen for head pain. Going to OT for trigger finger, had surgery - states she was cut on in 2 different locations. Followed by Dr. Leah Quinones for weight management, started in Oct 2019. Daughter was born 19 via , was taken a month early due to patient's hx of heart failure in past, HTN. Diabetes.      Hx breast cancer in , had right breast mastectomy.  Patient states she was interested in having right breast reconstruction, not sure if she wants to do now. She needed to lose weight before they would consider surgery. 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 Prior to Admission medications    Medication Sig Start Date End Date Taking? Authorizing Provider   insulin degludec Bj Vivian FlexTouch U-100) 100 unit/mL (3 mL) inpn INJECT 30 UNITS ONCE DAILY AND INCREASE AS DIRECTED UP TO 50 UNITS PER DAY  Patient taking differently: INJECT 28 UNITS ONCE DAILY AND INCREASE AS DIRECTED UP TO 50 UNITS PER DAY 20  Yes Severo Favre, MD   NovoLOG Flexpen U-100 Insulin 100 unit/mL (3 mL) inpn INJECT 1:5 FOR CARBS + 1 UNIT FOR 50 > 150 FOR CORRECTION. MAX 50 UNITS PER DAY--Dose change 20--updated med list--did not send prescription to the pharmacy 20  Yes Severo Favre, MD   lisinopriL (PRINIVIL, ZESTRIL) 40 mg tablet Take 1 Tab by mouth daily.  New higher dose replaces prior script on file for 20 mg tabs 20  Yes Severo Favre, MD   FreeStyle Mitchell 14 Day Sensor kit USE AS DIRECTED EVERY 14 DAYS 3/17/20  Yes Severo Favre, MD   penicillin v potassium (VEETID) 500 mg tablet Take  by mouth four (4) times daily. Not taking   Yes Provider, Historical   ibuprofen (MOTRIN) 800 mg tablet Take 1 Tab by mouth every six (6) hours as needed for Pain. 3/6/20  Yes Prashanth Ding NP   Blood-Glucose Meter monitoring kit Check blood sugar four times a day 12/20/19  Yes Laury Barrios MD   glucose blood VI test strips (BLOOD GLUCOSE TEST) strip Use to check blood sugar four times a day. 12/20/19  Yes Laury Barrios MD   lancets misc Use to check blood sugar four times a day. 12/20/19  Yes Laury Barrios MD   levonorgestrel (MIRENA) 20 mcg/24 hours (5 yrs) 52 mg IUD 1 Device by IntraUTERine route once. Yes Provider, Historical   FREESTYLE PRECISION KIRK STRIPS strip Use as needed up to twice daily with [employer] reader to check blood sugar when having having problems with the [employer] sensors 9/27/19  Yes Juan Francisco Parks MD   [facility] ULTRA BLUE TEST STRIP strip Test 4 times daily 9/16/19  Yes Juan Francisco Parks MD   cetirizine (ZYRTEC) 10 mg tablet Take 10 mg by mouth as needed. Yes Provider, Historical   FREESTYLE NASREEN 14 DAY READER misc Use as directed 3/19/19  Yes Juan Francisco Parks MD   ibuprofen (MOTRIN) 800 mg tablet Take 1 Tab by mouth every eight (8) hours. 2/23/19  Yes Ciara Steven MD   cholecalciferol, vitamin D3, (VITAMIN D3) 2,000 unit tab Take 4,000 Units by mouth daily. Yes Provider, Historical   cloNIDine HCL (CATAPRES) 0.1 mg tablet Take 1 Tab by mouth as needed. BP over 180/90 6/26/20   Provider, Historical   hydroCHLOROthiazide (HYDRODIURIL) 12.5 mg tablet Take 1 Tab by mouth daily. 6/24/20   Provider, Historical   multivit,calc,mins/iron/folic (ONE-A-DAY WOMENS FORMULA PO) Take  by mouth daily. Provider, Historical   ondansetron (ZOFRAN ODT) 8 mg disintegrating tablet Take 1 Tab by mouth every eight (8) hours as needed for Nausea.  6/15/19   Mickey Evans MD   LANCE PEN NEEDLE 32 gauge x 5/32\" ndle USE AS DIRECTED TO INJECT INSULIN 4 TIMES DAILY 1/24/19   Juan Francisco Parks MD     Allergies Allergen Reactions    Codeine Nausea and Vomiting       Patient Active Problem List   Diagnosis Code    Uncontrolled type 1 diabetes mellitus ([facility]) E10.65    Essential hypertension, benign I10    Encounter for long-term (current) use of other medications Z79.899    Encounter for long-term (current) use of insulin ([facility] Z79.4    Vitamin D deficiency E55.9    Abnormal thyroid blood test R79.89    Ne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 ([facility]) G62.0, T45.1X5A    Cardiomyopathy due to chemotherapy ([facility] I42.7, T45.1X5A    Type 1 diabetes mellitus without complication ([facility] W49.6    Morbid obesity with BMI of 40.0-44.9, adult ([facility]) E66.01, Z68.41    Wound check, abscess Z51.89    History of breast cancer Z85.3    Pregnancy induced hypertension, third trimester O13.3    Pregnancy induced hypertension O13.9     Current Outpatient Medications   Medication Sig Dispense Refill    insulin degludec Ghada Yaniv FlexTouch U-100) 100 unit/mL (3 mL) inpn INJECT 30 UNITS ONCE DAILY AND INCREASE AS DIRECTED UP TO 50 UNITS PER DAY (Patient taking differently: INJECT 28 UNITS ONCE DAILY AND INCREASE AS DIRECTED UP TO 50 UNITS PER DAY) 15 mL 11    NovoLOG Flexpen U-100 Insulin 100 unit/mL (3 mL) inpn INJECT 1:5 FOR CARBS + 1 UNIT FOR 50 > 150 FOR CORRECTION. MAX 50 UNITS PER DAY--Dose change 4/13/20--updated med list--did not send prescription to the pharmacy 15 mL 11    lisinopriL (PRINIVIL, ZESTRIL) 40 mg tablet Take 1 Tab by mouth daily. New higher dose replaces prior script on file for 20 mg tabs 90 Tab 3    FreeStyle Mitchell 14 Day Sensor kit USE AS DIRECTED EVERY 14 DAYS 2 Kit 11    penicillin v potassium (VEETID) 500 mg tablet Take  by mouth four (4) times daily. Not taking      ibuprofen (MOTRIN) 800 mg tablet Take 1 Tab by mouth every six (6) hours as needed for Pain.  60 Tab 2    Blood-Glucose Meter monitoring kit Check blood sugar four times a day 1 Kit 0    glucose blood VI test strips (BLOOD GLUCOSE TEST) strip Use to check blood sugar four times a day. 400 Strip 3    lancets misc Use to check blood sugar four times a day. 400 Each 3    levonorgestrel (MIRENA) 20 mcg/24 hours (5 yrs) 52 mg IUD 1 Device by IntraUTERine route once.  FREESTYLE PRECISION KIRK STRIPS strip Use as needed up to twice daily with [employer] reader to check blood sugar when having having problems with the [employer] sensors 50 Strip 11    ONETOUCH ULTRA BLUE TEST STRIP strip Test 4 times daily 400 Strip 3    cetirizine (ZYRTEC) 10 mg tablet Take 10 mg by mouth as needed.  FREESTYLE NASREEN 14 DAY READER misc Use as directed 1 Each 0    ibuprofen (MOTRIN) 800 mg tablet Take 1 Tab by mouth every eight (8) hours. 30 Tab 1    cholecalciferol, vitamin D3, (VITAMIN D3) 2,000 unit tab Take 4,000 Units by mouth daily.  cloNIDine HCL (CATAPRES) 0.1 mg tablet Take 1 Tab by mouth as needed. BP over 180/90      hydroCHLOROthiazide (HYDRODIURIL) 12.5 mg tablet Take 1 Tab by mouth daily.  multivit,calc,mins/iron/folic (ONE-A-DAY WOMENS FORMULA PO) Take  by mouth daily.  ondansetron (ZOFRAN ODT) 8 mg disintegrating tablet Take 1 Tab by mouth every eight (8) hours as needed for Nausea. 10 Tab 0    LANCE PEN NEEDLE 32 gauge x 5/32\" ndle USE AS DIRECTED TO INJECT INSULIN 4 TIMES DAILY 400 Pen Needle 3     Allergies   Allergen Reactions    Codeine Nausea and Vomiting     Past Medical History:   Diagnosis Date    Breast cancer [facility]) Nov 2012    Right breast infiltrating ductal carcinoma    Cardiomyopathy due to chemotherapy ([address]     EF 20 %    Essential hypertension     Gestational hypertension     History of sepsis 1/2013    after chemo    Hx of difficult intubation     resulting from sepsis in january 2013.       Hypertension     Morbid obesity ([address]     Ne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 ([address]     Type I (juvenile type) diabetes mellitus without mention of complication, uncontrolled     diagnosed age 4    Unspecified vitamin D deficiency 2011     Past Surgical History:   Procedure Laterality Date    BREAST SURGERY PROCEDURE UNLISTED      R Breast Mastectomy    HX  SECTION      HX CYST INCISION AND DRAINAGE  2013    perirectal abscess    HX GYN       in     HX MASTECTOMY Right     Right MRM with sentinel LNB.  HX ORTHOPAEDIC Right     Carpal tunnel release, index finger trigger release.  HX OTHER SURGICAL      cyst removed under left arm    HX OTHER SURGICAL      port placement for chemo    HX WISDOM TEETH EXTRACTION  08/15/2018     Family History   Problem Relation Age of Onset    Diabetes Brother         borderline Type 2    Diabetes Paternal Uncle     Diabetes Paternal Grandfather     Heart Disease Paternal Grandfather         MI in his 62s    Hypertension Mother     Heart Disease Father     Stroke Neg Hx      Social History     Tobacco Use    Smoking status: Never Smoker    Smokeless tobacco: Never Used   Substance Use Topics    Alcohol use: No       ROS      Objective: There were no vitals taken for this visit. General: alert, cooperative, no distress   Mental  status: normal mood, behavior, speech, dress, motor activity, and thought processes, able to follow commands   HENT: NCAT   Neck: no visualized mass   Resp: no respiratory distress   Neuro: no gross deficits   Skin: no discoloration or lesions of concern on visible areas   Psychiatric: normal affect, consistent with stated mood, no evidence of hallucinations     Additional exam findings: We discussed the expected course, resolution and complications of the diagnosis(es) in detail. Medication risks, benefits, costs, interactions, and alternatives were discussed as indicated. I advised her to contact the office if her condition worsens, changes or fails to improve as anticipated. She expressed understanding with the diagnosis(es) and plan.      Mee Cerna is a 38 y.o. female who was evaluated by a video visit encounter for concerns as above. Patient identification was verified prior to start of the visit. A caregiver was present when appropriate. Due to this being a TeleHealth encounter (During [place] public health emergency), evaluation of the following organ systems was limited: Vitals/Constitutional/EENT/Resp/CV/GI//MS/Neuro/Skin/Heme-Lymph-Imm. Pursuant to the emergency declaration under the [facility] waiver authority and the [employer] Act, this Virtual  Visit was conducted, with patient's (and/or legal guardian's) consent, to reduce the patient's risk of exposure to COVID-19 and provide necessary medical care. Services were provided through a video synchronous discussion virtually to substitute for in-person clinic visit. Patient and provider were located at their individual homes.       Delmer Castro NP

## 2020-06-29 NOTE — PROGRESS NOTES
Chief Complaint   Patient presents with    Hypertension     Before bed last night BP ; 133/87. On 6/26/20; Pt states at  BP was 190/90 at its highest. Pt is feeling better today and has a BP of 130/90.    1. Have you been to the ER, urgent care clinic since your last visit? Hospitalized since your last visit? Yes, [facility] ED ([facility])    2. Have you seen or consulted any other health care providers outside of the [address] [place] [address] since your last visit? Include any pap smears or colon screening.  No    Health Maintenance Due   Topic Date Due    PAP AKA CERVICAL CYTOLOGY  06/07/2014    Eye Exam Retinal or Dilated  07/06/2019    A1C test (Diabetic or Prediabetic)  03/13/2020    MICROALBUMIN Q1  06/20/2020    Foot Exam Q1  06/25/2020

## 2020-07-17 ENCOUNTER — VIRTUAL VISIT (OUTPATIENT)
Dept: FAMILY MEDICINE CLINIC | Age: 35
End: 2020-07-17

## 2020-07-17 DIAGNOSIS — E10.9 TYPE 1 DIABETES MELLITUS WITHOUT COMPLICATION (HCC): ICD-10-CM

## 2020-07-17 DIAGNOSIS — I10 ESSENTIAL HYPERTENSION, BENIGN: Primary | ICD-10-CM

## 2020-07-17 DIAGNOSIS — E66.01 MORBID OBESITY WITH BMI OF 40.0-44.9, ADULT (HCC): ICD-10-CM

## 2020-07-17 RX ORDER — BUPROPION HYDROCHLORIDE 150 MG/1
150 TABLET ORAL
Qty: 30 TAB | Refills: 2 | Status: SHIPPED | OUTPATIENT
Start: 2020-07-17 | End: 2020-09-10 | Stop reason: ALTCHOICE

## 2020-07-17 NOTE — PROGRESS NOTES
[facility] Progress Note:   F/up Physician Visit    Palomo Rose is a 38 y.o. female who was seen by synchronous (real-time) audio-video technology on 7/17/2020. Consent:  She and/or her healthcare decision maker is aware that this patient-initiated Telehealth encounter is a billable service, with coverage as determined by her insurance carrier. She is aware that she may receive a bill and has provided verbal consent to proceed: Yes    I was at home while conducting this encounter. Her weight was 214 early this week and now 220  She missed her period -late since early this month  She has a rosie  Has more cravings for sweets and junk food    712  Subjective:   Palomo Rose was seen for Weight Management    f/up physician visit for the VLCD / LCD Program.  Prior to Admission medications    Medication Sig Start Date End Date Taking? Authorizing Provider   buPROPion XL (WELLBUTRIN XL) 150 mg tablet Take 1 Tab by mouth every morning. 7/17/20  Yes Macarena Yanez MD   cloNIDine HCL (CATAPRES) 0.1 mg tablet Take 1 Tab by mouth as needed. BP over 180/90 6/26/20  Yes Provider, Historical   hydroCHLOROthiazide (HYDRODIURIL) 12.5 mg tablet Take 1 Tab by mouth daily. 6/29/20  Yes Sae Ding NP   naproxen (NAPROSYN) 375 mg tablet Take 1 Tab by mouth two (2) times daily (with meals). 6/29/20  Yes Sae Ding NP   insulin degludec Normajean Dubs FlexTouch U-100) 100 unit/mL (3 mL) inpn INJECT 30 UNITS ONCE DAILY AND INCREASE AS DIRECTED UP TO 50 UNITS PER DAY  Patient taking differently: INJECT 28 UNITS ONCE DAILY AND INCREASE AS DIRECTED UP TO 50 UNITS PER DAY 5/17/20  Yes Odalis Smith MD   NovoLOG Flexpen U-100 Insulin 100 unit/mL (3 mL) inpn INJECT 1:5 FOR CARBS + 1 UNIT FOR 50 > 150 FOR CORRECTION.  MAX 50 UNITS PER DAY--Dose change 4/13/20--updated med list--did not send prescription to the pharmacy 4/13/20  Yes Odalis Smith MD   lisinopriL (PRINIVIL, ZESTRIL) 40 mg tablet Take 1 Tab by mouth daily. New higher dose replaces prior script on file for 20 mg tabs 4/13/20  Yes Lalito Simental MD   levonorgestrel (MIRENA) 20 mcg/24 hours (5 yrs) 52 mg IUD 1 Device by IntraUTERine route once. Yes Provider, Historical   cetirizine (ZYRTEC) 10 mg tablet Take 10 mg by mouth as needed. Yes Provider, Historical   cholecalciferol, vitamin D3, (VITAMIN D3) 2,000 unit tab Take 4,000 Units by mouth daily. Yes Provider, Historical   FreeStyle Mitchell 14 Day Sensor kit USE AS DIRECTED EVERY 14 DAYS 3/17/20   Lalito Simental MD   penicillin v potassium (VEETID) 500 mg tablet Take  by mouth four (4) times daily. Not taking    Provider, Historical   Blood-Glucose Meter monitoring kit Check blood sugar four times a day 12/20/19   Leslie Mendoza MD   glucose blood VI test strips (BLOOD GLUCOSE TEST) strip Use to check blood sugar four times a day. 12/20/19   Leslie Mendoza MD   lancets misc Use to check blood sugar four times a day.  12/20/19   Leslie Mendoza MD   FREESTYLE PRECISION KIRK STRIPS strip Use as needed up to twice daily with [facility] reader to check blood sugar when having having problems with the [facility] sensors 9/27/19   Lalito Simental MD   [facility] ULTRA BLUE TEST STRIP strip Test 4 times daily 9/16/19   Lalito Simental MD   FREESTYLE MITCHELL 15 DAY READER misc Use as directed 3/19/19   Lalito Simental MD   LANCE PEN NEEDLE 28 gauge x 5/32\" ndle USE AS DIRECTED TO INJECT INSULIN 4 TIMES DAILY 1/24/19   Lalito Simental MD     Allergies   Allergen Reactions    Codeine Nausea and Vomiting       Patient Active Problem List    Diagnosis Date Noted    Pregnancy induced hypertension 02/20/2019    Pregnancy induced hypertension, third trimester 01/29/2019    History of breast cancer 08/29/2018    Morbid obesity with BMI of 40.0-44.9, adult ([facility] 09/22/2017    Wound check, abscess 09/22/2017    Type 1 diabetes mellitus without complication ([facility] 31/99/7251    Ne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 ([facility]     Cardiomyopathy due to chemotherapy ([facility]     Vitamin D deficiency 11/07/2011    Abnormal thyroid blood test 11/07/2011    Essential hypertension, benign 06/25/2010    Encounter for long-term (current) use of other medications 06/25/2010    Encounter for long-term (current) use of insulin ([facility] 06/25/2010    Uncontrolled type 1 diabetes mellitus (HCC)      Current Outpatient Medications   Medication Sig Dispense Refill    buPROPion XL (WELLBUTRIN XL) 150 mg tablet Take 1 Tab by mouth every morning. 30 Tab 2    cloNIDine HCL (CATAPRES) 0.1 mg tablet Take 1 Tab by mouth as needed. BP over 180/90      hydroCHLOROthiazide (HYDRODIURIL) 12.5 mg tablet Take 1 Tab by mouth daily. 90 Tab 2    naproxen (NAPROSYN) 375 mg tablet Take 1 Tab by mouth two (2) times daily (with meals). 60 Tab 3    insulin degludec Jayjay Leyland FlexTouch U-100) 100 unit/mL (3 mL) inpn INJECT 30 UNITS ONCE DAILY AND INCREASE AS DIRECTED UP TO 50 UNITS PER DAY (Patient taking differently: INJECT 28 UNITS ONCE DAILY AND INCREASE AS DIRECTED UP TO 50 UNITS PER DAY) 15 mL 11    NovoLOG Flexpen U-100 Insulin 100 unit/mL (3 mL) inpn INJECT 1:5 FOR CARBS + 1 UNIT FOR 50 > 150 FOR CORRECTION. MAX 50 UNITS PER DAY--Dose change 4/13/20--updated med list--did not send prescription to the pharmacy 15 mL 11    lisinopriL (PRINIVIL, ZESTRIL) 40 mg tablet Take 1 Tab by mouth daily. New higher dose replaces prior script on file for 20 mg tabs 90 Tab 3    levonorgestrel (MIRENA) 20 mcg/24 hours (5 yrs) 52 mg IUD 1 Device by IntraUTERine route once.  cetirizine (ZYRTEC) 10 mg tablet Take 10 mg by mouth as needed.  cholecalciferol, vitamin D3, (VITAMIN D3) 2,000 unit tab Take 4,000 Units by mouth daily.  FreeStyle Mitchell 14 Day Sensor kit USE AS DIRECTED EVERY 14 DAYS 2 Kit 11    penicillin v potassium (VEETID) 500 mg tablet Take  by mouth four (4) times daily.  Not taking      Blood-Glucose Meter monitoring kit Check blood sugar four times a day 1 Kit 0    glucose blood VI test strips (BLOOD GLUCOSE TEST) strip Use to check blood sugar four times a day. 400 Strip 3    lancets misc Use to check blood sugar four times a day. 400 Each 3    FREESTYLE PRECISION KIRK STRIPS strip Use as needed up to twice daily with [place] reader to check blood sugar when having having problems with the [place] sensors 50 Strip 11    ONETOUCH ULTRA BLUE TEST STRIP strip Test 4 times daily 400 Strip 3    FREESTYLE NASREEN 14 DAY READER misc Use as directed 1 Each 0    LANCE PEN NEEDLE 32 gauge x 5/32\" ndle USE AS DIRECTED TO INJECT INSULIN 4 TIMES DAILY 400 Pen Needle 3       ROS      CC: Weight Management      Patricia Nobles is a 38 y.o. female who is here for her      Weight Metrics 5/12/2020 3/13/2020 3/6/2020 2/26/2020 2/19/2020 2/19/2020 2/14/2020   Weight 218 lb 224 lb 8 oz 226 lb 3.2 oz 230 lb 4.8 oz - 225 lb 8 oz 230 lb 12.8 oz   Neck Circ (inches) - - - - 14 - -   Waist Measure Inches 41 - - - 41 - -   BMI 38.62 kg/m2 39.77 kg/m2 40.07 kg/m2 40.8 kg/m2 - 39.95 kg/m2 40.88 kg/m2         Outpatient Medications Marked as Taking for the 7/17/20 encounter (Virtual Visit) with Olu Cade MD   Medication Sig Dispense Refill    buPROPion XL (WELLBUTRIN XL) 150 mg tablet Take 1 Tab by mouth every morning. 30 Tab 2    cloNIDine HCL (CATAPRES) 0.1 mg tablet Take 1 Tab by mouth as needed. BP over 180/90      hydroCHLOROthiazide (HYDRODIURIL) 12.5 mg tablet Take 1 Tab by mouth daily. 90 Tab 2    naproxen (NAPROSYN) 375 mg tablet Take 1 Tab by mouth two (2) times daily (with meals).  60 Tab 3    insulin degludec Negin Precise FlexTouch U-100) 100 unit/mL (3 mL) inpn INJECT 30 UNITS ONCE DAILY AND INCREASE AS DIRECTED UP TO 50 UNITS PER DAY (Patient taking differently: INJECT 28 UNITS ONCE DAILY AND INCREASE AS DIRECTED UP TO 50 UNITS PER DAY) 15 mL 11    NovoLOG Flexpen U-100 Insulin 100 unit/mL (3 mL) inpn INJECT 1:5 FOR CARBS + 1 UNIT FOR 50 > 150 FOR CORRECTION. MAX 50 UNITS PER DAY--Dose change 4/13/20--updated med list--did not send prescription to the pharmacy 15 mL 11    lisinopriL (PRINIVIL, ZESTRIL) 40 mg tablet Take 1 Tab by mouth daily. New higher dose replaces prior script on file for 20 mg tabs 90 Tab 3    levonorgestrel (MIRENA) 20 mcg/24 hours (5 yrs) 52 mg IUD 1 Device by IntraUTERine route once.  cetirizine (ZYRTEC) 10 mg tablet Take 10 mg by mouth as needed.  cholecalciferol, vitamin D3, (VITAMIN D3) 2,000 unit tab Take 4,000 Units by mouth daily. Participation   Did you attend clinic and class last week? yes    Review of Systems  Since your last visit, have you experienced any complications? no  If yes, please list:       Are you taking an appetite suppressant? no  If so, is there any Chest Pain, Palpitations or Dizziness? BP Readings from Last 3 Encounters:   05/12/20 125/86   03/06/20 140/72   03/06/20 (!) 156/91       SLEEP: many interruptions from baby    Have you received any other medical care this week? no  If yes, where and for what? Have you discontinued or changed any medicine or dose of your medicine since your last visit with Dr Shahbaz Butts? no  If yes, where and for what? Diet  How many ounces of calorie-free liquids did you consume each day? 64 oz    How many meal replacements did you take each day? 2 and a meal , has had more snacks    Did you have any problems adhering to the program?  no If yes, please explain:      Exercise  Aerobic exercise:  min  Resistance exercise: 2-3  workouts / week  Any discomfort?  no     If yes, where? Objective  There were no vitals taken for this visit. No LMP recorded. PHYSICAL EXAMINATION:  [ INSTRUCTIONS:  \"[x]\" Indicates a positive item  \"[]\" Indicates a negative item  -- DELETE ALL ITEMS NOT EXAMINED]  Vital Signs: (As obtained by patient/caregiver at home)  There were no vitals taken for this visit. Constitutional: [x] Appears well-developed and well-nourished [x] No apparent distress      [] Abnormal -     Mental status: [x] Alert and awake  [x] Oriented to person/place/time [x] Able to follow commands    [] Abnormal -     Eyes:   EOM    [x]  Normal    [] Abnormal -   Sclera  [x]  Normal    [] Abnormal -          Discharge [x]  None visible   [] Abnormal -     HENT: [x] Normocephalic, atraumatic  [] Abnormal -   [x] Mouth/Throat: Mucous membranes are moist    External Ears [x] Normal  [] Abnormal -    Neck: [x] No visualized mass [] Abnormal -     Pulmonary/Chest: [x] Respiratory effort normal   [x] No visualized signs of difficulty breathing or respiratory distress        [] Abnormal -      Musculoskeletal:   [x] Normal gait with no signs of ataxia         [x] Normal range of motion of neck        [] Abnormal -     Neurological:        [x] No Facial Asymmetry (Cranial nerve 7 motor function) (limited exam due to video visit)          [x] No gaze palsy        [] Abnormal -          Skin:        [x] No significant exanthematous lesions or discoloration noted on facial skin         [] Abnormal -            Psychiatric:       [x] Normal Affect [] Abnormal -        [x] No Hallucinations    Other pertinent observable physical exam findings:-      Assessment / Plan    Encounter Diagnoses   Name Primary?  Essential hypertension, benign Yes    Morbid obesity with BMI of 40.0-44.9, adult ([facility])     Type 1 diabetes mellitus without complication ([facility]      Diagnoses and all orders for this visit:    1. Essential hypertension, benign    2. Morbid obesity with BMI of 40.0-44.9, adult (HCC)  -     buPROPion XL (WELLBUTRIN XL) 150 mg tablet; Take 1 Tab by mouth every morning. Doing ok, had gotten down to 214 and regained some, still lower than her last visit  Still on track w LCD  3. Type 1 diabetes mellitus without complication ([facility]  Had been eating more sugar lately  She has gotten back on track this week  1. Weight management improved   Progress was reviewed with patient    2. Labs    Latest results reviewed with patient   Lab slip given to pt for f/up  labs       The primary encounter diagnosis was Essential hypertension, benign. Diagnoses of Morbid obesity with BMI of 40.0-44.9, adult ([facility] [address] and Type 1 diabetes mellitus without complication ([facility] were also pertinent to this visit. Coding Help - Use CPT Codes 11651-45817, 98570-49033 for Established and New Patients respectively, either employing EM elements or Time rules. Other codes (example consult codes) may also apply. We discussed the expected course, resolution and complications of the diagnosis(es) in detail. Medication risks, benefits, costs, interactions, and alternatives were discussed as indicated. I advised her to contact the office if her condition worsens, changes or fails to improve as anticipated. She expressed understanding with the diagnosis(es) and plan. Pursuant to the emergency declaration under the [facility], [facility] waiver authority and the [employer] Act, this Virtual  Visit was conducted, with patient's consent, to reduce the patient's risk of exposure to COVID-19 and provide continuity of care for an established patient. Services were provided through a video synchronous discussion virtually to substitute for in-person clinic visit.     Rosa Suárez MD

## 2020-07-17 NOTE — PROGRESS NOTES
1. Have you been to the ER, urgent care clinic since your last visit? Hospitalized since your last visit? Yes When: 6/17/2020 Where: [facility] urgent [facility] Reason for visit: Bloop pressure    2. Have you seen or consulted any other health care providers outside of the [address] [place] [address] since your last visit? Include any pap smears or colon screening.  No    Chief Complaint   Patient presents with    Weight Management

## 2020-07-27 NOTE — PROGRESS NOTES
Anesthesia postop pain note  POD#1 s/p spinal Duramorph. Pain well controlled overnight. No respiratory depression. Nausea and vomiting overnight, but now improved. Still with some pruritis.   Plan: Continue spinal duramorph orderset for first 24hrs then pain management per surgical team. Felton Mccollum continues to be very diligent with her diabetes management and is happy with her current regimen of flexible insulin regimen MDI and wearing Dexcom G6 CGM:   1  Decrease Tresiba to 24 units at night time   2  Fiasp doses of 1 unit per 8 grams of carbs and 1 unit per 50 points  3  A1c today is 6 0%, with very steady sugars overall  4  Yearly screening labs due, these are fasting   5   Follow up in three months, but I understand it will be based on college schedule

## 2020-07-28 ENCOUNTER — TELEPHONE (OUTPATIENT)
Dept: ENDOCRINOLOGY | Age: 35
End: 2020-07-28

## 2020-07-28 NOTE — TELEPHONE ENCOUNTER
Prior auth for [facility] 14 day sensor through covermeds was sent to Dr. Sarahi Milligan for completion

## 2020-07-29 NOTE — TELEPHONE ENCOUNTER
Sent her the following message through [employer]:    I submitted an authorization for the freestyle cassi sensors through our electronic system called covermymeds and received notification that this med has been approved so you should be able to get this from the local or mail pharmacy that normally dispenses this for you. Please let me know if you have any trouble getting this filled.

## 2020-07-30 NOTE — TELEPHONE ENCOUNTER
Patient notified by voice mail and I left a message for her to let me know she received this message.

## 2020-07-30 NOTE — TELEPHONE ENCOUNTER
Please call pt to let her know she has an unread message in [employer] from yesterday. I submitted an authorization for the freestyle cassi sensors through our electronic system called covergiuliana and received notification that this med has been approved so you should be able to get this from the local or mail pharmacy that normally dispenses this for you. Fito Payer let me know if you have any trouble getting this filled.

## 2020-08-18 ENCOUNTER — TELEPHONE (OUTPATIENT)
Dept: FAMILY MEDICINE CLINIC | Age: 35
End: 2020-08-18

## 2020-08-18 NOTE — TELEPHONE ENCOUNTER
----- Message from Roel Snider sent at 8/18/2020  9:05 AM EDT -----  Regarding: ELMER [place]/Telephone  Contact: [phone]  Caller's first and last name: n/a  Callback required yes/no and why: yes  Best contact number(s): [phone]  Details to clarify the request: She is experiencing loss of taste and smell since yesterday and since she is diabetic and a breast cancer survivor she wanted to discuss getting tested for COVID-19

## 2020-08-21 RX ORDER — INSULIN DEGLUDEC INJECTION 100 U/ML
INJECTION, SOLUTION SUBCUTANEOUS
Qty: 15 ML | Refills: 11
Start: 2020-08-21 | End: 2020-10-22

## 2020-09-10 ENCOUNTER — OFFICE VISIT (OUTPATIENT)
Dept: FAMILY MEDICINE CLINIC | Age: 35
End: 2020-09-10
Payer: MEDICAID

## 2020-09-10 VITALS
SYSTOLIC BLOOD PRESSURE: 127 MMHG | DIASTOLIC BLOOD PRESSURE: 78 MMHG | TEMPERATURE: 98.3 F | HEART RATE: 73 BPM | WEIGHT: 218.4 LBS | HEIGHT: 63 IN | OXYGEN SATURATION: 98 % | RESPIRATION RATE: 16 BRPM | BODY MASS INDEX: 38.7 KG/M2

## 2020-09-10 DIAGNOSIS — E78.00 PURE HYPERCHOLESTEROLEMIA: ICD-10-CM

## 2020-09-10 DIAGNOSIS — Z13.220 SCREENING FOR HYPERLIPIDEMIA: ICD-10-CM

## 2020-09-10 DIAGNOSIS — E66.01 MORBID OBESITY WITH BMI OF 40.0-44.9, ADULT (HCC): ICD-10-CM

## 2020-09-10 DIAGNOSIS — I10 ESSENTIAL HYPERTENSION, BENIGN: ICD-10-CM

## 2020-09-10 DIAGNOSIS — E10.9 TYPE 1 DIABETES MELLITUS WITHOUT COMPLICATION (HCC): Primary | ICD-10-CM

## 2020-09-10 PROCEDURE — 99213 OFFICE O/P EST LOW 20 MIN: CPT | Performed by: FAMILY MEDICINE

## 2020-09-10 RX ORDER — BUPROPION HYDROCHLORIDE 150 MG/1
150 TABLET, EXTENDED RELEASE ORAL 2 TIMES DAILY
Qty: 60 TAB | Refills: 2 | Status: SHIPPED | OUTPATIENT
Start: 2020-09-10 | End: 2021-01-22 | Stop reason: SDUPTHER

## 2020-09-10 NOTE — PROGRESS NOTES
[facility] Progress Note:   F/up Physician Visit    CC: Weight Management      Adenike Mortensen is a 38 y.o. female who is here for her  f/up physician visit for the VLCD / LCD Program.    Weight Metrics 9/10/2020 5/12/2020 3/13/2020 3/6/2020 2/26/2020 2/19/2020 2/19/2020   Weight 218 lb 6.4 oz 218 lb 224 lb 8 oz 226 lb 3.2 oz 230 lb 4.8 oz - 225 lb 8 oz   Neck Circ (inches) - - - - - 14 -   Waist Measure Inches - 41 - - - 41 -   BMI 38.69 kg/m2 38.62 kg/m2 39.77 kg/m2 40.07 kg/m2 40.8 kg/m2 - 39.95 kg/m2         Outpatient Medications Marked as Taking for the 9/10/20 encounter (Office Visit) with Georgia Echeverria MD   Medication Sig Dispense Refill    buPROPion SR [facility] SR) 150 mg SR tablet Take 1 Tab by mouth two (2) times a day. 60 Tab 2    insulin degludec Saul López FlexTouch U-100) 100 unit/mL (3 mL) inpn INJECT 40 UNITS ONCE DAILY AND INCREASE AS DIRECTED UP TO 50 UNITS PER DAY--Dose change 8/21/20--updated med list--did not send prescription to the pharmacy 15 mL 11    OTHER HAS A PARAGUARD BIRTH CONTROL      cloNIDine HCL (CATAPRES) 0.1 mg tablet Take 1 Tab by mouth as needed. BP over 180/90      hydroCHLOROthiazide (HYDRODIURIL) 12.5 mg tablet Take 1 Tab by mouth daily. 90 Tab 2    naproxen (NAPROSYN) 375 mg tablet Take 1 Tab by mouth two (2) times daily (with meals). 60 Tab 3    NovoLOG Flexpen U-100 Insulin 100 unit/mL (3 mL) inpn INJECT 1:5 FOR CARBS + 1 UNIT FOR 50 > 150 FOR CORRECTION. MAX 50 UNITS PER DAY--Dose change 4/13/20--updated med list--did not send prescription to the pharmacy 15 mL 11    lisinopriL (PRINIVIL, ZESTRIL) 40 mg tablet Take 1 Tab by mouth daily. New higher dose replaces prior script on file for 20 mg tabs 90 Tab 3    levonorgestrel (MIRENA) 20 mcg/24 hours (5 yrs) 52 mg IUD 1 Device by IntraUTERine route once. HAS A PARAGUARD      cetirizine (ZYRTEC) 10 mg tablet Take 10 mg by mouth as needed.       cholecalciferol, vitamin D3, (VITAMIN D3) 2,000 unit tab Take 4,000 Units by mouth daily. Participation   Did you attend clinic and class last week? no    Review of Systems  Since your last visit, have you experienced any complications? no  If yes, please list:       Are you taking an appetite suppressant? yes  If so, is there any Chest Pain, Palpitations or Dizziness? BP Readings from Last 3 Encounters:   09/10/20 127/78   05/12/20 125/86   03/06/20 140/72         DM  Highest BS in 300s  That is usually afetr a low sugar   She uses glucose tabs and orange juices    She moved last week an was more active so BG dropped in the 40s  She decreased the insulin   She is using atkins protein shakes in am  And at lunch low carb meal and dinner she is feeling low blood sugr so she eats a low carb meal   then she starts eating jax covered almonds    She had corona virus in august since then she thinks everything has changed       Th wellbutrin is helping decrease appetite  SLEEP:      Have you received any other medical care this week? no  If yes, where and for what? Have you discontinued or changed any medicine or dose of your medicine since your last visit with Dr Derek Romero? no  If yes, where and for what? Diet  How many ounces of calorie-free liquids did you consume each day? 64 oz    How many meal replacements did you take each day? 0    Did you have any problems adhering to the program?  no If yes, please explain:      Exercise  Aerobic exercise: 0 min  Resistance exercise: 0 workouts / week  Any discomfort?  no     If yes, where? Objective  Visit Vitals  /78   Pulse 73   Temp 98.3 °F (36.8 °C) (Oral)   Resp 16   Ht 5' 3\" (1.6 m)   Wt 218 lb 6.4 oz (99.1 kg)   SpO2 98%   BMI 38.69 kg/m²     No LMP recorded.       Physical Exam  Appearance: well,   Mental:A&O x 3, NAD  H:NC/AT,  EENT:   EOMI, PERRL, No scleral icterus  Neck: no bruit or JVD  Lung: clear, No W/R  ABD: soft, active, nontender  Ext:  no Edema  Neuro: nonfocal  Assessment / Plan    Encounter Diagnoses   Name Primary?  Type 1 diabetes mellitus without complication ([facility] Yes    Morbid obesity with BMI of 40.0-44.9, adult ([facility]     Essential hypertension, benign     Screening for hyperlipidemia      Diagnoses and all orders for this visit:    1. Type 1 diabetes mellitus without complication (HCC)  -     HEMOGLOBIN A1C WITH EAG; Future  The junk food is causing her to have these extreme ups and downs as she tries to correct them      2. Morbid obesity with BMI of 40.0-44.9, adult ([facility]  Continue working to clean up the junk food   she is not able to afford the meal replacements at this time but she wants to stay connected to the program  I will continue to work with her   3. Essential hypertension, benign  -     METABOLIC PANEL, COMPREHENSIVE; Future    4. Screening for hyperlipidemia  -     LIPID PANEL; Future    Other orders  -     buPROPion SR (WELLBUTRIN SR) 150 mg SR tablet; Take 1 Tab by mouth two (2) times a day. -     METABOLIC PANEL, COMPREHENSIVE  -     LIPID PANEL  -     HEMOGLOBIN A1C WITH EAG      1. Weight management improved   Progress was reviewed with patient    2. Labs    Latest results reviewed with patient   Lab slip given to pt for f/up  labs       The primary encounter diagnosis was Type 1 diabetes mellitus without complication ([facility] Diagnoses of Morbid obesity with BMI of 40.0-44.9, adult [facility]), Essential hypertension, benign, and Screening for hyperlipidemia were also pertinent to this visit.

## 2020-09-10 NOTE — PROGRESS NOTES
Patient stated name &     Chief Complaint   Patient presents with    Weight Management     2 months follow up        Health Maintenance Due   Topic    PAP AKA CERVICAL CYTOLOGY     Eye Exam Retinal or Dilated     A1C test (Diabetic or Prediabetic)     MICROALBUMIN Q1     Foot Exam Q1     Flu Vaccine (1)       Wt Readings from Last 3 Encounters:   09/10/20 218 lb 6.4 oz (99.1 kg)   20 218 lb (98.9 kg)   20 224 lb 8 oz (101.8 kg)     Temp Readings from Last 3 Encounters:   09/10/20 98.3 °F (36.8 °C) (Oral)   20 97.2 °F (36.2 °C) (Oral)   20 98 °F (36.7 °C) (Oral)     BP Readings from Last 3 Encounters:   09/10/20 127/78   20 125/86   20 140/72     Pulse Readings from Last 3 Encounters:   09/10/20 73   20 70   20 79         Learning Assessment:  :     Learning Assessment 2017   PRIMARY LEARNER Patient   HIGHEST LEVEL OF EDUCATION - PRIMARY LEARNER  4 YEARS OF COLLEGE   BARRIERS PRIMARY LEARNER NONE   CO-LEARNER CAREGIVER No   PRIMARY LANGUAGE ENGLISH   LEARNER PREFERENCE PRIMARY LISTENING   ANSWERED BY self   RELATIONSHIP SELF       Depression Screening:  :     3 most recent PHQ Screens 9/10/2020   Little interest or pleasure in doing things Not at all   Feeling down, depressed, irritable, or hopeless Not at all   Total Score PHQ 2 0       Fall Risk Assessment:  :     No flowsheet data found. Abuse Screening:  :     Abuse Screening Questionnaire 2020 6/10/2020   Do you ever feel afraid of your partner? N N   Are you in a relationship with someone who physically or mentally threatens you? N N   Is it safe for you to go home? Y Y       Coordination of Care Questionnaire:  :     1) Have you been to an emergency room, urgent care clinic since your last visit? No    Hospitalized since your last visit? no             2) Have you seen or consulted any other health care providers outside of [facility] since your last visit?  No  (Include any pap smears or colon screenings in this section.)    Patient is accompanied by self I have received verbal consent from Lonnie Nolen to discuss any/all medical information while they are present in the room.

## 2020-09-11 LAB
ALBUMIN SERPL-MCNC: 3.7 G/DL (ref 3.8–4.8)
ALBUMIN/GLOB SERPL: 1.1 {RATIO} (ref 1.2–2.2)
ALP SERPL-CCNC: 84 IU/L (ref 39–117)
ALT SERPL-CCNC: 13 IU/L (ref 0–32)
AST SERPL-CCNC: 12 IU/L (ref 0–40)
BILIRUB SERPL-MCNC: <0.2 MG/DL (ref 0–1.2)
BUN SERPL-MCNC: 18 MG/DL (ref 6–20)
BUN/CREAT SERPL: 21 (ref 9–23)
CALCIUM SERPL-MCNC: 9.1 MG/DL (ref 8.7–10.2)
CHLORIDE SERPL-SCNC: 103 MMOL/L (ref 96–106)
CHOLEST SERPL-MCNC: 186 MG/DL (ref 100–199)
CO2 SERPL-SCNC: 26 MMOL/L (ref 20–29)
CREAT SERPL-MCNC: 0.85 MG/DL (ref 0.57–1)
EST. AVERAGE GLUCOSE BLD GHB EST-MCNC: 206 MG/DL
GLOBULIN SER CALC-MCNC: 3.4 G/DL (ref 1.5–4.5)
GLUCOSE SERPL-MCNC: 221 MG/DL (ref 65–99)
HBA1C MFR BLD: 8.8 % (ref 4.8–5.6)
HDLC SERPL-MCNC: 70 MG/DL
LDLC SERPL CALC-MCNC: 106 MG/DL (ref 0–99)
POTASSIUM SERPL-SCNC: 3.9 MMOL/L (ref 3.5–5.2)
PROT SERPL-MCNC: 7.1 G/DL (ref 6–8.5)
SODIUM SERPL-SCNC: 138 MMOL/L (ref 134–144)
TRIGL SERPL-MCNC: 50 MG/DL (ref 0–149)
VLDLC SERPL CALC-MCNC: 10 MG/DL (ref 5–40)

## 2020-09-14 RX ORDER — ATORVASTATIN CALCIUM 10 MG/1
10 TABLET, FILM COATED ORAL DAILY
Qty: 30 TAB | Refills: 2 | Status: SHIPPED | OUTPATIENT
Start: 2020-09-14 | End: 2020-10-22

## 2020-09-15 NOTE — PROGRESS NOTES
The blood sugar control is not at goal. The A1c is 8.8, I want to see it below 7  The LDL cholesterol is 106 and needs to be below 70.  I want to start you on lipitor  The liver and kidney are normal

## 2020-09-18 ENCOUNTER — TELEPHONE (OUTPATIENT)
Dept: FAMILY MEDICINE CLINIC | Age: 35
End: 2020-09-18

## 2020-09-18 NOTE — TELEPHONE ENCOUNTER
----- Message from Ramesh Vines MD sent at 9/14/2020 10:08 PM EDT -----  The blood sugar control is not at goal. The A1c is 8.8, I want to see it below 7  The LDL cholesterol is 106 and needs to be below 70.  I want to start you on lipitor  The liver and kidney are normal

## 2020-09-24 ENCOUNTER — VIRTUAL VISIT (OUTPATIENT)
Dept: FAMILY MEDICINE CLINIC | Age: 35
End: 2020-09-24
Payer: MEDICAID

## 2020-09-24 DIAGNOSIS — E10.9 TYPE 1 DIABETES MELLITUS WITHOUT COMPLICATION (HCC): Primary | ICD-10-CM

## 2020-09-24 DIAGNOSIS — E78.00 PURE HYPERCHOLESTEROLEMIA: ICD-10-CM

## 2020-09-24 DIAGNOSIS — I10 ESSENTIAL HYPERTENSION, BENIGN: ICD-10-CM

## 2020-09-24 DIAGNOSIS — E66.01 MORBID OBESITY WITH BMI OF 40.0-44.9, ADULT (HCC): ICD-10-CM

## 2020-09-24 PROCEDURE — 3052F HG A1C>EQUAL 8.0%<EQUAL 9.0%: CPT | Performed by: FAMILY MEDICINE

## 2020-09-24 PROCEDURE — 99214 OFFICE O/P EST MOD 30 MIN: CPT | Performed by: FAMILY MEDICINE

## 2020-09-24 NOTE — PROGRESS NOTES
1. Have you been to the ER, urgent care clinic since your last visit? Hospitalized since your last visit? No    2. Have you seen or consulted any other health care providers outside of the [address] [place] [address] since your last visit? Include any pap smears or colon screening. No     Pharmacy verified.   [id number] [address]    Chief Complaint   Patient presents with    Weight Management     Patient-Reported Vitals 9/24/2020   Patient-Reported Weight 218lb   Patient-Reported Height -   Patient-Reported Pulse 73   Patient-Reported Systolic  000   Patient-Reported Diastolic 84

## 2020-09-24 NOTE — PROGRESS NOTES
Adenike Mortensen is a 38 y.o. female who was seen by synchronous (real-time) audio-video technology on 9/24/2020. Consent:  She and/or her healthcare decision maker is aware that this patient-initiated Telehealth encounter is a billable service, with coverage as determined by her insurance carrier. She is aware that she may receive a bill and has provided verbal consent to proceed: Yes    I was at home while conducting this encounter. She had a HA and she had  systolic so she took a clonidine and the HA went away  Her blood sugars have all been in the 100s the past 2 week  She had a few lows less than 40. This was after taking insulin with a salad at lunch time each time that happened  She had an insulin pump in the past      She takes at least 1 wellbutrin each day      Adenike Mortensen is a 38 y.o. female  who is here for her follow up  Evaluation for the medical bariatric care. CC: I want to be healthier  Not buying products due to financial situation at home  Weight History  Current weight 218 and BMI is There is no height or weight on file to calculate BMI. Goal weight   Highest weight    (See weight gain time line scanned into media section of chart)        Significant Medical History    Update on sleep apnea and  CPAP no    Ever had bariatric surgery: no    Pregnant or planning on becoming pregnant w/in 6 months: no         Significant Psychosocial History     Current Major Lifestyle Changes: no  Any potential unsupportive people: no          Social History  Social History     Tobacco Use    Smoking status: Never Smoker    Smokeless tobacco: Never Used   Substance Use Topics    Alcohol use: No     How many times a week do you eat out?  0    Do you drink any EtOH?  no   If so, how much? Do you have upcoming any travel in the next 6 weeks?  no   If so, what do you have planned?           Exercise  How many days a week do you currently exercise?  0 days  Have you ever been told by a physician not to exercise?  no      Objective  There were no vitals taken for this visit. Waist Circumference: See Weight Management Doc Flowsheet  Neck Circumference: See Weight Management Doc Flowsheet  Percent Body Fat: See Weight Management Doc Flowsheet  Weight Metrics 9/10/2020 5/12/2020 3/13/2020 3/6/2020 2/26/2020 2/19/2020 2/19/2020   Weight 218 lb 6.4 oz 218 lb 224 lb 8 oz 226 lb 3.2 oz 230 lb 4.8 oz - 225 lb 8 oz   Neck Circ (inches) - - - - - 14 -   Waist Measure Inches - 41 - - - 41 -   BMI 38.69 kg/m2 38.62 kg/m2 39.77 kg/m2 40.07 kg/m2 40.8 kg/m2 - 39.95 kg/m2         Labs: we discussed the recent labs  a1c was 8.8    Review of Systems  Complete ROS negative except where noted above          712  Subjective:   Farzana Curiel was seen for Weight Management      Prior to Admission medications    Medication Sig Start Date End Date Taking? Authorizing Provider   atorvastatin (LIPITOR) 10 mg tablet Take 1 Tab by mouth daily. 9/14/20  Yes Yodit Medina MD   buPROPion SR [facility] SR) 150 mg SR tablet Take 1 Tab by mouth two (2) times a day. 9/10/20  Yes Yodit Medina MD   insulin degludec [facility] FlexTouch U-100) 100 unit/mL (3 mL) inpn INJECT 40 UNITS ONCE DAILY AND INCREASE AS DIRECTED UP TO 50 UNITS PER DAY--Dose change 8/21/20--updated med list--did not send prescription to the pharmacy  Patient taking differently: INJECT 30 UNITS ONCE DAILY AND INCREASE AS DIRECTED UP TO 50 UNITS PER DAY--Dose change 8/21/20--updated med list--did not send prescription to the pharmacy 8/21/20  Yes Radha Cano MD   cloNIDine HCL (CATAPRES) 0.1 mg tablet Take 1 Tab by mouth as needed. BP over 180/90 6/26/20  Yes Provider, Historical   hydroCHLOROthiazide (HYDRODIURIL) 12.5 mg tablet Take 1 Tab by mouth daily. 6/29/20  Yes Chapincito Ding, NP   naproxen (NAPROSYN) 375 mg tablet Take 1 Tab by mouth two (2) times daily (with meals).  6/29/20  Yes Chapincito Ding, NP   NovoLOG Flexpen U-100 Insulin 100 unit/mL (3 mL) inpn INJECT 1:5 FOR CARBS + 1 UNIT FOR 50 > 150 FOR CORRECTION. MAX 50 UNITS PER DAY--Dose change 4/13/20--updated med list--did not send prescription to the pharmacy 4/13/20  Yes Nano Waters MD   lisinopriL (PRINIVIL, ZESTRIL) 40 mg tablet Take 1 Tab by mouth daily. New higher dose replaces prior script on file for 20 mg tabs 4/13/20  Yes Nano Waters MD   levonorgestrel (MIRENA) 20 mcg/24 hours (5 yrs) 52 mg IUD 1 Device by IntraUTERine route once. HAS A PARAGUARD   Yes Provider, Historical   cetirizine (ZYRTEC) 10 mg tablet Take 10 mg by mouth as needed. Yes Provider, Historical   cholecalciferol, vitamin D3, (VITAMIN D3) 2,000 unit tab Take 4,000 Units by mouth daily. Yes Provider, Historical   OTHER HAS A PARAGUARD BIRTH CONTROL    Provider, Historical   FreeStyle Mitchell 14 Day Sensor kit USE AS DIRECTED EVERY 14 DAYS 3/17/20   Nano Waters MD   Blood-Glucose Meter monitoring kit Check blood sugar four times a day 12/20/19   Fito Parker MD   glucose blood VI test strips (BLOOD GLUCOSE TEST) strip Use to check blood sugar four times a day. 12/20/19   Fito Parker MD   lancets misc Use to check blood sugar four times a day.  12/20/19   Fito Parker MD   FREESTYLE PRECISION KIRK STRIPS strip Use as needed up to twice daily with [facility] reader to check blood sugar when having having problems with the [facility] sensors 9/27/19   Nano Waters MD   [facility] ULTRA BLUE TEST STRIP strip Test 4 times daily 9/16/19   Nano Waters MD   FREESTYLE MITCHELL 14 DAY READER misc Use as directed 3/19/19   Nano Waters MD   LANCE PEN NEEDLE 32 gauge x 5/32\" ndle USE AS DIRECTED TO INJECT INSULIN 4 TIMES DAILY 1/24/19   Nano Waters MD     Allergies   Allergen Reactions    Codeine Nausea and Vomiting       Patient Active Problem List    Diagnosis Date Noted    Pregnancy induced hypertension 02/20/2019    Pregnancy induced hypertension, third trimester 01/29/2019    History of breast cancer 08/29/2018    Morbid obesity with BMI of 40.0-44.9, adult ([facility] 09/22/2017    Wound check, abscess 09/22/2017    Type 1 diabetes mellitus without complication ([facility] 93/64/8716    Ne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 ([facility]     Cardiomyopathy due to chemotherapy ([facility]     Vitamin D deficiency 11/07/2011    Abnormal thyroid blood test 11/07/2011    Essential hypertension, benign 06/25/2010    Encounter for long-term (current) use of other medications 06/25/2010    Encounter for long-term (current) use of insulin ([facility] 06/25/2010    Uncontrolled type 1 diabetes mellitus (HCC)      Current Outpatient Medications   Medication Sig Dispense Refill    atorvastatin (LIPITOR) 10 mg tablet Take 1 Tab by mouth daily. 30 Tab 2    buPROPion SR (WELLBUTRIN SR) 150 mg SR tablet Take 1 Tab by mouth two (2) times a day. 60 Tab 2    insulin degludec Daniella [facility] FlexTouch U-100) 100 unit/mL (3 mL) inpn INJECT 40 UNITS ONCE DAILY AND INCREASE AS DIRECTED UP TO 50 UNITS PER DAY--Dose change 8/21/20--updated med list--did not send prescription to the pharmacy (Patient taking differently: INJECT 30 UNITS ONCE DAILY AND INCREASE AS DIRECTED UP TO 50 UNITS PER DAY--Dose change 8/21/20--updated med list--did not send prescription to the pharmacy) 15 mL 11    cloNIDine HCL (CATAPRES) 0.1 mg tablet Take 1 Tab by mouth as needed. BP over 180/90      hydroCHLOROthiazide (HYDRODIURIL) 12.5 mg tablet Take 1 Tab by mouth daily. 90 Tab 2    naproxen (NAPROSYN) 375 mg tablet Take 1 Tab by mouth two (2) times daily (with meals). 60 Tab 3    NovoLOG Flexpen U-100 Insulin 100 unit/mL (3 mL) inpn INJECT 1:5 FOR CARBS + 1 UNIT FOR 50 > 150 FOR CORRECTION. MAX 50 UNITS PER DAY--Dose change 4/13/20--updated med list--did not send prescription to the pharmacy 15 mL 11    lisinopriL (PRINIVIL, ZESTRIL) 40 mg tablet Take 1 Tab by mouth daily.  New higher dose replaces prior script on file for 20 mg tabs 90 Tab 3    levonorgestrel (MIRENA) 20 mcg/24 hours (5 yrs) 52 mg IUD 1 Device by IntraUTERine route once. HAS A PARAGUARD      cetirizine (ZYRTEC) 10 mg tablet Take 10 mg by mouth as needed.  cholecalciferol, vitamin D3, (VITAMIN D3) 2,000 unit tab Take 4,000 Units by mouth daily.  OTHER HAS A PARAGUARD BIRTH CONTROL      FreeStyle Mitchell 14 Day Sensor kit USE AS DIRECTED EVERY 14 DAYS 2 Kit 11    Blood-Glucose Meter monitoring kit Check blood sugar four times a day 1 Kit 0    glucose blood VI test strips (BLOOD GLUCOSE TEST) strip Use to check blood sugar four times a day. 400 Strip 3    lancets misc Use to check blood sugar four times a day. 400 Each 3    FREESTYLE PRECISION KIRK STRIPS strip Use as needed up to twice daily with [facility] reader to check blood sugar when having having problems with the [facility] sensors 50 Strip 11    ONETOUCH ULTRA BLUE TEST STRIP strip Test 4 times daily 400 Strip 3    FREESTYLE MITCHELL 14 DAY READER misc Use as directed 1 Each 0    LANCE PEN NEEDLE 32 gauge x 5/32\" ndle USE AS DIRECTED TO INJECT INSULIN 4 TIMES DAILY 400 Pen Needle 3       ROS    PHYSICAL EXAMINATION:  [ INSTRUCTIONS:  \"[x]\" Indicates a positive item  \"[]\" Indicates a negative item  -- DELETE ALL ITEMS NOT EXAMINED]  Vital Signs: (As obtained by patient/caregiver at home)  There were no vitals taken for this visit.      Constitutional: [x] Appears well-developed and well-nourished [x] No apparent distress      [] Abnormal -     Mental status: [x] Alert and awake  [x] Oriented to person/place/time [x] Able to follow commands    [] Abnormal -     Eyes:   EOM    [x]  Normal    [] Abnormal -   Sclera  [x]  Normal    [] Abnormal -          Discharge [x]  None visible   [] Abnormal -     HENT: [x] Normocephalic, atraumatic  [] Abnormal -   [x] Mouth/Throat: Mucous membranes are moist    External Ears [x] Normal  [] Abnormal -    Neck: [x] No visualized mass [] Abnormal -     Pulmonary/Chest: [x] Respiratory effort normal [x] No visualized signs of difficulty breathing or respiratory distress        [] Abnormal -      Musculoskeletal:   [x] Normal gait with no signs of ataxia         [x] Normal range of motion of neck        [] Abnormal -     Neurological:        [x] No Facial Asymmetry (Cranial nerve 7 motor function) (limited exam due to video visit)          [x] No gaze palsy        [] Abnormal -          Skin:        [x] No significant exanthematous lesions or discoloration noted on facial skin         [] Abnormal -            Psychiatric:       [x] Normal Affect [] Abnormal -        [x] No Hallucinations    Other pertinent observable physical exam findings:-      Assessment & Plan  Diagnoses and all orders for this visit:    1. Type 1 diabetes mellitus without complication ([facility]  I referred her to get an insulin pump  2. Morbid obesity with BMI of 40.0-44.9, adult ([facility])  Diet:cont the low carb lcd    Activity: she agreed to stat exercise, aim for 300 mins per week    Medication:cont wellbutrin      3. Essential hypertension, benign  BP a little high. As BP comes down this will come down  4. Pure hypercholesterolemia  Cont to monitor            Based on his history and exam, Palomo Rose is a good candidate for the  [facility]     There are no Patient Instructions on file for this visit. Follow-up and Dispositions    · Return in about 1 month (around 10/24/2020). Coding Help - Use CPT Codes 04783-37908, [id number] for Established and New Patients respectively, either employing EM elements or Time rules. Other codes (example consult codes) may also apply. The primary encounter diagnosis was Type 1 diabetes mellitus without complication ([facility] Diagnoses of Morbid obesity with BMI of 40.0-44.9, adult ([facility] Essential hypertension, benign, and Pure hypercholesterolemia were also pertinent to this visit.   The patient verbalizes understanding and agrees with the plan of care      We discussed the expected course, resolution and complications of the diagnosis(es) in detail. Medication risks, benefits, costs, interactions, and alternatives were discussed as indicated. I advised her to contact the office if her condition worsens, changes or fails to improve as anticipated. She expressed understanding with the diagnosis(es) and plan. Pursuant to the emergency declaration under the [address] [place] [address] waiver authority and the [employer] Act, this Virtual  Visit was conducted, with patient's consent, to reduce the patient's risk of exposure to COVID-19 and provide continuity of care for an established patient. Services were provided through a video synchronous discussion virtually to substitute for in-person clinic visit.     Corinne Hays MD

## 2020-10-08 DIAGNOSIS — E10.9 TYPE 1 DIABETES MELLITUS WITHOUT COMPLICATION (HCC): ICD-10-CM

## 2020-10-08 DIAGNOSIS — E55.9 VITAMIN D DEFICIENCY: ICD-10-CM

## 2020-10-08 DIAGNOSIS — I10 ESSENTIAL HYPERTENSION, BENIGN: ICD-10-CM

## 2020-10-17 LAB
25(OH)D3+25(OH)D2 SERPL-MCNC: 66.2 NG/ML (ref 30–100)
ALBUMIN SERPL-MCNC: 3.6 G/DL (ref 3.8–4.8)
ALBUMIN/CREAT UR: 8 MG/G CREAT (ref 0–29)
ALBUMIN/GLOB SERPL: 1.3 {RATIO} (ref 1.2–2.2)
ALP SERPL-CCNC: 75 IU/L (ref 39–117)
ALT SERPL-CCNC: 16 IU/L (ref 0–32)
AST SERPL-CCNC: 18 IU/L (ref 0–40)
BILIRUB SERPL-MCNC: 0.4 MG/DL (ref 0–1.2)
BUN SERPL-MCNC: 11 MG/DL (ref 6–20)
BUN/CREAT SERPL: 15 (ref 9–23)
CALCIUM SERPL-MCNC: 8.8 MG/DL (ref 8.7–10.2)
CHLORIDE SERPL-SCNC: 104 MMOL/L (ref 96–106)
CHOLEST SERPL-MCNC: 154 MG/DL (ref 100–199)
CO2 SERPL-SCNC: 27 MMOL/L (ref 20–29)
CREAT SERPL-MCNC: 0.72 MG/DL (ref 0.57–1)
CREAT UR-MCNC: 206.9 MG/DL
EST. AVERAGE GLUCOSE BLD GHB EST-MCNC: 186 MG/DL
GLOBULIN SER CALC-MCNC: 2.7 G/DL (ref 1.5–4.5)
GLUCOSE SERPL-MCNC: 78 MG/DL (ref 65–99)
HBA1C MFR BLD: 8.1 % (ref 4.8–5.6)
HDLC SERPL-MCNC: 74 MG/DL
INTERPRETATION, 910389: NORMAL
LDLC SERPL CALC-MCNC: 71 MG/DL (ref 0–99)
Lab: NORMAL
MICROALBUMIN UR-MCNC: 16.7 UG/ML
POTASSIUM SERPL-SCNC: 3.9 MMOL/L (ref 3.5–5.2)
PROT SERPL-MCNC: 6.3 G/DL (ref 6–8.5)
SODIUM SERPL-SCNC: 141 MMOL/L (ref 134–144)
TRIGL SERPL-MCNC: 36 MG/DL (ref 0–149)
VLDLC SERPL CALC-MCNC: 9 MG/DL (ref 5–40)

## 2020-10-22 ENCOUNTER — VIRTUAL VISIT (OUTPATIENT)
Dept: ENDOCRINOLOGY | Age: 35
End: 2020-10-22
Payer: MEDICAID

## 2020-10-22 DIAGNOSIS — I10 ESSENTIAL HYPERTENSION, BENIGN: ICD-10-CM

## 2020-10-22 DIAGNOSIS — E10.9 TYPE 1 DIABETES MELLITUS WITHOUT COMPLICATION (HCC): Primary | ICD-10-CM

## 2020-10-22 DIAGNOSIS — E78.5 HYPERLIPIDEMIA LDL GOAL <100: ICD-10-CM

## 2020-10-22 DIAGNOSIS — E55.9 VITAMIN D DEFICIENCY: ICD-10-CM

## 2020-10-22 PROCEDURE — 99214 OFFICE O/P EST MOD 30 MIN: CPT | Performed by: INTERNAL MEDICINE

## 2020-10-22 RX ORDER — INSULIN DEGLUDEC INJECTION 100 U/ML
INJECTION, SOLUTION SUBCUTANEOUS
Qty: 15 ML | Refills: 11
Start: 2020-10-22 | End: 2021-04-23

## 2020-10-22 RX ORDER — INSULIN ASPART 100 [IU]/ML
INJECTION, SOLUTION INTRAVENOUS; SUBCUTANEOUS
Qty: 15 ML | Refills: 11
Start: 2020-10-22 | End: 2021-02-10 | Stop reason: ALTCHOICE

## 2020-10-22 NOTE — PROGRESS NOTES
Chief Complaint   Patient presents with    Diabetes     pcp and pharmacy confirmed    Other     [phone]       **THIS IS A VIRTUAL VISIT VIA A VIDEO SYNCHRONOUS DISCUSSION. PATIENT AGREED TO HAVE THEIR CARE DELIVERED OVER A DOXY. ME VIDEO VISIT IN PLACE OF THEIR REGULARLY SCHEDULED OFFICE VISIT**    History of Present Illness: Sharin Ahumada is a 38 y.o. female here for follow up of diabetes. She is currently at [facility] with her 8year old son who was just diagnosed with diabetes yesterday and he was not in DKA but his sugar was over 300. Did increase her tresiba from 30 units up to 38-40 units when she was diagnosed with COVID in 8/20 and then as her sugars came back down, went back down to 30 units but was having some spikes from stress and went up to 32 units but was then having some lows and dropped back to 30 units yesterday. Her sugar was 53 this morning but it had been 218 last night and had 10 carbs and took 2 units for the food and 2 units for correction and it dropped low. Weight was 226 in 4/20 and down to 218 currently. She was started on atorvastatin 10 mg daily in 9/20 by Dr. Jeni Evangelista after her LDL was 106 and asked if she needed to keep taking this and I told her she can stop this and we'll reassess the need for this at her next visit especially since she is under the age of 34. She and I discussed going on a pump but we agreed to hold on this for the next 6 months. Current Outpatient Medications   Medication Sig    insulin degludec Walker Sidrafa FlexTouch U-100) 100 unit/mL (3 mL) inpn INJECT 30 UNITS ONCE DAILY AND INCREASE AS DIRECTED UP TO 50 UNITS PER DAY--Dose change 10/22/20--updated med list--did not send prescription to the pharmacy    atorvastatin (LIPITOR) 10 mg tablet Take 1 Tab by mouth daily.  buPROPion SR (WELLBUTRIN SR) 150 mg SR tablet Take 1 Tab by mouth two (2) times a day.     OTHER HAS A PARAGUARD BIRTH CONTROL    cloNIDine HCL (CATAPRES) 0.1 mg tablet Take 1 Tab by mouth as needed. BP over 180/90    hydroCHLOROthiazide (HYDRODIURIL) 12.5 mg tablet Take 1 Tab by mouth daily.  naproxen (NAPROSYN) 375 mg tablet Take 1 Tab by mouth two (2) times daily (with meals). (Patient taking differently: Take 375 mg by mouth as needed.)    NovoLOG Flexpen U-100 Insulin 100 unit/mL (3 mL) inpn INJECT 1:5 FOR CARBS + 1 UNIT FOR 50 > 150 FOR CORRECTION. MAX 50 UNITS PER DAY--Dose change 4/13/20--updated med list--did not send prescription to the pharmacy    lisinopriL (PRINIVIL, ZESTRIL) 40 mg tablet Take 1 Tab by mouth daily. New higher dose replaces prior script on file for 20 mg tabs    FreeStyle Mitchell 14 Day Sensor kit USE AS DIRECTED EVERY 14 DAYS    Blood-Glucose Meter monitoring kit Check blood sugar four times a day    glucose blood VI test strips (BLOOD GLUCOSE TEST) strip Use to check blood sugar four times a day.  lancets misc Use to check blood sugar four times a day.  levonorgestrel (MIRENA) 20 mcg/24 hours (5 yrs) 52 mg IUD 1 Device by IntraUTERine route once. HAS A PARAGUARD    FREESTYLE PRECISION KIRK STRIPS strip Use as needed up to twice daily with [facility] reader to check blood sugar when having having problems with the [facility] sensors    ONETOUCH ULTRA BLUE TEST STRIP strip Test 4 times daily    cetirizine (ZYRTEC) 10 mg tablet Take 10 mg by mouth as needed.  FREESTYLE MITCHELL 14 DAY READER misc Use as directed    LANCE PEN NEEDLE 32 gauge x 5/32\" ndle USE AS DIRECTED TO INJECT INSULIN 4 TIMES DAILY    cholecalciferol, vitamin D3, (VITAMIN D3) 2,000 unit tab Take 4,000 Units by mouth daily. No current facility-administered medications for this visit.       Allergies   Allergen Reactions    Codeine Nausea and Vomiting     Review of Systems: PER HPI    Physical Examination:  - GENERAL: NCAT, Appears well nourished   - EYES: EOMI, non-icteric, no proptosis   - Ear/Nose/Throat: NCAT, no visible inflammation or masses   - CARDIOVASCULAR: no cyanosis, no visible JVD   - RESPIRATORY: respiratory effort normal without any distress or labored breathing   - MUSCULOSKELETAL: Normal ROM of neck and upper extremities observed   - SKIN: No rash on face  - NEUROLOGIC:  No facial asymmetry (Cranial nerve 7 motor function), No gaze palsy   - PSYCHIATRIC: Normal affect, Normal insight and judgement       Data Reviewed:   Component      Latest Ref Rng & Units 10/16/2020 10/16/2020 10/16/2020 10/16/2020           9:44 AM  9:44 AM  9:44 AM  9:44 AM   Glucose      65 - 99 mg/dL  78     BUN      6 - 20 mg/dL  11     Creatinine      0.57 - 1.00 mg/dL  0.72     GFR est non-AA      >59 mL/min/1.73  107     GFR est AA      >59 mL/min/1.73  124     BUN/Creatinine ratio      9 - 23  15     Sodium      134 - 144 mmol/L  141     Potassium      3.5 - 5.2 mmol/L  3.9     Chloride      96 - 106 mmol/L  104     CO2      20 - 29 mmol/L  27     Calcium      8.7 - 10.2 mg/dL  8.8     Protein, total      6.0 - 8.5 g/dL  6.3     Albumin      3.8 - 4.8 g/dL  3.6 (L)     GLOBULIN, TOTAL      1.5 - 4.5 g/dL  2.7     A-G Ratio      1.2 - 2.2  1.3     Bilirubin, total      0.0 - 1.2 mg/dL  0.4     Alk. phosphatase      39 - 117 IU/L  75     AST      0 - 40 IU/L  18     ALT      0 - 32 IU/L  16     Cholesterol, total      100 - 199 mg/dL 154      Triglyceride      0 - 149 mg/dL 36      HDL Cholesterol      >39 mg/dL 74      VLDL Cholesterol Erwin      5 - 40 mg/dL 9      LDL Cholesterol      0 - 99 mg/dL 71      Creatinine, urine      Not Estab. mg/dL    206.9   Microalbumin, urine      Not Estab. ug/mL    16.7   Microalbumin/Creat.  Ratio      0 - 29 mg/g creat    8   Hemoglobin A1c, (calculated)      4.8 - 5.6 %   8.1 (H)    Estimated average glucose      mg/dL   186      Component      Latest Ref Rng & Units 10/16/2020           9:44 AM   VITAMIN D, 25-HYDROXY      30.0 - 100.0 ng/mL 66.2     Assessment/Plan:     1) Type 1 DM uncontrolled: Her most recent Hgb A1c was 8.1% in 10/20 down from 8.8% in 9/20 down from 9% in 12/19 up from 8.6% in 10/19 up from 7.8% in 6/19 up from 7.1% in 2/19 stable from 1/19 stable from 12/18 down from 7.2% in 11/18 up from 6.8% in 10/18 down from 7.5% in 9/18 down from 8.2% in 6/18 up from 8% in 1/18 (she was at [facility] from 11/12 to 1/18 and states A1c values were in the 7-8% range) down from 8.8% in January 2012 stable from 8.9% in September 2011 up from 7.5% in May 2010. Sugars are improving with weight loss and now is having overnight hypoglycemia so will decrease her tresiba. Will make her carb ratio and correction less aggressive to help with lows  - cont tresiba 30 units in the morning  - Take Novolog 1:8 for CHO ratio for meals  - Take Novolog 1:75 for > 150 during the day   - Check bs 4x/day due to fluctuating sugars  - cont freestyle cassi  - optho UTD 7/17  - foot exam done 6/19  - microalbumin nl 10/20  - check Hgb A1c and cmp prior to next visit      2) HTN NOS (401.9): her BP was above goal < 140/90 at last visit  - cont lisinopril 40 mg daily  - monitor home blood pressure readings      3) Vitamin D deficiency: Level was 11.1 in 9/11. Started on ergo at that time and level up to 24 in January 2012. Still 25.9 in 2/18 so advised to take 4000 units daily but has not been doing this and level was 27.4 in 10/18 and 47 in 6/19 and 66 in 10/20  - check Vitamin D 25-OH level in 10/21  - cont vitamin D 4000 units daily    4) Hyperlipidemia with goal LDL < 100: LDL 65 6/19 and 106 in 10/19 and in 9/20 and PCP started atorvastatin 10 mg daily and down to 71 in 10/20 but will stop this for now given age < 40.  - diet control  - check lipids prior to next visit      Patient Instructions   1) Stay on tresiba 30 units daily for now. 2) Make your carb ratio less aggressive back to 1 unit for 8 grams of carbs. 3) Make your correction factor less aggressive and take 1 unit for 75 points over 150.      4) Give me an update in a week with how this is doing.   We'll hold on a pump for now. 5) Your cholesterol, vitamin D, liver, kidney, and urine were normal but I think it's fine to stop the atorvastatin for cholesterol and reassess in the spring. 6) Please come for a follow up visit on 4/23/21 at 8:50am in our [place] office. 7) I put an order directly into the [employer] system to repeat your labs in the 1-2 weeks prior to your next visit so just ask for the order under my name and you will receive a courtesy reminder through [employer] to have these drawn. Follow-up and Dispositions    · Return 4/23/21 at 8:50am.           Copy sent to: Kelli Montoya MD (Obstetrics & Gynecology)  Joey Hernandez MD (Surgical Oncology)  Jose Luis Delarosa MD (Hematology and Oncology)  Dr. Jeni Evangelista via [facility]    Addendum: 1/28/21 7:37 PM    Patient contacted me over our [employer] patient portal on 1/25/21 that she would like to pursue the [employer] system and the Dexcom continuous glucose monitor. I told her that I would be happy to support her in this process.     she has the following indications to continue begin treatment with Dexcom:  1) she has type 1 diabetes (E10.9) and is on an intensive insulin regimen with 4 injections per day  2) she tests her blood sugar 4 times per day and makes treatment decisions off her blood sugar readings and will do the same off dexcom sensor readings  3) she will require frequent adjustments to her injection doses based on her dexcom sensor readings  4) she will benefit from therapeutic continuous glucose monitoring and I recommend that she begin this  5) she is seen in my office every 4-6 months

## 2020-10-22 NOTE — PATIENT INSTRUCTIONS
1) Stay on tresiba 30 units daily for now. 2) Make your carb ratio less aggressive back to 1 unit for 8 grams of carbs. 3) Make your correction factor less aggressive and take 1 unit for 75 points over 150.   
 
4) Give me an update in a week with how this is doing. We'll hold on a pump for now. 5) Your cholesterol, vitamin D, liver, kidney, and urine were normal but I think it's fine to stop the atorvastatin for cholesterol and reassess in the spring. 6) Please come for a follow up visit on 4/23/21 at 8:50am in our [address] [facility] [address] office. 7) I put an order directly into the [employer] system to repeat your labs in the 1-2 weeks prior to your next visit so just ask for the order under my name and you will receive a courtesy reminder through [employer] to have these drawn.

## 2020-10-23 ENCOUNTER — VIRTUAL VISIT (OUTPATIENT)
Dept: FAMILY MEDICINE CLINIC | Age: 35
End: 2020-10-23
Payer: MEDICAID

## 2020-10-23 DIAGNOSIS — E78.00 PURE HYPERCHOLESTEROLEMIA: ICD-10-CM

## 2020-10-23 DIAGNOSIS — E66.01 MORBID OBESITY WITH BMI OF 40.0-44.9, ADULT (HCC): ICD-10-CM

## 2020-10-23 DIAGNOSIS — E10.9 TYPE 1 DIABETES MELLITUS WITHOUT COMPLICATION (HCC): Primary | ICD-10-CM

## 2020-10-23 DIAGNOSIS — I10 ESSENTIAL HYPERTENSION, BENIGN: ICD-10-CM

## 2020-10-23 PROCEDURE — 3052F HG A1C>EQUAL 8.0%<EQUAL 9.0%: CPT | Performed by: FAMILY MEDICINE

## 2020-10-23 PROCEDURE — 99214 OFFICE O/P EST MOD 30 MIN: CPT | Performed by: FAMILY MEDICINE

## 2020-10-23 NOTE — PROGRESS NOTES
1. Have you been to the ER, urgent care clinic since your last visit? Hospitalized since your last visit? No    2. Have you seen or consulted any other health care providers outside of the [address] [place] [address] since your last visit? Include any pap smears or colon screening. No       Pharmacy verified.    [id number]- [address]    Chief Complaint   Patient presents with    Diabetes    Hypertension    Cholesterol Problem    Follow-up     Patient-Reported Vitals 10/23/2020   Patient-Reported Weight 218lb   Patient-Reported Height -   Patient-Reported Pulse -   Patient-Reported Systolic  -   Patient-Reported Diastolic -      3 most recent PHQ Screens 10/23/2020   Little interest or pleasure in doing things Not at all   Feeling down, depressed, irritable, or hopeless Not at all   Total Score PHQ 2 0     Health Maintenance Due   Topic Date Due    PAP AKA CERVICAL CYTOLOGY  06/07/2014    Eye Exam Retinal or Dilated  07/06/2019    Foot Exam Q1  06/25/2020    Flu Vaccine (1) 09/01/2020

## 2020-10-23 NOTE — PROGRESS NOTES
[facility] Progress Note:   F/up Physician Visit    Gabriella Kunz is a 38 y.o. female who was seen by synchronous (real-time) audio-video technology on 10/23/2020. Consent:  She and/or her healthcare decision maker is aware that this patient-initiated Telehealth encounter is a billable service, with coverage as determined by her insurance carrier. She is aware that she may receive a bill and has provided verbal consent to proceed: Yes    I was at home while conducting this encounter. Her son is in hospital w recent diag type 1 diabetes   she just ordered products  Last week  She had problem w finances. She does not have the CGM yet  She has been exercising more and doing resistance w it  Her am and pm weight changes 4 lbs on some days  Sleep: 7-8 hrs  Today 218 Sept 218  In march she was 224  h  Subjective:   Gabriella Kunz was seen for Diabetes; Hypertension; Cholesterol Problem; and Follow-up    f/up physician visit for the VLCD / LCD Program.  Prior to Admission medications    Medication Sig Start Date End Date Taking? Authorizing Provider   insulin degludec [facility] FlexTouch U-100) 100 unit/mL (3 mL) inpn INJECT 30 UNITS ONCE DAILY AND INCREASE AS DIRECTED UP TO 50 UNITS PER DAY--Dose change 10/22/20--updated med list--did not send prescription to the pharmacy 10/22/20  Yes Chrissy Meredith MD   NovoLOG Flexpen U-100 Insulin 100 unit/mL (3 mL) inpn INJECT 1:8 FOR CARBS + 1 UNIT FOR 75 > 150 FOR CORRECTION. MAX 50 UNITS PER DAY--Dose change 10/22/20--updated med list--did not send prescription to the pharmacy 10/22/20  Yes Chrissy Meredith MD   buPROPion SR Loye Amarjit SR) 150 mg SR tablet Take 1 Tab by mouth two (2) times a day. 9/10/20  Yes Luis Paarda MD   OTHER HAS A PARAGUARD BIRTH CONTROL   Yes Provider, Historical   cloNIDine HCL (CATAPRES) 0.1 mg tablet Take 1 Tab by mouth as needed.  BP over 180/90 6/26/20  Yes Provider, Historical   hydroCHLOROthiazide (HYDRODIURIL) 12.5 mg tablet Take 1 Tab by mouth daily. 6/29/20  Yes Radha Ding NP   naproxen (NAPROSYN) 375 mg tablet Take 1 Tab by mouth two (2) times daily (with meals). Patient taking differently: Take 375 mg by mouth as needed. 6/29/20  Yes Ellen Ding NP   lisinopriL (PRINIVIL, ZESTRIL) 40 mg tablet Take 1 Tab by mouth daily. New higher dose replaces prior script on file for 20 mg tabs 4/13/20  Yes Beka Concepcion MD   [facility] ULTRA BLUE TEST STRIP strip Test 4 times daily 9/16/19  Yes Beka Concepcion MD   cetirizine (ZYRTEC) 10 mg tablet Take 10 mg by mouth as needed. Yes Provider, Historical   cholecalciferol, vitamin D3, (VITAMIN D3) 2,000 unit tab Take 4,000 Units by mouth daily. Yes Provider, Historical   FreeStyle Mitchell 14 Day Sensor kit USE AS DIRECTED EVERY 14 DAYS 3/17/20   Beka Concepcion MD   Blood-Glucose Meter monitoring kit Check blood sugar four times a day 12/20/19   Chloe Andrade MD   glucose blood VI test strips (BLOOD GLUCOSE TEST) strip Use to check blood sugar four times a day. 12/20/19   Chloe Andrade MD   lancets misc Use to check blood sugar four times a day. 12/20/19   Chloe Andrade MD   levonorgestrel (MIRENA) 20 mcg/24 hours (5 yrs) 52 mg IUD 1 Device by IntraUTERine route once.  HAS A PARAGUARD    Provider, Historical   FREESTYLE PRECISION KIRK STRIPS strip Use as needed up to twice daily with [facility] reader to check blood sugar when having having problems with the [facility] sensors 9/27/19   Beka Concepcion MD   FREESTYLE MITCHELL 14 DAY READER misc Use as directed 3/19/19   Beka Concepcion MD   LANCE PEN NEEDLE 32 gauge x 5/32\" ndle USE AS DIRECTED TO INJECT INSULIN 4 TIMES DAILY 1/24/19   Beka Concepcion MD     Allergies   Allergen Reactions    Codeine Nausea and Vomiting       Patient Active Problem List    Diagnosis Date Noted    Pregnancy induced hypertension 02/20/2019    Pregnancy induced hypertension, third trimester 01/29/2019    History of breast cancer 08/29/2018    Morbid obesity with BMI of 40.0-44.9, adult ([facility] 09/22/2017    Wound check, abscess 09/22/2017    Type 1 diabetes mellitus without complication ([facility] 73/74/6378    Ne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 ([facility]     Cardiomyopathy due to chemotherapy ([facility]     Vitamin D deficiency 11/07/2011    Abnormal thyroid blood test 11/07/2011    Essential hypertension, benign 06/25/2010    Encounter for long-term (current) use of other medications 06/25/2010    Encounter for long-term (current) use of insulin ([facility] 06/25/2010    Uncontrolled type 1 diabetes mellitus (HCC)      Current Outpatient Medications   Medication Sig Dispense Refill    insulin degludec [facility] FlexTouch U-100) 100 unit/mL (3 mL) inpn INJECT 30 UNITS ONCE DAILY AND INCREASE AS DIRECTED UP TO 50 UNITS PER DAY--Dose change 10/22/20--updated med list--did not send prescription to the pharmacy 15 mL 11    NovoLOG Flexpen U-100 Insulin 100 unit/mL (3 mL) inpn INJECT 1:8 FOR CARBS + 1 UNIT FOR 75 > 150 FOR CORRECTION. MAX 50 UNITS PER DAY--Dose change 10/22/20--updated med list--did not send prescription to the pharmacy 15 mL 11    buPROPion SR (WELLBUTRIN SR) 150 mg SR tablet Take 1 Tab by mouth two (2) times a day. 60 Tab 2    OTHER HAS A PARAGUARD BIRTH CONTROL      cloNIDine HCL (CATAPRES) 0.1 mg tablet Take 1 Tab by mouth as needed. BP over 180/90      hydroCHLOROthiazide (HYDRODIURIL) 12.5 mg tablet Take 1 Tab by mouth daily. 90 Tab 2    naproxen (NAPROSYN) 375 mg tablet Take 1 Tab by mouth two (2) times daily (with meals). (Patient taking differently: Take 375 mg by mouth as needed.) 60 Tab 3    lisinopriL (PRINIVIL, ZESTRIL) 40 mg tablet Take 1 Tab by mouth daily. New higher dose replaces prior script on file for 20 mg tabs 90 Tab 3    ONETOUCH ULTRA BLUE TEST STRIP strip Test 4 times daily 400 Strip 3    cetirizine (ZYRTEC) 10 mg tablet Take 10 mg by mouth as needed.       cholecalciferol, vitamin D3, (VITAMIN D3) 2,000 unit tab Take 4,000 Units by mouth daily.  FreeStyle Mitchell 14 Day Sensor kit USE AS DIRECTED EVERY 14 DAYS 2 Kit 11    Blood-Glucose Meter monitoring kit Check blood sugar four times a day 1 Kit 0    glucose blood VI test strips (BLOOD GLUCOSE TEST) strip Use to check blood sugar four times a day. 400 Strip 3    lancets misc Use to check blood sugar four times a day. 400 Each 3    levonorgestrel (MIRENA) 20 mcg/24 hours (5 yrs) 52 mg IUD 1 Device by IntraUTERine route once. HAS A PARAGUARD      FREESTYLE PRECISION KIRK STRIPS strip Use as needed up to twice daily with [facility] reader to check blood sugar when having having problems with the [facility] sensors 50 Strip 11    FREESTYLE MITCHELL 14 DAY READER misc Use as directed 1 Each 0    LANCE PEN NEEDLE 32 gauge x 5/32\" ndle USE AS DIRECTED TO INJECT INSULIN 4 TIMES DAILY 400 Pen Needle 3       ROS      CC: Weight Management      Lady Ubran is a 38 y.o. female who is here for her      Weight Metrics 9/10/2020 5/12/2020 3/13/2020 3/6/2020 2/26/2020 2/19/2020 2/19/2020   Weight 218 lb 6.4 oz 218 lb 224 lb 8 oz 226 lb 3.2 oz 230 lb 4.8 oz - 225 lb 8 oz   Neck Circ (inches) - - - - - 14 -   Waist Measure Inches - 41 - - - 41 -   BMI 38.69 kg/m2 38.62 kg/m2 39.77 kg/m2 40.07 kg/m2 40.8 kg/m2 - 39.95 kg/m2         Outpatient Medications Marked as Taking for the 10/23/20 encounter (Virtual Visit) with Magda Conrad MD   Medication Sig Dispense Refill    insulin degludec Sophia Moll FlexTouch U-100) 100 unit/mL (3 mL) inpn INJECT 30 UNITS ONCE DAILY AND INCREASE AS DIRECTED UP TO 50 UNITS PER DAY--Dose change 10/22/20--updated med list--did not send prescription to the pharmacy 15 mL 11    NovoLOG Flexpen U-100 Insulin 100 unit/mL (3 mL) inpn INJECT 1:8 FOR CARBS + 1 UNIT FOR 75 > 150 FOR CORRECTION.  MAX 50 UNITS PER DAY--Dose change 10/22/20--updated med list--did not send prescription to the pharmacy 15 mL 11    buPROPion SR [facility] SR) 150 mg SR tablet Take 1 Tab by mouth two (2) times a day. 60 Tab 2    OTHER HAS A PARAGUARD BIRTH CONTROL      cloNIDine HCL (CATAPRES) 0.1 mg tablet Take 1 Tab by mouth as needed. BP over 180/90      hydroCHLOROthiazide (HYDRODIURIL) 12.5 mg tablet Take 1 Tab by mouth daily. 90 Tab 2    naproxen (NAPROSYN) 375 mg tablet Take 1 Tab by mouth two (2) times daily (with meals). (Patient taking differently: Take 375 mg by mouth as needed.) 60 Tab 3    lisinopriL (PRINIVIL, ZESTRIL) 40 mg tablet Take 1 Tab by mouth daily. New higher dose replaces prior script on file for 20 mg tabs 90 Tab 3    ONETOUCH ULTRA BLUE TEST STRIP strip Test 4 times daily 400 Strip 3    cetirizine (ZYRTEC) 10 mg tablet Take 10 mg by mouth as needed.  cholecalciferol, vitamin D3, (VITAMIN D3) 2,000 unit tab Take 4,000 Units by mouth daily. Participation   Did you attend clinic and class last week? no    Review of Systems  Since your last visit, have you experienced any complications? no  If yes, please list:       Are you taking an appetite suppressant? no  If so, is there any Chest Pain, Palpitations or Dizziness? BP Readings from Last 3 Encounters:   09/10/20 127/78   05/12/20 125/86   03/06/20 140/72       SLEEP:    Have you received any other medical care this week? no  If yes, where and for what? Have you discontinued or changed any medicine or dose of your medicine since your last visit with Dr Aditya Cameron? no  If yes, where and for what? Diet  How many ounces of calorie-free liquids did you consume each day? 64 oz    How many meal replacements did you take each day? 0    Did you have any problems adhering to the program?  no If yes, please explain:      Exercise  Aerobic exercise: *did not quantitate but doing \" more\" min  Resistance exercise: see above workouts / week  Any discomfort?  no     If yes, where? Objective  There were no vitals taken for this visit.   No LMP recorded. PHYSICAL EXAMINATION:  [ INSTRUCTIONS:  \"[x]\" Indicates a positive item  \"[]\" Indicates a negative item  -- DELETE ALL ITEMS NOT EXAMINED]  Vital Signs: (As obtained by patient/caregiver at home)  There were no vitals taken for this visit. Constitutional: [x] Appears well-developed and well-nourished [x] No apparent distress      [] Abnormal -     Mental status: [x] Alert and awake  [x] Oriented to person/place/time [x] Able to follow commands    [] Abnormal -     Eyes:   EOM    [x]  Normal    [] Abnormal -   Sclera  [x]  Normal    [] Abnormal -          Discharge [x]  None visible   [] Abnormal -     HENT: [x] Normocephalic, atraumatic  [] Abnormal -   [x] Mouth/Throat: Mucous membranes are moist    External Ears [x] Normal  [] Abnormal -    Neck: [x] No visualized mass [] Abnormal -     Pulmonary/Chest: [x] Respiratory effort normal   [x] No visualized signs of difficulty breathing or respiratory distress        [] Abnormal -      Musculoskeletal:   [x] Normal gait with no signs of ataxia         [x] Normal range of motion of neck        [] Abnormal -     Neurological:        [x] No Facial Asymmetry (Cranial nerve 7 motor function) (limited exam due to video visit)          [x] No gaze palsy        [] Abnormal -          Skin:        [x] No significant exanthematous lesions or discoloration noted on facial skin         [] Abnormal -            Psychiatric:       [x] Normal Affect [] Abnormal -        [x] No Hallucinations    Other pertinent observable physical exam findings:-      Assessment / Plan    Encounter Diagnoses   Name Primary?  Type 1 diabetes mellitus without complication ([facility] Yes    Morbid obesity with BMI of 40.0-44.9, adult ([facility]     Essential hypertension, benign     Pure hypercholesterolemia      Diagnoses and all orders for this visit:    1.  Type 1 diabetes mellitus without complication (HCC)  Cont to monitor  The diet process helps lower the blood sugar  We discussed the importance of close monitoring and adjusting the meds accordingly    2. Morbid obesity with BMI of 40.0-44.9, adult ([facility]    3. Essential hypertension, benign  Well controlled  4. Pure hypercholesterolemia  Monitor and treat as indicated    1. Weight management stable   Progress was reviewed with patient    2. Labs    Latest results reviewed with patient   Lab slip given to pt for f/up  labs    The primary encounter diagnosis was Type 1 diabetes mellitus without complication ([facility] Diagnoses of Morbid obesity with BMI of 40.0-44.9, adult ([facility] Essential hypertension, benign, and Pure hypercholesterolemia were also pertinent to this visit. Coding Help - Use CPT Codes 30194-07189, [id number] for Established and New Patients respectively, either employing EM elements or Time rules. Other codes (example consult codes) may also apply. We discussed the expected course, resolution and complications of the diagnosis(es) in detail. Medication risks, benefits, costs, interactions, and alternatives were discussed as indicated. I advised her to contact the office if her condition worsens, changes or fails to improve as anticipated. She expressed understanding with the diagnosis(es) and plan. Pursuant to the emergency declaration under the [facility] waiver authority and the [employer] and [employer] Act, this Virtual  Visit was conducted, with patient's consent, to reduce the patient's risk of exposure to COVID-19 and provide continuity of care for an established patient. Services were provided through a video synchronous discussion virtually to substitute for in-person clinic visit.     Silvana Osorio MD

## 2020-10-28 ENCOUNTER — TELEPHONE (OUTPATIENT)
Dept: FAMILY MEDICINE CLINIC | Age: 35
End: 2020-10-28

## 2020-10-28 NOTE — TELEPHONE ENCOUNTER
----- Message from Wyline Shone sent at 10/28/2020 12:37 PM EDT -----  Regarding: Dr Andrew Clinton first and last name and relationship to patient (if not the patient): n/a  Best contact number: [phone]  Preferred date and time: Morning, Week of Nov 23rd  Scheduled appointment date and time: none  Reason for appointment: weight loss monthly follow up   Details to clarify the request: Requires in office visit

## 2020-11-05 ENCOUNTER — TELEPHONE (OUTPATIENT)
Dept: ENDOCRINOLOGY | Age: 35
End: 2020-11-05

## 2020-11-05 NOTE — TELEPHONE ENCOUNTER
Sent her the following message through [employer]:    I submitted an authorization for the tresiba through our electronic system called covermymeds and received notification that this med has been approved so you should be able to get this from the local or mail pharmacy that normally dispenses this for you. Please let me know if you have any trouble getting this filled.

## 2020-11-25 ENCOUNTER — OFFICE VISIT (OUTPATIENT)
Dept: FAMILY MEDICINE CLINIC | Age: 35
End: 2020-11-25
Payer: MEDICAID

## 2020-11-25 VITALS
DIASTOLIC BLOOD PRESSURE: 84 MMHG | OXYGEN SATURATION: 100 % | SYSTOLIC BLOOD PRESSURE: 134 MMHG | HEIGHT: 63 IN | BODY MASS INDEX: 38.8 KG/M2 | TEMPERATURE: 98.9 F | HEART RATE: 73 BPM | RESPIRATION RATE: 16 BRPM | WEIGHT: 219 LBS

## 2020-11-25 DIAGNOSIS — E10.9 TYPE 1 DIABETES MELLITUS WITHOUT COMPLICATION (HCC): Primary | ICD-10-CM

## 2020-11-25 DIAGNOSIS — I10 ESSENTIAL HYPERTENSION, BENIGN: ICD-10-CM

## 2020-11-25 DIAGNOSIS — E66.01 MORBID OBESITY WITH BMI OF 40.0-44.9, ADULT (HCC): ICD-10-CM

## 2020-11-25 DIAGNOSIS — Z23 ENCOUNTER FOR IMMUNIZATION: ICD-10-CM

## 2020-11-25 PROCEDURE — 3052F HG A1C>EQUAL 8.0%<EQUAL 9.0%: CPT | Performed by: FAMILY MEDICINE

## 2020-11-25 PROCEDURE — 90686 IIV4 VACC NO PRSV 0.5 ML IM: CPT | Performed by: FAMILY MEDICINE

## 2020-11-25 PROCEDURE — 90471 IMMUNIZATION ADMIN: CPT | Performed by: FAMILY MEDICINE

## 2020-11-25 PROCEDURE — 99214 OFFICE O/P EST MOD 30 MIN: CPT | Performed by: FAMILY MEDICINE

## 2020-11-25 NOTE — PROGRESS NOTES
[facility] Progress Note:   F/up Physician Visit    CC: Weight Management      Marko Miguel is a 38 y.o. female who is here for her  f/up physician visit for the VLCD / LCD Program.    Her weight is 1 lb higher than it was in sept  At home she had 214lb on her scale  She admits she has not been compliant   she says her blood sugar was doing well until she started drinking flavored water. She has gone back to clear water   her a1c was checked recently and was 8.1 down from the last one 8.4    She is not doing any exercise  Has gained 1 lb        Weight Metrics 11/25/2020 9/10/2020 5/12/2020 3/13/2020 3/6/2020 2/26/2020 2/19/2020   Weight 219 lb 218 lb 6.4 oz 218 lb 224 lb 8 oz 226 lb 3.2 oz 230 lb 4.8 oz -   Neck Circ (inches) - - - - - - 14   Waist Measure Inches - - 41 - - - 41   BMI 38.79 kg/m2 38.69 kg/m2 38.62 kg/m2 39.77 kg/m2 40.07 kg/m2 40.8 kg/m2 -         Outpatient Medications Marked as Taking for the 11/25/20 encounter (Office Visit) with Marychuy Franco MD   Medication Sig Dispense Refill    insulin degludec Vickie Hick FlexTouch U-100) 100 unit/mL (3 mL) inpn INJECT 30 UNITS ONCE DAILY AND INCREASE AS DIRECTED UP TO 50 UNITS PER DAY--Dose change 10/22/20--updated med list--did not send prescription to the pharmacy 15 mL 11    NovoLOG Flexpen U-100 Insulin 100 unit/mL (3 mL) inpn INJECT 1:8 FOR CARBS + 1 UNIT FOR 75 > 150 FOR CORRECTION. MAX 50 UNITS PER DAY--Dose change 10/22/20--updated med list--did not send prescription to the pharmacy 15 mL 11    buPROPion SR (WELLBUTRIN SR) 150 mg SR tablet Take 1 Tab by mouth two (2) times a day. 60 Tab 2    OTHER HAS A PARAGUARD BIRTH CONTROL      cloNIDine HCL (CATAPRES) 0.1 mg tablet Take 1 Tab by mouth as needed. BP over 180/90      hydroCHLOROthiazide (HYDRODIURIL) 12.5 mg tablet Take 1 Tab by mouth daily. 90 Tab 2    naproxen (NAPROSYN) 375 mg tablet Take 1 Tab by mouth two (2) times daily (with meals).  (Patient taking differently: Take 375 mg by mouth as needed.) 60 Tab 3    lisinopriL (PRINIVIL, ZESTRIL) 40 mg tablet Take 1 Tab by mouth daily. New higher dose replaces prior script on file for 20 mg tabs 90 Tab 3    cetirizine (ZYRTEC) 10 mg tablet Take 10 mg by mouth as needed.  cholecalciferol, vitamin D3, (VITAMIN D3) 2,000 unit tab Take 4,000 Units by mouth daily. Participation   Did you attend clinic and class last week? no    Review of Systems  Since your last visit, have you experienced any complications? no  If yes, please list:       Are you taking an appetite suppressant? no  If so, is there any Chest Pain, Palpitations or Dizziness? BP Readings from Last 3 Encounters:   11/25/20 134/84   09/10/20 127/78   05/12/20 125/86       SLEEP:    Have you received any other medical care this week? no  If yes, where and for what? Have you discontinued or changed any medicine or dose of your medicine since your last visit with Dr Uzair Mesa? no  If yes, where and for what? Diet  How many ounces of calorie-free liquids did you consume each day? 90  oz     How many meal replacements did you take each day? 1-2 and 1-2 meal also supplementing with PP, financially she needs to stick w PP right now      Did you have any problems adhering to the program?  no If yes, please explain:      Exercise  Aerobic exercise: 0 min  Resistance exercise:  workouts / week  Any discomfort?  no     If yes, where? Objective  Visit Vitals  /84 (BP 1 Location: Left arm, BP Patient Position: Sitting)   Pulse 73   Temp 98.9 °F (37.2 °C) (Oral)   Resp 16   Ht 5' 3\" (1.6 m)   Wt 219 lb (99.3 kg)   SpO2 100%   BMI 38.79 kg/m²     No LMP recorded. Physical Exam  Appearance: well,   Mental:A&O x 3, NAD  H:NC/AT,  EENT:   EOMI, PERRL, No scleral icterus  Neck: no bruit or JVD  Lung: clear, No W/R  ABD: soft, active, nontender  Ext:  no Edema  Neuro: nonfocal  Assessment / Plan    Encounter Diagnoses   Name Primary?     Type 1 diabetes mellitus without complication (HCC) Yes    Morbid obesity with BMI of 40.0-44.9, adult ([facility]     Essential hypertension, benign      Diagnoses and all orders for this visit:    1. Type 1 diabetes mellitus without complication (HCC)  She is using a lot less insulin than she used to    2. Morbid obesity with BMI of 40.0-44.9, adult (HCC)  Her diet is not low carb  She admits she is still eating enough sweets and carbs tht her blood sugar is still too high which means she is not eating low carb      3. Essential hypertension, benign  controlled        1. Weight management control uncertain   Progress was reviewed with patient    2. Labs    Latest results reviewed with patient   1. Get on track w 300 mins of exercise per week  2. Clean up the carbs better and the insulin can be adjusted down. She reported she is [place] if she eats less carbs her blod sugar will bottom out. I counseles her that her insulin dose would be ower if she eats less carbs and the blood sugar can stabilize    Flu shot given  * The primary encounter diagnosis was Type 1 diabetes mellitus without complication ([facility] Diagnoses of Morbid obesity with BMI of 40.0-44.9, adult ([address] and Essential hypertension, benign were also pertinent to this visit.

## 2020-11-25 NOTE — PROGRESS NOTES
1. Have you been to the ER, urgent care clinic since your last visit? Hospitalized since your last visit? No    2. Have you seen or consulted any other health care providers outside of the [address] [place] [address] since your last visit? Include any pap smears or colon screening. No     Pharmacy verified.    [facility]Y #[id number]- [facility]    Visit Vitals  /84 (BP 1 Location: Left arm, BP Patient Position: Sitting)   Pulse 73   Temp 98.9 °F (37.2 °C) (Oral)   Resp 16   Ht 5' 3\" (1.6 m)   Wt 219 lb (99.3 kg)   SpO2 100%   BMI 38.79 kg/m²     Body Weight: 219.4 LB  Body Fat%: 41.8 %  BMI: 38.5  Body Water Weight: 43.5 %  Resting Energy: 1642    3 most recent PHQ Screens 11/25/2020   Little interest or pleasure in doing things Not at all   Feeling down, depressed, irritable, or hopeless Not at all   Total Score PHQ 2 0     Health Maintenance Due   Topic Date Due    PAP AKA CERVICAL CYTOLOGY  06/07/2014    Eye Exam Retinal or Dilated  07/06/2019    Foot Exam Q1  06/25/2020    Flu Vaccine (1) 09/01/2020

## 2020-12-31 NOTE — PROGRESS NOTES
"  Cardiothoracic Surgery Progress Note      Patient: Eli Pruett Date: 2020   : 1978 Attending: Raina Pandya MD   38year old male        Operation/Procedure:  Coronary Artery Bypass Graft times _3__ with :  Left internal mammary artery to the left anterior descending artery  Reverse greater saphenous vein graft to the diagonal artery                    Reverse greater saphenous vein graft to the obtuse marginal artery  Epicardial ligation of Left atrial appendage. Ultrasound mapping of bilateral saphenous veins  Endoscopic harvest of left greater saphenous vein  Titanium plate closure of complex sternal wound    Post-op Days: 2    Subjective:   Patient seen and examined this morning with Dr. Jadyn Wu  Transferred to [facility] this am  Denies pain, sob or cp  Walked w/o difficulty in the hallway, chest tubes in place and dumped     Allergies:   ALLERGIES:  No Known Allergies    Vitals:    Vital Last Value 24 Hour Range   Temperature 97.3 Â°F (36.3 Â°C) (20 1040) Temp  Min: 97.3 Â°F (36.3 Â°C)  Max: 99 Â°F (37.2 Â°C)   Pulse 85 (20 1040) Pulse  Min: 78  Max: 93   Respiratory 18 (20 1040) Resp  Min: 16  Max: 33   Non-Invasive  Blood Pressure 99/70 (20 1040) BP  Min: 94/54  Max: 157/79   Pulse Oximetry 95 % (20 1040) SpO2  Min: 94 %  Max: 100 %   CVP 8 mmHg (20 1100) No data recorded     Arterial BP      PAP      CO/CI     SVO2       Vital Today Admit   Weight 129 kg (284 lb 6.3 oz) (20 0619) Weight: 126 kg (277 lb 10.7 oz) (20)   Height N/A Height: 6' 1.23"" (186 cm) (20)   BMI N/A BMI (Calculated): 36.41 (20)       Intake/Output:    No intake/output data recorded. I/O last 3 completed shifts: In: 856.4 [P.O.:350; I.V.:324.6; IV Piggyback:181.8]  Out: 1140 [Urine:910; Chest Tube:230]    Physical examination:     Body mass index is 37.29 kg/mÂ². Gen: AOx3, NAD  Head: Normocephalic   Eyes: Sclerae are nonicteric  Neck: No neck mass.   " 1. Have you been to the ER, urgent care clinic since your last visit? Hospitalized since your last visit? No    2. Have you seen or consulted any other health care providers outside of the [address] [place] [address] since your last visit? Include any pap smears or colon screening.  No     Chief Complaint   Patient presents with    Weight Management     MSWL     Body Weight: 240.1  Body Fat%: 44.1  Muscle Mass Weight: 42.1  Body Water Weight: 25.1  Basal Metabolic Rate: 8594  BMI: 42.53 Supple. Heart RRR, PW removed today  Lungs: CTAB, CT removed today, f/u CXR no PTX  Abdomen: Soft. Nontender. No masses are palpable. Extremities: 1+ LE edema, compression stockings in place  LLE incisions c/d/i  Integumentary: No rash or ulceration. Sternotomy incision c/d/i, no erythema or drainage. Psychiatric: Appropriate affect and orientation. Neurologic: No gross sensory or motor deficit. Cranial nerves are intact.     Medications/Infusions:  Scheduled:   â¢ carvedilol  12.5 mg Oral 2 times per day   â¢ sodium chloride  500 mL Intravenous Once   â¢ ticagrelor  90 mg Oral BID   â¢ aspirin  81 mg Oral Daily   â¢ Potassium Standard Replacement Protocol   Does not apply See Admin Instructions   â¢ Magnesium Standard Replacement Protocol   Does not apply See Admin Instructions   â¢ Phosphorus Standard Replacement Protocol   Does not apply See Admin Instructions   â¢ furosemide  20 mg Intravenous Q12H   â¢ insulin glargine  20 Units Subcutaneous Nightly   â¢ insulin lispro  5 Units Subcutaneous TID AC   â¢ insulin lispro   Subcutaneous TID WC   â¢ insulin lispro   Subcutaneous Nightly   â¢ mupirocin (BACTROBAN) 2% nasal ointment  1 application Each Nare 2 times per day   â¢ sodium chloride (PF)  2 mL Intracatheter 2 times per day   â¢ lidocaine  1 patch Transdermal Daily   â¢ famotidine (PEPCID) injection  20 mg Intravenous Nightly   â¢ docusate sodium-sennosides  2 tablet OG Tube Daily   â¢ pravastatin  80 mg OG Tube Nightly   â¢ vitamin - therapeutic multivitamins w/minerals  1 tablet OG Tube Daily   â¢ acetaminophen  650 mg Oral Q6H       Continuous Infusions:   â¢ dextrose 5 % / sodium chloride 0.45% with KCl 40 mEq infusion 5 mL/hr at 12/30/20 1500   â¢ sodium chloride 0.9% infusion     â¢ sodium chloride 0.9% infusion         Laboratory Results:    Lab Results   Component Value Date    WBC 23.3 (H) 12/31/2020    HCT 32.0 (L) 12/31/2020    HGB 10.2 (L) 12/31/2020     12/31/2020    INR 1.2 12/31/2020    PTT 33 (H) 12/31/2020    BUN 44 (H) 12/31/2020    CREATININE 3.31 (H) 12/31/2020    SODIUM 140 12/31/2020    POTASSIUM 5.1 12/31/2020    CHLORIDE 105 12/31/2020    AST 26 12/29/2020    ALKPT 118 (H) 12/29/2020    BILIRUBIN 0.6 12/29/2020    CO2 25 12/31/2020    GPT 27 12/29/2020    GLUCOSE 262 (H) 12/31/2020    ALBUMIN 2.7 (L) 12/29/2020    MG 2.2 12/31/2020       Impression:  1. CAD s/p CABGx3 12/29  2. HTN  3. HLD  4. CKD stage 4  5. DMT2  6.  JEANNETTE    Plan:  HD Stable  Continue asa, pravastatin  Added bb this am  CT and PW removed this am  Ok to start brilinta tonight  Cr up today - discontinued gabapentin, continue to trend  Vol OL - continue lasix 20 iv bid  Encourage IS/ambulation  Pain management - ES tylenol, added tramadol prn    D/w Dr. Bryan Bacon, PA-C  [facility], [address]

## 2021-01-11 ENCOUNTER — VIRTUAL VISIT (OUTPATIENT)
Dept: FAMILY MEDICINE CLINIC | Age: 36
End: 2021-01-11
Payer: MEDICAID

## 2021-01-11 DIAGNOSIS — E66.01 MORBID OBESITY WITH BMI OF 40.0-44.9, ADULT (HCC): ICD-10-CM

## 2021-01-11 DIAGNOSIS — I10 ESSENTIAL HYPERTENSION, BENIGN: ICD-10-CM

## 2021-01-11 DIAGNOSIS — E10.9 TYPE 1 DIABETES MELLITUS WITHOUT COMPLICATION (HCC): Primary | ICD-10-CM

## 2021-01-11 PROCEDURE — 99213 OFFICE O/P EST LOW 20 MIN: CPT | Performed by: FAMILY MEDICINE

## 2021-01-11 NOTE — PROGRESS NOTES
Huma Chavez is a 38 y.o. female who was seen by synchronous (real-time) audio-video technology on 1/11/2021. Consent:  She and/or her healthcare decision maker is aware that this patient-initiated Telehealth encounter is a billable service, with coverage as determined by her insurance carrier. She is aware that she may receive a bill and has provided verbal consent to proceed: Yes    I was at home while conducting this encounter. Huma Chavez is a 38 y.o. female  who is here for her follow up  Evaluation for the medical bariatric care. CC: I want to be healthier    Weight History  Current weight  208 and BMI is There is no height or weight on file to calculate BMI. Goal weight BMI 24  Highest weight 267  (See weight gain time line scanned into media section of chart)    Blood sugars are in the 100s      Significant Medical History    Update on sleep apnea and  CPAP no    Ever had bariatric surgery: no    Pregnant or planning on becoming pregnant w/in 6 months: no         Significant Psychosocial History     Current Major Lifestyle Changes: no  Any potential unsupportive people: no          Social History  Social History     Tobacco Use    Smoking status: Never Smoker    Smokeless tobacco: Never Used   Substance Use Topics    Alcohol use: No     How many times a week do you eat out?  0    Do you drink any EtOH?  no   If so, how much? Do you have upcoming any travel in the next 6 weeks?  no   If so, what do you have planned? Exercise  How many days a week do you currently exercise?  0 days  Have you ever been told by a physician not to exercise?  no      Objective  There were no vitals taken for this visit.       Waist Circumference: See Weight Management Doc Flowsheet  Neck Circumference: See Weight Management Doc Flowsheet  Percent Body Fat: See Weight Management Doc Flowsheet  Weight Metrics 11/25/2020 9/10/2020 5/12/2020 3/13/2020 3/6/2020 2/26/2020 2/19/2020   Weight 219 lb 218 lb 6.4 oz 218 lb 224 lb 8 oz 226 lb 3.2 oz 230 lb 4.8 oz -   Neck Circ (inches) - - - - - - 14   Waist Measure Inches - - 41 - - - 41   BMI 38.79 kg/m2 38.69 kg/m2 38.62 kg/m2 39.77 kg/m2 40.07 kg/m2 40.8 kg/m2 -         Labs: time for labs    Review of Systems  Complete ROS negative except where noted above          712  Subjective:   Gaviota Banks was seen for No chief complaint on file. Prior to Admission medications    Medication Sig Start Date End Date Taking? Authorizing Provider   insulin degludec Marciana Geo FlexTouch U-100) 100 unit/mL (3 mL) inpn INJECT 30 UNITS ONCE DAILY AND INCREASE AS DIRECTED UP TO 50 UNITS PER DAY--Dose change 10/22/20--updated med list--did not send prescription to the pharmacy 10/22/20   Rasheeda Murillo MD   NovoLOG Flexpen U-100 Insulin 100 unit/mL (3 mL) inpn INJECT 1:8 FOR CARBS + 1 UNIT FOR 75 > 150 FOR CORRECTION. MAX 50 UNITS PER DAY--Dose change 10/22/20--updated med list--did not send prescription to the pharmacy 10/22/20   Rasheeda Murillo MD   buPROPion SR [facility]) 150 mg SR tablet Take 1 Tab by mouth two (2) times a day. 9/10/20   Jimmy Falk MD   OTHER HAS A PARAGUARD BIRTH CONTROL    Provider, Historical   cloNIDine HCL (CATAPRES) 0.1 mg tablet Take 1 Tab by mouth as needed. BP over 180/90 6/26/20   Provider, Historical   hydroCHLOROthiazide (HYDRODIURIL) 12.5 mg tablet Take 1 Tab by mouth daily. 6/29/20   Nicolette Ding NP   naproxen (NAPROSYN) 375 mg tablet Take 1 Tab by mouth two (2) times daily (with meals). Patient taking differently: Take 375 mg by mouth as needed. 6/29/20   Nicolette Ding NP   lisinopriL (PRINIVIL, ZESTRIL) 40 mg tablet Take 1 Tab by mouth daily.  New higher dose replaces prior script on file for 20 mg tabs 4/13/20   Rasheeda Murillo MD   FreeStyle Mitchell 14 Day Sensor kit USE AS DIRECTED EVERY 14 DAYS 3/17/20   Rasheeda Murillo MD   Blood-Glucose Meter monitoring kit Check blood sugar four times a day 12/20/19   Lucille Cantor MD   glucose blood VI test strips (BLOOD GLUCOSE TEST) strip Use to check blood sugar four times a day. 12/20/19   Lucille Cantor MD   lancets misc Use to check blood sugar four times a day. 12/20/19   Lucille Cantor MD   levonorgestrel (MIRENA) 20 mcg/24 hours (5 yrs) 52 mg IUD 1 Device by IntraUTERine route once. HAS A PARAGUARD    Provider, Historical   FREESTYLE PRECISION KIRK STRIPS strip Use as needed up to twice daily with [facility] reader to check blood sugar when having having problems with the [facility] sensors 9/27/19   Navarro Islas MD   [facility] ULTRA BLUE TEST STRIP strip Test 4 times daily 9/16/19   Navarro Islas MD   cetirizine (ZYRTEC) 10 mg tablet Take 10 mg by mouth as needed. Provider, Historical   FREESTYLE NASREEN 14 DAY READER misc Use as directed 3/19/19   Navarro Islas MD   LANCE PEN NEEDLE 32 gauge x 5/32\" ndle USE AS DIRECTED TO INJECT INSULIN 4 TIMES DAILY 1/24/19   Navarro Islas MD   cholecalciferol, vitamin D3, (VITAMIN D3) 2,000 unit tab Take 4,000 Units by mouth daily.     Provider, Historical     Allergies   Allergen Reactions    Codeine Nausea and Vomiting       Patient Active Problem List    Diagnosis Date Noted    Pregnancy induced hypertension 02/20/2019    Pregnancy induced hypertension, third trimester 01/29/2019    History of breast cancer 08/29/2018    Morbid obesity with BMI of 40.0-44.9, adult ([address] 09/22/2017    Wound check, abscess 09/22/2017    Type 1 diabetes mellitus without complication ([address] 81/71/3401    Ne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 ([address]     Cardiomyopathy due to chemotherapy ([address]     Vitamin D deficiency 11/07/2011    Abnormal thyroid blood test 11/07/2011    Essential hypertension, benign 06/25/2010    Encounter for long-term (current) use of other medications 06/25/2010    Encounter for long-term (current) use of insulin ([address] 06/25/2010    Uncontrolled type 1 diabetes mellitus ([address] Current Outpatient Medications   Medication Sig Dispense Refill    insulin degludec Don Grates FlexTouch U-100) 100 unit/mL (3 mL) inpn INJECT 30 UNITS ONCE DAILY AND INCREASE AS DIRECTED UP TO 50 UNITS PER DAY--Dose change 10/22/20--updated med list--did not send prescription to the pharmacy 15 mL 11    NovoLOG Flexpen U-100 Insulin 100 unit/mL (3 mL) inpn INJECT 1:8 FOR CARBS + 1 UNIT FOR 75 > 150 FOR CORRECTION. MAX 50 UNITS PER DAY--Dose change 10/22/20--updated med list--did not send prescription to the pharmacy 15 mL 11    buPROPion SR (WELLBUTRIN SR) 150 mg SR tablet Take 1 Tab by mouth two (2) times a day. 60 Tab 2    OTHER HAS A PARAGUARD BIRTH CONTROL      cloNIDine HCL (CATAPRES) 0.1 mg tablet Take 1 Tab by mouth as needed. BP over 180/90      hydroCHLOROthiazide (HYDRODIURIL) 12.5 mg tablet Take 1 Tab by mouth daily. 90 Tab 2    naproxen (NAPROSYN) 375 mg tablet Take 1 Tab by mouth two (2) times daily (with meals). (Patient taking differently: Take 375 mg by mouth as needed.) 60 Tab 3    lisinopriL (PRINIVIL, ZESTRIL) 40 mg tablet Take 1 Tab by mouth daily. New higher dose replaces prior script on file for 20 mg tabs 90 Tab 3    FreeStyle Mitchell 14 Day Sensor kit USE AS DIRECTED EVERY 14 DAYS 2 Kit 11    Blood-Glucose Meter monitoring kit Check blood sugar four times a day 1 Kit 0    glucose blood VI test strips (BLOOD GLUCOSE TEST) strip Use to check blood sugar four times a day. 400 Strip 3    lancets misc Use to check blood sugar four times a day. 400 Each 3    levonorgestrel (MIRENA) 20 mcg/24 hours (5 yrs) 52 mg IUD 1 Device by IntraUTERine route once. HAS A PARAGUARD      FREESTYLE PRECISION KIRK STRIPS strip Use as needed up to twice daily with [employer] reader to check blood sugar when having having problems with the [employer] sensors 50 Strip 11    ONETOUCH ULTRA BLUE TEST STRIP strip Test 4 times daily 400 Strip 3    cetirizine (ZYRTEC) 10 mg tablet Take 10 mg by mouth as needed.       FREESTYLE NASREEN 14 DAY READER [facility] Use as directed 1 Each 0    LANCE PEN NEEDLE 32 gauge x 5/32\" ndle USE AS DIRECTED TO INJECT INSULIN 4 TIMES DAILY 400 Pen Needle 3    cholecalciferol, vitamin D3, (VITAMIN D3) 2,000 unit tab Take 4,000 Units by mouth daily. ROS    PHYSICAL EXAMINATION:  [ INSTRUCTIONS:  \"[x]\" Indicates a positive item  \"[]\" Indicates a negative item  -- DELETE ALL ITEMS NOT EXAMINED]  Vital Signs: (As obtained by patient/caregiver at home)  There were no vitals taken for this visit. Constitutional: [x] Appears well-developed and well-nourished [x] No apparent distress      [] Abnormal -     Mental status: [x] Alert and awake  [x] Oriented to person/place/time [x] Able to follow commands    [] Abnormal -     Eyes:   EOM    [x]  Normal    [] Abnormal -   Sclera  [x]  Normal    [] Abnormal -          Discharge [x]  None visible   [] Abnormal -     HENT: [x] Normocephalic, atraumatic  [] Abnormal -   [x] Mouth/Throat: Mucous membranes are moist    External Ears [x] Normal  [] Abnormal -    Neck: [x] No visualized mass [] Abnormal -     Pulmonary/Chest: [x] Respiratory effort normal   [x] No visualized signs of difficulty breathing or respiratory distress        [] Abnormal -      Musculoskeletal:   [x] Normal gait with no signs of ataxia         [x] Normal range of motion of neck        [] Abnormal -     Neurological:        [x] No Facial Asymmetry (Cranial nerve 7 motor function) (limited exam due to video visit)          [x] No gaze palsy        [] Abnormal -          Skin:        [x] No significant exanthematous lesions or discoloration noted on facial skin         [] Abnormal -            Psychiatric:       [x] Normal Affect [] Abnormal -        [x] No Hallucinations    Other pertinent observable physical exam findings:-      Assessment & Plan  Diagnoses and all orders for this visit:    1.  Type 1 diabetes mellitus without complication (HCC)  Low carb weight loss plan also helps decrese the insuline need  2. Morbid obesity with BMI of 40.0-44.9, adult ([facility]  Diet: she does another rprotein shake 2 times a day and 2 meals  Not taking much humalog    Activity: has not been exercising    Medication cont wellbutrin    3. Essential hypertension, benign  Cont to monitor        :    Based on his history and exam, Nataly Mi is a good candidate for the  [facility] Weight Loss Program     There are no Patient Instructions on file for this visit. Coding Help - Use CPT Codes 17290-68452, 68135-37744 for Established and New Patients respectively, either employing EM elements or Time rules. Other codes (example consult codes) may also apply. The primary encounter diagnosis was Type 1 diabetes mellitus without complication ([facility] Diagnoses of Morbid obesity with BMI of 40.0-44.9, adult ([facility] and Essential hypertension, benign were also pertinent to this visit. The patient verbalizes understanding and agrees with the plan of care      We discussed the expected course, resolution and complications of the diagnosis(es) in detail. Medication risks, benefits, costs, interactions, and alternatives were discussed as indicated. I advised her to contact the office if her condition worsens, changes or fails to improve as anticipated. She expressed understanding with the diagnosis(es) and plan. Pursuant to the emergency declaration under the [facility], 1135 waiver authority and the [employer] and [employer] General Act, this Virtual  Visit was conducted, with patient's consent, to reduce the patient's risk of exposure to COVID-19 and provide continuity of care for an established patient. Services were provided through a video synchronous discussion virtually to substitute for in-person clinic visit.     Rome Perdomo MD

## 2021-01-16 RX ORDER — PEN NEEDLE, DIABETIC 32GX 5/32"
NEEDLE, DISPOSABLE MISCELLANEOUS
Qty: 400 PEN NEEDLE | Refills: 0 | Status: SHIPPED | OUTPATIENT
Start: 2021-01-16 | End: 2021-03-12

## 2021-01-29 ENCOUNTER — TELEPHONE (OUTPATIENT)
Dept: ENDOCRINOLOGY | Age: 36
End: 2021-01-29

## 2021-01-29 NOTE — TELEPHONE ENCOUNTER
I spoke with Sarahy Connor and he stated that the progress note was cut off but he also needs the visit prior to that one. I informed him that I will fax it .

## 2021-01-29 NOTE — TELEPHONE ENCOUNTER
Oscar Dixon from [facility] is asking a return call @[phone], regarding medical note that he received for this pt dated 10/22/21 it got cut off. He said he needs another medical note prior to 10/22/21 visit. Fax# [phone].

## 2021-02-08 ENCOUNTER — TELEPHONE (OUTPATIENT)
Dept: DIABETES SERVICES | Age: 36
End: 2021-02-08

## 2021-02-09 ENCOUNTER — TELEPHONE (OUTPATIENT)
Dept: DIABETES SERVICES | Age: 36
End: 2021-02-09

## 2021-02-09 RX ORDER — CALCIUM CITRATE/VITAMIN D3 200MG-6.25
TABLET ORAL
Qty: 400 STRIP | Refills: 3 | Status: SHIPPED | OUTPATIENT
Start: 2021-02-09 | End: 2022-02-22

## 2021-02-09 NOTE — TELEPHONE ENCOUNTER
----- Message from Evostor. Karen Vargas sent at 2021  5:11 PM EST -----  Regarding: Prescription Question  Contact: [phone]  Yinka Skaggs    During my pump training I was told to check my sugars with a machine to make sure I put accurate readings into the pump. Yet my prescription for the strips . Can you send in a new one for me as soon as you can. Its the true metrix strips. .... I had a few ar the house and found out my Charlies Null was 60 units higher than my meter bg check. Thanks!

## 2021-02-10 ENCOUNTER — VIRTUAL VISIT (OUTPATIENT)
Dept: DIABETES SERVICES | Age: 36
End: 2021-02-10

## 2021-02-10 DIAGNOSIS — E10.9 TYPE 1 DIABETES MELLITUS WITHOUT COMPLICATION (HCC): Primary | ICD-10-CM

## 2021-02-10 RX ORDER — CALCIUM CARB/VITAMIN D3/VIT K1 500-100-40
TABLET,CHEWABLE ORAL
Qty: 100 SYRINGE | Refills: 11 | Status: SHIPPED | OUTPATIENT
Start: 2021-02-10 | End: 2022-05-03

## 2021-02-10 RX ORDER — INSULIN ASPART 100 [IU]/ML
INJECTION, SOLUTION INTRAVENOUS; SUBCUTANEOUS
Qty: 30 ML | Refills: 11 | Status: SHIPPED | OUTPATIENT
Start: 2021-02-10 | End: 2022-02-22

## 2021-02-10 NOTE — PROGRESS NOTES
[employer] for Diabetes Health  Diabetes Self-Management Education & Support Program  Omnipod Classic System Start         Met with patient on 2/09/21 for initial pump start. Order has been uploaded to Media section on 2/8/2021- please see this order for settings that were placed in the pump. Stated that her last dose of Roya Rosado was in the morning of 2/09/2021. Follow-up with patient on 2/10/2021- patient states that she has been battling hypoglycemia over night. States that her BG was between 40-65mg/dL overnight starting at 8pm .  States that she typically has hypoglycemia overnight prior to beginning pump and since she has lost 65 pounds. Instructed patient to decrease her basal rate by 20% to 1.0 units/hour per Dr. Gareth Harrison order over night. Set new basal rate from 8p-12a and 12a-8a at 1.0 units/hour. Patient able to read back changes that were made to settings in pump. Patient instructed to contact Dr. Malini Castro if additional changes are needed. Also contact Dr. Malini Castro with any episodes of hypoglycemia. Material covered:     Introduction to Pump Therapy   [x]Reviewed diabetes education topics: Blood glucose (Bg) testing, treating hypoglycemia & hyperglycemia, carbohydrate counting, sick day management  [x]Reviewed Pump Therapy  Concepts: Basal/bolus, insulin on board, insulin to carb ratio, correction factor, duration of insulin action   Be Prepared:   ? [x]PDM/pods  ? [x]insulin  ? [x]Blood glucose test strips/lancets/lancing device  ? [x]Backup supplies  insulin/BG strips/batteries  ? [x]glucose tablets/fast acting source of carbohydrate/glucagon- states she will need a new prescription- discussed having her son as well as her  trained on how to use glucogon   Supply Reorder:   ? [x][employer] Customer [facility] [phone]  ? [x]Current distributor  name/contact info: [employer]        [x]When to reorder   System Overview   ?  [x]Communication process/distance [x]storage guidelines  ? [x]Diagnostic tests (Ct scans/mRi/XRays)  ? [x]travel guidelines   Pod         [x]Fill port/adhesive/needle cap/pink slide insert/ Waterproof iPX8   PDM (Personal Diabetes Manager)   [x]Battery/button layout/BG meter     PDM Settings   ? [x]Pump therapy Order Form  ? [x]Basic settings  iD screen/time/date/date format  ? [x]Basal settings   ? [x]max basal   ? [x]basal rates  ? [x]temp basal  ? [x]Bolus settings  iC ratio, correction factor, min Bg for bolus calcs, reverse correction, duration of insulin action, extended        ? [x]bolus, max bolus   Pod Activation   ? [x]Change Pod  ? [x]Room temperature insulin        [x]Fill syringe  min/max amounts  ? [x]DO not  prefill Pod  ? [x]site selection/rotation & prep  ? [x]automated cannula insertion  check infusion site/viewing window & pink slide insert  ? [x]Bg reminder 1.5 hours after insertion  ? [x]When to change Pod   Status Screen/Home Screen Actions   ? [x]status screen  iD screen, battery, volume remaining, time/date, last Bg/last bolus, current basal rate, Pod expiration, iOB  ? [x]Bolus  Bolus calculations, manual bolus, correction bolus, meal bolus, start/cancel bolus  ? [x]suspend/resume insulin  ? [x]my Records     [x]insulin/Bg/alarm/Carb     [x]all history     [x]event/Day functionality  ? [x]settings     [x]Basal Program  edit     [x]Bolus  edit iC ratio, correction factor, target Bg, duration of insulin     Advanced Features (optional)   ? []extended bolus  ? [x]temp basal rate  ? [x]additional basal programs  ? []Presets  temp   ? []basal/Carb/Bolus presets   Troubleshooting   ? [x]Hypoglycemia  ? [x]sick day management  ? [x]Hyperglycemia & ketone testing   Reminders & Alerts   ? [x]Custom reminders  ? [x]Pod expiration alert  ? [x]low [place] alert   Advisory & Hazard Alarms   ? [x]advisory  series of beeps - response required  ?  [x]Hazard alarm  Continuous tone  urgent attention required  ? [x]Occlusion, Pod error, auto Off,      ? [x]empty reservoir   Ongoing Success   ? [x]glooko  ? [x]my Omnipod® Patient kye  ? [x][place] for [employer]  ? [x]Omn[employer]od 24/7 Customer Care [phone]  ? [x]Reviewed user guide  ? [x]Reviewed Customer's Bill of Rights & Responsibilities     To return to center on 2/19/21 to go over extended bolus and advanced settings. States she is meeting with the [address]  next week for her son's pump training. To contact O[employer] directly with any pump concerns. If she has any pump setting concerns, she is to contact Dr. Audelia Basurto via [address] [place] [address] [employer] for Telehealth:  Jose Sky is a 43 y.o. female being evaluated through a synchronous (real-time) audio-video technology platform, as a substitution for an in-person encounter, to address the healthcare issues mentioned above. I was in the office. The patient was at home. A caregiver was present when appropriate. The patient and/or her healthcare decision maker, is aware that this patient-initiated, Telehealth encounter on 2/10/2021 is a billable service, with coverage as determined by her insurance carrier. She is aware that she may receive a bill and has provided verbal consent to proceed: Yes. This telehealth encounter occurred during the COVID-19 pandemic and public health emergency. Evaluation of the following organ systems was limited: Vitals/Constitutional/EENT/Resp/CV/GI//MS/Neuro/Skin/Heme-Lymph-Imm. Pursuant to the emergency declaration under the [facility], [address] [place] [address] waiver authority and the [employer] and [email or web address] General Act, this Virtual Visit was conducted with patient's (and/or legal guardian's) consent, to reduce the risk of exposure to COVID-19 and provide necessary medical care.      Time in appointments: 113 minutes 02/9/2021  75 minutes 2/10/2021      Christopher Goldsmith RD, [facility]

## 2021-02-17 ENCOUNTER — OFFICE VISIT (OUTPATIENT)
Dept: FAMILY MEDICINE CLINIC | Age: 36
End: 2021-02-17
Payer: MEDICAID

## 2021-02-17 VITALS
DIASTOLIC BLOOD PRESSURE: 80 MMHG | BODY MASS INDEX: 38.45 KG/M2 | HEIGHT: 63 IN | SYSTOLIC BLOOD PRESSURE: 124 MMHG | OXYGEN SATURATION: 98 % | TEMPERATURE: 97.8 F | WEIGHT: 217 LBS | HEART RATE: 70 BPM | RESPIRATION RATE: 16 BRPM

## 2021-02-17 DIAGNOSIS — I10 ESSENTIAL HYPERTENSION, BENIGN: ICD-10-CM

## 2021-02-17 DIAGNOSIS — E78.00 PURE HYPERCHOLESTEROLEMIA: ICD-10-CM

## 2021-02-17 DIAGNOSIS — E66.01 MORBID OBESITY WITH BMI OF 40.0-44.9, ADULT (HCC): ICD-10-CM

## 2021-02-17 DIAGNOSIS — E10.9 TYPE 1 DIABETES MELLITUS WITHOUT COMPLICATION (HCC): Primary | ICD-10-CM

## 2021-02-17 DIAGNOSIS — Z11.59 NEED FOR HEPATITIS C SCREENING TEST: ICD-10-CM

## 2021-02-17 PROCEDURE — 99214 OFFICE O/P EST MOD 30 MIN: CPT | Performed by: FAMILY MEDICINE

## 2021-02-17 PROCEDURE — 3052F HG A1C>EQUAL 8.0%<EQUAL 9.0%: CPT | Performed by: FAMILY MEDICINE

## 2021-02-17 NOTE — PROGRESS NOTES
1. Have you been to the ER, urgent care clinic since your last visit? Hospitalized since your last visit? No    2. Have you seen or consulted any other health care providers outside of the [address] [place] [address] since your last visit? Include any pap smears or colon screening.  No     Chief Complaint   Patient presents with    Complete Physical     No pap     Health Maintenance Due   Topic Date Due    PAP AKA CERVICAL CYTOLOGY  06/07/2014    Eye Exam Retinal or Dilated  07/06/2019    Foot Exam Q1  06/25/2020     Visit Vitals  /80 (BP 1 Location: Left upper arm, BP Patient Position: Sitting, BP Cuff Size: Adult long)   Pulse 70   Temp 97.8 °F (36.6 °C) (Skin)   Resp 16   Ht 5' 3\" (1.6 m)   Wt 217 lb (98.4 kg)   SpO2 98%   BMI 38.44 kg/m²     3 most recent PHQ Screens 2/17/2021   Little interest or pleasure in doing things Not at all   Feeling down, depressed, irritable, or hopeless Not at all   Total Score PHQ 2 0

## 2021-02-17 NOTE — PROGRESS NOTES
[facility] Progress Note:   F/up Physician Visit    CC: Weight Management  She has a new insulin pump and had a lot of lows which meant she had to eat a snack more often so she has regained from 208 to 70 Luc Ahumada is a 43 y.o. female who is here for her  f/up physician visit for the VLCD / LCD Program.    Weight Metrics 2/17/2021 11/25/2020 9/10/2020 5/12/2020 3/13/2020 3/6/2020 2/26/2020   Weight 217 lb 219 lb 218 lb 6.4 oz 218 lb 224 lb 8 oz 226 lb 3.2 oz 230 lb 4.8 oz   Neck Circ (inches) - - - - - - -   Waist Measure Inches - - - 41 - - -   BMI 38.44 kg/m2 38.79 kg/m2 38.69 kg/m2 38.62 kg/m2 39.77 kg/m2 40.07 kg/m2 40.8 kg/m2         Outpatient Medications Marked as Taking for the 2/17/21 encounter (Office Visit) with Bradley Diallo MD   Medication Sig Dispense Refill    subcutaneous insulin pump (OMNIPOD INSULIN MANAGEMENT) by Does Not Apply route.  insulin aspart U-100 (NovoLOG U-100 Insulin aspart) 100 unit/mL injection Use as directed in insulin pump. Max 100 units/day. 30 mL 11    buPROPion SR (WELLBUTRIN SR) 150 mg SR tablet Take 1 Tab by mouth two (2) times a day. 60 Tab 2    cloNIDine HCL (CATAPRES) 0.1 mg tablet Take 1 Tab by mouth as needed. BP over 180/90      hydroCHLOROthiazide (HYDRODIURIL) 12.5 mg tablet Take 1 Tab by mouth daily. 90 Tab 2    lisinopriL (PRINIVIL, ZESTRIL) 40 mg tablet Take 1 Tab by mouth daily. New higher dose replaces prior script on file for 20 mg tabs 90 Tab 3    cetirizine (ZYRTEC) 10 mg tablet Take 10 mg by mouth as needed.  cholecalciferol, vitamin D3, (VITAMIN D3) 2,000 unit tab Take 4,000 Units by mouth daily. Participation   Did you attend clinic and class last week? no    Review of Systems  Since your last visit, have you experienced any complications? no  If yes, please list:       Are you taking an appetite suppressant? yes  If so, is there any Chest Pain, Palpitations or Dizziness?     BP Readings from Last 3 Encounters:   02/17/21 124/80   11/25/20 134/84   09/10/20 127/78       SLEEP:    Have you received any other medical care this week? no  If yes, where and for what? Have you discontinued or changed any medicine or dose of your medicine since your last visit with Dr Niesha Blanchard? no  If yes, where and for what? Diet  How many ounces of calorie-free liquids did you consume each day?  ? oz    How many meal replacements did you take each day? PP due to expense    Did you have any problems adhering to the program?  no If yes, please explain:      Exercise  Aerobic exercise: 0 min  Resistance exercise: 0 workouts / week  Any discomfort?  no     If yes, where? Objective  Visit Vitals  /80 (BP 1 Location: Left upper arm, BP Patient Position: Sitting, BP Cuff Size: Adult long)   Pulse 70   Temp 97.8 °F (36.6 °C) (Skin)   Resp 16   Ht 5' 3\" (1.6 m)   Wt 217 lb (98.4 kg)   SpO2 98%   BMI 38.44 kg/m²     No LMP recorded. Physical Exam  Appearance: well,   Mental:A&O x 3, NAD  H:NC/AT,  EENT:   EOMI, PERRL, No scleral icterus  Neck: no bruit or JVD  Lung: clear, No W/R  ABD: soft, active, nontender  Ext:  no Edema  Neuro: nonfocal  Assessment / Plan    Encounter Diagnoses   Name Primary?  Type 1 diabetes mellitus without complication ([facility] Yes    Morbid obesity with BMI of 40.0-44.9, adult ([facility]     Essential hypertension, benign     Pure hypercholesterolemia      Diagnoses and all orders for this visit:    1. Type 1 diabetes mellitus without complication (HCC)  -     HEMOGLOBIN A1C WITH EAG; Future  -      DIABETES FOOT EXAM  I counseled her on ways to recover the blood sugar without overshooting  She has talked to endocrine and cut down the insulin zambrano  I explained as she is more compliant with the eating plan she will need less insulin for sure than she has started on. I hope to avoid these lows and then having to eat \"up \" to the insuline dose    2.  Morbid obesity with BMI of 40.0-44.9, adult ([facility]  -     HEMOGLOBIN A1C WITH EAG; Future  -     METABOLIC PANEL, COMPREHENSIVE; Future  -     LIPID PANEL; Future  -      DIABETES FOOT EXAM  The diabetes care is directly effecting her weight loss  As she gets better control of the diet and then the insulin dose she will start losing weight   3. Essential hypertension, benign  -     METABOLIC PANEL, COMPREHENSIVE; Future  Good control  No change in meds needed  4. Pure hypercholesterolemia  -     LIPID PANEL; Future  Checking current level    1. Weight management poorly controlled   Progress was reviewed with patient  See above  2. Labs    Latest results reviewed with patient          The primary encounter diagnosis was Type 1 diabetes mellitus without complication ([facility] Diagnoses of Morbid obesity with BMI of 40.0-44.9, adult ([facility] Essential hypertension, benign, and Pure hypercholesterolemia were also pertinent to this visit.

## 2021-02-18 LAB
ALBUMIN SERPL-MCNC: 3.7 G/DL (ref 3.8–4.8)
ALBUMIN/GLOB SERPL: 1.1 {RATIO} (ref 1.2–2.2)
ALP SERPL-CCNC: 80 IU/L (ref 39–117)
ALT SERPL-CCNC: 15 IU/L (ref 0–32)
AST SERPL-CCNC: 12 IU/L (ref 0–40)
BILIRUB SERPL-MCNC: 0.5 MG/DL (ref 0–1.2)
BUN SERPL-MCNC: 12 MG/DL (ref 6–20)
BUN/CREAT SERPL: 15 (ref 9–23)
CALCIUM SERPL-MCNC: 9.4 MG/DL (ref 8.7–10.2)
CHLORIDE SERPL-SCNC: 102 MMOL/L (ref 96–106)
CHOLEST SERPL-MCNC: 175 MG/DL (ref 100–199)
CO2 SERPL-SCNC: 24 MMOL/L (ref 20–29)
CREAT SERPL-MCNC: 0.78 MG/DL (ref 0.57–1)
EST. AVERAGE GLUCOSE BLD GHB EST-MCNC: 194 MG/DL
GLOBULIN SER CALC-MCNC: 3.3 G/DL (ref 1.5–4.5)
GLUCOSE SERPL-MCNC: 224 MG/DL (ref 65–99)
HBA1C MFR BLD: 8.4 % (ref 4.8–5.6)
HCV AB S/CO SERPL IA: <0.1 S/CO RATIO (ref 0–0.9)
HDLC SERPL-MCNC: 73 MG/DL
LDLC SERPL CALC-MCNC: 93 MG/DL (ref 0–99)
POTASSIUM SERPL-SCNC: 4.3 MMOL/L (ref 3.5–5.2)
PROT SERPL-MCNC: 7 G/DL (ref 6–8.5)
SODIUM SERPL-SCNC: 138 MMOL/L (ref 134–144)
TRIGL SERPL-MCNC: 45 MG/DL (ref 0–149)
VLDLC SERPL CALC-MCNC: 9 MG/DL (ref 5–40)

## 2021-02-25 ENCOUNTER — VIRTUAL VISIT (OUTPATIENT)
Dept: DIABETES SERVICES | Age: 36
End: 2021-02-25

## 2021-02-25 DIAGNOSIS — E10.9 TYPE 1 DIABETES MELLITUS WITHOUT COMPLICATION (HCC): Primary | ICD-10-CM

## 2021-02-25 NOTE — PROGRESS NOTES
The A1c is 8.4. the last one was 8. 1. this will improve with the changes we discussed  The cholesterol went from 71 which is 2 points above  ideal and now is 93.    This too will improve as you get restarted exercising and continue getting better control of the junk foods  I want to recheck in may, keep working on it

## 2021-03-01 ENCOUNTER — TELEPHONE (OUTPATIENT)
Dept: ENDOCRINOLOGY | Age: 36
End: 2021-03-01

## 2021-03-01 NOTE — TELEPHONE ENCOUNTER
Kaela with [employer] called and wanted to know for the  [facility]  it was checked for a quantity of 3 refills. She stated that normally it is for 0 refills. I spoke with Dr. Isaac العراقي and he stated that it is for 1 device with zero refills. Yoshi Miramontes was notified and voiced understanding.

## 2021-03-01 NOTE — TELEPHONE ENCOUNTER
----- Message from Chen Peraza sent at 3/1/2021  3:09 PM EST -----  Regarding: Dr. Fisher Laughter: [phone]  General Message/Vendor Calls    Caller's first and last name:Jagdeep Sherwood       Reason for call:[facility] needs a call back today if possible to get a verbal confirmation for the refill count for the Dexcom      Callback required yes/no and why:yes, confirm.        Best contact number(s):[phone]      Details to clarify the request:n/a      Chen Peraza

## 2021-03-03 NOTE — PROGRESS NOTES
[employer] Diabetes Health  Diabetes Self-Management Education & Support Program  [employer] Classic System Follow up    Met with patient virtually to help patient with any problem solving that is of concern. She has been working with [employer] as her son has also started the [employer]. Reviewed advanced settings as well as [employer]. States she will contact [employer] with any pump concerns in the future. If she has any pump setting concerns, she is to contact Dr. Lucinda Nguyen via [address] All material has been covered at this time:     Introduction to Pump Therapy   [x]Reviewed diabetes education topics: Blood glucose (Bg) testing, treating hypoglycemia & hyperglycemia, carbohydrate counting, sick day management  [x]Reviewed Pump Therapy  Concepts: Basal/bolus, insulin on board, insulin to carb ratio, correction factor, duration of insulin action   Be Prepared:   ? [x]PDM/pods  ? [x]insulin  ? [x]Blood glucose test strips/lancets/lancing device  ? [x]Backup supplies  insulin/BG strips/batteries  ? [x]glucose tablets/fast acting source of carbohydrate/glucagon- states she will need a new prescription- discussed having her son as well as her  trained on how to use glucogon   Supply Reorder:   ? [x][facility] Customer Care [phone]  ? [x]Current distributor  name/contact info: [employer]        [x]When to reorder   System Overview   ? [x]Communication process/distance        [x]storage guidelines  ? [x]Diagnostic tests (Ct scans/mRi/XRays)  ? [x]travel guidelines   Pod         [x]Fill port/adhesive/needle cap/pink slide insert/ Waterproof iPX8   PDM (Personal Diabetes Manager)   [x]Battery/button layout/BG meter     PDM Settings   ? [x]Pump therapy Order Form  ? [x]Basic settings  iD screen/time/date/date format  ? [x]Basal settings   ? [x]max basal   ? [x]basal rates  ?  [x]temp basal  ? [x]Bolus settings  iC ratio, correction factor, min Bg for bolus calcs, reverse correction, duration of insulin action, extended        ? [x]bolus, max bolus   Pod Activation   ? [x]Change Pod  ? [x]Room temperature insulin        [x]Fill syringe  min/max amounts  ? [x]DO not  prefill Pod  ? [x]site selection/rotation & prep  ? [x]automated cannula insertion  check infusion site/viewing window & pink slide insert  ? [x]Bg reminder 1.5 hours after insertion  ? [x]When to change Pod   Status Screen/Home Screen Actions   ? [x]status screen  iD screen, battery, volume remaining, time/date, last Bg/last bolus, current basal rate, Pod expiration, iOB  ? [x]Bolus  Bolus calculations, manual bolus, correction bolus, meal bolus, start/cancel bolus  ? [x]suspend/resume insulin  ? [x]my Records     [x]insulin/Bg/alarm/Carb     [x]all history     [x]event/Day functionality  ? [x]settings     [x]Basal Program  edit     [x]Bolus  edit iC ratio, correction factor, target Bg, duration of insulin     Advanced Features (optional)   ? [x]extended bolus  ? [x]temp basal rate  ? [x]additional basal programs  ? [x]Presets  temp   ? [x]basal/Carb/Bolus presets   Troubleshooting   ? [x]Hypoglycemia  ? [x]sick day management  ? [x]Hyperglycemia & ketone testing   Reminders & Alerts   ? [x]Custom reminders  ? [x]Pod expiration alert  ? [x]low [place] alert   Advisory & Hazard Alarms   ? [x]advisory  series of beeps - response required  ? [x]Hazard alarm  Continuous tone  urgent attention required    ? [x]Occlusion, Pod error, auto Off,      ? [x]empty reservoir   Ongoing Success   ? [x]glooko  ? [x]my Omnipod® Patient kye  ? [x][place] for [employer]  ? [x][employer] 24/7 Customer Care [phone]  ? [x]Reviewed user guide  ?  [x]Reviewed Customer's Bill of Rights & Responsibilities         [facility] [employer] for Telehealth:  Nicole Norton is a 43 y.o. female being evaluated through a synchronous (real-time) audio-video technology platform, as a substitution for an in-person encounter, to address the healthcare issues mentioned above. I was in the office. The patient was at home. A caregiver was present when appropriate. The patient and/or her healthcare decision maker, is aware that this patient-initiated, Telehealth encounter on 2/10/2021 is a billable service, with coverage as determined by her insurance carrier. She is aware that she may receive a bill and has provided verbal consent to proceed: Yes. This telehealth encounter occurred during the COVID-19 pandemic and public health emergency. Evaluation of the following organ systems was limited: Vitals/Constitutional/EENT/Resp/CV/GI//MS/Neuro/Skin/Heme-Lymph-Imm. Pursuant to the emergency declaration under the [address] [place] [address], [address] [place] [address] waiver authority and the [employer] Act, this Virtual Visit was conducted with patient's (and/or legal guardian's) consent, to reduce the risk of exposure to COVID-19 and provide necessary medical care.      Time in appointment: 30 minutes    Ruslan Cage RD, [facility]

## 2021-03-05 RX ORDER — LISINOPRIL 40 MG/1
TABLET ORAL
Qty: 90 TAB | Refills: 3 | Status: SHIPPED | OUTPATIENT
Start: 2021-03-05 | End: 2022-04-28 | Stop reason: SDUPTHER

## 2021-03-05 RX ORDER — FLASH GLUCOSE SENSOR
KIT MISCELLANEOUS
Qty: 6 KIT | Refills: 3 | Status: SHIPPED | OUTPATIENT
Start: 2021-03-05 | End: 2021-11-01

## 2021-03-12 RX ORDER — PEN NEEDLE, DIABETIC 32GX 5/32"
NEEDLE, DISPOSABLE MISCELLANEOUS
Qty: 400 PEN NEEDLE | Refills: 3 | Status: SHIPPED | OUTPATIENT
Start: 2021-03-12 | End: 2022-05-03

## 2021-03-19 ENCOUNTER — VIRTUAL VISIT (OUTPATIENT)
Dept: FAMILY MEDICINE CLINIC | Age: 36
End: 2021-03-19
Payer: MEDICAID

## 2021-03-19 DIAGNOSIS — E66.01 MORBID OBESITY WITH BMI OF 40.0-44.9, ADULT (HCC): ICD-10-CM

## 2021-03-19 DIAGNOSIS — I10 ESSENTIAL HYPERTENSION, BENIGN: ICD-10-CM

## 2021-03-19 DIAGNOSIS — E10.9 TYPE 1 DIABETES MELLITUS WITHOUT COMPLICATION (HCC): Primary | ICD-10-CM

## 2021-03-19 PROCEDURE — 3052F HG A1C>EQUAL 8.0%<EQUAL 9.0%: CPT | Performed by: FAMILY MEDICINE

## 2021-03-19 PROCEDURE — 99214 OFFICE O/P EST MOD 30 MIN: CPT | Performed by: FAMILY MEDICINE

## 2021-03-19 NOTE — PROGRESS NOTES
Patient stated name &   Chief Complaint   Patient presents with    Weight Management     Follow up        Health Maintenance Due   Topic    PAP AKA CERVICAL CYTOLOGY     Eye Exam Retinal or Dilated     COVID-19 Vaccine (2 - Moderna 2-dose series)       Wt Readings from Last 3 Encounters:   21 217 lb (98.4 kg)   20 219 lb (99.3 kg)   09/10/20 218 lb 6.4 oz (99.1 kg)     Temp Readings from Last 3 Encounters:   21 97.8 °F (36.6 °C) (Skin)   20 98.9 °F (37.2 °C) (Oral)   09/10/20 98.3 °F (36.8 °C) (Oral)     BP Readings from Last 3 Encounters:   21 124/80   20 134/84   09/10/20 127/78     Pulse Readings from Last 3 Encounters:   21 70   20 73   09/10/20 73         Learning Assessment:  :     Learning Assessment 2017   PRIMARY LEARNER Patient   HIGHEST LEVEL OF EDUCATION - PRIMARY LEARNER  4 YEARS OF COLLEGE   BARRIERS PRIMARY LEARNER NONE   CO-LEARNER CAREGIVER No   PRIMARY LANGUAGE ENGLISH   LEARNER PREFERENCE PRIMARY LISTENING   ANSWERED BY self   RELATIONSHIP SELF       Depression Screening:  :     3 most recent PHQ Screens 2021   Little interest or pleasure in doing things Not at all   Feeling down, depressed, irritable, or hopeless Not at all   Total Score PHQ 2 0       Fall Risk Assessment:  :     No flowsheet data found. Abuse Screening:  :     Abuse Screening Questionnaire 2020 2020 6/10/2020   Do you ever feel afraid of your partner? N N N   Are you in a relationship with someone who physically or mentally threatens you? N N N   Is it safe for you to go home? Robby Rodney       Coordination of Care Questionnaire:  :     1) Have you been to an emergency room, urgent care clinic since your last visit? No    Hospitalized since your last visit? No             2) Have you seen or consulted any other health care providers outside of [address] [place] [address] since your last visit?  No  (Include any pap smears or colon screenings in this section.)    Patient is accompanied by VV I have received verbal consent from Gaviota Banks to discuss any/all medical information while they are present in the room.

## 2021-03-19 NOTE — PROGRESS NOTES
[facility] Progress Note:   F/up Physician Visit    Favian Merrill is a 43 y.o. female who was seen by synchronous (real-time) audio-video technology on 3/19/2021. Consent:  She and/or her healthcare decision maker is aware that this patient-initiated Telehealth encounter is a billable service, with coverage as determined by her insurance carrier. She is aware that she may receive a bill and has provided verbal consent to proceed: Yes    I was at home while conducting this encounter. 712  Subjective:   Favian Merrill was seen for Weight Management (Follow up)    Now 214 Feb 217      f/up physician visit for the VLCD / LCD Program.  Prior to Admission medications    Medication Sig Start Date End Date Taking? Authorizing Provider   lisinopriL (PRINIVIL, ZESTRIL) 40 mg tablet TAKE 1 TAB BY MOUTH ONCE DAILY 3/5/21  Yes Jovani Cotter MD   subcutaneous insulin pump (OMNIPOD INSULIN MANAGEMENT) by Does Not Apply route. Yes Provider, Historical   insulin aspart U-100 (NovoLOG U-100 Insulin aspart) 100 unit/mL injection Use as directed in insulin pump. Max 100 units/day. 2/10/21  Yes Jovani Cotter MD   buPROPion SR [facility] SR) 150 mg SR tablet Take 1 Tab by mouth two (2) times a day. 1/27/21  Yes Shaun Juárez MD   OTHER HAS A PARAGUARD BIRTH CONTROL   Yes Provider, Historical   cloNIDine HCL (CATAPRES) 0.1 mg tablet Take 1 Tab by mouth as needed. BP over 180/90 6/26/20  Yes Provider, Historical   hydroCHLOROthiazide (HYDRODIURIL) 12.5 mg tablet Take 1 Tab by mouth daily. 6/29/20  Yes Teodora Ding NP   naproxen (NAPROSYN) 375 mg tablet Take 1 Tab by mouth two (2) times daily (with meals). Patient taking differently: Take 375 mg by mouth as needed. 6/29/20  Yes Teodora Ding NP   cetirizine (ZYRTEC) 10 mg tablet Take 10 mg by mouth as needed.    Yes Provider, Historical   cholecalciferol, vitamin D3, (VITAMIN D3) 2,000 unit tab Take 4,000 Units by mouth daily. Yes Provider, Historical   BD Zandra 2nd Gen Pen Needle 32 gauge x 5/32\" ndle USE AS DIRECTED TO INJECT INSULIN 4 TIMES DAILY 3/12/21   Eben Ceballos MD   FreeStyle Mitchell 14 Day Sensor kit USE AS DIRECTED EVERY 14 DAYS 3/5/21   Eben Ceballos MD   Insulin Syringe-Needle U-100 1 mL 31 gauge x 5/16 syrg Use as directed three times daily 2/10/21   Eben Ceballos MD   True Metrix Glucose Test Strip strip Use to check blood sugar four times a day. DX E10.65 2/9/21   Eben Ceballos MD   insulin degludec Gretta Au FlexTouch U-100) 100 unit/mL (3 mL) inpn INJECT 30 UNITS ONCE DAILY AND INCREASE AS DIRECTED UP TO 50 UNITS PER DAY--Dose change 10/22/20--updated med list--did not send prescription to the pharmacy 10/22/20   Eben Ceballos MD   Blood-Glucose Meter monitoring kit Check blood sugar four times a day 12/20/19   Roxie Norman MD   glucose blood VI test strips (BLOOD GLUCOSE TEST) strip Use to check blood sugar four times a day. 12/20/19   Roxie Norman MD   lancets misc Use to check blood sugar four times a day. 12/20/19   Roxie Norman MD   levonorgestrel (MIRENA) 20 mcg/24 hours (5 yrs) 52 mg IUD 1 Device by IntraUTERine route once.  HAS A PARAGUARD    Provider, Historical   FREESTYLE PRECISION KIRK STRIPS strip Use as needed up to twice daily with [facility] reader to check blood sugar when having having problems with the [facility] sensors 9/27/19   Eben Ceballos MD   [facility] ULTRA BLUE TEST STRIP strip Test 4 times daily 9/16/19   Eben Ceballos MD   FREESTYLE MITCHELL 14 DAY READER misc Use as directed 3/19/19   Eben Ceballos MD     Allergies   Allergen Reactions    Codeine Nausea and Vomiting       Patient Active Problem List    Diagnosis Date Noted    Pregnancy induced hypertension 02/20/2019    Pregnancy induced hypertension, third trimester 01/29/2019    History of breast cancer 08/29/2018    Morbid obesity with BMI of 40.0-44.9, adult ([address] 09/22/2017    Wound check, abscess 09/22/2017    Type 1 diabetes mellitus without complication ([facility] 03/30/8941    [facility]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 ([facility]     Cardiomyopathy due to chemotherapy ([facility]     Vitamin D deficiency 11/07/2011    Abnormal thyroid blood test 11/07/2011    Essential hypertension, benign 06/25/2010    Encounter for long-term (current) use of other medications 06/25/2010    Encounter for long-term (current) use of insulin ([facility] 06/25/2010    Uncontrolled type 1 diabetes mellitus (HCC)      Current Outpatient Medications   Medication Sig Dispense Refill    lisinopriL (PRINIVIL, ZESTRIL) 40 mg tablet TAKE 1 TAB BY MOUTH ONCE DAILY 90 Tab 3    subcutaneous insulin pump (OMNIPOD INSULIN MANAGEMENT) by Does Not Apply route.  insulin aspart U-100 (NovoLOG U-100 Insulin aspart) 100 unit/mL injection Use as directed in insulin pump. Max 100 units/day. 30 mL 11    buPROPion SR (WELLBUTRIN SR) 150 mg SR tablet Take 1 Tab by mouth two (2) times a day. 60 Tab 2    OTHER HAS A PARAGUARD BIRTH CONTROL      cloNIDine HCL (CATAPRES) 0.1 mg tablet Take 1 Tab by mouth as needed. BP over 180/90      hydroCHLOROthiazide (HYDRODIURIL) 12.5 mg tablet Take 1 Tab by mouth daily. 90 Tab 2    naproxen (NAPROSYN) 375 mg tablet Take 1 Tab by mouth two (2) times daily (with meals). (Patient taking differently: Take 375 mg by mouth as needed.) 60 Tab 3    cetirizine (ZYRTEC) 10 mg tablet Take 10 mg by mouth as needed.  cholecalciferol, vitamin D3, (VITAMIN D3) 2,000 unit tab Take 4,000 Units by mouth daily.  BD Zandra 2nd Gen Pen Needle 32 gauge x 5/32\" ndle USE AS DIRECTED TO INJECT INSULIN 4 TIMES DAILY 400 Pen Needle 3    FreeStyle Mitchell 14 Day Sensor kit USE AS DIRECTED EVERY 14 DAYS 6 Kit 3    Insulin Syringe-Needle U-100 1 mL 31 gauge x 5/16 syrg Use as directed three times daily 100 Syringe 11    True Metrix Glucose Test Strip strip Use to check blood sugar four times a day.   DX E10.65 400 Strip 3  insulin degludec Andreas Money FlexTouch U-100) 100 unit/mL (3 mL) inpn INJECT 30 UNITS ONCE DAILY AND INCREASE AS DIRECTED UP TO 50 UNITS PER DAY--Dose change 10/22/20--updated med list--did not send prescription to the pharmacy 15 mL 11    Blood-Glucose Meter monitoring kit Check blood sugar four times a day 1 Kit 0    glucose blood VI test strips (BLOOD GLUCOSE TEST) strip Use to check blood sugar four times a day. 400 Strip 3    lancets misc Use to check blood sugar four times a day. 400 Each 3    levonorgestrel (MIRENA) 20 mcg/24 hours (5 yrs) 52 mg IUD 1 Device by IntraUTERine route once. HAS A PARAGUARD      FREESTYLE PRECISION KIRK STRIPS strip Use as needed up to twice daily with [employer] reader to check blood sugar when having having problems with the Veronica Brett sensors 50 Strip 11    ONETOUCH ULTRA BLUE TEST STRIP strip Test 4 times daily 400 Strip 3    FREESTYLE NASREEN 14 DAY READER misc Use as directed 1 Each 0       ROS      CC: Weight Management      Huma Chavez is a 43 y.o. female who is here for her      Weight Metrics 2/17/2021 11/25/2020 9/10/2020 5/12/2020 3/13/2020 3/6/2020 2/26/2020   Weight 217 lb 219 lb 218 lb 6.4 oz 218 lb 224 lb 8 oz 226 lb 3.2 oz 230 lb 4.8 oz   Neck Circ (inches) - - - - - - -   Waist Measure Inches - - - 41 - - -   BMI 38.44 kg/m2 38.79 kg/m2 38.69 kg/m2 38.62 kg/m2 39.77 kg/m2 40.07 kg/m2 40.8 kg/m2         Outpatient Medications Marked as Taking for the 3/19/21 encounter (Virtual Visit) with Pradeep Lozano MD   Medication Sig Dispense Refill    lisinopriL (PRINIVIL, ZESTRIL) 40 mg tablet TAKE 1 TAB BY MOUTH ONCE DAILY 90 Tab 3    subcutaneous insulin pump (OMNIPOD INSULIN MANAGEMENT) by Does Not Apply route.  insulin aspart U-100 (NovoLOG U-100 Insulin aspart) 100 unit/mL injection Use as directed in insulin pump. Max 100 units/day. 30 mL 11    buPROPion SR (WELLBUTRIN SR) 150 mg SR tablet Take 1 Tab by mouth two (2) times a day.  60 Tab 2    OTHER HAS A PARAGUARD BIRTH CONTROL      cloNIDine HCL (CATAPRES) 0.1 mg tablet Take 1 Tab by mouth as needed. BP over 180/90      hydroCHLOROthiazide (HYDRODIURIL) 12.5 mg tablet Take 1 Tab by mouth daily. 90 Tab 2    naproxen (NAPROSYN) 375 mg tablet Take 1 Tab by mouth two (2) times daily (with meals). (Patient taking differently: Take 375 mg by mouth as needed.) 60 Tab 3    cetirizine (ZYRTEC) 10 mg tablet Take 10 mg by mouth as needed.  cholecalciferol, vitamin D3, (VITAMIN D3) 2,000 unit tab Take 4,000 Units by mouth daily. Participation   Did you attend clinic and class last week? no    Review of Systems  Since your last visit, have you experienced any complications? no  If yes, please list:       Are you taking an appetite suppressant? no  If so, is there any Chest Pain, Palpitations or Dizziness? BP Readings from Last 3 Encounters:   02/17/21 124/80   11/25/20 134/84   09/10/20 127/78       SLEEP:  6-7    Have you received any other medical care this week? no  If yes, where and for what? Have you discontinued or changed any medicine or dose of your medicine since your last visit with Dr Joe Smith? no  If yes, where and for what? Diet  How many ounces of calorie-free liquids did you consume each day? 64 oz    How many meal replacements did you take each day? Ran out    Did you have any problems adhering to the program?  no If yes, please explain:      Exercise  Aerobic exercise: ifit treadmill  min  Resistance exercise: workouts / week  Any discomfort?  no     If yes, where? Objective  There were no vitals taken for this visit. No LMP recorded. PHYSICAL EXAMINATION:  [ INSTRUCTIONS:  \"[x]\" Indicates a positive item  \"[]\" Indicates a negative item  -- DELETE ALL ITEMS NOT EXAMINED]  Vital Signs: (As obtained by patient/caregiver at home)  There were no vitals taken for this visit.      Constitutional: [x] Appears well-developed and well-nourished [x] No apparent distress      [] Abnormal -     Mental status: [x] Alert and awake  [x] Oriented to person/place/time [x] Able to follow commands    [] Abnormal -     Eyes:   EOM    [x]  Normal    [] Abnormal -   Sclera  [x]  Normal    [] Abnormal -          Discharge [x]  None visible   [] Abnormal -     HENT: [x] Normocephalic, atraumatic  [] Abnormal -   [x] Mouth/Throat: Mucous membranes are moist    External Ears [x] Normal  [] Abnormal -    Neck: [x] No visualized mass [] Abnormal -     Pulmonary/Chest: [x] Respiratory effort normal   [x] No visualized signs of difficulty breathing or respiratory distress        [] Abnormal -      Musculoskeletal:   [x] Normal gait with no signs of ataxia         [x] Normal range of motion of neck        [] Abnormal -     Neurological:        [x] No Facial Asymmetry (Cranial nerve 7 motor function) (limited exam due to video visit)          [x] No gaze palsy        [] Abnormal -          Skin:        [x] No significant exanthematous lesions or discoloration noted on facial skin         [] Abnormal -            Psychiatric:       [x] Normal Affect [] Abnormal -        [x] No Hallucinations    Other pertinent observable physical exam findings:-      Assessment / Plan    Encounter Diagnoses   Name Primary?  Type 1 diabetes mellitus without complication ([facility] Yes    Morbid obesity with BMI of 40.0-44.9, adult ([facility]     Essential hypertension, benign      Diagnoses and all orders for this visit:    1. Type 1 diabetes mellitus without complication (HCC)  Blood sugar dropping because the activity has increased  I explained she needs to lower the basal insulin and to discuss w her endocrine doc  2. Morbid obesity with BMI of 40.0-44.9, adult (HCC)  Continue the ifit TM and cnt to lower the junk food/carbs  3. Essential hypertension, benign  The medical management of obesity is helping get better control of blood sugar and the blood pressure    1.   Weight management improved   Progress was reviewed with patient    2. Labs    Latest results reviewed with patient  Blood sugars running low, she has forgotten to decrease the insulin        The primary encounter diagnosis was Type 1 diabetes mellitus without complication ([facility] Diagnoses of Morbid obesity with BMI of 40.0-44.9, adult ([facility] and Essential hypertension, benign were also pertinent to this visit. Coding Help - Use CPT Codes 92061-29826, 06157-00296 for Established and New Patients respectively, either employing EM elements or Time rules. Other codes (example consult codes) may also apply. We discussed the expected course, resolution and complications of the diagnosis(es) in detail. Medication risks, benefits, costs, interactions, and alternatives were discussed as indicated. I advised her to contact the office if her condition worsens, changes or fails to improve as anticipated. She expressed understanding with the diagnosis(es) and plan. Pursuant to the emergency declaration under the [facility], [facility] waiver authority and the [employer] General Act, this Virtual  Visit was conducted, with patient's consent, to reduce the patient's risk of exposure to COVID-19 and provide continuity of care for an established patient. Services were provided through a video synchronous discussion virtually to substitute for in-person clinic visit.     Rome Perdomo MD

## 2021-04-09 DIAGNOSIS — E78.5 HYPERLIPIDEMIA LDL GOAL <100: ICD-10-CM

## 2021-04-09 DIAGNOSIS — E55.9 VITAMIN D DEFICIENCY: ICD-10-CM

## 2021-04-09 DIAGNOSIS — I10 ESSENTIAL HYPERTENSION, BENIGN: ICD-10-CM

## 2021-04-09 DIAGNOSIS — E10.9 TYPE 1 DIABETES MELLITUS WITHOUT COMPLICATION (HCC): ICD-10-CM

## 2021-04-20 LAB
ALBUMIN SERPL-MCNC: 3.8 G/DL (ref 3.8–4.8)
ALBUMIN/GLOB SERPL: 1.3 {RATIO} (ref 1.2–2.2)
ALP SERPL-CCNC: 73 IU/L (ref 39–117)
ALT SERPL-CCNC: 13 IU/L (ref 0–32)
AST SERPL-CCNC: 15 IU/L (ref 0–40)
BILIRUB SERPL-MCNC: 0.3 MG/DL (ref 0–1.2)
BUN SERPL-MCNC: 15 MG/DL (ref 6–20)
BUN/CREAT SERPL: 18 (ref 9–23)
CALCIUM SERPL-MCNC: 9.3 MG/DL (ref 8.7–10.2)
CHLORIDE SERPL-SCNC: 106 MMOL/L (ref 96–106)
CHOLEST SERPL-MCNC: 163 MG/DL (ref 100–199)
CO2 SERPL-SCNC: 25 MMOL/L (ref 20–29)
CREAT SERPL-MCNC: 0.84 MG/DL (ref 0.57–1)
EST. AVERAGE GLUCOSE BLD GHB EST-MCNC: 163 MG/DL
GLOBULIN SER CALC-MCNC: 2.9 G/DL (ref 1.5–4.5)
GLUCOSE SERPL-MCNC: 86 MG/DL (ref 65–99)
HBA1C MFR BLD: 7.3 % (ref 4.8–5.6)
HDLC SERPL-MCNC: 77 MG/DL
IMP & REVIEW OF LAB RESULTS: NORMAL
LDLC SERPL CALC-MCNC: 78 MG/DL (ref 0–99)
POTASSIUM SERPL-SCNC: 4.8 MMOL/L (ref 3.5–5.2)
PROT SERPL-MCNC: 6.7 G/DL (ref 6–8.5)
SODIUM SERPL-SCNC: 142 MMOL/L (ref 134–144)
TRIGL SERPL-MCNC: 33 MG/DL (ref 0–149)
VLDLC SERPL CALC-MCNC: 8 MG/DL (ref 5–40)

## 2021-04-23 ENCOUNTER — VIRTUAL VISIT (OUTPATIENT)
Dept: ENDOCRINOLOGY | Age: 36
End: 2021-04-23
Payer: MEDICAID

## 2021-04-23 DIAGNOSIS — E78.5 HYPERLIPIDEMIA LDL GOAL <100: ICD-10-CM

## 2021-04-23 DIAGNOSIS — R79.89 ABNORMAL THYROID BLOOD TEST: ICD-10-CM

## 2021-04-23 DIAGNOSIS — E55.9 VITAMIN D DEFICIENCY: ICD-10-CM

## 2021-04-23 DIAGNOSIS — E10.9 TYPE 1 DIABETES MELLITUS WITHOUT COMPLICATION (HCC): Primary | ICD-10-CM

## 2021-04-23 DIAGNOSIS — I10 ESSENTIAL HYPERTENSION, BENIGN: ICD-10-CM

## 2021-04-23 PROCEDURE — 99214 OFFICE O/P EST MOD 30 MIN: CPT | Performed by: INTERNAL MEDICINE

## 2021-04-23 PROCEDURE — 3051F HG A1C>EQUAL 7.0%<8.0%: CPT | Performed by: INTERNAL MEDICINE

## 2021-04-23 RX ORDER — CHOLECALCIFEROL TAB 125 MCG (5000 UNIT) 125 MCG
TAB ORAL DAILY
COMMUNITY

## 2021-04-23 NOTE — PROGRESS NOTES
Chief Complaint   Patient presents with   Thom Re Diabetes     [phone] doxy    Other     pcp and pharmacy confirmed       **THIS IS A VIRTUAL VISIT VIA A VIDEO SYNCHRONOUS DISCUSSION. PATIENT AGREED TO HAVE THEIR CARE DELIVERED OVER A DOXY. ME VIDEO VISIT IN PLACE OF THEIR REGULARLY SCHEDULED OFFICE VISIT**    History of Present Illness: Deepak Crandall is a 43 y.o. female here for follow up of diabetes. She started the OmniPod in 2/21 and has enjoyed this so far. Current settings are as follows:  - basal: 12a: 0.8, 8a: 1.1, 8p: 0.8  - Carb ratio: 8  - sensitivity: 75  - target: 120  - active insulin time: 4 hours    She was having lows overnight and during the day on initial basal rate of 1.25 units/hr all day long and has been in touch over mychart and we have decreased the rates to the ones above. This has provided much better control along with starting on dexcom but had an issue with her last sensor and was using cassi the past 2 weeks until just putting on a new one last night and her sugar was in the 60s overnight so she may need a further change in her basal rates. I asked her to share her dexcom data with me so I can see if other changes are needed to her settings. Has been off lipitor since last visit per my directions. she has the following indications to continue treatment with Dexcom:  1) she has type 1 diabetes (E10.9) and is on an intensive insulin regimen with an insulin pump  2) she tests her blood sugar 4 times per day and makes treatment decisions off her blood sugar readings and does the same off dexcom sensor readings  3) she requires frequent adjustments to her insulin pump settings based on her dexcom sensor readings  4) she has benefitted from therapeutic continuous glucose monitoring and I recommend that she continue this  5) she is seen in my office every 4-6 months      Current Outpatient Medications   Medication Sig    INTRAUTERINE DEVICE, IUD, IU by IntraUTERine route.     cholecalciferol (VITAMIN D3) (5000 Units/125 mcg) tab tablet Take  by mouth daily.  blood-glucose sensor (DEXCOM G6 SENSOR) by Does Not Apply route.  blood-glucose transmitter (DEXCOM G6 TRANSMITTER) by Does Not Apply route.  BD Zandra 2nd Gen Pen Needle 32 gauge x 5/32\" ndle USE AS DIRECTED TO INJECT INSULIN 4 TIMES DAILY    lisinopriL (PRINIVIL, ZESTRIL) 40 mg tablet TAKE 1 TAB BY MOUTH ONCE DAILY    subcutaneous insulin pump (OMNIPOD INSULIN MANAGEMENT) by Does Not Apply route.  insulin aspart U-100 (NovoLOG U-100 Insulin aspart) 100 unit/mL injection Use as directed in insulin pump. Max 100 units/day.  Insulin Syringe-Needle U-100 1 mL 31 gauge x 5/16 syrg Use as directed three times daily    True Metrix Glucose Test Strip strip Use to check blood sugar four times a day. DX E10.65    buPROPion SR (WELLBUTRIN SR) 150 mg SR tablet Take 1 Tab by mouth two (2) times a day.  cloNIDine HCL (CATAPRES) 0.1 mg tablet Take 1 Tab by mouth as needed. BP over 180/90    hydroCHLOROthiazide (HYDRODIURIL) 12.5 mg tablet Take 1 Tab by mouth daily.  naproxen (NAPROSYN) 375 mg tablet Take 1 Tab by mouth two (2) times daily (with meals). (Patient taking differently: Take 375 mg by mouth as needed.)    Blood-Glucose Meter monitoring kit Check blood sugar four times a day    glucose blood VI test strips (BLOOD GLUCOSE TEST) strip Use to check blood sugar four times a day.  lancets misc Use to check blood sugar four times a day.  FREESTYLE PRECISION KIRK STRIPS strip Use as needed up to twice daily with [facility] reader to check blood sugar when having having problems with the Mitchell sensors    cetirizine (ZYRTEC) 10 mg tablet Take 10 mg by mouth as needed.     FreeStyle Mitchell 14 Day Sensor kit USE AS DIRECTED EVERY 14 DAYS    OTHER HAS A PARAGUARD BIRTH CONTROL    ONETOUCH ULTRA BLUE TEST STRIP strip Test 4 times daily    FREESTYLE MITCHELL 14 DAY READER misc Use as directed     No current facility-administered medications for this visit. Allergies   Allergen Reactions    Codeine Nausea and Vomiting     Review of Systems: PER HPI    Physical Examination:  - GENERAL: NCAT, Appears well nourished   - EYES: EOMI, non-icteric, no proptosis   - Ear/Nose/Throat: NCAT, no visible inflammation or masses   - CARDIOVASCULAR: no cyanosis, no visible JVD   - RESPIRATORY: respiratory effort normal without any distress or labored breathing   - MUSCULOSKELETAL: Normal ROM of neck and upper extremities observed   - SKIN: No rash on face  - NEUROLOGIC:  No facial asymmetry (Cranial nerve 7 motor function), No gaze palsy   - PSYCHIATRIC: Normal affect, Normal insight and judgement       Data Reviewed:   Component      Latest Ref Rng & Units 4/19/2021 2/17/2021   Glucose      65 - 99 mg/dL 86 224 (H)   BUN      6 - 20 mg/dL 15 12   Creatinine      0.57 - 1.00 mg/dL 0.84 0.78   GFR est non-AA      >59 mL/min/1.73 88 97   GFR est AA      >59 mL/min/1.73 102 112   BUN/Creatinine ratio      9 - 23 18 15   Sodium      134 - 144 mmol/L 142 138   Potassium      3.5 - 5.2 mmol/L 4.8 4.3   Chloride      96 - 106 mmol/L 106 102   CO2      20 - 29 mmol/L 25 24   Calcium      8.7 - 10.2 mg/dL 9.3 9.4   Protein, total      6.0 - 8.5 g/dL 6.7 7.0   Albumin      3.8 - 4.8 g/dL 3.8 3.7 (L)   GLOBULIN, TOTAL      1.5 - 4.5 g/dL 2.9 3.3   A-G Ratio      1.2 - 2.2 1.3 1.1 (L)   Bilirubin, total      0.0 - 1.2 mg/dL 0.3 0.5   Alk.  phosphatase      39 - 117 IU/L 73 80   AST      0 - 40 IU/L 15 12   ALT      0 - 32 IU/L 13 15   Cholesterol, total      100 - 199 mg/dL 163 175   Triglyceride      0 - 149 mg/dL 33 45   HDL Cholesterol      >39 mg/dL 77 73   VLDL, calculated      5 - 40 mg/dL 8 9   LDL, calculated      0 - 99 mg/dL 78 93   Hemoglobin A1c, (calculated)      4.8 - 5.6 % 7.3 (H) 8.4 (H)   Estimated average glucose      mg/dL 163 194     Assessment/Plan:     1) Type 1 DM uncontrolled: Her most recent Hgb A1c was 7.3% in 4/21 down from 8.4% in 2/21 up from 8.1% in 10/20 down from 8.8% in 9/20 down from 9% in 12/19 up from 8.6% in 10/19 up from 7.8% in 6/19 up from 7.1% in 2/19 stable from 1/19 stable from 12/18 down from 7.2% in 11/18 up from 6.8% in 10/18 down from 7.5% in 9/18 down from 8.2% in 6/18 up from 8% in 1/18 (she was at [address] [place] [address] from 11/12 to 1/18 and states A1c values were in the 7-8% range) down from 8.8% in January 2012 stable from 8.9% in September 2011 up from 7.5% in May 2010. Sugars are improving since starting on the pump so will retrieve her dexcom data to see if any other changes are needed. - cont current pump settings for now   - Check bs 4x/day due to fluctuating sugars  - cont dexcom  - optho UTD 7/17  - foot exam done 6/19  - microalbumin nl 10/20  - check Hgb A1c and cmp and microalbumin prior to next visit      2) HTN NOS (401.9): her BP was above goal < 140/90 at last visit  - cont lisinopril 40 mg daily  - monitor home blood pressure readings      3) Vitamin D deficiency: Level was 11.1 in 9/11. Started on ergo at that time and level up to 24 in January 2012. Still 25.9 in 2/18 so advised to take 4000 units daily but has not been doing this and level was 27.4 in 10/18 and 47 in 6/19 and 66 in 10/20  - check Vitamin D 25-OH level prior to next visit  - cont vitamin D 4000 units daily    4) Hyperlipidemia with goal LDL < 100: LDL 65 6/19 and 106 in 10/19 and in 9/20 and PCP started atorvastatin 10 mg daily and down to 71 in 10/20 but stopped this for now given age < 40 and up to 80 in 2/21 and down to 78 in 4/21.  - diet control  - check lipids prior to next visit    Patient Instructions   1) Please accept the invite I sent to your e-mail so you can share your dexcom data with me and let me know when this has been done so I can see if there are any trends that warrant changes to your pump settings. 2) Your A1c is the best it's been in the past 2 years.     3) Your liver and kidney and cholesterol are normal.    4) Please come for a follow up visit on 11/1/21 at 9:10am in our [place] office. 5) I put an [facility] directly [facility] the [facility] system to repeat your labs in the 1-2 weeks prior to your next visit so just ask for the order under my name and you will receive a courtesy reminder through [employer] to have these drawn. Follow-up and Dispositions    · Return 11/1/21 at 9:10am.               Copy sent to:   Richardson Washington MD (Obstetrics & Gynecology)  Dolores Katz MD (Surgical Oncology)  Devika Frances MD (Hematology and Oncology)  Dr. Yamel Camara via [facility]

## 2021-04-23 NOTE — PATIENT INSTRUCTIONS
1) Please accept the invite I sent to your e-mail so you can share your dexcom data with me and let me know when this has been done so I can see if there are any trends that warrant changes to your pump settings. 2) Your A1c is the best it's been in the past 2 years. 3) Your liver and kidney and cholesterol are normal. 
 
4) Please come for a follow up visit on 11/1/21 at 9:10am in our [place] office. 5) I put an order directly into the [employer] [facility] system to repeat your labs in the 1-2 weeks prior to your next visit so just ask for the order under my name and you will receive a courtesy reminder through [employer] to have these drawn.

## 2021-06-02 ENCOUNTER — TELEPHONE (OUTPATIENT)
Dept: FAMILY MEDICINE CLINIC | Age: 36
End: 2021-06-02

## 2021-06-02 NOTE — TELEPHONE ENCOUNTER
.LVM to schedule VV      ----- Message from Willie Alonzo sent at 6/2/2021  9:16 AM EDT -----  Regarding: Dr. Sawyer Angulo  Appointment not available    Caller's first and last name and relationship to patient (if not the patient): n/a      Best contact number: [phone]      Preferred date and time: as soon as possible      Scheduled appointment date and time: n/a      Reason for appointment: med refill for BP meds      Details to clarify the request: n/a      Willie Alonzo

## 2021-06-03 ENCOUNTER — TELEPHONE (OUTPATIENT)
Dept: FAMILY MEDICINE CLINIC | Age: 36
End: 2021-06-03

## 2021-06-03 NOTE — TELEPHONE ENCOUNTER
----- Message from Jonathon Roa sent at 6/3/2021  1:05 PM EDT -----  Regarding: Dr. Tee Salamanca first and last name: N/A       Reason for call: Request for copy of last physical/ Hep B shot. Callback required yes/no and why: yes       Best contact number(s): [phone]      Details to clarify the request: Pt is scheduled for a virtual appt with Dr. Jerri Hendrix on 6/7/21 at 9:00am for a medication refill. Patient is requesting to receive a copy of her most recent physical and proof of Hep B Vaccination.       Jonathon Roa

## 2021-06-07 ENCOUNTER — TELEPHONE (OUTPATIENT)
Dept: ENDOCRINOLOGY | Age: 36
End: 2021-06-07

## 2021-06-07 ENCOUNTER — VIRTUAL VISIT (OUTPATIENT)
Dept: FAMILY MEDICINE CLINIC | Age: 36
End: 2021-06-07
Payer: MEDICAID

## 2021-06-07 DIAGNOSIS — E78.00 PURE HYPERCHOLESTEROLEMIA: ICD-10-CM

## 2021-06-07 DIAGNOSIS — I10 ESSENTIAL HYPERTENSION, BENIGN: ICD-10-CM

## 2021-06-07 DIAGNOSIS — E10.9 TYPE 1 DIABETES MELLITUS WITHOUT COMPLICATION (HCC): Primary | ICD-10-CM

## 2021-06-07 DIAGNOSIS — E66.01 MORBID OBESITY WITH BMI OF 40.0-44.9, ADULT (HCC): ICD-10-CM

## 2021-06-07 PROCEDURE — 3051F HG A1C>EQUAL 7.0%<8.0%: CPT | Performed by: FAMILY MEDICINE

## 2021-06-07 PROCEDURE — 99214 OFFICE O/P EST MOD 30 MIN: CPT | Performed by: FAMILY MEDICINE

## 2021-06-07 RX ORDER — HYDROCHLOROTHIAZIDE 12.5 MG/1
12.5 TABLET ORAL DAILY
Qty: 90 TABLET | Refills: 2 | Status: SHIPPED | OUTPATIENT
Start: 2021-06-07 | End: 2022-02-23

## 2021-06-07 NOTE — TELEPHONE ENCOUNTER
Please call PCP's office at [phone]. I received a perfect serve message asking for lab information on this patient. Thanks.

## 2021-06-07 NOTE — TELEPHONE ENCOUNTER
I spoke to [facility] (nurse) for Dr Yamel Camara and she stated they needed a copy of Ms. Natasha Kendall recent labs. I informed her that we are both [facility] and her results are posted in her chart. She stated that she did not see the results. I asked her to go to her lab results page and I  guided her to where Ms Natasha Kendall results were that was dated for 4/19/21. Mirna voiced okay and no other request was offered at this time.

## 2021-06-07 NOTE — PROGRESS NOTES
Identified pt with two pt identifiers(name and ). Reviewed record in preparation for visit and have obtained necessary documentation. Chief Complaint   Patient presents with    Medication Refill    Other     requesting for lab & shot records         Health Maintenance Due   Topic    PAP AKA CERVICAL CYTOLOGY     Eye Exam Retinal or Dilated     COVID-19 Vaccine (2 - Moderna 2-dose series)       Coordination of Care Questionnaire:  :   1) Have you been to an emergency room, urgent care, or hospitalized since your last visit? If yes, where when, and reason for visit? no      2. Have seen or consulted any other health care provider since your last visit? If yes, where when, and reason for visit?  no        Patient is accompanied by self I have received verbal consent from Ramy Willingham to discuss any/all medical information while they are present in the room.

## 2021-06-07 NOTE — PROGRESS NOTES
Yeison Sandoval is a 43 y.o. female who was seen by synchronous (real-time) audio-video technology on 6/7/2021 for Medication Refill and Other (requesting for lab & shot records )    She went to get labs at [address] [place] [address] on 4/27 but not showing up in chart yet  Her endocrine doc discussed them and the a1c was 7.4, which is the best she has ever had  She is now on the insulin pump   her basal rate 22.8 per day  She is exercising  But not consistent   her last weight 224 this past weekend          Assessment & Plan:   Diagnoses and all orders for this visit:    1. Type 1 diabetes mellitus without complication (HCC)  We discussed monitoring the pump alerts more and the need for routine as far as the diet so the pump can be set and stabilize the blood sugar to avoid drops  2. Essential hypertension, benign  -     hydroCHLOROthiazide (HYDRODIURIL) 12.5 mg tablet; Take 1 Tablet by mouth daily. Trying to get better control of the bp  3. Pure hypercholesterolemia  She will work on getting me the blood work done recently by jhony  4. Morbid obesity with BMI of 40.0-44.9, adult ([facility] [address]    This will be addressed separately in bariatric medicine practice        Subjective:       Prior to Admission medications    Medication Sig Start Date End Date Taking? Authorizing Provider   hydroCHLOROthiazide (HYDRODIURIL) 12.5 mg tablet Take 1 Tablet by mouth daily. 6/7/21  Yes Oneal Duane, MD   INTRAUTERINE DEVICE, IUD, IU by IntraUTERine route. Yes Provider, Historical   cholecalciferol (VITAMIN D3) (5000 Units/125 mcg) tab tablet Take  by mouth daily. Yes Provider, Historical   blood-glucose sensor (DEXCOM G6 SENSOR) by Does Not Apply route. Yes Provider, Historical   blood-glucose transmitter (DEXCOM G6 TRANSMITTER) by Does Not Apply route.    Yes Provider, Historical   BD Zandra 2nd Gen Pen Needle 32 gauge x 5/32\" ndle USE AS DIRECTED TO INJECT INSULIN 4 TIMES DAILY 3/12/21  Yes Bruno Braswell MD   lisinopriL (Raymond Alar) 40 mg tablet TAKE 1 TAB BY MOUTH ONCE DAILY 3/5/21  Yes MD Kendra LimonStyle Mitchell 14 Day Sensor kit USE AS DIRECTED EVERY 14 DAYS 3/5/21  Yes Ailyn Mares MD   subcutaneous insulin pump (OMNIPOD INSULIN MANAGEMENT) by Does Not Apply route. Yes Provider, Historical   insulin aspart U-100 (NovoLOG U-100 Insulin aspart) 100 unit/mL injection Use as directed in insulin pump. Max 100 units/day. 2/10/21  Yes Ailyn Mares MD   Insulin Syringe-Needle U-100 1 mL 31 gauge x 5/16 syrg Use as directed three times daily 2/10/21  Yes Ailyn Mares MD   True Metrix Glucose Test Strip strip Use to check blood sugar four times a day. DX E10.65 2/9/21  Yes Ailyn Mares MD   buPROPion SR [facility] SR) 150 mg SR tablet Take 1 Tab by mouth two (2) times a day. 1/27/21  Yes Tim Lau MD   OTHER HAS A PARAGUARD BIRTH CONTROL   Yes Provider, Historical   cloNIDine HCL (CATAPRES) 0.1 mg tablet Take 1 Tab by mouth as needed. BP over 180/90  Patient not taking: Reported on 6/8/2021 6/26/20  Yes Provider, Historical   naproxen (NAPROSYN) 375 mg tablet Take 1 Tab by mouth two (2) times daily (with meals). Patient taking differently: Take 375 mg by mouth as needed. 6/29/20  Yes Maurice Ding NP   Blood-Glucose Meter monitoring kit Check blood sugar four times a day 12/20/19  Yes Tim Lau MD   glucose blood VI test strips (BLOOD GLUCOSE TEST) strip Use to check blood sugar four times a day. 12/20/19  Yes Tim Lau MD   lancets misc Use to check blood sugar four times a day. 12/20/19  Yes Tim Lau MD   FREESTYLE PRECISION KIRK STRIPS strip Use as needed up to twice daily with [facility] reader to check blood sugar when having having problems with the [employer] sensors 9/27/19  Yes Ailyn Mares MD   [facility] ULTRA BLUE TEST STRIP strip Test 4 times daily 9/16/19  Yes Ailyn Mares MD   cetirizine (ZYRTEC) 10 mg tablet Take 10 mg by mouth as needed.    Yes Provider, Historical   FREESTYLE NASREEN 15 DAY READER misc Use as directed 3/19/19  Yes Marina Ames MD     Patient Active Problem List    Diagnosis Date Noted    Pregnancy induced hypertension 02/20/2019    Pregnancy induced hypertension, third trimester 01/29/2019    History of breast cancer 08/29/2018    Morbid obesity with BMI of 40.0-44.9, adult ([facility] [address] 09/22/2017    Wound check, abscess 09/22/2017    Type 1 diabetes mellitus without complication ([facility] [address] 53/53/9568    Ne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 ([facility] [address]     Cardiomyopathy due to chemotherapy ([facility] [address]     Vitamin D deficiency 11/07/2011    Abnormal thyroid blood test 11/07/2011    Essential hypertension, benign 06/25/2010    Encounter for long-term (current) use of other medications 06/25/2010    Encounter for long-term (current) use of insulin ([facility] [address] 06/25/2010    Uncontrolled type 1 diabetes mellitus (HCC)      Current Outpatient Medications   Medication Sig Dispense Refill    hydroCHLOROthiazide (HYDRODIURIL) 12.5 mg tablet Take 1 Tablet by mouth daily. 90 Tablet 2    INTRAUTERINE DEVICE, IUD, IU by IntraUTERine route.  cholecalciferol (VITAMIN D3) (5000 Units/125 mcg) tab tablet Take  by mouth daily.  blood-glucose sensor (DEXCOM G6 SENSOR) by Does Not Apply route.  blood-glucose transmitter (DEXCOM G6 TRANSMITTER) by Does Not Apply route.  BD Zandra 2nd Gen Pen Needle 32 gauge x 5/32\" ndle USE AS DIRECTED TO INJECT INSULIN 4 TIMES DAILY 400 Pen Needle 3    lisinopriL (PRINIVIL, ZESTRIL) 40 mg tablet TAKE 1 TAB BY MOUTH ONCE DAILY 90 Tab 3    FreeStyle Nasreen 14 Day Sensor kit USE AS DIRECTED EVERY 14 DAYS 6 Kit 3    subcutaneous insulin pump (OMNIPOD INSULIN MANAGEMENT) by Does Not Apply route.  insulin aspart U-100 (NovoLOG U-100 Insulin aspart) 100 unit/mL injection Use as directed in insulin pump. Max 100 units/day.  30 mL 11    Insulin Syringe-Needle U-100 1 mL 31 gauge x 5/16 syrg Use as directed three times daily 100 Syringe 11    True Metrix Glucose Test Strip strip Use to check blood sugar four times a day. DX E10.65 400 Strip 3    buPROPion SR (WELLBUTRIN SR) 150 mg SR tablet Take 1 Tab by mouth two (2) times a day. 60 Tab 2    OTHER HAS A PARAGUARD BIRTH CONTROL      cloNIDine HCL (CATAPRES) 0.1 mg tablet Take 1 Tab by mouth as needed. BP over 180/90 (Patient not taking: Reported on 6/8/2021)      naproxen (NAPROSYN) 375 mg tablet Take 1 Tab by mouth two (2) times daily (with meals). (Patient taking differently: Take 375 mg by mouth as needed.) 60 Tab 3    Blood-Glucose Meter monitoring kit Check blood sugar four times a day 1 Kit 0    glucose blood VI test strips (BLOOD GLUCOSE TEST) strip Use to check blood sugar four times a day. 400 Strip 3    lancets misc Use to check blood sugar four times a day. 400 Each 3    FREESTYLE PRECISION KIRK STRIPS strip Use as needed up to twice daily with [facility] reader to check blood sugar when having having problems with the [facility] sensors 50 Strip 11    ONETOUCH ULTRA BLUE TEST STRIP strip Test 4 times daily 400 Strip 3    cetirizine (ZYRTEC) 10 mg tablet Take 10 mg by mouth as needed.       FREESTYLE NASREEN 14 DAY READER misc Use as directed 1 Each 0       ROS    Objective:     Patient-Reported Vitals 6/7/2021   Patient-Reported Weight 224lb   Patient-Reported Height -   Patient-Reported Pulse -   Patient-Reported Systolic  -   Patient-Reported Diastolic -        [INSTRUCTIONS:  \"[x]\" Indicates a positive item  \"[]\" Indicates a negative item  -- DELETE ALL ITEMS NOT EXAMINED]    Constitutional: [x] Appears well-developed and well-nourished [x] No apparent distress      [] Abnormal -     Mental status: [x] Alert and awake  [x] Oriented to person/place/time [x] Able to follow commands    [] Abnormal -     Eyes:   EOM    [x]  Normal    [] Abnormal -   Sclera  [x]  Normal    [] Abnormal -          Discharge [x]  None visible   [] Abnormal -     HENT: [x] Normocephalic, atraumatic  [] Abnormal -   [x] Mouth/Throat: Mucous membranes are moist    External Ears [x] Normal  [] Abnormal -    Neck: [x] No visualized mass [] Abnormal -     Pulmonary/Chest: [x] Respiratory effort normal   [x] No visualized signs of difficulty breathing or respiratory distress        [] Abnormal -      Musculoskeletal:   [x] Normal gait with no signs of ataxia         [x] Normal range of motion of neck        [] Abnormal -     Neurological:        [x] No Facial Asymmetry (Cranial nerve 7 motor function) (limited exam due to video visit)          [x] No gaze palsy        [] Abnormal -          Skin:        [x] No significant exanthematous lesions or discoloration noted on facial skin         [] Abnormal -            Psychiatric:       [x] Normal Affect [] Abnormal -        [x] No Hallucinations    Other pertinent observable physical exam findings:-        We discussed the expected course, resolution and complications of the diagnosis(es) in detail. Medication risks, benefits, costs, interactions, and alternatives were discussed as indicated. I advised her to contact the office if her condition worsens, changes or fails to improve as anticipated. She expressed understanding with the diagnosis(es) and plan. Jaquelin Barboza, was evaluated through a synchronous (real-time) audio-video encounter. The patient (or guardian if applicable) is aware that this is a billable service. Verbal consent to proceed has been obtained within the past 12 months. The visit was conducted pursuant to the emergency declaration under the [facility] [place], [address] [place] [address] authority and the [employer] General Act. Patient identification was verified, and a caregiver was present when appropriate. The patient was located in a state where the provider was credentialed to provide care.       Dorian Hugo MD

## 2021-06-08 ENCOUNTER — OFFICE VISIT (OUTPATIENT)
Dept: SURGERY | Age: 36
End: 2021-06-08
Payer: MEDICAID

## 2021-06-08 VITALS
DIASTOLIC BLOOD PRESSURE: 80 MMHG | RESPIRATION RATE: 16 BRPM | BODY MASS INDEX: 40.41 KG/M2 | HEIGHT: 63 IN | HEART RATE: 69 BPM | TEMPERATURE: 98.3 F | SYSTOLIC BLOOD PRESSURE: 126 MMHG | OXYGEN SATURATION: 100 % | WEIGHT: 228.1 LBS

## 2021-06-08 DIAGNOSIS — E66.01 MORBID OBESITY WITH BMI OF 40.0-44.9, ADULT (HCC): Primary | ICD-10-CM

## 2021-06-08 DIAGNOSIS — E10.9 TYPE 1 DIABETES MELLITUS WITHOUT COMPLICATION (HCC): ICD-10-CM

## 2021-06-08 DIAGNOSIS — I10 ESSENTIAL HYPERTENSION, BENIGN: ICD-10-CM

## 2021-06-08 DIAGNOSIS — E55.9 VITAMIN D DEFICIENCY: ICD-10-CM

## 2021-06-08 PROCEDURE — 3051F HG A1C>EQUAL 7.0%<8.0%: CPT | Performed by: FAMILY MEDICINE

## 2021-06-08 PROCEDURE — 99214 OFFICE O/P EST MOD 30 MIN: CPT | Performed by: FAMILY MEDICINE

## 2021-06-08 NOTE — PROGRESS NOTES
[facility] Progress Note:   F/up Physician Visit  NEW [facility]  [facility]  CC: Weight Management      Yeison Sandoval is a 43 y.o. female who is here for her  f/up physician visit for the VLCD / LCD Program.    Weight Metrics 6/8/2021 6/8/2021 2/17/2021 11/25/2020 9/10/2020 5/12/2020 3/13/2020   Weight - 228 lb 1.6 oz 217 lb 219 lb 218 lb 6.4 oz 218 lb 224 lb 8 oz   Neck Circ (inches) 14 - - - - - -   Waist Measure Inches 41 - - - - 41 -   Body Fat % 42.9 - - - - - -   BMI - 40.41 kg/m2 38.44 kg/m2 38.79 kg/m2 38.69 kg/m2 38.62 kg/m2 39.77 kg/m2   gained 11 lbs since February  She has type 1 DM, and has a failrly new insulin pump  Blood sugar 166 yesterday  Woke this morning was 62  She struggles w a lot of bouncing around of the blood sugar. She admits to noncompliance with her low carb diet which makes the insulin pump give doses that then lead t lows on the days she exercises and is compliant w diet          Outpatient Medications Marked as Taking for the 6/8/21 encounter (Office Visit) with Oneal Duane, MD   Medication Sig Dispense Refill    hydroCHLOROthiazide (HYDRODIURIL) 12.5 mg tablet Take 1 Tablet by mouth daily. 90 Tablet 2    INTRAUTERINE DEVICE, IUD, IU by IntraUTERine route.  cholecalciferol (VITAMIN D3) (5000 Units/125 mcg) tab tablet Take  by mouth daily.  lisinopriL (PRINIVIL, ZESTRIL) 40 mg tablet TAKE 1 TAB BY MOUTH ONCE DAILY 90 Tab 3    FreeStyle Mitchell 14 Day Sensor kit USE AS DIRECTED EVERY 14 DAYS 6 Kit 3    subcutaneous insulin pump (OMNIPOD INSULIN MANAGEMENT) by Does Not Apply route.  insulin aspart U-100 (NovoLOG U-100 Insulin aspart) 100 unit/mL injection Use as directed in insulin pump. Max 100 units/day. 30 mL 11    Insulin Syringe-Needle U-100 1 mL 31 gauge x 5/16 syrg Use as directed three times daily 100 Syringe 11    True Metrix Glucose Test Strip strip Use to check blood sugar four times a day.   DX E10.65 400 Strip 3    buPROPion SR [facility] SR) 150 mg SR tablet Take 1 Tab by mouth two (2) times a day. 60 Tab 2    OTHER HAS A PARAGUARD BIRTH CONTROL      naproxen (NAPROSYN) 375 mg tablet Take 1 Tab by mouth two (2) times daily (with meals). (Patient taking differently: Take 375 mg by mouth as needed.) 60 Tab 3    glucose blood VI test strips (BLOOD GLUCOSE TEST) strip Use to check blood sugar four times a day. 400 Strip 3    lancets misc Use to check blood sugar four times a day. 400 Each 3    FREESTYLE PRECISION KIRK STRIPS strip Use as needed up to twice daily with [facility] reader to check blood sugar when having having problems with the [facility] sensors 50 Strip 11    ONETOUCH ULTRA BLUE TEST STRIP strip Test 4 times daily 400 Strip 3    cetirizine (ZYRTEC) 10 mg tablet Take 10 mg by mouth as needed.  FREESTYLE NASREEN 14 DAY READER misc Use as directed 1 Each 0         Participation   Did you attend clinic and class last week? no    Review of Systems  Since your last visit, have you experienced any complications? no  If yes, please list:     Are you taking an appetite suppressant? yes  If so, is there any Chest Pain, Palpitations or Dizziness? BP Readings from Last 3 Encounters:   06/08/21 126/80   02/17/21 124/80   11/25/20 134/84       SLEEP:    Have you received any other medical care this week? no  If yes, where and for what? Have you discontinued or changed any medicine or dose of your medicine since your last visit with Dr Sabas Martinez? no  If yes, where and for what? Diet  How many ounces of calorie-free liquids did you consume each day? Not much    How many meal replacements did you take each day? Not consistent    Did you have any problems adhering to the program?  yes If yes, please explain:      Exercise  Aerobic exercise: TM 30 min 2-3  Resistance exercise: 0 workouts / week  Any discomfort?  no     If yes, where?       Objective  Visit Vitals  /80   Pulse 69   Temp 98.3 °F (36.8 °C) (Oral)   Resp 16   Ht 5' 3\" (1.6 m)   Wt 228 lb 1.6 oz (103.5 kg)   SpO2 100%   BMI 40.41 kg/m²     No LMP recorded. Physical Exam  Appearance: well,   Mental:A&O x 3, NAD  H:NC/AT,  EENT:   EOMI, PERRL, No scleral icterus  Neck: no bruit or JVD  Lung: clear, No W/R  ABD: soft, active, nontender  Ext:  no Edema  Neuro: nonfocal  Assessment / Plan    Encounter Diagnoses   Name Primary?  Morbid obesity with BMI of 40.0-44.9, adult ([facility] [address] Yes    Type 1 diabetes mellitus without complication ([facility] [address]     Vitamin D deficiency     Essential hypertension, benign      Diagnoses and all orders for this visit:    1. Morbid obesity with BMI of 40.0-44.9, adult ([address]  Need to better manage eating in order to better control her weight and blood sugar  This will also decrease her risk of recurrent breast cancer  2. Type 1 diabetes mellitus without complication ([facility] [address]  I explained she needs to cut the insulin in half when she starts the VLCD and then can increase a bit if needed. I hope to prevent dangerous lows. The pump will alarm and help prevent   3. Vitamin D deficiency  Will continue to monitor and treat  4. Essential hypertension, benign  Cont the same meds    1. Weight management poorly controlled   Progress was reviewed with patient    2. Labs    Latest results reviewed with patient          The primary encounter diagnosis was Morbid obesity with BMI of 40.0-44.9, adult ([address] Diagnoses of Type 1 diabetes mellitus without complication ([address] Vitamin D deficiency, and Essential hypertension, benign were also pertinent to this visit.

## 2021-06-08 NOTE — LETTER
NOTIFICATION RETURN TO WORK / SCHOOL 
 
6/8/2021 11:42 AM 
 
Ms. Stevie Haley [address] [address] To Whom It May Concern: 
 
[facility] the [facility] Please excuse Monday June 14 to return Nory 15, 2021 If there are questions or concerns please have the patient contact our office.  
 
 
 
Sincerely, 
 
 
Jennifer Ha MD

## 2021-06-14 ENCOUNTER — CLINICAL SUPPORT (OUTPATIENT)
Dept: SURGERY | Age: 36
End: 2021-06-14

## 2021-06-14 VITALS
HEART RATE: 70 BPM | DIASTOLIC BLOOD PRESSURE: 78 MMHG | HEIGHT: 63 IN | SYSTOLIC BLOOD PRESSURE: 120 MMHG | BODY MASS INDEX: 38.45 KG/M2 | WEIGHT: 217 LBS

## 2021-06-14 DIAGNOSIS — E66.01 MORBID OBESITY WITH BMI OF 40.0-44.9, ADULT (HCC): Primary | ICD-10-CM

## 2021-06-14 DIAGNOSIS — I10 ESSENTIAL HYPERTENSION, BENIGN: ICD-10-CM

## 2021-06-14 DIAGNOSIS — E55.9 VITAMIN D DEFICIENCY: ICD-10-CM

## 2021-06-14 DIAGNOSIS — E10.9 TYPE 1 DIABETES MELLITUS WITHOUT COMPLICATION (HCC): ICD-10-CM

## 2021-06-14 NOTE — PROGRESS NOTES
Progress Note: Weekly Education Class in the [facility] Program      # of weeks enrolled in Reducing Phase 1    1) Patient is on VLCD [x] or LCD []    Did patient have any new symptoms or physical problems? Yes [x]   or  No []    If yes, check & comment: weakness [x], fatigue [x], lightheadedness [], headache [], cramps [], cold intolerance [], hair loss [], diarrhea [], constipation [] or other:                                Has patient been to an ER or Urgent Care? Yes []  or No [x]    If yes, why:                                         2) Number of meal replacements consumed? 20    3) How many ounces of water did patient consume (not including shakes)? 150    Did patient have any problems adhering to the diet? Yes [] or No []    If yes, describe situation: 3 pieces of grilled strips                                                       4) How has patient mood overall been this week?  Sad [], Happy [x], Stressed [], Tired [], other            5) Physical Activity Over the Past Week:    Aerobic exercise:  min  Resistance exercise:  workouts / week

## 2021-06-15 ENCOUNTER — TELEPHONE (OUTPATIENT)
Dept: FAMILY MEDICINE CLINIC | Age: 36
End: 2021-06-15

## 2021-06-15 NOTE — TELEPHONE ENCOUNTER
----- Message from Trini Thomas sent at 6/15/2021  9:47 AM EDT -----  Regarding: Dr. Monika Barrett Message/Vendor Calls    Caller's first and last name: n/a      Reason for call: Needs a copy of recent physical      Callback required yes/no and why: yes      Best contact number(s): [phone]      Details to clarify the request: n/a      Trini Thomas

## 2021-06-16 NOTE — TELEPHONE ENCOUNTER
Two patient Identification confirmed. Spoke with patient about lab physical.  Last physical sent via fax as requested by patient.     Fax#: [phone]    File: [id number]

## 2021-06-28 ENCOUNTER — CLINICAL SUPPORT (OUTPATIENT)
Dept: SURGERY | Age: 36
End: 2021-06-28

## 2021-06-28 VITALS
HEART RATE: 77 BPM | BODY MASS INDEX: 38.54 KG/M2 | WEIGHT: 217.5 LBS | HEIGHT: 63 IN | SYSTOLIC BLOOD PRESSURE: 146 MMHG | DIASTOLIC BLOOD PRESSURE: 84 MMHG

## 2021-06-28 DIAGNOSIS — E10.9 TYPE 1 DIABETES MELLITUS WITHOUT COMPLICATION (HCC): ICD-10-CM

## 2021-06-28 DIAGNOSIS — I10 ESSENTIAL HYPERTENSION, BENIGN: ICD-10-CM

## 2021-06-28 DIAGNOSIS — E66.01 MORBID OBESITY WITH BMI OF 40.0-44.9, ADULT (HCC): Primary | ICD-10-CM

## 2021-06-28 NOTE — PROGRESS NOTES
Progress Note: Weekly Education Class in the [facility] Loss Program     # of weeks enrolled in Reducing Phase 2    1) Patient is on VLCD [x] or LCD []    Did patient have any new symptoms or physical problems? Yes [x]   or  No []    If yes, check & comment: weakness [], fatigue [x], lightheadedness [], headache [x], cramps [], cold intolerance [], hair loss [], diarrhea [], constipation [x] or other:                                 Has patient been to an ER or Urgent Care? Yes []  or No [x]    If yes, why:                                        2) Number of meal replacements consumed? 26    3) How many ounces of water did patient consume (not including shakes)? 280    Did patient have any problems adhering to the diet? Yes [x] or No []    If yes, describe situation: low blood sugar, had to eat meal and not product                                                       4) How has patient mood overall been this week? Sad [], Happy [], Stressed [], Tired [x], other happy and sad           5) Physical Activity Over the Past Week:    Aerobic exercise: 0 min  Resistance exercise: 0 workouts / week          Progress Note: Weekly Education Class in the [facility] Loss Program     # of weeks enrolled in Reducing Phase  3    1) Patient is on VLCD [x] or LCD []    Did patient have any new symptoms or physical problems? Yes [x]   or  No []    If yes, check & comment: weakness [], fatigue [x], lightheadedness [], headache [x], cramps [], cold intolerance [], hair loss [], diarrhea [], constipation [x] or other:                                 Has patient been to an ER or Urgent Care? Yes []  or No [x]    If yes, why:                                       2) Number of meal replacements consumed? 15    3) How many ounces of water did patient consume (not including shakes)? 280    Did patient have any problems adhering to the diet?  Yes [x] or No []    If yes, describe situation: on vacation at the [place]) How has patient mood overall been this week?  Sad [], Happy [x], Stressed [], Tired [], other            5) Physical Activity Over the Past Week:    Aerobic exercise: 0 min  Resistance exercise: 0 workouts / week

## 2021-07-01 NOTE — PROGRESS NOTES
Nurse note from patient's weekly VLCD / LCD / Maintenance class was reviewed. Pertinent medical concerns were:      Doing well  BP Readings from Last 3 Encounters:   06/28/21 (!) 146/84   06/14/21 120/78   06/08/21 126/80       Weight Metrics 6/28/2021 6/14/2021 6/8/2021 6/8/2021 2/17/2021 11/25/2020 9/10/2020   Weight 217 lb 8 oz 217 lb - 228 lb 1.6 oz 217 lb 219 lb 218 lb 6.4 oz   Neck Circ (inches) - - 14 - - - -   Waist Measure Inches - - 41 - - - -   Body Fat % - - 42.9 - - - -   BMI 38.53 kg/m2 38.44 kg/m2 - 40.41 kg/m2 38.44 kg/m2 38.79 kg/m2 38.69 kg/m2       Current Outpatient Medications   Medication Sig Dispense Refill    hydroCHLOROthiazide (HYDRODIURIL) 12.5 mg tablet Take 1 Tablet by mouth daily. 90 Tablet 2    INTRAUTERINE DEVICE, IUD, IU by IntraUTERine route.  cholecalciferol (VITAMIN D3) (5000 Units/125 mcg) tab tablet Take  by mouth daily.  blood-glucose sensor (DEXCOM G6 SENSOR) by Does Not Apply route.  blood-glucose transmitter (DEXCOM G6 TRANSMITTER) by Does Not Apply route.  BD Zandra 2nd Gen Pen Needle 32 gauge x 5/32\" ndle USE AS DIRECTED TO INJECT INSULIN 4 TIMES DAILY 400 Pen Needle 3    lisinopriL (PRINIVIL, ZESTRIL) 40 mg tablet TAKE 1 TAB BY MOUTH ONCE DAILY 90 Tab 3    FreeStyle Mitchell 14 Day Sensor kit USE AS DIRECTED EVERY 14 DAYS 6 Kit 3    subcutaneous insulin pump (OMNIPOD INSULIN MANAGEMENT) by Does Not Apply route.  insulin aspart U-100 (NovoLOG U-100 Insulin aspart) 100 unit/mL injection Use as directed in insulin pump. Max 100 units/day. 30 mL 11    Insulin Syringe-Needle U-100 1 mL 31 gauge x 5/16 syrg Use as directed three times daily 100 Syringe 11    True Metrix Glucose Test Strip strip Use to check blood sugar four times a day. DX E10.65 400 Strip 3    buPROPion SR (WELLBUTRIN SR) 150 mg SR tablet Take 1 Tab by mouth two (2) times a day.  60 Tab 2    OTHER HAS A PARAGUARD BIRTH CONTROL      cloNIDine HCL (CATAPRES) 0.1 mg tablet Take 1 Tab by mouth as needed. BP over 180/90 (Patient not taking: Reported on 6/8/2021)      naproxen (NAPROSYN) 375 mg tablet Take 1 Tab by mouth two (2) times daily (with meals). (Patient taking differently: Take 375 mg by mouth as needed.) 60 Tab 3    Blood-Glucose Meter monitoring kit Check blood sugar four times a day 1 Kit 0    glucose blood VI test strips (BLOOD GLUCOSE TEST) strip Use to check blood sugar four times a day. 400 Strip 3    lancets misc Use to check blood sugar four times a day. 400 Each 3    FREESTYLE PRECISION KIRK STRIPS strip Use as needed up to twice daily with [facility] reader to check blood sugar when having having problems with the [facility] sensors 50 Strip 11    ONETOUCH ULTRA BLUE TEST STRIP strip Test 4 times daily 400 Strip 3    cetirizine (ZYRTEC) 10 mg tablet Take 10 mg by mouth as needed.       FREESTYLE NASREEN 14 DAY READER misc Use as directed 1 Each 0

## 2021-07-05 ENCOUNTER — TELEPHONE (OUTPATIENT)
Dept: FAMILY MEDICINE CLINIC | Age: 36
End: 2021-07-05

## 2021-07-05 NOTE — TELEPHONE ENCOUNTER
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 If she has not had one she needs an appt to get one.       Callback required yes/no and why: Yes      Best contact number(s):[phone]       Details to clarify the request: N/A      Rafael Days

## 2021-07-07 ENCOUNTER — TELEPHONE (OUTPATIENT)
Dept: FAMILY MEDICINE CLINIC | Age: 36
End: 2021-07-07

## 2021-07-07 NOTE — TELEPHONE ENCOUNTER
Please advise    The only order I see if from another provider      ----- Message from Devendra Benjamin sent at 7/7/2021  9:48 AM EDT -----  Regarding: Dr. Vicenta Soriano: [phone]  General Message/Vendor Calls    Caller's first and last name: Pt.       Reason for call: Needs order for blood work, needs to schedule. Called last week, has not heard back. Blood work is time sensitive. Callback required yes/no and why: Yes, needs to schedule lab work. Best contact number(s): [phone]       Details to clarify the request: N/a.       Devendra Benjamin

## 2021-07-08 ENCOUNTER — TELEPHONE (OUTPATIENT)
Dept: FAMILY MEDICINE CLINIC | Age: 36
End: 2021-07-08

## 2021-07-08 NOTE — TELEPHONE ENCOUNTER
Please advise     Pt has appt scheduled on 7/12/21 at [facility] for an nurse visit and than a VV on 7/14/21 at [facility]    ----- Message from Idell Burkitt sent at 7/8/2021 12:44 PM EDT -----  Regarding: Dr. 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 PT also states you may also find shot records under [facility] or [address] [facility].       Idell Burkitt

## 2021-07-08 NOTE — PROGRESS NOTES
Nurse note from patient's weekly VLCD / LCD / Maintenance class was reviewed. Pertinent medical concerns were:      bp borderline  Recheck in 1 week    BP Readings from Last 3 Encounters:   06/28/21 (!) 146/84   06/14/21 120/78   06/08/21 126/80       Weight Metrics 6/28/2021 6/14/2021 6/8/2021 6/8/2021 2/17/2021 11/25/2020 9/10/2020   Weight 217 lb 8 oz 217 lb - 228 lb 1.6 oz 217 lb 219 lb 218 lb 6.4 oz   Neck Circ (inches) - - 14 - - - -   Waist Measure Inches - - 41 - - - -   Body Fat % - - 42.9 - - - -   BMI 38.53 kg/m2 38.44 kg/m2 - 40.41 kg/m2 38.44 kg/m2 38.79 kg/m2 38.69 kg/m2       Current Outpatient Medications   Medication Sig Dispense Refill    hydroCHLOROthiazide (HYDRODIURIL) 12.5 mg tablet Take 1 Tablet by mouth daily. 90 Tablet 2    INTRAUTERINE DEVICE, IUD, IU by IntraUTERine route.  cholecalciferol (VITAMIN D3) (5000 Units/125 mcg) tab tablet Take  by mouth daily.  blood-glucose sensor (DEXCOM G6 SENSOR) by Does Not Apply route.  blood-glucose transmitter (DEXCOM G6 TRANSMITTER) by Does Not Apply route.  BD Zandra 2nd Gen Pen Needle 32 gauge x 5/32\" ndle USE AS DIRECTED TO INJECT INSULIN 4 TIMES DAILY 400 Pen Needle 3    lisinopriL (PRINIVIL, ZESTRIL) 40 mg tablet TAKE 1 TAB BY MOUTH ONCE DAILY 90 Tab 3    FreeStyle Mitchell 14 Day Sensor kit USE AS DIRECTED EVERY 14 DAYS 6 Kit 3    subcutaneous insulin pump (OMNIPOD INSULIN MANAGEMENT) by Does Not Apply route.  insulin aspart U-100 (NovoLOG U-100 Insulin aspart) 100 unit/mL injection Use as directed in insulin pump. Max 100 units/day. 30 mL 11    Insulin Syringe-Needle U-100 1 mL 31 gauge x 5/16 syrg Use as directed three times daily 100 Syringe 11    True Metrix Glucose Test Strip strip Use to check blood sugar four times a day. DX E10.65 400 Strip 3    buPROPion SR (WELLBUTRIN SR) 150 mg SR tablet Take 1 Tab by mouth two (2) times a day.  60 Tab 2    OTHER HAS A PARAGUARD BIRTH CONTROL      cloNIDine HCL (CATAPRES) 0.1 mg tablet Take 1 Tab by mouth as needed. BP over 180/90 (Patient not taking: Reported on 6/8/2021)      naproxen (NAPROSYN) 375 mg tablet Take 1 Tab by mouth two (2) times daily (with meals). (Patient taking differently: Take 375 mg by mouth as needed.) 60 Tab 3    Blood-Glucose Meter monitoring kit Check blood sugar four times a day 1 Kit 0    glucose blood VI test strips (BLOOD GLUCOSE TEST) strip Use to check blood sugar four times a day. 400 Strip 3    lancets misc Use to check blood sugar four times a day. 400 Each 3    FREESTYLE PRECISION KIRK STRIPS strip Use as needed up to twice daily with [facility] reader to check blood sugar when having having problems with the [facility] sensors 50 Strip 11    ONETOUCH ULTRA BLUE TEST STRIP strip Test 4 times daily 400 Strip 3    cetirizine (ZYRTEC) 10 mg tablet Take 10 mg by mouth as needed.       FREESTYLE NASREEN 14 DAY READER misc Use as directed 1 Each 0

## 2021-07-12 ENCOUNTER — CLINICAL SUPPORT (OUTPATIENT)
Dept: SURGERY | Age: 36
End: 2021-07-12

## 2021-07-12 VITALS
HEIGHT: 63 IN | SYSTOLIC BLOOD PRESSURE: 127 MMHG | TEMPERATURE: 99.2 F | DIASTOLIC BLOOD PRESSURE: 74 MMHG | BODY MASS INDEX: 37.79 KG/M2 | WEIGHT: 213.3 LBS | HEART RATE: 79 BPM | RESPIRATION RATE: 18 BRPM | OXYGEN SATURATION: 96 %

## 2021-07-12 DIAGNOSIS — E55.9 VITAMIN D DEFICIENCY: ICD-10-CM

## 2021-07-12 DIAGNOSIS — E66.01 MORBID OBESITY WITH BMI OF 40.0-44.9, ADULT (HCC): Primary | ICD-10-CM

## 2021-07-12 DIAGNOSIS — I10 ESSENTIAL HYPERTENSION, BENIGN: ICD-10-CM

## 2021-07-12 DIAGNOSIS — E10.9 TYPE 1 DIABETES MELLITUS WITHOUT COMPLICATION (HCC): ICD-10-CM

## 2021-07-12 NOTE — PROGRESS NOTES
Progress Note: Weekly Education Class in the [facility] Weight Loss Program     1) Patient is on Very Low Calorie Diet [] (4 meal replacements per day, 800 kcal/day)      Low Calorie Diet [x] (2-3 meal replacements per day, 6833-5579 kcal/day)    Did patient have any new symptoms or physical problems? Yes [x]   or  No []    If yes, check & comment: weakness [], fatigue [], lightheadedness [], headache [x], cramps [], cold intolerance [], hair loss [], diarrhea [], constipation [],  NA [] other:                                 Has patient had any medical attention from other providers, urgent care or the emergency room this week? Yes []  or No [x]       NA [], If yes, why:                                          2) Number of meal replacements consumed daily? 2-3 (range)  NA []    Did you eat any food outside of the program? Yes [x] No []    3) Average ounces of water patient consumed daily this week (not including shakes)? 35     (divide the weekly total by 7)    Any other sugar sweetened beverages consumed this week? Yes [x]  No []    Did patient have any problems adhering to the diet? Yes [x]  No [] NA []    If yes, Vacation [], Celebrations [], Conferences [], Family Reunions [] other: Ate some chips on Tuesday, had Frappe Wednesday. 4) How has patient mood overall been this week? Sad [], Happy [x], Stressed [], Tired [], Content [], NA [], other            5) Physical Activity Over the Past Week:    Cardio exercise: 116 min  Strength exercise: 4 workouts / week  Number of steps walked per day: N/A      Medications reconciled by nurse Yes [x]  No[]    Patient was given therapeutic recommendations for any noted side effects of their dietary approach based upon [facility] patient manual per providers recommendation.

## 2021-07-12 NOTE — PROGRESS NOTES
Progress Note: Weekly Education Class in the [facility] Weight Loss Program     1) Patient is on Very Low Calorie Diet [] (4 meal replacements per day, 800 kcal/day)      Low Calorie Diet [x] (2-3 meal replacements per day, 0686-3066 kcal/day)    Did patient have any new symptoms or physical problems? Yes [x]   or  No []    If yes, check & comment: weakness [], fatigue [x], lightheadedness [x], headache [x], cramps [], cold intolerance [], hair loss [], diarrhea [], constipation [],  NA [] other:                                 Has patient had any medical attention from other providers, urgent care or the emergency room this week? Yes [x]  or No []       NA [], If yes, why: Dental cleaning                                        2) Number of meal replacements consumed daily? 3 (range)  NA []    Did you eat any food outside of the program? Yes [x] No []    3) Average ounces of water patient consumed daily this week (not including shakes)? 28     (divide the weekly total by 7)    Any other sugar sweetened beverages consumed this week? Yes [x]  No []    Did patient have any problems adhering to the diet? Yes [x]  No [] NA []    If yes, Vacation [], Celebrations [], Conferences [], Family Reunions [] other: this week to preserve product I did 3 products a day and a meal. Meal and vegetables w/ carbs due to triage times not working for me. 4) How has patient mood overall been this week? Sad [], Happy [x], Stressed [], Tired [], Content [], NA [], other            5) Physical Activity Over the Past Week:    Cardio exercise: 90 min  Strength exercise: 2 workouts / week  Number of steps walked per day: N/A      Medications reconciled by nurse Yes [x]  No[]    Patient was given therapeutic recommendations for any noted side effects of their dietary approach based upon [facility] patient manual per providers recommendation.

## 2021-07-19 ENCOUNTER — CLINICAL SUPPORT (OUTPATIENT)
Dept: SURGERY | Age: 36
End: 2021-07-19

## 2021-07-19 VITALS
DIASTOLIC BLOOD PRESSURE: 90 MMHG | HEART RATE: 62 BPM | WEIGHT: 215 LBS | BODY MASS INDEX: 38.09 KG/M2 | SYSTOLIC BLOOD PRESSURE: 150 MMHG

## 2021-07-19 DIAGNOSIS — E10.9 TYPE 1 DIABETES MELLITUS WITHOUT COMPLICATION (HCC): ICD-10-CM

## 2021-07-19 DIAGNOSIS — I10 ESSENTIAL HYPERTENSION, BENIGN: ICD-10-CM

## 2021-07-19 DIAGNOSIS — E66.01 CLASS 2 SEVERE OBESITY DUE TO EXCESS CALORIES WITH SERIOUS COMORBIDITY IN ADULT, UNSPECIFIED BMI (HCC): Primary | ICD-10-CM

## 2021-07-19 NOTE — PROGRESS NOTES
Progress Note: Weekly Education Class in the [facility] Weight Loss Program     1) Patient is on Very Low Calorie Diet [x] (4 meal replacements per day, 800 kcal/day)      Low Calorie Diet [] (2-3 meal replacements per day, 8599-5794 kcal/day)    Did patient have any new symptoms or physical problems? Yes [x]   or  No []    If yes, check & comment: weakness [x], fatigue [x], lightheadedness [], headache [x], cramps [], cold intolerance [], hair loss [], diarrhea [], constipation [],  NA [] other:                                 Has patient had any medical attention from other providers, urgent care or the emergency room this week? Yes []  or No [x]       NA [], If yes, why:                                          2) Number of meal replacements consumed daily? 3-4 (range)  NA []    Did you eat any food outside of the program? Yes [x] No []    3) Average ounces of water patient consumed daily this week (not including shakes)? 20     (divide the weekly total by 7)    Any other sugar sweetened beverages consumed this week? Yes [x]  No []    Did patient have any problems adhering to the diet? Yes [x]  No [] NA []    If yes, Vacation [], Celebrations [x], Conferences [], Family Reunions [] other:                                               4) How has patient mood overall been this week? Sad [], Happy [x], Stressed [], Tired [], Content [], NA [], other            5) Physical Activity Over the Past Week:    Cardio exercise: 90 min  Strength exercise: 3 workouts / week  Number of steps walked per day: n/a    Medications reconciled by nurse Yes [x]  No[]    Patient was given therapeutic recommendations for any noted side effects of their dietary approach based upon [facility] patient manual per providers recommendation.

## 2021-07-20 ENCOUNTER — OFFICE VISIT (OUTPATIENT)
Dept: SURGERY | Age: 36
End: 2021-07-20

## 2021-07-20 DIAGNOSIS — E66.01 CLASS 2 SEVERE OBESITY DUE TO EXCESS CALORIES WITH SERIOUS COMORBIDITY IN ADULT, UNSPECIFIED BMI (HCC): Primary | ICD-10-CM

## 2021-07-21 ENCOUNTER — VIRTUAL VISIT (OUTPATIENT)
Dept: SURGERY | Age: 36
End: 2021-07-21
Payer: MEDICAID

## 2021-07-21 DIAGNOSIS — E66.01 CLASS 2 SEVERE OBESITY DUE TO EXCESS CALORIES WITH SERIOUS COMORBIDITY IN ADULT, UNSPECIFIED BMI (HCC): Primary | ICD-10-CM

## 2021-07-21 DIAGNOSIS — E10.9 TYPE 1 DIABETES MELLITUS WITHOUT COMPLICATION (HCC): ICD-10-CM

## 2021-07-21 DIAGNOSIS — I10 ESSENTIAL HYPERTENSION, BENIGN: ICD-10-CM

## 2021-07-21 DIAGNOSIS — E55.9 VITAMIN D DEFICIENCY: ICD-10-CM

## 2021-07-21 PROCEDURE — 3051F HG A1C>EQUAL 7.0%<8.0%: CPT | Performed by: FAMILY MEDICINE

## 2021-07-21 PROCEDURE — 99214 OFFICE O/P EST MOD 30 MIN: CPT | Performed by: FAMILY MEDICINE

## 2021-07-21 NOTE — PROGRESS NOTES
[facility] Progress Note:   F/up Physician Visit    Maryjane Zaragoza is a 43 y.o. female who was seen by synchronous (real-time) audio-video technology on 7/21/2021. Consent:  She and/or her healthcare decision maker is aware that this patient-initiated Telehealth encounter is a billable service, with coverage as determined by her insurance carrier. She is aware that she may receive a bill and has provided verbal consent to proceed: Yes    I was at home while conducting this encounter. 712  Subjective:   Maryjane Zaragoza was seen for Weight Management  her last weigh in was in pm  Usually weighhs in am  She was up on the last weigh in  Today 209.4  Having low blood sugars in the late part of the day    f/up physician visit for the VLCD / LCD Program.  Prior to Admission medications    Medication Sig Start Date End Date Taking? Authorizing Provider   hydroCHLOROthiazide (HYDRODIURIL) 12.5 mg tablet Take 1 Tablet by mouth daily. 6/7/21  Yes Tommy Fofana MD   INTRAUTERINE DEVICE, IUD, IU by IntraUTERine route. Yes Provider, Historical   cholecalciferol (VITAMIN D3) (5000 Units/125 mcg) tab tablet Take  by mouth daily. Yes Provider, Historical   blood-glucose sensor (DEXCOM G6 SENSOR) by Does Not Apply route. Yes Provider, Historical   blood-glucose transmitter (DEXCOM G6 TRANSMITTER) by Does Not Apply route. Yes Provider, Historical   BD Zandra 2nd Gen Pen Needle 32 gauge x 5/32\" ndle USE AS DIRECTED TO INJECT INSULIN 4 TIMES DAILY 3/12/21  Yes Nik Torres MD   lisinopriL (PRINIVIL, ZESTRIL) 40 mg tablet TAKE 1 TAB BY MOUTH ONCE DAILY 3/5/21  Yes Nik Torres MD   insulin aspart U-100 (NovoLOG U-100 Insulin aspart) 100 unit/mL injection Use as directed in insulin pump. Max 100 units/day.  2/10/21  Yes Nik Torres MD   Insulin Syringe-Needle U-100 1 mL 31 gauge x 5/16 syrg Use as directed three times daily 2/10/21  Yes Nik Torres MD   buPROPion SR [facility] SR) 150 mg SR tablet Take 1 Tab by mouth two (2) times a day. 1/27/21 7/21/21 Yes Otoniel Guerrero MD   OTHER HAS A PARAGUARD BIRTH CONTROL   Yes Provider, Historical   cloNIDine HCL (CATAPRES) 0.1 mg tablet Take 1 Tablet by mouth as needed. BP over 180/90 6/26/20  Yes Provider, Historical   Blood-Glucose Meter monitoring kit Check blood sugar four times a day 12/20/19  Yes Otoniel Guerrero MD   glucose blood VI test strips (BLOOD GLUCOSE TEST) strip Use to check blood sugar four times a day. 12/20/19  Yes Otoniel Guerrero MD   lancets misc Use to check blood sugar four times a day. 12/20/19  Yes Otoniel Guerrero MD   [facility] ULTRA BLUE TEST STRIP strip Test 4 times daily 9/16/19  Yes Jaylon Jasmine MD   cetirizine (ZYRTEC) 10 mg tablet Take 10 mg by mouth as needed. Yes Provider, Historical   buPROPion SR (WELLBUTRIN SR) 150 mg SR tablet TAKE 1 TABLET BY MOUTH TWICE A DAY 7/21/21   Otoniel Guerrero MD   FreeStyle Mitchell 14 Day Sensor kit USE AS DIRECTED EVERY 14 DAYS  Patient not taking: Reported on 7/19/2021 3/5/21   Jaylon Jasmine MD   subcutaneous insulin pump (OMNIPOD INSULIN MANAGEMENT) by Does Not Apply route. Patient not taking: Reported on 7/21/2021    Provider, Historical   True Metrix Glucose Test Strip strip Use to check blood sugar four times a day. DX E10.65  Patient not taking: Reported on 7/21/2021 2/9/21   Jaylon Jasmine MD   naproxen (NAPROSYN) 375 mg tablet Take 1 Tab by mouth two (2) times daily (with meals). Patient taking differently: Take 375 mg by mouth as needed.  6/29/20   Reji Ding, ELMER   FREESTYLE PRECISION KIRK STRIPS strip Use as needed up to twice daily with [employer] reader to check blood sugar when having having problems with the Roanne Erika sensors  Patient not taking: Reported on 7/21/2021 9/27/19   MD DANIELLA GarciaSTYLE MITCHELL 14 DAY READER misc Use as directed  Patient not taking: Reported on 7/19/2021 3/19/19   Jaylon Jasmine MD     Allergies Allergen Reactions    Codeine Nausea and Vomiting       Patient Active Problem List    Diagnosis Date Noted    Pregnancy induced hypertension 02/20/2019    Pregnancy induced hypertension, third trimester 01/29/2019    History of breast cancer 08/29/2018    Morbid obesity with BMI of 40.0-44.9, adult ([facility] 09/22/2017    Wound check, abscess 09/22/2017    Type 1 diabetes mellitus without complication ([facility] 57/20/8814    Ne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 ([facility]     Cardiomyopathy due to chemotherapy ([facility]     Vitamin D deficiency 11/07/2011    Abnormal thyroid blood test 11/07/2011    Essential hypertension, benign 06/25/2010    Encounter for long-term (current) use of other medications 06/25/2010    Encounter for long-term (current) use of insulin ([facility] 06/25/2010    Uncontrolled type 1 diabetes mellitus (HCC)      Current Outpatient Medications   Medication Sig Dispense Refill    hydroCHLOROthiazide (HYDRODIURIL) 12.5 mg tablet Take 1 Tablet by mouth daily. 90 Tablet 2    INTRAUTERINE DEVICE, IUD, IU by IntraUTERine route.  cholecalciferol (VITAMIN D3) (5000 Units/125 mcg) tab tablet Take  by mouth daily.  blood-glucose sensor (DEXCOM G6 SENSOR) by Does Not Apply route.  blood-glucose transmitter (DEXCOM G6 TRANSMITTER) by Does Not Apply route.  BD Zandra 2nd Gen Pen Needle 32 gauge x 5/32\" ndle USE AS DIRECTED TO INJECT INSULIN 4 TIMES DAILY 400 Pen Needle 3    lisinopriL (PRINIVIL, ZESTRIL) 40 mg tablet TAKE 1 TAB BY MOUTH ONCE DAILY 90 Tab 3    insulin aspart U-100 (NovoLOG U-100 Insulin aspart) 100 unit/mL injection Use as directed in insulin pump. Max 100 units/day. 30 mL 11    Insulin Syringe-Needle U-100 1 mL 31 gauge x 5/16 syrg Use as directed three times daily 100 Syringe 11    OTHER HAS A PARAGUARD BIRTH CONTROL      cloNIDine HCL (CATAPRES) 0.1 mg tablet Take 1 Tablet by mouth as needed.  BP over 180/90      Blood-Glucose Meter monitoring kit Check blood sugar four times a day 1 Kit 0    glucose blood VI test strips (BLOOD GLUCOSE TEST) strip Use to check blood sugar four times a day. 400 Strip 3    lancets misc Use to check blood sugar four times a day. 400 Each 3    ONETOUCH ULTRA BLUE TEST STRIP strip Test 4 times daily 400 Strip 3    cetirizine (ZYRTEC) 10 mg tablet Take 10 mg by mouth as needed.  buPROPion SR (WELLBUTRIN SR) 150 mg SR tablet TAKE 1 TABLET BY MOUTH TWICE A  Tablet 1    FreeStyle Mitchell 14 Day Sensor kit USE AS DIRECTED EVERY 14 DAYS (Patient not taking: Reported on 7/19/2021) 6 Kit 3    subcutaneous insulin pump (OMNIPOD INSULIN MANAGEMENT) by Does Not Apply route. (Patient not taking: Reported on 7/21/2021)      True Metrix Glucose Test Strip strip Use to check blood sugar four times a day. DX E10.65 (Patient not taking: Reported on 7/21/2021) 400 Strip 3    naproxen (NAPROSYN) 375 mg tablet Take 1 Tab by mouth two (2) times daily (with meals).  (Patient taking differently: Take 375 mg by mouth as needed.) 60 Tab 3    FREESTYLE PRECISION KIRK STRIPS strip Use as needed up to twice daily with [employer] [facility] [facility] reader to check blood sugar when having having problems with the [place] [employer] sensors (Patient not taking: Reported on 7/21/2021) 50 Strip 11    FREESTYLE MITCHELL 14 DAY READER misc Use as directed (Patient not taking: Reported on 7/19/2021) 1 Each 0       ROS      CC: Weight Management      Macarena Haley is a 43 y.o. female who is here for her      Weight Metrics 7/19/2021 7/12/2021 6/28/2021 6/14/2021 6/8/2021 6/8/2021 2/17/2021   Weight 215 lb 213 lb 4.8 oz 217 lb 8 oz 217 lb - 228 lb 1.6 oz 217 lb   Neck Circ (inches) - - - - 14 - -   Waist Measure Inches - - - - 41 - -   Body Fat % - - - - 42.9 - -   BMI 38.09 kg/m2 37.78 kg/m2 38.53 kg/m2 38.44 kg/m2 - 40.41 kg/m2 38.44 kg/m2         Outpatient Medications Marked as Taking for the 7/21/21 encounter (Virtual Visit) with Vern Veliz MD   Medication Sig Dispense Refill    hydroCHLOROthiazide (HYDRODIURIL) 12.5 mg tablet Take 1 Tablet by mouth daily. 90 Tablet 2    INTRAUTERINE DEVICE, IUD, IU by IntraUTERine route.  cholecalciferol (VITAMIN D3) (5000 Units/125 mcg) tab tablet Take  by mouth daily.  blood-glucose sensor (DEXCOM G6 SENSOR) by Does Not Apply route.  blood-glucose transmitter (DEXCOM G6 TRANSMITTER) by Does Not Apply route.  BD Zandra 2nd Gen Pen Needle 32 gauge x 5/32\" ndle USE AS DIRECTED TO INJECT INSULIN 4 TIMES DAILY 400 Pen Needle 3    lisinopriL (PRINIVIL, ZESTRIL) 40 mg tablet TAKE 1 TAB BY MOUTH ONCE DAILY 90 Tab 3    insulin aspart U-100 (NovoLOG U-100 Insulin aspart) 100 unit/mL injection Use as directed in insulin pump. Max 100 units/day. 30 mL 11    Insulin Syringe-Needle U-100 1 mL 31 gauge x 5/16 syrg Use as directed three times daily 100 Syringe 11    [DISCONTINUED] buPROPion SR (WELLBUTRIN SR) 150 mg SR tablet Take 1 Tab by mouth two (2) times a day. 60 Tab 2    OTHER HAS A PARAGUARD BIRTH CONTROL      cloNIDine HCL (CATAPRES) 0.1 mg tablet Take 1 Tablet by mouth as needed. BP over 180/90      Blood-Glucose Meter monitoring kit Check blood sugar four times a day 1 Kit 0    glucose blood VI test strips (BLOOD GLUCOSE TEST) strip Use to check blood sugar four times a day. 400 Strip 3    lancets misc Use to check blood sugar four times a day. 400 Each 3    ONETOUCH ULTRA BLUE TEST STRIP strip Test 4 times daily 400 Strip 3    cetirizine (ZYRTEC) 10 mg tablet Take 10 mg by mouth as needed. Participation   Did you attend clinic and class last week? yes    Review of Systems  Since your last visit, have you experienced any complications? no  If yes, please list:       Are you taking an appetite suppressant? yes  If so, is there any Chest Pain, Palpitations or Dizziness?     BP Readings from Last 3 Encounters:   07/19/21 (!) 150/90   07/12/21 127/74   06/28/21 (!) 146/84       SLEEP: 6-7    Have you received any other medical care this week? yes  If yes, where and for what? Have you discontinued or changed any medicine or dose of your medicine since your last visit with Dr Otoniel Wing? no  If yes, where and for what? Diet  How many ounces of calorie-free liquids did you consume each day? Not getting 64, not sure how much oz    How many meal replacements did you take each day? Missing some because she did not purchase enough      Did you have any problems adhering to the program?  no If yes, please explain:      Exercise  Aerobic exercise: TM 30 min and then strenth 45 min 3-4 days a week min  Resistance exercise: *see above workouts / week  Any discomfort?  no     If yes, where? Objective  There were no vitals taken for this visit. No LMP recorded. PHYSICAL EXAMINATION:  [ INSTRUCTIONS:  \"[x]\" Indicates a positive item  \"[]\" Indicates a negative item  -- DELETE ALL ITEMS NOT EXAMINED]  Vital Signs: (As obtained by patient/caregiver at home)  There were no vitals taken for this visit.      Constitutional: [x] Appears well-developed and well-nourished [x] No apparent distress      [] Abnormal -     Mental status: [x] Alert and awake  [x] Oriented to person/place/time [x] Able to follow commands    [] Abnormal -     Eyes:   EOM    [x]  Normal    [] Abnormal -   Sclera  [x]  Normal    [] Abnormal -          Discharge [x]  None visible   [] Abnormal -     HENT: [x] Normocephalic, atraumatic  [] Abnormal -   [x] Mouth/Throat: Mucous membranes are moist    External Ears [x] Normal  [] Abnormal -    Neck: [x] No visualized mass [] Abnormal -     Pulmonary/Chest: [x] Respiratory effort normal   [x] No visualized signs of difficulty breathing or respiratory distress        [] Abnormal -      Musculoskeletal:   [x] Normal gait with no signs of ataxia         [x] Normal range of motion of neck        [] Abnormal -     Neurological:        [x] No Facial Asymmetry (Cranial nerve 7 motor function) (limited exam due to video visit)          [x] No gaze palsy        [] Abnormal -          Skin:        [x] No significant exanthematous lesions or discoloration noted on facial skin         [] Abnormal -            Psychiatric:       [x] Normal Affect [] Abnormal -        [x] No Hallucinations    Other pertinent observable physical exam findings:-      Assessment / Plan    Encounter Diagnoses   Name Primary?  Class 2 severe obesity due to excess calories with serious comorbidity in adult, unspecified BMI (HCC) Yes    BMI 40.0-44.9, adult (HCC)     Type 1 diabetes mellitus without complication (HCC)     Essential hypertension, benign     Vitamin D deficiency      Diagnoses and all orders for this visit:    1. Class 2 severe obesity due to excess calories with serious comorbidity in adult, unspecified BMI ([address]    2. BMI 40.0-44.9, adult [facility])  Doing ok, started in June at 228, went down to 213 but is up 2 lbs today  3. Type 1 diabetes mellitus without complication ([address]  Has dropped too low a lot of times. We discussed talking to endocrine about deceasing the basal insulin  4. Essential hypertension, benign  bp not checked today, her cuff not working  5. Vitamin D deficiency  Monitor and treat as indicated    1. Weight management improved   Progress was reviewed with patient    2. Labs    Latest results reviewed with patient          The primary encounter diagnosis was Class 2 severe obesity due to excess calories with serious comorbidity in adult, unspecified BMI ([address] Diagnoses of BMI 40.0-44.9, adult ([address] Type 1 diabetes mellitus without complication ([address] Essential hypertension, benign, and Vitamin D deficiency were also pertinent to this visit. Coding Help - Use CPT Codes 75136-76965, 45512-51352 for Established and New Patients respectively, either employing EM elements or Time rules. Other codes (example consult codes) may also apply.       We discussed the expected course, resolution and complications of the diagnosis(es) in detail. Medication risks, benefits, costs, interactions, and alternatives were discussed as indicated. I advised her to contact the office if her condition worsens, changes or fails to improve as anticipated. She expressed understanding with the diagnosis(es) and plan. Pursuant to the emergency declaration under the [address] [place] [address] waiver authority and the [employer] and [employer] Act, this Virtual  Visit was conducted, with patient's consent, to reduce the patient's risk of exposure to COVID-19 and provide continuity of care for an established patient. Services were provided through a video synchronous discussion virtually to substitute for in-person clinic visit.     Karuna Moya MD

## 2021-07-27 ENCOUNTER — CLINICAL SUPPORT (OUTPATIENT)
Dept: SURGERY | Age: 36
End: 2021-07-27

## 2021-07-27 ENCOUNTER — OFFICE VISIT (OUTPATIENT)
Dept: SURGERY | Age: 36
End: 2021-07-27

## 2021-07-27 DIAGNOSIS — E66.01 CLASS 2 SEVERE OBESITY DUE TO EXCESS CALORIES WITH SERIOUS COMORBIDITY IN ADULT, UNSPECIFIED BMI (HCC): Primary | ICD-10-CM

## 2021-07-27 NOTE — PROGRESS NOTES
Metabolic Program Initial Nutrition Consult    Date: 2021   Physician: Pietro Gtz MD  Name: Juancarlos Canada  :  1985    Type of Plan: VLCD  Weeks on Plan: 1 month  Virtual visit was completed through [employer]. ASSESSMENT:      Medications/Supplements:   Prior to Admission medications    Medication Sig Start Date End Date Taking? Authorizing Provider   buPROPion SR [facility] SR) 150 mg SR tablet TAKE 1 TABLET BY MOUTH TWICE A DAY 21   Adelina Meneses MD   hydroCHLOROthiazide (HYDRODIURIL) 12.5 mg tablet Take 1 Tablet by mouth daily. 21   Adleina Meneses MD   INTRAUTERINE DEVICE, IUD, IU by IntraUTERine route. Provider, Historical   cholecalciferol (VITAMIN D3) (5000 Units/125 mcg) tab tablet Take  by mouth daily. Provider, Historical   blood-glucose sensor (DEXCOM G6 SENSOR) by Does Not Apply route. Provider, Historical   blood-glucose transmitter (DEXCOM G6 TRANSMITTER) by Does Not Apply route. Provider, Historical   BD Zandra 2nd Gen Pen Needle 32 gauge x 5/32\" ndle USE AS DIRECTED TO INJECT INSULIN 4 TIMES DAILY 3/12/21   Sailaja Gamble MD   lisinopriL (PRINIVIL, ZESTRIL) 40 mg tablet TAKE 1 TAB BY MOUTH ONCE DAILY 3/5/21   Sailaja Gamble MD   FreeStyle Mitchell 14 Day Sensor kit USE AS DIRECTED EVERY 14 DAYS  Patient not taking: Reported on 2021 3/5/21   Sailaja Gamble MD   subcutaneous insulin pump (OMNIPOD INSULIN MANAGEMENT) by Does Not Apply route. Patient not taking: Reported on 2021    Provider, Historical   insulin aspart U-100 (NovoLOG U-100 Insulin aspart) 100 unit/mL injection Use as directed in insulin pump. Max 100 units/day. 2/10/21   Sailaja Gamble MD   Insulin Syringe-Needle U-100 1 mL 31 gauge x 5/16 syrg Use as directed three times daily 2/10/21   Sailaja Gamble MD   True Metrix Glucose Test Strip strip Use to check blood sugar four times a day.   DX E10.65  Patient not taking: Reported on 2021   Sailaja Gamble MD   OTHER HAS A PARAARD BIRTH CONTROL    Provider, Historical   cloNIDine HCL (CATAPRES) 0.1 mg tablet Take 1 Tablet by mouth as needed. BP over 180/90 6/26/20   Provider, Historical   naproxen (NAPROSYN) 375 mg tablet Take 1 Tab by mouth two (2) times daily (with meals). Patient taking differently: Take 375 mg by mouth as needed. 6/29/20   Maria D Ding NP   Blood-Glucose Meter monitoring kit Check blood sugar four times a day 12/20/19   Tasneem Giron MD   glucose blood VI test strips (BLOOD GLUCOSE TEST) strip Use to check blood sugar four times a day. 12/20/19   Tasneem Giron MD   lancets misc Use to check blood sugar four times a day. 12/20/19   Tasneem Giron MD   FREESTYLE PRECISION KIRK STRIPS strip Use as needed up to twice daily with [facility] reader to check blood sugar when having having problems with the [facility] sensors  Patient not taking: Reported on 7/21/2021 9/27/19   Shital Moya MD   [facility] ULTRA BLUE TEST STRIP strip Test 4 times daily 9/16/19   Shital Moya MD   cetirizine (ZYRTEC) 10 mg tablet Take 10 mg by mouth as needed. Provider, Historical   FREESTYLE NASREEN 14 DAY READER misc Use as directed  Patient not taking: Reported on 7/19/2021 3/19/19   Shital Moya MD     Reported wt history: Pt presents today for initial nutrition consult for the [employer] [address]      Starting weight: 228#  Current weight: 215#  Goal: under 200#    Exercise/Physical Activity: treadmill IFIT with . 3-4 days/week    Reported Diet History:not successful at VLCD according to patient, having combo of food and shakes every day. Trying to keep food to proteins to prevent eating excessive carbs to assist with better BG control d/t DM Type 1. Today recall thus far: lunch turkey rollup with cheese and persaud and meal replacement. Bkfst: Ortega with vanilla pudding. Started meal replacements:yes.   If yes, how many per day:2-4    Aversions/side effects to meal replacements: none reported    24 Hour Diet Recall  Breakfast  Vanilla pudding Shake with coffee and 2 packets substitute sugar plus turkey hotdog   Lunch  Shake and eyad sausage   Dinner  2 Baked chicken legs with cheddar broccoli soup   Snacks  Bar at 4:30pm before getting home in car, cheese crisps after dinner   Beverages  Water with crystal light 40-64oz, diet mt dew, diet tea, diet cranberry apple, diet lemonade. 24oz x 3 of the diet. Environment/Psychosocial/Support: works evening supportive. Pt notes being extra tired in evening, while caring for their 3 yo. NUTRITION INTERVENTION:  Pt educated on nutrition recommendations for VLCD, specifically 4 meal replacements every day, every three to four hours. Grocery meal:  Use the balanced plate method to plan meals, include 3-6 oz of lean source of protein, unlimited non-starchy vegetables, 1/2 cup whole grains/beans OR 1/2 cup fruit OR 1 serving of low fat dairy. Utilize handouts listing healthy snack and meal ideas to limit restaurant meals. Read all nutrition labels. Demonstrated and emphasized identifying serving size, total fat, sugar and protein content. Defined low fat as </= 3 g per serving. Discussed lean and extra lean sources of protein. Provided list of low fat cooking methods. Avoid foods with sugar listed in the first 3 ingredients and >/15 g sugar per serving. Practice mindful eating habits; take small bites, chew thoroughly, avoid distractions, utilize hunger/fullness scale. Attend Metabolic Support Group and increase physical activity (approved per MD) for long term weight maintenance. NUTRITION MONITORING AND EVALUATION: pt choosing appropriate categories of protein/vegetable/fats to assist with BG control and cravings but no emphasis on type of protein. Discussed leaner, less processed options to assist with kcals and sodium. Pt does take B/P medication. Adherence likely.     The following goals were established with patient;  1) reduce diet drinks. Currently consuming 72 oz diet drinks plus 24 oz water with crystal light for total of 96 oz of artificial sweeteners. Decrease 24 oz of diet drink daily for next two weeks while increasing 24 oz plain water. Then for next two weeks, decrease another 24 oz diet drink daily and increase another 24 oz plain water for total 24 oz diet drink and 48 oz plain water. 2) less processed meats like turkey hotdog or eyad sausage, choose chicken breast, tuna, boiled eggs, nuts, low fat greek yogurt. Specific tips and techniques to facilitate compliance with above recommendations were provided and discussed. If further details are desired please contact me at [phone]. This phone number was also provided to the patient for any further questions or concerns.           Kaley Rock, MS, RD, LDN

## 2021-07-29 ENCOUNTER — OFFICE VISIT (OUTPATIENT)
Dept: FAMILY MEDICINE CLINIC | Age: 36
End: 2021-07-29
Payer: MEDICAID

## 2021-07-29 VITALS
HEART RATE: 75 BPM | SYSTOLIC BLOOD PRESSURE: 128 MMHG | DIASTOLIC BLOOD PRESSURE: 73 MMHG | TEMPERATURE: 98.2 F | HEIGHT: 63 IN | WEIGHT: 215 LBS | OXYGEN SATURATION: 98 % | BODY MASS INDEX: 38.09 KG/M2 | RESPIRATION RATE: 16 BRPM

## 2021-07-29 DIAGNOSIS — Z01.84 IMMUNITY STATUS TESTING: Primary | ICD-10-CM

## 2021-07-29 PROCEDURE — 99213 OFFICE O/P EST LOW 20 MIN: CPT | Performed by: FAMILY MEDICINE

## 2021-07-29 NOTE — PROGRESS NOTES
1. Have you been to the ER, urgent care clinic since your last visit? Hospitalized since your last visit? No    2. Have you seen or consulted any other health care providers outside of the [address] [place] [address] since your last visit? Include any pap smears or colon screening. No     Chief Complaint   Patient presents with    Immunization/Injection     Health Maintenance Due   Topic Date Due    PAP AKA CERVICAL CYTOLOGY  06/07/2014    Eye Exam Retinal or Dilated  07/06/2019     Visit Vitals  /73 (BP 1 Location: Left arm, BP Patient Position: Sitting, BP Cuff Size: Adult)   Pulse 75   Temp 98.2 °F (36.8 °C) (Skin)   Resp 16   Ht 5' 3\" (1.6 m)   Wt 215 lb (97.5 kg)   SpO2 98%   BMI 38.09 kg/m²     3 most recent PHQ Screens 7/29/2021   Little interest or pleasure in doing things Not at all   Feeling down, depressed, irritable, or hopeless Not at all   Total Score PHQ 2 0     Abuse Screening Questionnaire 7/29/2021   Do you ever feel afraid of your partner? N   Are you in a relationship with someone who physically or mentally threatens you? N   Is it safe for you to go home?  Abril Gonzales

## 2021-07-29 NOTE — PROGRESS NOTES
Chief Complaint   Patient presents with    Immunization/Injection     she is a 43y.o. year old female who presents for evalution. 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 Aspirin yes ____   No____ N/A____    Patient Active Problem List    Diagnosis    Pregnancy induced hypertension    Pregnancy induced hypertension, third trimester    History of breast cancer    Morbid obesity with BMI of 40.0-44.9, adult ([address]    Wound check, abscess     Left thigh.  Type 1 diabetes mellitus without complication (HCC)    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    Cardiomyopathy due to chemotherapy ([facility]    Vitamin D deficiency    Abnormal thyroid blood test    Essential hypertension, benign    Encounter for long-term (current) use of other medications    Encounter for long-term (current) use of insulin ([facility]    Uncontrolled type 1 diabetes mellitus ([facility]     diagnosed age 4         Nurse notes were reviewed and copied and are correct  Review of Systems - negative except as listed above in the HPI    Objective:     Vitals:    07/29/21 1604   BP: 128/73   Pulse: 75   Resp: 16   Temp: 98.2 °F (36.8 °C)   TempSrc: Skin   SpO2: 98%   Weight: 215 lb (97.5 kg)   Height: 5' 3\" (1.6 m)     Physical Examination: General appearance - alert, well appearing, and in no distress  Mental status - alert, oriented to person, place, and time  Chest - clear to auscultation, no wheezes, rales or rhonchi, symmetric air entry  Heart - normal rate, regular rhythm, normal S1, S2, no murmurs, rubs, clicks or gallops         Assessment/ Plan:   Diagnoses and all orders for this visit:    1. Immunity status testing  -     MEASLES/MUMPS/RUBELLA IMMUNITY;  Future     testing first to see if she is already immune      ICD-10-CM ICD-9-CM    1. Immunity status testing  Z01.84 V72.61 MEASLES/MUMPS/RUBELLA IMMUNITY       I have discussed the diagnosis with the patient and the intended plan as seen in the above orders. The patient has received an after-visit summary and questions were answered concerning future plans. There are no Patient Instructions on file for this visit.     The patient verbalizes understanding and agrees with the plan of care

## 2021-07-30 ENCOUNTER — CLINICAL SUPPORT (OUTPATIENT)
Dept: SURGERY | Age: 36
End: 2021-07-30

## 2021-07-30 VITALS
SYSTOLIC BLOOD PRESSURE: 146 MMHG | RESPIRATION RATE: 18 BRPM | WEIGHT: 214 LBS | HEART RATE: 68 BPM | OXYGEN SATURATION: 99 % | DIASTOLIC BLOOD PRESSURE: 86 MMHG | BODY MASS INDEX: 37.91 KG/M2

## 2021-07-30 DIAGNOSIS — E66.01 CLASS 2 SEVERE OBESITY DUE TO EXCESS CALORIES WITH SERIOUS COMORBIDITY IN ADULT, UNSPECIFIED BMI (HCC): Primary | ICD-10-CM

## 2021-07-30 NOTE — PROGRESS NOTES
Progress Note: Weekly Education Class in the [facility]     1) Patient is on Very Low Calorie Diet [x] (4 meal replacements per day, 800 kcal/day)      Low Calorie Diet [] (2-3 meal replacements per day, 4211-5322 kcal/day)    Did patient have any new symptoms or physical problems? Yes [x]   or  No []    If yes, check & comment: weakness [], fatigue [x], lightheadedness [], headache [], cramps [], cold intolerance [], hair loss [], diarrhea [], constipation [x],  NA [] other:                                 Has patient had any medical attention from other providers, urgent care or the emergency room this week? Yes [x]  or No []       NA [], If yes, why:  Dr. Aida May pcp. Vac for hep B                                       2) Number of meal replacements consumed daily? 1-4 (range)  NA []    Did you eat any food outside of the program? Yes [x] No []    3) Average ounces of water patient consumed daily this week (not including shakes)? 24     (divide the weekly total by 7)    Any other sugar sweetened beverages consumed this week? Yes [x]  No []    Did patient have any problems adhering to the diet? Yes [x]  No [] NA []    If yes, Vacation [], Celebrations [], Conferences [x], Family Reunions [] other:                                                4) How has patient mood overall been this week? Sad [], Happy [x], Stressed [], Tired [], Content [], NA [], other            5) Physical Activity Over the Past Week:    Cardio exercise: 0 min  Strength exercise: 0 workouts / week  Number of steps walked per day: n/a      Medications reconciled by nurse Yes [x]  No[]    Patient was given therapeutic recommendations for any noted side effects of their dietary approach based upon [facility] patient manual per providers recommendation.

## 2021-07-30 NOTE — PROGRESS NOTES
Progress Note: Weekly Education Class in the [facility]     1) Patient is on Very Low Calorie Diet [x] (4 meal replacements per day, 800 kcal/day)      Low Calorie Diet [] (2-3 meal replacements per day, 0457-0820 kcal/day)    Did patient have any new symptoms or physical problems? Yes [x]   or  No []    If yes, check & comment: weakness [], fatigue [], lightheadedness [], headache [], cramps [], cold intolerance [], hair loss [], diarrhea [], constipation [x],  NA [] other:                                 Has patient had any medical attention from other providers, urgent care or the emergency room this week? Yes []  or No [x]       NA [], If yes, why:                                        2) Number of meal replacements consumed daily? 2-4 (range)  NA []    Did you eat any food outside of the program? Yes [x] No []    3) Average ounces of water patient consumed daily this week (not including shakes)? 21     (divide the weekly total by 7)    Any other sugar sweetened beverages consumed this week? Yes []  No [x]    Did patient have any problems adhering to the diet? Yes [x]  No [] NA []    If yes, Vacation [], Celebrations [], Conferences [x], Family Reunions [] other:                                                4) How has patient mood overall been this week? Sad [], Happy [x], Stressed [], Tired [], Content [], NA [], other            5) Physical Activity Over the Past Week:    Cardio exercise: 60 min  Strength exercise: 2 workouts / week  Number of steps walked per day: n/a      Medications reconciled by nurse Yes [x]  No[]    Patient was given therapeutic recommendations for any noted side effects of their dietary approach based upon [facility] patient manual per providers recommendation.

## 2021-07-31 LAB
MEV IGG SER IA-ACNC: 97.3 AU/ML
MUV IGG SER IA-ACNC: 71.5 AU/ML
RUBV IGG SERPL IA-ACNC: 5.95 INDEX

## 2021-07-31 NOTE — PROGRESS NOTES
[facility]  Metabolic Weight Loss Program        Patient's Name: Italo Abbasi  : 1985    This patient is enrolled in [address] [place] [address] Weight Loss Program and attended the required weekly virtual nutrition class hosted via [address] [place] [address] today.       Daniella Alberts, MS, RD, LDN

## 2021-07-31 NOTE — PROGRESS NOTES
[facility]  Metabolic Weight Loss Program        Patient's Name: Sebastian Talavera  : 1985    This patient is enrolled in [address] [place] [address] Weight Loss Program and attended the required weekly virtual nutrition class hosted via [employer].       Jose Concepcion, MS, RD, LDN

## 2021-07-31 NOTE — PROGRESS NOTES
Nurse note from patient's weekly VLCD / LCD / Maintenance class was reviewed. Pertinent medical concerns were:  Weight bouncing around      BP Readings from Last 3 Encounters:   07/30/21 (!) 146/86   07/29/21 128/73   07/19/21 (!) 150/90       Weight Metrics 7/30/2021 7/29/2021 7/19/2021 7/12/2021 6/28/2021 6/14/2021 6/8/2021   Weight 214 lb 215 lb 215 lb 213 lb 4.8 oz 217 lb 8 oz 217 lb -   Neck Circ (inches) - - - - - - 14   Waist Measure Inches - - - - - - 41   Body Fat % - - - - - - 42.9   BMI 37.91 kg/m2 38.09 kg/m2 38.09 kg/m2 37.78 kg/m2 38.53 kg/m2 38.44 kg/m2 -       Current Outpatient Medications   Medication Sig Dispense Refill    buPROPion SR (WELLBUTRIN SR) 150 mg SR tablet TAKE 1 TABLET BY MOUTH TWICE A  Tablet 1    hydroCHLOROthiazide (HYDRODIURIL) 12.5 mg tablet Take 1 Tablet by mouth daily. 90 Tablet 2    INTRAUTERINE DEVICE, IUD, IU by IntraUTERine route.  cholecalciferol (VITAMIN D3) (5000 Units/125 mcg) tab tablet Take  by mouth daily.  blood-glucose sensor (DEXCOM G6 SENSOR) by Does Not Apply route.  blood-glucose transmitter (DEXCOM G6 TRANSMITTER) by Does Not Apply route.  BD Zandra 2nd Gen Pen Needle 32 gauge x 5/32\" ndle USE AS DIRECTED TO INJECT INSULIN 4 TIMES DAILY 400 Pen Needle 3    lisinopriL (PRINIVIL, ZESTRIL) 40 mg tablet TAKE 1 TAB BY MOUTH ONCE DAILY 90 Tab 3    FreeStyle Mitchell 14 Day Sensor kit USE AS DIRECTED EVERY 14 DAYS 6 Kit 3    subcutaneous insulin pump (OMNIPOD INSULIN MANAGEMENT) by Does Not Apply route.  insulin aspart U-100 (NovoLOG U-100 Insulin aspart) 100 unit/mL injection Use as directed in insulin pump. Max 100 units/day. 30 mL 11    Insulin Syringe-Needle U-100 1 mL 31 gauge x 5/16 syrg Use as directed three times daily 100 Syringe 11    True Metrix Glucose Test Strip strip Use to check blood sugar four times a day.   DX E10.65 400 Strip 3    OTHER HAS A PARAGUARD BIRTH CONTROL       cloNIDine HCL (CATAPRES) 0.1 mg tablet Take 1 Tablet by mouth as needed. BP over 180/90      naproxen (NAPROSYN) 375 mg tablet Take 1 Tab by mouth two (2) times daily (with meals). (Patient taking differently: Take 375 mg by mouth as needed.) 60 Tab 3    Blood-Glucose Meter monitoring kit Check blood sugar four times a day 1 Kit 0    glucose blood VI test strips (BLOOD GLUCOSE TEST) strip Use to check blood sugar four times a day. 400 Strip 3    lancets misc Use to check blood sugar four times a day. 400 Each 3    FREESTYLE PRECISION KIRK STRIPS strip Use as needed up to twice daily with [facility] reader to check blood sugar when having having problems with the [facility] sensors 50 Strip 11    ONETOUCH ULTRA BLUE TEST STRIP strip Test 4 times daily 400 Strip 3    cetirizine (ZYRTEC) 10 mg tablet Take 10 mg by mouth as needed.       FREESTYLE NASREEN 14 DAY READER misc Use as directed 1 Each 0

## 2021-08-02 NOTE — PROGRESS NOTES
Avi Boo presents for weekly or bi-monthly evaluation of Obesity Body mass index is 37.91 kg/m². Visit Vitals  BP (!) 146/86 (BP 1 Location: Left arm, BP Patient Position: Sitting)   Pulse 68   Resp 18   Wt 214 lb (97.1 kg)   LMP 07/15/2021   SpO2 99%   BMI 37.91 kg/m²       Wt Readings from Last 3 Encounters:   07/30/21 214 lb (97.1 kg)   07/29/21 215 lb (97.5 kg)   07/19/21 215 lb (97.5 kg)       1. Class 2 severe obesity due to excess calories with serious comorbidity in adult, unspecified BMI ([address]         I have reviewed and agree with nurse documentation of Weekly Education Class nurse progress note for [facility] Weight [facility] [place]). Avi Boo is compliant with program requirements and may continue participation utilizing the [employer] meal replacements, as discussed. Patient has been advised to refer to the [facility] Patient Manual and notify us if any side effects to this meal plan are present and/or persist.  Avi Boo may continue the current dietary approach, care and follow up at the monthly office visit with the provider, as scheduled. Follow-up and Dispositions    · Return in about 1 week (around 8/6/2021) for weight check. Documentation true and accepted by Kelly Toro.  Jeet Liz., AOBFP, Diplomate [employer]IA

## 2021-08-02 NOTE — PROGRESS NOTES
Zayra Dias presents for weekly or bi-monthly evaluation of Obesity Body mass index is 38.09 kg/m². Visit Vitals  BP (!) 150/90 (BP 1 Location: Left arm, BP Patient Position: Sitting) Comment: Had stressful encounter on the way here. Pulse 62   Wt 215 lb (97.5 kg)   LMP 07/15/2021   BMI 38.09 kg/m²       Wt Readings from Last 3 Encounters:   07/30/21 214 lb (97.1 kg)   07/29/21 215 lb (97.5 kg)   07/19/21 215 lb (97.5 kg)     Weight is stable. 1. Class 2 severe obesity due to excess calories with serious comorbidity in adult, unspecified BMI ([address]         I have reviewed and agree with nurse documentation of Weekly Education Class nurse progress note for [facility] Weight [address] [facility] [place]). Zayra Dias is compliant with program requirements and may continue participation utilizing the [employer] meal replacements, as discussed. Patient has been advised to refer to the [facility] Patient Manual and notify us if any side effects to this meal plan are present and/or persist.  Zayra Dias may continue the current dietary approach, care and follow up at the monthly office visit with the provider, as scheduled. Follow-up and Dispositions    · Return in about 1 week (around 7/26/2021) for weight check. Documentation true and accepted by Anneliese Gabriel.  Merritt Carrera., AOBFP, Diplomate [employer] HEREDIA

## 2021-08-04 NOTE — PROGRESS NOTES
Alfonso message sent to patient.  With reminder of Tecfidera(dimethylfumerate) recommendations and callback info            Tecfidera can cause gastrointestinal side effects such as nausea, vomiting, abdominal pain and diarrhea.  This can be reduced by taking the medication with a substantial amount of food always, such as with breakfast and dinner.  Many people find taking the medication with peanut butter is helpful.  You would not want to consume peanut butter if you are allergic to nuts.       If you experience any of the above gastrointestinal side effects with Tecfidera, start taking  Pepcid 20 mg by mouth twice daily for the remainder of the first month of Tecfidera treatment.  If the symptoms come back with stopping Zantac or Pepcid, take it regularly for another month.       Simethicone (Gas-X) can also help with abdominal pain and gas.  Diarrhea can be treated with loperamide.       These medications are sold over the counter and do not require a prescription.       Nausea can be treated with ondansetron which is a prescription medication that I can prescribe if you experience this symptom.       Abdominal pain can also be controlled with the prescription medication Singulair which I can prescribe for you if it is needed.       Flushing can also occur with Tecfidera and can be treated with an adult aspirin (325 mg) with each dose.       Many patients do not experience any side effects.  If you do experience side effects and can bear with them, they usually dissipate after the first month, or possibly 2, of treatment.            Post-Operative Day Number 3 Progress/Discharge Note    Patient doing well post-op day 3 from  delivery without significant complaints. Pain controlled on current medication. Voiding without difficulty, normal lochia. Vitals:    Patient Vitals for the past 8 hrs:   BP Temp Pulse Resp   19 0410 142/70 98.2 °F (36.8 °C) 76 16     Temp (24hrs), Av.9 °F (36.6 °C), Min:97.1 °F (36.2 °C), Max:98.3 °F (36.8 °C)      Vital signs stable, afebrile. Exam:  Patient without distress. Abdomen soft, fundus firm at level of umbilicus, non tender. Incision dry and                      clean without erythema. Lower extremities are negative for swelling, cords or tenderness. Lab/Data Review:  BMP: No results found for: NA, K, CL, CO2, AGAP, GLU, BUN, CREA, GFRAA, GFRNA  CMP: No results found for: NA, K, CL, CO2, AGAP, GLU, BUN, CREA, GFRAA, GFRNA, CA, MG, PHOS, ALB, TBIL, TP, ALB, GLOB, AGRAT, SGOT, ALT, GPT  CBC: No results found for: WBC, HGB, HGBEXT, HCT, HCTEXT, PLT, PLTEXT, HGBEXT, HCTEXT, PLTEXT  Recent Glucose Results: No results found for: GLU    Assessment and Plan:  Patient appears to be having uncomplicated post- course. Continue routine post-op care and maternal education. Plan discharge for today with follow up in our office in 1-2 weeks.

## 2021-08-10 ENCOUNTER — OFFICE VISIT (OUTPATIENT)
Dept: SURGERY | Age: 36
End: 2021-08-10

## 2021-08-10 DIAGNOSIS — E66.01 MORBID OBESITY (HCC): Primary | ICD-10-CM

## 2021-08-13 ENCOUNTER — CLINICAL SUPPORT (OUTPATIENT)
Dept: SURGERY | Age: 36
End: 2021-08-13

## 2021-08-13 VITALS
BODY MASS INDEX: 37.77 KG/M2 | OXYGEN SATURATION: 99 % | DIASTOLIC BLOOD PRESSURE: 76 MMHG | SYSTOLIC BLOOD PRESSURE: 126 MMHG | WEIGHT: 213.2 LBS | HEART RATE: 68 BPM

## 2021-08-13 DIAGNOSIS — I10 ESSENTIAL HYPERTENSION, BENIGN: ICD-10-CM

## 2021-08-13 DIAGNOSIS — E10.9 TYPE 1 DIABETES MELLITUS WITHOUT COMPLICATION (HCC): ICD-10-CM

## 2021-08-13 DIAGNOSIS — E66.01 CLASS 2 SEVERE OBESITY DUE TO EXCESS CALORIES WITH SERIOUS COMORBIDITY IN ADULT, UNSPECIFIED BMI (HCC): Primary | ICD-10-CM

## 2021-08-13 DIAGNOSIS — E55.9 VITAMIN D DEFICIENCY: ICD-10-CM

## 2021-08-13 NOTE — PROGRESS NOTES
Progress Note: Weekly Education Class in the [facility]         Patient is on Very Low Calorie Diet [] (4 meal replacements per day, 800 kcal/day)      Low Calorie Diet [x] (2-3 meal replacements per day, 0574-0114 kcal/day)    1) Did patient have any new symptoms or physical problems? Yes []    No [x]    If yes, check & comment: weakness [], fatigue [], lightheadedness [], headache [], cramps [], cold intolerance [], hair loss [], diarrhea [], constipation [],  NA [] other:                             2) Has patient had any medical attention from other providers, urgent care or the emergency room this week? Yes [x]  No []       NA [], If yes, why: OB/GYN -issues with IUD                               3) Any other sugar sweetened beverages consumed this week? Yes [x]  No []    4) Did patient have any problems adhering to the diet? Yes [x]  No [] NA []    If yes, Vacation [], Celebrations [], Conferences [], Family Reunions [x] other:  Period cravings for ice cream                                          5) How many hours of sleep this week?   (range)  NA [x]    Number of meal replacements consumed daily? 3(range)  NA []    Average ounces of water patient consumed daily this week (not including shakes)? (divide the weekly total by 7) 24.8    Did you eat any food outside of the program? Yes [x] No []    Physical Activity Over the Past Week:    Cardio exercise: 0 min  Strength exercise: 0 workouts / week  Number of steps walked per day: 0  How has patient mood overall been this week? Sad [], Happy [x], Stressed [], Tired [], Content [], NA [], other            Medications reconciled by nurse Yes [x]  No[]    Patient was given therapeutic recommendations for any noted side effects of their dietary approach based upon [facility] patient manual per providers recommendation.            Progress Note: Weekly Education Class in the [facility] Program         Patient is on Very Low Calorie Diet [] (4 meal replacements per day, 800 kcal/day)      Low Calorie Diet [x] (2-3 meal replacements per day, 0501-6208 kcal/day)    1) Did patient have any new symptoms or physical problems? Yes [x]    No []    If yes, check & comment: weakness [], fatigue [x], lightheadedness [], headache [], cramps [x], cold intolerance [], hair loss [], diarrhea [], constipation [],  NA [] other:                       2) Has patient had any medical attention from other providers, urgent care or the emergency room this week? Yes []  No [x]       NA [], If yes, why:                          3) Any other sugar sweetened beverages consumed this week? Yes [x]  No []    4) Did patient have any problems adhering to the diet? Yes []  No [] NA [x] Answered yes, but no response. If yes, Vacation [], Celebrations [], Conferences [], Family Reunions [] other:                                            5) How many hours of sleep this week?    (range)  NA [x]    Number of meal replacements consumed daily? 3 range)  NA []    Average ounces of water patient consumed daily this week (not including shakes)? Not answered    (divide the weekly total by 7)    Did you eat any food outside of the program? Yes [x] No []    Physical Activity Over the Past Week:    Cardio exercise:64 min  Strength exercise: 15 workouts / week  Number of steps walked per day: How has patient mood overall been this week? Sad [x], Happy [], Stressed [], Tired [], Content [], NA [], other      Medications reconciled by nurse Yes []  No[]    Patient was given therapeutic recommendations for any noted side effects of their dietary approach based upon [employer] patient manual per providers recommendation.

## 2021-08-16 NOTE — PROGRESS NOTES
[facility]  Metabolic Weight Loss Program        Patient's Name: Francisco Gómez  : 1985    This patient is enrolled in [address] [place] [address] Weight Loss Program and attended the required weekly virtual nutrition class hosted via [employer] today.       Jamey Kwon RD

## 2021-08-25 ENCOUNTER — VIRTUAL VISIT (OUTPATIENT)
Dept: SURGERY | Age: 36
End: 2021-08-25
Payer: MEDICAID

## 2021-08-25 DIAGNOSIS — I10 ESSENTIAL HYPERTENSION, BENIGN: ICD-10-CM

## 2021-08-25 DIAGNOSIS — E55.9 VITAMIN D DEFICIENCY: ICD-10-CM

## 2021-08-25 DIAGNOSIS — E10.9 TYPE 1 DIABETES MELLITUS WITHOUT COMPLICATION (HCC): ICD-10-CM

## 2021-08-25 DIAGNOSIS — E66.01 CLASS 2 SEVERE OBESITY DUE TO EXCESS CALORIES WITH SERIOUS COMORBIDITY IN ADULT, UNSPECIFIED BMI (HCC): Primary | ICD-10-CM

## 2021-08-25 PROCEDURE — 99214 OFFICE O/P EST MOD 30 MIN: CPT | Performed by: FAMILY MEDICINE

## 2021-08-25 PROCEDURE — 3051F HG A1C>EQUAL 7.0%<8.0%: CPT | Performed by: FAMILY MEDICINE

## 2021-08-25 RX ORDER — CLINDAMYCIN HYDROCHLORIDE 300 MG/1
CAPSULE ORAL
COMMUNITY
Start: 2021-08-20 | End: 2021-12-09

## 2021-08-25 NOTE — PROGRESS NOTES
[facility] Progress Note:   F/up Physician Visit    Mariela Walker is a 43 y.o. female who was seen by synchronous (real-time) audio-video technology on 8/25/2021. Consent:  She and/or her healthcare decision maker is aware that this patient-initiated Telehealth encounter is a billable service, with coverage as determined by her insurance carrier. She is aware that she may receive a bill and has provided verbal consent to proceed: Yes    I was at home while conducting this encounter. 712  Subjective:   Mariela Walker was seen for Weight Management  struggling with a lot of low sugars  The lowest has been 55 per dexcom  She did not drop the insulin dose because she had plans to go out of town  She is learning to factor in the exercise with the insulin requirements too  She is planning to drop out of the program  She is going into an internship    f/up physician visit for the VLCD / LCD Program.  Prior to Admission medications    Medication Sig Start Date End Date Taking? Authorizing Provider   clindamycin (CLEOCIN) 300 mg capsule TAKE 1 CAPSULE BY MOUTH 3 TIMES A DAY AS NEEDED HIDRADENITIS 8/20/21  Yes Provider, Historical   buPROPion SR (WELLBUTRIN SR) 150 mg SR tablet TAKE 1 TABLET BY MOUTH TWICE A DAY 7/21/21  Yes Hui Davis MD   hydroCHLOROthiazide (HYDRODIURIL) 12.5 mg tablet Take 1 Tablet by mouth daily. 6/7/21  Yes Hui Davis MD   INTRAUTERINE DEVICE, IUD, IU by IntraUTERine route. Yes Provider, Historical   cholecalciferol (VITAMIN D3) (5000 Units/125 mcg) tab tablet Take  by mouth daily. Yes Provider, Historical   blood-glucose sensor (DEXCOM G6 SENSOR) by Does Not Apply route. Yes Provider, Historical   blood-glucose transmitter (DEXCOM G6 TRANSMITTER) by Does Not Apply route.    Yes Provider, Historical   BD Zandra 2nd Gen Pen Needle 32 gauge x 5/32\" ndle USE AS DIRECTED TO INJECT INSULIN 4 TIMES DAILY 3/12/21  Yes Magdalena Aguiar MD   lisinopriL (PRINIVIL, ZESTRIL) 40 mg tablet TAKE 1 TAB BY MOUTH ONCE DAILY 3/5/21  Yes Lloyd Dozier MD   FreeStyle Mitchell 14 Day Sensor kit USE AS DIRECTED EVERY 14 DAYS 3/5/21  Yes Lloyd Dozier MD   subcutaneous insulin pump (OMNIPOD INSULIN MANAGEMENT) by Does Not Apply route. Yes Provider, Historical   insulin aspart U-100 (NovoLOG U-100 Insulin aspart) 100 unit/mL injection Use as directed in insulin pump. Max 100 units/day. 2/10/21  Yes Lloyd Dozier MD   Insulin Syringe-Needle U-100 1 mL 31 gauge x 5/16 syrg Use as directed three times daily 2/10/21  Yes Lloyd Dozier MD   True Metrix Glucose Test Strip strip Use to check blood sugar four times a day. DX E10.65 2/9/21  Yes Lloyd Dozier MD   OTHER HAS A PARAGUARD BIRTH CONTROL    Yes Provider, Historical   cloNIDine HCL (CATAPRES) 0.1 mg tablet Take 1 Tablet by mouth as needed. BP over 180/90 6/26/20  Yes Provider, Historical   naproxen (NAPROSYN) 375 mg tablet Take 1 Tab by mouth two (2) times daily (with meals). Patient taking differently: Take 375 mg by mouth as needed. 6/29/20  Yes Anna Ding NP   Blood-Glucose Meter monitoring kit Check blood sugar four times a day 12/20/19  Yes Harshad Aviles MD   glucose blood VI test strips (BLOOD GLUCOSE TEST) strip Use to check blood sugar four times a day. 12/20/19  Yes Harshad Aviles MD   lancets misc Use to check blood sugar four times a day. 12/20/19  Yes Harshad Aviles MD   FREESTYLE PRECISION KIRK STRIPS strip Use as needed up to twice daily with [employer] reader to check blood sugar when having having problems with the [employer] sensors 9/27/19  Yes Lloyd Dozier MD   [facility] ULTRA BLUE TEST STRIP strip Test 4 times daily 9/16/19  Yes Lloyd Dozier MD   cetirizine (ZYRTEC) 10 mg tablet Take 10 mg by mouth as needed.    Yes Provider, Historical   FREESTYLE MITCHELL 14 DAY READER misc Use as directed 3/19/19  Yes Lloyd Dozier MD     Allergies   Allergen Reactions    Codeine Nausea and Vomiting       Patient Active Problem List    Diagnosis Date Noted    Pregnancy induced hypertension 02/20/2019    Pregnancy induced hypertension, third trimester 01/29/2019    History of breast cancer 08/29/2018    Morbid obesity with BMI of 40.0-44.9, adult ([facility] 09/22/2017    Wound check, abscess 09/22/2017    Type 1 diabetes mellitus without complication ([facility] 24/19/8316    Ne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 ([facility] [facility]     Cardiomyopathy due to chemotherapy ([facility] [facility]     Vitamin D deficiency 11/07/2011    Abnormal thyroid blood test 11/07/2011    Essential hypertension, benign 06/25/2010    Encounter for long-term (current) use of other medications 06/25/2010    Encounter for [facility]-term (current[facility] 06/25/2010    Uncontrolled type 1 diabetes mellitus (HCC)      Current Outpatient Medications   Medication Sig Dispense Refill    clindamycin (CLEOCIN) 300 mg capsule TAKE 1 CAPSULE BY MOUTH 3 TIMES A DAY AS NEEDED HIDRADENITIS      buPROPion SR (WELLBUTRIN SR) 150 mg SR tablet TAKE 1 TABLET BY MOUTH TWICE A  Tablet 1    hydroCHLOROthiazide (HYDRODIURIL) 12.5 mg tablet Take 1 Tablet by mouth daily. 90 Tablet 2    INTRAUTERINE DEVICE, IUD, IU by IntraUTERine route.  cholecalciferol (VITAMIN D3) (5000 Units/125 mcg) tab tablet Take  by mouth daily.  blood-glucose sensor (DEXCOM G6 SENSOR) by Does Not Apply route.  blood-glucose transmitter (DEXCOM G6 TRANSMITTER) by Does Not Apply route.  BD Zandra 2nd Gen Pen Needle 32 gauge x 5/32\" ndle USE AS DIRECTED TO INJECT INSULIN 4 TIMES DAILY 400 Pen Needle 3    lisinopriL (PRINIVIL, ZESTRIL) 40 mg tablet TAKE 1 TAB BY MOUTH ONCE DAILY 90 Tab 3    FreeStyle Mitchell 14 Day Sensor kit USE AS DIRECTED EVERY 14 DAYS 6 Kit 3    subcutaneous insulin pump (OMNIPOD INSULIN MANAGEMENT) by Does Not Apply route.       insulin aspart U-100 (NovoLOG U-100 Insulin aspart) 100 unit/mL injection Use as directed in insulin pump. Max 100 units/day. 30 mL 11    Insulin Syringe-Needle U-100 1 mL 31 gauge x 5/16 syrg Use as directed three times daily 100 Syringe 11    True Metrix Glucose Test Strip strip Use to check blood sugar four times a day. DX E10.65 400 Strip 3    OTHER HAS A PARAGUARD BIRTH CONTROL       cloNIDine HCL (CATAPRES) 0.1 mg tablet Take 1 Tablet by mouth as needed. BP over 180/90      naproxen (NAPROSYN) 375 mg tablet Take 1 Tab by mouth two (2) times daily (with meals). (Patient taking differently: Take 375 mg by mouth as needed.) 60 Tab 3    Blood-Glucose Meter monitoring kit Check blood sugar four times a day 1 Kit 0    glucose blood VI test strips (BLOOD GLUCOSE TEST) strip Use to check blood sugar four times a day. 400 Strip 3    lancets misc Use to check blood sugar four times a day. 400 Each 3    FREESTYLE PRECISION KIRK STRIPS strip Use as needed up to twice daily with [facility] reader to check blood sugar when having having problems with the [facility] sensors 50 Strip 11    ONETOUCH ULTRA BLUE TEST STRIP strip Test 4 times daily 400 Strip 3    cetirizine (ZYRTEC) 10 mg tablet Take 10 mg by mouth as needed.       FREESTYLE NASREEN 14 DAY READER misc Use as directed 1 Each 0       ROS      CC: Weight Management      Francisco Gómez is a 43 y.o. female who is here for her      Weight Metrics 8/13/2021 7/30/2021 7/29/2021 7/19/2021 7/12/2021 6/28/2021 6/14/2021   Weight 213 lb 3.2 oz 214 lb 215 lb 215 lb 213 lb 4.8 oz 217 lb 8 oz 217 lb   Neck Circ (inches) - - - - - - -   Waist Measure Inches - - - - - - -   Body Fat % - - - - - - -   BMI 37.77 kg/m2 37.91 kg/m2 38.09 kg/m2 38.09 kg/m2 37.78 kg/m2 38.53 kg/m2 38.44 kg/m2         Outpatient Medications Marked as Taking for the 8/25/21 encounter (Virtual Visit) with Mello Delacruz MD   Medication Sig Dispense Refill    clindamycin (CLEOCIN) 300 mg capsule TAKE 1 CAPSULE BY MOUTH 3 TIMES A DAY AS NEEDED HIDRADENITIS      buPROPion SR (WELLBUTRIN SR) 150 mg SR tablet TAKE 1 TABLET BY MOUTH TWICE A  Tablet 1    hydroCHLOROthiazide (HYDRODIURIL) 12.5 mg tablet Take 1 Tablet by mouth daily. 90 Tablet 2    INTRAUTERINE DEVICE, IUD, IU by IntraUTERine route.  cholecalciferol (VITAMIN D3) (5000 Units/125 mcg) tab tablet Take  by mouth daily.  blood-glucose sensor (DEXCOM G6 SENSOR) by Does Not Apply route.  blood-glucose transmitter (DEXCOM G6 TRANSMITTER) by Does Not Apply route.  BD Zandra 2nd Gen Pen Needle 32 gauge x 5/32\" ndle USE AS DIRECTED TO INJECT INSULIN 4 TIMES DAILY 400 Pen Needle 3    lisinopriL (PRINIVIL, ZESTRIL) 40 mg tablet TAKE 1 TAB BY MOUTH ONCE DAILY 90 Tab 3    FreeStyle Mitchell 14 Day Sensor kit USE AS DIRECTED EVERY 14 DAYS 6 Kit 3    subcutaneous insulin pump (OMNIPOD INSULIN MANAGEMENT) by Does Not Apply route.  insulin aspart U-100 (NovoLOG U-100 Insulin aspart) 100 unit/mL injection Use as directed in insulin pump. Max 100 units/day. 30 mL 11    Insulin Syringe-Needle U-100 1 mL 31 gauge x 5/16 syrg Use as directed three times daily 100 Syringe 11    True Metrix Glucose Test Strip strip Use to check blood sugar four times a day. DX E10.65 400 Strip 3    OTHER HAS A PARAGUARD BIRTH CONTROL       cloNIDine HCL (CATAPRES) 0.1 mg tablet Take 1 Tablet by mouth as needed. BP over 180/90      naproxen (NAPROSYN) 375 mg tablet Take 1 Tab by mouth two (2) times daily (with meals). (Patient taking differently: Take 375 mg by mouth as needed.) 60 Tab 3    Blood-Glucose Meter monitoring kit Check blood sugar four times a day 1 Kit 0    glucose blood VI test strips (BLOOD GLUCOSE TEST) strip Use to check blood sugar four times a day. 400 Strip 3    lancets misc Use to check blood sugar four times a day.  400 Each 3    FREESTYLE PRECISION KIRK STRIPS strip Use as needed up to twice daily with [facility] reader to check blood sugar when having having problems with the [facility] sensors 50 Strip 11    ONETOUCH ULTRA BLUE TEST STRIP strip Test 4 times daily 400 Strip 3    cetirizine (ZYRTEC) 10 mg tablet Take 10 mg by mouth as needed.  MogujieE 14 DAY READER [facility] Use as directed 1 Each 0         Participation   Did you attend clinic and class last week? yes    Review of Systems  Since your last visit, have you experienced any complications? no  If yes, please list:       Are you taking an appetite suppressant? yes  If so, is there any Chest Pain, Palpitations or Dizziness? BP Readings from Last 3 Encounters:   08/13/21 126/76   07/30/21 (!) 146/86   07/29/21 128/73       SLEEP:      Have you received any other medical care this week? no  If yes, where and for what? Have you discontinued or changed any medicine or dose of your medicine since your last visit with Dr Naya Medina? no  If yes, where and for what? Diet  How many ounces of calorie-free liquids did you consume each day? Not sure oz    How many meal replacements did you take each day?0    Did you have any problems adhering to the program?  no If yes, please explain:      Exercise  Aerobic exercise:  min  Resistance exercise:  workouts / week  Any discomfort?  no     If yes, where? Objective  Visit Vitals  LMP 08/10/2021     Patient's last menstrual period was 08/10/2021.                     PHYSICAL EXAMINATION:  [ INSTRUCTIONS:  \"[x]\" Indicates a positive item  \"[]\" Indicates a negative item  -- DELETE ALL ITEMS NOT EXAMINED]  Vital Signs: (As obtained by patient/caregiver at home)  Visit Vitals  LMP 08/10/2021        Constitutional: [x] Appears well-developed and well-nourished [x] No apparent distress      [] Abnormal -     Mental status: [x] Alert and awake  [x] Oriented to person/place/time [x] Able to follow commands    [] Abnormal -     Eyes:   EOM    [x]  Normal    [] Abnormal -   Sclera  [x]  Normal    [] Abnormal -          Discharge [x]  None visible   [] Abnormal -     HENT: [x] Normocephalic, atraumatic  [] Abnormal -   [x] Mouth/Throat: Mucous membranes are moist    External Ears [x] Normal  [] Abnormal -    Neck: [x] No visualized mass [] Abnormal -     Pulmonary/Chest: [x] Respiratory effort normal   [x] No visualized signs of difficulty breathing or respiratory distress        [] Abnormal -      Musculoskeletal:   [x] Normal gait with no signs of ataxia         [x] Normal range of motion of neck        [] Abnormal -     Neurological:        [x] No Facial Asymmetry (Cranial nerve 7 motor function) (limited exam due to video visit)          [x] No gaze palsy        [] Abnormal -          Skin:        [x] No significant exanthematous lesions or discoloration noted on facial skin         [] Abnormal -            Psychiatric:       [x] Normal Affect [] Abnormal -        [x] No Hallucinations    Other pertinent observable physical exam findings:-      Assessment / Plan    Encounter Diagnoses   Name Primary?  Class 2 severe obesity due to excess calories with serious comorbidity in adult, unspecified BMI (HCC) Yes    BMI 40.0-44.9, adult (HCC)     Type 1 diabetes mellitus without complication (HCC)     Essential hypertension, benign     Vitamin D deficiency      Diagnoses and all orders for this visit:    1. Class 2 severe obesity due to excess calories with serious comorbidity in adult, unspecified BMI (HCC)  Cont the LCD using PP drinks/replacements  2. BMI 40.0-44.9, adult ([address]    3. Type 1 diabetes mellitus without complication (HCC)  Adjust along w the hep of endocrine    4. Essential hypertension, benign  bp well controlled  5. Vitamin D deficiency  Cont to monitor and treat    1. Weight management stable   Progress was reviewed with patient    2.   Labs    Latest results reviewed with patient          face to face time with [facility] consisted of counseling & coordinating and/or discussing treatment plans in reference to her obesity The primary encounter diagnosis was Class 2 severe obesity due to excess calories with serious comorbidity in adult, unspecified BMI ([facility] [address] Diagnoses of BMI 40.0-44.9, adult ([facility] [address] Type 1 diabetes mellitus without complication ([facility] [address] Essential hypertension, benign, and Vitamin D deficiency were also pertinent to this visit. Follow-up and Dispositions    · Return in about 1 month (around 9/25/2021). Coding Help - Use CPT Codes 85790-01842, [id number] for Established and New Patients respectively, either employing EM elements or Time rules. Other codes (example consult codes) may also apply. We discussed the expected course, resolution and complications of the diagnosis(es) in detail. Medication risks, benefits, costs, interactions, and alternatives were discussed as indicated. I advised her to contact the office if her condition worsens, changes or fails to improve as anticipated. She expressed understanding with the diagnosis(es) and plan. Pursuant to the emergency declaration under the [facility], [facility] waiver authority and the [employer] and [employer] Act, this Virtual  Visit was conducted, with patient's consent, to reduce the patient's risk of exposure to COVID-19 and provide continuity of care for an established patient. Services were provided through a video synchronous discussion virtually to substitute for in-person clinic visit.     Ольга Jean MD

## 2021-09-01 NOTE — PROGRESS NOTES
Nurse note from patient's weekly VLCD / LCD / Maintenance class was reviewed. Pertinent medical concerns were:   Doing well     BP Readings from Last 3 Encounters:   08/13/21 126/76   07/30/21 (!) 146/86   07/29/21 128/73       Weight Metrics 8/13/2021 7/30/2021 7/29/2021 7/19/2021 7/12/2021 6/28/2021 6/14/2021   Weight 213 lb 3.2 oz 214 lb 215 lb 215 lb 213 lb 4.8 oz 217 lb 8 oz 217 lb   Neck Circ (inches) - - - - - - -   Waist Measure Inches - - - - - - -   Body Fat % - - - - - - -   BMI 37.77 kg/m2 37.91 kg/m2 38.09 kg/m2 38.09 kg/m2 37.78 kg/m2 38.53 kg/m2 38.44 kg/m2       Current Outpatient Medications   Medication Sig Dispense Refill    buPROPion SR (WELLBUTRIN SR) 150 mg SR tablet TAKE 1 TABLET BY MOUTH TWICE A  Tablet 1    hydroCHLOROthiazide (HYDRODIURIL) 12.5 mg tablet Take 1 Tablet by mouth daily. 90 Tablet 2    INTRAUTERINE DEVICE, IUD, IU by IntraUTERine route.  cholecalciferol (VITAMIN D3) (5000 Units/125 mcg) tab tablet Take  by mouth daily.  blood-glucose sensor (DEXCOM G6 SENSOR) by Does Not Apply route.  blood-glucose transmitter (DEXCOM G6 TRANSMITTER) by Does Not Apply route.  BD Zandra 2nd Gen Pen Needle 32 gauge x 5/32\" ndle USE AS DIRECTED TO INJECT INSULIN 4 TIMES DAILY 400 Pen Needle 3    lisinopriL (PRINIVIL, ZESTRIL) 40 mg tablet TAKE 1 TAB BY MOUTH ONCE DAILY 90 Tab 3    FreeStyle Mitchell 14 Day Sensor kit USE AS DIRECTED EVERY 14 DAYS 6 Kit 3    subcutaneous insulin pump (OMNIPOD INSULIN MANAGEMENT) by Does Not Apply route.  insulin aspart U-100 (NovoLOG U-100 Insulin aspart) 100 unit/mL injection Use as directed in insulin pump. Max 100 units/day. 30 mL 11    Insulin Syringe-Needle U-100 1 mL 31 gauge x 5/16 syrg Use as directed three times daily 100 Syringe 11    True Metrix Glucose Test Strip strip Use to check blood sugar four times a day.   DX E10.65 400 Strip 3    OTHER HAS A PARAGUARD BIRTH CONTROL       cloNIDine HCL (CATAPRES) 0.1 mg tablet Take 1 Tablet by mouth as needed. BP over 180/90      naproxen (NAPROSYN) 375 mg tablet Take 1 Tab by mouth two (2) times daily (with meals). (Patient taking differently: Take 375 mg by mouth as needed.) 60 Tab 3    Blood-Glucose Meter monitoring kit Check blood sugar four times a day 1 Kit 0    glucose blood VI test strips (BLOOD GLUCOSE TEST) strip Use to check blood sugar four times a day. 400 Strip 3    lancets misc Use to check blood sugar four times a day. 400 Each 3    FREESTYLE PRECISION KIRK STRIPS strip Use as needed up to twice daily with [facility] reader to check blood sugar when having having problems with the [facility] sensors 50 Strip 11    ONETOUCH ULTRA BLUE TEST STRIP strip Test 4 times daily 400 Strip 3    cetirizine (ZYRTEC) 10 mg tablet Take 10 mg by mouth as needed.       FREESTYLE NASREEN 14 DAY READER misc Use as directed 1 Each 0    clindamycin (CLEOCIN) 300 mg capsule TAKE 1 CAPSULE BY MOUTH 3 TIMES A DAY AS NEEDED HIDRADENITIS

## 2021-09-25 ENCOUNTER — HOSPITAL ENCOUNTER (EMERGENCY)
Age: 36
Discharge: HOME | End: 2021-09-25
Attending: EMERGENCY MEDICINE
Payer: MEDICAID

## 2021-09-25 VITALS
WEIGHT: 219.36 LBS | TEMPERATURE: 98 F | BODY MASS INDEX: 38.87 KG/M2 | HEIGHT: 63 IN | HEART RATE: 83 BPM | DIASTOLIC BLOOD PRESSURE: 100 MMHG | SYSTOLIC BLOOD PRESSURE: 160 MMHG | RESPIRATION RATE: 16 BRPM | OXYGEN SATURATION: 100 %

## 2021-09-25 DIAGNOSIS — N93.8 DUB (DYSFUNCTIONAL UTERINE BLEEDING): Primary | ICD-10-CM

## 2021-09-25 LAB
ANION GAP SERPL CALC-SCNC: 5 MMOL/L (ref 5–15)
APPEARANCE UR: ABNORMAL
BACTERIA URNS QL MICRO: NEGATIVE /HPF
BASOPHILS # BLD: 0 K/UL (ref 0–0.1)
BASOPHILS NFR BLD: 0 % (ref 0–1)
BILIRUB UR QL: NEGATIVE
BUN SERPL-MCNC: 16 MG/DL (ref 6–20)
BUN/CREAT SERPL: 17 (ref 12–20)
CALCIUM SERPL-MCNC: 8.3 MG/DL (ref 8.5–10.1)
CHLORIDE SERPL-SCNC: 105 MMOL/L (ref 97–108)
CO2 SERPL-SCNC: 28 MMOL/L (ref 21–32)
COLOR UR: ABNORMAL
CREAT SERPL-MCNC: 0.92 MG/DL (ref 0.55–1.02)
DIFFERENTIAL METHOD BLD: ABNORMAL
EOSINOPHIL # BLD: 0.3 K/UL (ref 0–0.4)
EOSINOPHIL NFR BLD: 4 % (ref 0–7)
EPITH CASTS URNS QL MICRO: ABNORMAL /LPF
ERYTHROCYTE [DISTWIDTH] IN BLOOD BY AUTOMATED COUNT: 13.2 % (ref 11.5–14.5)
GLUCOSE SERPL-MCNC: 136 MG/DL (ref 65–100)
GLUCOSE UR STRIP.AUTO-MCNC: NEGATIVE MG/DL
HCG UR QL: NEGATIVE
HCT VFR BLD AUTO: 37.6 % (ref 35–47)
HGB BLD-MCNC: 12 G/DL (ref 11.5–16)
HGB UR QL STRIP: ABNORMAL
IMM GRANULOCYTES # BLD AUTO: 0 K/UL (ref 0–0.04)
IMM GRANULOCYTES NFR BLD AUTO: 1 % (ref 0–0.5)
KETONES UR QL STRIP.AUTO: NEGATIVE MG/DL
LEUKOCYTE ESTERASE UR QL STRIP.AUTO: NEGATIVE
LYMPHOCYTES # BLD: 3 K/UL (ref 0.8–3.5)
LYMPHOCYTES NFR BLD: 37 % (ref 12–49)
MCH RBC QN AUTO: 29.4 PG (ref 26–34)
MCHC RBC AUTO-ENTMCNC: 31.9 G/DL (ref 30–36.5)
MCV RBC AUTO: 92.2 FL (ref 80–99)
MONOCYTES # BLD: 0.7 K/UL (ref 0–1)
MONOCYTES NFR BLD: 9 % (ref 5–13)
NEUTS SEG # BLD: 4 K/UL (ref 1.8–8)
NEUTS SEG NFR BLD: 50 % (ref 32–75)
NITRITE UR QL STRIP.AUTO: NEGATIVE
NRBC # BLD: 0 K/UL (ref 0–0.01)
NRBC BLD-RTO: 0 PER 100 WBC
PH UR STRIP: 5.5 [PH] (ref 5–8)
PLATELET # BLD AUTO: 303 K/UL (ref 150–400)
PMV BLD AUTO: 9.7 FL (ref 8.9–12.9)
POTASSIUM SERPL-SCNC: 3.5 MMOL/L (ref 3.5–5.1)
PROT UR STRIP-MCNC: NEGATIVE MG/DL
RBC # BLD AUTO: 4.08 M/UL (ref 3.8–5.2)
RBC #/AREA URNS HPF: ABNORMAL /HPF (ref 0–5)
SODIUM SERPL-SCNC: 138 MMOL/L (ref 136–145)
SP GR UR REFRACTOMETRY: 1.02 (ref 1–1.03)
UR CULT HOLD, URHOLD: NORMAL
UROBILINOGEN UR QL STRIP.AUTO: 0.2 EU/DL (ref 0.2–1)
WBC # BLD AUTO: 8.1 K/UL (ref 3.6–11)
WBC URNS QL MICRO: ABNORMAL /HPF (ref 0–4)

## 2021-09-25 PROCEDURE — 36415 COLL VENOUS BLD VENIPUNCTURE: CPT

## 2021-09-25 PROCEDURE — 81001 URINALYSIS AUTO W/SCOPE: CPT

## 2021-09-25 PROCEDURE — 99283 EMERGENCY DEPT VISIT LOW MDM: CPT

## 2021-09-25 PROCEDURE — 80048 BASIC METABOLIC PNL TOTAL CA: CPT

## 2021-09-25 PROCEDURE — 85025 COMPLETE CBC W/AUTO DIFF WBC: CPT

## 2021-09-25 PROCEDURE — 81025 URINE PREGNANCY TEST: CPT

## 2021-09-25 NOTE — ED PROVIDER NOTES
68-year-old female history of right-sided breast cancer, hypertension, obesity, diabetes presents to the emergency department with vaginal bleeding. She has a copper IUD. She tells me she has had somewhat irregular periods over the past year. She has been bleeding for the past 2.5 weeks. She saw her OB/GYN several weeks ago with an ultrasound which demonstrated ovarian cyst but otherwise reassuring. She is pending an appointment in 6 days with her OB/GYN. She tells me she has abdominal cramping similar to a period. The history is provided by the patient and medical records. Vaginal Bleeding  This is a new problem. The current episode started more than 1 week ago. The problem has not changed since onset. Associated symptoms include abdominal pain. Pertinent negatives include no chest pain, no headaches and no shortness of breath. Treatments tried: motrin. The treatment provided significant relief. Past Medical History:   Diagnosis Date    Breast cancer [facility]) 2012    Right breast infiltrating ductal carcinoma    Cardiomyopathy due to chemotherapy ([address]     EF 20 %    Essential hypertension     Gestational hypertension     History of sepsis 2013    after chemo    Hx of difficult intubation     resulting from sepsis in 2013.  Hypertension     Morbid obesity ([address]     Ne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 ([address]     Type I (juvenile type) diabetes mellitus without mention of complication, uncontrolled     diagnosed age 4    Unspecified vitamin D deficiency 2011       Past Surgical History:   Procedure Laterality Date    HX  SECTION      HX CYST INCISION AND DRAINAGE  2013    perirectal abscess    HX GYN       in     HX MASTECTOMY Right     Right MRM with sentinel LNB.  HX ORTHOPAEDIC Right     Carpal tunnel release, index finger trigger release.     HX OTHER SURGICAL      cyst removed under left arm    HX OTHER SURGICAL      port placement for chemo    HX WISDOM TEETH EXTRACTION  08/15/2018    [place] BREAST SURGERY PROCEDURE UNLISTED  2013    R Breast Mastectomy         Family History:   Problem Relation Age of Onset    Diabetes Brother         borderline Type 2    Diabetes Paternal Uncle     Diabetes Paternal Grandfather     Heart Disease Paternal Grandfather         MI in his 62s    Hypertension Mother     Heart Disease Father     Stroke Neg Hx        Social History     Socioeconomic History    Marital status:      Spouse name: Not on file    Number of children: Not on file    Years of education: Not on file    Highest education level: Not on file   Occupational History    Not on file   Tobacco Use    Smoking status: Never Smoker    Smokeless tobacco: Never Used   Vaping Use    Vaping Use: Never used   Substance and Sexual Activity    Alcohol use: No    Drug use: No    Sexual activity: Yes     Partners: Male   Other Topics Concern     Service Not Asked    Blood Transfusions Not Asked    Caffeine Concern Not Asked    Occupational Exposure Not Asked    Hobby Hazards Not Asked    Sleep Concern Not Asked    Stress Concern Not Asked    Weight Concern Not Asked    Special Diet Not Asked    Back Care Not Asked    Exercise Not Asked    Bike Helmet Not Asked   [place] Not Asked    Self-Exams Not Asked   Social History Narrative    Lives in [facility] with her  and 4 yo son. Got  in July 2017. Currently in [address] [place] [address] for a masters in [employer]. Social Determinants of Health     Financial Resource Strain:     Difficulty of Paying Living Expenses:    Food Insecurity:     Worried About Running Out of Food in the Last Year:     [address] in the Last Year:    Transportation Needs:     Lack of Transportation (Medical):      Lack of Transportation (Non-Medical):    Physical Activity:     Days of Exercise per Week:     Minutes of Exercise per Session:    Stress:     Feeling of Stress :    Social Connections:     Frequency of Communication with Friends and Family:     Frequency of Social Gatherings with Friends and Family:     Attends [employer] Services:     Active Member of Clubs or Organizations:     Attends Club or Organization Meetings:     Marital Status:    Intimate Partner Violence:     Fear of Current or Ex-Partner:     Emotionally Abused:     Physically Abused:     Sexually Abused: ALLERGIES: Codeine    Review of Systems   Constitutional: Negative for fatigue and fever. HENT: Negative for sneezing and sore throat. Respiratory: Negative for cough and shortness of breath. Cardiovascular: Negative for chest pain and leg swelling. Gastrointestinal: Positive for abdominal pain. Negative for diarrhea, nausea and vomiting. Genitourinary: Positive for vaginal bleeding. Negative for difficulty urinating and dysuria. Musculoskeletal: Negative for arthralgias and myalgias. Skin: Negative for color change and rash. Neurological: Negative for weakness and headaches. Psychiatric/Behavioral: Negative for agitation and behavioral problems. Vitals:    09/25/21 1514   BP: (!) 160/100   Pulse: 83   Resp: 16   Temp: 98 °F (36.7 °C)   SpO2: 100%   Weight: 99.5 kg (219 lb 5.7 oz)   Height: 5' 3\" (1.6 m)            Physical Exam  Vitals and nursing note reviewed. Constitutional:       General: She is not in acute distress. Appearance: Normal appearance. She is well-developed. She is not ill-appearing, toxic-appearing or diaphoretic. HENT:      Head: Normocephalic and atraumatic. Nose: Nose normal.      Mouth/Throat:      Mouth: Mucous membranes are moist.      Pharynx: Oropharynx is clear. Eyes:      Extraocular Movements: Extraocular movements intact. Conjunctiva/sclera: Conjunctivae normal.      Pupils: Pupils are equal, round, and reactive to light. Cardiovascular:      Rate and Rhythm: Normal rate and regular rhythm.       Pulses: Normal pulses. Heart sounds: Normal heart sounds. Pulmonary:      Effort: Pulmonary effort is normal. No respiratory distress. Breath sounds: Normal breath sounds. No wheezing. Chest:      Chest wall: No tenderness. Abdominal:      General: Abdomen is flat. There is no distension. Palpations: Abdomen is soft. Tenderness: There is no abdominal tenderness. There is no guarding or rebound. Musculoskeletal:         General: No swelling, tenderness, deformity or signs of injury. Normal range of motion. Cervical back: Normal range of motion and neck supple. No rigidity. No muscular tenderness. Right lower leg: No edema. Left lower leg: No edema. Skin:     General: Skin is warm and dry. Capillary Refill: Capillary refill takes less than 2 seconds. Neurological:      General: No focal deficit present. Mental Status: She is alert and oriented to person, place, and time. Psychiatric:         Mood and Affect: Mood normal.         Behavior: Behavior normal.          MDM  Number of Diagnoses or Management Options  Diagnosis management comments: 25-year-old female presents as above with dysfunctional uterine bleeding. Reassuring labs in the emergency department. Will discharge instructions to follow-up with her obstetrician as already scheduled. Return if needed.        Amount and/or Complexity of Data Reviewed  Clinical lab tests: reviewed           Procedures

## 2021-09-25 NOTE — ED TRIAGE NOTES
To ed with patient reporting she has had her period now for 2.5 weeks constantly. Reports she completely missed her cycle in august and saw her OB and everything was normal other than a cyst on her ovary. Pt reports vaginal bleeding started 09/07/21. Reports she has had abdominal cramps for a few days, much worse today. Took ibuprofen today at 12:00. Pt reports she just doesn't feel well, and that she is has a headache and feels lightheaded.

## 2021-09-25 NOTE — DISCHARGE INSTRUCTIONS
Thank you for allowing us to provide you with medical care today. We realize that you have many choices for your emergency care needs. We thank you for choosing [facility]. Please choose us in the future for any continued health care needs. We hope we addressed all of your medical concerns. We strive to provide excellent quality care in the Emergency Department. Anything less than excellent does not meet our expectations. The exam and treatment you received in the Emergency Department were for an emergent problem and are not intended as complete care. It is important that you follow up with a doctor, nurse practitioner, or physician's assistant for ongoing care. If your symptoms worsen or you do not improve as expected and you are unable to reach your usual health care provider, you should return to the Emergency Department. We are available 24 hours a day. Take this sheet with you when you go to your follow-up visit. If you have any problem arranging the follow-up visit, contact the Emergency Department immediately. Make an appointment your family doctor for follow up of this visit. Return to the ER if you are unable to be seen in a timely manner.

## 2021-10-01 ENCOUNTER — HOSPITAL ENCOUNTER (OUTPATIENT)
Dept: ULTRASOUND IMAGING | Age: 36
Discharge: HOME | End: 2021-10-01
Attending: SPECIALIST
Payer: MEDICAID

## 2021-10-01 ENCOUNTER — TRANSCRIBE ORDER (OUTPATIENT)
Dept: SCHEDULING | Age: 36
End: 2021-10-01

## 2021-10-01 DIAGNOSIS — N92.0 MENORRHAGIA: Primary | ICD-10-CM

## 2021-10-01 DIAGNOSIS — N92.0 MENORRHAGIA: ICD-10-CM

## 2021-10-01 PROCEDURE — 76856 US EXAM PELVIC COMPLETE: CPT

## 2021-10-01 PROCEDURE — 76830 TRANSVAGINAL US NON-OB: CPT

## 2021-10-18 DIAGNOSIS — R79.89 ABNORMAL THYROID BLOOD TEST: ICD-10-CM

## 2021-10-18 DIAGNOSIS — E55.9 VITAMIN D DEFICIENCY: ICD-10-CM

## 2021-10-18 DIAGNOSIS — E78.5 HYPERLIPIDEMIA LDL GOAL <100: ICD-10-CM

## 2021-10-18 DIAGNOSIS — I10 ESSENTIAL HYPERTENSION, BENIGN: ICD-10-CM

## 2021-10-18 DIAGNOSIS — E10.9 TYPE 1 DIABETES MELLITUS WITHOUT COMPLICATION (HCC): ICD-10-CM

## 2021-10-26 LAB
25(OH)D3+25(OH)D2 SERPL-MCNC: 55.6 NG/ML (ref 30–100)
ALBUMIN SERPL-MCNC: 3.7 G/DL (ref 3.8–4.8)
ALBUMIN/CREAT UR: 6 MG/G CREAT (ref 0–29)
ALBUMIN/GLOB SERPL: 1.2 {RATIO} (ref 1.2–2.2)
ALP SERPL-CCNC: 78 IU/L (ref 44–121)
ALT SERPL-CCNC: 17 IU/L (ref 0–32)
AST SERPL-CCNC: 20 IU/L (ref 0–40)
BILIRUB SERPL-MCNC: 0.4 MG/DL (ref 0–1.2)
BUN SERPL-MCNC: 14 MG/DL (ref 6–20)
BUN/CREAT SERPL: 17 (ref 9–23)
CALCIUM SERPL-MCNC: 8.9 MG/DL (ref 8.7–10.2)
CHLORIDE SERPL-SCNC: 101 MMOL/L (ref 96–106)
CHOLEST SERPL-MCNC: 156 MG/DL (ref 100–199)
CO2 SERPL-SCNC: 25 MMOL/L (ref 20–29)
CREAT SERPL-MCNC: 0.84 MG/DL (ref 0.57–1)
CREAT UR-MCNC: 146.7 MG/DL
EST. AVERAGE GLUCOSE BLD GHB EST-MCNC: 180 MG/DL
GLOBULIN SER CALC-MCNC: 3.2 G/DL (ref 1.5–4.5)
GLUCOSE SERPL-MCNC: 134 MG/DL (ref 65–99)
HBA1C MFR BLD: 7.9 % (ref 4.8–5.6)
HDLC SERPL-MCNC: 81 MG/DL
IMP & REVIEW OF LAB RESULTS: NORMAL
LDLC SERPL CALC-MCNC: 67 MG/DL (ref 0–99)
MICROALBUMIN UR-MCNC: 8.7 UG/ML
POTASSIUM SERPL-SCNC: 3.8 MMOL/L (ref 3.5–5.2)
PROT SERPL-MCNC: 6.9 G/DL (ref 6–8.5)
SODIUM SERPL-SCNC: 137 MMOL/L (ref 134–144)
TRIGL SERPL-MCNC: 29 MG/DL (ref 0–149)
VLDLC SERPL CALC-MCNC: 8 MG/DL (ref 5–40)

## 2021-11-01 ENCOUNTER — OFFICE VISIT (OUTPATIENT)
Dept: ENDOCRINOLOGY | Age: 36
End: 2021-11-01
Payer: MEDICAID

## 2021-11-01 VITALS
DIASTOLIC BLOOD PRESSURE: 68 MMHG | BODY MASS INDEX: 38.09 KG/M2 | WEIGHT: 215 LBS | SYSTOLIC BLOOD PRESSURE: 142 MMHG | HEIGHT: 63 IN | HEART RATE: 80 BPM

## 2021-11-01 DIAGNOSIS — E55.9 VITAMIN D DEFICIENCY: ICD-10-CM

## 2021-11-01 DIAGNOSIS — R79.89 ABNORMAL THYROID BLOOD TEST: ICD-10-CM

## 2021-11-01 DIAGNOSIS — I10 ESSENTIAL HYPERTENSION, BENIGN: ICD-10-CM

## 2021-11-01 DIAGNOSIS — E78.5 HYPERLIPIDEMIA LDL GOAL <100: ICD-10-CM

## 2021-11-01 DIAGNOSIS — E10.9 TYPE 1 DIABETES MELLITUS WITHOUT COMPLICATION (HCC): Primary | ICD-10-CM

## 2021-11-01 PROCEDURE — 3051F HG A1C>EQUAL 7.0%<8.0%: CPT | Performed by: INTERNAL MEDICINE

## 2021-11-01 PROCEDURE — 99214 OFFICE O/P EST MOD 30 MIN: CPT | Performed by: INTERNAL MEDICINE

## 2021-11-01 NOTE — PROGRESS NOTES
Chief Complaint   Patient presents with    Diabetes     History of Present Illness: Rashel Holm is a 43 y.o. female here for follow up of diabetes. Current settings are as follows:  - basal: 12a: 0.75, 8a: 1.15, 5p: 1.05, 8p: 0.8  - Carb ratio: 8  - sensitivity: 75  - target: 120  - active insulin time: 4 hours    she has the following indications to continue treatment with Dexcom:  1) she has type 1 diabetes (E10.9) and is on an intensive insulin regimen with an insulin pump  2) she tests her blood sugar 4 times per day and makes treatment decisions off her blood sugar readings and does the same off dexcom sensor readings  3) she requires frequent adjustments to her insulin pump settings based on her dexcom sensor readings  4) she has benefitted from therapeutic continuous glucose monitoring and I recommend that she continue this  5) she is seen in my office every 4-6 months    Has been on her current pump settings the past 3-4 weeks. Was in the very low carb diet program this summer and last visit was in August.  Weight was 248 at last in person visit with me in 12/19 and currently down to 215 lbs. Off the program, she hasn't been as careful with her diet and has had some more sweets at times. Her fasting sugars have been doing well but having some spikes after meals but need her dexcom download to see if any changes are needed. Did not take her hctz and lisinopril this morning and was running late so will monitor home readings. Compliant with vitamin D. Current Outpatient Medications   Medication Sig    buPROPion SR (WELLBUTRIN SR) 150 mg SR tablet TAKE 1 TABLET BY MOUTH TWICE A DAY    hydroCHLOROthiazide (HYDRODIURIL) 12.5 mg tablet Take 1 Tablet by mouth daily.  INTRAUTERINE DEVICE, IUD, IU by IntraUTERine route.  cholecalciferol (VITAMIN D3) (5000 Units/125 mcg) tab tablet Take  by mouth daily.  blood-glucose sensor (DEXCOM G6 SENSOR) by Does Not Apply route.     blood-glucose transmitter (DEXCOM G6 TRANSMITTER) by Does Not Apply route.  lisinopriL (PRINIVIL, ZESTRIL) 40 mg tablet TAKE 1 TAB BY MOUTH ONCE DAILY    subcutaneous insulin pump (OMNIPOD INSULIN MANAGEMENT) by Does Not Apply route.  insulin aspart U-100 (NovoLOG U-100 Insulin aspart) 100 unit/mL injection Use as directed in insulin pump. Max 100 units/day.  clindamycin (CLEOCIN) 300 mg capsule TAKE 1 CAPSULE BY MOUTH 3 TIMES A DAY AS NEEDED HIDRADENITIS    BD Zandra 2nd Gen Pen Needle 32 gauge x 5/32\" ndle USE AS DIRECTED TO INJECT INSULIN 4 TIMES DAILY    Insulin Syringe-Needle U-100 1 mL 31 gauge x 5/16 syrg Use as directed three times daily    True Metrix Glucose Test Strip strip Use to check blood sugar four times a day. DX E10.65    OTHER HAS A PARAGUARD BIRTH CONTROL     naproxen (NAPROSYN) 375 mg tablet Take 1 Tab by mouth two (2) times daily (with meals). (Patient taking differently: Take 375 mg by mouth as needed.)    Blood-Glucose Meter monitoring kit Check blood sugar four times a day    glucose blood VI test strips (BLOOD GLUCOSE TEST) strip Use to check blood sugar four times a day.  lancets misc Use to check blood sugar four times a day.  FREESTYLE PRECISION KIRK STRIPS strip Use as needed up to twice daily with [facility] reader to check blood sugar when having having problems with the [facility] sensors    ONETOUCH ULTRA BLUE TEST STRIP strip Test 4 times daily    cetirizine (ZYRTEC) 10 mg tablet Take 10 mg by mouth as needed. No current facility-administered medications for this visit. Allergies   Allergen Reactions    Codeine Nausea and Vomiting     Review of Systems: PER HPI    Physical Examination:  Blood pressure (!) 142/68, pulse 80, height 5' 3\" (1.6 m), weight 215 lb (97.5 kg).   - General: pleasant, no distress, good eye contact   - Neck: no carotid bruits  - Cardiovascular: regular, normal rate, nl s1 and s2, no m/r/g,   - Respiratory: clear bilaterally  - Integumentary: no edema, - Psychiatric: normal mood and affect    Diabetic foot exam:     Left Foot:   Visual Exam: callous - mild   Pulse DP: 2+ (normal)   Filament test: normal sensation    Vibratory sensation: normal      Right Foot:   Visual Exam: callous - mild   Pulse DP: 2+ (normal)   Filament test: normal sensation    Vibratory sensation: normal        Data Reviewed:   Component      Latest Ref Rng & Units 10/25/2021   Glucose      65 - 99 mg/dL 134 (H)   BUN      6 - 20 mg/dL 14   Creatinine      0.57 - 1.00 mg/dL 0.84   GFR est non-AA      >59 mL/min/1.73 88   GFR est AA      >59 mL/min/1.73 102   BUN/Creatinine ratio      9 - 23 17   Sodium      134 - 144 mmol/L 137   Potassium      3.5 - 5.2 mmol/L 3.8   Chloride      96 - 106 mmol/L 101   CO2      20 - 29 mmol/L 25   Calcium      8.7 - 10.2 mg/dL 8.9   Protein, total      6.0 - 8.5 g/dL 6.9   Albumin      3.8 - 4.8 g/dL 3.7 (L)   GLOBULIN, TOTAL      1.5 - 4.5 g/dL 3.2   A-G Ratio      1.2 - 2.2 1.2   Bilirubin, total      0.0 - 1.2 mg/dL 0.4   Alk. phosphatase      44 - 121 IU/L 78   AST      0 - 40 IU/L 20   ALT      0 - 32 IU/L 17   Cholesterol, total      100 - 199 mg/dL 156   Triglyceride      0 - 149 mg/dL 29   HDL Cholesterol      >39 mg/dL 81   VLDL, calculated      5 - 40 mg/dL 8   LDL, calculated      0 - 99 mg/dL 67   Creatinine, urine      Not Estab. mg/dL 146.7   Microalbumin, urine      Not Estab. ug/mL 8.7   Microalbumin/Creat.  Ratio      0 - 29 mg/g creat 6   Hemoglobin A1c, (calculated)      4.8 - 5.6 % 7.9 (H)   Estimated average glucose      mg/dL 180   VITAMIN D, 25-HYDROXY      30.0 - 100.0 ng/mL 55.6       Assessment/Plan:     1) Type 1 DM uncontrolled: Her most recent Hgb A1c was 7.9% in 10/21 up from 7.3% in 4/21 down from 8.4% in 2/21 up from 8.1% in 10/20 down from 8.8% in 9/20 down from 9% in 12/19 up from 8.6% in 10/19 up from 7.8% in 6/19 up from 7.1% in 2/19 stable from 1/19 stable from 12/18 down from 7.2% in 11/18 up from 6.8% in 10/18 down from 7.5% in 9/18 down from 8.2% in 6/18 up from 8% in 1/18 (she was at [facility] from 11/12 to 1/18 and states A1c values were in the 7-8% range) down from 8.8% in January 2012 stable from 8.9% in September 2011 up from 7.5% in May 2010. Need more data to see what changes need to be made. - cont current pump settings for now   - Check bs 4x/day due to fluctuating sugars  - cont dexcom  - optho UTD 7/17  - foot exam done 11/21  - microalbumin nl 10/21  - check POC A1c at next visit  - check Hgb A1c and cmp and microalbumin in 10/22      2) HTN NOS (401.9): her BP was above goal < 140/90 but didn't take meds this morning   - cont lisinopril 40 mg daily  - cont hctz 12.5 mg daily  - monitor home blood pressure readings      3) Vitamin D deficiency: Level was 11.1 in 9/11. Started on ergo at that time and level up to 24 in January 2012. Still 25.9 in 2/18 so advised to take 4000 units daily but has not been doing this and level was 27.4 in 10/18 and 47 in 6/19 and 66 in 10/20 and 55 in 10/21 on 5000 units daily  - check Vitamin D 25-OH level in 10/21  - cont vitamin D 5000 units daily    4) Hyperlipidemia with goal LDL < 100: LDL 65 6/19 and 106 in 10/19 and in 9/20 and PCP started atorvastatin 10 mg daily and down to 71 in 10/20 but stopped this for now given age < 40 and up to 93 in 2/21 and down to 78 in 4/21 and 67 in 10/21  - diet control  - check lipids in 10/22    Patient Instructions   1) Your Hemoglobin A1c (3 month test of blood sugar) was 7.9% up from 7.3%. Let me know when you have uploaded more data to Dexcom and I'll take a look for any other trends. 2) Your liver and kidney and urine and vitamin D and cholesterol are all normal.    3) Start monitoring blood pressure about 2-3 times per week at alternating times either in the morning or evening after resting for 5 minutes and sitting upright in a chair with your arm at heart level.  Please let me know if you are having readings over 140 on the top number or 90 on the bottom number. 4) Your weight is down from 248 in 12/19 to 215 today. Keep up the good work. 5) You won't need labs before the next visit and we'll do the A1c in the office. Follow-up and Dispositions    · Return in about 6 months (around 5/1/2022). Copy sent to:   Johnnie Segundo MD (Obstetrics & Gynecology)  Anny Oseguera MD (Surgical Oncology)  Jorge Gustafson MD (Hematology and Oncology)  Dr. Dorie Britt via [facility]

## 2021-11-16 ENCOUNTER — TELEPHONE (OUTPATIENT)
Dept: FAMILY MEDICINE CLINIC | Age: 36
End: 2021-11-16

## 2021-11-16 ENCOUNTER — TELEPHONE (OUTPATIENT)
Dept: ENDOCRINOLOGY | Age: 36
End: 2021-11-16

## 2021-11-16 NOTE — TELEPHONE ENCOUNTER
BARBIE to schedule         ----- Message from Jim Ibrahim sent at 11/16/2021 12:49 PM EST -----  Subject: Appointment Request    Reason for Call: Flu Shot    QUESTIONS  Type of Appointment? Established Patient  Reason for appointment request? No appointments available during search  Additional Information for Provider? Pt just wants to come in for a flu   shot; please call pt  ---------------------------------------------------------------------------  --------------  CALL BACK INFO  What is the best way for the office to contact you? OK to leave message on   voicemail  Preferred Call Back Phone Number? [phone]  ---------------------------------------------------------------------------  --------------  SCRIPT ANSWERS  Relationship to Patient? Self   Is the Adult patient requesting a Flu Shot? Yes  Does the patient have a question for their provider regarding the flu   shot? No  Have you been diagnosed with, awaiting test results for, or told that you   are suspected of having COVID-19 (Coronavirus)? (If patient has tested   negative or was tested as a requirement for work, school, or travel and   not based on symptoms, answer no)? No  Within the past two weeks have you developed any of the following symptoms   (answer no if symptoms have been present longer than 2 weeks or began   more than 2 weeks ago)? Fever or Chills, Cough, Shortness of breath or   difficulty breathing, Loss of taste or smell, Sore throat, Nasal   congestion, Sneezing or runny nose, Fatigue or generalized body aches   (answer no if pain is specific to a body part e.g. back pain), Diarrhea,   Headache? No  Have you had close contact with someone with COVID-19 in the last 14 days? No  (Service Expert - click yes below to proceed with [employer] As Usual   Scheduling)?  Yes

## 2021-11-17 NOTE — TELEPHONE ENCOUNTER
Please call pt to let her know she has an unread message in [employer] from 2 weeks ago:    I got a chance to look at your download and what is difficult to tell is the context of your tracings.  You have some days where your sugars are perfect and other days where you spike up either during the day or overnight and I don't know exactly when these correlate with your periods or issues with not counting carbs correctly. [facility] don't you upload your data in 2 more weeks and send me an attachment that keeps track of the spikes over 180 and whether you think they are food related or stress or period related or something else.  Thanks.

## 2021-11-29 NOTE — PROGRESS NOTES
Chief Complaint   Patient presents with    Diabetes    Obesity    Hypertension     Pt concerned with right ear pain. 1. Have you been to the ER, urgent care clinic since your last visit? Hospitalized since your last visit? No    2. Have you seen or consulted any other health care providers outside of the [address] [place] [address] since your last visit? Include any pap smears or colon screening. Yes, Ortho    Pt accepts flu shot. Verbal Order with Readback given by Mckenna Mckeon NP for Influenza. Given in Right Deltoid without difficulty.   PAP - Dr. Diaz Splinter Maintenance Due   Topic Date Due    PAP AKA CERVICAL CYTOLOGY  06/07/2014    EYE EXAM RETINAL OR DILATED  07/06/2019    Influenza Age 9 to Adult  08/01/2019 Dapsone Pregnancy And Lactation Text: This medication is Pregnancy Category C and is not considered safe during pregnancy or breast feeding.

## 2021-12-09 ENCOUNTER — NURSE TRIAGE (OUTPATIENT)
Dept: OTHER | Facility: CLINIC | Age: 36
End: 2021-12-09

## 2021-12-09 ENCOUNTER — HOSPITAL ENCOUNTER (EMERGENCY)
Age: 36
Discharge: HOME | End: 2021-12-09
Attending: STUDENT IN AN ORGANIZED HEALTH CARE EDUCATION/TRAINING PROGRAM
Payer: MEDICAID

## 2021-12-09 ENCOUNTER — APPOINTMENT (OUTPATIENT)
Dept: CT IMAGING | Age: 36
End: 2021-12-09
Attending: STUDENT IN AN ORGANIZED HEALTH CARE EDUCATION/TRAINING PROGRAM
Payer: MEDICAID

## 2021-12-09 VITALS
BODY MASS INDEX: 38.4 KG/M2 | HEIGHT: 63 IN | RESPIRATION RATE: 16 BRPM | TEMPERATURE: 98.5 F | WEIGHT: 216.71 LBS | SYSTOLIC BLOOD PRESSURE: 154 MMHG | OXYGEN SATURATION: 99 % | HEART RATE: 65 BPM | DIASTOLIC BLOOD PRESSURE: 79 MMHG

## 2021-12-09 DIAGNOSIS — R11.0 NAUSEA WITHOUT VOMITING: ICD-10-CM

## 2021-12-09 DIAGNOSIS — R10.9 ABDOMINAL DISCOMFORT: Primary | ICD-10-CM

## 2021-12-09 LAB
ALBUMIN SERPL-MCNC: 3.4 G/DL (ref 3.5–5)
ALBUMIN/GLOB SERPL: 0.8 {RATIO} (ref 1.1–2.2)
ALP SERPL-CCNC: 87 U/L (ref 45–117)
ALT SERPL-CCNC: 25 U/L (ref 12–78)
ANION GAP SERPL CALC-SCNC: 5 MMOL/L (ref 5–15)
APPEARANCE UR: CLEAR
AST SERPL-CCNC: 13 U/L (ref 15–37)
BACTERIA URNS QL MICRO: NEGATIVE /HPF
BASOPHILS # BLD: 0 K/UL (ref 0–0.1)
BASOPHILS NFR BLD: 0 % (ref 0–1)
BILIRUB SERPL-MCNC: 0.3 MG/DL (ref 0.2–1)
BILIRUB UR QL: NEGATIVE
BUN SERPL-MCNC: 15 MG/DL (ref 6–20)
BUN/CREAT SERPL: 16 (ref 12–20)
CALCIUM SERPL-MCNC: 8.7 MG/DL (ref 8.5–10.1)
CHLORIDE SERPL-SCNC: 103 MMOL/L (ref 97–108)
CO2 SERPL-SCNC: 30 MMOL/L (ref 21–32)
COLOR UR: ABNORMAL
CREAT SERPL-MCNC: 0.95 MG/DL (ref 0.55–1.02)
DIFFERENTIAL METHOD BLD: NORMAL
EOSINOPHIL # BLD: 0.1 K/UL (ref 0–0.4)
EOSINOPHIL NFR BLD: 2 % (ref 0–7)
EPITH CASTS URNS QL MICRO: ABNORMAL /LPF
ERYTHROCYTE [DISTWIDTH] IN BLOOD BY AUTOMATED COUNT: 12.5 % (ref 11.5–14.5)
GLOBULIN SER CALC-MCNC: 4.5 G/DL (ref 2–4)
GLUCOSE SERPL-MCNC: 133 MG/DL (ref 65–100)
GLUCOSE UR STRIP.AUTO-MCNC: NEGATIVE MG/DL
HCG UR QL: NEGATIVE
HCT VFR BLD AUTO: 38.8 % (ref 35–47)
HGB BLD-MCNC: 12.6 G/DL (ref 11.5–16)
HGB UR QL STRIP: NEGATIVE
IMM GRANULOCYTES # BLD AUTO: 0 K/UL (ref 0–0.04)
IMM GRANULOCYTES NFR BLD AUTO: 0 % (ref 0–0.5)
KETONES UR QL STRIP.AUTO: NEGATIVE MG/DL
LEUKOCYTE ESTERASE UR QL STRIP.AUTO: ABNORMAL
LIPASE SERPL-CCNC: 49 U/L (ref 73–393)
LYMPHOCYTES # BLD: 2 K/UL (ref 0.8–3.5)
LYMPHOCYTES NFR BLD: 30 % (ref 12–49)
MAGNESIUM SERPL-MCNC: 1.7 MG/DL (ref 1.6–2.4)
MCH RBC QN AUTO: 29.4 PG (ref 26–34)
MCHC RBC AUTO-ENTMCNC: 32.5 G/DL (ref 30–36.5)
MCV RBC AUTO: 90.7 FL (ref 80–99)
MONOCYTES # BLD: 0.6 K/UL (ref 0–1)
MONOCYTES NFR BLD: 9 % (ref 5–13)
NEUTS SEG # BLD: 4.1 K/UL (ref 1.8–8)
NEUTS SEG NFR BLD: 59 % (ref 32–75)
NITRITE UR QL STRIP.AUTO: NEGATIVE
NRBC # BLD: 0 K/UL (ref 0–0.01)
NRBC BLD-RTO: 0 PER 100 WBC
PH UR STRIP: 6 [PH] (ref 5–8)
PLATELET # BLD AUTO: 318 K/UL (ref 150–400)
PMV BLD AUTO: 9.9 FL (ref 8.9–12.9)
POTASSIUM SERPL-SCNC: 3.6 MMOL/L (ref 3.5–5.1)
PROT SERPL-MCNC: 7.9 G/DL (ref 6.4–8.2)
PROT UR STRIP-MCNC: NEGATIVE MG/DL
RBC # BLD AUTO: 4.28 M/UL (ref 3.8–5.2)
RBC #/AREA URNS HPF: ABNORMAL /HPF (ref 0–5)
SODIUM SERPL-SCNC: 138 MMOL/L (ref 136–145)
SP GR UR REFRACTOMETRY: 1.02 (ref 1–1.03)
UR CULT HOLD, URHOLD: NORMAL
UROBILINOGEN UR QL STRIP.AUTO: 0.2 EU/DL (ref 0.2–1)
WBC # BLD AUTO: 6.9 K/UL (ref 3.6–11)
WBC URNS QL MICRO: ABNORMAL /HPF (ref 0–4)

## 2021-12-09 PROCEDURE — 74176 CT ABD & PELVIS W/O CONTRAST: CPT

## 2021-12-09 PROCEDURE — 83735 ASSAY OF MAGNESIUM: CPT

## 2021-12-09 PROCEDURE — 74011250636 HC RX REV CODE- 250/636: Performed by: STUDENT IN AN ORGANIZED HEALTH CARE EDUCATION/TRAINING PROGRAM

## 2021-12-09 PROCEDURE — 83690 ASSAY OF LIPASE: CPT

## 2021-12-09 PROCEDURE — 85025 COMPLETE CBC W/AUTO DIFF WBC: CPT

## 2021-12-09 PROCEDURE — 80053 COMPREHEN METABOLIC PANEL: CPT

## 2021-12-09 PROCEDURE — 96374 THER/PROPH/DIAG INJ IV PUSH: CPT

## 2021-12-09 PROCEDURE — 81001 URINALYSIS AUTO W/SCOPE: CPT

## 2021-12-09 PROCEDURE — 36415 COLL VENOUS BLD VENIPUNCTURE: CPT

## 2021-12-09 PROCEDURE — 99284 EMERGENCY DEPT VISIT MOD MDM: CPT

## 2021-12-09 PROCEDURE — 81025 URINE PREGNANCY TEST: CPT

## 2021-12-09 RX ORDER — FAMOTIDINE 20 MG/1
20 TABLET, FILM COATED ORAL 2 TIMES DAILY
Qty: 28 TABLET | Refills: 0 | Status: SHIPPED | OUTPATIENT
Start: 2021-12-09 | End: 2021-12-23

## 2021-12-09 RX ORDER — ONDANSETRON 4 MG/1
4 TABLET, ORALLY DISINTEGRATING ORAL
Qty: 12 TABLET | Refills: 0 | OUTPATIENT
Start: 2021-12-09 | End: 2022-09-13

## 2021-12-09 RX ORDER — ONDANSETRON 2 MG/ML
4 INJECTION INTRAMUSCULAR; INTRAVENOUS
Status: COMPLETED | OUTPATIENT
Start: 2021-12-09 | End: 2021-12-09

## 2021-12-09 RX ADMIN — SODIUM CHLORIDE 1000 ML: 9 INJECTION, SOLUTION INTRAVENOUS at 11:26

## 2021-12-09 RX ADMIN — ONDANSETRON 4 MG: 2 INJECTION INTRAMUSCULAR; INTRAVENOUS at 11:36

## 2021-12-09 NOTE — LETTER
[address] EMERGENCY DEPT  [address] [facility] [address]  [phone]    Work/School Note    Date: 12/9/2021    To Whom It May concern:    Jose Sky was seen and treated today in the emergency room by the following provider(s):  Attending Provider: Jessica Rapp MD.      Jose Sky may return to work on 12/11/2021.     Sincerely,          Jh Martini

## 2021-12-09 NOTE — ED TRIAGE NOTES
Patient was ot able to see her doctor today for a complaint that started on Monday. Has had nausea and hot flashes and a dull headache. Has had IUD problems for 2 years. Also taking OTC \"belly medications. \" Not sure if she is pregnant. Ate a very large breakfast today. The symptoms \"always get worse after eating. \" no vomiting no diarrhea

## 2021-12-09 NOTE — ED NOTES
The patient was discharged home by provider in stable condition. The patient is alert and oriented, in no respiratory distress and discharge vital signs obtained. The patient's diagnosis, condition and treatment were explained. The patient expressed understanding. Two prescriptions given. Work note given. A discharge plan has been developed. A  was not involved in the process. Aftercare instructions were given. Pt ambulatory out of the ED.

## 2021-12-09 NOTE — ED PROVIDER NOTES
Sharon James is a 43 y.o. female G2   with past medical history notable for htn, type 1 dm, hx of chemotherapy-induced cardiomyopathy but has had recover cardiac function since presenting with nausea, abdominal discomfort. Initially was epigastric but now patient states is diffuse. Abdominal pain is mild to moderate, crampy and not associated with urination, defecation, movement. Appears to be somewhat improved after she eats. She also notes 3 days of hot flashes which she describes as feeling very hot but not diaphoretic over 5 minutes at a time, over 5 episodes per day over the last 3 days. She had transient increase in her glucose yesterday after she had a cortisone injection in her hand but has since recovered, a.m. glucose was 140 this morning. Her last menstrual period was 2021 but she has had irregular menstrual use, has history of uterine fibroids. [facility]   Lynette Motley MD PCP            Past Medical History:   Diagnosis Date    Breast cancer [facility]) 2012    Right breast infiltrating ductal carcinoma    Cardiomyopathy due to chemotherapy ([address]     EF 20 %    Essential hypertension     Gestational hypertension     History of sepsis 2013    after chemo    Hx of difficult intubation     resulting from sepsis in 2013.  Hypertension     Morbid obesity ([address]     Ne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 ([address]     Type I (juvenile type) diabetes mellitus without mention of complication, uncontrolled     diagnosed age 4    Unspecified vitamin D deficiency 2011       Past Surgical History:   Procedure Laterality Date    HX  SECTION      HX CYST INCISION AND DRAINAGE  2013    perirectal abscess    HX GYN       in     HX MASTECTOMY Right     Right MRM with sentinel LNB.  HX ORTHOPAEDIC Right     Carpal tunnel release, index finger trigger release.     HX OTHER SURGICAL      cyst removed under left arm    HX OTHER SURGICAL      port placement for chemo    HX WISDOM TEETH EXTRACTION  08/15/2018    CT BREAST SURGERY PROCEDURE UNLISTED  2013    R Breast Mastectomy         Family History:   Problem Relation Age of Onset    Diabetes Brother         borderline Type 2    Diabetes Paternal Uncle     Diabetes Paternal Grandfather     Heart Disease Paternal Grandfather         MI in his 62s    Hypertension Mother     Heart Disease Father     Stroke Neg Hx        Social History     Socioeconomic History    Marital status:      Spouse name: Not on file    Number of children: Not on file    Years of education: Not on file    Highest education level: Not on file   Occupational History    Not on file   Tobacco Use    Smoking status: Never Smoker    Smokeless tobacco: Never Used   Vaping Use    Vaping Use: Never used   Substance and Sexual Activity    Alcohol use: No    Drug use: No    Sexual activity: Yes     Partners: Male   Other Topics Concern     Service Not Asked    Blood Transfusions Not Asked    Caffeine Concern Not Asked    Occupational Exposure Not Asked    Hobby Hazards Not Asked    Sleep Concern Not Asked    Stress Concern Not Asked    Weight Concern Not Asked    Special Diet Not Asked    Back Care Not Asked    Exercise Not Asked    Bike Helmet Not Asked   [address] Not Asked    Self-Exams Not Asked   Social History Narrative    Lives in [facility] with her  and 6 yo son. Got  in July 2017. Currently in [address] [place] [address] for a masters in [employer]. Social Determinants of Health     Financial Resource Strain:     Difficulty of Paying Living Expenses: Not on file   Food Insecurity:     Worried About Running Out of Food in the Last Year: Not on file    Cheri of Food in the Last Year: Not on file   Transportation Needs:     Lack of Transportation (Medical): Not on file    Lack of Transportation (Non-Medical):  Not on file   Physical Activity:     Days of Exercise per Week: Not on file    Minutes of Exercise per Session: Not on file   Stress:     Feeling of Stress : Not on file   Social Connections:     Frequency of Communication with Friends and Family: Not on file    Frequency of Social Gatherings with Friends and Family: Not on file    Attends Yazidi Services: Not on file    Active Member of Clubs or Organizations: Not on file    Attends Club or Organization Meetings: Not on file    Marital Status: Not on file   Intimate Partner Violence:     Fear of Current or Ex-Partner: Not on file    Emotionally Abused: Not on file    Physically Abused: Not on file    Sexually Abused: Not on file   Housing Stability:     Unable to Pay for Housing in the Last Year: Not on file    Number of [place] in the Last Year: Not on file    Unstable Housing in the Last Year: Not on file         ALLERGIES: Codeine    Review of Systems   Constitutional: Positive for fatigue. Negative for chills and fever. Eyes: Negative for photophobia. Respiratory: Negative for shortness of breath. Cardiovascular: Negative for chest pain. Gastrointestinal: Negative for abdominal pain, nausea and vomiting. Genitourinary: Negative for dysuria. Musculoskeletal: Negative for back pain. Neurological: Positive for light-headedness. Negative for syncope and headaches. Psychiatric/Behavioral: Negative for confusion. All other systems reviewed and are negative. Vitals:    12/09/21 1025 12/09/21 1237 12/09/21 1400   BP: (!) 163/84 (!) 148/73 (!) 154/79   Pulse: 65 65 65   Resp: 14 15 16   Temp: 98.5 °F (36.9 °C)     SpO2: 100% 100% 99%   Weight: 98.3 kg (216 lb 11.4 oz)     Height: 5' 3\" (1.6 m)              Physical Exam  Vitals and nursing note reviewed. Constitutional:       General: She is not in acute distress. Appearance: She is well-developed. She is not toxic-appearing. HENT:      Head: Normocephalic and atraumatic.       Mouth/Throat:      Mouth: Mucous membranes are moist.      Pharynx: No oropharyngeal exudate. Eyes:      Extraocular Movements: Extraocular movements intact. Pupils: Pupils are equal, round, and reactive to light. Cardiovascular:      Rate and Rhythm: Normal rate and regular rhythm. Heart sounds: Normal heart sounds. Pulmonary:      Effort: Pulmonary effort is normal. No respiratory distress. Breath sounds: Normal breath sounds. Chest:      Chest wall: No tenderness. Abdominal:      General: There is no distension. Palpations: Abdomen is soft. Tenderness: There is abdominal tenderness ( suprapubic). Musculoskeletal:         General: No deformity. Cervical back: Normal range of motion. Right lower leg: No edema. Left lower leg: No edema. Comments: Entire spine palpated, no tenderness or deformity. Skin:     General: Skin is warm and dry. Capillary Refill: Capillary refill takes less than 2 seconds. Neurological:      General: No focal deficit present. Mental Status: She is alert and oriented to person, place, and time. Psychiatric:         Mood and Affect: Mood normal.          [facility]  ED Course as of 21 1625   Thu Dec 09, 2021   1328 Feeling better, still has mild abdominal discomfort but improved. Explained that I am not aware of any clinical significance of slightly lower than normal range lipase. Otherwise labs were normal. [NS]   6360 Advised to follow-up with primary care, may need GI referral if she has persistent symptoms. I suspect however that she has gastroenteritis or another transient abdominal issue which may resolve with supportive care. CT was negative. [NS]      ED Course User Index  [NS] Kayy León MD        MEDICAL DECISION MAKIN y.o. female presents with Nausea      Initially concerned she might be pregnant although her urine pregnancy was negative, CT negative for acute abnormality and labs at baseline.       Repeat exam is reassuring, no peritoneal signs  LABORATORY TESTS:  Labs Reviewed   URINALYSIS W/MICROSCOPIC - Abnormal; Notable for the following components:       Result Value    Leukocyte Esterase TRACE (*)     All other components within normal limits   METABOLIC PANEL, COMPREHENSIVE - Abnormal; Notable for the following components:    Glucose 133 (*)     AST (SGOT) 13 (*)     Albumin 3.4 (*)     Globulin 4.5 (*)     A-G Ratio 0.8 (*)     All other components within normal limits   LIPASE - Abnormal; Notable for the following components:    Lipase 49 (*)     All other components within normal limits   URINE CULTURE HOLD SAMPLE   CBC WITH AUTOMATED DIFF   MAGNESIUM   SAMPLES BEING HELD   HCG URINE, QL. - POC       IMAGING RESULTS:  CT ABD PELV WO CONT   Final Result   No acute abnormality          MEDICATIONS GIVEN:  Medications   sodium chloride 0.9 % bolus infusion 1,000 mL (0 mL IntraVENous IV Completed 12/9/21 1215)   ondansetron (ZOFRAN) injection 4 mg (4 mg IntraVENous Given 12/9/21 1136)       PROGRESS NOTE:   4:25 PM Patient's symptoms have improved after treatment    EKG:  none completed    CONSULTS:  none    IMPRESSION:  1. Abdominal discomfort    2. Nausea without vomiting        PLAN:  -   Discharge  Discharge Medication List as of 12/9/2021  1:29 PM      START taking these medications    Details   ondansetron (Zofran ODT) 4 mg disintegrating tablet 1 Tablet by SubLINGual route every eight (8) hours as needed for Nausea or Vomiting., Normal, Disp-12 Tablet, R-0      famotidine (Pepcid) 20 mg tablet Take 1 Tablet by mouth two (2) times a day for 14 days. , Normal, Disp-28 Tablet, R-0         CONTINUE these medications which have NOT CHANGED    Details   buPROPion SR (WELLBUTRIN SR) 150 mg SR tablet TAKE 1 TABLET BY MOUTH TWICE A DAY, Normal, Disp-180 Tablet, R-1      hydroCHLOROthiazide (HYDRODIURIL) 12.5 mg tablet Take 1 Tablet by mouth daily. , Normal, Disp-90 Tablet, R-2      cholecalciferol (VITAMIN D3) (5000 Units/125 mcg) tab tablet Take by mouth daily. , Historical Med      blood-glucose sensor (DEXCOM G6 SENSOR) by Does Not Apply route., Historical Med      blood-glucose transmitter (DEXCOM G6 TRANSMITTER) by Does Not Apply route., Historical Med      lisinopriL (PRINIVIL, ZESTRIL) 40 mg tablet TAKE 1 TAB BY MOUTH ONCE DAILY, Normal, Disp-90 Tab, R-3      subcutaneous insulin pump (OMNIPOD INSULIN MANAGEMENT) by Does Not Apply route., Historical Med      insulin aspart U-100 (NovoLOG U-100 Insulin aspart) 100 unit/mL injection Use as directed in insulin pump. Max 100 units/day., Normal, Disp-30 mL, R-11      !! True Metrix Glucose Test Strip strip Use to check blood sugar four times a day. DX E10.65, Normal, Disp-400 Strip, R-3, JENNIFFER      Blood-Glucose Meter monitoring kit Check blood sugar four times a day, Normal, Disp-1 Kit, R-0      lancets misc Use to check blood sugar four times a day., Normal, Disp-400 Each, R-3      !! ONETOUCH ULTRA BLUE TEST STRIP strip Test 4 times daily, Normal, Disp-400 Strip, R-3, JENNIFFER      INTRAUTERINE DEVICE, IUD, IU by IntraUTERine route., Historical Med      BD Zandra 2nd Gen Pen Needle 32 gauge x 5/32\" ndle USE AS DIRECTED TO INJECT INSULIN 4 TIMES DAILY, Normal, Disp-400 Pen Needle, R-3, JENNIFEFR      Insulin Syringe-Needle U-100 1 mL 31 gauge x 5/16 syrg Use as directed three times daily, Normal, Disp-100 Syringe, R-11      OTHER HAS A PARAGUARD BIRTH CONTROL , Historical Med      naproxen (NAPROSYN) 375 mg tablet Take 1 Tab by mouth two (2) times daily (with meals). , Normal, Disp-60 Tab,R-3      !! glucose blood VI test strips (BLOOD GLUCOSE TEST) strip Use to check blood sugar four times a day., Normal, Disp-400 Strip, R-3      !! FREESTYLE PRECISION KIRK STRIPS strip Use as needed up to twice daily with [facility] reader to check blood sugar when having having problems with the [facility] sensors, Normal, Disp-50 Strip, R-11, JENNIFFER      cetirizine (ZYRTEC) 10 mg tablet Take 10 mg by mouth as needed., Historical Med       !! - Potential duplicate medications found. Please discuss with provider. Follow-up Information     Follow up With Specialties Details Why Rahul Clay MD Family Medicine, Bariatrics Schedule an appointment as soon as possible for a visit  Call for a follow up appointment. [address] [id number]  [phone]          Return precautions given      Prachi Swanson MD          Please note that this dictation was completed with [employer], the computer voice recognition software. Quite often unanticipated grammatical, syntax, homophones, and other interpretive errors are inadvertently transcribed by the computer software. Please disregard these errors. Please excuse any errors that have escaped final proofreading.       Procedures

## 2021-12-09 NOTE — ED NOTES
Patient tolerated IV blood draw and has received IV medications per orders. Only c/o slight nausea at this time. Awaiting test results and lights dimmed in the room for comfort. Now talking on the phone to her family.

## 2021-12-09 NOTE — TELEPHONE ENCOUNTER
Received call from Edis at [facility] with Red Flag Complaint. Brief description of triage: Nausea, hot flashes, and mild abdominal pain around belly button to touch. Denies any diarrhea or constipation, last bm this morning. Pt notes that her BS has been in the 200's before bed but had a cortisone injection in her hand on 12/7/2021. BS this morning was 140    Triage indicates for patient to be seen in the office today or tomorrow. Care advice provided, patient verbalizes understanding; denies any other questions or concerns; instructed to call back for any new or worsening symptoms. Teams message to [facility] for appointment scheduling. Reason for Disposition   Patient wants to be seen    Answer Assessment - Initial Assessment Questions  1. NAUSEA SEVERITY: \"How bad is the nausea? \" (e.g., mild, moderate, severe; dehydration, weight loss)    - MILD: loss of appetite without change in eating habits    - MODERATE: decreased oral intake without significant weight loss, dehydration, or malnutrition    - SEVERE: inadequate caloric or fluid intake, significant weight loss, symptoms of dehydration      Mild    2. ONSET: \"When did the nausea begin? \"      12/6/2021    3. VOMITING: \"Any vomiting? \" If so, ask: \"How many times today? \"      Denies    4. RECURRENT SYMPTOM: \"Have you had nausea before? \" If so, ask: \"When was the last time? \" \"What happened that time? \"     Yes, when she was last pregnant    5. CAUSE: \"What do you think is causing the nausea? \"      Unknown    6. PREGNANCY: \"Is there any chance you are pregnant? \" (e.g., unprotected intercourse, missed birth control pill, broken condom)     Unsure; LMP - 11/3/2021; Has an IUD, states she took a home pregnancy test on 12/8/2021 - Negative - has been experiencing irregular periods    Protocols used: NAUSEA-ADULT-OH    Attention Provider: Thank you for allowing me to participate in the care of your patient.   The patient was connected to triage in response to information provided to the [facility]. Please do not respond through this encounter as the response is not directed to a shared pool.

## 2022-02-22 RX ORDER — INSULIN ASPART 100 [IU]/ML
INJECTION, SOLUTION INTRAVENOUS; SUBCUTANEOUS
Qty: 30 ML | Refills: 11 | Status: SHIPPED | OUTPATIENT
Start: 2022-02-22

## 2022-02-22 RX ORDER — CALCIUM CITRATE/VITAMIN D3 200MG-6.25
TABLET ORAL
Qty: 200 STRIP | Refills: 11 | Status: SHIPPED | OUTPATIENT
Start: 2022-02-22 | End: 2022-03-28

## 2022-02-27 ENCOUNTER — HOSPITAL ENCOUNTER (EMERGENCY)
Age: 37
Discharge: HOME | End: 2022-02-27
Attending: EMERGENCY MEDICINE
Payer: MEDICAID

## 2022-02-27 VITALS
HEART RATE: 76 BPM | BODY MASS INDEX: 40.55 KG/M2 | WEIGHT: 228.84 LBS | TEMPERATURE: 97.3 F | DIASTOLIC BLOOD PRESSURE: 81 MMHG | HEIGHT: 63 IN | SYSTOLIC BLOOD PRESSURE: 139 MMHG | OXYGEN SATURATION: 98 % | RESPIRATION RATE: 16 BRPM

## 2022-02-27 DIAGNOSIS — R11.0 NAUSEA WITHOUT VOMITING: ICD-10-CM

## 2022-02-27 DIAGNOSIS — R42 DIZZY SPELLS: Primary | ICD-10-CM

## 2022-02-27 LAB
ALBUMIN SERPL-MCNC: 3.3 G/DL (ref 3.5–5)
ALBUMIN/GLOB SERPL: 0.8 {RATIO} (ref 1.1–2.2)
ALP SERPL-CCNC: 88 U/L (ref 45–117)
ALT SERPL-CCNC: 23 U/L (ref 12–78)
ANION GAP SERPL CALC-SCNC: 6 MMOL/L (ref 5–15)
APPEARANCE UR: CLEAR
AST SERPL-CCNC: 18 U/L (ref 15–37)
BASOPHILS # BLD: 0 K/UL (ref 0–0.1)
BASOPHILS NFR BLD: 0 % (ref 0–1)
BILIRUB SERPL-MCNC: 0.2 MG/DL (ref 0.2–1)
BILIRUB UR QL: NEGATIVE
BUN SERPL-MCNC: 20 MG/DL (ref 6–20)
BUN/CREAT SERPL: 18 (ref 12–20)
CALCIUM SERPL-MCNC: 8.8 MG/DL (ref 8.5–10.1)
CHLORIDE SERPL-SCNC: 103 MMOL/L (ref 97–108)
CO2 SERPL-SCNC: 28 MMOL/L (ref 21–32)
COLOR UR: ABNORMAL
COMMENT, HOLDF: NORMAL
CREAT SERPL-MCNC: 1.14 MG/DL (ref 0.55–1.02)
DIFFERENTIAL METHOD BLD: NORMAL
EOSINOPHIL # BLD: 0.3 K/UL (ref 0–0.4)
EOSINOPHIL NFR BLD: 4 % (ref 0–7)
ERYTHROCYTE [DISTWIDTH] IN BLOOD BY AUTOMATED COUNT: 13 % (ref 11.5–14.5)
GLOBULIN SER CALC-MCNC: 4.1 G/DL (ref 2–4)
GLUCOSE SERPL-MCNC: 317 MG/DL (ref 65–100)
GLUCOSE UR STRIP.AUTO-MCNC: >1000 MG/DL
HCG UR QL: NEGATIVE
HCT VFR BLD AUTO: 39.1 % (ref 35–47)
HGB BLD-MCNC: 12.5 G/DL (ref 11.5–16)
HGB UR QL STRIP: NEGATIVE
IMM GRANULOCYTES # BLD AUTO: 0 K/UL (ref 0–0.04)
IMM GRANULOCYTES NFR BLD AUTO: 0 % (ref 0–0.5)
KETONES UR QL STRIP.AUTO: ABNORMAL MG/DL
LEUKOCYTE ESTERASE UR QL STRIP.AUTO: NEGATIVE
LYMPHOCYTES # BLD: 2.5 K/UL (ref 0.8–3.5)
LYMPHOCYTES NFR BLD: 35 % (ref 12–49)
MCH RBC QN AUTO: 29.8 PG (ref 26–34)
MCHC RBC AUTO-ENTMCNC: 32 G/DL (ref 30–36.5)
MCV RBC AUTO: 93.1 FL (ref 80–99)
MONOCYTES # BLD: 0.6 K/UL (ref 0–1)
MONOCYTES NFR BLD: 8 % (ref 5–13)
NEUTS SEG # BLD: 3.8 K/UL (ref 1.8–8)
NEUTS SEG NFR BLD: 53 % (ref 32–75)
NITRITE UR QL STRIP.AUTO: NEGATIVE
NRBC # BLD: 0 K/UL (ref 0–0.01)
NRBC BLD-RTO: 0 PER 100 WBC
PH UR STRIP: 7 [PH] (ref 5–8)
PLATELET # BLD AUTO: 297 K/UL (ref 150–400)
PMV BLD AUTO: 10 FL (ref 8.9–12.9)
POTASSIUM SERPL-SCNC: 4.1 MMOL/L (ref 3.5–5.1)
PROT SERPL-MCNC: 7.4 G/DL (ref 6.4–8.2)
PROT UR STRIP-MCNC: NEGATIVE MG/DL
RBC # BLD AUTO: 4.2 M/UL (ref 3.8–5.2)
SAMPLES BEING HELD,HOLD: NORMAL
SODIUM SERPL-SCNC: 137 MMOL/L (ref 136–145)
SP GR UR REFRACTOMETRY: 1.03 (ref 1–1.03)
UR CULT HOLD, URHOLD: NORMAL
UROBILINOGEN UR QL STRIP.AUTO: 1 EU/DL (ref 0.2–1)
WBC # BLD AUTO: 7.2 K/UL (ref 3.6–11)

## 2022-02-27 PROCEDURE — 81025 URINE PREGNANCY TEST: CPT

## 2022-02-27 PROCEDURE — 80053 COMPREHEN METABOLIC PANEL: CPT

## 2022-02-27 PROCEDURE — 74011250636 HC RX REV CODE- 250/636: Performed by: EMERGENCY MEDICINE

## 2022-02-27 PROCEDURE — 36415 COLL VENOUS BLD VENIPUNCTURE: CPT

## 2022-02-27 PROCEDURE — 96374 THER/PROPH/DIAG INJ IV PUSH: CPT

## 2022-02-27 PROCEDURE — 99284 EMERGENCY DEPT VISIT MOD MDM: CPT

## 2022-02-27 PROCEDURE — 85025 COMPLETE CBC W/AUTO DIFF WBC: CPT

## 2022-02-27 PROCEDURE — 81003 URINALYSIS AUTO W/O SCOPE: CPT

## 2022-02-27 RX ORDER — MECLIZINE HYDROCHLORIDE 25 MG/1
50 TABLET ORAL
Status: COMPLETED | OUTPATIENT
Start: 2022-02-27 | End: 2022-02-27

## 2022-02-27 RX ORDER — ONDANSETRON 4 MG/1
4 TABLET, ORALLY DISINTEGRATING ORAL
Qty: 12 TABLET | Refills: 0 | Status: SHIPPED | OUTPATIENT
Start: 2022-02-27 | End: 2022-05-25

## 2022-02-27 RX ORDER — ONDANSETRON 2 MG/ML
4 INJECTION INTRAMUSCULAR; INTRAVENOUS
Status: COMPLETED | OUTPATIENT
Start: 2022-02-27 | End: 2022-02-27

## 2022-02-27 RX ORDER — MECLIZINE HYDROCHLORIDE 25 MG/1
50 TABLET ORAL DAILY
Status: DISCONTINUED | OUTPATIENT
Start: 2022-02-28 | End: 2022-02-27

## 2022-02-27 RX ORDER — MECLIZINE HYDROCHLORIDE 25 MG/1
25 TABLET ORAL
Qty: 21 TABLET | Refills: 0 | Status: SHIPPED | OUTPATIENT
Start: 2022-02-27 | End: 2022-03-09

## 2022-02-27 RX ADMIN — MECLIZINE HYDROCHLORIDE 50 MG: 25 TABLET ORAL at 20:28

## 2022-02-27 RX ADMIN — ONDANSETRON 4 MG: 2 INJECTION INTRAMUSCULAR; INTRAVENOUS at 19:37

## 2022-02-27 NOTE — ED TRIAGE NOTES
Patient presents with intermittent vertigo and nausea for a month-    Patient reports she was having similar symptoms and thought it was related to her IUD so she had it removed and reports the past month her symptoms have been worse    Patient reports her LMP was 1/3/22 but has been irregular since getting her IUD removed-

## 2022-02-28 NOTE — ED NOTES
Discharge teaching/instructions completed, patient verbalized understanding, no questions at this time. Patient drowsy from medication, waiting for ride home.

## 2022-02-28 NOTE — ED PROVIDER NOTES
Date of Service:  2022    Patient:  Melinda Bradshaw    Chief Complaint:  Dizziness       HPI:  Melinda Bradshaw is a 42 y.o.  female who presents for evaluation of dizziness and abdominal bloating/nausea. Patient has 2 complaints. One she has had potentially positional dizziness symptoms here and there over the last month, it happened about 3 or 4 times. Happened today. No recent illness, specifically no head cold congestion or any history of the like. Patient also notes some generalized abdominal bloating and feeling as though she is nauseous. No vomiting. No abdominal pain. No fevers chills dysuria. Patient had a negative home pregnancy test.  She denies other acute complaints           Past Medical History:   Diagnosis Date    Breast cancer [facility]) 2012    Right breast infiltrating ductal carcinoma    Cardiomyopathy due to chemotherapy ([address]     EF 20 %    Essential hypertension     Gestational hypertension     History of sepsis 2013    after chemo    Hx of difficult intubation     resulting from sepsis in 2013.  Hypertension     Morbid obesity ([address]     Ne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 ([address]     Type I (juvenile type) diabetes mellitus without mention of complication, uncontrolled     diagnosed age 4    Unspecified vitamin D deficiency 2011       Past Surgical History:   Procedure Laterality Date    HX  SECTION      HX CYST INCISION AND DRAINAGE  2013    perirectal abscess    HX GYN       in     HX MASTECTOMY Right     Right MRM with sentinel LNB.  HX ORTHOPAEDIC Right     Carpal tunnel release, index finger trigger release.     HX OTHER SURGICAL      cyst removed under left arm    HX OTHER SURGICAL      port placement for chemo    HX WISDOM TEETH EXTRACTION  08/15/2018    MD BREAST SURGERY PROCEDURE UNLISTED      R Breast Mastectomy         Family History:   Problem Relation Age of Onset    Diabetes Brother borderline Type 2    Diabetes Paternal Uncle     Diabetes Paternal Grandfather     Heart Disease Paternal Grandfather         MI in his 62s    Hypertension Mother     Heart Disease Father     Stroke Neg Hx        Social History     Socioeconomic History    Marital status:      Spouse name: Not on file    Number of children: Not on file    Years of education: Not on file    Highest education level: Not on file   Occupational History    Not on file   Tobacco Use    Smoking status: Never Smoker    Smokeless tobacco: Never Used   Vaping Use    Vaping Use: Never used   Substance and Sexual Activity    Alcohol use: No    Drug use: No    Sexual activity: Yes     Partners: Male   Other Topics Concern     Service Not Asked    Blood Transfusions Not Asked    Caffeine Concern Not Asked    Occupational Exposure Not Asked    Hobby Hazards Not Asked    Sleep Concern Not Asked    Stress Concern Not Asked    Weight Concern Not Asked    Special Diet Not Asked    Back Care Not Asked    Exercise Not Asked    Bike Helmet Not Asked   [address] Not Asked    Self-Exams Not Asked   Social History Narrative    Lives in [facility] with her  and 6 yo son. Got  in July 2017. Currently in [address] [place] [address] for a masters in [employer]. Social Determinants of Health     Financial Resource Strain:     Difficulty of Paying Living Expenses: Not on file   Food Insecurity:     Worried About Running Out of Food in the Last Year: Not on file    Cheri of Food in the Last Year: Not on file   Transportation Needs:     Lack of Transportation (Medical): Not on file    Lack of Transportation (Non-Medical):  Not on file   Physical Activity:     Days of Exercise per Week: Not on file    Minutes of Exercise per Session: Not on file   Stress:     Feeling of Stress : Not on file   Social Connections:     Frequency of Communication with Friends and Family: Not on file    Frequency of Social Gatherings with Friends and Family: Not on file    Attends Taoism Services: Not on file    Active Member of Clubs or Organizations: Not on file    Attends Club or Organization Meetings: Not on file    Marital Status: Not on file   Intimate Partner Violence:     Fear of Current or Ex-Partner: Not on file    Emotionally Abused: Not on file    Physically Abused: Not on file    Sexually Abused: Not on file   Housing Stability:     Unable to Pay for Housing in the Last Year: Not on file    Number of [place] in the Last Year: Not on file    Unstable Housing in the Last Year: Not on file         ALLERGIES: Codeine    Review of Systems   Gastrointestinal: Positive for nausea. Negative for vomiting. Neurological: Positive for dizziness. All other systems reviewed and are negative. Vitals:    02/27/22 1852   BP: (!) 155/77   Pulse: 70   Resp: 16   Temp: 97.3 °F (36.3 °C)   SpO2: 100%   Weight: 103.8 kg (228 lb 13.4 oz)   Height: 5' 3\" (1.6 m)            Physical Exam  Vitals and nursing note reviewed. Constitutional:       Appearance: Normal appearance. HENT:      Head: Normocephalic and atraumatic. Nose: Nose normal.      Mouth/Throat:      Mouth: Mucous membranes are moist.   Eyes:      General: No scleral icterus. Cardiovascular:      Rate and Rhythm: Normal rate. Pulmonary:      Effort: Pulmonary effort is normal.   Abdominal:      General: There is no distension. Tenderness: There is no abdominal tenderness. Musculoskeletal:         General: No deformity. Skin:     General: Skin is warm. Capillary Refill: Capillary refill takes less than 2 seconds. Neurological:      Mental Status: She is alert and oriented to person, place, and time.    Psychiatric:         Mood and Affect: Mood normal.          MDM     VITAL SIGNS:  Patient Vitals for the past 4 hrs:   Temp Pulse Resp BP SpO2   02/27/22 2116 -- 76 -- 139/81 98 %   02/27/22 2052 -- -- -- (!) 144/82 96 %   02/27/22 2037 -- -- -- (!) 143/86 95 %   02/27/22 1852 97.3 °F (36.3 °C) 70 16 (!) 155/77 100 %         LABS:  Recent Results (from the past 6 hour(s))   CBC WITH AUTOMATED DIFF    Collection Time: 02/27/22  7:26 PM   Result Value Ref Range    WBC 7.2 3.6 - 11.0 K/uL    RBC 4.20 3.80 - 5.20 M/uL    HGB 12.5 11.5 - 16.0 g/dL    HCT 39.1 35.0 - 47.0 %    MCV 93.1 80.0 - 99.0 FL    MCH 29.8 26.0 - 34.0 PG    MCHC 32.0 30.0 - 36.5 g/dL    RDW 13.0 11.5 - 14.5 %    PLATELET 117 467 - 899 K/uL    MPV 10.0 8.9 - 12.9 FL    NRBC 0.0 0  WBC    ABSOLUTE NRBC 0.00 0.00 - 0.01 K/uL    NEUTROPHILS 53 32 - 75 %    LYMPHOCYTES 35 12 - 49 %    MONOCYTES 8 5 - 13 %    EOSINOPHILS 4 0 - 7 %    BASOPHILS 0 0 - 1 %    IMMATURE GRANULOCYTES 0 0.0 - 0.5 %    ABS. NEUTROPHILS 3.8 1.8 - 8.0 K/UL    ABS. LYMPHOCYTES 2.5 0.8 - 3.5 K/UL    ABS. MONOCYTES 0.6 0.0 - 1.0 K/UL    ABS. EOSINOPHILS 0.3 0.0 - 0.4 K/UL    ABS. BASOPHILS 0.0 0.0 - 0.1 K/UL    ABS. IMM. GRANS. 0.0 0.00 - 0.04 K/UL    DF AUTOMATED     METABOLIC PANEL, COMPREHENSIVE    Collection Time: 02/27/22  7:26 PM   Result Value Ref Range    Sodium 137 136 - 145 mmol/L    Potassium 4.1 3.5 - 5.1 mmol/L    Chloride 103 97 - 108 mmol/L    CO2 28 21 - 32 mmol/L    Anion gap 6 5 - 15 mmol/L    Glucose 317 (H) 65 - 100 mg/dL    BUN 20 6 - 20 MG/DL    Creatinine 1.14 (H) 0.55 - 1.02 MG/DL    BUN/Creatinine ratio 18 12 - 20      GFR est AA >60 >60 ml/min/1.73m2    GFR est non-AA 53 (L) >60 ml/min/1.73m2    Calcium 8.8 8.5 - 10.1 MG/DL    Bilirubin, total 0.2 0.2 - 1.0 MG/DL    ALT (SGPT) 23 12 - 78 U/L    AST (SGOT) 18 15 - 37 U/L    Alk.  phosphatase 88 45 - 117 U/L    Protein, total 7.4 6.4 - 8.2 g/dL    Albumin 3.3 (L) 3.5 - 5.0 g/dL    Globulin 4.1 (H) 2.0 - 4.0 g/dL    A-G Ratio 0.8 (L) 1.1 - 2.2     URINALYSIS W/ RFLX MICROSCOPIC    Collection Time: 02/27/22  7:26 PM   Result Value Ref Range    Color YELLOW/STRAW      Appearance CLEAR CLEAR      Specific gravity 1.029 1.003 - 1.030 pH (UA) 7.0 5.0 - 8.0      Protein Negative NEG mg/dL    Glucose >1,000 (A) NEG mg/dL    Ketone TRACE (A) NEG mg/dL    Bilirubin Negative NEG      Blood Negative NEG      Urobilinogen 1.0 0.2 - 1.0 EU/dL    Nitrites Negative NEG      Leukocyte Esterase Negative NEG     SAMPLES BEING HELD    Collection Time: 02/27/22  7:26 PM   Result Value Ref Range    SAMPLES BEING HELD [email or web address]     COMMENT        Add-on orders for these samples will be processed based on acceptable specimen integrity and analyte stability, which may vary by analyte. URINE CULTURE HOLD SAMPLE    Collection Time: 02/27/22  7:26 PM    Specimen: Serum   Result Value Ref Range    Urine culture hold        Urine on hold in Microbiology dept for 2 days. If unpreserved urine is submitted, it cannot be used for addtional testing after 24 hours, recollection will be required. HCG URINE, QL. - POC    Collection Time: 02/27/22  7:32 PM   Result Value Ref Range    Pregnancy test,urine (POC) Negative NEG          IMAGING:  No orders to display         Medications During Visit:  Medications   ondansetron (ZOFRAN) injection 4 mg (4 mg IntraVENous Given 2/27/22 1937)   meclizine (ANTIVERT) tablet 50 mg (50 mg Oral Given 2/27/22 2028)         DECISION MAKING:  Hossein Shearer is a 42 y.o. female who comes in as above. Here, patient appears well. Feeling better after medicines as above. Mostly having episodes of vertigo given her positional type symptoms. As for the cause of her bloating feeling and nausea it is unknown however she does appear much better at this point. Will discharge home with medicines as below. Follow-up with PCP      IMPRESSION:  1. Dizzy spells    2.  Nausea without vomiting        DISPOSITION:  Discharged      Discharge Medication List as of 2/27/2022  8:59 PM      START taking these medications    Details   !! ondansetron (ZOFRAN ODT) 4 mg disintegrating tablet Take 1 Tablet by mouth every eight (8) hours as needed for Nausea for up to 12 doses. , Normal, Disp-12 Tablet, R-0      meclizine (ANTIVERT) 25 mg tablet Take 1 Tablet by mouth three (3) times daily as needed for Dizziness for up to 10 days. , Normal, Disp-21 Tablet, R-0       !! - Potential duplicate medications found. Please discuss with provider. CONTINUE these medications which have NOT CHANGED    Details   hydroCHLOROthiazide (HYDRODIURIL) 12.5 mg tablet TAKE 1 TABLET BY MOUTH EVERY DAY, Normal, Disp-90 Tablet, R-1      !! True Metrix Glucose Test Strip strip USE TO CHECK BLOOD SUGAR FOUR TIMES A DAY. DX E10.65, Normal, Disp-200 Strip, R-11, JENNIFFER      insulin aspart U-100 (NovoLOG U-100 Insulin aspart) 100 unit/mL injection USE AS DIRECTED IN INSULIN PUMP.  UNITS/DAY., Normal, Disp-30 mL, R-11      !! ondansetron (Zofran ODT) 4 mg disintegrating tablet 1 Tablet by SubLINGual route every eight (8) hours as needed for Nausea or Vomiting., Normal, Disp-12 Tablet, R-0      buPROPion SR (WELLBUTRIN SR) 150 mg SR tablet TAKE 1 TABLET BY MOUTH TWICE A DAY, Normal, Disp-180 Tablet, R-1      INTRAUTERINE DEVICE, IUD, IU by IntraUTERine route., Historical Med      cholecalciferol (VITAMIN D3) (5000 Units/125 mcg) tab tablet Take  by mouth daily. , Historical Med      blood-glucose sensor (DEXCOM G6 SENSOR) by Does Not Apply route., Historical Med      blood-glucose transmitter (DEXCOM G6 TRANSMITTER) by Does Not Apply route., Historical Med      BD Zandra 2nd Gen Pen Needle 32 gauge x 5/32\" ndle USE AS DIRECTED TO INJECT INSULIN 4 TIMES DAILY, Normal, Disp-400 Pen Needle, R-3, JENNIFFER      lisinopriL (PRINIVIL, ZESTRIL) 40 mg tablet TAKE 1 TAB BY MOUTH ONCE DAILY, Normal, Disp-90 Tab, R-3      subcutaneous insulin pump (OMNIPOD INSULIN MANAGEMENT) by Does Not Apply route., Historical Med      Insulin Syringe-Needle U-100 1 mL 31 gauge x 5/16 syrg Use as directed three times daily, Normal, Disp-100 Syringe, R-11      OTHER HAS A PARAGUARD BIRTH CONTROL , Historical Med naproxen (NAPROSYN) 375 mg tablet Take 1 Tab by mouth two (2) times daily (with meals). , Normal, Disp-60 Tab,R-3      Blood-Glucose Meter monitoring kit Check blood sugar four times a day, Normal, Disp-1 Kit, R-0      !! glucose blood VI test strips (BLOOD GLUCOSE TEST) strip Use to check blood sugar four times a day., Normal, Disp-400 Strip, R-3      lancets misc Use to check blood sugar four times a day., Normal, Disp-400 Each, R-3      !! FREESTYLE PRECISION KIRK STRIPS strip Use as needed up to twice daily with Ness Bolls reader to check blood sugar when having having problems with the Ness Bolls sensors, Normal, Disp-50 Strip, R-11, JENNIFFER      !! ONETOUCH ULTRA BLUE TEST STRIP strip Test 4 times daily, Normal, Disp-400 Strip, R-3, JENNIFFER      cetirizine (ZYRTEC) 10 mg tablet Take 10 mg by mouth as needed., Historical Med       !! - Potential duplicate medications found. Please discuss with provider. Follow-up Information     Follow up With Specialties Details Why Contact Info    Zane Alvarez MD Family Medicine, Bariatrics   [address]  [phone]      Fay Grimes MD Neurology Schedule an appointment as soon as possible for a visit   [address] [phone]              The patient is asked to follow-up with their primary care provider in the next several days. They are to call tomorrow for an appointment. The patient is asked to return promptly for any increased concerns or worsening of symptoms. They can return to this emergency department or any other emergency department.     Procedures

## 2022-03-02 ENCOUNTER — HOSPITAL ENCOUNTER (EMERGENCY)
Age: 37
Discharge: HOME | End: 2022-03-02
Attending: EMERGENCY MEDICINE
Payer: MEDICAID

## 2022-03-02 ENCOUNTER — TELEPHONE (OUTPATIENT)
Dept: ENDOCRINOLOGY | Age: 37
End: 2022-03-02

## 2022-03-02 VITALS
OXYGEN SATURATION: 99 % | RESPIRATION RATE: 16 BRPM | HEART RATE: 60 BPM | DIASTOLIC BLOOD PRESSURE: 72 MMHG | WEIGHT: 228 LBS | TEMPERATURE: 97.9 F | BODY MASS INDEX: 40.4 KG/M2 | SYSTOLIC BLOOD PRESSURE: 135 MMHG | HEIGHT: 63 IN

## 2022-03-02 DIAGNOSIS — E86.0 DEHYDRATION: ICD-10-CM

## 2022-03-02 DIAGNOSIS — R53.81 MALAISE AND FATIGUE: Primary | ICD-10-CM

## 2022-03-02 DIAGNOSIS — R53.83 MALAISE AND FATIGUE: Primary | ICD-10-CM

## 2022-03-02 DIAGNOSIS — R82.4 KETONURIA: ICD-10-CM

## 2022-03-02 DIAGNOSIS — R73.9 HYPERGLYCEMIA: ICD-10-CM

## 2022-03-02 LAB
ANION GAP SERPL CALC-SCNC: 7 MMOL/L (ref 5–15)
APPEARANCE UR: CLEAR
BACTERIA URNS QL MICRO: NEGATIVE /HPF
BASE EXCESS BLDV CALC-SCNC: 2.8 MMOL/L
BASOPHILS # BLD: 0 K/UL (ref 0–0.1)
BASOPHILS NFR BLD: 0 % (ref 0–1)
BILIRUB UR QL: NEGATIVE
BUN SERPL-MCNC: 14 MG/DL (ref 6–20)
BUN/CREAT SERPL: 14 (ref 12–20)
CALCIUM SERPL-MCNC: 9.1 MG/DL (ref 8.5–10.1)
CHLORIDE SERPL-SCNC: 99 MMOL/L (ref 97–108)
CO2 SERPL-SCNC: 27 MMOL/L (ref 21–32)
COLOR UR: ABNORMAL
COMMENT, HOLDF: NORMAL
CREAT SERPL-MCNC: 0.98 MG/DL (ref 0.55–1.02)
DIFFERENTIAL METHOD BLD: NORMAL
EOSINOPHIL # BLD: 0.2 K/UL (ref 0–0.4)
EOSINOPHIL NFR BLD: 2 % (ref 0–7)
EPITH CASTS URNS QL MICRO: ABNORMAL /LPF
ERYTHROCYTE [DISTWIDTH] IN BLOOD BY AUTOMATED COUNT: 12.7 % (ref 11.5–14.5)
GLUCOSE BLD STRIP.AUTO-MCNC: 265 MG/DL (ref 65–117)
GLUCOSE BLD STRIP.AUTO-MCNC: 274 MG/DL (ref 65–117)
GLUCOSE SERPL-MCNC: 293 MG/DL (ref 65–100)
GLUCOSE UR STRIP.AUTO-MCNC: >1000 MG/DL
HCO3 BLDV-SCNC: 27.7 MMOL/L (ref 23–28)
HCT VFR BLD AUTO: 40.3 % (ref 35–47)
HGB BLD-MCNC: 13.3 G/DL (ref 11.5–16)
HGB UR QL STRIP: NEGATIVE
IMM GRANULOCYTES # BLD AUTO: 0 K/UL (ref 0–0.04)
IMM GRANULOCYTES NFR BLD AUTO: 0 % (ref 0–0.5)
KETONES UR QL STRIP.AUTO: >80 MG/DL
LEUKOCYTE ESTERASE UR QL STRIP.AUTO: NEGATIVE
LYMPHOCYTES # BLD: 2.7 K/UL (ref 0.8–3.5)
LYMPHOCYTES NFR BLD: 33 % (ref 12–49)
MCH RBC QN AUTO: 30.4 PG (ref 26–34)
MCHC RBC AUTO-ENTMCNC: 33 G/DL (ref 30–36.5)
MCV RBC AUTO: 92 FL (ref 80–99)
MONOCYTES # BLD: 0.5 K/UL (ref 0–1)
MONOCYTES NFR BLD: 6 % (ref 5–13)
NEUTS SEG # BLD: 4.7 K/UL (ref 1.8–8)
NEUTS SEG NFR BLD: 59 % (ref 32–75)
NITRITE UR QL STRIP.AUTO: NEGATIVE
NRBC # BLD: 0 K/UL (ref 0–0.01)
NRBC BLD-RTO: 0 PER 100 WBC
PCO2 BLDV: 42.3 MMHG (ref 41–51)
PH BLDV: 7.42 [PH] (ref 7.32–7.42)
PH UR STRIP: 6 [PH] (ref 5–8)
PLATELET # BLD AUTO: 293 K/UL (ref 150–400)
PMV BLD AUTO: 10.6 FL (ref 8.9–12.9)
PO2 BLDV: 28 MMHG (ref 25–40)
POTASSIUM SERPL-SCNC: 3.7 MMOL/L (ref 3.5–5.1)
PROT UR STRIP-MCNC: NEGATIVE MG/DL
RBC # BLD AUTO: 4.38 M/UL (ref 3.8–5.2)
RBC #/AREA URNS HPF: ABNORMAL /HPF (ref 0–5)
SAMPLES BEING HELD,HOLD: NORMAL
SAO2 % BLDV: 52.8 % (ref 65–88)
SERVICE CMNT-IMP: ABNORMAL
SODIUM SERPL-SCNC: 133 MMOL/L (ref 136–145)
SP GR UR REFRACTOMETRY: 1.02 (ref 1–1.03)
SPECIMEN TYPE: ABNORMAL
UR CULT HOLD, URHOLD: NORMAL
UROBILINOGEN UR QL STRIP.AUTO: 0.2 EU/DL (ref 0.2–1)
WBC # BLD AUTO: 8.1 K/UL (ref 3.6–11)
WBC URNS QL MICRO: ABNORMAL /HPF (ref 0–4)

## 2022-03-02 PROCEDURE — 82803 BLOOD GASES ANY COMBINATION: CPT

## 2022-03-02 PROCEDURE — 36415 COLL VENOUS BLD VENIPUNCTURE: CPT

## 2022-03-02 PROCEDURE — 81001 URINALYSIS AUTO W/SCOPE: CPT

## 2022-03-02 PROCEDURE — 99284 EMERGENCY DEPT VISIT MOD MDM: CPT

## 2022-03-02 PROCEDURE — 80048 BASIC METABOLIC PNL TOTAL CA: CPT

## 2022-03-02 PROCEDURE — 74011250636 HC RX REV CODE- 250/636: Performed by: EMERGENCY MEDICINE

## 2022-03-02 PROCEDURE — 82962 GLUCOSE BLOOD TEST: CPT

## 2022-03-02 PROCEDURE — 85025 COMPLETE CBC W/AUTO DIFF WBC: CPT

## 2022-03-02 PROCEDURE — 96360 HYDRATION IV INFUSION INIT: CPT

## 2022-03-02 PROCEDURE — 96361 HYDRATE IV INFUSION ADD-ON: CPT

## 2022-03-02 RX ADMIN — SODIUM CHLORIDE 1000 ML: 9 INJECTION, SOLUTION INTRAVENOUS at 20:26

## 2022-03-02 RX ADMIN — SODIUM CHLORIDE 1000 ML: 9 INJECTION, SOLUTION INTRAVENOUS at 18:46

## 2022-03-02 NOTE — ED TRIAGE NOTES
Type 1 diabetic blood sugar this morning 83  Checked ketones at home-large  Headache this morning  Feels confused difficulty getting words out  Per patient Amanda Fuentes feels weird\"

## 2022-03-02 NOTE — TELEPHONE ENCOUNTER
3/2/2022    Pt called and left a vm at 4:13 pm stating that she just took a test for Ketone urine and the results came out moderate to large. She feel sick to her stomach and want to talk to someone to see if she should go to the ER.  Pt can be reached at [phone]    Thanks,   Presley Villavicencio

## 2022-03-02 NOTE — TELEPHONE ENCOUNTER
Patient c/o stomach discomfort, headache, moderate to large ketones in her urine. She stated when she checked her sugar at lunch time it was 175. She stated she only had a  protein shake and she drank some water. She stated when she left work  at 4:00 pm her sugar was 358. Patient stated that she is feeling bad so she has decided that she is going to go to [address] [place] [address]. She stated someone is already on there way to pick her up. I informed her that I will let Dr. Juan Vazquez know.

## 2022-03-03 NOTE — ED NOTES
I have reviewed discharge instructions with the patient. The patient verbalized understanding. Pt discharged in stable condition and ambulating. No further questions at this time.

## 2022-03-03 NOTE — ED PROVIDER NOTES
The history is provided by the patient. Fatigue  This is a recurrent problem. The current episode started 12 to 24 hours ago. The problem has not changed since onset. There was no focality noted. Pertinent negatives include no focal weakness, no loss of balance, no visual change, no unresponsiveness and no disorientation. There has been no fever. Associated symptoms include headaches (this morning, resolved). Associated symptoms comments: Took a home test that showed large ketones in urine. She has been on a diet which includes drinking 2 protein shakes during the day and low carb. On insulin pump for DM1. . Meds prior to arrival: insulin from pump. Past Medical History:   Diagnosis Date    Breast cancer [facility]) 2012    Right breast infiltrating ductal carcinoma    Cardiomyopathy due to chemotherapy ([address]     EF 20 %    Essential hypertension     Gestational hypertension     History of sepsis 2013    after chemo    Hx of difficult intubation     resulting from sepsis in 2013.  Hypertension     Morbid obesity ([address]     Ne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 ([address]     Type I (juvenile type) diabetes mellitus without mention of complication, uncontrolled     diagnosed age 4    Unspecified vitamin D deficiency 2011       Past Surgical History:   Procedure Laterality Date    HX  SECTION      HX CYST INCISION AND DRAINAGE  2013    perirectal abscess    HX GYN       in     HX MASTECTOMY Right     Right MRM with sentinel LNB.  HX ORTHOPAEDIC Right     Carpal tunnel release, index finger trigger release.     HX OTHER SURGICAL      cyst removed under left arm    HX OTHER SURGICAL      port placement for chemo    HX WISDOM TEETH EXTRACTION  08/15/2018    [place] BREAST SURGERY PROCEDURE UNLISTED      R Breast Mastectomy         Family History:   Problem Relation Age of Onset    Diabetes Brother         borderline Type 2    Diabetes Paternal Uncle     Diabetes Paternal Grandfather     Heart Disease Paternal Grandfather         MI in his 62s    Hypertension Mother     Heart Disease Father     Stroke Neg Hx        Social History     Socioeconomic History    Marital status:      Spouse name: Not on file    Number of children: Not on file    Years of education: Not on file    Highest education level: Not on file   Occupational History    Not on file   Tobacco Use    Smoking status: Never Smoker    Smokeless tobacco: Never Used   Vaping Use    Vaping Use: Never used   Substance and Sexual Activity    Alcohol use: No    Drug use: No    Sexual activity: Yes     Partners: Male   Other Topics Concern     Service Not Asked    Blood Transfusions Not Asked    Caffeine Concern Not Asked    Occupational Exposure Not Asked    Hobby Hazards Not Asked    Sleep Concern Not Asked    Stress Concern Not Asked    Weight Concern Not Asked    Special Diet Not Asked    Back Care Not Asked    Exercise Not Asked    Bike Helmet Not Asked   [address] Not Asked    Self-Exams Not Asked   Social History Narrative    Lives in [place] with her  and 6 yo son. Got  in July 2017. Currently in [address] [place] [address] for a masters in [employer]. Social Determinants of Health     Financial Resource Strain:     Difficulty of Paying Living Expenses: Not on file   Food Insecurity:     Worried About Running Out of Food in the Last Year: Not on file    Cheri of Food in the Last Year: Not on file   Transportation Needs:     Lack of Transportation (Medical): Not on file    Lack of Transportation (Non-Medical):  Not on file   Physical Activity:     Days of Exercise per Week: Not on file    Minutes of Exercise per Session: Not on file   Stress:     Feeling of Stress : Not on file   Social Connections:     Frequency of Communication with Friends and Family: Not on file    Frequency of Social Gatherings with Friends and Family: Not on file   [facility] Attends Jew Services: Not on file    Active Member of Clubs or Organizations: Not on file    Attends Club or Organization Meetings: Not on file    Marital Status: Not on file   Intimate Partner Violence:     Fear of Current or Ex-Partner: Not on file    Emotionally Abused: Not on file    Physically Abused: Not on file    Sexually Abused: Not on file   Housing Stability:     Unable to Pay for Housing in the Last Year: Not on file    Number of [place] in the Last Year: Not on file    Unstable Housing in the Last Year: Not on file         ALLERGIES: Codeine    Review of Systems   Constitutional: Positive for fatigue. Neurological: Positive for headaches (this morning, resolved). Negative for focal weakness and loss of balance. All other systems reviewed and are negative. Vitals:    03/02/22 1855 03/02/22 2000 03/02/22 2200 03/02/22 2213   BP: (!) 151/76 122/66 122/71 135/72   Pulse:    60   Resp:    16   Temp:    97.9 °F (36.6 °C)   SpO2: 100%   99%   Weight:       Height:                Physical Exam  Vitals and nursing note reviewed. Constitutional:       General: She is not in acute distress. Appearance: She is well-developed. HENT:      Head: Normocephalic and atraumatic. Eyes:      Conjunctiva/sclera: Conjunctivae normal.   Cardiovascular:      Rate and Rhythm: Normal rate and regular rhythm. Heart sounds: Normal heart sounds. Pulmonary:      Effort: Pulmonary effort is normal. No respiratory distress. Breath sounds: Normal breath sounds. Abdominal:      General: There is no distension. Musculoskeletal:         General: No deformity. Normal range of motion. Cervical back: Neck supple. Skin:     General: Skin is warm and dry. Neurological:      Mental Status: She is alert. Cranial Nerves: No cranial nerve deficit.    Psychiatric:         Mood and Affect: Mood normal.         Behavior: Behavior normal.          MDM     42 y.o. female presents with general malaise over the course of the day. She has had 2 protein shakes as part of a diet but no food today and is on insulin pump for DM1. She noted ketones in her urine. Her blood sugar is up slightly but no evidence of DKA. I suspect her ketonuria is dietary induced or related to dehydration from hyperglycemia. Fluid resuscitated and feeling much better. Plan to follow up with PCP as needed and return precautions discussed for worsening or new concerning symptoms.      Procedures

## 2022-03-04 ENCOUNTER — VIRTUAL VISIT (OUTPATIENT)
Dept: FAMILY MEDICINE CLINIC | Age: 37
End: 2022-03-04
Payer: MEDICAID

## 2022-03-04 DIAGNOSIS — E10.9 TYPE 1 DIABETES MELLITUS WITHOUT COMPLICATION (HCC): Primary | ICD-10-CM

## 2022-03-04 DIAGNOSIS — R42 DIZZINESS: ICD-10-CM

## 2022-03-04 DIAGNOSIS — E66.01 MORBID OBESITY WITH BMI OF 40.0-44.9, ADULT (HCC): ICD-10-CM

## 2022-03-04 DIAGNOSIS — R06.83 SNORING: ICD-10-CM

## 2022-03-04 DIAGNOSIS — R51.9 FREQUENT HEADACHES: ICD-10-CM

## 2022-03-04 DIAGNOSIS — R40.0 DAYTIME SOMNOLENCE: ICD-10-CM

## 2022-03-04 PROCEDURE — 99213 OFFICE O/P EST LOW 20 MIN: CPT | Performed by: FAMILY MEDICINE

## 2022-03-04 NOTE — PROGRESS NOTES
Carla Valenzuela is a 42 y.o. female who was seen by synchronous (real-time) audio-video technology on 3/4/2022 for Hospital Follow Up and Referral Request    She is asking for a referral to neurology  She has been using an atkins diet  protein shake without medical supervision  She developed nausea and dizziness and shw went to ER  She has had bad headaches recently as well  She wants to see a neurologist  The dizziness stopped Sunday          Assessment & Plan:   Diagnoses and all orders for this visit:    1. Type 1 diabetes mellitus without complication ([facility]  -     REFERRAL TO DIETITIAN  She is having a lot of difficulty adjusting dietto reduce cals for weight loss and avoid dangerous drops in sugar    2. Dizziness  -     REFERRAL TO NEUROLOGY  posibly has jasmina. This may be contributing to the dizziness  3. Frequent headaches  -     SLEEP MEDICINE REFERRAL  She needs  A test for JASMINA  4. Snoring  -     SLEEP MEDICINE REFERRAL    5. Daytime somnolence  -     SLEEP MEDICINE REFERRAL    6. Morbid obesity with BMI of 40.0-44.9, adult ([facility]  -     REFERRAL TO DIETITIAN            Subjective:       Prior to Admission medications    Medication Sig Start Date End Date Taking? Authorizing Provider   ondansetron (ZOFRAN ODT) 4 mg disintegrating tablet Take 1 Tablet by mouth every eight (8) hours as needed for Nausea for up to 12 doses. 2/27/22  Yes Gene Ley, DO   meclizine (ANTIVERT) 25 mg tablet Take 1 Tablet by mouth three (3) times daily as needed for Dizziness for up to 10 days. 2/27/22 3/9/22 Yes Gene Ley, DO   hydroCHLOROthiazide (HYDRODIURIL) 12.5 mg tablet TAKE 1 TABLET BY MOUTH EVERY DAY 2/23/22  Yes Zane Alvarez MD   True Metrix Glucose Test Strip strip USE TO CHECK BLOOD SUGAR FOUR TIMES A DAY. DX E10.65 2/22/22  Yes Thomas Manzano MD   insulin aspart U-100 (NovoLOG U-100 Insulin aspart) 100 unit/mL injection USE AS DIRECTED IN INSULIN PUMP.   UNITS/DAY. 2/22/22  Yes Reg Lara Lucio Coffey MD   buPROPion SR [facility] SR) 150 mg SR tablet TAKE 1 TABLET BY MOUTH TWICE A DAY 7/21/21  Yes Jonah White MD   INTRAUTERINE DEVICE, IUD, IU by IntraUTERine route. Yes Provider, Historical   cholecalciferol (VITAMIN D3) (5000 Units/125 mcg) tab tablet Take  by mouth daily. Yes Provider, Historical   blood-glucose sensor (DEXCOM G6 SENSOR) by Does Not Apply route. Yes Provider, Historical   blood-glucose transmitter (DEXCOM G6 TRANSMITTER) by Does Not Apply route. Yes Provider, Historical   BD Zandra 2nd Gen Pen Needle 32 gauge x 5/32\" ndle USE AS DIRECTED TO INJECT INSULIN 4 TIMES DAILY 3/12/21  Yes Deniz Snyder MD   lisinopriL (PRINIVIL, ZESTRIL) 40 mg tablet TAKE 1 TAB BY MOUTH ONCE DAILY 3/5/21  Yes Deniz Snyder MD   subcutaneous insulin pump (OMNIPOD INSULIN MANAGEMENT) by Does Not Apply route. Yes Provider, Historical   Insulin Syringe-Needle U-100 1 mL 31 gauge x 5/16 syrg Use as directed three times daily 2/10/21  Yes Deniz Snyder MD   OTHER HAS A PARAGUARD BIRTH CONTROL    Yes Provider, Historical   naproxen (NAPROSYN) 375 mg tablet Take 1 Tab by mouth two (2) times daily (with meals). Patient taking differently: Take 375 mg by mouth as needed. 6/29/20  Yes Flavia Ding, NP   Blood-Glucose Meter monitoring kit Check blood sugar four times a day 12/20/19  Yes Jonah White MD   glucose blood VI test strips (BLOOD GLUCOSE TEST) strip Use to check blood sugar four times a day. 12/20/19  Yes Jonah White MD   lancets misc Use to check blood sugar four times a day.  12/20/19  Yes Jonah White MD   FREESTYLE PRECISION KIRK STRIPS strip Use as needed up to twice daily with [employer] reader to check blood sugar when having having problems with the [employer] sensors 9/27/19  Yes Deniz Snyder MD   [facility] ULTRA BLUE TEST STRIP strip Test 4 times daily 9/16/19  Yes Deniz Snyder MD   ondansetron (Zofran ODT) 4 mg disintegrating tablet 1 Tablet by SubLINGual route every eight (8) hours as needed for Nausea or Vomiting. Patient not taking: Reported on 3/4/2022 12/9/21   Samuel Villarreal MD   cetirizine (ZYRTEC) 10 mg tablet Take 10 mg by mouth as needed. Patient not taking: Reported on 12/9/2021    Provider, Historical     Patient Active Problem List    Diagnosis Date Noted    Pregnancy induced hypertension 02/20/2019    Pregnancy induced hypertension, third trimester 01/29/2019    History of breast cancer 08/29/2018    Morbid obesity with BMI of 40.0-44.9, adult ([facility] 09/22/2017    Wound check, abscess 09/22/2017    Type 1 diabetes mellitus without complication ([facility] 59/79/8529    Ne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 ([facility]     Cardiomyopathy due to chemotherapy ([facility]     Vitamin D deficiency 11/07/2011    Abnormal thyroid blood test 11/07/2011    Essential hypertension, benign 06/25/2010    Encounter for long-term (current) use of other medications 06/25/2010    Encounter for long-term (current) use of insulin ([facility] 06/25/2010    Uncontrolled type 1 diabetes mellitus (HCC)      Current Outpatient Medications   Medication Sig Dispense Refill    ondansetron (ZOFRAN ODT) 4 mg disintegrating tablet Take 1 Tablet by mouth every eight (8) hours as needed for Nausea for up to 12 doses. 12 Tablet 0    meclizine (ANTIVERT) 25 mg tablet Take 1 Tablet by mouth three (3) times daily as needed for Dizziness for up to 10 days. 21 Tablet 0    hydroCHLOROthiazide (HYDRODIURIL) 12.5 mg tablet TAKE 1 TABLET BY MOUTH EVERY DAY 90 Tablet 1    True Metrix Glucose Test Strip strip USE TO CHECK BLOOD SUGAR FOUR TIMES A DAY. DX E10.65 200 Strip 11    insulin aspart U-100 (NovoLOG U-100 Insulin aspart) 100 unit/mL injection USE AS DIRECTED IN INSULIN PUMP.  UNITS/DAY. 30 mL 11    buPROPion SR (WELLBUTRIN SR) 150 mg SR tablet TAKE 1 TABLET BY MOUTH TWICE A  Tablet 1    INTRAUTERINE DEVICE, IUD, IU by IntraUTERine route.       cholecalciferol (VITAMIN D3) (5000 Units/125 mcg) tab tablet Take  by mouth daily.  blood-glucose sensor (DEXCOM G6 SENSOR) by Does Not Apply route.  blood-glucose transmitter (DEXCOM G6 TRANSMITTER) by Does Not Apply route.  BD Zandra 2nd Gen Pen Needle 32 gauge x 5/32\" ndle USE AS DIRECTED TO INJECT INSULIN 4 TIMES DAILY 400 Pen Needle 3    lisinopriL (PRINIVIL, ZESTRIL) 40 mg tablet TAKE 1 TAB BY MOUTH ONCE DAILY 90 Tab 3    subcutaneous insulin pump (OMNIPOD INSULIN MANAGEMENT) by Does Not Apply route.  Insulin Syringe-Needle U-100 1 mL 31 gauge x 5/16 syrg Use as directed three times daily 100 Syringe 11    OTHER HAS A PARAGUARD BIRTH CONTROL       naproxen (NAPROSYN) 375 mg tablet Take 1 Tab by mouth two (2) times daily (with meals). (Patient taking differently: Take 375 mg by mouth as needed.) 60 Tab 3    Blood-Glucose Meter monitoring kit Check blood sugar four times a day 1 Kit 0    glucose blood VI test strips (BLOOD GLUCOSE TEST) strip Use to check blood sugar four times a day. 400 Strip 3    lancets misc Use to check blood sugar four times a day. 400 Each 3    FREESTYLE PRECISION KIRK STRIPS strip Use as needed up to twice daily with [facility] reader to check blood sugar when having having problems with the [facility] sensors 50 Strip 11    ONETOUCH ULTRA BLUE TEST STRIP strip Test 4 times daily 400 Strip 3    ondansetron (Zofran ODT) 4 mg disintegrating tablet 1 Tablet by SubLINGual route every eight (8) hours as needed for Nausea or Vomiting. (Patient not taking: Reported on 3/4/2022) 12 Tablet 0    cetirizine (ZYRTEC) 10 mg tablet Take 10 mg by mouth as needed.  (Patient not taking: Reported on 12/9/2021)         ROS    Objective:     Patient-Reported Vitals 3/4/2022   Patient-Reported Weight 217.4lb   Patient-Reported Height -   Patient-Reported Pulse -   Patient-Reported Systolic  946   Patient-Reported Diastolic 85        [INSTRUCTIONS:  \"[x]\" Indicates a positive item  \"[]\" Indicates a negative item  -- DELETE ALL ITEMS NOT EXAMINED]    Constitutional: [x] Appears well-developed and well-nourished [x] No apparent distress      [] Abnormal -     Mental status: [x] Alert and awake  [x] Oriented to person/place/time [x] Able to follow commands    [] Abnormal -     Eyes:   EOM    [x]  Normal    [] Abnormal -   Sclera  [x]  Normal    [] Abnormal -          Discharge [x]  None visible   [] Abnormal -     HENT: [x] Normocephalic, atraumatic  [] Abnormal -   [x] Mouth/Throat: Mucous membranes are moist    External Ears [x] Normal  [] Abnormal -    Neck: [x] No visualized mass [] Abnormal -     Pulmonary/Chest: [x] Respiratory effort normal   [x] No visualized signs of difficulty breathing or respiratory distress        [] Abnormal -      Musculoskeletal:   [x] Normal gait with no signs of ataxia         [x] Normal range of motion of neck        [] Abnormal -     Neurological:        [x] No Facial Asymmetry (Cranial nerve 7 motor function) (limited exam due to video visit)          [x] No gaze palsy        [] Abnormal -          Skin:        [x] No significant exanthematous lesions or discoloration noted on facial skin         [] Abnormal -            Psychiatric:       [x] Normal Affect [] Abnormal -        [x] No Hallucinations    Other pertinent observable physical exam findings:-        We discussed the expected course, resolution and complications of the diagnosis(es) in detail. Medication risks, benefits, costs, interactions, and alternatives were discussed as indicated. I advised her to contact the office if her condition worsens, changes or fails to improve as anticipated. She expressed understanding with the diagnosis(es) and plan. Edin Bailey, was evaluated through a synchronous (real-time) audio-video encounter. The patient (or guardian if applicable) is aware that this is a billable service, which includes applicable co-pays. Verbal consent to proceed has been obtained.  The visit was conducted pursuant to the emergency declaration under the [facility], [address] [place] [address] authority and the [employer] and [employer] Act. Patient identification was verified, and a caregiver was present when appropriate. The patient was located at home in a state where the provider was licensed to provide care.       Fabien Holguin MD

## 2022-03-04 NOTE — PROGRESS NOTES
1. Have you been to the ER, urgent care clinic since your last visit? Hospitalized since your last visit? Yes When: 3/2/2022 Where: [facility] Reason for visit: Altered mental status    2. Have you seen or consulted any other health care providers outside of the [address] [place] [address] since your last visit? Include any pap smears or colon screening. No     Chief Complaint   Patient presents with   [facility] Follow Up    Referral Request     3 most recent PHQ Screens 3/4/2022   Little interest or pleasure in doing things Not at all   Feeling down, depressed, irritable, or hopeless Not at all   Total Score PHQ 2 0     Abuse Screening Questionnaire 3/4/2022   Do you ever feel afraid of your partner? N   Are you in a relationship with someone who physically or mentally threatens you? N   Is it safe for you to go home?  Y     Health Maintenance Due   Topic Date Due    Cervical cancer screen  Never done    Eye Exam Retinal or Dilated  07/06/2019    COVID-19 Vaccine (3 - Booster) 07/05/2021    Flu Vaccine (1) 09/01/2021     Learning Assessment 9/22/2017   PRIMARY LEARNER Patient   HIGHEST LEVEL OF EDUCATION - PRIMARY LEARNER  4 YEARS OF COLLEGE   BARRIERS PRIMARY LEARNER NONE   CO-LEARNER CAREGIVER No   PRIMARY LANGUAGE ENGLISH   LEARNER PREFERENCE PRIMARY LISTENING   ANSWERED BY self   RELATIONSHIP SELF     Patient-Reported Vitals 3/4/2022   Patient-Reported Weight 217.4lb   Patient-Reported Height -   Patient-Reported Pulse -   Patient-Reported Systolic  -   Patient-Reported Diastolic -

## 2022-03-11 ENCOUNTER — PATIENT MESSAGE (OUTPATIENT)
Dept: SLEEP MEDICINE | Age: 37
End: 2022-03-11

## 2022-03-18 PROBLEM — O13.3 PREGNANCY INDUCED HYPERTENSION, THIRD TRIMESTER: Status: ACTIVE | Noted: 2019-01-29

## 2022-03-19 PROBLEM — Z51.89 WOUND CHECK, ABSCESS: Status: ACTIVE | Noted: 2017-09-22

## 2022-03-19 PROBLEM — E66.01 MORBID OBESITY WITH BMI OF 40.0-44.9, ADULT (HCC): Status: ACTIVE | Noted: 2017-09-22

## 2022-03-19 PROBLEM — E10.9 TYPE 1 DIABETES MELLITUS WITHOUT COMPLICATION (HCC): Status: ACTIVE | Noted: 2017-06-05

## 2022-03-19 PROBLEM — O13.9 PREGNANCY INDUCED HYPERTENSION: Status: ACTIVE | Noted: 2019-02-20

## 2022-03-20 PROBLEM — Z85.3 HISTORY OF BREAST CANCER: Status: ACTIVE | Noted: 2018-08-29

## 2022-03-23 ENCOUNTER — HOSPITAL ENCOUNTER (EMERGENCY)
Age: 37
Discharge: HOME | End: 2022-03-23
Attending: EMERGENCY MEDICINE
Payer: MEDICAID

## 2022-03-23 ENCOUNTER — NURSE TRIAGE (OUTPATIENT)
Dept: OTHER | Facility: CLINIC | Age: 37
End: 2022-03-23

## 2022-03-23 VITALS
OXYGEN SATURATION: 100 % | TEMPERATURE: 97.3 F | HEIGHT: 63 IN | WEIGHT: 230 LBS | DIASTOLIC BLOOD PRESSURE: 90 MMHG | HEART RATE: 102 BPM | BODY MASS INDEX: 40.75 KG/M2 | RESPIRATION RATE: 18 BRPM | SYSTOLIC BLOOD PRESSURE: 197 MMHG

## 2022-03-23 DIAGNOSIS — R42 DIZZY SPELLS: Primary | ICD-10-CM

## 2022-03-23 DIAGNOSIS — N91.2 AMENORRHEA: ICD-10-CM

## 2022-03-23 DIAGNOSIS — R11.0 NAUSEA WITHOUT VOMITING: ICD-10-CM

## 2022-03-23 LAB
ALBUMIN SERPL-MCNC: 3.4 G/DL (ref 3.5–5)
ALBUMIN/GLOB SERPL: 0.8 {RATIO} (ref 1.1–2.2)
ALP SERPL-CCNC: 101 U/L (ref 45–117)
ALT SERPL-CCNC: 28 U/L (ref 12–78)
ANION GAP SERPL CALC-SCNC: 2 MMOL/L (ref 5–15)
APPEARANCE UR: CLEAR
AST SERPL-CCNC: 15 U/L (ref 15–37)
BACTERIA URNS QL MICRO: NEGATIVE /HPF
BASOPHILS # BLD: 0 K/UL (ref 0–0.1)
BASOPHILS NFR BLD: 1 % (ref 0–1)
BILIRUB SERPL-MCNC: 0.5 MG/DL (ref 0.2–1)
BILIRUB UR QL: NEGATIVE
BUN SERPL-MCNC: 20 MG/DL (ref 6–20)
BUN/CREAT SERPL: 19 (ref 12–20)
CALCIUM SERPL-MCNC: 8.9 MG/DL (ref 8.5–10.1)
CHLORIDE SERPL-SCNC: 105 MMOL/L (ref 97–108)
CO2 SERPL-SCNC: 30 MMOL/L (ref 21–32)
COLOR UR: NORMAL
CREAT SERPL-MCNC: 1.07 MG/DL (ref 0.55–1.02)
DIFFERENTIAL METHOD BLD: NORMAL
EOSINOPHIL # BLD: 0.3 K/UL (ref 0–0.4)
EOSINOPHIL NFR BLD: 5 % (ref 0–7)
EPITH CASTS URNS QL MICRO: NORMAL /LPF
ERYTHROCYTE [DISTWIDTH] IN BLOOD BY AUTOMATED COUNT: 12.7 % (ref 11.5–14.5)
GLOBULIN SER CALC-MCNC: 4.4 G/DL (ref 2–4)
GLUCOSE SERPL-MCNC: 155 MG/DL (ref 65–100)
GLUCOSE UR STRIP.AUTO-MCNC: NEGATIVE MG/DL
HCG SERPL QL: NEGATIVE
HCT VFR BLD AUTO: 40.4 % (ref 35–47)
HGB BLD-MCNC: 12.7 G/DL (ref 11.5–16)
HGB UR QL STRIP: NEGATIVE
HYALINE CASTS URNS QL MICRO: NORMAL /LPF (ref 0–2)
IMM GRANULOCYTES # BLD AUTO: 0 K/UL (ref 0–0.04)
IMM GRANULOCYTES NFR BLD AUTO: 0 % (ref 0–0.5)
KETONES UR QL STRIP.AUTO: NEGATIVE MG/DL
LEUKOCYTE ESTERASE UR QL STRIP.AUTO: NEGATIVE
LIPASE SERPL-CCNC: 99 U/L (ref 73–393)
LYMPHOCYTES # BLD: 2.5 K/UL (ref 0.8–3.5)
LYMPHOCYTES NFR BLD: 38 % (ref 12–49)
MCH RBC QN AUTO: 29.2 PG (ref 26–34)
MCHC RBC AUTO-ENTMCNC: 31.4 G/DL (ref 30–36.5)
MCV RBC AUTO: 92.9 FL (ref 80–99)
MONOCYTES # BLD: 0.6 K/UL (ref 0–1)
MONOCYTES NFR BLD: 9 % (ref 5–13)
NEUTS SEG # BLD: 3.2 K/UL (ref 1.8–8)
NEUTS SEG NFR BLD: 47 % (ref 32–75)
NITRITE UR QL STRIP.AUTO: NEGATIVE
NRBC # BLD: 0 K/UL (ref 0–0.01)
NRBC BLD-RTO: 0 PER 100 WBC
PH UR STRIP: 8 [PH] (ref 5–8)
PLATELET # BLD AUTO: 300 K/UL (ref 150–400)
PMV BLD AUTO: 9.7 FL (ref 8.9–12.9)
POTASSIUM SERPL-SCNC: 3.8 MMOL/L (ref 3.5–5.1)
PROT SERPL-MCNC: 7.8 G/DL (ref 6.4–8.2)
PROT UR STRIP-MCNC: NEGATIVE MG/DL
RBC # BLD AUTO: 4.35 M/UL (ref 3.8–5.2)
RBC #/AREA URNS HPF: NORMAL /HPF (ref 0–5)
SODIUM SERPL-SCNC: 137 MMOL/L (ref 136–145)
SP GR UR REFRACTOMETRY: 1.02 (ref 1–1.03)
UR CULT HOLD, URHOLD: NORMAL
UROBILINOGEN UR QL STRIP.AUTO: 1 EU/DL (ref 0.2–1)
WBC # BLD AUTO: 6.6 K/UL (ref 3.6–11)
WBC URNS QL MICRO: NORMAL /HPF (ref 0–4)

## 2022-03-23 PROCEDURE — 85025 COMPLETE CBC W/AUTO DIFF WBC: CPT

## 2022-03-23 PROCEDURE — 80053 COMPREHEN METABOLIC PANEL: CPT

## 2022-03-23 PROCEDURE — 36415 COLL VENOUS BLD VENIPUNCTURE: CPT

## 2022-03-23 PROCEDURE — 99283 EMERGENCY DEPT VISIT LOW MDM: CPT

## 2022-03-23 PROCEDURE — 83690 ASSAY OF LIPASE: CPT

## 2022-03-23 PROCEDURE — 81001 URINALYSIS AUTO W/SCOPE: CPT

## 2022-03-23 PROCEDURE — 84703 CHORIONIC GONADOTROPIN ASSAY: CPT

## 2022-03-23 NOTE — DISCHARGE INSTRUCTIONS
Follow-up with PCP as scheduled tomorrow. Continue to monitor symptoms and return with any changes or worsening.

## 2022-03-23 NOTE — TELEPHONE ENCOUNTER
Received call from [place] at [facility] with Red Flag Complaint. Subjective: Caller states \"I was in the ER for stomach pain and they referred me to a neurologist.  I was seen for nausea and was dizzy. I had keytones in my urine at the time. They wanted me to stay and get it washed out and so I did. I followed up and had a dr kyet with Dr. Neil Merino and she got me a lot of referrals. I am fighting low sugars now. I decreased my insulin due to being low. I went to my OB/GYN and they did a blood test and I am not pregnant. I am still dizzy and nausea. It happens every morning. \"     Current Symptoms: nausea, dizziness    Onset: 1 month ago; unchanged    Associated Symptoms: NA    Pain Severity: no pain    Temperature: none     What has been tried: reduced insulin    LMP: 1/3/2022 Pregnant: Yes    Recommended disposition: Go to Office Now    Care advice provided, patient verbalizes understanding; denies any other questions or concerns; instructed to call back for any new or worsening symptoms. Patient/Caller agrees with recommended disposition; writer provided warm transfer to [place] at [facility] for appointment scheduling    Attention Provider: Thank you for allowing me to participate in the care of your patient. The patient was connected to triage in response to information provided to the [facility]. Please do not respond through this encounter as the response is not directed to a shared pool.         Reason for Disposition   Lightheadedness (dizziness) present now, after 2 hours of rest and fluids    Protocols used: DIZZINESS-ADULT-[place]

## 2022-03-23 NOTE — ED PROVIDER NOTES
38-year-old female with history of breast cancer, HTN, and T1DM presents to ED with intermittent episodes of dizziness and nausea. Patient reports the symptoms have been on and off for about a year and she has been into the ED several times for this. She notes that the symptoms have come mostly in the morning and improve after eating something. At her last time in the ED she notes that there were ketones found in her urine, she was referred to primary care. In the meantime she also adds that she has not had her period for the past 3 months. She has taken several pregnancy test throughout this process and went to her OB to get pregnancy test as well, all of which were negative. Now for the past few days she has noticed a \"fluttering\" in her lower abdomen and she is concerned that she is pregnant and it is not showing up on the pregnancy test.  She arrives today requesting a ultrasound to rule this out. She already has her appointment with her primary care physician and believes that she will be set up with a neurology appointment by her. She denies any fevers, chills, vision changes, headache, vomiting, diarrhea, or urinary symptoms. The history is provided by the patient. Dizziness  Pertinent negatives include no chest pain, no headaches and no shortness of breath. Past Medical History:   Diagnosis Date    Breast cancer [facility]) Nov 2012    Right breast infiltrating ductal carcinoma    Cardiomyopathy due to chemotherapy ([address]     EF 20 %    Essential hypertension     Gestational hypertension     History of sepsis 1/2013    after chemo    Hx of difficult intubation     resulting from sepsis in january 2013.       Hypertension     Morbid obesity ([address]     Ne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 ([address]     Type I (juvenile type) diabetes mellitus without mention of complication, uncontrolled     diagnosed age 4    Unspecified vitamin D deficiency 11/7/2011       Past Surgical History: Procedure Laterality Date    HX  SECTION      HX CYST INCISION AND DRAINAGE  2013    perirectal abscess    HX GYN       in     HX MASTECTOMY Right     Right MRM with sentinel LNB.  HX ORTHOPAEDIC Right     Carpal tunnel release, index finger trigger release.  HX OTHER SURGICAL      cyst removed under left arm    HX OTHER SURGICAL      port placement for chemo    HX WISDOM TEETH EXTRACTION  08/15/2018    [place] BREAST SURGERY PROCEDURE UNLISTED      R Breast Mastectomy         Family History:   Problem Relation Age of Onset    Diabetes Brother         borderline Type 2    Diabetes Paternal Uncle     Diabetes Paternal Grandfather     Heart Disease Paternal Grandfather         MI in his 62s    Hypertension Mother     Heart Disease Father     Stroke Neg Hx        Social History     Socioeconomic History    Marital status:      Spouse name: Not on file    Number of children: Not on file    Years of education: Not on file    Highest education level: Not on file   Occupational History    Not on file   Tobacco Use    Smoking status: Never Smoker    Smokeless tobacco: Never Used   Vaping Use    Vaping Use: Never used   Substance and Sexual Activity    Alcohol use: No    Drug use: No    Sexual activity: Yes     Partners: Male   Other Topics Concern     Service Not Asked    Blood Transfusions Not Asked    Caffeine Concern Not Asked    Occupational Exposure Not Asked    Hobby Hazards Not Asked    Sleep Concern Not Asked    Stress Concern Not Asked    Weight Concern Not Asked    Special Diet Not Asked    Back Care Not Asked    Exercise Not Asked    Bike Helmet Not Asked    [address] Not Asked    Self-Exams Not Asked   Social History Narrative    Lives in [place] [facility] [place] [facility] with her  and 4 yo son. Got  in 2017. Currently in [address] [place] [address] for a masters in [employer].       Social Determinants of Health     Financial Resource Strain:     Difficulty of Paying Living Expenses: Not on file   Food Insecurity:     Worried About Running Out of Food in the Last Year: Not on file    Ran Out of Food in the Last Year: Not on file   Transportation Needs:     Lack of Transportation (Medical): Not on file    Lack of Transportation (Non-Medical): Not on file   Physical Activity:     Days of Exercise per Week: Not on file    Minutes of Exercise per Session: Not on file   Stress:     Feeling of Stress : Not on file   Social Connections:     Frequency of Communication with Friends and Family: Not on file    Frequency of Social Gatherings with Friends and Family: Not on file    Attends Sabianist Services: Not on file    Active Member of [address] [place] [address] [employer] or Organizations: Not on file    Attends Club or Organization Meetings: Not on file    Marital Status: Not on file   Intimate Partner Violence:     Fear of Current or Ex-Partner: Not on file    Emotionally Abused: Not on file    Physically Abused: Not on file    Sexually Abused: Not on file   Housing Stability:     Unable to Pay for Housing in the Last Year: Not on file    Number of [place] in the Last Year: Not on file    Unstable Housing in the Last Year: Not on file         ALLERGIES: Codeine    Review of Systems   Constitutional: Negative for fever. HENT: Negative for congestion and sinus pressure. Respiratory: Negative for shortness of breath. Cardiovascular: Negative for chest pain. Gastrointestinal: Positive for nausea. Negative for vomiting. Genitourinary: Negative for dysuria. Musculoskeletal: Negative for myalgias. Neurological: Positive for dizziness. Negative for headaches. Hematological: Negative for adenopathy. Psychiatric/Behavioral: The patient is not nervous/anxious. All other systems reviewed and are negative.       Vitals:    03/23/22 1635   BP: (!) 197/90   Pulse: (!) 102   Resp: 18   Temp: 97.3 °F (36.3 °C)   SpO2: 100%   Weight: 104.3 kg (230 lb)   Height: 5' 3\" (1.6 m)            Physical Exam  Vitals and nursing note reviewed. Constitutional:       General: She is not in acute distress. Appearance: Normal appearance. She is normal weight. HENT:      Head: Normocephalic and atraumatic. Eyes:      Extraocular Movements: Extraocular movements intact. Pupils: Pupils are equal, round, and reactive to light. Cardiovascular:      Rate and Rhythm: Normal rate and regular rhythm. Heart sounds: Normal heart sounds. Pulmonary:      Breath sounds: Normal breath sounds. Abdominal:      Palpations: Abdomen is soft. Tenderness: There is no abdominal tenderness. Lymphadenopathy:      Cervical: No cervical adenopathy. Skin:     General: Skin is warm and dry. Neurological:      General: No focal deficit present. Mental Status: She is alert and oriented to person, place, and time. Psychiatric:         Mood and Affect: Mood normal.         Behavior: Behavior normal.         Thought Content: Thought content normal.          MDM  Number of Diagnoses or Management Options  Amenorrhea  Dizzy spells  Nausea without vomiting  Diagnosis management comments: 27-year-old female with history of breast cancer, HTN, and T1DM presents to ED with intermittent episodes of dizziness and nausea. Vital signs stable in triage. Physical exam unremarkable without tenderness to palpation of abdomen. Neuro exam intact. Labs unremarkable including urine and beta-hCG of serum was negative. Explained to patient with negative serum pregnancy test no ultrasound is indicated. Patient already has prescription for meclizine at home and has follow-up scheduled for PCP tomorrow. Patient has had various work-ups for dizziness while in ED which have been all unremarkable. She has been recommended to follow-up with PCP and neurology. Patient will be discharged with instructions for follow-up, conservative care and return precautions.        Amount and/or Complexity of Data Reviewed  Clinical lab tests: reviewed and ordered  Discuss the patient with other providers: yes           Procedures

## 2022-03-23 NOTE — ED TRIAGE NOTES
Pt arrives for dizziness and abd discomfort that has been on and off for 1 year.    Pt has been seen several times for above mentioned symptoms

## 2022-03-24 ENCOUNTER — TELEPHONE (OUTPATIENT)
Dept: ENDOCRINOLOGY | Age: 37
End: 2022-03-24

## 2022-03-24 ENCOUNTER — OFFICE VISIT (OUTPATIENT)
Dept: FAMILY MEDICINE CLINIC | Age: 37
End: 2022-03-24
Payer: MEDICAID

## 2022-03-24 VITALS
RESPIRATION RATE: 16 BRPM | TEMPERATURE: 98.3 F | HEART RATE: 79 BPM | HEIGHT: 63 IN | OXYGEN SATURATION: 98 % | WEIGHT: 227.4 LBS | SYSTOLIC BLOOD PRESSURE: 136 MMHG | BODY MASS INDEX: 40.29 KG/M2 | DIASTOLIC BLOOD PRESSURE: 84 MMHG

## 2022-03-24 DIAGNOSIS — R10.9 ABDOMINAL DISCOMFORT: Primary | ICD-10-CM

## 2022-03-24 DIAGNOSIS — R42 DIZZINESS: ICD-10-CM

## 2022-03-24 PROBLEM — I50.9 ACUTE HEART FAILURE (HCC): Status: ACTIVE | Noted: 2022-03-24

## 2022-03-24 PROBLEM — L73.2 HIDRADENITIS SUPPURATIVA: Status: ACTIVE | Noted: 2022-03-24

## 2022-03-24 PROBLEM — I10 HYPERTENSION: Status: ACTIVE | Noted: 2022-03-24

## 2022-03-24 PROCEDURE — 99214 OFFICE O/P EST MOD 30 MIN: CPT | Performed by: NURSE PRACTITIONER

## 2022-03-24 NOTE — TELEPHONE ENCOUNTER
Thank you Dr. Vera Winger. I called MsMissael Vahe Norris back and left a vm regarding 3/28 appt.      Kashif Castro

## 2022-03-24 NOTE — TELEPHONE ENCOUNTER
3/24/2022  10:09 AM    Ms. Simi Ellington called and stated she was hospitalized yesterday. She wants a follow up with you. She also stated she would like to know if you are able to see her today because she's off and a VV would be okay for her. Once I hear back from you on this matter. I can contact the pt.      Thanks,   Amandeep Haddad

## 2022-03-24 NOTE — PATIENT INSTRUCTIONS
Follow up with Endocrin today about lower blood sugars  Follow up today with OBGYN to rule out pregnancy  We will notify you of ultrasound results when they return     Abdominal Pain: Care Instructions  Your Care Instructions     Abdominal pain has many possible causes. Some aren't serious and get better on their own in a few days. Others need more testing and treatment. If your pain continues or gets worse, you need to be rechecked and may need more tests to find out what is wrong. You may need surgery to correct the problem. Don't ignore new symptoms, such as fever, nausea and vomiting, urination problems, pain that gets worse, and dizziness. These may be signs of a more serious problem. Your doctor may have recommended a follow-up visit in the next 8 to 12 hours. If you are not getting better, you may need more tests or treatment. The doctor has checked you carefully, but problems can develop later. If you notice any problems or new symptoms, get medical treatment right away. Follow-up care is a key part of your treatment and safety. Be sure to make and go to all appointments, and call your doctor if you are having problems. It's also a good idea to know your test results and keep a list of the medicines you take. How can you care for yourself at home? · Rest until you feel better. · To prevent dehydration, drink plenty of fluids. Choose water and other clear liquids until you feel better. If you have kidney, heart, or liver disease and have to limit fluids, talk with your doctor before you increase the amount of fluids you drink. · If your stomach is upset, eat mild foods, such as rice, dry toast or crackers, bananas, and applesauce. Try eating several small meals instead of two or three large ones. · Wait until 48 hours after all symptoms have gone away before you have spicy foods, alcohol, and drinks that contain caffeine. · Do not eat foods that are high in fat.   · Avoid anti-inflammatory medicines such as aspirin, ibuprofen (Advil, Motrin), and naproxen (Aleve). These can cause stomach upset. Talk to your doctor if you take daily aspirin for another health problem. When should you call for help? Call 911 anytime you think you may need emergency care. For example, call if:    · You passed out (lost consciousness).     · You pass maroon or very bloody stools.     · You vomit blood or what looks like coffee grounds.     · You have new, severe belly pain. Call your doctor now or seek immediate medical care if:    · Your pain gets worse, especially if it becomes focused in one area of your belly.     · You have a new or higher fever.     · Your stools are black and look like tar, or they have streaks of blood.     · You have unexpected vaginal bleeding.     · You have symptoms of a urinary tract infection. These may include:  ? Pain when you urinate. ? Urinating more often than usual.  ? Blood in your urine.     · You are dizzy or lightheaded, or you feel like you may faint. Watch closely for changes in your health, and be sure to contact your doctor if:    · You are not getting better after 1 day (24 hours). Where can you learn more? Go to http://www.gray.com/  Enter R028 in the search box to learn more about \"Abdominal Pain: Care Instructions. \"  Current as of: July 1, 2021               Content Version: 13.2  © 1389-2964 [employer]. Care instructions adapted under license by [employer] (which disclaims liability or warranty for this information). If you have questions about a medical condition or this instruction, always ask your healthcare professional. [address] any warranty or liability for your use of this information. Dizziness: Care Instructions  Your Care Instructions  Dizziness is the feeling of unsteadiness or fuzziness in your head.  It is different than having vertigo, which is a feeling that the room is spinning or that you are moving or falling. It is also different from lightheadedness, which is the feeling that you are about to faint. It can be hard to know what causes dizziness. Some people feel dizzy when they have migraine headaches. Sometimes bouts of flu can make you feel dizzy. Some medical conditions, such as heart problems or high blood pressure, can make you feel dizzy. Many medicines can cause dizziness, including medicines for high blood pressure, pain, or anxiety. If a medicine causes your symptoms, your doctor may recommend that you stop or change the medicine. If it is a problem with your heart, you may need medicine to help your heart work better. If there is no clear reason for your symptoms, your doctor may suggest watching and waiting for a while to see if the dizziness goes away on its own. Follow-up care is a key part of your treatment and safety. Be sure to make and go to all appointments, and call your doctor if you are having problems. It's also a good idea to know your test results and keep a list of the medicines you take. How can you care for yourself at home? · If your doctor recommends or prescribes medicine, take it exactly as directed. Call your doctor if you think you are having a problem with your medicine. · Do not drive while you feel dizzy. · Try to prevent falls. Steps you can take include:  ? Using nonskid mats, adding grab bars near the tub, and using night-lights. ? Clearing your home so that walkways are free of anything you might trip on.  ? Letting family and friends know that you have been feeling dizzy. This will help them know how to help you. When should you call for help? Call 911 anytime you think you may need emergency care. For example, call if:    · You passed out (lost consciousness).     · You have dizziness along with symptoms of a heart attack. These may include:  ?  Chest pain or pressure, or a strange feeling in the chest.  ? Sweating. ? Shortness of breath. ? Nausea or vomiting. ? Pain, pressure, or a strange feeling in the back, neck, jaw, or upper belly or in one or both shoulders or arms. ? Lightheadedness or sudden weakness. ? A fast or irregular heartbeat.     · You have symptoms of a stroke. These may include:  ? Sudden numbness, tingling, weakness, or loss of movement in your face, arm, or leg, especially on only one side of your body. ? Sudden vision changes. ? Sudden trouble speaking. ? Sudden confusion or trouble understanding simple statements. ? Sudden problems with walking or balance. ? A sudden, severe headache that is different from past headaches. Call your doctor now or seek immediate medical care if:    · You feel dizzy and have a fever, headache, or ringing in your ears.     · You have new or increased nausea and vomiting.     · Your dizziness does not go away or comes back. Watch closely for changes in your health, and be sure to contact your doctor if:    · You do not get better as expected. Where can you learn more? Go to http://www.gray.com/  Enter Q823 in the search box to learn more about \"Dizziness: Care Instructions. \"  Current as of: July 1, 2021               Content Version: 13.2  © 2006-2022 [employer]. Care instructions adapted under license by [employer] (which disclaims liability or warranty for this information). If you have questions about a medical condition or this instruction, always ask your healthcare professional. [place] [address] any warranty or liability for your use of this information.

## 2022-03-24 NOTE — TELEPHONE ENCOUNTER
Sent her the following message through [employer]:    I understand you called for a sooner appointment and I was unable to accommodate this today.   I do have an opening on Monday 3/28 at 12:10pm either in person or virtual.  Let me know if you want to take this and if so whether you plan on coming in or doing this virtual.

## 2022-03-24 NOTE — TELEPHONE ENCOUNTER
I don't have the ability to see her today as I'm all booked up. I can see her in person or virtually on Monday 3/28/22 at 12:10pm if she would like this.

## 2022-03-24 NOTE — PROGRESS NOTES
Identified pt with two pt identifiers(name and ). Reviewed record in preparation for visit and have obtained necessary documentation. Chief Complaint   Patient presents with    Blood sugar problem    Dizziness        Health Maintenance Due   Topic    Cervical cancer screen     Eye Exam Retinal or Dilated     COVID-19 Vaccine (3 - Booster)    Flu Vaccine (1)       Coordination of Care Questionnaire:  :   1) Have you been to an emergency room, urgent care, or hospitalized since your last visit? If yes, where when, and reason for visit? Yes, [facility] D[facility]      2. Have seen or consulted any other health care provider since your last visit? If yes, where when, and reason for visit? no        Patient is accompanied by self I have received verbal consent from Luis E John to discuss any/all medical information while they are present in the room.

## 2022-03-24 NOTE — PROGRESS NOTES
Juma Garcia is a 42 y.o. female who presents to clinic today for the following:    Chief Complaint   Patient presents with    Blood sugar problem    Dizziness       Vitals:    03/24/22 0904   BP: 136/84   Pulse: 79   Resp: 16   Temp: 98.3 °F (36.8 °C)   TempSrc: Temporal   SpO2: 98%   Weight: 227 lb 6.4 oz (103.1 kg)   Height: 5' 3\" (1.6 m)       Body mass index is 40.28 kg/m². Patients past medical, surgical and family histories were reviewed. Allergies and Medications reviewed and updated. Current Outpatient Medications:     ondansetron (ZOFRAN ODT) 4 mg disintegrating tablet, Take 1 Tablet by mouth every eight (8) hours as needed for Nausea for up to 12 doses. , Disp: 12 Tablet, Rfl: 0    hydroCHLOROthiazide (HYDRODIURIL) 12.5 mg tablet, TAKE 1 TABLET BY MOUTH EVERY DAY, Disp: 90 Tablet, Rfl: 1    True Metrix Glucose Test Strip strip, USE TO CHECK BLOOD SUGAR FOUR TIMES A DAY. DX E10.65, Disp: 200 Strip, Rfl: 11    insulin aspart U-100 (NovoLOG U-100 Insulin aspart) 100 unit/mL injection, USE AS DIRECTED IN INSULIN PUMP.  UNITS/DAY., Disp: 30 mL, Rfl: 11    ondansetron (Zofran ODT) 4 mg disintegrating tablet, 1 Tablet by SubLINGual route every eight (8) hours as needed for Nausea or Vomiting. (Patient not taking: Reported on 3/4/2022), Disp: 12 Tablet, Rfl: 0    buPROPion SR (WELLBUTRIN SR) 150 mg SR tablet, TAKE 1 TABLET BY MOUTH TWICE A DAY, Disp: 180 Tablet, Rfl: 1    INTRAUTERINE DEVICE, IUD, IU, by IntraUTERine route., Disp: , Rfl:     cholecalciferol (VITAMIN D3) (5000 Units/125 mcg) tab tablet, Take  by mouth daily. , Disp: , Rfl:     blood-glucose sensor (DEXCOM G6 SENSOR), by Does Not Apply route., Disp: , Rfl:     blood-glucose transmitter (DEXCOM G6 TRANSMITTER), by Does Not Apply route., Disp: , Rfl:     BD Zandra 2nd Gen Pen Needle 32 gauge x 5/32\" ndle, USE AS DIRECTED TO INJECT INSULIN 4 TIMES DAILY, Disp: 400 Pen Needle, Rfl: 3    lisinopriL (PRINIVIL, ZESTRIL) 40 mg tablet, TAKE 1 TAB BY MOUTH ONCE DAILY, Disp: 90 Tab, Rfl: 3    subcutaneous insulin pump (OMNIPOD INSULIN MANAGEMENT), by Does Not Apply route., Disp: , Rfl:     Insulin Syringe-Needle U-100 1 mL 31 gauge x 5/16 syrg, Use as directed three times daily, Disp: 100 Syringe, Rfl: 11    OTHER, HAS A PARAGUARD BIRTH CONTROL , Disp: , Rfl:     naproxen (NAPROSYN) 375 mg tablet, Take 1 Tab by mouth two (2) times daily (with meals). (Patient taking differently: Take 375 mg by mouth as needed.), Disp: 60 Tab, Rfl: 3    Blood-Glucose Meter monitoring kit, Check blood sugar four times a day, Disp: 1 Kit, Rfl: 0    glucose blood VI test strips (BLOOD GLUCOSE TEST) strip, Use to check blood sugar four times a day., Disp: 400 Strip, Rfl: 3    lancets misc, Use to check blood sugar four times a day., Disp: 400 Each, Rfl: 3    FREESTYLE PRECISION KIRK STRIPS strip, Use as needed up to twice daily with [facility] reader to check blood sugar when having having problems with the [facility] sensors, Disp: 50 Strip, Rfl: 11    ONETOUCH ULTRA BLUE TEST STRIP strip, Test 4 times daily, Disp: 400 Strip, Rfl: 3    cetirizine (ZYRTEC) 10 mg tablet, Take 10 mg by mouth as needed. (Patient not taking: Reported on 12/9/2021), Disp: , Rfl:     Allergies   Allergen Reactions    Codeine Nausea and Vomiting       Past Medical History:   Diagnosis Date    Breast cancer [facility]) Nov 2012    Right breast infiltrating ductal carcinoma    Cardiomyopathy due to chemotherapy ([address]     EF 20 %    Essential hypertension     Gestational hypertension     History of sepsis 1/2013    after chemo    Hx of difficult intubation     resulting from sepsis in january 2013.       Hypertension     Morbid obesity ([address]     Ne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 ([address]     Type I (juvenile type) diabetes mellitus without mention of complication, uncontrolled     diagnosed age 4    Unspecified vitamin D deficiency 11/7/2011       Past Surgical History:   Procedure Laterality Date    HX  SECTION      HX CYST INCISION AND DRAINAGE  2013    perirectal abscess    HX GYN       in     HX MASTECTOMY Right     Right MRM with sentinel LNB.  HX ORTHOPAEDIC Right     Carpal tunnel release, index finger trigger release.  HX OTHER SURGICAL      cyst removed under left arm    HX OTHER SURGICAL      port placement for chemo    HX WISDOM TEETH EXTRACTION  08/15/2018    MO BREAST SURGERY PROCEDURE UNLISTED      R Breast Mastectomy       Family History   Problem Relation Age of Onset    Diabetes Brother         borderline Type 2    Diabetes Paternal Uncle     Diabetes Paternal Grandfather     Heart Disease Paternal Grandfather         MI in his 62s    Hypertension Mother     Heart Disease Father     Stroke Neg Hx        Social History     Socioeconomic History    Marital status:      Spouse name: Not on file    Number of children: Not on file    Years of education: Not on file    Highest education level: Not on file   Occupational History    Not on file   Tobacco Use    Smoking status: Never Smoker    Smokeless tobacco: Never Used   Vaping Use    Vaping Use: Never used   Substance and Sexual Activity    Alcohol use: No    Drug use: No    Sexual activity: Yes     Partners: Male   Other Topics Concern     Service Not Asked    Blood Transfusions Not Asked    Caffeine Concern Not Asked    Occupational Exposure Not Asked    Hobby Hazards Not Asked    Sleep Concern Not Asked    Stress Concern Not Asked    Weight Concern Not Asked    Special Diet Not Asked    Back Care Not Asked    Exercise Not Asked    Bike Helmet Not Asked   [place] Not Asked    Self-Exams Not Asked   Social History Narrative    Lives in [place] with her  and 4 yo son. Got  in 2017. Currently in [address] [place] [address] for a masters in [employer].       Social Determinants of Health     Financial Resource Strain:  Difficulty of Paying Living Expenses: Not on file   Food Insecurity:     Worried About Running Out of Food in the Last Year: Not on file    Ran Out of Food in the Last Year: Not on file   Transportation Needs:     Lack of Transportation (Medical): Not on file    Lack of Transportation (Non-Medical): Not on file   Physical Activity:     Days of Exercise per Week: Not on file    Minutes of Exercise per Session: Not on file   Stress:     Feeling of Stress : Not on file   Social Connections:     Frequency of Communication with Friends and Family: Not on file    Frequency of Social Gatherings with Friends and Family: Not on file    Attends Christian Services: Not on file    Active Member of [address] [place] [address] Message Bus or Organizations: Not on file    Attends Club or Organization Meetings: Not on file    Marital Status: Not on file   Intimate Partner Violence:     Fear of Current or Ex-Partner: Not on file    Emotionally Abused: Not on file    Physically Abused: Not on file    Sexually Abused: Not on file   Housing Stability:     Unable to Pay for Housing in the Last Year: Not on file    Number of [place] in the Last Year: Not on file    Unstable Housing in the Last Year: Not on file           Physical Exam  Vitals and nursing note reviewed. HENT:      Head: Normocephalic. Nose: Nose normal.      Mouth/Throat:      Mouth: Mucous membranes are moist.   Eyes:      Pupils: Pupils are equal, round, and reactive to light. Cardiovascular:      Rate and Rhythm: Normal rate and regular rhythm. Pulses: Normal pulses. Heart sounds: Normal heart sounds. Pulmonary:      Effort: Pulmonary effort is normal.      Breath sounds: Normal breath sounds. Abdominal:      General: Abdomen is flat. There is no distension. Musculoskeletal:         General: Normal range of motion. Cervical back: Normal range of motion. Skin:     General: Skin is warm.       Capillary Refill: Capillary refill takes less than 2 seconds. Neurological:      General: No focal deficit present. Mental Status: She is alert and oriented to person, place, and time. Psychiatric:         Mood and Affect: Mood normal.         Behavior: Behavior normal.          Patient was seen in office with a complaint of continued dizziness and unusual feeling in her abdomen. Patient has gone to the emergency room yesterday. Chance of dizziness with occasions of low blood sugar. She also described a sensation in her abdomen. There is no exam done of the abdomen yesterday. She does report that she has not had a menstrual cycle in 3 months. She is not on any birth control. She reports similar symptoms in the past with her pregnancies. She reports multiple pregnancy tests and one yesterday at the hospital that was negative. She had appointment today with her OB. She also has an appointment with neurology. She is working on appointment with her endocrinologist to adjust her insulin. She does have a continuous monitor system with her blood sugar that will alarm when she gets low. She has been keeping's on hand when this happens. I have ordered an abdominal ultrasound. She will see her OB today and will follow-up with neurology. She does have meclizine. I did check her tympanic membranes her left was fluid-filled but not infected. Seasonal allergies not recommended that she start taking her Zyrtec daily. She should follow-up if symptoms continue. Review of Systems   Constitutional: Negative for fever and malaise/fatigue. HENT: Negative for congestion, sinus pain and sore throat. Eyes: Negative. Respiratory: Negative for cough and shortness of breath. Cardiovascular: Negative for chest pain. Gastrointestinal: Positive for abdominal pain. Negative for diarrhea, nausea and vomiting. Genitourinary: Negative for dysuria. Musculoskeletal: Negative. Skin: Negative. Neurological: Positive for dizziness.  Negative for headaches. Endo/Heme/Allergies: Negative for environmental allergies. Psychiatric/Behavioral: Negative. No results found. Recent Results (from the past 24 hour(s))   CBC WITH AUTOMATED DIFF    Collection Time: 03/23/22  4:48 PM   Result Value Ref Range    WBC 6.6 3.6 - 11.0 K/uL    RBC 4.35 3.80 - 5.20 M/uL    HGB 12.7 11.5 - 16.0 g/dL    HCT 40.4 35.0 - 47.0 %    MCV 92.9 80.0 - 99.0 FL    MCH 29.2 26.0 - 34.0 PG    MCHC 31.4 30.0 - 36.5 g/dL    RDW 12.7 11.5 - 14.5 %    PLATELET 823 848 - 669 K/uL    MPV 9.7 8.9 - 12.9 FL    NRBC 0.0 0  WBC    ABSOLUTE NRBC 0.00 0.00 - 0.01 K/uL    NEUTROPHILS 47 32 - 75 %    LYMPHOCYTES 38 12 - 49 %    MONOCYTES 9 5 - 13 %    EOSINOPHILS 5 0 - 7 %    BASOPHILS 1 0 - 1 %    IMMATURE GRANULOCYTES 0 0.0 - 0.5 %    ABS. NEUTROPHILS 3.2 1.8 - 8.0 K/UL    ABS. LYMPHOCYTES 2.5 0.8 - 3.5 K/UL    ABS. MONOCYTES 0.6 0.0 - 1.0 K/UL    ABS. EOSINOPHILS 0.3 0.0 - 0.4 K/UL    ABS. BASOPHILS 0.0 0.0 - 0.1 K/UL    ABS. IMM. GRANS. 0.0 0.00 - 0.04 K/UL    DF AUTOMATED     METABOLIC PANEL, COMPREHENSIVE    Collection Time: 03/23/22  4:48 PM   Result Value Ref Range    Sodium 137 136 - 145 mmol/L    Potassium 3.8 3.5 - 5.1 mmol/L    Chloride 105 97 - 108 mmol/L    CO2 30 21 - 32 mmol/L    Anion gap 2 (L) 5 - 15 mmol/L    Glucose 155 (H) 65 - 100 mg/dL    BUN 20 6 - 20 MG/DL    Creatinine 1.07 (H) 0.55 - 1.02 MG/DL    BUN/Creatinine ratio 19 12 - 20      GFR est AA >60 >60 ml/min/1.73m2    GFR est non-AA 57 (L) >60 ml/min/1.73m2    Calcium 8.9 8.5 - 10.1 MG/DL    Bilirubin, total 0.5 0.2 - 1.0 MG/DL    ALT (SGPT) 28 12 - 78 U/L    AST (SGOT) 15 15 - 37 U/L    Alk.  phosphatase 101 45 - 117 U/L    Protein, total 7.8 6.4 - 8.2 g/dL    Albumin 3.4 (L) 3.5 - 5.0 g/dL    Globulin 4.4 (H) 2.0 - 4.0 g/dL    A-G Ratio 0.8 (L) 1.1 - 2.2     LIPASE    Collection Time: 03/23/22  4:48 PM   Result Value Ref Range    Lipase 99 73 - 393 U/L   HCG QL SERUM    Collection Time: 03/23/22  4:48 PM   Result Value Ref Range    HCG, Ql. Negative NEG     URINALYSIS W/MICROSCOPIC    Collection Time: 03/23/22  4:57 PM   Result Value Ref Range    Color YELLOW/STRAW      Appearance CLEAR CLEAR      Specific gravity 1.021 1.003 - 1.030      pH (UA) 8.0 5.0 - 8.0      Protein Negative NEG mg/dL    Glucose Negative NEG mg/dL    Ketone Negative NEG mg/dL    Bilirubin Negative NEG      Blood Negative NEG      Urobilinogen 1.0 0.2 - 1.0 EU/dL    Nitrites Negative NEG      Leukocyte Esterase Negative NEG      WBC 0-4 0 - 4 /hpf    RBC 0-5 0 - 5 /hpf    Epithelial cells FEW FEW /lpf    Bacteria Negative NEG /hpf    Hyaline cast 0-2 0 - 2 /lpf   URINE CULTURE HOLD SAMPLE    Collection Time: 03/23/22  4:57 PM    Specimen: Serum; Urine   Result Value Ref Range    Urine culture hold        Urine on hold in Microbiology dept for 2 days. If unpreserved urine is submitted, it cannot be used for addtional testing after 24 hours, recollection will be required. Assessment and Plan:    Encounter Diagnoses   Name Primary?  Abdominal discomfort Yes    Dizziness                 I have discussed the diagnosis with the patient and the intended plan as seen in the above orders. she has expressed understanding. The patient has received an after-visit summary and questions were answered concerning future plans. I have discussed medication side effects and warnings with the patient as well.         Electronically Signed: Herminio Wilhelm NP

## 2022-03-28 ENCOUNTER — VIRTUAL VISIT (OUTPATIENT)
Dept: ENDOCRINOLOGY | Age: 37
End: 2022-03-28
Payer: MEDICAID

## 2022-03-28 DIAGNOSIS — E55.9 VITAMIN D DEFICIENCY: ICD-10-CM

## 2022-03-28 DIAGNOSIS — E10.9 TYPE 1 DIABETES MELLITUS WITHOUT COMPLICATION (HCC): Primary | ICD-10-CM

## 2022-03-28 DIAGNOSIS — I10 ESSENTIAL HYPERTENSION, BENIGN: ICD-10-CM

## 2022-03-28 DIAGNOSIS — E78.5 HYPERLIPIDEMIA LDL GOAL <100: ICD-10-CM

## 2022-03-28 PROCEDURE — 99214 OFFICE O/P EST MOD 30 MIN: CPT | Performed by: INTERNAL MEDICINE

## 2022-03-28 PROCEDURE — 95251 CONT GLUC MNTR ANALYSIS I&R: CPT | Performed by: INTERNAL MEDICINE

## 2022-03-28 NOTE — PROGRESS NOTES
Chief Complaint   Patient presents with   [facility] Diabetes     [phone]     Other     pcp and pharmacy confirmed       **THIS IS A VIRTUAL VISIT VIA A VIDEO SYNCHRONOUS DISCUSSION. PATIENT AGREED TO HAVE THEIR CARE DELIVERED OVER A DOXY. ME VIDEO VISIT IN PLACE OF THEIR REGULARLY SCHEDULED OFFICE VISIT**    History of Present Illness: Lani Olvera is a 42 y.o. female here for follow up of diabetes. Current settings are as follows:  - basal: 12a: 0.95, 8a: 1.4, 8p: 1.0  - Carb ratio: 10  - sensitivity: 75  - target: 120 (150)  - active insulin time: 4 hours    she has the following indications to continue treatment with Dexcom:  1) she has type 1 diabetes (E10.9) and is on an intensive insulin regimen with an insulin pump  2) she tests her blood sugar 4 times per day and makes treatment decisions off her blood sugar readings and does the same off dexcom sensor readings  3) she requires frequent adjustments to her insulin pump settings based on her dexcom sensor readings  4) she has benefitted from therapeutic continuous glucose monitoring and I recommend that she continue this  5) she is seen in my office every 4-6 months    Since February has been experiencing dizziness and nausea and had a CT scan and labs that were normal and was sent home with meds for these 2 symptoms. Continued to have these symptoms and then went back to the ER on 3/3/23 and 3/23/23. Took several negative pregnancy tests both with blood and urine. Hasn't had a period for the past 3 months and saw her OB/GYN's NP last week. Will be seeing a neurologist in May and will be having a sleep study. Will be seeing Dr. Peña Feeling today. Had come off the lisinopril and hctz as she was concerned she was pregnant but has since gone back on these. Her BP was 185/99 today but was normal at PCP's office last week. She is scheduled for a pelvic ultrasound but this is a little ways out. Has an abd ultrasound scheduled for 4/2/22.   She also will need an MRI for her breast cancer surveillance. She has been on her current pump settings the past month or so. Review of her Dexcom data over the past 2 weeks shows she is having frequent lows in the 40-70 range in the morning and unclear if this is contributing to her symptoms. Also tends to spike after meals and then can go low about 2-3 hours later. Current Outpatient Medications   Medication Sig    ondansetron (ZOFRAN ODT) 4 mg disintegrating tablet Take 1 Tablet by mouth every eight (8) hours as needed for Nausea for up to 12 doses.  hydroCHLOROthiazide (HYDRODIURIL) 12.5 mg tablet TAKE 1 TABLET BY MOUTH EVERY DAY    insulin aspart U-100 (NovoLOG U-100 Insulin aspart) 100 unit/mL injection USE AS DIRECTED IN INSULIN PUMP.  UNITS/DAY.  ondansetron (Zofran ODT) 4 mg disintegrating tablet 1 Tablet by SubLINGual route every eight (8) hours as needed for Nausea or Vomiting.  buPROPion SR (WELLBUTRIN SR) 150 mg SR tablet TAKE 1 TABLET BY MOUTH TWICE A DAY    cholecalciferol (VITAMIN D3) (5000 Units/125 mcg) tab tablet Take  by mouth daily.  blood-glucose sensor (DEXCOM G6 SENSOR) by Does Not Apply route.  blood-glucose transmitter (DEXCOM G6 TRANSMITTER) by Does Not Apply route.  BD Zandra 2nd Gen Pen Needle 32 gauge x 5/32\" ndle USE AS DIRECTED TO INJECT INSULIN 4 TIMES DAILY    lisinopriL (PRINIVIL, ZESTRIL) 40 mg tablet TAKE 1 TAB BY MOUTH ONCE DAILY    subcutaneous insulin pump (OMNIPOD INSULIN MANAGEMENT) by Does Not Apply route.  Insulin Syringe-Needle U-100 1 mL 31 gauge x 5/16 syrg Use as directed three times daily    Blood-Glucose Meter monitoring kit Check blood sugar four times a day    glucose blood VI test strips (BLOOD GLUCOSE TEST) strip Use to check blood sugar four times a day.  lancets misc Use to check blood sugar four times a day.  cetirizine (ZYRTEC) 10 mg tablet Take 10 mg by mouth as needed.      No current facility-administered medications for this visit. Allergies   Allergen Reactions    Codeine Nausea and Vomiting     Review of Systems: PER HPI    Physical Examination:  - GENERAL: NCAT, Appears well nourished   - EYES: EOMI, non-icteric, no proptosis   - Ear/Nose/Throat: NCAT, no visible inflammation or masses   - CARDIOVASCULAR: no cyanosis, no visible JVD   - RESPIRATORY: respiratory effort normal without any distress or labored breathing   - MUSCULOSKELETAL: Normal ROM of neck and upper extremities observed   - SKIN: No rash on face  - NEUROLOGIC:  No facial asymmetry (Cranial nerve 7 motor function), No gaze palsy   - PSYCHIATRIC: Normal affect, Normal insight and judgement       Data Reviewed:   Component      Latest Ref Rng & Units 3/23/2022           4:48 PM   Sodium      136 - 145 mmol/L 137   Potassium      3.5 - 5.1 mmol/L 3.8   Chloride      97 - 108 mmol/L 105   CO2      21 - 32 mmol/L 30   Anion gap      5 - 15 mmol/L 2 (L)   Glucose      65 - 100 mg/dL 155 (H)   BUN      6 - 20 MG/DL 20   Creatinine      0.55 - 1.02 MG/DL 1.07 (H)   BUN/Creatinine ratio      12 - 20   19   GFR est AA      >60 ml/min/1.73m2 >60   GFR est non-AA      >60 ml/min/1.73m2 57 (L)   Calcium      8.5 - 10.1 MG/DL 8.9   Bilirubin, total      0.2 - 1.0 MG/DL 0.5   ALT      12 - 78 U/L 28   AST      15 - 37 U/L 15   Alk.  phosphatase      45 - 117 U/L 101   Protein, total      6.4 - 8.2 g/dL 7.8   Albumin      3.5 - 5.0 g/dL 3.4 (L)   Globulin      2.0 - 4.0 g/dL 4.4 (H)   A-G Ratio      1.1 - 2.2   0.8 (L)       Assessment/Plan:     1) Type 1 DM uncontrolled: Her most recent Hgb A1c was 7.9% in 10/21 up from 7.3% in 4/21 down from 8.4% in 2/21 up from 8.1% in 10/20 down from 8.8% in 9/20 down from 9% in 12/19 up from 8.6% in 10/19 up from 7.8% in 6/19 up from 7.1% in 2/19 stable from 1/19 stable from 12/18 down from 7.2% in 11/18 up from 6.8% in 10/18 down from 7.5% in 9/18 down from 8.2% in 6/18 up from 8% in 1/18 (she was at [facility] from 11/12 to 1/18 and states A1c values were in the 7-8% range) down from 8.8% in January 2012 stable from 8.9% in September 2011 up from 7.5% in May 2010. She needs less basal insulin overnight and during the day but a more aggressive carb ratio so adjust her to the new settings below. - cont current pump settings aside from changes below  - Check bs 4x/day due to fluctuating sugars  - cont dexcom  - optho UTD 7/17  - foot exam done 11/21  - microalbumin nl 10/21  - check POC A1c at next visit  - check Hgb A1c and cmp and microalbumin in 10/22      2) HTN NOS (401.9): her BP was at goal < 140/90 at PCP's office last week but high today but has been under more stress and unclear if this is driving up her blood pressure. May need her hctz increased if her BP remains high. - cont lisinopril 40 mg daily  - cont hctz 12.5 mg daily  - monitor home blood pressure readings      3) Vitamin D deficiency: Level was 11.1 in 9/11. Started on ergo at that time and level up to 24 in January 2012. Still 25.9 in 2/18 so advised to take 4000 units daily but has not been doing this and level was 27.4 in 10/18 and 47 in 6/19 and 66 in 10/20 and 55 in 10/21 on 5000 units daily  - check Vitamin D 25-OH level in 10/22  - cont vitamin D 5000 units daily    4) Hyperlipidemia with goal LDL < 100: LDL 65 6/19 and 106 in 10/19 and in 9/20 and PCP started atorvastatin 10 mg daily and down to 71 in 10/20 but stopped this for now given age < 40 and up to 93 in 2/21 and down to 78 in 4/21 and 67 in 10/21  - diet control  - check lipids in 10/22    Patient Instructions   1) Current settings are as follows:  - basal: 12a: 0.85, 8a: 1.3, 8p: 1.0  - Carb ratio: 8  - sensitivity: 75  - target: 120 (150)  - active insulin time: 4 hours    I made your basal rates less aggressive overnight and during the day to help with lows and your carb ratio more aggressive to help with spikes after eating.     2) Please download your dexcom in another week and let me know when you have done this and I can take a look at your data. Follow-up and Dispositions    · Return as scheduled in May. Maday Fairchild, was evaluated through a synchronous (real-time) audio-video encounter. The patient (or guardian if applicable) is aware that this is a billable service, which includes applicable co-pays. Verbal consent to proceed has been obtained. The visit was conducted pursuant to the emergency declaration under the [address] [place] [address] and the [employer] and [employer] General Act. Patient identification was verified, and a caregiver was present when appropriate. The patient was located at home in a state where the provider was licensed to provide care. --Mariajose Nj MD on 3/28/2022 at 12:51 PM    An electronic signature was used to authenticate this note. Copy sent to:   Dhara Martinez MD (Obstetrics & Gynecology)  Arnulfo Mar MD (Surgical Oncology)  James Cabrera MD (Hematology and Oncology)  Dr. Chip Pelayo via [facility]

## 2022-03-28 NOTE — TELEPHONE ENCOUNTER
3/28/2022  9:42 AM    Ms. Alcides Salgado is able to do a VV today at 12:10pm. Would you still like me to schedule her?     Kaycee Wright

## 2022-03-28 NOTE — PATIENT INSTRUCTIONS
1) Current settings are as follows:  - basal: 12a: 0.85, 8a: 1.3, 8p: 1.0  - Carb ratio: 8  - sensitivity: 75  - target: 120 (150)  - active insulin time: 4 hours    I made your basal rates less aggressive overnight and during the day to help with lows and your carb ratio more aggressive to help with spikes after eating. 2) Please download your dexcom in another week and let me know when you have done this and I can take a look at your data.

## 2022-03-28 NOTE — TELEPHONE ENCOUNTER
3/28/2022    Good Morning Dr. Morro Telles,     I spoke with Ms. Dheeraj Naidu and let her know about the appt today 3/28 at 12:10pm. Ms. Dheeraj Naidu is not sure if she's able to do an appt in-person or virtually due to coverage at work and doing that time its busy. Are we able to schedule her for another day? I will contact her back once I hear back from you.      Thanks,   Ty Leyden

## 2022-04-02 ENCOUNTER — HOSPITAL ENCOUNTER (OUTPATIENT)
Dept: ULTRASOUND IMAGING | Age: 37
Discharge: HOME | End: 2022-04-02
Attending: NURSE PRACTITIONER
Payer: MEDICAID

## 2022-04-02 DIAGNOSIS — R10.9 ABDOMINAL DISCOMFORT: ICD-10-CM

## 2022-04-02 PROCEDURE — 76700 US EXAM ABDOM COMPLETE: CPT

## 2022-04-17 ENCOUNTER — TELEPHONE (OUTPATIENT)
Dept: ENDOCRINOLOGY | Age: 37
End: 2022-04-17

## 2022-04-17 RX ORDER — INSULIN PUMP CONTROLLER
EACH MISCELLANEOUS
Qty: 6 PACKAGE | Refills: 3 | Status: SHIPPED | OUTPATIENT
Start: 2022-04-17 | End: 2022-08-20 | Stop reason: SDUPTHER

## 2022-04-17 NOTE — TELEPHONE ENCOUNTER
Per the fax I received from [facility], I have sent a prescription for Omnipod DASH cartridges to her local [facility] for a 90 day supply. Please fax the form for the personal  to [phone]. Thanks.

## 2022-04-28 RX ORDER — LISINOPRIL 40 MG/1
TABLET ORAL
Qty: 90 TABLET | Refills: 3 | Status: SHIPPED | OUTPATIENT
Start: 2022-04-28

## 2022-05-03 ENCOUNTER — OFFICE VISIT (OUTPATIENT)
Dept: ENDOCRINOLOGY | Age: 37
End: 2022-05-03
Payer: MEDICAID

## 2022-05-03 ENCOUNTER — HOSPITAL ENCOUNTER (OUTPATIENT)
Dept: NUTRITION | Age: 37
Discharge: HOME | End: 2022-05-03
Payer: MEDICAID

## 2022-05-03 VITALS
HEIGHT: 63 IN | SYSTOLIC BLOOD PRESSURE: 148 MMHG | WEIGHT: 229.3 LBS | BODY MASS INDEX: 40.63 KG/M2 | HEART RATE: 68 BPM | DIASTOLIC BLOOD PRESSURE: 84 MMHG

## 2022-05-03 DIAGNOSIS — E55.9 VITAMIN D DEFICIENCY: ICD-10-CM

## 2022-05-03 DIAGNOSIS — E66.01 MORBID OBESITY WITH BMI OF 40.0-44.9, ADULT (HCC): ICD-10-CM

## 2022-05-03 DIAGNOSIS — E10.9 TYPE 1 DIABETES MELLITUS WITHOUT COMPLICATION (HCC): Primary | ICD-10-CM

## 2022-05-03 DIAGNOSIS — I10 ESSENTIAL HYPERTENSION, BENIGN: ICD-10-CM

## 2022-05-03 DIAGNOSIS — E10.9 TYPE 1 DIABETES MELLITUS WITHOUT COMPLICATION (HCC): ICD-10-CM

## 2022-05-03 DIAGNOSIS — E78.5 HYPERLIPIDEMIA LDL GOAL <100: ICD-10-CM

## 2022-05-03 LAB — HBA1C MFR BLD HPLC: 8.6 %

## 2022-05-03 PROCEDURE — 95251 CONT GLUC MNTR ANALYSIS I&R: CPT | Performed by: INTERNAL MEDICINE

## 2022-05-03 PROCEDURE — 99214 OFFICE O/P EST MOD 30 MIN: CPT | Performed by: INTERNAL MEDICINE

## 2022-05-03 PROCEDURE — 97802 MEDICAL NUTRITION INDIV IN: CPT | Performed by: DIETITIAN, REGISTERED

## 2022-05-03 PROCEDURE — 3052F HG A1C>EQUAL 8.0%<EQUAL 9.0%: CPT | Performed by: INTERNAL MEDICINE

## 2022-05-03 PROCEDURE — 83036 HEMOGLOBIN GLYCOSYLATED A1C: CPT | Performed by: INTERNAL MEDICINE

## 2022-05-03 NOTE — PATIENT INSTRUCTIONS
1) Let me know when you want to go off the pump for vacation and I can get you a supply of some insulin pens to use and if you decide the pump is not for you, I'm happy to support you and just you injections and Dexcom. 2) Now that you made your carb ratio more aggressive 2 days ago, let's see what happens with your after meal readings and if they'll stop going over 180 2 hours after you eat. Watch to see if you then are going low 3-4 hours after eating as we may need to decrease your daytime 8a basal to 1.2 from 1.3.    3) Start monitoring blood pressure about 2-3 times per week at alternating times either in the morning or evening after resting for 5 minutes and sitting upright in a chair with your arm at heart level. Please let me know if you are having readings over 140 on the top number or 90 on the bottom number over the next 2 weeks. 4) Your A1c cindy from 7.9% to 8.6% due the spikes you have had but hopefully we can get your next one back down.

## 2022-05-03 NOTE — PROGRESS NOTES
Chief Complaint   Patient presents with    Diabetes     pcp and pharmacy confirmed     History of Present Illness: Ej Carpenter is a 42 y.o. female here for follow up of diabetes. Current settings are as follows:  - basal: 12a: 0.75, 8a: 1.3, 8p: 0.95  - Carb ratio: 7  - sensitivity: 75  - target: 120 (150)  - active insulin time: 4 hours    she has the following indications to continue treatment with Dexcom:  1) she has type 1 diabetes (E10.9) and is on an intensive insulin regimen with an insulin pump  2) she tests her blood sugar 4 times per day and makes treatment decisions off her blood sugar readings and does the same off dexcom sensor readings  3) she requires frequent adjustments to her insulin pump settings based on her dexcom sensor readings  4) she has benefitted from therapeutic continuous glucose monitoring and I recommend that she continue this  5) she is seen in my office every 4-6 months    Was found to be in early menopause but won't be able to go on any HRT given her breast cancer history. Her blood sugars have been fluctuating a lot and is now having more highs at night and also during the day so just made her carb ratio more aggressive at 7 from 8 two days ago. Review of her dexcom over the past 14 days shows that she is only spending 40% time in range and the rest is high and only rarely going low but this can happen in the afternoon after not eating for several hours so may need a decrease in her basal during the day. Has not enjoyed the pump as much as she thought she would and may choose to go back on injections in the future. Compliant with BP regimen and hasn't checked any readings recently at home.             Current Outpatient Medications   Medication Sig    lisinopriL (PRINIVIL, ZESTRIL) 40 mg tablet TAKE 1 TAB BY MOUTH ONCE DAILY    Omnipod Dash Insulin Pod crtg Replaced Pod cartridge every 72 hours    ondansetron (ZOFRAN ODT) 4 mg disintegrating tablet Take 1 Tablet by mouth every eight (8) hours as needed for Nausea for up to 12 doses.  hydroCHLOROthiazide (HYDRODIURIL) 12.5 mg tablet TAKE 1 TABLET BY MOUTH EVERY DAY    insulin aspart U-100 (NovoLOG U-100 Insulin aspart) 100 unit/mL injection USE AS DIRECTED IN INSULIN PUMP.  UNITS/DAY.  ondansetron (Zofran ODT) 4 mg disintegrating tablet 1 Tablet by SubLINGual route every eight (8) hours as needed for Nausea or Vomiting.  buPROPion SR (WELLBUTRIN SR) 150 mg SR tablet TAKE 1 TABLET BY MOUTH TWICE A DAY    cholecalciferol (VITAMIN D3) (5000 Units/125 mcg) tab tablet Take  by mouth daily.  blood-glucose sensor (DEXCOM G6 SENSOR) by Does Not Apply route.  blood-glucose transmitter (DEXCOM G6 TRANSMITTER) by Does Not Apply route.  subcutaneous insulin pump (OMNIPOD INSULIN MANAGEMENT) by Does Not Apply route.  Blood-Glucose Meter monitoring kit Check blood sugar four times a day    glucose blood VI test strips (BLOOD GLUCOSE TEST) strip Use to check blood sugar four times a day.  lancets misc Use to check blood sugar four times a day.  cetirizine (ZYRTEC) 10 mg tablet Take 10 mg by mouth as needed. No current facility-administered medications for this visit. Allergies   Allergen Reactions    Codeine Nausea and Vomiting     Review of Systems: PER HPI    Physical Examination:  Blood pressure (!) 151/84, pulse 68, height 5' 3\" (1.6 m), weight 229 lb 4.8 oz (104 kg).   - General: pleasant, no distress, good eye contact   - Neck: no carotid bruits  - Cardiovascular: regular, normal rate, nl s1 and s2, no m/r/g,   - Respiratory: clear bilaterally  - Integumentary: no edema,   - Psychiatric: normal mood and affect    Data Reviewed:   Component      Latest Ref Rng & Units 5/3/2022          10:20 AM   Hemoglobin A1c (POC)      % 8.6       Assessment/Plan:     1) Type 1 DM uncontrolled: Her most recent Hgb A1c was 8.6% in 5/22 up from 7.9% in 10/21 up from 7.3% in 4/21 down from 8.4% in 2/21 up from 8.1% in 10/20 down from 8.8% in 9/20 down from 9% in 12/19 up from 8.6% in 10/19 up from 7.8% in 6/19 up from 7.1% in 2/19 stable from 1/19 stable from 12/18 down from 7.2% in 11/18 up from 6.8% in 10/18 down from 7.5% in 9/18 down from 8.2% in 6/18 up from 8% in 1/18 (she was at [address] [place] [address] from 11/12 to 1/18 and states A1c values were in the 7-8% range) down from 8.8% in January 2012 stable from 8.9% in September 2011 up from 7.5% in May 2010. She just changed her carb ratio 2 days ago and don't see any other trends that need changing yet. - cont current pump settings   - Check bs 4x/day due to fluctuating sugars  - cont dexcom  - optho UTD 7/17  - foot exam done 11/21  - microalbumin nl 10/21  - check POC A1c in 4/23  - check Hgb A1c and cmp and microalbumin prior to next visit      2) HTN NOS (401.9): her BP was above goal < 140/90 so will monitor some home readings as she was at goal on 3/24/22 at PCP's office. May need her hctz increased if her BP remains high. - cont lisinopril 40 mg daily  - cont hctz 12.5 mg daily  - monitor home blood pressure readings      3) Vitamin D deficiency: Level was 11.1 in 9/11. Started on ergo at that time and level up to 24 in January 2012.   Still 25.9 in 2/18 so advised to take 4000 units daily but has not been doing this and level was 27.4 in 10/18 and 47 in 6/19 and 66 in 10/20 and 55 in 10/21 on 5000 units daily  - check Vitamin D 25-OH level prior to next visit  - cont vitamin D 5000 units daily    4) Hyperlipidemia with goal LDL < 100: LDL 65 6/19 and 106 in 10/19 and in 9/20 and PCP started atorvastatin 10 mg daily and down to 71 in 10/20 but stopped this for now given age < 40 and up to 80 in 2/21 and down to 78 in 4/21 and 67 in 10/21  - diet control  - check lipids prior to next visit      Patient Instructions   1) Let me know when you want to go off the pump for vacation and I can get you a supply of some insulin pens to use and if you decide the pump is not for you, I'm happy to support you and just you injections and Dexcom. 2) Now that you made your carb ratio more aggressive 2 days ago, let's see what happens with your after meal readings and if they'll stop going over 180 2 hours after you eat. Watch to see if you then are going low 3-4 hours after eating as we may need to decrease your daytime 8a basal to 1.2 from 1.3.    3) Start monitoring blood pressure about 2-3 times per week at alternating times either in the morning or evening after resting for 5 minutes and sitting upright in a chair with your arm at heart level. Please let me know if you are having readings over 140 on the top number or 90 on the bottom number over the next 2 weeks. 4) Your A1c cindy from 7.9% to 8.6% due the spikes you have had but hopefully we can get your next one back down. Follow-up and Dispositions    · Return in about 6 months (around 11/3/2022). Copy sent to:   Elizabeth Boyce MD (Obstetrics & Gynecology)  Wilma Moreland MD (Surgical Oncology)  Angelina Stephenson MD (Hematology and Oncology)  Dr. Lee Pat via [facility]

## 2022-05-03 NOTE — PROGRESS NOTES
[id number] [facility]     Nutrition Assessment - Medical Nutrition Therapy   Outpatient Initial Evaluation         Patient Name: Ferdinand Friedman : 1985   Treatment Diagnosis: E10.9 (ICD-10-CM) - Type 1 diabetes mellitus without complications   C68.56 (FSD-28-VB) - Morbid (severe) obesity due to excess calories      Referral Source: Tanner Buck MD [facility]): 5/3/2022     In time:   200pm             Out time:   310pm   Total Treatment Time (min):   70     Gender: female Age: 42 y.o. Ht: 63 in Wt:  229 lb 104 kg   BMI: 40.6 (class III obesity)  BMR   Male  BMR Female [id number]     Past Medical History:  Patient Active Problem List   Diagnosis Code    Uncontrolled type 1 diabetes mellitus [id number]    Essential hypertension, benign I10    Encounter for long-term (current) use of other medications Z79.899    Encounter for long-term (current) use of insulin ([address] Z79.4    Vitamin D deficiency E55.9    Abnormal thyroid blood test R79.89    Ne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 ([address] G62.0, T45.1X5A    Cardiomyopathy due to chemotherapy ([address] I42.7, T45.1X5A    Type 1 diabetes mellitus without complication ([address] U60.3    Morbid obesity with BMI of 40.0-44.9, adult (HCC) E66.01, Z68.41    Wound check, abscess Z51.89    History of breast cancer Z85.3    Pregnancy induced hypertension, third trimester O13.3    Pregnancy induced hypertension O13.9    Acute heart failure (HCC) I50.9    Hidradenitis suppurativa L73.2    Hypertension I10        Pertinent Medications:     Current Outpatient Medications:     lisinopriL (PRINIVIL, ZESTRIL) 40 mg tablet, TAKE 1 TAB BY MOUTH ONCE DAILY, Disp: 90 Tablet, Rfl: 3    Omnipod Dash Insulin Pod crtg, Replaced Pod cartridge every 72 hours, Disp: 6 Package, Rfl: 3    ondansetron (ZOFRAN ODT) 4 mg disintegrating tablet, Take 1 Tablet by mouth every eight (8) hours as needed for Nausea for up to 12 doses. , Disp: 12 Tablet, Rfl: 0    hydroCHLOROthiazide (HYDRODIURIL) 12.5 mg tablet, TAKE 1 TABLET BY MOUTH EVERY DAY, Disp: 90 Tablet, Rfl: 1    insulin aspart U-100 (NovoLOG U-100 Insulin aspart) 100 unit/mL injection, USE AS DIRECTED IN INSULIN PUMP.  UNITS/DAY., Disp: 30 mL, Rfl: 11    ondansetron (Zofran ODT) 4 mg disintegrating tablet, 1 Tablet by SubLINGual route every eight (8) hours as needed for Nausea or Vomiting., Disp: 12 Tablet, Rfl: 0    buPROPion SR (WELLBUTRIN SR) 150 mg SR tablet, TAKE 1 TABLET BY MOUTH TWICE A DAY, Disp: 180 Tablet, Rfl: 1    cholecalciferol (VITAMIN D3) (5000 Units/125 mcg) tab tablet, Take  by mouth daily. , Disp: , Rfl:     blood-glucose sensor (DEXCOM G6 SENSOR), by Does Not Apply route., Disp: , Rfl:     blood-glucose transmitter (DEXCOM G6 TRANSMITTER), by Does Not Apply route., Disp: , Rfl:     subcutaneous insulin pump (OMNIPOD INSULIN MANAGEMENT), by Does Not Apply route., Disp: , Rfl:     Blood-Glucose Meter monitoring kit, Check blood sugar four times a day, Disp: 1 Kit, Rfl: 0    glucose blood VI test strips (BLOOD GLUCOSE TEST) strip, Use to check blood sugar four times a day., Disp: 400 Strip, Rfl: 3    lancets misc, Use to check blood sugar four times a day., Disp: 400 Each, Rfl: 3    cetirizine (ZYRTEC) 10 mg tablet, Take 10 mg by mouth as needed. , Disp: , Rfl:      Biochemical Data:   Lab Results   Component Value Date/Time    Hemoglobin A1c 7.9 (H) 10/25/2021 08:17 AM    Hemoglobin A1c (POC) 8.6 05/03/2022 10:20 AM     Lab Results   Component Value Date/Time    Sodium 137 03/23/2022 04:48 PM    Potassium 3.8 03/23/2022 04:48 PM    Chloride 105 03/23/2022 04:48 PM    CO2 30 03/23/2022 04:48 PM    Anion gap 2 (L) 03/23/2022 04:48 PM    Glucose 155 (H) 03/23/2022 04:48 PM    BUN 20 03/23/2022 04:48 PM    Creatinine 1.07 (H) 03/23/2022 04:48 PM    BUN/Creatinine ratio 19 03/23/2022 04:48 PM    GFR est AA >60 03/23/2022 04:48 PM    GFR est non-AA 57 (L) 03/23/2022 04:48 PM Calcium 8.9 03/23/2022 04:48 PM    Bilirubin, total 0.5 03/23/2022 04:48 PM    Alk. phosphatase 101 03/23/2022 04:48 PM    Protein, total 7.8 03/23/2022 04:48 PM    Albumin 3.4 (L) 03/23/2022 04:48 PM    Globulin 4.4 (H) 03/23/2022 04:48 PM    A-G Ratio 0.8 (L) 03/23/2022 04:48 PM    ALT (SGPT) 28 03/23/2022 04:48 PM    AST (SGOT) 15 03/23/2022 04:48 PM     Lab Results   Component Value Date/Time    Cholesterol, total 156 10/25/2021 08:17 AM    HDL Cholesterol 81 10/25/2021 08:17 AM    LDL, calculated 67 10/25/2021 08:17 AM    LDL, calculated 106 (H) 10/23/2019 02:33 PM    VLDL, calculated 8 10/25/2021 08:17 AM    VLDL, calculated 17 10/23/2019 02:33 PM    Triglyceride 29 10/25/2021 08:17 AM        Assessment:   Pt is a 41 yo female seen today for type 1 DM and obesity. Pt recent lab results show A1c 7.9 (H) and glucose 155 (H). Pt is currently on insulin pump and working with endocrinology to adjust insulin dosing. Pt recently switch to 1:7 carb ratio. Pt is recommended to be checking blood sugar levels 4x daily. Pt noted that she has had issues with control of blood sugars over the past couple of months. Pt is using juice and HiChew candy to help bring up blood sugar when she gets low alarms from Dexcom machine. Pt has previously lost 50# in the [facility] program. Pt noted doing 3 protein drinks daily and one protein bar during that time period. 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 Pt lives at home with  and son. Pt son also diagnosed with type 1 DM. Pt is typically using her day off to do meal prep and grocery shopping. Pt is good about planning out meals to take with her to work. Pt would like to lose weight to get under 200#s.     Exercise: Morning walking M-F 20 min    Lab Results   Component Value Date/Time    Hemoglobin A1c 7.9 (H) 10/25/2021 08:17 AM    Hemoglobin A1c 7.3 (H) 04/19/2021 08:34 AM    Hemoglobin A1c 8.4 (H) 02/17/2021 12:00 AM     Wt Readings from Last 3 Encounters:   05/03/22 104 kg (229 lb 4.8 oz)   03/24/22 103.1 kg (227 lb 6.4 oz)   03/23/22 104.3 kg (230 lb)          Food & Nutrition:   B- 100 calorie muffin, sausage and cheese; 2 waffles, sf syrup, sausage (400 bernard); laurynfrancisco kirkn breakfast bowl (8g carb); special K cereal and milk   S-   L- Protein and vegetable; fruit; salad   S- Peanuts, eyad sausages, snap pea crisps, cubed cheese and pepperoni with crackers; slim lorna, carb master yogurt, peanut butter and apple   D- Protein and vegetable; beans and peas, meatloaf  S-   Drinks: Water, juice as needed for blood sugar dropping        Estimate Needs:    Equation( [x] MSJ ; []  HBE; [] Gaetana Limes; [] other)  * Activity Factor (1.3) - 500   Calories: 1600  Protein: 100 Carbs: 180 Fat: 53   Kcal/day  g/day  g/day  g/day        percent: 25  45  30               Nutrition Diagnosis Food and nutrition related knowledge deficit R/T lack of prior education for diabetes and weight loss nutrition AEB pt questions during session. Excessive energy intake R/T Food and nutrition related knowledge deficit for energy needs and healthy weight loss AEB request for session, BMI > 40, dietary recall. Nutrition Intervention &  Education: Educated pt on the pathophysiology of Type I Diabetes, healthy weight loss, and the rationale for dietary modifications and increased activity. Educated pt on lean proteins, healthy fats, non-starchy vegetables, and complex carbohydrate food sources. Discussed limiting carbohydrates, label reading, meal timing, and appropriate serving sizes. Encouraged pt to avoid sugary beverages. Reviewed meal builder tool, calorie and macro breakdown as well as exercise parameters.     Handouts Provided: []  Carbohydrates  []  Protein  [x]  Non-starchy Vegatbles  []  Food Label  []  Meal and Snack Ideas  []  Food Journals []  Diabetes  []  Cholesterol  []  Sodium  []  Gen Nutr Guidelines  []  SBGM Guidelines  [x]  Others: Meal builder    Information Reviewed with: Pt    Readiness to Change Stage: []  Pre-contemplative    []  Contemplative  []  Preparation               [x]  Action                  []  Maintenance   Potential Barriers to Learning: []  Decline in memory    []  Language barrier   []  Other:  []  Emotional                  []  Limited mobility     [x]  None  []  Lack of motivation     [] Vision, hearing or cognitive impairment   Expected Compliance: Pt expressed comprehension, high motivation, and compliance is expected. Nutritional Goal - To promote lifestyle changes to result in:    [x]  Weight loss  [x]  Improved diabetic control  []  Decreased cholesterol levels  [x]  Decreased blood pressure  []  Weight maintenance []  Preventing any interactions associated with food allergies  []  Adequate weight gain toward goal weight  []  Other:        Patient Goals:   - Improve water intake by having at least 64 oz daily   - Increase accountability and awareness of food choices by recording food and beverage intake by using a food journal at least 3 days per week. - Reduce amount of TV watched at night, limit to 2 shows per evening.       Dietitian Signature: Carine Roche RDN Date: 5/3/2022   Follow-up: 2-4 weeks  Time: 200PM

## 2022-05-16 DIAGNOSIS — E10.9 TYPE 1 DIABETES MELLITUS WITHOUT COMPLICATION (HCC): ICD-10-CM

## 2022-05-16 RX ORDER — IBUPROFEN 200 MG
CAPSULE ORAL
Qty: 400 STRIP | Refills: 3 | Status: SHIPPED | OUTPATIENT
Start: 2022-05-16

## 2022-05-24 ENCOUNTER — HOSPITAL ENCOUNTER (OUTPATIENT)
Dept: NUTRITION | Age: 37
Discharge: HOME | End: 2022-05-24
Payer: MEDICAID

## 2022-05-24 DIAGNOSIS — E10.9 TYPE 1 DIABETES MELLITUS WITHOUT COMPLICATION (HCC): ICD-10-CM

## 2022-05-24 DIAGNOSIS — E66.01 MORBID OBESITY WITH BMI OF 40.0-44.9, ADULT (HCC): ICD-10-CM

## 2022-05-24 PROCEDURE — 97803 MED NUTRITION INDIV SUBSEQ: CPT | Performed by: DIETITIAN, REGISTERED

## 2022-05-24 NOTE — PROGRESS NOTES
John Hernandez was informed of the inherent limitations of a virtual visit,  and has consented to a virtual therapy visit on 2022. The patient was informed that at any time during the virtual visit, they can decide to stop the virtual visit. The patient verified that they are physically located in the [facility] for this virtual visit. NUTRITION - FOLLOW-UP TREATMENT NOTE  Patient Name: John Hernandez         Date: 2022  : 1985    YES Patient  Verified  Diagnosis:   E10.9 (ICD-10-CM) - Type 1 diabetes mellitus without complications   I25.70 (WON-18-JF) - Morbid (severe) obesity due to excess calories      In time:   800am             Out time:   845am   Total Treatment Time (min):   45     SUBJECTIVE/ASSESSMENT  Current Wt: 225.6 (at home)  Previous Wt: 229 Wt Change: -3.4     Initial Wt: 229 Total Wt change: -3.4 Height: 63     Changes in medication or medical history? Any new allergies, surgeries or procedures? NO    If yes, update Summary List        Nutrition Diagnosis        Diagnosis Status: Food and nutrition related knowledge deficit R/T lack of prior education for diabetes and weight loss nutrition AEB pt questions during session. [x]  Improved []  No Change    []  Declined   []  Discontinued     Excessive energy intake R/T Food and nutrition related knowledge deficit for energy needs and healthy weight loss AEB request for session, BMI > 40, dietary recall.   [x]  Improved []  No Change    []  Declined   []  Discontinued        Nutrition Monitoring and Evaluation: Pt seen virtually. Pt in [place]. Pt seen today for follow-up visit. Pt has been feeling good about counting her calories and has been seeing how the foods are adding up in the day. Pt has been noticing that she can go days without it but will go back to it when foods are different and she is getting off track. Pt has not been drinking as much water as planned but is working to get more each day.  Pt has bottle that tracks water throughout the day. Pt noticed having cravings for sweets and certain foods in the past week. Pt noted having challenge with dinner preparation. We discussed resources and ways to prepare ahead for dinners. Pt has been doing well with getting in her vegetables, the harder part has been meeting carbohydrate goals. Pt has been seeing better blood sugar numbers and balance. Doing 20g carb for snacks and 40g carbs for meals. Pt has graduation and son's birthday parties coming up. Pt has been making sure to look ahead for menu choices and calories. Pt has been walking 4x week walking in the mornings around the neighborhood. Previous goals:  Met. - Improve water intake by having at least 64 oz daily   Not met. - Increase accountability and awareness of food choices by recording food and beverage intake by using a food journal at least 3 days per week. Met. - Reduce amount of TV watched at night, limit to 2 shows per evening. Nutrition Prescription and  Intervention Educated pt on the pathophysiology of Type I Diabetes, healthy weight loss, and the rationale for dietary modifications and increased activity. Educated pt on lean proteins, healthy fats, non-starchy vegetables, and complex carbohydrate food sources. Discussed limiting carbohydrates, label reading, meal timing, and appropriate serving sizes. Encouraged pt to avoid sugary beverages. Reviewed meal builder tool, calorie and macro breakdown as well as exercise parameters. Patient Education:  [x]  Review current plan with patient   []  Other:    Handouts/  Information Provided: []  Carbohydrates  []  Protein  []  Fiber  []  Serving Sizes  []  Fluids  []  General guidelines []  Diabetes  []  Cholesterol  []  Sodium  []  SBGM  []  Food Journals  []  Others:      Patient Goals - Track calories and carbs 2-3x week and when routine is different.    - Improve water intake by having at least 64 oz daily, use water jug marked for the day  - Keep up with 5x week 20 min walking on treadmill  - Focus on more filling healthy snacks      PLAN  [x]  Continue on current plan []  Follow-up PRN   []  Discharge due to :    [x]  Next appt: 2-4 weeks      Dietitian: Daisha Soares RDN     Date: 5/24/2022 Time: 800 AM     Jacoby Forte is a 42 y.o. female being evaluated by a Virtual Visit (video visit) encounter to address concerns as mentioned above. A caregiver was present when appropriate. Due to this being a TeleHealth encounter (During [facility] public health emergency), evaluation of the following areas was limited: Nutrition Focused Physical Exam. Pursuant to the emergency declaration under the [address] [place] [address] authority and the [employer] Act, this Virtual Visit was conducted with patient's (and/or legal guardian's) consent, to reduce the risk of exposure to COVID-19 and provide necessary medical care. Services were provided through a video synchronous discussion virtually to substitute for in-person encounter. --Daisha Soares RD on 5/24/2022 at 7:42 AM    An electronic signature was used to authenticate this note.

## 2022-05-25 ENCOUNTER — TELEPHONE (OUTPATIENT)
Dept: FAMILY MEDICINE CLINIC | Age: 37
End: 2022-05-25

## 2022-05-25 ENCOUNTER — OFFICE VISIT (OUTPATIENT)
Dept: FAMILY MEDICINE CLINIC | Age: 37
End: 2022-05-25
Payer: MEDICAID

## 2022-05-25 VITALS
WEIGHT: 229 LBS | TEMPERATURE: 97.8 F | OXYGEN SATURATION: 99 % | DIASTOLIC BLOOD PRESSURE: 86 MMHG | BODY MASS INDEX: 40.57 KG/M2 | HEIGHT: 63 IN | HEART RATE: 72 BPM | SYSTOLIC BLOOD PRESSURE: 138 MMHG | RESPIRATION RATE: 14 BRPM

## 2022-05-25 DIAGNOSIS — Z71.3 WEIGHT LOSS COUNSELING, ENCOUNTER FOR: ICD-10-CM

## 2022-05-25 DIAGNOSIS — E10.9 TYPE 1 DIABETES MELLITUS WITHOUT COMPLICATION (HCC): ICD-10-CM

## 2022-05-25 DIAGNOSIS — Z02.89 ENCOUNTER FOR PHYSICAL EXAMINATION RELATED TO EMPLOYMENT: Primary | ICD-10-CM

## 2022-05-25 DIAGNOSIS — I10 ESSENTIAL HYPERTENSION, BENIGN: ICD-10-CM

## 2022-05-25 DIAGNOSIS — Z13.6 SCREENING FOR CARDIOVASCULAR CONDITION: ICD-10-CM

## 2022-05-25 DIAGNOSIS — I83.93 VARICOSE VEINS OF BOTH LOWER EXTREMITIES, UNSPECIFIED WHETHER COMPLICATED: ICD-10-CM

## 2022-05-25 DIAGNOSIS — E55.9 VITAMIN D DEFICIENCY: ICD-10-CM

## 2022-05-25 PROCEDURE — 99214 OFFICE O/P EST MOD 30 MIN: CPT | Performed by: FAMILY MEDICINE

## 2022-05-25 PROCEDURE — 3052F HG A1C>EQUAL 8.0%<EQUAL 9.0%: CPT | Performed by: FAMILY MEDICINE

## 2022-05-25 RX ORDER — BUPROPION HYDROCHLORIDE 150 MG/1
150 TABLET, EXTENDED RELEASE ORAL 2 TIMES DAILY
Qty: 180 TABLET | Refills: 1 | Status: SHIPPED | OUTPATIENT
Start: 2022-05-25

## 2022-05-25 NOTE — PROGRESS NOTES
Francisco Gómez is a 42 y.o. female who presents today for her employment physical.    LMP: Postmenopausal. Menses: Postmenopausal. Last pelvic/PAP: Done by OB/GYN. Last mammogram: 2021. Last BMD: Never. Last screening colonoscopy: Never. Trying to eat a well balanced diet. Immunization History   Administered Date(s) Administered    Influenza Vaccine 12/04/2013, 10/10/2014, 12/16/2015, 09/15/2017    Influenza Vaccine (Quad) PF (>6 Mo Flulaval, Fluarix, and >3 Yrs Afluria, Fluzone [id number]) 11/11/2016, 09/18/2017, 08/29/2018, 01/08/2020, 11/25/2020   ,   Immunization History   Administered Date(s) Administered    Pneumococcal Polysaccharide (PPSV-23) 09/18/2017   ,       Current Outpatient Medications   Medication Sig Dispense Refill    buPROPion SR (WELLBUTRIN SR) 150 mg SR tablet Take 1 Tablet by mouth two (2) times a day. 180 Tablet 1    Comp. Stocking,Thigh,Long,Large misc Use as needed for varicose veins, compression grade : 15-20 2 Each 0    glucose blood VI test strips (blood glucose test) strip Use to check blood sugar four times a day. 400 Strip 3    lisinopriL (PRINIVIL, ZESTRIL) 40 mg tablet TAKE 1 TAB BY MOUTH ONCE DAILY 90 Tablet 3    Omnipod Dash Insulin Pod crtg Replaced Pod cartridge every 72 hours 6 Package 3    hydroCHLOROthiazide (HYDRODIURIL) 12.5 mg tablet TAKE 1 TABLET BY MOUTH EVERY DAY 90 Tablet 1    insulin aspart U-100 (NovoLOG U-100 Insulin aspart) 100 unit/mL injection USE AS DIRECTED IN INSULIN PUMP.  UNITS/DAY. 30 mL 11    ondansetron (Zofran ODT) 4 mg disintegrating tablet 1 Tablet by SubLINGual route every eight (8) hours as needed for Nausea or Vomiting. 12 Tablet 0    cholecalciferol (VITAMIN D3) (5000 Units/125 mcg) tab tablet Take  by mouth daily.  blood-glucose sensor (DEXCOM G6 SENSOR) by Does Not Apply route.  blood-glucose transmitter (DEXCOM G6 TRANSMITTER) by Does Not Apply route.       subcutaneous insulin pump (OMNIPOD INSULIN MANAGEMENT) by Does Not Apply route.  Blood-Glucose Meter monitoring kit Check blood sugar four times a day 1 Kit 0    lancets misc Use to check blood sugar four times a day. 400 Each 3    cetirizine (ZYRTEC) 10 mg tablet Take 10 mg by mouth as needed.  ondansetron (ZOFRAN ODT) 4 mg disintegrating tablet Take 1 Tablet by mouth every eight (8) hours as needed for Nausea for up to 12 doses. 12 Tablet 0     Allergies: Codeine   Social History     Socioeconomic History    Marital status:      Spouse name: Not on file    Number of children: Not on file    Years of education: Not on file    Highest education level: Not on file   Occupational History    Not on file   Tobacco Use    Smoking status: Never Smoker    Smokeless tobacco: Never Used   Vaping Use    Vaping Use: Never used   Substance and Sexual Activity    Alcohol use: No    Drug use: No    Sexual activity: Yes     Partners: Male   Other Topics Concern     Service Not Asked    Blood Transfusions Not Asked    Caffeine Concern Not Asked    Occupational Exposure Not Asked    Hobby Hazards Not Asked    Sleep Concern Not Asked    Stress Concern Not Asked    Weight Concern Not Asked    Special Diet Not Asked    Back Care Not Asked    Exercise Not Asked    Bike Helmet Not Asked   [address] [employer] [facility] Not Asked    Self-Exams Not Asked   Social History Narrative    Lives in [place] with her  and 4 yo son. Got  in July 2017. Currently in [address] [place] [address] for a masters in [employer]. Social Determinants of Health     Financial Resource Strain:     Difficulty of Paying Living Expenses: Not on file   Food Insecurity:     Worried About Running Out of Food in the Last Year: Not on file    Cheri of Food in the Last Year: Not on file   Transportation Needs:     Lack of Transportation (Medical): Not on file    Lack of Transportation (Non-Medical):  Not on file   Physical Activity:     Days of Exercise per Week: Not on file    Minutes of Exercise per Session: Not on file   Stress:     Feeling of Stress : Not on file   Social Connections:     Frequency of Communication with Friends and Family: Not on file    Frequency of Social Gatherings with Friends and Family: Not on file    Attends Hindu Services: Not on file    Active Member of Clubs or Organizations: Not on file    Attends Club or Organization Meetings: Not on file    Marital Status: Not on file   Intimate Partner Violence:     Fear of Current or Ex-Partner: Not on file    Emotionally Abused: Not on file    Physically Abused: Not on file    Sexually Abused: Not on file   Housing Stability:     Unable to Pay for Housing in the Last Year: Not on file    Number of [place] in the Last Year: Not on file    Unstable Housing in the Last Year: Not on file     Family History   Problem Relation Age of Onset    Diabetes Brother         borderline Type 2    Diabetes Paternal Uncle     Diabetes Paternal Grandfather     Heart Disease Paternal Grandfather         MI in his 62s    Hypertension Mother     Heart Disease Father     Stroke Neg Hx      Past Medical History:   Diagnosis Date    Breast cancer ([facility] [address] Nov 2012    Right breast infiltrating ductal carcinoma    Cardiomyopathy due to chemotherapy ([facility] [address]     EF 20 %    Essential hypertension     Gestational hypertension     History of sepsis 1/2013    after chemo    Hx of difficult intubation     resulting from sepsis in january 2013.       Hypertension     Morbid obesity ([facility] [address]     Ne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 ([facility] [address]     Type I (juvenile type) diabetes mellitus without mention of complication, uncontrolled     diagnosed age 4    Unspecified vitamin D deficiency 11/7/2011       Problem List  Date Reviewed: 5/3/2022          Codes Class Noted    Acute heart failure ([facility] [address] ICD-10-CM: I50.9  ICD-9-CM: 428.9  3/24/2022        Hidradenitis suppurativa ICD-10-CM: L73.2  ICD-9-CM: 705.83  3/24/2022 Hypertension ICD-10-CM: I10  ICD-9-CM: 401.9  3/24/2022        Pregnancy induced hypertension ICD-10-CM: O13.9  ICD-9-CM: 642.30  2/20/2019        Pregnancy induced hypertension, third trimester ICD-10-CM: O13.3  ICD-9-CM: 642.33  1/29/2019        History of breast cancer ICD-10-CM: Z85.3  ICD-9-CM: V10.3  8/29/2018        Morbid obesity with BMI of 40.0-44.9, adult [facility]) ICD-10-CM: E66.01, Z68.41  ICD-9-CM: 278.01, V85.41  9/22/2017        Wound check, abscess ICD-10-CM: Z51.89  ICD-9-CM: V58.89  9/22/2017    Overview Signed 9/22/2017 11:55 AM by Ibis John MD     Left thigh.              Type 1 diabetes mellitus without complication [facility]) [id number]: E10.9  ICD-9-CM: 250.01  6/5/2017        Ne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 ([facility] [address] ICD-10-CM: G62.0, T45.1X5A  ICD-9-CM: 357.6, E933.1  Unknown        Cardiomyopathy due to chemotherapy [facility]) ICD-10-CM: I42.7, T45.1X5A  ICD-9-CM: 425.9, E933.1  Unknown        Vitamin D deficiency ICD-10-CM: E55.9  ICD-9-CM: 268.9  11/7/2011        Abnormal thyroid blood test ICD-10-CM: R79.89  ICD-9-CM: 790.6  11/7/2011        Essential hypertension, benign ICD-10-CM: I10  ICD-9-CM: 401.1  6/25/2010        Encounter for long-term (current) use of other medications ICD-10-CM: Z79.899  ICD-9-CM: V58.69  6/25/2010        Encounter for long-term (current) use of insulin ([facility] [address] ICD-10-CM: Z79.4  ICD-9-CM: V58.67  6/25/2010        Uncontrolled type 1 diabetes mellitus ICD-10-CM: CHS3593  ICD-9-CM: 250.03  Unknown    Overview Signed 12/15/2009  8:56 AM by Dora Aranda MD     diagnosed age 4                   Patient Care Team:  Bailey Stubbs MD as PCP - General (Family Medicine)  Bailey Stubbs MD as PCP - [facility] Empaneled Provider  Ibis John MD (Surgery Physician)  Keyur Zhong MD (Obstetrics & Gynecology)  Hill Henry MD (Surgical Oncology)  Teofilo Villatoro MD (Hematology and Oncology)  Danny Malik MD (Maternal Fetal Medicine Physician)  Alon Ly RN as Benefits Care Manager  Jocelyn Quinn MD as Consulting Provider (Endocrinology Physician)  Cornelius Carpenter MD as Consulting Provider (Family Medicine)  Luz Ding MD as Physician (Sleep Medicine Physician)    Review of Systems   Constitutional: Negative. HENT: Negative. Eyes: Negative. Respiratory: Negative. Cardiovascular: Negative. Gastrointestinal: Negative. Genitourinary: Negative. Musculoskeletal: Negative. Skin: Negative. Neurological: Negative. Endo/Heme/Allergies: Negative. Psychiatric/Behavioral: Negative. Physical Exam  Constitutional:       Appearance: Normal appearance. HENT:      Right Ear: Tympanic membrane, ear canal and external ear normal.      Left Ear: Tympanic membrane, ear canal and external ear normal.      Nose: No congestion. Mouth/Throat:      Pharynx: No oropharyngeal exudate. Eyes:      Extraocular Movements: Extraocular movements intact. Conjunctiva/sclera: Conjunctivae normal.      Pupils: Pupils are equal, round, and reactive to light. Neck:      Thyroid: No thyromegaly. Cardiovascular:      Rate and Rhythm: Normal rate and regular rhythm. Pulmonary:      Effort: Pulmonary effort is normal.      Breath sounds: Normal breath sounds. Abdominal:      General: Bowel sounds are normal. There is no distension. Palpations: Abdomen is soft. Tenderness: There is no abdominal tenderness. Musculoskeletal:         General: Normal range of motion. Cervical back: Normal range of motion and neck supple. Right lower leg: No edema. Left lower leg: No edema. Lymphadenopathy:      Cervical: No cervical adenopathy. Skin:     General: Skin is warm and dry. Neurological:      General: No focal deficit present. Mental Status: She is alert and oriented to person, place, and time. Mental status is at baseline.    Psychiatric:         Mood and Affect: Mood normal. Behavior: Behavior normal.            Assessment/plan    1. Weight loss counseling, encounter for  Patient takes Wellbutrin for weight loss  - buPROPion SR (WELLBUTRIN SR) 150 mg SR tablet; Take 1 Tablet by mouth two (2) times a day. Dispense: 180 Tablet; Refill: 1  - THYROID CASCADE PROFILE; Future  - URINALYSIS W/ REFLEX CULTURE; Future  - CBC WITH AUTOMATED DIFF; Future  - METABOLIC PANEL, COMPREHENSIVE; Future  - THYROID CASCADE PROFILE  - URINALYSIS W/ REFLEX CULTURE  - CBC WITH AUTOMATED DIFF  - METABOLIC PANEL, COMPREHENSIVE    2. Encounter for physical examination related to employment  Patient is requesting employment physical today. This is required by her work. 3. Screening for cardiovascular condition    - METABOLIC PANEL, COMPREHENSIVE; Future  - LIPID PANEL; Future  - METABOLIC PANEL, COMPREHENSIVE  - LIPID PANEL    4. Vitamin D deficiency    - VITAMIN D, 25 HYDROXY; Future  - VITAMIN D, 25 HYDROXY    5. Essential hypertension, benign    - THYROID CASCADE PROFILE; Future  - URINALYSIS W/ REFLEX CULTURE; Future  - METABOLIC PANEL, COMPREHENSIVE; Future  - THYROID CASCADE PROFILE  - URINALYSIS W/ REFLEX CULTURE  - METABOLIC PANEL, COMPREHENSIVE    6. Type 1 diabetes mellitus without complication (HCC)    - METABOLIC PANEL, COMPREHENSIVE; Future  - METABOLIC PANEL, COMPREHENSIVE    7. Varicose veins of both lower extremities, unspecified whether complicated    - Comp. Stocking,Thigh,Long,Large misc; Use as needed for varicose veins, compression grade : 15-20  Dispense: 2 Each; Refill: 0        42 y.o. female who presents today for annual exam. UTD on screening. UTD on vaccines. Will check routine labs. Encouraged daily exercise and trying to eat a well balanced diet. I have discussed the diagnosis with the patient and the intended plan as seen in the above orders. The patient has received an after-visit summary and questions were answered concerning future plans.   I have discussed medication side effects and warnings with the patient as well. The patient agrees and understands above plan. Follow-up and Dispositions    · Return in about 6 months (around 11/25/2022) for follow up.            Peyton Orozco MD

## 2022-05-26 LAB
25(OH)D3+25(OH)D2 SERPL-MCNC: 59.3 NG/ML (ref 30–100)
ALBUMIN SERPL-MCNC: 4 G/DL (ref 3.8–4.8)
ALBUMIN/GLOB SERPL: 1.3 {RATIO} (ref 1.2–2.2)
ALP SERPL-CCNC: 91 IU/L (ref 44–121)
ALT SERPL-CCNC: 13 IU/L (ref 0–32)
APPEARANCE UR: CLEAR
AST SERPL-CCNC: 12 IU/L (ref 0–40)
BACTERIA #/AREA URNS HPF: NORMAL /[HPF]
BASOPHILS # BLD AUTO: 0 X10E3/UL (ref 0–0.2)
BASOPHILS NFR BLD AUTO: 0 %
BILIRUB SERPL-MCNC: 0.3 MG/DL (ref 0–1.2)
BILIRUB UR QL STRIP: NEGATIVE
BUN SERPL-MCNC: 13 MG/DL (ref 6–20)
BUN/CREAT SERPL: 13 (ref 9–23)
CALCIUM SERPL-MCNC: 9.6 MG/DL (ref 8.7–10.2)
CASTS URNS QL MICRO: NORMAL /LPF
CHLORIDE SERPL-SCNC: 102 MMOL/L (ref 96–106)
CHOLEST SERPL-MCNC: 185 MG/DL (ref 100–199)
CO2 SERPL-SCNC: 26 MMOL/L (ref 20–29)
COLOR UR: YELLOW
CREAT SERPL-MCNC: 1 MG/DL (ref 0.57–1)
EGFR: 73 ML/MIN/1.73
EOSINOPHIL # BLD AUTO: 0.1 X10E3/UL (ref 0–0.4)
EOSINOPHIL NFR BLD AUTO: 2 %
EPI CELLS #/AREA URNS HPF: NORMAL /HPF (ref 0–10)
ERYTHROCYTE [DISTWIDTH] IN BLOOD BY AUTOMATED COUNT: 12.1 % (ref 11.7–15.4)
GLOBULIN SER CALC-MCNC: 3 G/DL (ref 1.5–4.5)
GLUCOSE SERPL-MCNC: 77 MG/DL (ref 65–99)
GLUCOSE UR QL STRIP: NEGATIVE
HCT VFR BLD AUTO: 38.8 % (ref 34–46.6)
HDLC SERPL-MCNC: 81 MG/DL
HGB BLD-MCNC: 12.7 G/DL (ref 11.1–15.9)
HGB UR QL STRIP: NEGATIVE
IMM GRANULOCYTES # BLD AUTO: 0.1 X10E3/UL (ref 0–0.1)
IMM GRANULOCYTES NFR BLD AUTO: 1 %
KETONES UR QL STRIP: ABNORMAL
LDLC SERPL CALC-MCNC: 96 MG/DL (ref 0–99)
LEUKOCYTE ESTERASE UR QL STRIP: NEGATIVE
LYMPHOCYTES # BLD AUTO: 1.6 X10E3/UL (ref 0.7–3.1)
LYMPHOCYTES NFR BLD AUTO: 34 %
MCH RBC QN AUTO: 29.6 PG (ref 26.6–33)
MCHC RBC AUTO-ENTMCNC: 32.7 G/DL (ref 31.5–35.7)
MCV RBC AUTO: 90 FL (ref 79–97)
MICRO URNS: ABNORMAL
MICRO URNS: ABNORMAL
MONOCYTES # BLD AUTO: 0.5 X10E3/UL (ref 0.1–0.9)
MONOCYTES NFR BLD AUTO: 10 %
NEUTROPHILS # BLD AUTO: 2.5 X10E3/UL (ref 1.4–7)
NEUTROPHILS NFR BLD AUTO: 53 %
NITRITE UR QL STRIP: NEGATIVE
PH UR STRIP: 7 [PH] (ref 5–7.5)
PLATELET # BLD AUTO: 300 X10E3/UL (ref 150–450)
POTASSIUM SERPL-SCNC: 4 MMOL/L (ref 3.5–5.2)
PROT SERPL-MCNC: 7 G/DL (ref 6–8.5)
PROT UR QL STRIP: NEGATIVE
RBC # BLD AUTO: 4.29 X10E6/UL (ref 3.77–5.28)
RBC #/AREA URNS HPF: NORMAL /HPF (ref 0–2)
SODIUM SERPL-SCNC: 140 MMOL/L (ref 134–144)
SP GR UR STRIP: 1.02 (ref 1–1.03)
TRIGL SERPL-MCNC: 41 MG/DL (ref 0–149)
TSH SERPL DL<=0.005 MIU/L-ACNC: 0.62 UIU/ML (ref 0.45–4.5)
URINALYSIS REFLEX, 377202: ABNORMAL
UROBILINOGEN UR STRIP-MCNC: 0.2 MG/DL (ref 0.2–1)
VLDLC SERPL CALC-MCNC: 8 MG/DL (ref 5–40)
WBC # BLD AUTO: 4.7 X10E3/UL (ref 3.4–10.8)
WBC #/AREA URNS HPF: NORMAL /HPF (ref 0–5)

## 2022-06-07 ENCOUNTER — HOSPITAL ENCOUNTER (OUTPATIENT)
Dept: NUTRITION | Age: 37
Discharge: HOME | End: 2022-06-07
Payer: MEDICAID

## 2022-06-07 DIAGNOSIS — I10 ESSENTIAL HYPERTENSION, BENIGN: ICD-10-CM

## 2022-06-07 DIAGNOSIS — E66.01 MORBID OBESITY WITH BMI OF 40.0-44.9, ADULT (HCC): ICD-10-CM

## 2022-06-07 DIAGNOSIS — E10.65 UNCONTROLLED TYPE 1 DIABETES MELLITUS WITH HYPERGLYCEMIA (HCC): ICD-10-CM

## 2022-06-07 DIAGNOSIS — E10.9 TYPE 1 DIABETES MELLITUS WITHOUT COMPLICATION (HCC): ICD-10-CM

## 2022-06-07 PROCEDURE — 97803 MED NUTRITION INDIV SUBSEQ: CPT | Performed by: DIETITIAN, REGISTERED

## 2022-06-07 NOTE — PROGRESS NOTES
NUTRITION - FOLLOW-UP TREATMENT NOTE  Patient Name: Chepe Gant         Date: 2022  : 1985    YES Patient  Verified  Diagnosis:   E10.9 (ICD-10-CM) - Type 1 diabetes mellitus without complications   B65.73 (BKG-60-TB) - Morbid (severe) obesity due to excess calories      In time:   750am             Out time:   830am   Total Treatment Time (min):   40     SUBJECTIVE/ASSESSMENT  Current Wt: 228.5 Previous Wt: 225.6 (at home)  Wt Change: +3.9     Initial Wt: 229 Total Wt change: +0.5 Height: 63     Changes in medication or medical history? Any new allergies, surgeries or procedures? NO    If yes, update Summary List        Nutrition Diagnosis        Diagnosis Status: Food and nutrition related knowledge deficit R/T lack of prior education for diabetes and weight loss nutrition AEB pt questions during session. [x]  Improved []  No Change    []  Declined   []  Discontinued     Excessive energy intake R/T Food and nutrition related knowledge deficit for energy needs and healthy weight loss AEB request for session, BMI > 40, dietary recall.   [x]  Improved []  No Change    []  Declined   []  Discontinued        Nutrition Monitoring and Evaluation: Pt seen today for follow-up visit. Pt is experiencing low blood sugar 2x daily now and having to correct with candy. Happening after lunch and sometimes in the morning between breakfast and lunch. Pt has been trying to adjust insulin dosing based on low values that she is seeing. Pt is writing out foods and blood sugar values to see correlation and get accountability. Pt has found that she is still challenged to drink water throughout the day. Pt is drinking diet green tea and zero sugar lemonade. Pt is walking 4 x per week consistently and working on getting in the 5th day each week. Breakfast and lunch slimfast drink with yogurt and granola, turkey sausage and egg.   Dinner: Chicken and vegetable, potato for carb if present    Has been having starburst to correct blood sugars    Previous goals:  Met. - Track calories and carbs 2-3x week and when routine is different. Not met. - Improve water intake by having at least 64 oz daily, use water jug marked for the day  Met - Keep up with 5x week 20 min walking on treadmill  Met - Focus on more filling healthy snacks      Nutrition Prescription and  Intervention Educated pt on the pathophysiology of Type I Diabetes, healthy weight loss, and the rationale for dietary modifications and increased activity. Educated pt on lean proteins, healthy fats, non-starchy vegetables, and complex carbohydrate food sources. Discussed limiting carbohydrates, label reading, meal timing, and appropriate serving sizes. Encouraged pt to avoid sugary beverages. Reviewed meal builder tool, calorie and macro breakdown as well as exercise parameters.       Patient Education:  [x]  Review current plan with patient   []  Other:    Handouts/  Information Provided: []  Carbohydrates  []  Protein  []  Fiber  []  Serving Sizes  []  Fluids  []  General guidelines []  Diabetes  []  Cholesterol  []  Sodium  []  SBGM  []  Food Journals  []  Others:      Patient Goals - Alternate drinks with water   - Continue writing out foods   - Take medication 2x daily as prescribed  - Get meals to have in freezer to avoid eating out due to lack of planning      PLAN  [x]  Continue on current plan []  Follow-up PRN   []  Discharge due to :    [x]  Next appt: 2 weeks      Dietitian: Sharon Silva RDN     Date: 6/7/2022 Time: 800 AM

## 2022-06-20 DIAGNOSIS — I10 ESSENTIAL HYPERTENSION, BENIGN: ICD-10-CM

## 2022-06-21 ENCOUNTER — HOSPITAL ENCOUNTER (OUTPATIENT)
Dept: NUTRITION | Age: 37
Discharge: HOME | End: 2022-06-21
Payer: MEDICAID

## 2022-06-21 DIAGNOSIS — E10.9 TYPE 1 DIABETES MELLITUS WITHOUT COMPLICATION (HCC): ICD-10-CM

## 2022-06-21 DIAGNOSIS — E66.01 MORBID OBESITY WITH BMI OF 40.0-44.9, ADULT (HCC): ICD-10-CM

## 2022-06-21 PROCEDURE — 97803 MED NUTRITION INDIV SUBSEQ: CPT | Performed by: DIETITIAN, REGISTERED

## 2022-06-21 NOTE — PROGRESS NOTES
NUTRITION - FOLLOW-UP TREATMENT NOTE  Patient Name: Kane Mujica         Date: 2022  : 1985    YES Patient  Verified  Diagnosis:   E10.9 (ICD-10-CM) - Type 1 diabetes mellitus without complications   R59.13 (DNA-95-SZ) - Morbid (severe) obesity due to excess calories      In time:   800am             Out time:   830am   Total Treatment Time (min):   30     SUBJECTIVE/ASSESSMENT  Current Wt: 222.2 Previous Wt: 228.5 Wt Change: -5.3     Initial Wt: 229 Total Wt change: -4.8 Height: 63     Changes in medication or medical history? Any new allergies, surgeries or procedures? NO    If yes, update Summary List        Nutrition Diagnosis        Diagnosis Status: Food and nutrition related knowledge deficit R/T lack of prior education for diabetes and weight loss nutrition AEB pt questions during session. [x]  Improved []  No Change    []  Declined   []  Discontinued     Excessive energy intake R/T Food and nutrition related knowledge deficit for energy needs and healthy weight loss AEB request for session, BMI > 40, dietary recall.   [x]  Improved []  No Change    []  Declined   []  Discontinued        Nutrition Monitoring and Evaluation: Pt seen today for follow-up visit. Pt noted that her blood sugars have been better and she has been having less low values with adding in a morning snack. When eating lunch pt has been doing insulin half way through the meal and doses the rest following the meal if needed. Pt has been focused on packing meals for work and has been consistent with her meal routine each day. Pt got a new Fitbit watch which has been tracking her health stats. Pt has been walking a few days a week in the mornings and has been meeting steps. Pt will be going to the beach for the weekend coming up and has a birthday dinner for her mom's birthday. With meals out pt has been checking ahead for the restaurant nutrition and has been trading out carb side for vegetables.    Pt has been improving her water intake during the day. Pt enjoys having water with lemon and in her tumbler. Pt blood pressure today: 155/88 (high), 88 BPM     Breakfast: slimfast drink with yogurt and granola, turkey sausage and egg. Snack: 100 bernard granola bar  Lunch: Yogurt with granola and slim fast; turkey and cheese roll up with slim fast   Snack: 100 bernard granola bar, 100 bernard PB crackers, 100 bernard popcorn   Dinner: Chicken and vegetable, potato for carb if present    Has been having starburst to correct blood sugars    Previous goals:  Met. - Alternate drinks with water   Not Met. - Continue writing out foods   Met. - Take medication 2x daily as prescribed  Met. - Get meals to have in freezer to avoid eating out due to lack of planning      Nutrition Prescription and  Intervention Educated pt on the pathophysiology of Type I Diabetes, healthy weight loss, and the rationale for dietary modifications and increased activity. Educated pt on lean proteins, healthy fats, non-starchy vegetables, and complex carbohydrate food sources. Discussed limiting carbohydrates, label reading, meal timing, and appropriate serving sizes. Encouraged pt to avoid sugary beverages. Reviewed meal builder tool, calorie and macro breakdown as well as exercise parameters. Patient Education:  [x]  Review current plan with patient   []  Other:    Handouts/  Information Provided: []  Carbohydrates  []  Protein  []  Fiber  []  Serving Sizes  []  Fluids  []  General guidelines []  Diabetes  []  Cholesterol  []  Sodium  []  SBGM  []  Food Journals  []  Others:      Patient Goals - Continue to increase exercise routine with weights and ab exercises. - Avoid over correcting when blood sugar gets low   - Keep up with increased water intake, get in 2 tumblers each day.    - Get back to logging in [employer]   - Limit to small piece of cake at celebrations      PLAN  [x]  Continue on current plan []  Follow-up PRN   []  Discharge due to : [x]  Next appt: 2 weeks      Dietitian: Osbaldo Kenyon RDN     Date: 6/21/2022 Time: 800 AM

## 2022-06-22 RX ORDER — HYDROCHLOROTHIAZIDE 12.5 MG/1
TABLET ORAL
Qty: 90 TABLET | Refills: 1 | Status: SHIPPED | OUTPATIENT
Start: 2022-06-22 | End: 2022-09-16

## 2022-06-28 ENCOUNTER — OFFICE VISIT (OUTPATIENT)
Dept: SLEEP MEDICINE | Age: 37
End: 2022-06-28
Payer: MEDICAID

## 2022-06-28 VITALS
HEART RATE: 68 BPM | OXYGEN SATURATION: 97 % | BODY MASS INDEX: 40.5 KG/M2 | WEIGHT: 228.6 LBS | HEIGHT: 63 IN | DIASTOLIC BLOOD PRESSURE: 80 MMHG | SYSTOLIC BLOOD PRESSURE: 134 MMHG

## 2022-06-28 DIAGNOSIS — G47.33 OSA (OBSTRUCTIVE SLEEP APNEA): Primary | ICD-10-CM

## 2022-06-28 DIAGNOSIS — E66.01 MORBID OBESITY WITH BMI OF 40.0-44.9, ADULT (HCC): ICD-10-CM

## 2022-06-28 PROCEDURE — 99213 OFFICE O/P EST LOW 20 MIN: CPT | Performed by: SPECIALIST

## 2022-06-28 RX ORDER — BISMUTH SUBSALICYLATE 262 MG
1 TABLET,CHEWABLE ORAL DAILY
COMMUNITY

## 2022-06-28 NOTE — PROGRESS NOTES
[address] [place]. [place], [address]  Tel.  [phone]  Fax. [address]  [place], [address] [facility]  Tel.  [phone]  Fax. [phone] [address] [facility] [address]   Tel.  [phone]  Fax. [phone]         Chief Complaint       No chief complaint on file. MIGUELINA Diaz is a 42 y.o. female seen for follow-up. She had an initial evaluation 2/20 demonstrating AHI of 0/h associated with minimal SaO2 of 89%. Snoring during 59.7%. At that time recommended to see ENT. Has not had further intervention. She notes that she continues to snore. Otherwise symptoms have not significantly changed. Notes that she may doze if she is riding as a passenger. Otherwise does not doze if seated and inactive such as when reading or watching TV, in a public place such as movies, in conversation, quietly after lunch or when driving. [place] Sleepiness Scale: 9    Allergies   Allergen Reactions    Codeine Nausea and Vomiting       Current Outpatient Medications   Medication Sig Dispense Refill    multivitamin (ONE A DAY) tablet Take 1 Tablet by mouth daily.  hydroCHLOROthiazide (HYDRODIURIL) 12.5 mg tablet TAKE 1 TABLET BY MOUTH EVERY DAY 90 Tablet 1    buPROPion SR (WELLBUTRIN SR) 150 mg SR tablet Take 1 Tablet by mouth two (2) times a day. 180 Tablet 1    lisinopriL (PRINIVIL, ZESTRIL) 40 mg tablet TAKE 1 TAB BY MOUTH ONCE DAILY 90 Tablet 3    insulin aspart U-100 (NovoLOG U-100 Insulin aspart) 100 unit/mL injection USE AS DIRECTED IN INSULIN PUMP.  UNITS/DAY. 30 mL 11    ondansetron (Zofran ODT) 4 mg disintegrating tablet 1 Tablet by SubLINGual route every eight (8) hours as needed for Nausea or Vomiting. 12 Tablet 0    cholecalciferol (VITAMIN D3) (5000 Units/125 mcg) tab tablet Take  by mouth daily.  blood-glucose sensor (DEXCOM G6 SENSOR) by Does Not Apply route.       blood-glucose transmitter (DEXCOM G6 TRANSMITTER) by Does Not Apply route.  subcutaneous insulin pump (OMNIPOD INSULIN MANAGEMENT) by Does Not Apply route.  cetirizine (ZYRTEC) 10 mg tablet Take 10 mg by mouth as needed.  Comp. Stocking,Thigh,Long,Large misc Use as needed for varicose veins, compression grade : 15-20 2 Each 0    glucose blood VI test strips (blood glucose test) strip Use to check blood sugar four times a day. 400 Strip 3    Omnipod Dash Insulin Pod crtg Replaced Pod cartridge every 72 hours 6 Package 3    Blood-Glucose Meter monitoring kit Check blood sugar four times a day 1 Kit 0    lancets misc Use to check blood sugar four times a day. 400 Each 3        She  has a past medical history of Breast cancer [facility]) (Nov 2012), Cardiomyopathy due to chemotherapy [facility]), Essential hypertension, Gestational hypertension, History of sepsis (1/2013), difficult intubation, Hypertension, Morbid obesity ([facility] [address] Ne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 ([facility] [address] Type I (juvenile type) diabetes mellitus without mention of complication, uncontrolled, and Unspecified vitamin D deficiency (11/7/2011).     She has no past medical history of Abnormal Pap smear, Abnormal Papanicolaou smear of cervix, Acquired hypothyroidism, Anemia, Asthma, Asthma, Chlamydia, Complication of anesthesia, Genital herpes, Gestational diabetes, Gonorrhea, Heart abnormalities, Herpes gestationis, Herpes simplex without mention of complication, Human immunodeficiency virus (HIV) disease ([address] OTHER MEDICAL, Infertility, Infertility, female, Kidney disease, Liver disease, Nicotine vapor product user, Non-nicotine vapor product user, Phlebitis and thrombophlebitis of unspecified site, Pituitary disorder ([address] Polycystic disease, ovaries, Postpartum depression, Psychiatric problem, Rhesus isoimmunization unspecified as to episode of care in pregnancy, Sickle cell trait syndrome ([address] Sickle-cell disease, unspecified, Syphilis, Systemic lupus erythematosus [facility]), Thyroid activity decreased, Trauma, Unspecified breast disorder, Unspecified diseases of blood and blood-forming organs, or Unspecified epilepsy without mention of intractable epilepsy. She  has a past surgical history that includes hx mastectomy (Right); hx cyst incision and drainage (2013); hx gyn; hx orthopaedic (Right); hx other surgical; hx other surgical; hx wisdom teeth extraction (08/15/2018); pr breast surgery procedure unlisted (); and hx  section. She family history includes Diabetes in her brother, paternal grandfather, and paternal uncle; Heart Disease in her father and paternal grandfather; Hypertension in her mother. She  reports that she has never smoked. She has never used smokeless tobacco. She reports that she does not drink alcohol and does not use drugs. Review of Systems:  Unchanged per patient      Objective:     Visit Vitals  /80   Pulse 68   Ht 5' 2.99\" (1.6 m)   Wt 228 lb 9.6 oz (103.7 kg)   SpO2 97%   BMI 40.50 kg/m²     Body mass index is 40.5 kg/m². General:   Conversant, cooperative   Eyes:   no nystagmus   Oropharynx:   Mallampati score II, tongue scalloped                CVS:  Normal rate, regular rhythm        Neuro:  Speech fluent, face symmetrical             Assessment:       ICD-10-CM ICD-9-CM    1. JASMINA (obstructive sleep apnea)  G47.33 327.23    2. Morbid obesity with BMI of 40.0-44.9, adult ([facility]  E66.01 278.01     Z68.41 V85.41      Have suggested reevaluation with home sleep test.  She prefers to defer that assessment. She will contact the office if symptoms become more pronounced. She would benefit from weight reduction. Was advised that weight loss measures often more effective if sleep disordered breathing concurrently treated. Plan:   No orders of the defined types were placed in this encounter. * Patient has a history and examination consistent with the diagnosis of sleep apnea.   * She was provided information on sleep apnea including corresponding risk factors and the importance of proper treatment. * Treatment options if indicated were reviewed today. * Potential benefit of weight reduction       Robert Nair MD, FAASM  Electronically signed 06/28/22        This note was created using voice recognition software. Despite editing, there may be syntax errors. This note will not be viewable in [address]

## 2022-07-05 ENCOUNTER — HOSPITAL ENCOUNTER (OUTPATIENT)
Dept: NUTRITION | Age: 37
Discharge: HOME | End: 2022-07-05
Payer: MEDICAID

## 2022-07-05 DIAGNOSIS — E66.01 MORBID OBESITY WITH BMI OF 40.0-44.9, ADULT (HCC): ICD-10-CM

## 2022-07-05 DIAGNOSIS — E10.9 TYPE 1 DIABETES MELLITUS WITHOUT COMPLICATION (HCC): ICD-10-CM

## 2022-07-05 PROCEDURE — 97803 MED NUTRITION INDIV SUBSEQ: CPT | Performed by: DIETITIAN, REGISTERED

## 2022-07-05 NOTE — PROGRESS NOTES
Pedrito Turner was informed of the inherent limitations of a virtual visit,  and has consented to a virtual therapy visit on 2022. The patient was informed that at any time during the virtual visit, they can decide to stop the virtual visit. The patient verified that they are physically located in the [facility] for this virtual visit. NUTRITION - FOLLOW-UP TREATMENT NOTE  Patient Name: Pedrito Turner         Date: 2022  : 1985    YES Patient  Verified  Diagnosis:   E10.9 (ICD-10-CM) - Type 1 diabetes mellitus without complications   I33.61 (DDP-01-GB) - Morbid (severe) obesity due to excess calories      In time:   1000am             Out time:   1043am   Total Treatment Time (min):   43     SUBJECTIVE/ASSESSMENT  Current Wt: 223.4 Previous Wt: 222.2 Wt Change: +1.2     Initial Wt: 229 Total Wt change: -3.6 Height: 63     Changes in medication or medical history? Any new allergies, surgeries or procedures? NO    If yes, update Summary List        Nutrition Diagnosis        Diagnosis Status: Food and nutrition related knowledge deficit R/T lack of prior education for diabetes and weight loss nutrition AEB pt questions during session. [x]  Improved []  No Change    []  Declined   []  Discontinued     Excessive energy intake R/T Food and nutrition related knowledge deficit for energy needs and healthy weight loss AEB request for session, BMI > 40, dietary recall.   [x]  Improved []  No Change    []  Declined   []  Discontinued        Nutrition Monitoring and Evaluation: Pt seen virtually. Pt in [place]. Pt seen today for follow-up visit. Pt had a good holiday weekend but feels that she has not seen as successful with weight loss. Pt has been frustrated with maintaining weight instead of dropping weight. Pt had an incident yesterday with pump coming dislocated and blood sugars getting high. Pt has noticed that she is overcorrecting her blood sugar levels when they get low.   Pt will be going to the beach for the weekend and will be relying on meals out for dinner. Planning to pack slim fast drinks and healthy snack options with her to the beach. Pt has been more active with walking every morning. Pt has been improving her water intake during the day. Pt enjoys having water with lemon and in her tumbler. Breakfast: slimfast drink, turkey sausage and egg. Snack: 100 bernard granola bar  Lunch: Yogurt with granola and slim fast; turkey and cheese roll up with slim fast   Snack: 100 bernard granola bar, 100 bernard PB crackers, 100 bernard popcorn   Dinner: Chicken and vegetable, potato for carb if present    Has been having starburst to correct blood sugars    Previous goals:  - Continue to increase exercise routine with weights and ab exercises. - Avoid over correcting when blood sugar gets low   - Keep up with increased water intake, get in 2 tumblers each day. - Get back to logging in [employer] kye   - Limit to small piece of cake at celebrations      Nutrition Prescription and  Intervention Educated pt on the pathophysiology of Type I Diabetes, healthy weight loss, and the rationale for dietary modifications and increased activity. Educated pt on lean proteins, healthy fats, non-starchy vegetables, and complex carbohydrate food sources. Discussed limiting carbohydrates, label reading, meal timing, and appropriate serving sizes. Encouraged pt to avoid sugary beverages. Reviewed meal builder tool, calorie and macro breakdown as well as exercise parameters. Patient Education:  [x]  Review current plan with patient   []  Other:    Handouts/  Information Provided: []  Carbohydrates  []  Protein  []  Fiber  []  Serving Sizes  []  Fluids  []  General guidelines []  Diabetes  []  Cholesterol  []  Sodium  []  SBGM  []  Food Journals  []  Others:      Patient Goals - With low blood sugars avoid over correcting by waiting 15 min before checking a second time.   - Get back to logging in [employer]itness [employer] kye   - Use slim fast shakes for snacks and plan to have protein and vegetable at the meals   - Continue with daily walking  - Plan to pack healthy snacks for the beach     PLAN  [x]  Continue on current plan []  Follow-up PRN   []  Discharge due to :    [x]  Next appt: 2 weeks      Dietitian: Saint Kidd, RDN     Date: 7/5/2022 Time: 1000 AM   Liam Echeverria is a 42 y.o. female being evaluated by a Virtual Visit (video visit) encounter to address concerns as mentioned above. A caregiver was present when appropriate. Due to this being a TeleHealth encounter ([facility]), evaluation of the following areas was limited: Direct tissue palpation, direct goniometric measurements, blood pressure, O2 saturation. Pursuant to the emergency declaration under the [address] [place] [address], [address] [place] [address] authority and the [employer] and [employer] Act, this Virtual Visit was conducted with patient's (and/or legal guardian's) consent, to reduce the risk of exposure to COVID-19 and provide necessary medical care. Services were provided through a video synchronous discussion virtually to substitute for in-person encounter. --Saint Kidd, RD on 7/5/2022 at 8:50 AM    An electronic signature was used to authenticate this note.

## 2022-07-19 ENCOUNTER — HOSPITAL ENCOUNTER (OUTPATIENT)
Dept: NUTRITION | Age: 37
Discharge: HOME | End: 2022-07-19
Payer: MEDICAID

## 2022-07-19 DIAGNOSIS — E10.9 TYPE 1 DIABETES MELLITUS WITHOUT COMPLICATION (HCC): ICD-10-CM

## 2022-07-19 DIAGNOSIS — E66.01 MORBID OBESITY WITH BMI OF 40.0-44.9, ADULT (HCC): ICD-10-CM

## 2022-07-19 PROCEDURE — 97803 MED NUTRITION INDIV SUBSEQ: CPT | Performed by: DIETITIAN, REGISTERED

## 2022-07-19 NOTE — PROGRESS NOTES
Daniele Odonnell was informed of the inherent limitations of a virtual visit,  and has consented to a virtual therapy visit on 2022. The patient was informed that at any time during the virtual visit, they can decide to stop the virtual visit. The patient verified that they are physically located in the [facility] for this virtual visit. NUTRITION - FOLLOW-UP TREATMENT NOTE  Patient Name: Daniele Odonnell         Date: 2022  : 1985    YES Patient  Verified  Diagnosis:   E10.9 (ICD-10-CM) - Type 1 diabetes mellitus without complications   E83.88 (ZQA-02-NG) - Morbid (severe) obesity due to excess calories      In time:  830am             Out time:   855am   Total Treatment Time (min):   25     SUBJECTIVE/ASSESSMENT  Current Wt: 222 Previous Wt: 223.4 Wt Change: -1.4     Initial Wt: 229 Total Wt change: -5 Height: 63     Changes in medication or medical history? Any new allergies, surgeries or procedures? NO    If yes, update Summary List        Nutrition Diagnosis        Diagnosis Status: Food and nutrition related knowledge deficit R/T lack of prior education for diabetes and weight loss nutrition AEB pt questions during session. [x]  Improved []  No Change    []  Declined   []  Discontinued     Excessive energy intake R/T Food and nutrition related knowledge deficit for energy needs and healthy weight loss AEB request for session, BMI > 40, dietary recall.   [x]  Improved []  No Change    []  Declined   []  Discontinued        Nutrition Monitoring and Evaluation: Pt seen virtually. Pt in [place]. Pt seen today for follow-up visit. Pt has noted things going really well. Pt has been following plan for low carb meals and some fruit at snack times. Pt has also been more consistent with medication for appetite suppression and has been doing well with it. Pt is finding she will skip dinner and end up with low blood sugar later on.    Pt feels like she did well with eating out while on trip to [place]. Pt will be going on another vacation next week for her vacation. Pt will be traveling vis train and has a plan to pack her 100 bernard snacks and prepared salads for the trip. Pt is also still enjoying having water with lemon and in her tumbler. Pt has been walking most mornings and has even started running. Pt feels that she is in a good routine. Breakfast: slimfast drink, turkey sausage and egg. Snack: 100 bernard granola bar  Lunch: Yogurt with granola and slim fast; turkey and cheese roll up with slim fast   Snack: 100 bernard granola bar, 100 bernard PB crackers, 100 bernard popcorn   Dinner: Chicken and vegetable, potato for carb if present    Has been having starburst to correct blood sugars    Previous goals:  - With low blood sugars avoid over correcting by waiting 15 min before checking a second time. - Get back to logging in [employer] kye   - Use slim fast shakes for snacks and plan to have protein and vegetable at the meals   - Continue with daily walking  - Plan to pack healthy snacks for the [place]     Nutrition Prescription and  Intervention Educated pt on the pathophysiology of Type I Diabetes, healthy weight loss, and the rationale for dietary modifications and increased activity. Educated pt on lean proteins, healthy fats, non-starchy vegetables, and complex carbohydrate food sources. Discussed limiting carbohydrates, label reading, meal timing, and appropriate serving sizes. Encouraged pt to avoid sugary beverages. Reviewed meal builder tool, calorie and macro breakdown as well as exercise parameters. Patient Education:  [x]  Review current plan with patient   []  Other:    Handouts/  Information Provided: []  Carbohydrates  []  Protein  []  Fiber  []  Serving Sizes  []  Fluids  []  General guidelines []  Diabetes  []  Cholesterol  []  Sodium  []  SBGM  []  Food Journals  []  Others:      Patient Goals - Make sure to have a dinner meal in order to keep blood sugar consistent.   - Limit to one sweet splurge per day on vacation.    - Journal and track foods and calories for 1-2 weeks before next visit. - Plan ahead (meals and order groceries if needed) to get back on track after vacation. PLAN  [x]  Continue on current plan []  Follow-up PRN   []  Discharge due to :    [x]  Next appt: 2 weeks      Dietitian: Kristian Mak RDN     Date: 7/19/2022 Time: 830 AM   Damon Min is a 42 y.o. female being evaluated by a Virtual Visit (video visit) encounter to address concerns as mentioned above. A caregiver was present when appropriate. Due to this being a TeleHealth encounter (During [facility]- public health emergency), evaluation of the following areas was limited: Direct tissue palpation, direct goniometric measurements, blood pressure, O2 saturation. Pursuant to the emergency declaration under the [address] [place] [address] and the [employer] Act, this Virtual Visit was conducted with patient's (and/or legal guardian's) consent, to reduce the risk of exposure to COVID-19 and provide necessary medical care. Services were provided through a video synchronous discussion virtually to substitute for in-person encounter. --Kristian Mak RD on 7/19/2022 at 8:50 AM    An electronic signature was used to authenticate this note.

## 2022-07-21 ENCOUNTER — PATIENT MESSAGE (OUTPATIENT)
Dept: ENDOCRINOLOGY | Age: 37
End: 2022-07-21

## 2022-07-21 RX ORDER — INSULIN GLARGINE 100 [IU]/ML
INJECTION, SOLUTION SUBCUTANEOUS
Qty: 15 ML | Refills: 0 | Status: SHIPPED | OUTPATIENT
Start: 2022-07-21 | End: 2022-08-06

## 2022-07-21 RX ORDER — PEN NEEDLE, DIABETIC 31 GX3/16"
NEEDLE, DISPOSABLE MISCELLANEOUS
Qty: 100 PEN NEEDLE | Refills: 0 | Status: SHIPPED | OUTPATIENT
Start: 2022-07-21

## 2022-07-21 RX ORDER — INSULIN ASPART 100 [IU]/ML
INJECTION, SOLUTION INTRAVENOUS; SUBCUTANEOUS
Qty: 15 ML | Refills: 0 | Status: SHIPPED | OUTPATIENT
Start: 2022-07-21 | End: 2022-08-06 | Stop reason: SDUPTHER

## 2022-08-01 ENCOUNTER — DOCUMENTATION ONLY (OUTPATIENT)
Dept: FAMILY MEDICINE CLINIC | Age: 37
End: 2022-08-01

## 2022-08-01 ENCOUNTER — OFFICE VISIT (OUTPATIENT)
Dept: FAMILY MEDICINE CLINIC | Age: 37
End: 2022-08-01
Payer: MEDICAID

## 2022-08-01 VITALS
HEIGHT: 63 IN | DIASTOLIC BLOOD PRESSURE: 86 MMHG | SYSTOLIC BLOOD PRESSURE: 146 MMHG | HEART RATE: 76 BPM | OXYGEN SATURATION: 99 % | WEIGHT: 229 LBS | RESPIRATION RATE: 16 BRPM | TEMPERATURE: 98.3 F | BODY MASS INDEX: 40.57 KG/M2

## 2022-08-01 DIAGNOSIS — Z11.1 TUBERCULOSIS SCREENING: Primary | ICD-10-CM

## 2022-08-01 DIAGNOSIS — I83.93 VARICOSE VEINS OF BOTH LOWER EXTREMITIES, UNSPECIFIED WHETHER COMPLICATED: ICD-10-CM

## 2022-08-01 LAB
MM INDURATION POC: 0 MM (ref 0–5)
PPD POC: NEGATIVE NEGATIVE

## 2022-08-01 PROCEDURE — 86580 TB INTRADERMAL TEST: CPT | Performed by: STUDENT IN AN ORGANIZED HEALTH CARE EDUCATION/TRAINING PROGRAM

## 2022-08-01 PROCEDURE — 99213 OFFICE O/P EST LOW 20 MIN: CPT | Performed by: STUDENT IN AN ORGANIZED HEALTH CARE EDUCATION/TRAINING PROGRAM

## 2022-08-01 NOTE — TELEPHONE ENCOUNTER
Sent Authorization Medical Release form to [address] [place] [address] Fax # [phone] on 08/01/2022

## 2022-08-01 NOTE — PROGRESS NOTES
1. Have you been to the ER, urgent care clinic since your last visit? Hospitalized since your last visit? No    2. Have you seen or consulted any other health care providers outside of the [address] [place] [address] since your last visit? Include any pap smears or colon screening.  Yes [address] [facility]

## 2022-08-01 NOTE — PROGRESS NOTES
Assessment/Plan:     Diagnoses and all orders for this visit:    1. Tuberculosis screening  -     AMB POC TUBERCULOSIS, INTRADERMAL (SKIN TEST)  -Patient requires PPD placement and tuberculosis screening for work.  -No concerning signs or symptoms for tuberculosis  -PPD placed in office today and to be read within 48 to 72 hours. 2. Varicose veins of both lower extremities, unspecified whether complicated  -     Comp. Stocking,Thigh,Long,Large misc; Use as needed for varicose veins, compression grade : 15-20  -Prescription refilled for compression stockings due to bilateral varicose veins. Discussed expected course/resolution/complications of diagnosis in detail with patient. Medication risks/benefits/costs/interactions/alternatives discussed with patient. Pt expressed understanding with the diagnosis and plan      Subjective:      Naif Hogue is a 42 y.o. female who presents for had concerns including PPD Reading. Current Outpatient Medications   Medication Sig Dispense Refill    Comp. Stocking,Thigh,Long,Large misc Use as needed for varicose veins, compression grade : 15-20 2 Each 0    insulin glargine (Lantus Solostar U-100 Insulin) 100 unit/mL (3 mL) inpn Inject 20 units daily in the morning 15 mL 0    insulin aspart U-100 (NovoLOG Flexpen U-100 Insulin) 100 unit/mL (3 mL) inpn Inject 1 unit for 9 grams of carbs + 1 units for every 75 mg/dl above 150 mg/dl--max dose 30 units/day 15 mL 0    Insulin Needles, Disposable, (BD Ultra-Fine Mini Pen Needle) 31 gauge x 3/16\" ndle Use as directed 4 times daily 100 Pen Needle 0    multivitamin (ONE A DAY) tablet Take 1 Tablet by mouth daily.  hydroCHLOROthiazide (HYDRODIURIL) 12.5 mg tablet TAKE 1 TABLET BY MOUTH EVERY DAY 90 Tablet 1    buPROPion SR (WELLBUTRIN SR) 150 mg SR tablet Take 1 Tablet by mouth two (2) times a day. 180 Tablet 1    glucose blood VI test strips (blood glucose test) strip Use to check blood sugar four times a day. 400 Strip 3    lisinopriL (PRINIVIL, ZESTRIL) 40 mg tablet TAKE 1 TAB BY MOUTH ONCE DAILY 90 Tablet 3    Omnipod Dash Insulin Pod crtg Replaced Pod cartridge every 72 hours 6 Package 3    insulin aspart U-100 (NovoLOG U-100 Insulin aspart) 100 unit/mL injection USE AS DIRECTED IN INSULIN PUMP.  UNITS/DAY. 30 mL 11    ondansetron (Zofran ODT) 4 mg disintegrating tablet 1 Tablet by SubLINGual route every eight (8) hours as needed for Nausea or Vomiting. 12 Tablet 0    cholecalciferol (VITAMIN D3) (5000 Units/125 mcg) tab tablet Take  by mouth daily.  blood-glucose sensor (DEXCOM G6 SENSOR) by Does Not Apply route.  blood-glucose transmitter (DEXCOM G6 TRANSMITTER) by Does Not Apply route.  subcutaneous insulin pump (OMNIPOD INSULIN MANAGEMENT) by Does Not Apply route.  Blood-Glucose Meter monitoring kit Check blood sugar four times a day 1 Kit 0    lancets misc Use to check blood sugar four times a day. 400 Each 3    cetirizine (ZYRTEC) 10 mg tablet Take 10 mg by mouth as needed. Allergies   Allergen Reactions    Codeine Nausea and Vomiting     Past Medical History:   Diagnosis Date    Breast cancer [facility]) 2012    Right breast infiltrating ductal carcinoma    Cardiomyopathy due to chemotherapy ([address]     EF 20 %    Essential hypertension     Gestational hypertension     History of sepsis 2013    after chemo    Hx of difficult intubation     resulting from sepsis in 2013. 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     HX ORTHOPAEDIC Right     Carpal tunnel release, index finger trigger release.  HX OTHER SURGICAL      cyst removed under left arm    HX OTHER SURGICAL      port placement for chemo    HX WISDOM TEETH EXTRACTION  08/15/2018    IA BREAST SURGERY PROCEDURE UNLISTED  2013    R Breast Mastectomy     Family History   Problem Relation Age of Onset    Diabetes Brother         borderline Type 2    Diabetes Paternal Uncle     Diabetes Paternal Grandfather     Heart Disease Paternal Grandfather         MI in his 62s    Hypertension Mother     Heart Disease Father     Stroke Neg Hx      Social History     Socioeconomic History    Marital status:      Spouse name: Not on file    Number of children: Not on file    Years of education: Not on file    Highest education level: Not on file   Occupational History    Not on file   Tobacco Use    Smoking status: Never    Smokeless tobacco: Never   Vaping Use    Vaping Use: Never used   Substance and Sexual Activity    Alcohol use: No    Drug use: No    Sexual activity: Yes     Partners: Male   Other Topics Concern     Service Not Asked    Blood Transfusions Not Asked    Caffeine Concern Not Asked    Occupational Exposure Not Asked    Hobby Hazards Not Asked    Sleep Concern Not Asked    Stress Concern Not Asked    Weight Concern Not Asked    Special Diet Not Asked    Back Care Not Asked    Exercise Not Asked    Bike Helmet Not Asked   [address] Not Asked    Self-Exams Not Asked   Social History Narrative    Lives in [place] with her  and 6 yo son. Got  in July 2017. Currently in [address] [place] [address],[place] [address] for a masters in [employer].       Social Determinants of Health     Financial Resource Strain: Not on file   Food Insecurity: Not on file   Transportation Needs: Not on file   Physical Activity: Not on file   Stress: Not on file   Social Connections: Not on file   Intimate Partner Violence: Not on file   Housing Stability: Not on file       Patient is a 68-year-old female who presents to the office today for PPD test.  Patient recently had an annual exam however for work she does need a PPD test to be completed. She denies any cough, shortness of breath, hemoptysis, fever, chills or any other concerning signs or symptoms. Patient does state she has a history of varicose veins that are symptomatic and was previously prescribed compression stockings but has misplaced the prescription and therefore requesting a new one today. Patient denies any other acute concerns. Of note patient's blood pressure at home is usually very well controlled. ROS:   Review of Systems   Constitutional:  Negative for chills and fever. HENT:  Negative for congestion. Eyes:  Negative for blurred vision. Respiratory:  Negative for cough and shortness of breath. Cardiovascular:  Negative for chest pain, palpitations and leg swelling. Gastrointestinal:  Negative for abdominal pain, nausea and vomiting. Genitourinary:  Negative for dysuria. Neurological:  Negative for dizziness, tingling, weakness and headaches. Objective:   Visit Vitals  BP (!) 146/86 (BP 1 Location: Left upper arm, BP Patient Position: Sitting, BP Cuff Size: Large adult)   Pulse 76   Temp 98.3 °F (36.8 °C) (Temporal)   Resp 16   Ht 5' 3\" (1.6 m)   Wt 229 lb (103.9 kg)   SpO2 99%   BMI 40.57 kg/m²         Vitals and Nurse Documentation reviewed. Physical Exam  Constitutional:       General: She is not in acute distress. Appearance: Normal appearance. She is obese. She is not toxic-appearing. HENT:      Head: Normocephalic and atraumatic. Eyes:      Conjunctiva/sclera: Conjunctivae normal.   Cardiovascular:      Rate and Rhythm: Normal rate and regular rhythm. Pulses: Normal pulses. Heart sounds: Normal heart sounds. No murmur heard. No gallop. Pulmonary:      Effort: Pulmonary effort is normal. No respiratory distress.       Breath sounds: Normal breath sounds. No wheezing or rhonchi. Abdominal:      General: Bowel sounds are normal. There is no distension. Palpations: Abdomen is soft. Tenderness: There is no abdominal tenderness. There is no guarding. Musculoskeletal:      Cervical back: Neck supple. Right lower leg: No edema. Left lower leg: No edema. Neurological:      Mental Status: She is alert and oriented to person, place, and time.       Gait: Gait normal.

## 2022-08-03 ENCOUNTER — CLINICAL SUPPORT (OUTPATIENT)
Dept: FAMILY MEDICINE CLINIC | Age: 37
End: 2022-08-03

## 2022-08-03 DIAGNOSIS — Z11.1 ENCOUNTER FOR PPD SKIN TEST READING: Primary | ICD-10-CM

## 2022-08-03 NOTE — PROGRESS NOTES
Ms. Barnes Erp returned today to have her PPD read. Result: 0 mm induration.   Interpretation: Negative for swelling, redness, bleeding, bruising, infection  Allergic reaction: no    Cgleave, cma

## 2022-08-06 RX ORDER — INSULIN ASPART 100 [IU]/ML
INJECTION, SOLUTION INTRAVENOUS; SUBCUTANEOUS
Qty: 15 ML | Refills: 11 | Status: SHIPPED | OUTPATIENT
Start: 2022-08-06

## 2022-08-06 RX ORDER — INSULIN GLARGINE 100 [IU]/ML
INJECTION, SOLUTION SUBCUTANEOUS
Qty: 15 ML | Refills: 11 | Status: SHIPPED | OUTPATIENT
Start: 2022-08-06

## 2022-08-09 ENCOUNTER — HOSPITAL ENCOUNTER (OUTPATIENT)
Dept: NUTRITION | Age: 37
Discharge: HOME | End: 2022-08-09
Payer: MEDICAID

## 2022-08-09 DIAGNOSIS — E10.9 TYPE 1 DIABETES MELLITUS WITHOUT COMPLICATION (HCC): Primary | ICD-10-CM

## 2022-08-09 DIAGNOSIS — E66.01 MORBID OBESITY WITH BMI OF 40.0-44.9, ADULT (HCC): ICD-10-CM

## 2022-08-09 PROCEDURE — 97803 MED NUTRITION INDIV SUBSEQ: CPT | Performed by: DIETITIAN, REGISTERED

## 2022-08-09 NOTE — PROGRESS NOTES
Lily Rowe was informed of the inherent limitations of a virtual visit,  and has consented to a virtual therapy visit on 2022. The patient was informed that at any time during the virtual visit, they can decide to stop the virtual visit. The patient verified that they are physically located in the [facility] for this virtual visit. NUTRITION - FOLLOW-UP TREATMENT NOTE  Patient Name: Lily Rowe         Date: 2022  : 1985    YES Patient  Verified  Diagnosis:   E10.9 (ICD-10-CM) - Type 1 diabetes mellitus without complications   T91.61 (WVF-73-MM) - Morbid (severe) obesity due to excess calories      In time:  830am             Out time:   900am   Total Treatment Time (min):   30     SUBJECTIVE/ASSESSMENT  Current Wt: 221 Previous Wt: 222 Wt Change: -1     Initial Wt: 229 Total Wt change: -6 Height: 63     Changes in medication or medical history? Any new allergies, surgeries or procedures? NO    If yes, update Summary List        Nutrition Diagnosis        Diagnosis Status: Food and nutrition related knowledge deficit R/T lack of prior education for diabetes and weight loss nutrition AEB pt questions during session. [x]  Improved []  No Change    []  Declined   []  Discontinued     Excessive energy intake R/T Food and nutrition related knowledge deficit for energy needs and healthy weight loss AEB request for session, BMI > 40, dietary recall. [x]  Improved []  No Change    []  Declined   []  Discontinued        Nutrition Monitoring and Evaluation: Pt seen virtually. Pt in  [facility] Pt seen today for follow-up visit. Pt did well with vacation and got off track with foods. Coming back from vacation pt has been trying hard to get back on track. Pt will be starting new hours for work this week. Pt noted that it is hard to pick her own options in weight loss and she has been struggling to not have a set meal plan.    Pt noted that her blood sugars have been higher over vacation and eating more carbs than normal. Pt has been taking her medications more regularly. Pt is also still enjoying having water with lemon and in her tumbler. Pt has been walking most mornings and has even started running. Breakfast: slimfast drink, turkey sausage and 2 eggs, carbmaster yogurt. Snack: 100 bernard granola bar  Lunch: Yogurt with granola and slim fast; turkey and cheese roll up with slim fast   Snack: 100 bernard granola bar, 100 bernard PB crackers, 100 bernard popcorn   Dinner: Chicken and vegetable, potato for carb if present        Previous goals:  Met. - Make sure to have a dinner meal in order to keep blood sugar consistent. - Limit to one sweet splurge per day on vacation.    - Journal and track foods and calories for 1-2 weeks before next visit. Met. - Plan ahead (meals and order groceries if needed) to get back on track after vacation. Nutrition Prescription and  Intervention Educated pt on the pathophysiology of Type I Diabetes, healthy weight loss, and the rationale for dietary modifications and increased activity. Educated pt on lean proteins, healthy fats, non-starchy vegetables, and complex carbohydrate food sources. Discussed limiting carbohydrates, label reading, meal timing, and appropriate serving sizes. Encouraged pt to avoid sugary beverages. Reviewed meal builder tool, calorie and macro breakdown as well as exercise parameters.       Patient Education:  [x]  Review current plan with patient   []  Other:    Handouts/  Information Provided: []  Carbohydrates  []  Protein  []  Fiber  []  Serving Sizes  []  Fluids  []  General guidelines []  Diabetes  []  Cholesterol  []  Sodium  []  SBGM  []  Food Journals  []  Others:      Patient Goals - Pack foods for work and plan for some extras with new schedule   - Get in Exercise on Tuesday and look for gaps in the day to walk during work   - Get in 400-500 calories per meal and 200-300 calories at snack     PLAN  [x]  Continue on current plan [] Follow-up PRN   []  Discharge due to :    [x]  Next appt: 2 weeks      Dietitian: Angelina Han RDN     Date: 8/9/2022 Time: 830 AM   Rebeca Ann is a 42 y.o. female being evaluated by a Virtual Visit (video visit) encounter to address concerns as mentioned above. A caregiver was present when appropriate. Due to this being a TeleHealth encounter (During BMYVY-39 public health emergency), evaluation of the following areas was limited: Direct tissue palpation, direct goniometric measurements, blood pressure, O2 saturation. Pursuant to the emergency declaration under the [address] [place] [address], [address] [place] [address] authority and the [employer] Act, this Virtual Visit was conducted with patient's (and/or legal guardian's) consent, to reduce the risk of exposure to COVID-19 and provide necessary medical care. Services were provided through a video synchronous discussion virtually to substitute for in-person encounter. --Angelina Han RD on 8/9/2022 at 8:50 AM    An electronic signature was used to authenticate this note.

## 2022-08-20 RX ORDER — INSULIN PUMP CONTROLLER
EACH MISCELLANEOUS
Qty: 5 UNSPECIFIED | Refills: 3 | Status: SHIPPED | OUTPATIENT
Start: 2022-08-20

## 2022-08-22 NOTE — TELEPHONE ENCOUNTER
Ms. Angelo Mcbride called for lolita Herndon as she was not sure of the dose of lantus and novolog to take. Her sugars are running in 400s at this time and she has no omnipod pods and that her current pod is not working. She mentioned that  when she was on vacation at the end of July she was also out of pods and had to use MDI, and that her taking the lantus she started with 20 units daily and went up all the way to 28 units and it still ran high 200s-300s. We discussed correct insulin storage, using new pen needles, rotating, etc to ensure adequate administration and patient indicates she follows all of that. The plan for now is to discontinue her pod and switch to MDI, to take immediately novolog 4 units for correction (I also asked her to use the ketostix to ensure she is not going into DKA, she is denying any symptoms katarzyna), and to check in 1-2 hours to ensure it is coming down and to take an additional 1-2 units if it remains above 250. Advised to take 28-30 units of lantus ( we calculated her basal insulin dose per the setting in the last note with Dr Tyler James and she is taking approx 25 units per day but patient said her current insulin per day average basal is 20 units per PDM. We also discussed that due to the last time her being on vacation variability of sugars with relation to that may have caused variability in insulin response. She says that when she is on Arlana Geronimo her sugars run better than Lantus and is asking if a PA can be done for it. We also discussed parameters for her to seek urgent/emergent medical care and importance of staying well hydrated and to monitor her sugars and ensure it is coming down tonight, she indicates understanding and will let us know if she has any other questions.  I advised her that will relay her message to Dr Branden Goodrich team.

## 2022-09-01 ENCOUNTER — TELEPHONE (OUTPATIENT)
Dept: ENDOCRINOLOGY | Age: 37
End: 2022-09-01

## 2022-09-01 RX ORDER — INSULIN PUMP CONTROLLER
EACH MISCELLANEOUS
Qty: 3 PACKAGE | Refills: 11 | Status: SHIPPED | OUTPATIENT
Start: 2022-09-01

## 2022-09-01 NOTE — TELEPHONE ENCOUNTER
----- Message from Sanjiv Wilkins MD sent at 8/21/2022  6:52 PM EDT -----  Regarding: FW: Renate Carrizales     ----- Message -----  From: Lily Rowe  Sent: 8/21/2022   5:37 PM EDT  To: [facility] Nurse Pool  Subject: Abhishek Crowe. I was informed that Theres a new omnipod out that talks to the New [facility] blood glucose monitor now. So I would like to put in my request to get one. Also FYI when I was using the Lantus I experienced a lot of highs in the mornings. I also realized that you took me off lantus and had me on Tresiba the previous time before I started using the pump. So I would like to try this one again. Hopefully this will resolve the high morning sugars. I also want to know if I can get more than one box of omnipod dashes at a time? Usually I have at least 1 malfunction and this causes problems with me being able to refill it again within the time I need it. Otherwise I have to turn back to the lantus insulin until I can refill it. The pharmacy is telling me now that I have to wait til September 2nd before I can refill again which doesnt make any sense to me. I didnt have any malfunctions this time. So I am going to call them.     Renetta Ferrer

## 2022-09-01 NOTE — TELEPHONE ENCOUNTER
Called and spoke with pt. She states that after she spoke with Dr. Carlita Louie on 8/21/22, she was able to go to [facility] on 8/22/22 and was given one box of 5 pods that will last 15 days. She often finds that the pods don't last 72 hours and ends up having to change every 48 hours and therefore is often running out too soon. I told her that I will send a new prescription to [facility] for 3 packs of 5 pods that will allow her to change every 2 days to give her a 30 day supply. She asked about upgrading to the new Omnipod 5 and I told her that I don't know if this is allowed yet by her insurance and to check with her pharmacy and insurance about whether she can get this through the pharmacy at this time and if so, then I will try to send a prescription for her to . I told her I would send her a FanTreet message with this plan. she voiced understanding of this plan.

## 2022-09-08 ENCOUNTER — OFFICE VISIT (OUTPATIENT)
Dept: FAMILY MEDICINE CLINIC | Age: 37
End: 2022-09-08
Payer: MEDICAID

## 2022-09-08 ENCOUNTER — TELEPHONE (OUTPATIENT)
Dept: ENDOCRINOLOGY | Age: 37
End: 2022-09-08

## 2022-09-08 VITALS
SYSTOLIC BLOOD PRESSURE: 147 MMHG | BODY MASS INDEX: 40.57 KG/M2 | WEIGHT: 229 LBS | RESPIRATION RATE: 16 BRPM | HEART RATE: 71 BPM | OXYGEN SATURATION: 100 % | TEMPERATURE: 97.2 F | DIASTOLIC BLOOD PRESSURE: 81 MMHG | HEIGHT: 63 IN

## 2022-09-08 DIAGNOSIS — I10 ESSENTIAL HYPERTENSION, BENIGN: ICD-10-CM

## 2022-09-08 DIAGNOSIS — E10.9 TYPE 1 DIABETES MELLITUS WITHOUT COMPLICATION (HCC): Primary | ICD-10-CM

## 2022-09-08 DIAGNOSIS — E66.01 MORBID OBESITY WITH BMI OF 40.0-44.9, ADULT (HCC): ICD-10-CM

## 2022-09-08 PROCEDURE — 99214 OFFICE O/P EST MOD 30 MIN: CPT | Performed by: FAMILY MEDICINE

## 2022-09-08 PROCEDURE — 3052F HG A1C>EQUAL 8.0%<EQUAL 9.0%: CPT | Performed by: FAMILY MEDICINE

## 2022-09-08 NOTE — TELEPHONE ENCOUNTER
----- Message from Jose Barnett MD sent at 9/8/2022  4:11 PM EDT -----  Jenaro Hale,  This patient has worked with me in the past on weight loss. I am also the pcp. I understand she is a type 1 diabetic. I want your perspective on her taking semaglutide for weight loss. I want to clear that with you before trying it.   Thank you,  Jose Barnett MD

## 2022-09-08 NOTE — TELEPHONE ENCOUNTER
I have used ozempic in type 1 patients to help with weight loss with some success so if you would like to try this, I'm willing to have you start this or I can discuss it with her at her next visit this fall. Thanks.

## 2022-09-08 NOTE — PROGRESS NOTES
1. Have you been to the ER, urgent care clinic since your last visit? Hospitalized since your last visit? No    2. Have you seen or consulted any other health care providers outside of the [address] [place] [address] since your last visit? Include any pap smears or colon screening. No    Chief Complaint   Patient presents with    Referral Follow Up     Health Maintenance Due   Topic Date Due    Cervical cancer screen  Never done    Pneumococcal 0-64 years (2 - PCV) 09/18/2018    Eye Exam Retinal or Dilated  07/06/2019    COVID-19 Vaccine (3 - Booster) 07/05/2021    Flu Vaccine (1) 09/01/2022     3 most recent PHQ Screens 9/8/2022   Little interest or pleasure in doing things Not at all   Feeling down, depressed, irritable, or hopeless Not at all   Total Score PHQ 2 0     Abuse Screening Questionnaire 9/8/2022   Do you ever feel afraid of your partner? N   Are you in a relationship with someone who physically or mentally threatens you? N   Is it safe for you to go home?  Y     Learning Assessment 9/22/2017   PRIMARY LEARNER Patient   HIGHEST LEVEL OF EDUCATION - PRIMARY LEARNER  4 YEARS OF COLLEGE   BARRIERS PRIMARY LEARNER NONE   CO-LEARNER CAREGIVER No   PRIMARY LANGUAGE ENGLISH   LEARNER PREFERENCE PRIMARY LISTENING   ANSWERED BY self   RELATIONSHIP SELF     Visit Vitals  BP (!) 147/81 (BP 1 Location: Left upper arm, BP Patient Position: Sitting, BP Cuff Size: Small adult)   Pulse 71   Temp 97.2 °F (36.2 °C) (Skin)   Resp 16   Ht 5' 3\" (1.6 m)   Wt 229 lb (103.9 kg)   SpO2 100%   BMI 40.57 kg/m²

## 2022-09-08 NOTE — PROGRESS NOTES
Chief Complaint   Patient presents with    Referral Follow Up     she is a 42y.o. year old female who presents for evalution. She has been seeing a dietitian for weight loss an diabetes control   She has been avoiding carbs as well and no success with weight loss  She has an insulin pump and gets lantus as well as novolog per her chart  She c/o appetite is very strong and craving junk foods  I think the insulin is creating some of the hunger complaints    She has been doing 30 mins walk 3 times a week along with the changes in eating but no success with weight loss            Reviewed PmHx, RxHx, FmHx, SocHx, AllgHx and updated and dated in the chart. Aspirin yes ____   No____ N/A____    Patient Active Problem List    Diagnosis    Acute heart failure (HCC)    Hidradenitis suppurativa    Hypertension    Pregnancy induced hypertension    Pregnancy induced hypertension, third trimester    History of breast cancer    Morbid obesity with BMI of 40.0-44.9, adult ([facility]    Wound check, abscess     Left thigh.       Type 1 diabetes mellitus without complication (HCC)    Ne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 ([address]    Cardiomyopathy due to chemotherapy ([facility]    Vitamin D deficiency    Abnormal thyroid blood test    Essential hypertension, benign    Encounter for long-term (current) use of other medications    Encounter for long-term (current) use of insulin ([address]    Uncontrolled type 1 diabetes mellitus     diagnosed age 4         Nurse notes were reviewed and copied and are correct  Review of Systems - negative except as listed above in the HPI    Objective:     Vitals:    09/08/22 1506   BP: (!) 147/81   Pulse: 71   Resp: 16   Temp: 97.2 °F (36.2 °C)   TempSrc: Skin   SpO2: 100%   Weight: 229 lb (103.9 kg)   Height: 5' 3\" (1.6 m)     Physical Examination: General appearance - alert, well appearing, and in no distress  Mental status - alert, oriented to person, place, and time  Chest - clear to auscultation, no wheezes, rales or rhonchi, symmetric air entry  Heart - normal rate, regular rhythm, normal S1, S2, no murmurs, rubs, clicks or gallops         Assessment/ Plan:   Diagnoses and all orders for this visit:    1. Type 1 diabetes mellitus without complication (HCC)  -     semaglutide (Ozempic) 0.25 mg or 0.5 mg/dose (2 mg/1.5 ml) subq pen; 0.25 mg by SubCUTAneous route every seven (7) days. After a discussion with her endocrine doc I will go ahead and start her on ozempic. No change needed in the insulin dose on the first month dose. Her endocrine doc is aware of this addition  The insulin need will decrease as she changes what she is eating  The patient verbalized understanding of the need to be monitoring the blood sugar closely for changes as she changes what she is eating  2. Essential hypertension, benign  Cont hctz 12.5 mg  Lisinopril 40 mg ea day  No change in doses. I am hopeful as she works on weight loss the blood pressure will soon decrease too  3. Morbid obesity with BMI of 40.0-44.9, adult (HCC)  Cont the wellbutrinSR 150 mg bid     4. Breast Cancer  Weght loss will also decrease the chance of recurrence        ICD-10-CM ICD-9-CM    1. Type 1 diabetes mellitus without complication (HCC)  P99.6 250.01 semaglutide (Ozempic) 0.25 mg or 0.5 mg/dose (2 mg/1.5 ml) subq pen      2. Essential hypertension, benign  I10 401.1       3. Morbid obesity with BMI of 40.0-44.9, adult ([facility]  E66.01 278.01     Z68.41 V85.41           I have discussed the diagnosis with the patient and the intended plan as seen in the above orders. The patient has received an after-visit summary  if not on [facility] and questions were answered concerning future plans.  Patients in [facility] have access to avs without printing    Medication Side Effects and Warnings were discussed with patient:   Patient Labs were reviewed and or requested:   Patient Past Records were reviewed and or requested: yes        There are no Patient Instructions on file for this visit.

## 2022-09-09 RX ORDER — SEMAGLUTIDE 1.34 MG/ML
0.25 INJECTION, SOLUTION SUBCUTANEOUS
Qty: 1 BOX | Refills: 0 | Status: SHIPPED | OUTPATIENT
Start: 2022-09-09 | End: 2022-10-10 | Stop reason: SDUPTHER

## 2022-09-13 ENCOUNTER — HOSPITAL ENCOUNTER (EMERGENCY)
Age: 37
Discharge: HOME | End: 2022-09-13
Attending: EMERGENCY MEDICINE
Payer: MEDICAID

## 2022-09-13 VITALS
DIASTOLIC BLOOD PRESSURE: 89 MMHG | OXYGEN SATURATION: 98 % | TEMPERATURE: 97.8 F | HEIGHT: 63 IN | SYSTOLIC BLOOD PRESSURE: 168 MMHG | WEIGHT: 229 LBS | HEART RATE: 60 BPM | BODY MASS INDEX: 40.57 KG/M2 | RESPIRATION RATE: 16 BRPM

## 2022-09-13 DIAGNOSIS — R11.0 NAUSEA WITHOUT VOMITING: ICD-10-CM

## 2022-09-13 DIAGNOSIS — I10 HYPERTENSION, UNSPECIFIED TYPE: ICD-10-CM

## 2022-09-13 DIAGNOSIS — R51.9 NONINTRACTABLE HEADACHE, UNSPECIFIED CHRONICITY PATTERN, UNSPECIFIED HEADACHE TYPE: ICD-10-CM

## 2022-09-13 DIAGNOSIS — R42 DIZZINESS: Primary | ICD-10-CM

## 2022-09-13 LAB
ALBUMIN SERPL-MCNC: 3.9 G/DL (ref 3.5–5.2)
ALBUMIN/GLOB SERPL: 1.1 {RATIO} (ref 1.1–2.2)
ALP SERPL-CCNC: 98 U/L (ref 35–104)
ALT SERPL-CCNC: 20 U/L (ref 10–35)
ANION GAP SERPL CALC-SCNC: 7 MMOL/L (ref 5–15)
APPEARANCE UR: CLEAR
AST SERPL-CCNC: 24 U/L (ref 10–35)
BACTERIA URNS QL MICRO: NEGATIVE /HPF
BASOPHILS # BLD: 0 K/UL (ref 0–0.1)
BASOPHILS NFR BLD: 0 % (ref 0–1)
BILIRUB SERPL-MCNC: 0.2 MG/DL (ref 0.2–1)
BILIRUB UR QL: NEGATIVE
BUN SERPL-MCNC: 9 MG/DL (ref 6–20)
BUN/CREAT SERPL: 11 (ref 12–20)
CALCIUM SERPL-MCNC: 9.3 MG/DL (ref 8.6–10)
CHLORIDE SERPL-SCNC: 104 MMOL/L (ref 98–107)
CO2 SERPL-SCNC: 29 MMOL/L (ref 22–29)
COLOR UR: NORMAL
CREAT SERPL-MCNC: 0.81 MG/DL (ref 0.5–0.9)
DIFFERENTIAL METHOD BLD: NORMAL
EOSINOPHIL # BLD: 0.2 K/UL (ref 0–0.4)
EOSINOPHIL NFR BLD: 3 % (ref 0–7)
EPITH CASTS URNS QL MICRO: NORMAL /LPF
ERYTHROCYTE [DISTWIDTH] IN BLOOD BY AUTOMATED COUNT: 13 % (ref 11.5–14.5)
GLOBULIN SER CALC-MCNC: 3.6 G/DL (ref 2–4)
GLUCOSE SERPL-MCNC: 114 MG/DL (ref 65–100)
GLUCOSE UR STRIP.AUTO-MCNC: NEGATIVE MG/DL
HCT VFR BLD AUTO: 40 % (ref 35–47)
HGB BLD-MCNC: 13 G/DL (ref 11.5–16)
HGB UR QL STRIP: NEGATIVE
IMM GRANULOCYTES # BLD AUTO: 0 K/UL (ref 0–0.04)
IMM GRANULOCYTES NFR BLD AUTO: 0 % (ref 0–0.5)
KETONES UR QL STRIP.AUTO: NEGATIVE MG/DL
LEUKOCYTE ESTERASE UR QL STRIP.AUTO: NEGATIVE
LYMPHOCYTES # BLD: 2.3 K/UL (ref 0.8–3.5)
LYMPHOCYTES NFR BLD: 30 % (ref 12–49)
MCH RBC QN AUTO: 29.3 PG (ref 26–34)
MCHC RBC AUTO-ENTMCNC: 32.5 G/DL (ref 30–36.5)
MCV RBC AUTO: 90.1 FL (ref 80–99)
MONOCYTES # BLD: 0.8 K/UL (ref 0–1)
MONOCYTES NFR BLD: 10 % (ref 5–13)
NEUTS SEG # BLD: 4.4 K/UL (ref 1.8–8)
NEUTS SEG NFR BLD: 57 % (ref 32–75)
NITRITE UR QL STRIP.AUTO: NEGATIVE
NRBC # BLD: 0 K/UL (ref 0–0.01)
NRBC BLD-RTO: 0 PER 100 WBC
PH UR STRIP: 6.5 [PH] (ref 5–8)
PLATELET # BLD AUTO: 278 K/UL (ref 150–400)
PMV BLD AUTO: 10 FL (ref 8.9–12.9)
POTASSIUM SERPL-SCNC: 4.3 MMOL/L (ref 3.5–5.1)
PROT SERPL-MCNC: 7.5 G/DL (ref 6.4–8.3)
PROT UR STRIP-MCNC: NEGATIVE MG/DL
RBC # BLD AUTO: 4.44 M/UL (ref 3.8–5.2)
RBC #/AREA URNS HPF: NORMAL /HPF
SARS-COV-2, COV2: NORMAL
SODIUM SERPL-SCNC: 140 MMOL/L (ref 136–145)
SP GR UR REFRACTOMETRY: 1.01 (ref 1–1.03)
TROPONIN I BLD-MCNC: <0.04 NG/ML (ref 0–0.08)
UA: UC IF INDICATED,UAUC: NORMAL
UROBILINOGEN UR QL STRIP.AUTO: 0.2 EU/DL (ref 0.2–1)
WBC # BLD AUTO: 7.7 K/UL (ref 3.6–11)
WBC URNS QL MICRO: NORMAL /HPF (ref 0–4)

## 2022-09-13 PROCEDURE — 74011250637 HC RX REV CODE- 250/637: Performed by: NURSE PRACTITIONER

## 2022-09-13 PROCEDURE — 36415 COLL VENOUS BLD VENIPUNCTURE: CPT

## 2022-09-13 PROCEDURE — 80053 COMPREHEN METABOLIC PANEL: CPT

## 2022-09-13 PROCEDURE — 81001 URINALYSIS AUTO W/SCOPE: CPT

## 2022-09-13 PROCEDURE — 96374 THER/PROPH/DIAG INJ IV PUSH: CPT

## 2022-09-13 PROCEDURE — U0005 INFEC AGEN DETEC AMPLI PROBE: HCPCS

## 2022-09-13 PROCEDURE — 99284 EMERGENCY DEPT VISIT MOD MDM: CPT

## 2022-09-13 PROCEDURE — 74011250636 HC RX REV CODE- 250/636: Performed by: NURSE PRACTITIONER

## 2022-09-13 PROCEDURE — 85025 COMPLETE CBC W/AUTO DIFF WBC: CPT

## 2022-09-13 PROCEDURE — 84484 ASSAY OF TROPONIN QUANT: CPT

## 2022-09-13 RX ORDER — KETOROLAC TROMETHAMINE 30 MG/ML
15 INJECTION, SOLUTION INTRAMUSCULAR; INTRAVENOUS
Status: COMPLETED | OUTPATIENT
Start: 2022-09-13 | End: 2022-09-13

## 2022-09-13 RX ORDER — IBUPROFEN 600 MG/1
600 TABLET ORAL
Qty: 20 TABLET | Refills: 0 | Status: SHIPPED | OUTPATIENT
Start: 2022-09-13

## 2022-09-13 RX ORDER — CETIRIZINE HCL 10 MG
10 TABLET ORAL DAILY
Qty: 14 TABLET | Refills: 0 | Status: SHIPPED | OUTPATIENT
Start: 2022-09-13 | End: 2022-09-27

## 2022-09-13 RX ORDER — ACETAMINOPHEN 500 MG
1000 TABLET ORAL
Status: COMPLETED | OUTPATIENT
Start: 2022-09-13 | End: 2022-09-13

## 2022-09-13 RX ORDER — ONDANSETRON 4 MG/1
4 TABLET, ORALLY DISINTEGRATING ORAL
Qty: 8 TABLET | Refills: 0 | Status: SHIPPED | OUTPATIENT
Start: 2022-09-13 | End: 2022-10-10 | Stop reason: SDUPTHER

## 2022-09-13 RX ADMIN — ACETAMINOPHEN 1000 MG: 500 TABLET, FILM COATED ORAL at 16:07

## 2022-09-13 RX ADMIN — KETOROLAC TROMETHAMINE 15 MG: 30 INJECTION, SOLUTION INTRAMUSCULAR at 16:07

## 2022-09-13 NOTE — Clinical Note
[address]  [facility] EMERGENCY DEPT  [address] [id number] [phone]    Work/School Note    Date: 9/13/2022    To Whom It May concern:      Favian Merrill was seen and treated today in the emergency room by the following provider(s):  Attending Provider: Cheko Vieyra MD  Nurse Practitioner: Michela Andrade NP. Favian Merrill is excused from work/school on 09/13/22. She is clear to return to work/school on 09/14/22.     No restrictions    Sincerely,          Domi Ferguson NP

## 2022-09-13 NOTE — ED TRIAGE NOTES
Patient reports she had an elevated BP reading this morning of 174. Took lisinopril and HCTZ at 12:47. Denies any CP or SOB     Reports feeling dizzy and nausea with headache since Sunday. States daughter is covid positive.      Patient adds she has also been urinating more frequently than normal

## 2022-09-13 NOTE — ED NOTES
MD and RN have reviewed and gave discharge instructions and prescriptions to the patient. The patient verbalized understanding. The pt left ambulatory by self.

## 2022-09-13 NOTE — DISCHARGE INSTRUCTIONS
Discontinue taking the Bethanie [place] OTC, you are that you are drinking an adequate amount of fluids, you may alternate Tylenol as needed for headache control. Following up with the ENT as you have had numerous ER visits with complaints of dizziness. Follow-up with your PCP this week and discuss possible adjustments of your blood pressure medication. You may use the Zofran as needed for nausea and vomiting.

## 2022-09-13 NOTE — ED PROVIDER NOTES
38-year-old female with past medical history of breast cancer, cardiomyopathy with an ejection fraction of 20%, essential hypertension, difficult intubation, morbid obesity oh, neuropathy, type 1 diabetes, vitamin D deficiency presents ambulatory to the emergency center with complaints of taking her blood pressure at home and having a systolic blood pressure 360. Patient states she took her lisinopril and HCTZ 1247 prior to arrival.  Patient currently denies any chest pain, shortness of breath, fever, chills, abdominal pain, vomiting or diarrhea. Patient states that her daughter tested COVID-positive this past weekend, and since then she has felt dizzy with nausea and has had a bad headache and increased urinary frequency, denies vaginal discharge or abnormal vaginal bleeding. Patient states that she has been taking OTC Bethanie [place] relief. Past Medical History:   Diagnosis Date    Breast cancer [facility]) 2012    Right breast infiltrating ductal carcinoma    Cardiomyopathy due to chemotherapy ([address]     EF 20 %    Essential hypertension     Gestational hypertension     History of sepsis 2013    after chemo    Hx of difficult intubation     resulting from sepsis in 2013. 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 HX ORTHOPAEDIC Right     Carpal tunnel release, index finger trigger release.     HX OTHER SURGICAL      cyst removed under left arm    HX OTHER SURGICAL      port placement for chemo    HX WISDOM TEETH EXTRACTION  08/15/2018    MO BREAST SURGERY PROCEDURE UNLISTED  2013    R Breast Mastectomy         Family History:   Problem Relation Age of Onset    Diabetes Brother         borderline Type 2    Diabetes Paternal Uncle     Diabetes Paternal Grandfather     Heart Disease Paternal Grandfather         MI in his 62s    Hypertension Mother     Heart Disease Father     Stroke Neg Hx        Social History     Socioeconomic History    Marital status:      Spouse name: Not on file    Number of children: Not on file    Years of education: Not on file    Highest education level: Not on file   Occupational History    Not on file   Tobacco Use    Smoking status: Never    Smokeless tobacco: Never   Vaping Use    Vaping Use: Never used   Substance and Sexual Activity    Alcohol use: No    Drug use: No    Sexual activity: Yes     Partners: Male   Other Topics Concern     Service Not Asked    Blood Transfusions Not Asked    Caffeine Concern Not Asked    Occupational Exposure Not Asked    Hobby Hazards Not Asked    Sleep Concern Not Asked    Stress Concern Not Asked    Weight Concern Not Asked    Special Diet Not Asked    Back Care Not Asked    Exercise Not Asked    Bike Helmet Not Asked    Seat Belt Not Asked    Self-Exams Not Asked   Social History Narrative    Lives in [place] [facility] with her  and 6 yo son. Got  in July 2017. Currently in [address] [place] [address] for a masters in [employer]. Social Determinants of Health     Financial Resource Strain: Not on file   Food Insecurity: Not on file   Transportation Needs: Not on file   Physical Activity: Not on file   Stress: Not on file   Social Connections: Not on file   Intimate Partner Violence: Not on file   Housing Stability: Not on file         ALLERGIES: Codeine    Review of Systems   Constitutional:  Negative for chills and fever. HENT:  Negative for congestion, sinus pain and sore throat. Respiratory:  Negative for cough, chest tightness and shortness of breath.     Cardiovascular:  Negative for chest pain. Gastrointestinal:  Positive for nausea. Negative for abdominal pain, constipation and vomiting. Genitourinary:  Positive for frequency. Negative for difficulty urinating, dysuria, vaginal bleeding and vaginal discharge. Neurological:  Positive for dizziness and headaches. Negative for weakness, light-headedness and numbness. Psychiatric/Behavioral: Negative. Vitals:    09/13/22 1400 09/13/22 1506 09/13/22 1600 09/13/22 1752   BP: (!) 184/103 (!) 177/95 (!) 177/95 (!) 168/89   Pulse: 84   60   Resp: 18   16   Temp: 98.3 °F (36.8 °C)   97.8 °F (36.6 °C)   SpO2: 98% 98% 97% 98%   Weight: 103.9 kg (229 lb)      Height: 5' 3\" (1.6 m)               Physical Exam  Vitals and nursing note reviewed. Constitutional:       General: She is not in acute distress. Appearance: Normal appearance. She is obese. She is not ill-appearing. HENT:      Head: Normocephalic. Right Ear: Tympanic membrane normal.      Left Ear: Tympanic membrane normal.      Nose: Nose normal.      Mouth/Throat:      Mouth: Mucous membranes are moist.   Eyes:      Extraocular Movements: Extraocular movements intact. Pupils: Pupils are equal, round, and reactive to light. Cardiovascular:      Rate and Rhythm: Normal rate. Pulses: Normal pulses. Pulmonary:      Effort: Pulmonary effort is normal. No respiratory distress. Breath sounds: Normal breath sounds. No wheezing. Abdominal:      General: Bowel sounds are normal. There is no distension. Palpations: Abdomen is soft. Tenderness: There is no abdominal tenderness. There is no right CVA tenderness, left CVA tenderness, guarding or rebound. Musculoskeletal:         General: Normal range of motion. Cervical back: Normal range of motion. Skin:     General: Skin is warm and dry. Capillary Refill: Capillary refill takes less than 2 seconds. Neurological:      Mental Status: She is alert and oriented to person, place, and time. GCS: GCS eye subscore is 4. GCS verbal subscore is 5. GCS motor subscore is 6. Cranial Nerves: Cranial nerves are intact. No facial asymmetry. Sensory: Sensation is intact. Motor: Motor function is intact. No weakness. Coordination: Coordination is intact. Gait: Gait is intact. Psychiatric:         Mood and Affect: Mood normal.        MDM  Number of Diagnoses or Management Options  Dizziness: established and improving  Hypertension, unspecified type: established and improving  Nausea without vomiting: established and improving  Nonintractable headache, unspecified chronicity pattern, unspecified headache type: established and improving  Diagnosis management comments: Differential DX: Viral syndrome vs COVID vs UTI vs dehydration        Amount and/or Complexity of Data Reviewed  Clinical lab tests: reviewed and ordered    Risk of Complications, Morbidity, and/or Mortality  Presenting problems: moderate  Diagnostic procedures: low  Management options: low    Patient Progress  Patient progress: improved  VITAL SIGNS:  Patient Vitals for the past 4 hrs:   Temp Pulse Resp BP SpO2   09/13/22 1752 97.8 °F (36.6 °C) 60 16 (!) 168/89 98 %         LABS:  No results found for this or any previous visit (from the past 6 hour(s)). IMAGING:  No orders to display         Medications During Visit:  Medications   acetaminophen (TYLENOL) tablet 1,000 mg (1,000 mg Oral Given 9/13/22 1607)   ketorolac (TORADOL) injection 15 mg (15 mg IntraVENous Given 9/13/22 1607)         DECISION MAKING:  France Ta is a 42 y.o. female who comes in as above.    68-year-old female with past medical history of breast cancer, cardiomyopathy with an ejection fraction of 20%, essential hypertension, difficult intubation, morbid obesity oh, neuropathy, type 1 diabetes, vitamin D deficiency presents ambulatory to the emergency center with complaints of taking her blood pressure at home and having a systolic blood pressure 174. Patient states she took her lisinopril and HCTZ 1247 prior to arrival.  Patient currently denies any chest pain, shortness of breath, fever, chills, abdominal pain, vomiting or diarrhea. Patient states that her daughter tested COVID-positive this past weekend, and since then she has felt dizzy with nausea and has had a bad headache and increased urinary frequency, denies vaginal discharge or abnormal vaginal bleeding. Patient states that she has been taking OTC Bethanie [place] relief. Discussed plan with patient to check basic labs,Covid swab, and poc troponin. Patient re-evaluated, BP improved, patient has been observed up and ambulatory in room, denies currently feeling dizzy, states that it resolved.'s with patient no electrolyte abnormality, no acute kidney injury, no anemia, urine is unremarkable for acute urinary tract infection, troponins less than 0.4. Headache has improved, patient tolerating PO intake without n/v.  Slightly improved, advised patient to follow-up with PCP for further management and adjustment of already prescribed BP medications. Discussed stopping the Morin [place] at this time, trialing Zyrtec for seasonal allergies relief. IMPRESSION:  1. Dizziness    2. Nausea without vomiting    3. Nonintractable headache, unspecified chronicity pattern, unspecified headache type    4. Hypertension, unspecified type        DISPOSITION:  Discharged      Discharge Medication List as of 9/13/2022  5:38 PM        START taking these medications    Details   cetirizine (ZyrTEC) 10 mg tablet Take 1 Tablet by mouth daily for 14 doses. , Normal, Disp-14 Tablet, R-0      ibuprofen (MOTRIN) 600 mg tablet Take 1 Tablet by mouth every six (6) hours as needed for Pain., Normal, Disp-20 Tablet, R-0      ondansetron (ZOFRAN ODT) 4 mg disintegrating tablet Take 1 Tablet by mouth every eight (8) hours as needed for Nausea or Vomiting for up to 8 doses. , Normal, Disp-8 Tablet, R-0           CONTINUE these medications which have NOT CHANGED    Details   semaglutide (Ozempic) 0.25 mg or 0.5 mg/dose (2 mg/1.5 ml) subq pen 0.25 mg by SubCUTAneous route every seven (7) days. , Normal, Disp-1 Box, R-0      !! Omnipod Dash Pods, Gen 4, crtg Replaced Pod cartridge every 48 hours--dispense 3 boxes of 5 pods = 15 pods for a 30 day supply--New higher dose replaces prior script on file, Normal, Disp-3 Package, R-11, JENNIFFER      !! Omnipod Dash Pods, Gen 4, crtg REPLACED POD CARTRIDGE EVERY 72 HOURS, Normal, Disp-5 UNSPECIFIED, R-3, DAWASAP PLEASE      insulin glargine (Lantus Solostar U-100 Insulin) 100 unit/mL (3 mL) inpn INJECT 20 UNITS DAILY IN THE MORNING, Normal, Disp-15 mL, R-11      insulin aspart U-100 (NovoLOG Flexpen U-100 Insulin) 100 unit/mL (3 mL) inpn Inject 1 unit for 9 grams of carbs + 1 units for every 75 mg/dl above 150 mg/dl--max dose 30 units/day, Normal, Disp-15 mL, R-11      Comp. Stocking,Thigh,Long,Large misc Use as needed for varicose veins, compression grade : 15-20, Print, Disp-2 Each, R-0      Insulin Needles, Disposable, (BD Ultra-Fine Mini Pen Needle) 31 gauge x 3/16\" ndle Use as directed 4 times daily, Normal, Disp-100 Pen Needle, R-0      multivitamin (ONE A DAY) tablet Take 1 Tablet by mouth daily. , Historical Med      hydroCHLOROthiazide (HYDRODIURIL) 12.5 mg tablet TAKE 1 TABLET BY MOUTH EVERY DAY, Normal, Disp-90 Tablet, R-1      buPROPion SR (WELLBUTRIN SR) 150 mg SR tablet Take 1 Tablet by mouth two (2) times a day., Normal, Disp-180 Tablet, R-1      glucose blood VI test strips (blood glucose test) strip Use to check blood sugar four times a day., Normal, Disp-400 Strip, R-3      lisinopriL (PRINIVIL, ZESTRIL) 40 mg tablet TAKE 1 TAB BY MOUTH ONCE DAILY, Normal, Disp-90 Tablet, R-3      insulin aspart U-100 (NovoLOG U-100 Insulin aspart) 100 unit/mL injection USE AS DIRECTED IN INSULIN PUMP.   UNITS/DAY., Normal, Disp-30 mL, R-11      cholecalciferol (VITAMIN D3) (5000 Units/125 mcg) tab tablet Take  by mouth daily. , Historical Med      blood-glucose sensor (DEXCOM G6 SENSOR) by Does Not Apply route., Historical Med      blood-glucose transmitter (DEXCOM G6 TRANSMITTER) by Does Not Apply route., Historical Med      subcutaneous insulin pump (OMNIPOD INSULIN MANAGEMENT) by Does Not Apply route., Historical Med      Blood-Glucose Meter monitoring kit Check blood sugar four times a day, Normal, Disp-1 Kit, R-0      lancets misc Use to check blood sugar four times a day., Normal, Disp-400 Each, R-3       !! - Potential duplicate medications found. Please discuss with provider. Follow-up Information    None           The patient is asked to follow-up with their primary care provider in the next several days. They are to call tomorrow for an appointment. The patient is asked to return promptly for any increased concerns or worsening of symptoms. They can return to this emergency department or any other emergency department.     Domi Robles NP  5:44 PM         Procedures

## 2022-09-14 LAB
SARS-COV-2, XPLCVT: NOT DETECTED
SOURCE, COVRS: NORMAL

## 2022-09-16 ENCOUNTER — HOSPITAL ENCOUNTER (OUTPATIENT)
Dept: NUTRITION | Age: 37
Discharge: HOME | End: 2022-09-16
Payer: MEDICAID

## 2022-09-16 DIAGNOSIS — E66.01 MORBID OBESITY WITH BMI OF 40.0-44.9, ADULT (HCC): ICD-10-CM

## 2022-09-16 DIAGNOSIS — E10.9 TYPE 1 DIABETES MELLITUS WITHOUT COMPLICATION (HCC): Primary | ICD-10-CM

## 2022-09-16 DIAGNOSIS — I10 ESSENTIAL HYPERTENSION, BENIGN: ICD-10-CM

## 2022-09-16 PROCEDURE — 97803 MED NUTRITION INDIV SUBSEQ: CPT | Performed by: DIETITIAN, REGISTERED

## 2022-09-16 RX ORDER — HYDROCHLOROTHIAZIDE 25 MG/1
25 TABLET ORAL DAILY
Qty: 90 TABLET | Refills: 1 | Status: SHIPPED | OUTPATIENT
Start: 2022-09-16

## 2022-09-16 NOTE — PROGRESS NOTES
Jaison Ellison was informed of the inherent limitations of a virtual visit,  and has consented to a virtual therapy visit on 2022. The patient was informed that at any time during the virtual visit, they can decide to stop the virtual visit. The patient verified that they are physically located in the [facility] for this virtual visit. NUTRITION - FOLLOW-UP TREATMENT NOTE  Patient Name: Jaison Ellison         Date: 2022  : 1985    YES Patient  Verified  Diagnosis:   E10.9 (ICD-10-CM) - Type 1 diabetes mellitus without complications   N98.12 ([id number]-KO) - Morbid (severe) obesity due to excess calories      In time:  1150am             Out time:   1215pm   Total Treatment Time (min):   25     SUBJECTIVE/ASSESSMENT  Current Wt: 229 Previous Wt: 221 Wt Change: +8     Initial Wt: 229 Total Wt change: 0 Height: 63     Changes in medication or medical history? Any new allergies, surgeries or procedures? NO    If yes, update Summary List   Ozempic      Nutrition Diagnosis        Diagnosis Status: Food and nutrition related knowledge deficit R/T lack of prior education for diabetes and weight loss nutrition AEB pt questions during session. [x]  Improved []  No Change    []  Declined   []  Discontinued     Excessive energy intake R/T Food and nutrition related knowledge deficit for energy needs and healthy weight loss AEB request for session, BMI > 40, dietary recall. [x]  Improved []  No Change    []  Declined   []  Discontinued        Nutrition Monitoring and Evaluation: Pt seen virtually. Pt in [place]. Pt seen today for follow-up visit. Pt noted being busy and coming over head cold. Pt has noticed blood pressure increase with medication for cold. Pt has been packing lunches at least 4 days per week for work. Pt has also been eating breakfast daily. Pt has noticed some sweet cravings where she will have dark chocolate almonds.  Pt has been avoiding high calorie foods and is reading labels more closely. Pt has not been drinking as much water as she has been leaning more towards the hot teas. Pt has been exercising more regularly on treadmill. Pt is also walking at the [address] [place] [address] during work. Breakfast: slimfast drink, turkey sausage and 2 eggs, carbmaster yogurt. Snack: 100 bernard granola bar  Lunch: Yogurt with granola and slim fast; turkey and cheese roll up with slim fast   Snack: 100 bernard granola bar, 100 bernard PB crackers, 100 bernard popcorn   Dinner: Chicken and vegetable, potato for carb if present      Previous goals:  - Pack foods for work and plan for some extras with new schedule   - Get in Exercise on Tuesday and look for gaps in the day to walk during work   - Get in 400-500 calories per meal and 200-300 calories at snack     Nutrition Prescription and  Intervention Educated pt on the pathophysiology of Type I Diabetes, healthy weight loss, and the rationale for dietary modifications and increased activity. Educated pt on lean proteins, healthy fats, non-starchy vegetables, and complex carbohydrate food sources. Discussed limiting carbohydrates, label reading, meal timing, and appropriate serving sizes. Encouraged pt to avoid sugary beverages. Reviewed meal builder tool, calorie and macro breakdown as well as exercise parameters.       Patient Education:  [x]  Review current plan with patient   []  Other:    Handouts/  Information Provided: []  Carbohydrates  []  Protein  []  Fiber  []  Serving Sizes  []  Fluids  []  General guidelines []  Diabetes  []  Cholesterol  []  Sodium  []  SBGM  []  Food Journals  []  Others:      Patient Goals - Increase time on treadmill, 30 min daily minimum   - Start on Ozempic  - Get back to tracking foods and blood sugars      PLAN  [x]  Continue on current plan []  Follow-up PRN   []  Discharge due to :    [x]  Next appt: 2 weeks      Dietitian: Carolynn Yadav RDN     Date: 9/16/2022 Time: Yee Coronado is a 42 y.o. female being evaluated by a Virtual Visit (video visit) encounter to address concerns as mentioned above. A caregiver was present when appropriate. Due to this being a TeleHealth encounter (During [place] [address] public health emergency), evaluation of the following areas was limited: Direct tissue palpation, direct goniometric measurements, blood pressure, O2 saturation. Pursuant to the emergency declaration under the [address] [place] [address], [address] [place] [address] and the [employer] Act, this Virtual Visit was conducted with patient's (and/or legal guardian's) consent, to reduce the risk of exposure to COVID-19 and provide necessary medical care. Services were provided through a video synchronous discussion virtually to substitute for in-person encounter. --Becky Bridges RD on 9/16/2022 at 8:50 AM    An electronic signature was used to authenticate this note.

## 2022-09-17 RX ORDER — INSULIN PMP CART,AUT,G6/7,CNTR
1 EACH SUBCUTANEOUS DAILY
Qty: 1 KIT | Refills: 0 | Status: SHIPPED | OUTPATIENT
Start: 2022-09-17 | End: 2022-10-11 | Stop reason: SDUPTHER

## 2022-09-17 RX ORDER — INSULIN PMP CART,AUT,G6/7,CNTR
1 EACH SUBCUTANEOUS
Qty: 15 EACH | Refills: 11 | Status: SHIPPED | OUTPATIENT
Start: 2022-09-17

## 2022-10-10 ENCOUNTER — VIRTUAL VISIT (OUTPATIENT)
Dept: FAMILY MEDICINE CLINIC | Age: 37
End: 2022-10-10
Payer: MEDICAID

## 2022-10-10 ENCOUNTER — HOSPITAL ENCOUNTER (OUTPATIENT)
Dept: NUTRITION | Age: 37
Discharge: HOME | End: 2022-10-10
Payer: MEDICAID

## 2022-10-10 DIAGNOSIS — E66.01 MORBID OBESITY WITH BMI OF 40.0-44.9, ADULT (HCC): ICD-10-CM

## 2022-10-10 DIAGNOSIS — R11.0 NAUSEA: ICD-10-CM

## 2022-10-10 DIAGNOSIS — E10.9 TYPE 1 DIABETES MELLITUS WITHOUT COMPLICATION (HCC): Primary | ICD-10-CM

## 2022-10-10 PROCEDURE — 97803 MED NUTRITION INDIV SUBSEQ: CPT | Performed by: DIETITIAN, REGISTERED

## 2022-10-10 PROCEDURE — 99214 OFFICE O/P EST MOD 30 MIN: CPT | Performed by: FAMILY MEDICINE

## 2022-10-10 PROCEDURE — 3052F HG A1C>EQUAL 8.0%<EQUAL 9.0%: CPT | Performed by: FAMILY MEDICINE

## 2022-10-10 RX ORDER — SEMAGLUTIDE 1.34 MG/ML
0.5 INJECTION, SOLUTION SUBCUTANEOUS
Qty: 1 BOX | Refills: 0 | Status: SHIPPED | OUTPATIENT
Start: 2022-10-10

## 2022-10-10 RX ORDER — ONDANSETRON 4 MG/1
4 TABLET, ORALLY DISINTEGRATING ORAL
Qty: 8 TABLET | Refills: 0 | Status: SHIPPED | OUTPATIENT
Start: 2022-10-10

## 2022-10-10 NOTE — PROGRESS NOTES
Brandon Chung is a 42 y.o. female who was seen by synchronous (real-time) audio-video technology on 10/10/2022 for Diabetes, Follow-up, Medication Refill, and Hospital Follow Up (HTN. Seen at [facility] on 9/13/2022.)    She went to ER in [facility] because she was dizzy and nauseated  She thought her BP was very high. She was taking regular cold med at that time      She is taking both lisinopril and hctz  Her highest blood sugar recently is 289  She is also taking ozempic, had her third shot recently  She has noted a change in her appetite and she thinks she has lost a few pounds. She thinks she has lost about 8 lbs        Assessment & Plan:   Diagnoses and all orders for this visit:    1. Type 1 diabetes mellitus without complication (HCC)  -     semaglutide (Ozempic) 0.25 mg or 0.5 mg/dose (2 mg/1.5 ml) subq pen; 0.5 mg by SubCUTAneous route every seven (7) days. Going up to the 0.5 mg dose  Keep a close eye on the blood sugars and adjust the insulin down if the sugars are getting low. She will never be off of insulin but she may need less since she is changing her eatin habits as well as adding the ozempic  2. Nausea  -     ondansetron (ZOFRAN ODT) 4 mg disintegrating tablet; Take 1 Tablet by mouth every eight (8) hours as needed for Nausea or Vomiting for up to 8 doses. Use this prn for nausea side effects from ozempic    Subjective:       Prior to Admission medications    Medication Sig Start Date End Date Taking? Authorizing Provider   semaglutide (Ozempic) 0.25 mg or 0.5 mg/dose (2 mg/1.5 ml) subq pen 0.5 mg by SubCUTAneous route every seven (7) days. 10/10/22  Yes Jonah White MD   ondansetron (ZOFRAN ODT) 4 mg disintegrating tablet Take 1 Tablet by mouth every eight (8) hours as needed for Nausea or Vomiting for up to 8 doses. 10/10/22  Yes Jonah Wihte MD   Omnipod 5 G6 Pods, Gen 5, crtg 1 Each by SubCUTAneous route every fourty-eight (48) hours.  Replaced Pod cartridge every 48 hours--dispense 3 boxes of 5 pods = 15 pods for a 30 day supply 9/17/22  Yes Montana Orantes MD   hydroCHLOROthiazide (HYDRODIURIL) 25 mg tablet Take 1 Tablet by mouth daily. 9/16/22  Yes Cecilia Melgar MD   ibuprofen (MOTRIN) 600 mg tablet Take 1 Tablet by mouth every six (6) hours as needed for Pain. 9/13/22  Yes Domi Martinez, NP   Omnipod Dash Pods, Gen 4, crtg Replaced Pod cartridge every 48 hours--dispense 3 boxes of 5 pods = 15 pods for a 30 day supply--New higher dose replaces prior script on file 9/1/22  Yes Montana Orantes MD   Omnipod Dash Pods, Gen 4, crtg REPLACED POD CARTRIDGE EVERY 72 HOURS 8/20/22  Yes Montana Orantes MD   insulin glargine (Lantus Solostar U-100 Insulin) 100 unit/mL (3 mL) inpn INJECT 20 UNITS DAILY IN THE MORNING 8/6/22  Yes Montana Orantes MD   insulin aspart U-100 (NovoLOG Flexpen U-100 Insulin) 100 unit/mL (3 mL) inpn Inject 1 unit for 9 grams of carbs + 1 units for every 75 mg/dl above 150 mg/dl--max dose 30 units/day 8/6/22  Yes Montana Orantes MD   Comp. Stocking,Thigh,Long,Large misc Use as needed for varicose veins, compression grade : 15-20 8/1/22  Yes Mariajose Grover MD   Insulin Needles, Disposable, (BD Ultra-Fine Mini Pen Needle) 31 gauge x 3/16\" ndle Use as directed 4 times daily 7/21/22  Yes Montana Orantes MD   multivitamin (ONE A DAY) tablet Take 1 Tablet by mouth daily. Yes Provider, Historical   buPROPion SR (WELLBUTRIN SR) 150 mg SR tablet Take 1 Tablet by mouth two (2) times a day. 5/25/22  Yes Gwyndolyn Hammans, MD   glucose blood VI test strips (blood glucose test) strip Use to check blood sugar four times a day. 5/16/22  Yes Montana Orantes MD   lisinopriL (PRINIVIL, ZESTRIL) 40 mg tablet TAKE 1 TAB BY MOUTH ONCE DAILY 4/28/22  Yes Montana Orantes MD   insulin aspart U-100 (NovoLOG U-100 Insulin aspart) 100 unit/mL injection USE AS DIRECTED IN INSULIN PUMP.   UNITS/DAY. 2/22/22  Yes Montana Orantes MD   cholecalciferol (VITAMIN D3) (5000 Units/125 mcg) tab tablet Take  by mouth daily. Yes Provider, Historical   blood-glucose sensor (DEXCOM G6 SENSOR) by Does Not Apply route. Yes Provider, Historical   blood-glucose transmitter (DEXCOM G6 TRANSMITTER) by Does Not Apply route. Yes Provider, Historical   subcutaneous insulin pump (OMNIPOD INSULIN MANAGEMENT) by Does Not Apply route. Yes Provider, Historical   Blood-Glucose Meter monitoring kit Check blood sugar four times a day 12/20/19  Yes Judy Zaragoza MD   lancets misc Use to check blood sugar four times a day. 12/20/19  Yes Judy Zaragoza MD   Omnipod 5 G6 Intro Kit, Gen 5, crtg 1 Kit by SubCUTAneous route daily. 10/11/22   Roberto Archibald MD     Patient Active Problem List    Diagnosis Date Noted    Acute heart failure ([facility] 03/24/2022    Hidradenitis suppurativa 03/24/2022    Hypertension 03/24/2022    Pregnancy induced hypertension 02/20/2019    Pregnancy induced hypertension, third trimester 01/29/2019    History of breast cancer 08/29/2018    Morbid obesity with BMI of 40.0-44.9, adult ([facility] 09/22/2017    Wound check, abscess 09/22/2017    Type 1 diabetes mellitus without complication ([address] 89/88/7382    Ne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 ([address]     Cardiomyopathy due to chemotherapy ([address]     Vitamin D deficiency 11/07/2011    Abnormal thyroid blood test 11/07/2011    Essential hypertension, benign 06/25/2010    Encounter for long-term (current) use of other medications 06/25/2010    Encounter for long-term (current) use of insulin ([facility] 06/25/2010    Uncontrolled type 1 diabetes mellitus      Current Outpatient Medications   Medication Sig Dispense Refill    semaglutide (Ozempic) 0.25 mg or 0.5 mg/dose (2 mg/1.5 ml) subq pen 0.5 mg by SubCUTAneous route every seven (7) days. 1 Box 0    ondansetron (ZOFRAN ODT) 4 mg disintegrating tablet Take 1 Tablet by mouth every eight (8) hours as needed for Nausea or Vomiting for up to 8 doses.  8 Tablet 0    Omnipod 5 G6 Pods, Gen 5, crtg 1 Each by SubCUTAneous route every fourty-eight (48) hours. Replaced Pod cartridge every 48 hours--dispense 3 boxes of 5 pods = 15 pods for a 30 day supply 15 Each 11    hydroCHLOROthiazide (HYDRODIURIL) 25 mg tablet Take 1 Tablet by mouth daily. 90 Tablet 1    ibuprofen (MOTRIN) 600 mg tablet Take 1 Tablet by mouth every six (6) hours as needed for Pain. 20 Tablet 0    Omnipod Dash Pods, Gen 4, crtg Replaced Pod cartridge every 48 hours--dispense 3 boxes of 5 pods = 15 pods for a 30 day supply--New higher dose replaces prior script on file 3 Package 11    Omnipod Dash Pods, Gen 4, crtg REPLACED POD CARTRIDGE EVERY 72 HOURS 5 UNSPECIFIED 3    insulin glargine (Lantus Solostar U-100 Insulin) 100 unit/mL (3 mL) inpn INJECT 20 UNITS DAILY IN THE MORNING 15 mL 11    insulin aspart U-100 (NovoLOG Flexpen U-100 Insulin) 100 unit/mL (3 mL) inpn Inject 1 unit for 9 grams of carbs + 1 units for every 75 mg/dl above 150 mg/dl--max dose 30 units/day 15 mL 11    Comp. Stocking,Thigh,Long,Large misc Use as needed for varicose veins, compression grade : 15-20 2 Each 0    Insulin Needles, Disposable, (BD Ultra-Fine Mini Pen Needle) 31 gauge x 3/16\" ndle Use as directed 4 times daily 100 Pen Needle 0    multivitamin (ONE A DAY) tablet Take 1 Tablet by mouth daily. buPROPion SR (WELLBUTRIN SR) 150 mg SR tablet Take 1 Tablet by mouth two (2) times a day. 180 Tablet 1    glucose blood VI test strips (blood glucose test) strip Use to check blood sugar four times a day. 400 Strip 3    lisinopriL (PRINIVIL, ZESTRIL) 40 mg tablet TAKE 1 TAB BY MOUTH ONCE DAILY 90 Tablet 3    insulin aspart U-100 (NovoLOG U-100 Insulin aspart) 100 unit/mL injection USE AS DIRECTED IN INSULIN PUMP.  UNITS/DAY. 30 mL 11    cholecalciferol (VITAMIN D3) (5000 Units/125 mcg) tab tablet Take  by mouth daily. blood-glucose sensor (DEXCOM G6 SENSOR) by Does Not Apply route.       blood-glucose transmitter (DEXCOM G6 TRANSMITTER) by Does Not Apply route. subcutaneous insulin pump (OMNIPOD INSULIN MANAGEMENT) by Does Not Apply route. Blood-Glucose Meter monitoring kit Check blood sugar four times a day 1 Kit 0    lancets misc Use to check blood sugar four times a day. 400 Each 3    Omnipod 5 G6 Intro Kit, Gen 5, crtg 1 Kit by SubCUTAneous route daily.  1 Kit 0       ROS    Objective:     Patient-Reported Vitals 10/10/2022   Patient-Reported Weight 221lb   Patient-Reported Height -   Patient-Reported Pulse -   Patient-Reported Systolic  921   Patient-Reported Diastolic 81        [INSTRUCTIONS:  \"[x]\" Indicates a positive item  \"[]\" Indicates a negative item  -- DELETE ALL ITEMS NOT EXAMINED]    Constitutional: [x] Appears well-developed and well-nourished [x] No apparent distress      [] Abnormal -     Mental status: [x] Alert and awake  [x] Oriented to person/place/time [x] Able to follow commands    [] Abnormal -     Eyes:   EOM    [x]  Normal    [] Abnormal -   Sclera  [x]  Normal    [] Abnormal -          Discharge [x]  None visible   [] Abnormal -     HENT: [x] Normocephalic, atraumatic  [] Abnormal -   [x] Mouth/Throat: Mucous membranes are moist    External Ears [x] Normal  [] Abnormal -    Neck: [x] No visualized mass [] Abnormal -     Pulmonary/Chest: [x] Respiratory effort normal   [x] No visualized signs of difficulty breathing or respiratory distress        [] Abnormal -      Musculoskeletal:   [x] Normal gait with no signs of ataxia         [x] Normal range of motion of neck        [] Abnormal -     Neurological:        [x] No Facial Asymmetry (Cranial nerve 7 motor function) (limited exam due to video visit)          [x] No gaze palsy        [] Abnormal -          Skin:        [x] No significant exanthematous lesions or discoloration noted on facial skin         [] Abnormal -            Psychiatric:       [x] Normal Affect [] Abnormal -        [x] No Hallucinations    Other pertinent observable physical exam findings:-        We discussed the expected course, resolution and complications of the diagnosis(es) in detail. Medication risks, benefits, costs, interactions, and alternatives were discussed as indicated. I advised her to contact the office if her condition worsens, changes or fails to improve as anticipated. She expressed understanding with the diagnosis(es) and plan. [place], was evaluated through a synchronous (real-time) audio-video encounter. The patient (or guardian if applicable) is aware that this is a billable service, which includes applicable co-pays. This Virtual Visit was conducted with patient's (and/or legal guardian's) consent. The visit was conducted pursuant to the emergency declaration under the [address] [place] [address] authority and the [employer] and [employer] General Act. Patient identification was verified, and a caregiver was present when appropriate.   The patient was located at: Home: [address] [place] [id number]  The provider was located at: Home: [unfilled]        Eric Mata MD

## 2022-10-10 NOTE — PROGRESS NOTES
1. Have you been to the ER, urgent care clinic since your last visit? Hospitalized since your last visit? Yes When: 9/13/2022 Where: [facility] Reason for visit: HTN    2. Have you seen or consulted any other health care providers outside of the [address] [place] [address] since your last visit? Include any pap smears or colon screening. No    Chief Complaint   Patient presents with    Diabetes    Follow-up    Medication Refill    Hospital Follow Up     HTN. Seen at [facility] on 9/13/2022. Health Maintenance Due   Topic Date Due    Cervical cancer screen  Never done    COVID-19 Vaccine (3 - Booster) 07/05/2021    Flu Vaccine (1) 08/01/2022    MICROALBUMIN Q1  10/25/2022    Foot Exam Q1  11/01/2022     3 most recent PHQ Screens 10/10/2022   Little interest or pleasure in doing things Not at all   Feeling down, depressed, irritable, or hopeless Not at all   Total Score PHQ 2 0     Abuse Screening Questionnaire 10/10/2022   Do you ever feel afraid of your partner? N   Are you in a relationship with someone who physically or mentally threatens you? N   Is it safe for you to go home?  Y     Learning Assessment 9/22/2017   PRIMARY LEARNER Patient   HIGHEST LEVEL OF EDUCATION - PRIMARY LEARNER  4 YEARS OF COLLEGE   BARRIERS PRIMARY LEARNER NONE   CO-LEARNER CAREGIVER No   PRIMARY LANGUAGE ENGLISH   LEARNER PREFERENCE PRIMARY LISTENING   ANSWERED BY self   RELATIONSHIP SELF     Patient-Reported Vitals 10/10/2022   Patient-Reported Weight 221lb   Patient-Reported Height -   Patient-Reported Pulse -   Patient-Reported Systolic  223   Patient-Reported Diastolic 81

## 2022-10-10 NOTE — PROGRESS NOTES
Livan Snider was informed of the inherent limitations of a virtual visit,  and has consented to a virtual therapy visit on 10/10/2022. The patient was informed that at any time during the virtual visit, they can decide to stop the virtual visit. The patient verified that they are physically located in the [facility] for this virtual visit. NUTRITION - FOLLOW-UP TREATMENT NOTE  Patient Name: Livan Snider         Date: 10/10/2022  : 1985    YES Patient  Verified  Diagnosis:   E10.9 (ICD-10-CM) - Type 1 diabetes mellitus without complications   A75.04 ([facility]) - Morbid (severe) obesity due to excess calories      In time:  215pm             Out time:   300pm   Total Treatment Time (min):   45     SUBJECTIVE/ASSESSMENT  Current Wt: 221 Previous Wt: 229 Wt Change: -8     Initial Wt: 229 Total Wt change: -8 Height: 63     Changes in medication or medical history? Any new allergies, surgeries or procedures? NO    If yes, update Summary List   Ozempic      Nutrition Diagnosis        Diagnosis Status: Food and nutrition related knowledge deficit R/T lack of prior education for diabetes and weight loss nutrition AEB pt questions during session. [x]  Improved []  No Change    []  Declined   []  Discontinued     Excessive energy intake R/T Food and nutrition related knowledge deficit for energy needs and healthy weight loss AEB request for session, BMI > 40, dietary recall. [x]  Improved []  No Change    []  Declined   []  Discontinued        Nutrition Monitoring and Evaluation: Pt seen virtually. Pt in [place]. Pt seen today for follow-up visit. Pt started on Ozempic 0.25mg dose, pt noted some mild stomach issues but feeling better today. Pt has been feeling under the weather and noticed getting into habit of eating to feel better and emotional eating on high carbohydrate foods. Pt noted discussing having protein and vegetables rather than carbs.  Discussed tracking carbohydrates in order to adjust insulin with new medication. Pt has been getting back into over correcting blood sugar. Pt has not been doing any exercise in the past month     Previous goals:  - Increase time on treadmill, 30 min daily minimum   - Start on Ozempic  - Get back to tracking foods and blood sugars      Nutrition Prescription and  Intervention Educated pt on the pathophysiology of Type I Diabetes, healthy weight loss, and the rationale for dietary modifications and increased activity. Educated pt on lean proteins, healthy fats, non-starchy vegetables, and complex carbohydrate food sources. Discussed limiting carbohydrates, label reading, meal timing, and appropriate serving sizes. Encouraged pt to avoid sugary beverages. Reviewed meal builder tool, calorie and macro breakdown as well as exercise parameters. Patient Education:  [x]  Review current plan with patient   []  Other:    Handouts/  Information Provided: []  Carbohydrates  []  Protein  []  Fiber  []  Serving Sizes  []  Fluids  []  General guidelines []  Diabetes  []  Cholesterol  []  Sodium  []  SBGM  []  Food Journals  []  Others:      Patient Goals - Incorporate 1 Carb-Free deal per day, aim for 30g of ChO per other meals    - Have protein snack for long class days   - Increase water intake, have 2 cups daily   - Add in walking 2x week at home on treadmill   - Keep food log in order to adjust insulin accordingly      PLAN  [x]  Continue on current plan []  Follow-up PRN   []  Discharge due to :    [x]  Next appt: 2 weeks      Dietitian: Gifty Wood RDN     Date: 10/10/2022 Time: Mendez Acevedo is a 42 y.o. female being evaluated by a Virtual Visit (video visit) encounter to address concerns as mentioned above. A caregiver was present when appropriate.  Due to this being a TeleHealth encounter (During [facility]-47 public health emergency), evaluation of the following areas was limited: Direct tissue palpation, direct goniometric measurements, blood pressure, O2 saturation. Pursuant to the emergency declaration under the [facility], [address] [place] [address] and the [employer] and [employer] Act, this Virtual Visit was conducted with patient's (and/or legal guardian's) consent, to reduce the risk of exposure to COVID-19 and provide necessary medical care. Services were provided through a video synchronous discussion virtually to substitute for in-person encounter. --Vanessa Rosales RD on 10/10/2022 at 8:50 AM    An electronic signature was used to authenticate this note.

## 2022-10-11 ENCOUNTER — TELEPHONE (OUTPATIENT)
Dept: ENDOCRINOLOGY | Age: 37
End: 2022-10-11

## 2022-10-11 DIAGNOSIS — E10.9 TYPE 1 DIABETES MELLITUS WITHOUT COMPLICATION (HCC): Primary | ICD-10-CM

## 2022-10-11 RX ORDER — INSULIN PMP CART,AUT,G6/7,CNTR
1 EACH SUBCUTANEOUS DAILY
Qty: 1 KIT | Refills: 0 | Status: SHIPPED | OUTPATIENT
Start: 2022-10-11

## 2022-10-11 NOTE — TELEPHONE ENCOUNTER
Informed pt Dr. Della Wu has sent a [employer] message that you have not yet read. Please read this as soon as possible. Pt verbalized understanding.

## 2022-10-11 NOTE — TELEPHONE ENCOUNTER
Please call pt to let her know she has an unread message in [employer]:    From   Thomas Manzano MD To   Carla Valenzuela \"VCAYO\" Sent and Delivered   10/10/2022 10:04 AM       Have them check their system. I sent it there on 9/17/22:     Omnipod 5 G6 Intro Kit, Gen 5, crtg 1 Kit 0 9/17/2022       Sig - Route: 1 Kit by SubCUTAneous route daily. - SubCUTAneous     Sent to pharmacy as: Omnipod 5 G6 Intro Kit (Gen 5) subcutaneous cartridge with controller     E-Prescribing Status: Receipt confirmed by pharmacy (9/17/2022  3:47 PM EDT)           If they are saying they don't have it, then let me know and I can resend it. Thanks. Previous Messages  ----- Message -----        From:Dee Peterson        Sent:10/10/2022 10:01 AM EDT          To:Patient Medical Advice Request Message List     Subject:Omnipod     Thanks for your response Dr Reg Lara. I have my oncology appointment on Friday. So I will ask them then. I will use thes new doses and check back in with you. Also the pharmacy has my new scrip for the new omnipod 5 but they need a separate scrip for the starter kit you mentioned to me. So once they receive that I can switch to the new Omnipod.

## 2022-10-13 ENCOUNTER — TELEPHONE (OUTPATIENT)
Dept: ENDOCRINOLOGY | Age: 37
End: 2022-10-13

## 2022-10-13 NOTE — TELEPHONE ENCOUNTER
PA initiated through [email or web address] for  Omnipod 5 G6 Intro (Gen 5) kit. Clinical questions submitted. Omnipod 5 G6 Intro (Gen 5) kit approved 10/13/2022 - 11/12/2022 PA #: [id number]. Pharmacy notified via fax, confirmation received. Pt notified via [address]

## 2022-10-14 LAB — MAMMOGRAPHY, EXTERNAL: NORMAL

## 2022-10-17 DIAGNOSIS — E55.9 VITAMIN D DEFICIENCY: ICD-10-CM

## 2022-10-17 DIAGNOSIS — I10 ESSENTIAL HYPERTENSION, BENIGN: ICD-10-CM

## 2022-10-17 DIAGNOSIS — E10.9 TYPE 1 DIABETES MELLITUS WITHOUT COMPLICATION (HCC): ICD-10-CM

## 2022-10-17 DIAGNOSIS — E78.5 HYPERLIPIDEMIA LDL GOAL <100: ICD-10-CM

## 2022-10-31 ENCOUNTER — HOSPITAL ENCOUNTER (OUTPATIENT)
Dept: NUTRITION | Age: 37
Discharge: HOME | End: 2022-10-31
Payer: MEDICAID

## 2022-10-31 DIAGNOSIS — E10.649 UNCONTROLLED TYPE 1 DIABETES MELLITUS WITH HYPOGLYCEMIA WITHOUT COMA (HCC): Primary | ICD-10-CM

## 2022-10-31 PROCEDURE — 97803 MED NUTRITION INDIV SUBSEQ: CPT | Performed by: DIETITIAN, REGISTERED

## 2022-10-31 NOTE — PROGRESS NOTES
Daniella Powell was informed of the inherent limitations of a virtual visit,  and has consented to a virtual therapy visit on 10/31/2022. The patient was informed that at any time during the virtual visit, they can decide to stop the virtual visit. The patient verified that they are physically located in the [facility] for this virtual visit. NUTRITION - FOLLOW-UP TREATMENT NOTE  Patient Name: Daniella Powell         Date: 10/31/2022  : 1985    YES Patient  Verified  Diagnosis:   E10.9 (ICD-10-CM) - Type 1 diabetes mellitus without complications   X24.90 ([facility]-TN) - Morbid (severe) obesity due to excess calories      In time:  1200pm             Out time:   1230pm   Total Treatment Time (min):   30     SUBJECTIVE/ASSESSMENT  Current Wt: NA Previous Wt: 221 Wt Change: NA     Initial Wt: 229 Total Wt change: -8 Height: 63     Changes in medication or medical history? Any new allergies, surgeries or procedures? NO    If yes, update Summary List   Ozempic      Nutrition Diagnosis        Diagnosis Status: Food and nutrition related knowledge deficit R/T lack of prior education for diabetes and weight loss nutrition AEB pt questions during session. [x]  Improved []  No Change    []  Declined   []  Discontinued     Excessive energy intake R/T Food and nutrition related knowledge deficit for energy needs and healthy weight loss AEB request for session, BMI > 40, dietary recall. [x]  Improved []  No Change    []  Declined   []  Discontinued        Nutrition Monitoring and Evaluation: Pt seen virtually. Pt in [place]. Pt seen today for follow-up visit. Pt has had a challenges over the past few weeks with blood sugar. Pt has been seeing high blood sugar numbers and is dosign insulin the same as previous. We discussed keeping a log so we could better determine insulin dosing and foods causing higher readings. Pt has noticed hormone changes and hot flashes.   Pt has been working in some carb free meals. Pt notices that her appetite following Ozempic dose is lower and she has had some upset stomach issues. Pt is not doing as well with water intake or exercise with being busy. Pt has time off coming up next week. We discussed halloween candy as pt see this as a possible challenge. Moderation and making balanced meal choices with the candy. Meeting with Endo in the next week     Food Recall:   Meal replacement shake, [place]  [place] ([facility]) hubbard  PB crackers, strawberries   Chicken, broccoli  Candy     Previous goals:  - Incorporate 1 Carb-Free deal per day, aim for 30g of Cho per other meals    - Have protein snack for long class days   - Increase water intake, have 2 cups daily   - Add in walking 2x week at home on treadmill   - Keep food log in order to adjust insulin accordingly      Nutrition Prescription and  Intervention Educated pt on the pathophysiology of Type I Diabetes, healthy weight loss, and the rationale for dietary modifications and increased activity. Educated pt on lean proteins, healthy fats, non-starchy vegetables, and complex carbohydrate food sources. Discussed limiting carbohydrates, label reading, meal timing, and appropriate serving sizes. Encouraged pt to avoid sugary beverages. Reviewed meal builder tool, calorie and macro breakdown as well as exercise parameters.       Patient Education:  [x]  Review current plan with patient   []  Other:    Handouts/  Information Provided: []  Carbohydrates  []  Protein  []  Fiber  []  Serving Sizes  []  Fluids  []  General guidelines []  Diabetes  []  Cholesterol  []  Sodium  []  SBGM  []  Food Journals  []  Others:      Patient Goals - Incorporate 1 Carb-Free deal per day, aim for 30g of ChO per other meals    - Increase water intake, have 2 cups daily   - Add in walking 2x week at home on treadmill   - Limit candy to one piece after meal   - Keep food log in order to adjust insulin accordingly      PLAN  [x]  Continue on current plan []  Follow-up PRN   [] Discharge due to :    [x]  Next appt: 2 weeks      Dietitian: Tammie Mares RDN     Date: 10/31/2022 Time: 1200PM   Dejon Cross is a 42 y.o. female being evaluated by a Virtual Visit (video visit) encounter to address concerns as mentioned above. A caregiver was present when appropriate. Due to this being a TeleHealth encounter (During [facility]-23 public health emergency), evaluation of the following areas was limited: Direct tissue palpation, direct goniometric measurements, blood pressure, O2 saturation. Pursuant to the emergency declaration under the [address] [place] [address], [address] [place] [address] authority and the [employer] and [employer] Act, this Virtual Visit was conducted with patient's (and/or legal guardian's) consent, to reduce the risk of exposure to COVID-19 and provide necessary medical care. Services were provided through a video synchronous discussion virtually to substitute for in-person encounter. --Tammie Mares RD on 10/31/2022 at 8:50 AM    An electronic signature was used to authenticate this note.

## 2022-11-07 ENCOUNTER — OFFICE VISIT (OUTPATIENT)
Dept: ENDOCRINOLOGY | Age: 37
End: 2022-11-07
Payer: MEDICAID

## 2022-11-07 VITALS
SYSTOLIC BLOOD PRESSURE: 144 MMHG | HEIGHT: 63 IN | BODY MASS INDEX: 40.29 KG/M2 | DIASTOLIC BLOOD PRESSURE: 85 MMHG | WEIGHT: 227.4 LBS | HEART RATE: 62 BPM

## 2022-11-07 DIAGNOSIS — E78.5 HYPERLIPIDEMIA LDL GOAL <100: ICD-10-CM

## 2022-11-07 DIAGNOSIS — R79.89 ABNORMAL THYROID BLOOD TEST: ICD-10-CM

## 2022-11-07 DIAGNOSIS — E55.9 VITAMIN D DEFICIENCY: ICD-10-CM

## 2022-11-07 DIAGNOSIS — E10.9 TYPE 1 DIABETES MELLITUS WITHOUT COMPLICATION (HCC): Primary | ICD-10-CM

## 2022-11-07 DIAGNOSIS — I10 ESSENTIAL HYPERTENSION, BENIGN: ICD-10-CM

## 2022-11-07 PROCEDURE — 99215 OFFICE O/P EST HI 40 MIN: CPT | Performed by: INTERNAL MEDICINE

## 2022-11-07 PROCEDURE — 3052F HG A1C>EQUAL 8.0%<EQUAL 9.0%: CPT | Performed by: INTERNAL MEDICINE

## 2022-11-07 PROCEDURE — 3078F DIAST BP <80 MM HG: CPT | Performed by: INTERNAL MEDICINE

## 2022-11-07 PROCEDURE — 95251 CONT GLUC MNTR ANALYSIS I&R: CPT | Performed by: INTERNAL MEDICINE

## 2022-11-07 PROCEDURE — 3074F SYST BP LT 130 MM HG: CPT | Performed by: INTERNAL MEDICINE

## 2022-11-07 NOTE — PATIENT INSTRUCTIONS
1) Go to [email or web address] and put the username [email or web address] and the password is [email or web address] and the [employer] code it [email or web address]. Once you have created your account, then let me know and I can try to look at your data through this share site. 2) Current settings are as follows:  - basal: 12a: 0.85, 8a: 1, 8p: 0.95  - Carb ratio: 7  - sensitivity: 75  - target: 120 (120)  - active insulin time: 4 hours    We'll see if making the carb ratio more aggressive at 7 instead of 9 helps with spikes after eating to further bring down your A1c.    3) Your liver and kidney and cholesterol and urine and vitamin D are all normal.    4) Start monitoring blood pressure about 2-3 times per week at alternating times either in the morning or evening after resting for 5 minutes and sitting upright in a chair with your arm at heart level. Please let me know if you are having readings over 140 on the top number or 90 on the bottom number. 5) We will just draw a Hemoglobin A1c in the office next time and you won't any additional labs.

## 2022-11-07 NOTE — PROGRESS NOTES
Chief Complaint   Patient presents with    Diabetes     PCP and pharmacy confirmed      History of Present Illness: Lani Olvera is a 42 y.o. female here for follow up of diabetes. Weight down 2 lbs since last visit in 5/22. Current settings are as follows:  - basal: 12a: 0.85, 8a: 1, 8p: 0.95  - Carb ratio: 9  - sensitivity: 75  - target: 120 (120)  - active insulin time: 4 hours    she has the following indications to continue treatment with Dexcom:  1) she has type 1 diabetes (E10.9) and is on an intensive insulin regimen with an insulin pump  2) she tests her blood sugar 4 times per day and makes treatment decisions off her blood sugar readings and does the same off dexcom sensor readings  3) she requires frequent adjustments to her insulin pump settings based on her dexcom sensor readings  4) she has benefitted from therapeutic continuous glucose monitoring and I recommend that she continue this  5) she is seen in my office every 4-6 months    Just switched from [address] to [address] on Tuesday 11/1/22 so her labs reflect the old system. Review of her Dexcom data for 14 days from 9/21-10/4/22, shows multiple spikes after meals and it appears she has her carb ratio set at 9 instead of 7 like she was at during her last visit so I reset this today. It does appear that with being in automode, her blood sugars are staying in the 100-150 range overnight but do still tend to spike after meals. She was put on ozempic by Dr. Ramon Zaragoza to help with weight loss. She has had some nausea with this that is tolerable so far. She has not checked her BP recently. Current Outpatient Medications   Medication Sig    Omnipod 5 G6 Intro Kit, Gen 5, crtg 1 Kit by SubCUTAneous route daily. semaglutide (Ozempic) 0.25 mg or 0.5 mg/dose (2 mg/1.5 ml) subq pen 0.5 mg by SubCUTAneous route every seven (7) days.     ondansetron (ZOFRAN ODT) 4 mg disintegrating tablet Take 1 Tablet by mouth every eight (8) hours as needed for Nausea or Vomiting for up to 8 doses. Omnipod 5 G6 Pods, Gen 5, crtg 1 Each by SubCUTAneous route every fourty-eight (48) hours. Replaced Pod cartridge every 48 hours--dispense 3 boxes of 5 pods = 15 pods for a 30 day supply    hydroCHLOROthiazide (HYDRODIURIL) 25 mg tablet Take 1 Tablet by mouth daily. ibuprofen (MOTRIN) 600 mg tablet Take 1 Tablet by mouth every six (6) hours as needed for Pain. Comp. Stocking,Thigh,Long,Large misc Use as needed for varicose veins, compression grade : 15-20    Insulin Needles, Disposable, (BD Ultra-Fine Mini Pen Needle) 31 gauge x 3/16\" ndle Use as directed 4 times daily    multivitamin (ONE A DAY) tablet Take 1 Tablet by mouth daily. glucose blood VI test strips (blood glucose test) strip Use to check blood sugar four times a day. lisinopriL (PRINIVIL, ZESTRIL) 40 mg tablet TAKE 1 TAB BY MOUTH ONCE DAILY    insulin aspart U-100 (NovoLOG U-100 Insulin aspart) 100 unit/mL injection USE AS DIRECTED IN INSULIN PUMP.  UNITS/DAY. cholecalciferol (VITAMIN D3) (5000 Units/125 mcg) tab tablet Take  by mouth daily. blood-glucose sensor (DEXCOM G6 SENSOR) by Does Not Apply route. blood-glucose transmitter (DEXCOM G6 TRANSMITTER) by Does Not Apply route. subcutaneous insulin pump (OMNIPOD INSULIN MANAGEMENT) by Does Not Apply route. Blood-Glucose Meter monitoring kit Check blood sugar four times a day    lancets misc Use to check blood sugar four times a day. No current facility-administered medications for this visit. Allergies   Allergen Reactions    Codeine Nausea and Vomiting     Review of Systems: PER HPI    Physical Examination:  Blood pressure (!) 144/85, pulse 62, height 5' 3\" (1.6 m), weight 227 lb 6.4 oz (103.1 kg).   General: pleasant, no distress, good eye contact   Neck: no carotid bruits  Cardiovascular: regular, normal rate, nl s1 and s2, no m/r/g,   Respiratory: clear bilaterally  Integumentary: no edema,   Psychiatric: normal mood and affect    Diabetic foot exam:     Left Foot:   Visual Exam: callous - mild   Pulse DP: 2+ (normal)   Filament test: normal sensation    Vibratory sensation: normal      Right Foot:   Visual Exam: callous - mild   Pulse DP: 2+ (normal)   Filament test: normal sensation    Vibratory sensation: normal      Data Reviewed:   Component      Latest Ref Rng & Units 10/28/2022   Glucose      70 - 99 mg/dL 76   BUN      6 - 20 mg/dL 15   Creatinine      0.57 - 1.00 mg/dL 0.84   eGFR      >59 mL/min/1.73 91   BUN/Creatinine ratio      9 - 23 18   Sodium      134 - 144 mmol/L 144   Potassium      3.5 - 5.2 mmol/L 3.7   Chloride      96 - 106 mmol/L 105   CO2      20 - 29 mmol/L 27   Calcium      8.7 - 10.2 mg/dL 9.2   Protein, total      6.0 - 8.5 g/dL 7.2   Albumin      3.8 - 4.8 g/dL 4.1   GLOBULIN, TOTAL      1.5 - 4.5 g/dL 3.1   A-G Ratio      1.2 - 2.2 1.3   Bilirubin, total      0.0 - 1.2 mg/dL 0.4   Alk. phosphatase      44 - 121 IU/L 99   AST      0 - 40 IU/L 17   ALT      0 - 32 IU/L 16   Cholesterol, total      100 - 199 mg/dL 190   Triglyceride      0 - 149 mg/dL 38   HDL Cholesterol      >39 mg/dL 83   VLDL, calculated      5 - 40 mg/dL 8   LDL, calculated      0 - 99 mg/dL 99   Creatinine, urine random      Not Estab. mg/dL 318.0   Microalbumin, urine      Not Estab. ug/mL 16.1   Microalbumin/Creat.  Ratio      0 - 29 mg/g creat 5   Hemoglobin A1c, (calculated)      4.8 - 5.6 % 8.0 (H)   Estimated average glucose      mg/dL 183   VITAMIN D, 25-HYDROXY      30.0 - 100.0 ng/mL 70.7       Assessment/Plan:     1) Type 1 DM uncontrolled: Her most recent Hgb A1c was 8% in 10/22 down from 8.6% in 5/22 up from 7.9% in 10/21 up from 7.3% in 4/21 down from 8.4% in 2/21 up from 8.1% in 10/20 down from 8.8% in 9/20 down from 9% in 12/19 up from 8.6% in 10/19 up from 7.8% in 6/19 up from 7.1% in 2/19 stable from 1/19 stable from 12/18 down from 7.2% in 11/18 up from 6.8% in 10/18 down from 7.5% in 9/18 down from 8.2% in 6/18 up from 8% in 1/18 (she was at [facility] from 11/12 to 1/18 and states A1c values were in the 7-8% range) down from 8.8% in January 2012 stable from 8.9% in September 2011 up from 7.5% in May 2010. I made her carb ratio more aggressive to help with spikes after meals. - cont current pump settings aside from change below  - Check bs 4x/day due to fluctuating sugars  - cont dexcom  - optho UTD 7/17  - foot exam done 11/22  - microalbumin nl 10/22  - check POC A1c at next visit  - check Hgb A1c and cmp and microalbumin in 11/23      2) HTN NOS (401.9): her BP was above goal < 140/90 so will monitor some home readings. May need her hctz increased if her BP remains high. - cont lisinopril 40 mg daily  - cont hctz 12.5 mg daily  - monitor home blood pressure readings      3) Vitamin D deficiency: Level was 11.1 in 9/11. Started on ergo at that time and level up to 24 in January 2012. Still 25.9 in 2/18 so advised to take 4000 units daily but has not been doing this and level was 27.4 in 10/18 and 47 in 6/19 and 66 in 10/20 and 55 in 10/21 and 70 in 10/22 on 5000 units daily  - check Vitamin D 25-OH level in 11/23  - cont vitamin D 5000 units daily    4) Hyperlipidemia with goal LDL < 100: LDL 65 6/19 and 106 in 10/19 and in 9/20 and PCP started atorvastatin 10 mg daily and down to 71 in 10/20 but stopped this for now given age < 40 and up to 93 in 2/21 and down to 78 in 4/21 and 67 in 10/21 and 99 in 10/22.  - diet control  - check lipids in 11/23    Patient Instructions   1) Go to [email or web address] and put the username [email or web address] and the password is [email or web address] and the procInteligisticsect code it [email or web address]. Once you have created your account, then let me know and I can try to look at your data through this share site.     2) Current settings are as follows:  - basal: 12a: 0.85, 8a: 1, 8p: 0.95  - Carb ratio: 7  - sensitivity: 75  - target: 120 (120)  - active insulin time: 4 hours    We'll see if making the carb ratio more aggressive at 7 instead of 9 helps with spikes after eating to further bring down your A1c.    3) Your liver and kidney and cholesterol and urine and vitamin D are all normal.    4) Start monitoring blood pressure about 2-3 times per week at alternating times either in the morning or evening after resting for 5 minutes and sitting upright in a chair with your arm at heart level. Please let me know if you are having readings over 140 on the top number or 90 on the bottom number. 5) We will just draw a Hemoglobin A1c in the office next time and you won't any additional labs. Follow-up and Dispositions    Return in about 6 months (around 5/7/2023). I spent a total of 42 minutes in both face-to-face and in non-face-to-face activities for the visit on the date of this encounter. Copy sent to:   Marleny Nazario MD (Obstetrics & Gynecology)  Nahomi Campoverde MD (Surgical Oncology)  Joe Villafana MD (Hematology and Oncology)  Dr. Deanna Nolen via [facility]

## 2022-11-11 ENCOUNTER — VIRTUAL VISIT (OUTPATIENT)
Dept: FAMILY MEDICINE CLINIC | Age: 37
End: 2022-11-11
Payer: MEDICAID

## 2022-11-11 DIAGNOSIS — E10.9 TYPE 1 DIABETES MELLITUS WITHOUT COMPLICATION (HCC): ICD-10-CM

## 2022-11-11 PROCEDURE — 3052F HG A1C>EQUAL 8.0%<EQUAL 9.0%: CPT | Performed by: FAMILY MEDICINE

## 2022-11-11 PROCEDURE — 99213 OFFICE O/P EST LOW 20 MIN: CPT | Performed by: FAMILY MEDICINE

## 2022-11-11 RX ORDER — SEMAGLUTIDE 1.34 MG/ML
0.5 INJECTION, SOLUTION SUBCUTANEOUS
Qty: 1 BOX | Refills: 1 | Status: SHIPPED
Start: 2022-11-11 | End: 2022-11-19 | Stop reason: SINTOL

## 2022-11-11 NOTE — PROGRESS NOTES
Julio Torres is a 42 y.o. female who was seen by synchronous (real-time) audio-video technology on 11/11/2022 for Medication Evaluation (ozempic)  She has the omnipod 5   They range from 110-180  She walks a lot at work but not doing other exercise  She has noted an improvement in her blood sugar  She noted that the few days after the ozempic dose if she eats a full meal she feels sick after  On her scale she is 220         Assessment & Plan:   Diagnoses and all orders for this visit:    1. Type 1 diabetes mellitus without complication (HCC)  -     semaglutide (Ozempic) 0.25 mg or 0.5 mg/dose (2 mg/1.5 ml) subq pen; 0.5 mg by SubCUTAneous route every seven (7) days. Continue the ozempic at 0.5 mg. She is having significant feeling of fullness a few days after the injection. I do notthink she will tolerate the 1 mg dse at this time  Recheck in january      Subjective:       Prior to Admission medications    Medication Sig Start Date End Date Taking? Authorizing Provider   semaglutide (Ozempic) 0.25 mg or 0.5 mg/dose (2 mg/1.5 ml) subq pen 0.5 mg by SubCUTAneous route every seven (7) days. 11/11/22  Yes Ceasar Pinon MD   Omnipod 5 G6 Intro Kit, Gen 5, crtg 1 Kit by SubCUTAneous route daily. 10/11/22  Yes Wilma Card MD   ondansetron (ZOFRAN ODT) 4 mg disintegrating tablet Take 1 Tablet by mouth every eight (8) hours as needed for Nausea or Vomiting for up to 8 doses. 10/10/22  Yes Ceasar Pinon MD   Omnipod 5 G6 Pods, Gen 5, crtg 1 Each by SubCUTAneous route every fourty-eight (48) hours. Replaced Pod cartridge every 48 hours--dispense 3 boxes of 5 pods = 15 pods for a 30 day supply 9/17/22  Yes Wilma Card MD   hydroCHLOROthiazide (HYDRODIURIL) 25 mg tablet Take 1 Tablet by mouth daily. 9/16/22  Yes Ceasar Pinon MD   ibuprofen (MOTRIN) 600 mg tablet Take 1 Tablet by mouth every six (6) hours as needed for Pain. 9/13/22  Yes Елена Adkins, NP   Comp. Stocking,Thigh,Long,Large misc Use as needed for varicose veins, compression grade : 15-20 8/1/22  Yes Alina Rome MD   Insulin Needles, Disposable, (BD Ultra-Fine Mini Pen Needle) 31 gauge x 3/16\" ndle Use as directed 4 times daily 7/21/22  Yes María Hull MD   multivitamin (ONE A DAY) tablet Take 1 Tablet by mouth daily. Yes Provider, Historical   glucose blood VI test strips (blood glucose test) strip Use to check blood sugar four times a day. 5/16/22  Yes María Hull MD   lisinopriL (PRINIVIL, ZESTRIL) 40 mg tablet TAKE 1 TAB BY MOUTH ONCE DAILY 4/28/22  Yes María Hull MD   insulin aspart U-100 (NovoLOG U-100 Insulin aspart) 100 unit/mL injection USE AS DIRECTED IN INSULIN PUMP.  UNITS/DAY. 2/22/22  Yes María Hull MD   cholecalciferol (VITAMIN D3) (5000 Units/125 mcg) tab tablet Take  by mouth daily. Yes Provider, Historical   blood-glucose sensor (DEXCOM G6 SENSOR) by Does Not Apply route. Yes Provider, Historical   blood-glucose transmitter (DEXCOM G6 TRANSMITTER) by Does Not Apply route. Yes Provider, Historical   subcutaneous insulin pump (OMNIPOD INSULIN MANAGEMENT) by Does Not Apply route. Yes Provider, Historical   Blood-Glucose Meter monitoring kit Check blood sugar four times a day 12/20/19  Yes Cee Pa MD   lancets misc Use to check blood sugar four times a day. 12/20/19  Yes Cee Pa MD   semaglutide (Ozempic) 0.25 mg or 0.5 mg/dose (2 mg/1.5 ml) subq pen 0.5 mg by SubCUTAneous route every seven (7) days.  10/10/22 11/11/22  Cee Pa MD     Patient Active Problem List    Diagnosis Date Noted    Acute heart failure ([facility] 03/24/2022    Hidradenitis suppurativa 03/24/2022    Hypertension 03/24/2022    Pregnancy induced hypertension 02/20/2019    Pregnancy induced hypertension, third trimester 01/29/2019    History of breast cancer 08/29/2018    Morbid obesity with BMI of 40.0-44.9, adult ([facility] 09/22/2017    Wound check, abscess 09/22/2017    Type 1 diabetes mellitus without complication ([facility] 83/89/0729    Ne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 ([facility]     Cardiomyopathy due to chemotherapy ([facility]     Vitamin D deficiency 11/07/2011    Abnormal thyroid blood test 11/07/2011    Essential hypertension, benign 06/25/2010    Encounter for long-term (current) use of other medications 06/25/2010    Encounter for long-term (current) use of insulin ([facility] 06/25/2010    Uncontrolled type 1 diabetes mellitus      Current Outpatient Medications   Medication Sig Dispense Refill    semaglutide (Ozempic) 0.25 mg or 0.5 mg/dose (2 mg/1.5 ml) subq pen 0.5 mg by SubCUTAneous route every seven (7) days. 1 Box 1    Omnipod 5 G6 Intro Kit, Gen 5, crtg 1 Kit by SubCUTAneous route daily. 1 Kit 0    ondansetron (ZOFRAN ODT) 4 mg disintegrating tablet Take 1 Tablet by mouth every eight (8) hours as needed for Nausea or Vomiting for up to 8 doses. 8 Tablet 0    Omnipod 5 G6 Pods, Gen 5, crtg 1 Each by SubCUTAneous route every fourty-eight (48) hours. Replaced Pod cartridge every 48 hours--dispense 3 boxes of 5 pods = 15 pods for a 30 day supply 15 Each 11    hydroCHLOROthiazide (HYDRODIURIL) 25 mg tablet Take 1 Tablet by mouth daily. 90 Tablet 1    ibuprofen (MOTRIN) 600 mg tablet Take 1 Tablet by mouth every six (6) hours as needed for Pain. 20 Tablet 0    Comp. Stocking,Thigh,Long,Large misc Use as needed for varicose veins, compression grade : 15-20 2 Each 0    Insulin Needles, Disposable, (BD Ultra-Fine Mini Pen Needle) 31 gauge x 3/16\" ndle Use as directed 4 times daily 100 Pen Needle 0    multivitamin (ONE A DAY) tablet Take 1 Tablet by mouth daily.  glucose blood VI test strips (blood glucose test) strip Use to check blood sugar four times a day. 400 Strip 3    lisinopriL (PRINIVIL, ZESTRIL) 40 mg tablet TAKE 1 TAB BY MOUTH ONCE DAILY 90 Tablet 3    insulin aspart U-100 (NovoLOG U-100 Insulin aspart) 100 unit/mL injection USE AS DIRECTED IN INSULIN PUMP.  MAX 100 UNITS/DAY. 30 mL 11    cholecalciferol (VITAMIN D3) (5000 Units/125 mcg) tab tablet Take  by mouth daily.  blood-glucose sensor (DEXCOM G6 SENSOR) by Does Not Apply route.  blood-glucose transmitter (DEXCOM G6 TRANSMITTER) by Does Not Apply route.  subcutaneous insulin pump (OMNIPOD INSULIN MANAGEMENT) by Does Not Apply route.  Blood-Glucose Meter monitoring kit Check blood sugar four times a day 1 Kit 0    lancets misc Use to check blood sugar four times a day.  400 Each 3       ROS    Objective:     Patient-Reported Vitals 11/11/2022   Patient-Reported Weight 227lb   Patient-Reported Height -   Patient-Reported Pulse -   Patient-Reported Systolic  -   Patient-Reported Diastolic -        [INSTRUCTIONS:  \"[x]\" Indicates a positive item  \"[]\" Indicates a negative item  -- DELETE ALL ITEMS NOT EXAMINED]    Constitutional: [x] Appears well-developed and well-nourished [x] No apparent distress      [] Abnormal -     Mental status: [x] Alert and awake  [x] Oriented to person/place/time [x] Able to follow commands    [] Abnormal -     Eyes:   EOM    [x]  Normal    [] Abnormal -   Sclera  [x]  Normal    [] Abnormal -          Discharge [x]  None visible   [] Abnormal -     HENT: [x] Normocephalic, atraumatic  [] Abnormal -   [x] Mouth/Throat: Mucous membranes are moist    External Ears [x] Normal  [] Abnormal -    Neck: [x] No visualized mass [] Abnormal -     Pulmonary/Chest: [x] Respiratory effort normal   [x] No visualized signs of difficulty breathing or respiratory distress        [] Abnormal -      Musculoskeletal:   [x] Normal gait with no signs of ataxia         [x] Normal range of motion of neck        [] Abnormal -     Neurological:        [x] No Facial Asymmetry (Cranial nerve 7 motor function) (limited exam due to video visit)          [x] No gaze palsy        [] Abnormal -          Skin:        [x] No significant exanthematous lesions or discoloration noted on facial skin         [] Abnormal -            Psychiatric:       [x] Normal Affect [] Abnormal -        [x] No Hallucinations    Other pertinent observable physical exam findings:-        We discussed the expected course, resolution and complications of the diagnosis(es) in detail. Medication risks, benefits, costs, interactions, and alternatives were discussed as indicated. I advised her to contact the office if her condition worsens, changes or fails to improve as anticipated. She expressed understanding with the diagnosis(es) and plan. Sandra Lloyd, was evaluated through a synchronous (real-time) audio-video encounter. The patient (or guardian if applicable) is aware that this is a billable service, which includes applicable co-pays. This Virtual Visit was conducted with patient's (and/or legal guardian's) consent. The visit was conducted pursuant to the emergency declaration under the [address] [place] [address], [address] [place] [address] authority and the [employer] and [employer] General Act. Patient identification was verified, and a caregiver was present when appropriate.   The patient was located at: Home: [address] [place] [id number]  The provider was located at: Home: [unfilled]        Fabien Holguin MD

## 2022-11-11 NOTE — PROGRESS NOTES
1. Have you been to the ER, urgent care clinic since your last visit? Hospitalized since your last visit? No    2. Have you seen or consulted any other health care providers outside of the [address] [place] [address] since your last visit? Include any pap smears or colon screening. No    Chief Complaint   Patient presents with    Medication Evaluation     ozempic     Health Maintenance Due   Topic Date Due    Hepatitis B Vaccine (1 of 3 - Risk 3-dose series) Never done    Cervical cancer screen  Never done    COVID-19 Vaccine (3 - Booster) 04/02/2021     3 most recent PHQ Screens 11/11/2022   Little interest or pleasure in doing things Not at all   Feeling down, depressed, irritable, or hopeless Not at all   Total Score PHQ 2 0     Abuse Screening Questionnaire 11/11/2022   Do you ever feel afraid of your partner? N   Are you in a relationship with someone who physically or mentally threatens you? N   Is it safe for you to go home?  Y     Learning Assessment 9/22/2017   PRIMARY LEARNER Patient   HIGHEST LEVEL OF EDUCATION - PRIMARY LEARNER  4 YEARS OF COLLEGE   BARRIERS PRIMARY LEARNER NONE   CO-LEARNER CAREGIVER No   PRIMARY LANGUAGE ENGLISH   LEARNER PREFERENCE PRIMARY LISTENING   ANSWERED BY self   RELATIONSHIP SELF     Patient-Reported Vitals 11/11/2022   Patient-Reported Weight 227lb   Patient-Reported Height -   Patient-Reported Pulse -   Patient-Reported Systolic  -   Patient-Reported Diastolic -

## 2022-11-21 ENCOUNTER — TELEPHONE (OUTPATIENT)
Dept: ENDOCRINOLOGY | Age: 37
End: 2022-11-21

## 2022-11-21 ENCOUNTER — HOSPITAL ENCOUNTER (OUTPATIENT)
Dept: NUTRITION | Age: 37
End: 2022-11-21
Payer: MEDICAID

## 2022-11-21 NOTE — TELEPHONE ENCOUNTER
PA initiated through [email or web address] for  Omnipod 5 G6 Pods. Clinical questions submitted. Omnipod 5 G6 pods approved 11/21/2022 - 11/21/2023 PA Case: [id number]. Pt notified via [address]

## 2022-11-23 ENCOUNTER — HOSPITAL ENCOUNTER (OUTPATIENT)
Dept: NUTRITION | Age: 37
Discharge: HOME | End: 2022-11-23
Payer: MEDICAID

## 2022-11-23 DIAGNOSIS — E66.01 MORBID OBESITY WITH BMI OF 40.0-44.9, ADULT (HCC): ICD-10-CM

## 2022-11-23 DIAGNOSIS — E10.9 TYPE 1 DIABETES MELLITUS WITHOUT COMPLICATION (HCC): Primary | ICD-10-CM

## 2022-11-23 PROCEDURE — 97803 MED NUTRITION INDIV SUBSEQ: CPT | Performed by: DIETITIAN, REGISTERED

## 2022-11-23 NOTE — PROGRESS NOTES
Dedrick Henderson was informed of the inherent limitations of a virtual visit,  and has consented to a virtual therapy visit on 2022. The patient was informed that at any time during the virtual visit, they can decide to stop the virtual visit. The patient verified that they are physically located in the [facility] for this virtual visit. NUTRITION - FOLLOW-UP TREATMENT NOTE  Patient Name: Dedrick Henderson         Date: 2022  : 1985    YES Patient  Verified  Diagnosis:   E10.9 (ICD-10-CM) - Type 1 diabetes mellitus without complications   T61.37 (XHF-74-VD) - Morbid (severe) obesity due to excess calories      In time:  1200pm             Out time:   1225pm   Total Treatment Time (min):   25     SUBJECTIVE/ASSESSMENT  Current Wt: 220 (pt reported) Previous Wt: 227  (22) Wt Change: -7     Initial Wt: 229 Total Wt change: -9 Height: 63     Changes in medication or medical history? Any new allergies, surgeries or procedures? NO    If yes, update Summary List   Ozempic d/c      Nutrition Diagnosis        Diagnosis Status: Food and nutrition related knowledge deficit R/T lack of prior education for diabetes and weight loss nutrition AEB pt questions during session. [x]  Improved []  No Change    []  Declined   []  Discontinued     Excessive energy intake R/T Food and nutrition related knowledge deficit for energy needs and healthy weight loss AEB request for session, BMI > 40, dietary recall. [x]  Improved []  No Change    []  Declined   []  Discontinued        Nutrition Monitoring and Evaluation: Pt seen virtually. Pt in [place]. Pt seen today for follow-up visit. Pt noted that there have been some medication changes. Pt stopped taking Ozempic due to adverse reaction, including nausea and vomiting. Pt has ordered keto gummies and is hoping that this will help suppress appetite Ingredients include ACV, Folate, B6 and B12.  Pt has gotten the omni pack that talks with CGM and BS has been consistently under 200 mg/dL. With foods pt has been following regimen more closely. Pt is regularly trying to eat fewer carbs with meals. Pt has not been consistent with water drinking. Pt as been busy and unable to walk on treadmill at home. Pt noted that she feels it is more realistic to walk the stairs at work going between floors. Pt is planning for Thanksgiving. Discussed having only 1/4 cup serving of the Mac and 1/4 cup of the yams. For dessert pt is planning to have pie, we discussed waiting 2-4 hours after the main meals before having dessert. Use measuring cups for the meal.      Food Recall:   B: [place] ([facility]) sausage, 2 boiled eggs, 2 slice hubbard  S: Protein shake   L: Ribs and string beans, small amt bbq; Salad   S: Protein One bar  D: Fried chicken and denys greens  S: Dark chcocolate almonds     Previous goals:  - Incorporate 1 Carb-Free deal per day, aim for 30g of ChO per other meals    - Increase water intake, have 2 cups daily   - Add in walking 2x week at home on treadmill   - Limit candy to one piece after meal   - Keep food log in order to adjust insulin accordingly      Nutrition Prescription and  Intervention Educated pt on the pathophysiology of Type I Diabetes, healthy weight loss, and the rationale for dietary modifications and increased activity. Educated pt on lean proteins, healthy fats, non-starchy vegetables, and complex carbohydrate food sources. Discussed limiting carbohydrates, label reading, meal timing, and appropriate serving sizes. Encouraged pt to avoid sugary beverages. Reviewed meal builder tool, calorie and macro breakdown as well as exercise parameters.       Patient Education:  [x]  Review current plan with patient   []  Other:    Handouts/  Information Provided: []  Carbohydrates  []  Protein  []  Fiber  []  Serving Sizes  []  Fluids  []  General guidelines []  Diabetes  []  Cholesterol  []  Sodium  []  SBGM  []  Food Journals  [x]  Others: Holiday tips Patient Goals - Incorporate 1 Carb-Free deal per day, aim for 30g of ChO per other meals    - Increase water intake, have 24 oz daily   - At work just take stairs and avoid the elevator   - Food log for accountability      PLAN  [x]  Continue on current plan []  Follow-up PRN   []  Discharge due to :    [x]  Next appt: 2 weeks      Dietitian: Nick Bullock RDN     Date: 11/23/2022 Time: 1200PM   Melinda Bradshaw is a 42 y.o. female being evaluated by a Virtual Visit (video visit) encounter to address concerns as mentioned above. A caregiver was present when appropriate. Due to this being a TeleHealth encounter ([facility]), evaluation of the following areas was limited: Direct tissue palpation, direct goniometric measurements, blood pressure, O2 saturation. Pursuant to the emergency declaration under the [address] [place] [address] and the [employer] Act, this Virtual Visit was conducted with patient's (and/or legal guardian's) consent, to reduce the risk of exposure to COVID-19 and provide necessary medical care. Services were provided through a video synchronous discussion virtually to substitute for in-person encounter. --Nick Bullock RD on 11/23/2022 at 8:50 AM    An electronic signature was used to authenticate this note.

## 2022-12-20 RX ORDER — PEN NEEDLE, DIABETIC 31 GX3/16"
NEEDLE, DISPOSABLE MISCELLANEOUS
Qty: 400 PEN NEEDLE | Refills: 3 | Status: SHIPPED | OUTPATIENT
Start: 2022-12-20

## 2022-12-20 RX ORDER — LISINOPRIL 40 MG/1
TABLET ORAL
Qty: 90 TABLET | Refills: 3 | Status: SHIPPED | OUTPATIENT
Start: 2022-12-20

## 2023-02-04 ENCOUNTER — TELEPHONE (OUTPATIENT)
Dept: ENDOCRINOLOGY | Age: 38
End: 2023-02-04

## 2023-02-04 RX ORDER — INSULIN GLARGINE 100 [IU]/ML
INJECTION, SOLUTION SUBCUTANEOUS
Qty: 15 ML | Refills: 11 | Status: SHIPPED | OUTPATIENT
Start: 2023-02-04

## 2023-02-04 RX ORDER — INSULIN ASPART 100 [IU]/ML
INJECTION, SOLUTION INTRAVENOUS; SUBCUTANEOUS
Qty: 15 ML | Refills: 11 | Status: SHIPPED | OUTPATIENT
Start: 2023-02-04

## 2023-02-04 NOTE — TELEPHONE ENCOUNTER
**LATE ENTRY--PHONE CALL TOOK PLACE ON 2/3/23 AT 6:35PM**    Called and spoke with patient about her [employer] message. She states she has been dealing with rash due to the adhesive on the omnipod for months and just never mentioned this to me as she has been trying to just put up with this so she could stay on the pump but it has come to the point where it's been too frustrating and she wants to stop using the omnipod. She took a dose of her son's lantus last night at 36 units and this has kept her sugar in the  range all day today so she is thinking that this insulin will be fine for now and we won't need to try and get approval for the tresiba. She would like to take a break from the pump and go back to injections. I told her to call [facility] and find out if she had refills on her lantus and novolog pens and pen needles and if so, she can try to fill them tonight but if not to let me know in the morning and I would send in a new prescription. She should continue using lantus at 36 units at night. I told her if she would like to try a pump with tubing that I would recommend the Tandem pump as this integrates with Dexcom and I would send her the info for the rep for this pump in the morning. she voiced understanding of this plan.  -------------------------------------------------------------------------------------------------------------------    Sent her the following message through [employer]:  Since I haven't heard from you this morning, I went ahead and sent a prescription for the novolog and lantus pens to [facility].  It does appear I just sent a prescription for pen needles to [facility] on 12/20/22 so you should be able to get more when you need them. Stay on lantus 36 units at bedtime and novolog 1 unit for 9 grams of carbs and 1 unit for 75 points over 150 for correction.   If you would like to consider using the Tandem (t-slim) pump that has a tube in the future, you can contact Romeo Champion who is the T-slim rep at [phone] with any questions or concerns regarding the Tandem pump and I'm happy to support you with this in the future.     Feel free to send me any pictures of your rash to [email or web address].

## 2023-02-06 ENCOUNTER — HOSPITAL ENCOUNTER (OUTPATIENT)
Dept: NUTRITION | Age: 38
Discharge: HOME | End: 2023-02-06
Payer: MEDICAID

## 2023-02-06 DIAGNOSIS — E10.649 UNCONTROLLED TYPE 1 DIABETES MELLITUS WITH HYPOGLYCEMIA WITHOUT COMA (HCC): Primary | ICD-10-CM

## 2023-02-06 DIAGNOSIS — E66.01 MORBID OBESITY WITH BMI OF 40.0-44.9, ADULT (HCC): ICD-10-CM

## 2023-02-06 PROCEDURE — 97803 MED NUTRITION INDIV SUBSEQ: CPT | Performed by: DIETITIAN, REGISTERED

## 2023-02-06 NOTE — PROGRESS NOTES
Hollie Billy was informed of the inherent limitations of a virtual visit,  and has consented to a virtual therapy visit on 2023. The patient was informed that at any time during the virtual visit, they can decide to stop the virtual visit. The patient verified that they are physically located in the [facility] for this virtual visit. NUTRITION - FOLLOW-UP TREATMENT NOTE  Patient Name: Hollie Billy         Date: 2023  : 1985    YES Patient  Verified  Diagnosis:   E10.9 (ICD-10-CM) - Type 1 diabetes mellitus without complications   S88.79 (REF-95-MO) - Morbid (severe) obesity due to excess calories      In time:  1100am             Out time:   1130am   Total Treatment Time (min):   30     SUBJECTIVE/ASSESSMENT  Current Wt: 230 Previous Wt: 220 (pt reported) Wt Change: +10     Initial Wt: 229 Total Wt change: +1 Height: 63     Changes in medication or medical history? Any new allergies, surgeries or procedures? NO    If yes, update Summary List   Ozempic d/c      Nutrition Diagnosis        Diagnosis Status: Food and nutrition related knowledge deficit R/T lack of prior education for diabetes and weight loss nutrition AEB pt questions during session. [x]  Improved []  No Change    []  Declined   []  Discontinued     Excessive energy intake R/T Food and nutrition related knowledge deficit for energy needs and healthy weight loss AEB request for session, BMI > 40, dietary recall. []  Improved []  No Change    [x]  Declined   []  Discontinued        Nutrition Monitoring and Evaluation: Pt seen virtually. Pt in [place]. Pt seen today for follow-up visit.       Skin reaction to omni pod   Back to regular insulin   Saw low 32 mg/dL at 1-2am yesterday, working on dosing of Lantus    Pt noted needing to feel like she is back at square one   Routine of meals are the same   Was getting more carbs at meals   [employer] where cut out meat led to more carb intake     Realistic goal weight 220 in next 2 months   Working on increasing water intake     Exercise: Take stairs at work instead of elevator. Food Recall:   B: [place] [facility] ([facility]) sausage, 2 boiled eggs, 2 slice hubbard, slim fast drink   S: Protein One bar   L: Slim fast shake, cheese and pepperoni and crackers   S: Protein One bar  D: Fish and vegetables with rice   S: Dark chcocolate almonds     Previous goals:  - Incorporate 1 Carb-Free deal per day, aim for 30g of ChO per other meals    - Increase water intake, have 24 oz daily   - At work just take stairs and avoid the elevator   - Food log for accountability      Nutrition Prescription and  Intervention Educated pt on the pathophysiology of Type I Diabetes, healthy weight loss, and the rationale for dietary modifications and increased activity. Educated pt on lean proteins, healthy fats, non-starchy vegetables, and complex carbohydrate food sources. Discussed limiting carbohydrates, label reading, meal timing, and appropriate serving sizes. Encouraged pt to avoid sugary beverages. Reviewed meal builder tool, calorie and macro breakdown as well as exercise parameters. Patient Education:  [x]  Review current plan with patient   []  Other:    Handouts/  Information Provided: []  Carbohydrates  []  Protein  []  Fiber  []  Serving Sizes  []  Fluids  []  General guidelines []  Diabetes  []  Cholesterol  []  Sodium  []  SBGM  []  Food Journals  []  Others:      Patient Goals - Write down all foods for the day   - Add brown rice with dinner rotation (1/2 to 1 cup)   - Get in at least 24-30oz water daily   - Take stairs at work each day, add in one rowing class      PLAN  [x]  Continue on current plan []  Follow-up PRN   []  Discharge due to :    [x]  Next appt: 2 weeks      Dietitian: Sachi Epps RDN     Date: 2/6/2023 Time: 1100AM   Gordy Phillips is a 34 y.o. female being evaluated by a Virtual Visit (video visit) encounter to address concerns as mentioned above.   A caregiver was present when appropriate. Due to this being a TeleHealth encounter (During [facility]-30 public health emergency), evaluation of the following areas was limited: Direct tissue palpation, direct goniometric measurements, blood pressure, O2 saturation. Pursuant to the emergency declaration under the [address] [place] [address], [address] [place] [address] and the [employer] and [employer] Act, this Virtual Visit was conducted with patient's (and/or legal guardian's) consent, to reduce the risk of exposure to COVID-19 and provide necessary medical care. Services were provided through a video synchronous discussion virtually to substitute for in-person encounter. --Aarti Ferguson RD on 2/6/2023 at 8:50 AM    An electronic signature was used to authenticate this note.

## 2023-02-15 RX ORDER — INSULIN GLARGINE 100 [IU]/ML
INJECTION, SOLUTION SUBCUTANEOUS
Qty: 15 ML | Refills: 11
Start: 2023-02-15

## 2023-02-20 ENCOUNTER — HOSPITAL ENCOUNTER (OUTPATIENT)
Dept: NUTRITION | Age: 38
Discharge: HOME | End: 2023-02-20
Payer: MEDICAID

## 2023-02-20 DIAGNOSIS — I10 HYPERTENSION, UNSPECIFIED TYPE: Primary | ICD-10-CM

## 2023-02-20 DIAGNOSIS — E10.9 TYPE 1 DIABETES MELLITUS WITHOUT COMPLICATION (HCC): ICD-10-CM

## 2023-02-20 DIAGNOSIS — E66.01 MORBID OBESITY WITH BMI OF 40.0-44.9, ADULT (HCC): ICD-10-CM

## 2023-02-20 PROCEDURE — 97803 MED NUTRITION INDIV SUBSEQ: CPT | Performed by: DIETITIAN, REGISTERED

## 2023-02-20 NOTE — PROGRESS NOTES
Haley Bernard was informed of the inherent limitations of a virtual visit,  and has consented to a virtual therapy visit on 2023. The patient was informed that at any time during the virtual visit, they can decide to stop the virtual visit. The patient verified that they are physically located in the [facility] for this virtual visit. NUTRITION - FOLLOW-UP TREATMENT NOTE  Patient Name: Haley Bernard         Date: 2023  : 1985    YES Patient  Verified  Diagnosis:   E10.9 (ICD-10-CM) - Type 1 diabetes mellitus without complications   J09.39 (UPW-06-TI) - Morbid (severe) obesity due to excess calories      In time:  300pm             Out time:   330pm   Total Treatment Time (min):   30     SUBJECTIVE/ASSESSMENT  Current Wt: 229 Previous Wt: 230 Wt Change: -1     Initial Wt: 229 Total Wt change: 0 Height: 63     Changes in medication or medical history? Any new allergies, surgeries or procedures? NO    If yes, update Summary List        Nutrition Diagnosis        Diagnosis Status: Food and nutrition related knowledge deficit R/T lack of prior education for diabetes and weight loss nutrition AEB pt questions during session. [x]  Improved []  No Change    []  Declined   []  Discontinued     Excessive energy intake R/T Food and nutrition related knowledge deficit for energy needs and healthy weight loss AEB request for session, BMI > 40, dietary recall. []  Improved []  No Change    [x]  Declined   []  Discontinued        Nutrition Monitoring and Evaluation: Pt seen virtually. Pt in  [facility] Pt seen today for follow-up visit. Back to normal insulin and has been correcting low insulin daily. Pt has been working with Dr. Leanne Santos to adjust insulin dosing. Pt put herself back on the pump to help keep it better regulated. Has been 134 mg/dL over the past 24 hours. For correcting BS pt was [facility].  Pt has been doing well to avoid over correcting blood sugar with honey.   Pt has been eating at regular meal times with snacks in between. Pt has gotten a new water bottle which has been helpful to drink more. Pt finds that she is drinking more in the morning and then forgetting in afternoon. Pt has been on treadmill 2x in the past week. Food Recall:   B: [place]ese [place] ([facility]) sausage, 2 boiled eggs, 2 slice hubbard, slim fast drink   S: Protein One bar   L: Slim fast shake, cheese and pepperoni and crackers   S: Protein One bar  D: Fish and vegetables with rice   S: Dark chcocolate almonds     Previous goals:  - Write down all foods for the day   - Add brown rice with dinner rotation (1/2 to 1 cup)   - Get in at least 24-30oz water daily   - Take stairs at work each day, add in one rowing class      Nutrition Prescription and  Intervention Educated pt on the pathophysiology of Type I Diabetes, healthy weight loss, and the rationale for dietary modifications and increased activity. Educated pt on lean proteins, healthy fats, non-starchy vegetables, and complex carbohydrate food sources. Discussed limiting carbohydrates, label reading, meal timing, and appropriate serving sizes. Encouraged pt to avoid sugary beverages. Reviewed meal builder tool, calorie and macro breakdown as well as exercise parameters.       Patient Education:  [x]  Review current plan with patient   []  Other:    Handouts/  Information Provided: []  Carbohydrates  []  Protein  []  Fiber  []  Serving Sizes  []  Fluids  []  General guidelines []  Diabetes  []  Cholesterol  []  Sodium  []  SBGM  []  Food Journals  []  Others:      Patient Goals - 2-3 x week walking on treadmill   - Have water with lunch and in afternoon.   - Look into new pump options   - Keep watch on carbs at meals      PLAN  [x]  Continue on current plan []  Follow-up PRN   []  Discharge due to :    [x]  Next appt: 2 weeks      Dietitian: Mary Mena RDN     Date: 2/20/2023 Time: 300PM   Albertina Berumen is a 34 y.o. female being evaluated by a Virtual Visit (video visit) encounter to address concerns as mentioned above. A caregiver was present when appropriate. Due to this being a TeleHealth encounter (During [facility]-21 public health emergency), evaluation of the following areas was limited: Direct tissue palpation, direct goniometric measurements, blood pressure, O2 saturation. Pursuant to the emergency declaration under the [address] [place] [address], [address] [place] [address] and the [employer] and [employer] Act, this Virtual Visit was conducted with patient's (and/or legal guardian's) consent, to reduce the risk of exposure to COVID-19 and provide necessary medical care. Services were provided through a video synchronous discussion virtually to substitute for in-person encounter. --Deidre Herring RD on 2/20/2023 at 8:50 AM    An electronic signature was used to authenticate this note.

## 2023-02-24 RX ORDER — INSULIN ASPART 100 [IU]/ML
INJECTION, SOLUTION INTRAVENOUS; SUBCUTANEOUS
Qty: 30 ML | Refills: 11 | Status: SHIPPED | OUTPATIENT
Start: 2023-02-24

## 2023-03-13 ENCOUNTER — HOSPITAL ENCOUNTER (OUTPATIENT)
Dept: NUTRITION | Age: 38
Discharge: HOME | End: 2023-03-13
Payer: MEDICAID

## 2023-03-13 DIAGNOSIS — E10.9 TYPE 1 DIABETES MELLITUS WITHOUT COMPLICATION (HCC): ICD-10-CM

## 2023-03-13 DIAGNOSIS — E10.649 UNCONTROLLED TYPE 1 DIABETES MELLITUS WITH HYPOGLYCEMIA WITHOUT COMA (HCC): Primary | ICD-10-CM

## 2023-03-13 DIAGNOSIS — E66.01 MORBID OBESITY WITH BMI OF 40.0-44.9, ADULT (HCC): ICD-10-CM

## 2023-03-13 PROCEDURE — 97803 MED NUTRITION INDIV SUBSEQ: CPT | Performed by: DIETITIAN, REGISTERED

## 2023-03-13 NOTE — PROGRESS NOTES
Lj Kay was informed of the inherent limitations of a virtual visit,  and has consented to a virtual therapy visit on 3/13/2023. The patient was informed that at any time during the virtual visit, they can decide to stop the virtual visit. The patient verified that they are physically located in the [facility] for this virtual visit. NUTRITION - FOLLOW-UP TREATMENT NOTE  Patient Name: Lj Kay         Date: 3/13/2023  : 1985    YES Patient  Verified  Diagnosis:   E10.9 (ICD-10-CM) - Type 1 diabetes mellitus without complications   W87.74 (NWK-25-BE) - Morbid (severe) obesity due to excess calories      In time:  300pm             Out time:   325pm   Total Treatment Time (min):   25     SUBJECTIVE/ASSESSMENT  Current Wt: 227 Previous Wt: 229 Wt Change: -2     Initial Wt: 229 Total Wt change: -2 Height: 63     Changes in medication or medical history? Any new allergies, surgeries or procedures? NO    If yes, update Summary List        Nutrition Diagnosis        Diagnosis Status: Food and nutrition related knowledge deficit R/T lack of prior education for diabetes and weight loss nutrition AEB pt questions during session. [x]  Improved []  No Change    []  Declined   []  Discontinued     Excessive energy intake R/T Food and nutrition related knowledge deficit for energy needs and healthy weight loss AEB request for session, BMI > 40, dietary recall. []  Improved []  No Change    [x]  Declined   []  Discontinued        Nutrition Monitoring and Evaluation: Pt seen virtually. Pt in  [facility]. Pt seen today for follow-up visit. Pt blood sugar has been up and down. Lack of control due to stress, seeing more low sugar levels. When low not over correcting as often. Took herself back off of her insulin pump and trying to reach out to Endo for mew options. Portion sizes on carbs sometimes higher than the 30g limit we discussed. Protein drinks and packed meals for lunches.    Carb free dinners more often in the week. Increased water intake but feeling like she could do better. Pt took a rowing class at [employer]. Wanting to go back and try more classes. Walking on treadmill 3x since last visit. Exercising in morning lifting weights and floor exercises. Meals out this weekend for [employer]. [place] kitchen [place] the [place] and planning to take salad for lunch option. Food Recall:   B: [place] ([facility]) sausage, 2 boiled eggs, 2 slice hubbard, slim fast drink   S: Protein One bar   L: Slim fast shake, cheese and pepperoni and crackers   S: Protein One bar; Vanilla yogurt and peanut butter  D: Fish and vegetables with rice   S: Dark chcocolate almonds     Previous goals:  - 2-3 x week walking on treadmill   - Have water with lunch and in afternoon.   - Look into new pump options   - Keep watch on carbs at meals      Nutrition Prescription and  Intervention Educated pt on the pathophysiology of Type I Diabetes, healthy weight loss, and the rationale for dietary modifications and increased activity. Educated pt on lean proteins, healthy fats, non-starchy vegetables, and complex carbohydrate food sources. Discussed limiting carbohydrates, label reading, meal timing, and appropriate serving sizes. Encouraged pt to avoid sugary beverages. Reviewed meal builder tool, calorie and macro breakdown as well as exercise parameters.       Patient Education:  [x]  Review current plan with patient   []  Other:    Handouts/  Information Provided: []  Carbohydrates  []  Protein  []  Fiber  []  Serving Sizes  []  Fluids  []  General guidelines []  Diabetes  []  Cholesterol  []  Sodium  []  SBGM  []  Food Journals  []  Others:      [facility] Goals - [facility]   - Work in more treadmill time or walking  - Continue morning exercise routine   - Have mostly carb free dinner meals   - Keep working to drink more water   - Look into new pump options      PLAN  [x]  Continue on current plan []  Follow-up PRN   []  Discharge due to :    [x]  Next appt: 2 weeks      Dietitian: Jyoti Buckley RDN     Date: 3/13/2023 Time: 300PM   Mckenna Rowe is a 34 y.o. female being evaluated by a Virtual Visit (video visit) encounter to address concerns as mentioned above. A caregiver was present when appropriate. Due to this being a TeleHealth encounter (During [facility]-13 public health emergency), evaluation of the following areas was limited: Direct tissue palpation, direct goniometric measurements, blood pressure, O2 saturation. Pursuant to the emergency declaration under the [address] [place] [address], [address] [place] [address] authority and the [employer] and [employer] Act, this Virtual Visit was conducted with patient's (and/or legal guardian's) consent, to reduce the risk of exposure to COVID-19 and provide necessary medical care. Services were provided through a video synchronous discussion virtually to substitute for in-person encounter. --Jyoti Buckley RD on 3/13/2023 at  300PM    An electronic signature was used to authenticate this note.

## 2023-03-27 ENCOUNTER — HOSPITAL ENCOUNTER (OUTPATIENT)
Dept: NUTRITION | Age: 38
Discharge: HOME | End: 2023-03-27
Payer: MEDICAID

## 2023-03-27 DIAGNOSIS — E66.01 MORBID OBESITY WITH BMI OF 40.0-44.9, ADULT (HCC): ICD-10-CM

## 2023-03-27 DIAGNOSIS — E10.9 TYPE 1 DIABETES MELLITUS WITHOUT COMPLICATION (HCC): ICD-10-CM

## 2023-03-27 DIAGNOSIS — I10 HYPERTENSION, UNSPECIFIED TYPE: Primary | ICD-10-CM

## 2023-03-27 PROCEDURE — 97803 MED NUTRITION INDIV SUBSEQ: CPT | Performed by: DIETITIAN, REGISTERED

## 2023-03-27 NOTE — PROGRESS NOTES
Fidencio Serrano was informed of the inherent limitations of a virtual visit,  and has consented to a virtual therapy visit on 3/27/2023. The patient was informed that at any time during the virtual visit, they can decide to stop the virtual visit. The patient verified that they are physically located in the [facility] for this virtual visit. NUTRITION - FOLLOW-UP TREATMENT NOTE  Patient Name: Fidencio Serrano         Date: 3/27/2023  : 1985    YES Patient  Verified  Diagnosis:   E10.9 (ICD-10-CM) - Type 1 diabetes mellitus without complications   L07.48 (QJG-64-MA) - Morbid (severe) obesity due to excess calories      In time:  1130am             Out time:   1200pm   Total Treatment Time (min):   30     SUBJECTIVE/ASSESSMENT  Current Wt: NA  Previous Wt: 227 Wt Change: NA     Initial Wt: 229 Total Wt change: -2 Height: 63     Changes in medication or medical history? Any new allergies, surgeries or procedures? NO    If yes, update Summary List        Nutrition Diagnosis        Diagnosis Status: Food and nutrition related knowledge deficit R/T lack of prior education for diabetes and weight loss nutrition AEB pt questions during session. [x]  Improved []  No Change    []  Declined   []  Discontinued     Excessive energy intake R/T Food and nutrition related knowledge deficit for energy needs and healthy weight loss AEB request for session, BMI > 40, dietary recall. []  Improved []  No Change    [x]  Declined   []  Discontinued        Nutrition Monitoring and Evaluation: Pt seen virtually. Pt in [place]. Pt seen today for follow-up visit. Pt has a bigger bottle for water with markers for the hours. 64 oz per day. Pt is currently waiting for prior auth and materials for new insulin pump. Pt has not seen as much up and down with BS. BS levels have been more stable in the past few weeks.    Electrolytes for headache     Salads for lunch  Scheduling in walking for the day   Doing stretches and strength exercises each morning   Exercising in morning lifting weights and floor exercises. Food Recall:   B: Russian [facility] sausage, 2 boiled eggs, 2 slice hubbard, slim fast drink   S: Protein One bar   L: Salads most days   S: Protein One bar; Vanilla yogurt and peanut butter  D: Baked chicken and broccoli with small starch (bread) or fruit cup    S: Dark chcocolate almonds, craving more sweets     Previous goals:  - Look into classes at [facility]   - Work in more treadmill time or walking  - Continue morning exercise routine   - Have mostly carb free dinner meals   - Keep working to drink more water   - Look into new pump options      Nutrition Prescription and  Intervention Educated pt on the pathophysiology of Type I Diabetes, healthy weight loss, and the rationale for dietary modifications and increased activity. Educated pt on lean proteins, healthy fats, non-starchy vegetables, and complex carbohydrate food sources. Discussed limiting carbohydrates, label reading, meal timing, and appropriate serving sizes. Encouraged pt to avoid sugary beverages. Reviewed meal builder tool, calorie and macro breakdown as well as exercise parameters.       Patient Education:  [x]  Review current plan with patient   []  Other:    Handouts/  Information Provided: []  Carbohydrates  []  Protein  []  Fiber  []  Serving Sizes  []  Fluids  []  General guidelines []  Diabetes  []  Cholesterol  []  Sodium  []  SBGM  []  Food Journals  []  Others:      Patient Goals - Look for low carb sweets to have at night with cravings   - Add in walking during lunch break at work; walk in morning on weekend    - Continue with 64 oz of water each day   - Avoid potatoes for the month     PLAN  [x]  Continue on current plan []  Follow-up PRN   []  Discharge due to :    [x]  Next appt: 2 weeks      Dietitian: Derek Garcia RDN     Date: 3/27/2023 Time: Jomar Gunn is a 34 y.o. female being evaluated by a Virtual Visit (video visit) encounter to address concerns as mentioned above. A caregiver was present when appropriate. Due to this being a TeleHealth encounter (During YFYAG-41 public health emergency), evaluation of the following areas was limited: Direct tissue palpation, direct goniometric measurements, blood pressure, O2 saturation. Pursuant to the emergency declaration under the [address] [place] [address] and the [employer] and [employer] Act, this Virtual Visit was conducted with patient's (and/or legal guardian's) consent, to reduce the risk of exposure to COVID-19 and provide necessary medical care. Services were provided through a video synchronous discussion virtually to substitute for in-person encounter. --Ann Howard RD on 3/27/2023 at  300PM    An electronic signature was used to authenticate this note.

## 2023-04-10 ENCOUNTER — HOSPITAL ENCOUNTER (OUTPATIENT)
Dept: NUTRITION | Age: 38
End: 2023-04-10
Payer: MEDICAID

## 2023-04-19 ENCOUNTER — HOSPITAL ENCOUNTER (OUTPATIENT)
Dept: NUTRITION | Age: 38
Discharge: HOME | End: 2023-04-19
Payer: MEDICAID

## 2023-04-19 DIAGNOSIS — E66.01 MORBID OBESITY WITH BMI OF 40.0-44.9, ADULT (HCC): ICD-10-CM

## 2023-04-19 DIAGNOSIS — I10 HYPERTENSION, UNSPECIFIED TYPE: ICD-10-CM

## 2023-04-19 DIAGNOSIS — E10.9 TYPE 1 DIABETES MELLITUS WITHOUT COMPLICATION (HCC): Primary | ICD-10-CM

## 2023-04-19 PROCEDURE — 97803 MED NUTRITION INDIV SUBSEQ: CPT | Performed by: DIETITIAN, REGISTERED

## 2023-04-19 NOTE — PROGRESS NOTES
Fidencio Serrano was informed of the inherent limitations of a virtual visit,  and has consented to a virtual therapy visit on 2023. The patient was informed that at any time during the virtual visit, they can decide to stop the virtual visit. The patient verified that they are physically located in the [facility] for this virtual visit. NUTRITION - FOLLOW-UP TREATMENT NOTE  Patient Name: Fidencio Serrano         Date: 2023  : 1985    YES Patient  Verified  Diagnosis:   E10.9 (ICD-10-CM) - Type 1 diabetes mellitus without complications   C30.64 (XBP-26-AJ) - Morbid (severe) obesity due to excess calories      In time:  1230pm             Out time:   100pm   Total Treatment Time (min):   30     SUBJECTIVE/ASSESSMENT  Current Wt: 229 Previous Wt: 227 Wt Change: +2     Initial Wt: 229 Total Wt change: 0 Height: 63     Changes in medication or medical history? Any new allergies, surgeries or procedures? NO    If yes, update Summary List        Nutrition Diagnosis        Diagnosis Status: Food and nutrition related knowledge deficit R/T lack of prior education for diabetes and weight loss nutrition AEB pt questions during session. [x]  Improved []  No Change    []  Declined   []  Discontinued     Excessive energy intake R/T Food and nutrition related knowledge deficit for energy needs and healthy weight loss AEB request for session, BMI > 40, dietary recall. []  Improved []  No Change    [x]  Declined   []  Discontinued        Nutrition Monitoring and Evaluation: Pt seen virtually. Pt in  [facility] Pt seen today for follow-up visit. Pt has been busy over the past few weeks. Pt has started walking at work and on the weekends. Potatoes limited to 1x week or not at all. Bigger bottle of water carrying with her and drinking more each day. Pt blood sugars have been higher from insulin dumping.  Stopped drinking crystal light and noticing some sugar free foods that might trigger this response as well. Pt reported today before lunch 101 mg/dL. Pt is still waiting on new insulin pump. Meal replacement bar or drink as meal if not having lunch. Has been planning ahead for meals and snacks that is helping to keep her on track. Walking about 45 min daily. With sweets pt has planned ahead and gotten some sugar free options which has helped to control cravings. Previous goals:  - Look for low carb sweets to have at night with cravings   - Add in walking during lunch break at work; walk in morning on weekend    - Continue with 64 oz of water each day   - Avoid potatoes for the month     Nutrition Prescription and  Intervention Educated pt on the pathophysiology of Type I Diabetes, healthy weight loss, and the rationale for dietary modifications and increased activity. Educated pt on lean proteins, healthy fats, non-starchy vegetables, and complex carbohydrate food sources. Discussed limiting carbohydrates, label reading, meal timing, and appropriate serving sizes. Encouraged pt to avoid sugary beverages. Reviewed meal builder tool, calorie and macro breakdown as well as exercise parameters.       Patient Education:  [x]  Review current plan with patient   []  Other:    Handouts/  Information Provided: []  Carbohydrates  []  Protein  []  Fiber  []  Serving Sizes  []  Fluids  []  General guidelines []  Diabetes  []  Cholesterol  []  Sodium  []  SBGM  []  Food Journals  []  Others:      Patient Goals - Meal replacement for protein at lunch   - Water aim for 64 oz daily   - Walking daily 30 min and morning routine   - Avoid Easter Candy   - Plan ahead for meals and snacks each day   - Avoid eating past 8:30pm      PLAN  [x]  Continue on current plan []  Follow-up PRN   []  Discharge due to :    [x]  Next appt: 2 weeks      Dietitian: Clinton Padilla RDN     Date: 4/19/2023 Time: 1230PM   Donna Luciano is a 34 y.o. female being evaluated by a Virtual Visit (video visit) encounter to address concerns as mentioned above. A caregiver was present when appropriate. Due to this being a TeleHealth encounter (During GHIOZ-51 public health emergency), evaluation of the following areas was limited: Direct tissue palpation, direct goniometric measurements, blood pressure, O2 saturation. Pursuant to the emergency declaration under the [address] [place] [address] and the [employer] and [employer] Act, this Virtual Visit was conducted with patient's (and/or legal guardian's) consent, to reduce the risk of exposure to COVID-19 and provide necessary medical care. Services were provided through a video synchronous discussion virtually to substitute for in-person encounter. --Janae Harris RD on 4/19/2023 at  300PM    An electronic signature was used to authenticate this note.

## 2023-05-03 ENCOUNTER — HOSPITAL ENCOUNTER (OUTPATIENT)
Dept: NUTRITION | Age: 38
Discharge: HOME | End: 2023-05-03
Payer: MEDICAID

## 2023-05-03 ENCOUNTER — TELEPHONE (OUTPATIENT)
Dept: DIABETES SERVICES | Age: 38
End: 2023-05-03

## 2023-05-03 DIAGNOSIS — E66.01 MORBID OBESITY WITH BMI OF 40.0-44.9, ADULT (HCC): ICD-10-CM

## 2023-05-03 DIAGNOSIS — I10 HYPERTENSION, UNSPECIFIED TYPE: ICD-10-CM

## 2023-05-03 DIAGNOSIS — E10.9 TYPE 1 DIABETES MELLITUS WITHOUT COMPLICATION (HCC): Primary | ICD-10-CM

## 2023-05-03 PROCEDURE — 97803 MED NUTRITION INDIV SUBSEQ: CPT | Performed by: DIETITIAN, REGISTERED

## 2023-05-04 ENCOUNTER — TELEPHONE (OUTPATIENT)
Dept: ENDOCRINOLOGY | Age: 38
End: 2023-05-04

## 2023-05-04 ENCOUNTER — TELEPHONE (OUTPATIENT)
Dept: DIABETES SERVICES | Age: 38
End: 2023-05-04

## 2023-05-04 DIAGNOSIS — E10.9 TYPE 1 DIABETES MELLITUS WITHOUT COMPLICATION (HCC): Primary | ICD-10-CM

## 2023-05-05 ENCOUNTER — OFFICE VISIT (OUTPATIENT)
Age: 38
End: 2023-05-05

## 2023-05-05 DIAGNOSIS — E10.9 TYPE 1 DIABETES MELLITUS WITHOUT COMPLICATION (HCC): Primary | ICD-10-CM

## 2023-05-08 ENCOUNTER — TELEPHONE (OUTPATIENT)
Age: 38
End: 2023-05-08

## 2023-05-08 NOTE — TELEPHONE ENCOUNTER
Patient called our PSR and left a message as she has a question about her insulin pump. Left message.   Nolan Khan RD

## 2023-05-15 ENCOUNTER — OFFICE VISIT (OUTPATIENT)
Age: 38
End: 2023-05-15
Payer: MEDICAID

## 2023-05-15 ENCOUNTER — HOSPITAL ENCOUNTER (OUTPATIENT)
Facility: HOSPITAL | Age: 38
Setting detail: RECURRING SERIES
Discharge: HOME | End: 2023-05-18
Payer: MEDICAID

## 2023-05-15 VITALS
WEIGHT: 240 LBS | HEIGHT: 63 IN | DIASTOLIC BLOOD PRESSURE: 82 MMHG | SYSTOLIC BLOOD PRESSURE: 144 MMHG | BODY MASS INDEX: 42.52 KG/M2 | HEART RATE: 66 BPM

## 2023-05-15 DIAGNOSIS — E55.9 VITAMIN D DEFICIENCY, UNSPECIFIED: ICD-10-CM

## 2023-05-15 DIAGNOSIS — E10.9 TYPE 1 DIABETES MELLITUS WITHOUT COMPLICATIONS (HCC): Primary | ICD-10-CM

## 2023-05-15 DIAGNOSIS — E78.2 MIXED HYPERLIPIDEMIA: ICD-10-CM

## 2023-05-15 DIAGNOSIS — I10 ESSENTIAL (PRIMARY) HYPERTENSION: ICD-10-CM

## 2023-05-15 LAB — HBA1C MFR BLD: 7.6 %

## 2023-05-15 PROCEDURE — 97803 MED NUTRITION INDIV SUBSEQ: CPT

## 2023-05-15 PROCEDURE — 3077F SYST BP >= 140 MM HG: CPT | Performed by: INTERNAL MEDICINE

## 2023-05-15 PROCEDURE — 3079F DIAST BP 80-89 MM HG: CPT | Performed by: INTERNAL MEDICINE

## 2023-05-15 PROCEDURE — 83036 HEMOGLOBIN GLYCOSYLATED A1C: CPT | Performed by: INTERNAL MEDICINE

## 2023-05-15 PROCEDURE — 99214 OFFICE O/P EST MOD 30 MIN: CPT | Performed by: INTERNAL MEDICINE

## 2023-05-15 RX ORDER — BUPROPION HYDROCHLORIDE 150 MG/1
1 TABLET, EXTENDED RELEASE ORAL 2 TIMES DAILY
COMMUNITY
Start: 2021-07-21

## 2023-05-15 RX ORDER — UBIDECARENONE 75 MG
50 CAPSULE ORAL DAILY
COMMUNITY

## 2023-05-15 NOTE — PROGRESS NOTES
and sitting upright in a chair with your arm at heart level. Please let me know if you are having readings over 140 on the top number or 90 on the bottom number. Return in about 6 months (around 11/15/2023). Copy sent to:    Roberto Casiano MD (Obstetrics & Gynecology)   Megan Martin MD (Surgical Oncology)   Alvin Farfan MD (Hematology and Oncology)   Dr. Izabel Rodgers via [facility]

## 2023-05-15 NOTE — PATIENT INSTRUCTIONS
1) When you have gotten back into [employer] and are able to share your data with me, send me a message through [employer] and I can take a look at your data and see if we need to make any other changes to your settings. 2) Your Hemoglobin A1c (3 month test of blood sugar) was 7.6% down from 8% in 11/22 and 8.6% in 11/22. 3) Start monitoring blood pressure about 2-3 times per week at alternating times either in the morning or evening after resting for 5 minutes and sitting upright in a chair with your arm at heart level. Please let me know if you are having readings over 140 on the top number or 90 on the bottom number.

## 2023-05-15 NOTE — PROGRESS NOTES
cheese;   S: Fruit  D: Protein with vegetable     Meals out and takeout for Mother's Day weekend. Choices for lower or substituted carbs. Walking more, using time on the weekends and on mornings when going into work later. Trying to do at least 2 days a week. 3-4x week still doing morning exercises at home, hand weights, crunches, leg lifts, etc.     Water has been harder. Pt is now measuring water intake closer. Many life changes leading to emotional eating the past few weeks. Adding more carbs back into diet. Pack of chips or small candy at work. Sleep is getting better, going to sleep by 10pm and mindful about turning TV off. Previous goals:  - On weekends get in more walking   - Get to sleep earlier, limit TV to one hour at night   - Avoid candy bowl at work   - Get Tandem Pump setup   - Increase water intake      Nutrition Prescription and  Intervention Educated pt on the pathophysiology of Type I Diabetes, healthy weight loss, and the rationale for dietary modifications and increased activity. Educated pt on lean proteins, healthy fats, non-starchy vegetables, and complex carbohydrate food sources. Discussed limiting carbohydrates, label reading, meal timing, and appropriate serving sizes. Encouraged pt to avoid sugary beverages. Reviewed meal builder tool, calorie and macro breakdown as well as exercise parameters. Patient Education:  [x]  Review current plan with patient   []  Other:    Handouts/  Information Provided: []  Carbohydrates  []  Protein  []  Fiber  []  Serving Sizes  []  Fluids  []  General guidelines []  Diabetes  []  Cholesterol  []  Sodium  []  SBGM  []  Food Journals  []  Others:      Patient Goals - 2 bottles of water (40 oz) daily. - Weekend days walking   - Stick with carb free meals at least once daily, limit to 30g carbs at meals  - Planning to have a protein food with breakfast and lunch   - Replace chips with triscuit crackers for crunchy cravings.    - Get to

## 2023-06-01 ENCOUNTER — TELEPHONE (OUTPATIENT)
Age: 38
End: 2023-06-01

## 2023-06-01 NOTE — TELEPHONE ENCOUNTER
Called patient to get started with Orientation for [facility] at [facility]. No answer, left voicemail advising patient to return call.

## 2023-06-19 ENCOUNTER — HOSPITAL ENCOUNTER (OUTPATIENT)
Facility: HOSPITAL | Age: 38
Setting detail: RECURRING SERIES
Discharge: HOME | End: 2023-06-22
Payer: MEDICAID

## 2023-06-19 PROCEDURE — 97803 MED NUTRITION INDIV SUBSEQ: CPT

## 2023-06-19 NOTE — PROGRESS NOTES
Michela Soriano was informed of the inherent limitations of a virtual visit,  and has consented to a virtual therapy visit on 2023. The patient was informed that at any time during the virtual visit, they can decide to stop the virtual visit. The patient verified that they are physically located in the [facility] for this virtual visit. NUTRITION - FOLLOW-UP TREATMENT NOTE  Patient Name: Michela Soriano         Date: 2023  : 1985    YES Patient  Verified  Diagnosis:   E10.9 (ICD-10-CM) - Type 1 diabetes mellitus without complications   F34.26 (QCM-51-WW) - Morbid (severe) obesity due to excess calories      In time:  1130am             Out time:   1200pm   Total Treatment Time (min):   30     SUBJECTIVE/ASSESSMENT  Current Wt: 240 Previous Wt: 240 Wt Change: 0     Initial Wt: 229 Total Wt change: +11 Height: 63     Changes in medication or medical history? Any new allergies, surgeries or procedures? NO    If yes, update Summary List        Nutrition Diagnosis        Diagnosis Status: Food and nutrition related knowledge deficit R/T lack of prior education for diabetes and weight loss nutrition AEB pt questions during session. [x]  Improved []  No Change    []  Declined   []  Discontinued     Excessive energy intake R/T Food and nutrition related knowledge deficit for energy needs and healthy weight loss AEB request for session, BMI > 40, dietary recall. []  Improved []  No Change    [x]  Declined   []  Discontinued        Nutrition Monitoring and Evaluation: Pt seen virtually. Pt in [place]. Pt seen today for follow-up visit. Pt off from work today and doing a self-care day. Pt has been doing birthday celebrations, cookouts, father's day, that have proven as challenges with sweets and meals out.  starting medical weight loss at [facility]. Pt is interested in restarting the program now as she previously did not get to finish it before Covid.  Excited about new tools to do

## 2023-07-03 ENCOUNTER — HOSPITAL ENCOUNTER (OUTPATIENT)
Facility: HOSPITAL | Age: 38
Setting detail: RECURRING SERIES
Discharge: HOME | End: 2023-07-06
Payer: MEDICAID

## 2023-07-03 PROCEDURE — 97803 MED NUTRITION INDIV SUBSEQ: CPT

## 2023-07-03 NOTE — PROGRESS NOTES
Lajuanda Phalen was informed of the inherent limitations of a virtual visit,  and has consented to a virtual therapy visit on 7/3/2023. The patient was informed that at any time during the virtual visit, they can decide to stop the virtual visit. The patient verified that they are physically located in the [facility] for this virtual visit. NUTRITION - FOLLOW-UP TREATMENT NOTE  Patient Name: Lajuanda Phalen         Date: 7/3/2023  : 1985    YES Patient  Verified  Diagnosis:   E10.9 (ICD-10-CM) - Type 1 diabetes mellitus without complications   G92.57 (NBO-89-XZ) - Morbid (severe) obesity due to excess calories      In time:  1000am             Out time:   1025am   Total Treatment Time (min):   25     SUBJECTIVE/ASSESSMENT  Current Wt: 240  Previous Wt: 240 Wt Change: 0     Initial Wt: 229 Total Wt change: +11 Height: 63     Changes in medication or medical history? Any new allergies, surgeries or procedures? NO    If yes, update Summary List        Nutrition Diagnosis        Diagnosis Status: Food and nutrition related knowledge deficit R/T lack of prior education for diabetes and weight loss nutrition AEB pt questions during session. [x]  Improved []  No Change    []  Declined   []  Discontinued     Excessive energy intake R/T Food and nutrition related knowledge deficit for energy needs and healthy weight loss AEB request for session, BMI > 40, dietary recall. [x]  Improved []  No Change    []  Declined   []  Discontinued        Nutrition Monitoring and Evaluation: Pt seen virtually. Pt in [address] [place] [address]. Pt seen today for follow-up visit.      Pt is more focused   Starting each day with atkins and turkey baumann or turkey sausage  Lunch Protein and fruit with veggie or salad   Dinner protein and vegetable     For sweets cravings having [employer] chocolate    At birthday party avoided cake and soda   Yogurt with pb for late night     Seeing  next Wednesday to start the weight loss program

## 2023-07-12 ENCOUNTER — OFFICE VISIT (OUTPATIENT)
Age: 38
End: 2023-07-12
Payer: MEDICAID

## 2023-07-12 VITALS
HEIGHT: 63 IN | SYSTOLIC BLOOD PRESSURE: 130 MMHG | RESPIRATION RATE: 16 BRPM | BODY MASS INDEX: 42.72 KG/M2 | WEIGHT: 241.1 LBS | HEART RATE: 72 BPM | DIASTOLIC BLOOD PRESSURE: 76 MMHG | OXYGEN SATURATION: 96 % | TEMPERATURE: 98.7 F

## 2023-07-12 DIAGNOSIS — E55.9 VITAMIN D DEFICIENCY, UNSPECIFIED: ICD-10-CM

## 2023-07-12 DIAGNOSIS — E10.9 TYPE 1 DIABETES MELLITUS WITHOUT COMPLICATIONS (HCC): ICD-10-CM

## 2023-07-12 DIAGNOSIS — E66.01 MORBID (SEVERE) OBESITY DUE TO EXCESS CALORIES (HCC): Primary | ICD-10-CM

## 2023-07-12 DIAGNOSIS — I10 ESSENTIAL (PRIMARY) HYPERTENSION: ICD-10-CM

## 2023-07-12 PROCEDURE — 99214 OFFICE O/P EST MOD 30 MIN: CPT | Performed by: FAMILY MEDICINE

## 2023-07-12 PROCEDURE — 3074F SYST BP LT 130 MM HG: CPT | Performed by: FAMILY MEDICINE

## 2023-07-12 PROCEDURE — 3078F DIAST BP <80 MM HG: CPT | Performed by: FAMILY MEDICINE

## 2023-07-12 PROCEDURE — 3051F HG A1C>EQUAL 7.0%<8.0%: CPT | Performed by: FAMILY MEDICINE

## 2023-07-12 RX ORDER — CLINDAMYCIN HYDROCHLORIDE 300 MG/1
CAPSULE ORAL
COMMUNITY
Start: 2023-05-21

## 2023-07-12 RX ORDER — BUPROPION HYDROCHLORIDE 150 MG/1
150 TABLET, EXTENDED RELEASE ORAL 2 TIMES DAILY
Qty: 60 TABLET | Refills: 2 | Status: SHIPPED | OUTPATIENT
Start: 2023-07-12

## 2023-07-12 ASSESSMENT — PATIENT HEALTH QUESTIONNAIRE - PHQ9
2. FEELING DOWN, DEPRESSED OR HOPELESS: 0
SUM OF ALL RESPONSES TO PHQ QUESTIONS 1-9: 0
SUM OF ALL RESPONSES TO PHQ QUESTIONS 1-9: 0
1. LITTLE INTEREST OR PLEASURE IN DOING THINGS: 0
SUM OF ALL RESPONSES TO PHQ QUESTIONS 1-9: 0
SUM OF ALL RESPONSES TO PHQ QUESTIONS 1-9: 0
SUM OF ALL RESPONSES TO PHQ9 QUESTIONS 1 & 2: 0

## 2023-07-13 LAB
25(OH)D3+25(OH)D2 SERPL-MCNC: 50.3 NG/ML (ref 30–100)
ALBUMIN SERPL-MCNC: 4.1 G/DL (ref 3.9–4.9)
ALBUMIN/GLOB SERPL: 1.4 {RATIO} (ref 1.2–2.2)
ALP SERPL-CCNC: 85 IU/L (ref 44–121)
ALT SERPL-CCNC: 14 IU/L (ref 0–32)
AST SERPL-CCNC: 18 IU/L (ref 0–40)
BILIRUB SERPL-MCNC: 0.3 MG/DL (ref 0–1.2)
BUN SERPL-MCNC: 19 MG/DL (ref 6–24)
BUN/CREAT SERPL: 22 (ref 9–23)
CALCIUM SERPL-MCNC: 9.4 MG/DL (ref 8.7–10.2)
CHLORIDE SERPL-SCNC: 103 MMOL/L (ref 96–106)
CO2 SERPL-SCNC: 20 MMOL/L (ref 20–29)
CREAT SERPL-MCNC: 0.87 MG/DL (ref 0.57–1)
EGFRCR SERPLBLD CKD-EPI 2021: 86 ML/MIN/1.73
GLOBULIN SER CALC-MCNC: 2.9 G/DL (ref 1.5–4.5)
GLUCOSE SERPL-MCNC: 79 MG/DL (ref 70–99)
HBA1C MFR BLD: 7.1 % (ref 4.8–5.6)
POTASSIUM SERPL-SCNC: 4.5 MMOL/L (ref 3.5–5.2)
PROT SERPL-MCNC: 7 G/DL (ref 6–8.5)
SODIUM SERPL-SCNC: 142 MMOL/L (ref 134–144)

## 2023-07-17 ENCOUNTER — OFFICE VISIT (OUTPATIENT)
Age: 38
End: 2023-07-17

## 2023-07-17 DIAGNOSIS — E66.01 MORBID OBESITY (HCC): Primary | ICD-10-CM

## 2023-07-17 NOTE — PROGRESS NOTES
[facility]  Metabolic Program Initial Nutrition Consult    Date: 2023   Physician: Ernestine Bauman MD  Name: Matthieu Moncada  :  1985    Type of Plan: LCD  Weeks on Plan: week 1  Virtual visit was completed through [address] [facility] ASSESSMENT:      Medications/Supplements:   Prior to Admission medications    Medication Sig Start Date End Date Taking? Authorizing Provider   clindamycin (CLEOCIN) 300 MG capsule TAKE 1 CAPSULE BY MOUTH THREE TIMES A DAY AS NEEDED BOILS 23   Historical Provider, MD   buPROPion [facility] SR) 150 MG extended release tablet Take 1 tablet by mouth 2 times daily 23   Ernestine Bauman MD   TRUE METRIX BLOOD GLUCOSE TEST strip USE TO CHECK BLOOD SUGAR FOUR TIMES A DAY. 6/15/23   Karlie Rodriguez MD   Multiple Vitamin (MULTIVITAMIN ADULT PO) Take by mouth daily    Historical Provider, MD   vitamin B-12 (CYANOCOBALAMIN) 100 MCG tablet Take 0.5 tablets by mouth daily    Historical Provider, MD   Lancets MISC Use to check blood sugar four times a day. 19   Ar Automatic Reconciliation   vitamin D3 (CHOLECALCIFEROL) 125 MCG (5000 UT) TABS tablet Take by mouth daily    Ar Automatic Reconciliation   hydroCHLOROthiazide (HYDRODIURIL) 25 MG tablet Take 1 tablet by mouth daily 22   Ar Automatic Reconciliation   ibuprofen (ADVIL;MOTRIN) 600 MG tablet Take 1 tablet by mouth every 6 hours as needed 22   Ar Automatic Reconciliation   insulin aspart (NOVOLOG) 100 UNIT/ML injection vial USE AS DIRECTED IN INSULIN PUMP.   UNITS/DAY. 23   Ar Automatic Reconciliation   insulin aspart (NOVOLOG FLEXPEN) 100 UNIT/ML injection pen Inject 1 unit for 9 grams of carbs + 1 units for every 75 mg/dl above 150 mg/dl--max dose 50 units/day 23   Ar Automatic Reconciliation   insulin glargine (LANTUS SOLOSTAR) 100 UNIT/ML injection pen INJECT 28 UNITS DAILY IN THE EVENING--Dose change 02/15/23--updated med list--did not send prescription to the pharmacy

## 2023-07-26 ENCOUNTER — OFFICE VISIT (OUTPATIENT)
Age: 38
End: 2023-07-26

## 2023-07-26 DIAGNOSIS — E66.01 MORBID OBESITY (HCC): Primary | ICD-10-CM

## 2023-07-27 ENCOUNTER — NURSE ONLY (OUTPATIENT)
Age: 38
End: 2023-07-27

## 2023-07-27 VITALS
WEIGHT: 238.4 LBS | DIASTOLIC BLOOD PRESSURE: 81 MMHG | SYSTOLIC BLOOD PRESSURE: 122 MMHG | HEIGHT: 63 IN | BODY MASS INDEX: 42.24 KG/M2 | OXYGEN SATURATION: 99 % | TEMPERATURE: 98 F | RESPIRATION RATE: 16 BRPM | HEART RATE: 68 BPM

## 2023-07-27 DIAGNOSIS — E10.9 TYPE 1 DIABETES MELLITUS WITHOUT COMPLICATIONS (HCC): ICD-10-CM

## 2023-07-27 DIAGNOSIS — E66.01 MORBID (SEVERE) OBESITY DUE TO EXCESS CALORIES (HCC): Primary | ICD-10-CM

## 2023-07-27 DIAGNOSIS — I10 ESSENTIAL (PRIMARY) HYPERTENSION: ICD-10-CM

## 2023-07-27 ASSESSMENT — PATIENT HEALTH QUESTIONNAIRE - PHQ9
SUM OF ALL RESPONSES TO PHQ QUESTIONS 1-9: 0
2. FEELING DOWN, DEPRESSED OR HOPELESS: 0
SUM OF ALL RESPONSES TO PHQ9 QUESTIONS 1 & 2: 0
SUM OF ALL RESPONSES TO PHQ QUESTIONS 1-9: 0
SUM OF ALL RESPONSES TO PHQ QUESTIONS 1-9: 0
1. LITTLE INTEREST OR PLEASURE IN DOING THINGS: 0
SUM OF ALL RESPONSES TO PHQ QUESTIONS 1-9: 0

## 2023-07-30 NOTE — PROGRESS NOTES
[facility]  Metabolic Weight Loss Program        Patient's Name: Bang Fish  : 1985    This patient is a participant at [address] [place] [address] Weight Management Center and attended the weekly virtual nutrition class hosted via "[employer].       Reyes Alvarez, MS, RD, LDN

## 2023-07-31 NOTE — PROGRESS NOTES
Nurse note from patient's weekly LCD / Maintenance class was reviewed. Pertinent medical concerns were:   reviewed     BP Readings from Last 3 Encounters:   07/27/23 122/81   07/12/23 130/76   05/15/23 (!) 144/82       No flowsheet data found. Current Outpatient Medications   Medication Sig Dispense Refill    clindamycin (CLEOCIN) 300 MG capsule TAKE 1 CAPSULE BY MOUTH THREE TIMES A DAY AS NEEDED BOILS      buPROPion (WELLBUTRIN SR) 150 MG extended release tablet Take 1 tablet by mouth 2 times daily 60 tablet 2    TRUE METRIX BLOOD GLUCOSE TEST strip USE TO CHECK BLOOD SUGAR FOUR TIMES A DAY. 100 strip 15    Multiple Vitamin (MULTIVITAMIN ADULT PO) Take by mouth daily      vitamin B-12 (CYANOCOBALAMIN) 100 MCG tablet Take 0.5 tablets by mouth daily      Lancets MISC Use to check blood sugar four times a day. vitamin D3 (CHOLECALCIFEROL) 125 MCG (5000 UT) TABS tablet Take by mouth daily      hydroCHLOROthiazide (HYDRODIURIL) 25 MG tablet Take 1 tablet by mouth daily      ibuprofen (ADVIL;MOTRIN) 600 MG tablet Take 1 tablet by mouth every 6 hours as needed      insulin aspart (NOVOLOG) 100 UNIT/ML injection vial USE AS DIRECTED IN INSULIN PUMP.  UNITS/DAY. insulin aspart (NOVOLOG FLEXPEN) 100 UNIT/ML injection pen Inject 1 unit for 9 grams of carbs + 1 units for every 75 mg/dl above 150 mg/dl--max dose 50 units/day      insulin glargine (LANTUS SOLOSTAR) 100 UNIT/ML injection pen INJECT 28 UNITS DAILY IN THE EVENING--Dose change 02/15/23--updated med list--did not send prescription to the pharmacy      lisinopril (PRINIVIL;ZESTRIL) 40 MG tablet Take 1 tablet by mouth daily      ondansetron (ZOFRAN-ODT) 4 MG disintegrating tablet Take 1 tablet by mouth every 8 hours as needed (Patient not taking: Reported on 7/12/2023)       No current facility-administered medications for this visit.

## 2023-08-07 ENCOUNTER — OFFICE VISIT (OUTPATIENT)
Facility: CLINIC | Age: 38
End: 2023-08-07
Payer: MEDICAID

## 2023-08-07 ENCOUNTER — OFFICE VISIT (OUTPATIENT)
Age: 38
End: 2023-08-07
Payer: MEDICAID

## 2023-08-07 VITALS
TEMPERATURE: 98.1 F | HEIGHT: 63 IN | DIASTOLIC BLOOD PRESSURE: 72 MMHG | HEART RATE: 66 BPM | WEIGHT: 235.7 LBS | SYSTOLIC BLOOD PRESSURE: 136 MMHG | RESPIRATION RATE: 18 BRPM | OXYGEN SATURATION: 97 % | BODY MASS INDEX: 41.76 KG/M2

## 2023-08-07 VITALS
WEIGHT: 236 LBS | SYSTOLIC BLOOD PRESSURE: 134 MMHG | HEART RATE: 63 BPM | OXYGEN SATURATION: 98 % | DIASTOLIC BLOOD PRESSURE: 86 MMHG | RESPIRATION RATE: 16 BRPM | HEIGHT: 63 IN | TEMPERATURE: 97.4 F | BODY MASS INDEX: 41.82 KG/M2

## 2023-08-07 DIAGNOSIS — E10.9 TYPE 1 DIABETES MELLITUS WITHOUT COMPLICATION (HCC): ICD-10-CM

## 2023-08-07 DIAGNOSIS — R79.89 ABNORMAL THYROID BLOOD TEST: ICD-10-CM

## 2023-08-07 DIAGNOSIS — Z13.0 SCREENING FOR DEFICIENCY ANEMIA: ICD-10-CM

## 2023-08-07 DIAGNOSIS — Z13.220 SCREENING CHOLESTEROL LEVEL: ICD-10-CM

## 2023-08-07 DIAGNOSIS — E66.01 MORBID (SEVERE) OBESITY DUE TO EXCESS CALORIES (HCC): Primary | ICD-10-CM

## 2023-08-07 DIAGNOSIS — E55.9 VITAMIN D DEFICIENCY: ICD-10-CM

## 2023-08-07 DIAGNOSIS — Z00.00 ENCOUNTER FOR WELLNESS EXAMINATION IN ADULT: Primary | ICD-10-CM

## 2023-08-07 DIAGNOSIS — Z85.3 HISTORY OF BREAST CANCER: ICD-10-CM

## 2023-08-07 DIAGNOSIS — Z13.228 ENCOUNTER FOR SCREENING FOR OTHER METABOLIC DISORDERS: ICD-10-CM

## 2023-08-07 DIAGNOSIS — E10.9 TYPE 1 DIABETES MELLITUS WITHOUT COMPLICATIONS (HCC): ICD-10-CM

## 2023-08-07 DIAGNOSIS — E55.9 VITAMIN D DEFICIENCY, UNSPECIFIED: ICD-10-CM

## 2023-08-07 DIAGNOSIS — I10 ESSENTIAL HYPERTENSION, BENIGN: ICD-10-CM

## 2023-08-07 DIAGNOSIS — I10 ESSENTIAL (PRIMARY) HYPERTENSION: ICD-10-CM

## 2023-08-07 DIAGNOSIS — Z79.4 ENCOUNTER FOR LONG-TERM (CURRENT) USE OF INSULIN (HCC): ICD-10-CM

## 2023-08-07 DIAGNOSIS — E66.01 MORBID OBESITY WITH BMI OF 40.0-44.9, ADULT (HCC): ICD-10-CM

## 2023-08-07 PROBLEM — O13.3 PREGNANCY INDUCED HYPERTENSION, THIRD TRIMESTER: Status: RESOLVED | Noted: 2019-01-29 | Resolved: 2023-08-07

## 2023-08-07 PROBLEM — Z51.89 WOUND CHECK, ABSCESS: Status: RESOLVED | Noted: 2017-09-22 | Resolved: 2023-08-07

## 2023-08-07 PROBLEM — I50.9 ACUTE HEART FAILURE (HCC): Status: RESOLVED | Noted: 2022-03-24 | Resolved: 2023-08-07

## 2023-08-07 PROBLEM — O13.9 PREGNANCY INDUCED HYPERTENSION: Status: RESOLVED | Noted: 2019-02-20 | Resolved: 2023-08-07

## 2023-08-07 PROCEDURE — 3074F SYST BP LT 130 MM HG: CPT | Performed by: FAMILY MEDICINE

## 2023-08-07 PROCEDURE — 3075F SYST BP GE 130 - 139MM HG: CPT | Performed by: NURSE PRACTITIONER

## 2023-08-07 PROCEDURE — 3078F DIAST BP <80 MM HG: CPT | Performed by: NURSE PRACTITIONER

## 2023-08-07 PROCEDURE — 99386 PREV VISIT NEW AGE 40-64: CPT | Performed by: NURSE PRACTITIONER

## 2023-08-07 PROCEDURE — 99214 OFFICE O/P EST MOD 30 MIN: CPT | Performed by: FAMILY MEDICINE

## 2023-08-07 PROCEDURE — 3051F HG A1C>EQUAL 7.0%<8.0%: CPT | Performed by: FAMILY MEDICINE

## 2023-08-07 PROCEDURE — 3078F DIAST BP <80 MM HG: CPT | Performed by: FAMILY MEDICINE

## 2023-08-07 SDOH — ECONOMIC STABILITY: FOOD INSECURITY: WITHIN THE PAST 12 MONTHS, YOU WORRIED THAT YOUR FOOD WOULD RUN OUT BEFORE YOU GOT MONEY TO BUY MORE.: NEVER TRUE

## 2023-08-07 SDOH — ECONOMIC STABILITY: INCOME INSECURITY: HOW HARD IS IT FOR YOU TO PAY FOR THE VERY BASICS LIKE FOOD, HOUSING, MEDICAL CARE, AND HEATING?: NOT HARD AT ALL

## 2023-08-07 SDOH — ECONOMIC STABILITY: HOUSING INSECURITY
IN THE LAST 12 MONTHS, WAS THERE A TIME WHEN YOU DID NOT HAVE A STEADY PLACE TO SLEEP OR SLEPT IN A SHELTER (INCLUDING NOW)?: NO

## 2023-08-07 SDOH — ECONOMIC STABILITY: FOOD INSECURITY: WITHIN THE PAST 12 MONTHS, THE FOOD YOU BOUGHT JUST DIDN'T LAST AND YOU DIDN'T HAVE MONEY TO GET MORE.: NEVER TRUE

## 2023-08-07 ASSESSMENT — PATIENT HEALTH QUESTIONNAIRE - PHQ9
SUM OF ALL RESPONSES TO PHQ9 QUESTIONS 1 & 2: 0
SUM OF ALL RESPONSES TO PHQ QUESTIONS 1-9: 0
2. FEELING DOWN, DEPRESSED OR HOPELESS: 0
SUM OF ALL RESPONSES TO PHQ QUESTIONS 1-9: 0
SUM OF ALL RESPONSES TO PHQ QUESTIONS 1-9: 0
1. LITTLE INTEREST OR PLEASURE IN DOING THINGS: 0
SUM OF ALL RESPONSES TO PHQ QUESTIONS 1-9: 0

## 2023-08-07 NOTE — PROGRESS NOTES
[facility] Progress Note:   F/up Physician Visit    CC: Weight Management      Paulita Hamman is a 34 y.o. female who is here for her  f/up physician visit for the / LCD Program.  July [birth year]  Now 44    Taking wellbutrin        She thinks she is compliant 5/7 days of the week  She has hd several celebrations this past month    DM  Staying under 180 on current meds  She has an insulin pump which auto adjusts  She is bolusing 10 units for B and L    On the way home she has an atkins bar and boluses 15 units          Weight Metrics 8/7/2023 7/27/2023 7/12/2023 5/15/2023 11/7/2022 9/13/2022 9/8/2022   Weight 235 lb 11.2 oz 238 lb 6.4 oz 241 lb 1.6 oz 240 lb 227 lb 6.4 oz 229 lb 229 lb   Neck (Inches) 14 - 14 - - - -   Waist Measure Inches 39.5 - 41 - - - -   Body Fat % 43.9 - 44.5 - - - -   BMI (Calculated) 41.8 kg/m2 42.3 kg/m2 42.8 kg/m2 42.6 kg/m2 40.4 kg/m2 40.7 kg/m2 40.7 kg/m2   No flowsheet data found. Current Outpatient Medications   Medication Sig Dispense Refill    clindamycin (CLEOCIN) 300 MG capsule TAKE 1 CAPSULE BY MOUTH THREE TIMES A DAY AS NEEDED BOILS      buPROPion (WELLBUTRIN SR) 150 MG extended release tablet Take 1 tablet by mouth 2 times daily 60 tablet 2    TRUE METRIX BLOOD GLUCOSE TEST strip USE TO CHECK BLOOD SUGAR FOUR TIMES A DAY. 100 strip 15    Multiple Vitamin (MULTIVITAMIN ADULT PO) Take by mouth daily      vitamin B-12 (CYANOCOBALAMIN) 100 MCG tablet Take 0.5 tablets by mouth daily      Lancets MISC Use to check blood sugar four times a day. vitamin D3 (CHOLECALCIFEROL) 125 MCG (5000 UT) TABS tablet Take by mouth daily      hydroCHLOROthiazide (HYDRODIURIL) 25 MG tablet Take 1 tablet by mouth daily      ibuprofen (ADVIL;MOTRIN) 600 MG tablet Take 1 tablet by mouth every 6 hours as needed      insulin aspart (NOVOLOG) 100 UNIT/ML injection vial USE AS DIRECTED IN INSULIN PUMP.  UNITS/DAY.       insulin aspart (NOVOLOG FLEXPEN) 100 UNIT/ML

## 2023-08-07 NOTE — PROGRESS NOTES
2023    Maik Thomson (:  1985) is a 34 y.o. female, here for a preventive medicine evaluation. 78-year-old female presents as a new patient and as such I will be doing her annual wellness with physical and fasting labs. Her previous PCP was doing weight management with her at [facility]. She has agreed to come back later for labs her chronic conditions include hypertension, abnormal thyroid, vitamin D, obesity, and type 1 diabetes. Her history is also notable for right mastectomy in  and she has been managed annually in March by the [address] [facility] and they also do her mammogram.  Prior to coming to this practice her old PCP was administrating a medical weight loss program and she is lost 6 pounds in 3 weeks that she has been in the program.  Patient is a  at the [address] [place] [address] since  and has no current complaints at this time. Patient Active Problem List   Diagnosis    Encounter for long-term (current) use of other medications    Abnormal thyroid blood test    Morbid obesity with BMI of 40.0-44.9, adult ([address] [facility])    Type 1 diabetes mellitus without complication (HCC)    Essential hypertension, benign    Encounter for wellness examination in adult    Vitamin D deficiency    History of breast cancer    Hidradenitis suppurativa       Review of Systems  ROS per HPI and PMH    Prior to Visit Medications    Medication Sig Taking? Authorizing Provider   clindamycin (CLEOCIN) 300 MG capsule TAKE 1 CAPSULE BY MOUTH THREE TIMES A DAY AS NEEDED BOILS Yes Historical Provider, MD   buPROPion (WELLBUTRIN SR) 150 MG extended release tablet Take 1 tablet by mouth 2 times daily Yes Patricia Perry MD   TRUE METRIX BLOOD GLUCOSE TEST strip USE TO CHECK BLOOD SUGAR FOUR TIMES A DAY.  Yes Alberto Dumont MD   Multiple Vitamin (MULTIVITAMIN ADULT PO) Take by mouth daily Yes Historical Provider, MD   vitamin B-12 (CYANOCOBALAMIN) 100 MCG tablet Take 0.5 tablets by mouth daily Yes Historical Provider,

## 2023-08-09 ENCOUNTER — OFFICE VISIT (OUTPATIENT)
Age: 38
End: 2023-08-09

## 2023-08-09 DIAGNOSIS — E66.01 MORBID OBESITY (HCC): Primary | ICD-10-CM

## 2023-08-09 LAB
25(OH)D3+25(OH)D2 SERPL-MCNC: 67.2 NG/ML (ref 30–100)
ALBUMIN SERPL-MCNC: 3.9 G/DL (ref 3.9–4.9)
ALBUMIN/GLOB SERPL: 1.2 {RATIO} (ref 1.2–2.2)
ALP SERPL-CCNC: 88 IU/L (ref 44–121)
ALT SERPL-CCNC: 15 IU/L (ref 0–32)
AST SERPL-CCNC: 19 IU/L (ref 0–40)
BASOPHILS # BLD AUTO: 0 X10E3/UL (ref 0–0.2)
BASOPHILS NFR BLD AUTO: 0 %
BILIRUB SERPL-MCNC: 0.2 MG/DL (ref 0–1.2)
BUN SERPL-MCNC: 23 MG/DL (ref 6–24)
BUN/CREAT SERPL: 28 (ref 9–23)
CALCIUM SERPL-MCNC: 9.6 MG/DL (ref 8.7–10.2)
CHLORIDE SERPL-SCNC: 101 MMOL/L (ref 96–106)
CHOLEST SERPL-MCNC: 179 MG/DL (ref 100–199)
CO2 SERPL-SCNC: 24 MMOL/L (ref 20–29)
CREAT SERPL-MCNC: 0.83 MG/DL (ref 0.57–1)
EGFRCR SERPLBLD CKD-EPI 2021: 91 ML/MIN/1.73
EOSINOPHIL # BLD AUTO: 0.2 X10E3/UL (ref 0–0.4)
EOSINOPHIL NFR BLD AUTO: 5 %
ERYTHROCYTE [DISTWIDTH] IN BLOOD BY AUTOMATED COUNT: 11.9 % (ref 11.7–15.4)
GLOBULIN SER CALC-MCNC: 3.2 G/DL (ref 1.5–4.5)
GLUCOSE SERPL-MCNC: 120 MG/DL (ref 70–99)
HBA1C MFR BLD: 7.2 % (ref 4.8–5.6)
HCT VFR BLD AUTO: 39 % (ref 34–46.6)
HDLC SERPL-MCNC: 78 MG/DL
HGB BLD-MCNC: 12.8 G/DL (ref 11.1–15.9)
IMM GRANULOCYTES # BLD AUTO: 0 X10E3/UL (ref 0–0.1)
IMM GRANULOCYTES NFR BLD AUTO: 0 %
LDLC SERPL CALC-MCNC: 91 MG/DL (ref 0–99)
LYMPHOCYTES # BLD AUTO: 1.9 X10E3/UL (ref 0.7–3.1)
LYMPHOCYTES NFR BLD AUTO: 35 %
MCH RBC QN AUTO: 29.8 PG (ref 26.6–33)
MCHC RBC AUTO-ENTMCNC: 32.8 G/DL (ref 31.5–35.7)
MCV RBC AUTO: 91 FL (ref 79–97)
MONOCYTES # BLD AUTO: 0.7 X10E3/UL (ref 0.1–0.9)
MONOCYTES NFR BLD AUTO: 14 %
NEUTROPHILS # BLD AUTO: 2.4 X10E3/UL (ref 1.4–7)
NEUTROPHILS NFR BLD AUTO: 46 %
PLATELET # BLD AUTO: 283 X10E3/UL (ref 150–450)
POTASSIUM SERPL-SCNC: 4.4 MMOL/L (ref 3.5–5.2)
PROT SERPL-MCNC: 7.1 G/DL (ref 6–8.5)
RBC # BLD AUTO: 4.29 X10E6/UL (ref 3.77–5.28)
SODIUM SERPL-SCNC: 140 MMOL/L (ref 134–144)
TRIGL SERPL-MCNC: 51 MG/DL (ref 0–149)
VLDLC SERPL CALC-MCNC: 10 MG/DL (ref 5–40)
WBC # BLD AUTO: 5.3 X10E3/UL (ref 3.4–10.8)

## 2023-08-14 RX ORDER — VALSARTAN 320 MG/1
320 TABLET ORAL DAILY
Qty: 30 TABLET | Refills: 11 | Status: SHIPPED | OUTPATIENT
Start: 2023-08-14

## 2023-08-16 ENCOUNTER — OFFICE VISIT (OUTPATIENT)
Age: 38
End: 2023-08-16

## 2023-08-16 DIAGNOSIS — E66.01 MORBID OBESITY (HCC): Primary | ICD-10-CM

## 2023-08-17 ENCOUNTER — TELEPHONE (OUTPATIENT)
Age: 38
End: 2023-08-17

## 2023-08-17 NOTE — TELEPHONE ENCOUNTER
Called patient to start our virtual nutrition consult scheduled today 8/17/23 at 2:30pm.  Left a voicemail letting patient know to call the office if she would like to continue appointment. [phone].

## 2023-08-24 ENCOUNTER — NURSE ONLY (OUTPATIENT)
Age: 38
End: 2023-08-24

## 2023-08-24 VITALS
BODY MASS INDEX: 41.89 KG/M2 | TEMPERATURE: 98.9 F | SYSTOLIC BLOOD PRESSURE: 137 MMHG | DIASTOLIC BLOOD PRESSURE: 87 MMHG | WEIGHT: 236.4 LBS | OXYGEN SATURATION: 98 % | RESPIRATION RATE: 18 BRPM | HEIGHT: 63 IN | HEART RATE: 69 BPM

## 2023-08-24 DIAGNOSIS — E66.01 MORBID (SEVERE) OBESITY DUE TO EXCESS CALORIES (HCC): Primary | ICD-10-CM

## 2023-08-24 DIAGNOSIS — E10.9 TYPE 1 DIABETES MELLITUS WITHOUT COMPLICATIONS (HCC): ICD-10-CM

## 2023-08-24 DIAGNOSIS — I10 ESSENTIAL (PRIMARY) HYPERTENSION: ICD-10-CM

## 2023-08-24 ASSESSMENT — PATIENT HEALTH QUESTIONNAIRE - PHQ9
2. FEELING DOWN, DEPRESSED OR HOPELESS: 0
SUM OF ALL RESPONSES TO PHQ9 QUESTIONS 1 & 2: 0
SUM OF ALL RESPONSES TO PHQ QUESTIONS 1-9: 0
2. FEELING DOWN, DEPRESSED OR HOPELESS: 0
1. LITTLE INTEREST OR PLEASURE IN DOING THINGS: 0
1. LITTLE INTEREST OR PLEASURE IN DOING THINGS: 0
SUM OF ALL RESPONSES TO PHQ QUESTIONS 1-9: 0
SUM OF ALL RESPONSES TO PHQ9 QUESTIONS 1 & 2: 0
SUM OF ALL RESPONSES TO PHQ QUESTIONS 1-9: 0

## 2023-08-28 DIAGNOSIS — I10 ESSENTIAL (PRIMARY) HYPERTENSION: ICD-10-CM

## 2023-08-28 RX ORDER — HYDROCHLOROTHIAZIDE 25 MG/1
TABLET ORAL
Qty: 90 TABLET | Refills: 5 | Status: SHIPPED | OUTPATIENT
Start: 2023-08-28

## 2023-08-30 ENCOUNTER — OFFICE VISIT (OUTPATIENT)
Age: 38
End: 2023-08-30

## 2023-08-30 DIAGNOSIS — E66.01 MORBID OBESITY (HCC): Primary | ICD-10-CM

## 2023-08-30 NOTE — PROGRESS NOTES
Nurse note from patient's weekly  LCD / Maintenance class was reviewed. Pertinent medical concerns were:   reviewed     BP Readings from Last 3 Encounters:   08/24/23 137/87   08/07/23 134/86   08/07/23 136/72       No flowsheet data found. Current Outpatient Medications   Medication Sig Dispense Refill    valsartan (DIOVAN) 320 MG tablet Take 1 tablet by mouth daily Replaces lisinopril. For blood pressure and kidney protection 30 tablet 11    clindamycin (CLEOCIN) 300 MG capsule TAKE 1 CAPSULE BY MOUTH THREE TIMES A DAY AS NEEDED BOILS      buPROPion (WELLBUTRIN SR) 150 MG extended release tablet Take 1 tablet by mouth 2 times daily 60 tablet 2    TRUE METRIX BLOOD GLUCOSE TEST strip USE TO CHECK BLOOD SUGAR FOUR TIMES A DAY. 100 strip 15    Multiple Vitamin (MULTIVITAMIN ADULT PO) Take by mouth daily      vitamin B-12 (CYANOCOBALAMIN) 100 MCG tablet Take 0.5 tablets by mouth daily      Lancets MISC Use to check blood sugar four times a day. vitamin D3 (CHOLECALCIFEROL) 125 MCG (5000 UT) TABS tablet Take by mouth daily      ibuprofen (ADVIL;MOTRIN) 600 MG tablet Take 1 tablet by mouth every 6 hours as needed      insulin aspart (NOVOLOG) 100 UNIT/ML injection vial USE AS DIRECTED IN INSULIN PUMP.  UNITS/DAY. insulin aspart (NOVOLOG FLEXPEN) 100 UNIT/ML injection pen Inject 1 unit for 9 grams of carbs + 1 units for every 75 mg/dl above 150 mg/dl--max dose 50 units/day      insulin glargine (LANTUS SOLOSTAR) 100 UNIT/ML injection pen INJECT 28 UNITS DAILY IN THE EVENING--Dose change 02/15/23--updated med list--did not send prescription to the pharmacy      hydroCHLOROthiazide (HYDRODIURIL) 25 MG tablet TAKE 1 TABLET BY MOUTH EVERY DAY 90 tablet 5     No current facility-administered medications for this visit.

## 2023-09-04 NOTE — PROGRESS NOTES
[facility]  Metabolic Weight Loss Program        Patient's Name: Alisha Plummer  : 1985    This patient is a participant at [address] [place] [address] [facility] and attended the weekly virtual nutrition class hosted via [employer].       Javed Evans, MS, RD, LDN

## 2023-09-05 ENCOUNTER — OFFICE VISIT (OUTPATIENT)
Age: 38
End: 2023-09-05
Payer: MEDICAID

## 2023-09-05 VITALS
DIASTOLIC BLOOD PRESSURE: 77 MMHG | HEART RATE: 73 BPM | WEIGHT: 235.9 LBS | TEMPERATURE: 98.4 F | RESPIRATION RATE: 16 BRPM | HEIGHT: 63 IN | SYSTOLIC BLOOD PRESSURE: 115 MMHG | OXYGEN SATURATION: 97 % | BODY MASS INDEX: 41.8 KG/M2

## 2023-09-05 DIAGNOSIS — E10.9 TYPE 1 DIABETES MELLITUS WITHOUT COMPLICATION (HCC): ICD-10-CM

## 2023-09-05 DIAGNOSIS — E66.01 MORBID OBESITY WITH BMI OF 40.0-44.9, ADULT (HCC): Primary | ICD-10-CM

## 2023-09-05 DIAGNOSIS — I10 ESSENTIAL HYPERTENSION, BENIGN: ICD-10-CM

## 2023-09-05 DIAGNOSIS — E55.9 VITAMIN D DEFICIENCY: ICD-10-CM

## 2023-09-05 PROCEDURE — 3074F SYST BP LT 130 MM HG: CPT | Performed by: FAMILY MEDICINE

## 2023-09-05 PROCEDURE — 3078F DIAST BP <80 MM HG: CPT | Performed by: FAMILY MEDICINE

## 2023-09-05 PROCEDURE — 99214 OFFICE O/P EST MOD 30 MIN: CPT | Performed by: FAMILY MEDICINE

## 2023-09-05 PROCEDURE — 3051F HG A1C>EQUAL 7.0%<8.0%: CPT | Performed by: FAMILY MEDICINE

## 2023-09-05 RX ORDER — TRIAMCINOLONE ACETONIDE 1 MG/G
CREAM TOPICAL
COMMUNITY
Start: 2023-08-25

## 2023-09-05 ASSESSMENT — PATIENT HEALTH QUESTIONNAIRE - PHQ9
SUM OF ALL RESPONSES TO PHQ QUESTIONS 1-9: 0
2. FEELING DOWN, DEPRESSED OR HOPELESS: 0
1. LITTLE INTEREST OR PLEASURE IN DOING THINGS: 0
SUM OF ALL RESPONSES TO PHQ9 QUESTIONS 1 & 2: 0
SUM OF ALL RESPONSES TO PHQ QUESTIONS 1-9: 0

## 2023-09-05 NOTE — PROGRESS NOTES
[facility] Progress Note:   F/up Physician Visit    CC: Weight Management      Burr Sandhoff is a 34 y.o. female who is here for her  f/up physician visit for the / LCD Program.  Aug 236  Now 235  She is getting low blood sugars at night  She has not adjusted the insulin down yet  When the sugar drops she eats sweets to bring it back up  I explained again that since she has decreased the sugars and cals she is eating she will not need as much insulin and she needs to adjust the basal insulin down and continue adjusting as the blood sugar decreases        9/5/2023     1:34 PM 9/5/2023     1:00 PM 8/24/2023     1:16 PM 8/7/2023    11:36 AM 8/7/2023     9:31 AM 8/7/2023     9:00 AM 7/27/2023    10:34 AM   Weight Metrics   Weight 235 lb 14.4 oz  236 lb 6.4 oz 236 lb 235 lb 11.2 oz  238 lb 6.4 oz   Neck (Inches)  14.25 in    14 in    Waist Measure Inches  39 in    39.5 in    Body Fat %  44 %    43.9 %    BMI (Calculated) 41.9 kg/m2  42 kg/m2 41.9 kg/m2 41.8 kg/m2  42.3 kg/m2          No data to display                   Current Outpatient Medications   Medication Sig Dispense Refill    triamcinolone (KENALOG) 0.1 % cream APPLY TO AFFECTED AREA 2 OR 3 TIMES DAILY AS NEEDED      hydroCHLOROthiazide (HYDRODIURIL) 25 MG tablet TAKE 1 TABLET BY MOUTH EVERY DAY 90 tablet 5    valsartan (DIOVAN) 320 MG tablet Take 1 tablet by mouth daily Replaces lisinopril. For blood pressure and kidney protection 30 tablet 11    clindamycin (CLEOCIN) 300 MG capsule TAKE 1 CAPSULE BY MOUTH THREE TIMES A DAY AS NEEDED BOILS      buPROPion (WELLBUTRIN SR) 150 MG extended release tablet Take 1 tablet by mouth 2 times daily 60 tablet 2    TRUE METRIX BLOOD GLUCOSE TEST strip USE TO CHECK BLOOD SUGAR FOUR TIMES A DAY. 100 strip 15    Multiple Vitamin (MULTIVITAMIN ADULT PO) Take 1 tablet by mouth daily      Lancets MISC Use to check blood sugar four times a day.       vitamin D3 (CHOLECALCIFEROL) 125 MCG (5000 UT) TABS

## 2023-09-06 ENCOUNTER — OFFICE VISIT (OUTPATIENT)
Age: 38
End: 2023-09-06

## 2023-09-06 DIAGNOSIS — E66.01 MORBID OBESITY (HCC): Primary | ICD-10-CM

## 2023-09-06 NOTE — PROGRESS NOTES
8/28/2023    Progress Note: Weekly Education Class in the [facility]         Patient is on Very Low Calorie Diet [] (4 meal replacements per day, 800 kcal/day)      Low Calorie Diet [x] (2-3 meal replacements per day, 0572-9402 kcal/day)    1) Did patient have any new symptoms or physical problems? Yes [x]    No []    If yes, check & comment: weakness [], fatigue [], lightheadedness [], headache [], cramps [], cold intolerance [], hair loss [], diarrhea [], constipation [x],  NA [] other: itching                                2) Has patient had any medical attention from other providers, urgent care or the emergency room this week? Yes   No []       NA [], If yes, why: [facility] for severe itching                                      3) Any other sugar sweetened beverages consumed this week? Yes [x]  No []    4) Did patient have any problems adhering to the diet? Yes [x]  No [] NA []    If yes, Vacation [], Celebrations [], Conferences [], Family Reunions [] other: itching                                               5) How many hours of sleep this week? 4-7    (range)  NA []    Number of meal replacements consumed daily? 2 (range)  NA []    Average ounces of water patient consumed daily this week (not including shakes)? 20     (divide the weekly total by 7)    Did you eat any food outside of the program? Yes [x] No []    Physical Activity Over the Past Week:    Cardio exercise: 80 min  Strength exercise: 3 workouts / week  Number of steps walked per day: 9401-5937    How has patient mood overall been this week? Sad [], Happy [], Stressed [], Tired [], Content [], NA [], other Several different moods             Medications reconciled by nurse Yes [x]  No[]    Patient was given therapeutic recommendations for any noted side effects of their dietary approach based upon [facility] patient manual per providers recommendation.      9/5/2023    Progress Note: Weekly Education Class in

## 2023-09-07 ENCOUNTER — TELEPHONE (OUTPATIENT)
Age: 38
End: 2023-09-07

## 2023-09-07 NOTE — TELEPHONE ENCOUNTER
Informed pt Dr. Beth Guzman has sent a [employer] message that you have not yet read. Please read this as soon as possible.  Pt verbalized understanding

## 2023-09-07 NOTE — TELEPHONE ENCOUNTER
Please call pt to let her know she has an unread message in [employer]: To clarify, do you have control IQ turned on? If so, you really should not be having low sugars overnight as the pump should stop delivering insulin to prevent this from happening. Let me know when you have a chance.

## 2023-09-07 NOTE — PROGRESS NOTES
[facility]  Metabolic Weight Loss Program        Patient's Name: Isabel Veras  : 1985    This patient is a participant at [facility] [place] and attended the weekly virtual nutrition class hosted via [employer].       Leonardo Conn, MS, RD, LDN

## 2023-09-13 NOTE — PROGRESS NOTES
[facility]  Metabolic Weight Loss Program        Patient's Name: Paulita Hamman  : 1985    This patient is a participant at [address] [place] [address] [facility] and attended the weekly virtual nutrition class hosted via [employer].       Monster Pierson, MS, RD, LDN

## 2023-09-20 ENCOUNTER — PATIENT MESSAGE (OUTPATIENT)
Age: 38
End: 2023-09-20

## 2023-09-21 ENCOUNTER — NURSE ONLY (OUTPATIENT)
Age: 38
End: 2023-09-21

## 2023-09-21 VITALS
SYSTOLIC BLOOD PRESSURE: 132 MMHG | DIASTOLIC BLOOD PRESSURE: 83 MMHG | HEIGHT: 63 IN | TEMPERATURE: 98.9 F | WEIGHT: 229.1 LBS | RESPIRATION RATE: 18 BRPM | OXYGEN SATURATION: 94 % | BODY MASS INDEX: 40.59 KG/M2 | HEART RATE: 95 BPM

## 2023-09-21 DIAGNOSIS — E10.9 TYPE 1 DIABETES MELLITUS WITHOUT COMPLICATION (HCC): ICD-10-CM

## 2023-09-21 DIAGNOSIS — E66.01 CLASS 3 SEVERE OBESITY DUE TO EXCESS CALORIES WITH SERIOUS COMORBIDITY AND BODY MASS INDEX (BMI) OF 40.0 TO 44.9 IN ADULT (HCC): Primary | ICD-10-CM

## 2023-09-21 DIAGNOSIS — E55.9 VITAMIN D DEFICIENCY: ICD-10-CM

## 2023-09-21 DIAGNOSIS — I10 ESSENTIAL HYPERTENSION, BENIGN: ICD-10-CM

## 2023-09-21 ASSESSMENT — PATIENT HEALTH QUESTIONNAIRE - PHQ9
SUM OF ALL RESPONSES TO PHQ9 QUESTIONS 1 & 2: 0
SUM OF ALL RESPONSES TO PHQ QUESTIONS 1-9: 0
SUM OF ALL RESPONSES TO PHQ QUESTIONS 1-9: 0
1. LITTLE INTEREST OR PLEASURE IN DOING THINGS: 0
SUM OF ALL RESPONSES TO PHQ QUESTIONS 1-9: 0
SUM OF ALL RESPONSES TO PHQ QUESTIONS 1-9: 0
2. FEELING DOWN, DEPRESSED OR HOPELESS: 0

## 2023-09-27 ENCOUNTER — OFFICE VISIT (OUTPATIENT)
Facility: CLINIC | Age: 38
End: 2023-09-27
Payer: MEDICAID

## 2023-09-27 VITALS
HEIGHT: 63 IN | BODY MASS INDEX: 40.57 KG/M2 | WEIGHT: 229 LBS | SYSTOLIC BLOOD PRESSURE: 136 MMHG | TEMPERATURE: 97.8 F | RESPIRATION RATE: 18 BRPM | HEART RATE: 84 BPM | OXYGEN SATURATION: 98 % | DIASTOLIC BLOOD PRESSURE: 78 MMHG

## 2023-09-27 DIAGNOSIS — E10.9 TYPE 1 DIABETES MELLITUS WITHOUT COMPLICATION (HCC): ICD-10-CM

## 2023-09-27 DIAGNOSIS — L20.9 ATOPIC DERMATITIS, UNSPECIFIED TYPE: ICD-10-CM

## 2023-09-27 DIAGNOSIS — J40 BRONCHITIS: Primary | ICD-10-CM

## 2023-09-27 PROCEDURE — 99214 OFFICE O/P EST MOD 30 MIN: CPT | Performed by: FAMILY MEDICINE

## 2023-09-27 PROCEDURE — 3051F HG A1C>EQUAL 7.0%<8.0%: CPT | Performed by: FAMILY MEDICINE

## 2023-09-27 PROCEDURE — 3078F DIAST BP <80 MM HG: CPT | Performed by: FAMILY MEDICINE

## 2023-09-27 PROCEDURE — 3075F SYST BP GE 130 - 139MM HG: CPT | Performed by: FAMILY MEDICINE

## 2023-09-27 RX ORDER — PREDNISONE 20 MG/1
40 TABLET ORAL DAILY
Qty: 10 TABLET | Refills: 0 | Status: SHIPPED | OUTPATIENT
Start: 2023-09-27 | End: 2023-10-02

## 2023-09-27 RX ORDER — TRIAMCINOLONE ACETONIDE 1 MG/G
OINTMENT TOPICAL
Qty: 80 G | Refills: 0 | Status: SHIPPED | OUTPATIENT
Start: 2023-09-27

## 2023-09-27 NOTE — PROGRESS NOTES
Subjective  Chief Complaint   Patient presents with    Cough     Past several weeks, congestion, headache and cold symptoms. Past week increase cough noted and increase phelm. HPI:  Colby Valerio is a 34 y.o. female. Symptoms started about 2 weeks ago with headache and sneezing shortly after her daughter came home from school sick. She progressed to sinus congestion. Coricidin helping and starting to feel better but started coughing about 10 days ago. No fever. Cough is starting to improve in the last few days. Objective  Vitals:    09/27/23 1055   BP: 136/78   Pulse:    Resp:    Temp:    SpO2:      Physical Exam  Constitutional:       General: She is not in acute distress. Appearance: Normal appearance. She is normal weight. She is not ill-appearing. HENT:      Head: Normocephalic and atraumatic. Right Ear: Tympanic membrane, ear canal and external ear normal. There is no impacted cerumen. Left Ear: Tympanic membrane, ear canal and external ear normal. There is no impacted cerumen. Nose: No congestion or rhinorrhea. Right Sinus: No maxillary sinus tenderness or frontal sinus tenderness. Left Sinus: No maxillary sinus tenderness or frontal sinus tenderness. Mouth/Throat:      Mouth: Mucous membranes are moist.      Pharynx: Oropharynx is clear. No oropharyngeal exudate or posterior oropharyngeal erythema. Eyes:      General:         Right eye: No discharge. Left eye: No discharge. Conjunctiva/sclera: Conjunctivae normal.   Cardiovascular:      Rate and Rhythm: Normal rate and regular rhythm. Heart sounds: No murmur heard. Pulmonary:      Effort: Pulmonary effort is normal. No respiratory distress. Breath sounds: No stridor. Wheezing (end exp wheeze in b/l bases) and rales (mild inspir rales in b/l bases, R>L) present. No rhonchi. Musculoskeletal:      Cervical back: Neck supple. No tenderness.    Lymphadenopathy:      Cervical: No

## 2023-10-23 ENCOUNTER — OFFICE VISIT (OUTPATIENT)
Age: 38
End: 2023-10-23
Payer: COMMERCIAL

## 2023-10-23 VITALS
SYSTOLIC BLOOD PRESSURE: 129 MMHG | TEMPERATURE: 98.3 F | HEIGHT: 63 IN | OXYGEN SATURATION: 98 % | RESPIRATION RATE: 20 BRPM | WEIGHT: 232.8 LBS | HEART RATE: 75 BPM | BODY MASS INDEX: 41.25 KG/M2 | DIASTOLIC BLOOD PRESSURE: 81 MMHG

## 2023-10-23 DIAGNOSIS — E55.9 VITAMIN D DEFICIENCY: ICD-10-CM

## 2023-10-23 DIAGNOSIS — E66.01 CLASS 3 SEVERE OBESITY DUE TO EXCESS CALORIES WITH SERIOUS COMORBIDITY AND BODY MASS INDEX (BMI) OF 40.0 TO 44.9 IN ADULT (HCC): Primary | ICD-10-CM

## 2023-10-23 DIAGNOSIS — E10.9 TYPE 1 DIABETES MELLITUS WITHOUT COMPLICATION (HCC): ICD-10-CM

## 2023-10-23 DIAGNOSIS — I10 ESSENTIAL HYPERTENSION, BENIGN: ICD-10-CM

## 2023-10-23 PROCEDURE — 3078F DIAST BP <80 MM HG: CPT | Performed by: FAMILY MEDICINE

## 2023-10-23 PROCEDURE — 3074F SYST BP LT 130 MM HG: CPT | Performed by: FAMILY MEDICINE

## 2023-10-23 PROCEDURE — 99214 OFFICE O/P EST MOD 30 MIN: CPT | Performed by: FAMILY MEDICINE

## 2023-10-23 PROCEDURE — 3051F HG A1C>EQUAL 7.0%<8.0%: CPT | Performed by: FAMILY MEDICINE

## 2023-10-23 ASSESSMENT — PATIENT HEALTH QUESTIONNAIRE - PHQ9
SUM OF ALL RESPONSES TO PHQ9 QUESTIONS 1 & 2: 0
1. LITTLE INTEREST OR PLEASURE IN DOING THINGS: 0
2. FEELING DOWN, DEPRESSED OR HOPELESS: 0
SUM OF ALL RESPONSES TO PHQ QUESTIONS 1-9: 0

## 2023-10-23 NOTE — PROGRESS NOTES
[facility] Progress Note:   F/up Physician Visit    CC: Weight Management      Valente Calix is a 34 y.o. female who is here for her  f/up physician visit for the / LCD Program.    [id number]  Sept 235  Now 232     she has been getting the meal replavcements  C/o feeling exhausted a lot and not exercising as often   Gets up at 4am and goes to bed by 10 pm    She is taking the wellbutrin but forgets the pm  DMT1  Blood sugars avg 200 lately            10/23/2023     2:18 PM 10/23/2023     2:00 PM 9/27/2023    10:41 AM 9/21/2023     3:20 PM 9/5/2023     1:34 PM 9/5/2023     1:00 PM 8/24/2023     1:16 PM   Weight Metrics   Weight 232 lb 12.8 oz  229 lb 229 lb 1.6 oz 235 lb 14.4 oz  236 lb 6.4 oz   Neck (Inches)  14.25 in    14.25 in    Waist Measure Inches  40.25 in    39 in    Body Fat %  43.6 %    44 %    BMI (Calculated) 41.3 kg/m2  40.7 kg/m2 40.7 kg/m2 41.9 kg/m2  42 kg/m2          No data to display                   Current Outpatient Medications   Medication Sig Dispense Refill    triamcinolone (KENALOG) 0.1 % ointment Apply topically 2 times daily to affected after. Do not use for >2 weeks consecutively. 80 g 0    hydroCHLOROthiazide (HYDRODIURIL) 25 MG tablet TAKE 1 TABLET BY MOUTH EVERY DAY 90 tablet 5    valsartan (DIOVAN) 320 MG tablet Take 1 tablet by mouth daily Replaces lisinopril. For blood pressure and kidney protection 30 tablet 11    buPROPion (WELLBUTRIN SR) 150 MG extended release tablet Take 1 tablet by mouth 2 times daily 60 tablet 2    TRUE METRIX BLOOD GLUCOSE TEST strip USE TO CHECK BLOOD SUGAR FOUR TIMES A DAY. 100 strip 15    Multiple Vitamin (MULTIVITAMIN ADULT PO) Take 1 tablet by mouth daily      Lancets MISC Use to check blood sugar four times a day.       vitamin D3 (CHOLECALCIFEROL) 125 MCG (5000 UT) TABS tablet Take 1 tablet by mouth daily      ibuprofen (ADVIL;MOTRIN) 600 MG tablet Take 1 tablet by mouth every 6 hours as needed      insulin aspart (NOVOLOG)

## 2023-10-31 ENCOUNTER — TELEPHONE (OUTPATIENT)
Age: 38
End: 2023-10-31

## 2023-10-31 NOTE — TELEPHONE ENCOUNTER
Pt states she can do a VV on her original appt date and time. Pt also confirmed she will have labs drawn.

## 2023-10-31 NOTE — TELEPHONE ENCOUNTER
Pt states she was scheduled to be seen on 11/13/23 at 3:30 PM but can not keep that appt. She states she was told the next available appt isn't until 02/2024 but states she doesn't want to wait that long to be seen. She asked if Dr Dasia Alvarado has any availability sooner than Feb 2024.

## 2023-10-31 NOTE — TELEPHONE ENCOUNTER
Can she switch to virtual that day? If not, we can check for the next available 12:10 slot that I have.

## 2023-11-01 DIAGNOSIS — E55.9 VITAMIN D DEFICIENCY, UNSPECIFIED: ICD-10-CM

## 2023-11-01 DIAGNOSIS — E78.2 MIXED HYPERLIPIDEMIA: ICD-10-CM

## 2023-11-01 DIAGNOSIS — E10.9 TYPE 1 DIABETES MELLITUS WITHOUT COMPLICATIONS (HCC): ICD-10-CM

## 2023-11-01 DIAGNOSIS — I10 ESSENTIAL (PRIMARY) HYPERTENSION: ICD-10-CM

## 2023-11-09 ENCOUNTER — NURSE ONLY (OUTPATIENT)
Age: 38
End: 2023-11-09

## 2023-11-09 VITALS
RESPIRATION RATE: 16 BRPM | SYSTOLIC BLOOD PRESSURE: 124 MMHG | TEMPERATURE: 97.6 F | HEART RATE: 72 BPM | HEIGHT: 63 IN | OXYGEN SATURATION: 96 % | DIASTOLIC BLOOD PRESSURE: 81 MMHG | BODY MASS INDEX: 40.93 KG/M2 | WEIGHT: 231 LBS

## 2023-11-09 DIAGNOSIS — E10.9 TYPE 1 DIABETES MELLITUS WITHOUT COMPLICATION (HCC): ICD-10-CM

## 2023-11-09 DIAGNOSIS — I10 ESSENTIAL HYPERTENSION, BENIGN: ICD-10-CM

## 2023-11-09 DIAGNOSIS — E66.01 CLASS 3 SEVERE OBESITY DUE TO EXCESS CALORIES WITH SERIOUS COMORBIDITY AND BODY MASS INDEX (BMI) OF 40.0 TO 44.9 IN ADULT (HCC): Primary | ICD-10-CM

## 2023-11-09 ASSESSMENT — PATIENT HEALTH QUESTIONNAIRE - PHQ9
SUM OF ALL RESPONSES TO PHQ9 QUESTIONS 1 & 2: 0
2. FEELING DOWN, DEPRESSED OR HOPELESS: 0
1. LITTLE INTEREST OR PLEASURE IN DOING THINGS: 0
SUM OF ALL RESPONSES TO PHQ QUESTIONS 1-9: 0

## 2023-11-11 LAB
25(OH)D3+25(OH)D2 SERPL-MCNC: 57.4 NG/ML (ref 30–100)
ALBUMIN SERPL-MCNC: 4.1 G/DL (ref 3.9–4.9)
ALBUMIN/GLOB SERPL: 1.3 {RATIO} (ref 1.2–2.2)
ALP SERPL-CCNC: 86 IU/L (ref 44–121)
ALT SERPL-CCNC: 15 IU/L (ref 0–32)
AST SERPL-CCNC: 17 IU/L (ref 0–40)
BILIRUB SERPL-MCNC: 0.4 MG/DL (ref 0–1.2)
BUN SERPL-MCNC: 15 MG/DL (ref 6–24)
BUN/CREAT SERPL: 17 (ref 9–23)
CALCIUM SERPL-MCNC: 9.6 MG/DL (ref 8.7–10.2)
CHLORIDE SERPL-SCNC: 102 MMOL/L (ref 96–106)
CHOLEST SERPL-MCNC: 195 MG/DL (ref 100–199)
CO2 SERPL-SCNC: 27 MMOL/L (ref 20–29)
CREAT SERPL-MCNC: 0.86 MG/DL (ref 0.57–1)
EGFRCR SERPLBLD CKD-EPI 2021: 88 ML/MIN/1.73
GLOBULIN SER CALC-MCNC: 3.1 G/DL (ref 1.5–4.5)
GLUCOSE SERPL-MCNC: 108 MG/DL (ref 70–99)
HBA1C MFR BLD: 7.3 % (ref 4.8–5.6)
HDLC SERPL-MCNC: 88 MG/DL
IMP & REVIEW OF LAB RESULTS: NORMAL
LDLC SERPL CALC-MCNC: 98 MG/DL (ref 0–99)
POTASSIUM SERPL-SCNC: 4.1 MMOL/L (ref 3.5–5.2)
PROT SERPL-MCNC: 7.2 G/DL (ref 6–8.5)
SODIUM SERPL-SCNC: 138 MMOL/L (ref 134–144)
TRIGL SERPL-MCNC: 45 MG/DL (ref 0–149)
VLDLC SERPL CALC-MCNC: 9 MG/DL (ref 5–40)

## 2023-11-13 ENCOUNTER — TELEMEDICINE (OUTPATIENT)
Age: 38
End: 2023-11-13
Payer: COMMERCIAL

## 2023-11-13 DIAGNOSIS — E78.2 MIXED HYPERLIPIDEMIA: ICD-10-CM

## 2023-11-13 DIAGNOSIS — E10.9 TYPE 1 DIABETES MELLITUS WITHOUT COMPLICATIONS (HCC): Primary | ICD-10-CM

## 2023-11-13 DIAGNOSIS — E55.9 VITAMIN D DEFICIENCY, UNSPECIFIED: ICD-10-CM

## 2023-11-13 DIAGNOSIS — I10 ESSENTIAL (PRIMARY) HYPERTENSION: ICD-10-CM

## 2023-11-13 PROCEDURE — 3051F HG A1C>EQUAL 7.0%<8.0%: CPT | Performed by: INTERNAL MEDICINE

## 2023-11-13 PROCEDURE — 99214 OFFICE O/P EST MOD 30 MIN: CPT | Performed by: INTERNAL MEDICINE

## 2023-11-13 NOTE — PATIENT INSTRUCTIONS
1) Your Hemoglobin A1c (3 month test of blood sugar) is 7.3% and is the best it's been since April 2021. Keep up the good work! It likely would have been lower had you not been on steroids last month. 2) BUN and creatinine are markers of kidney function. Your values are normal.    3) ALT and AST are markers of liver function. Your values are normal.    4) Your cholesterol and vitamin D are normal and your blood pressure is controlled. 5) Please come for a follow up visit on 5/20/24 at 8:30am  in our [place] office. 6) I put an order directly into the [employer] system to repeat your labs in the 1-2 weeks prior to your next visit so just ask for the order under my name and make a note on your calendar to have these done at that time. This order was also put directly into the [employer] portal.  Go under menu and my record and scroll down to upcoming tests and procedures and you are welcome to print this off or bring a copy of the order using the [employer] indra on your phone to the lab. Thanks!

## 2023-11-13 NOTE — PROGRESS NOTES
Chief Complaint   Patient presents with    Diabetes     MyChart pt instructed to wait for link    Other     PCP and pharmacy confirmed       **THIS IS A VIRTUAL VISIT VIA A VIDEO SYNCHRONOUS DISCUSSION. PATIENT AGREED TO HAVE THEIR CARE DELIVERED OVER A MYCHART VIDEO VISIT IN PLACE OF THEIR REGULARLY SCHEDULED OFFICE VISIT**    History of Present Illness: Nelia Madera is a 34 y.o. female here for follow up of diabetes. Weight down 9 lbs since last visit in 5/23. Current settings are as follows:   - basal: 12a: 0.65   - Carb ratio: 7   - sensitivity: 75   - target: 110   - active insulin time: 5 hours     she has the following indications to continue treatment with Dexcom:   1) she has type 1 diabetes (E10.9) and is on an intensive insulin regimen with an insulin pump   2) she tests her blood sugar 4 times per day and makes treatment decisions off her blood sugar readings and does the same off dexcom sensor readings   3) she requires frequent adjustments to her insulin pump settings based on her dexcom sensor readings   4) she has benefitted from therapeutic continuous glucose monitoring and I recommend that she continue this   5) she is seen in my office every 4-6 months    She has enjoyed the Tandem pump with control IQ over the past 6 months and is not having the issues with itching at the infusion sites like she did with omnipod. She finds that her current settings work very well but was having some hypoglycemia overnight and decreased her basal rate to 0.7 in 9/23 and just decreased it further to 0.65 yesterday. Has not made any other changes to her pump settings. She did have a respiratory infection about a month ago and needed a course of prednisone and this did raise her blood sugars but when not on steroids, they have been well controlled.   Her BP was 134/84 today on hctz 25 mg and diovan 320 mg daily, which I sent in 8/23 in place of lisinopril as she wanted to come off the lisinopril due to

## 2023-11-17 NOTE — PROGRESS NOTES
Nurse note from patient's weekly VLCD / LCD / Maintenance class was reviewed. Pertinent medical concerns were:   reviewed   Did not bring homework  Vitals nl  BP Readings from Last 3 Encounters:   11/09/23 124/81   10/23/23 129/81   09/27/23 136/78       No flowsheet data found. Current Outpatient Medications   Medication Sig Dispense Refill    triamcinolone (KENALOG) 0.1 % ointment Apply topically 2 times daily to affected after. Do not use for >2 weeks consecutively. 80 g 0    hydroCHLOROthiazide (HYDRODIURIL) 25 MG tablet TAKE 1 TABLET BY MOUTH EVERY DAY 90 tablet 5    valsartan (DIOVAN) 320 MG tablet Take 1 tablet by mouth daily Replaces lisinopril. For blood pressure and kidney protection 30 tablet 11    buPROPion (WELLBUTRIN SR) 150 MG extended release tablet Take 1 tablet by mouth 2 times daily 60 tablet 2    TRUE METRIX BLOOD GLUCOSE TEST strip USE TO CHECK BLOOD SUGAR FOUR TIMES A DAY. 100 strip 15    Multiple Vitamin (MULTIVITAMIN ADULT PO) Take 1 tablet by mouth daily      Lancets MISC Use to check blood sugar four times a day. vitamin D3 (CHOLECALCIFEROL) 125 MCG (5000 UT) TABS tablet Take 1 tablet by mouth daily      ibuprofen (ADVIL;MOTRIN) 600 MG tablet Take 1 tablet by mouth every 6 hours as needed      insulin aspart (NOVOLOG) 100 UNIT/ML injection vial USE AS DIRECTED IN INSULIN PUMP.  UNITS/DAY. No current facility-administered medications for this visit.

## 2023-11-20 ENCOUNTER — OFFICE VISIT (OUTPATIENT)
Age: 38
End: 2023-11-20
Payer: COMMERCIAL

## 2023-11-20 VITALS
HEIGHT: 63 IN | OXYGEN SATURATION: 97 % | TEMPERATURE: 98 F | RESPIRATION RATE: 18 BRPM | BODY MASS INDEX: 40.57 KG/M2 | WEIGHT: 229 LBS | DIASTOLIC BLOOD PRESSURE: 80 MMHG | SYSTOLIC BLOOD PRESSURE: 132 MMHG | HEART RATE: 63 BPM

## 2023-11-20 DIAGNOSIS — I10 ESSENTIAL HYPERTENSION, BENIGN: ICD-10-CM

## 2023-11-20 DIAGNOSIS — E10.9 TYPE 1 DIABETES MELLITUS WITHOUT COMPLICATION (HCC): ICD-10-CM

## 2023-11-20 DIAGNOSIS — E55.9 VITAMIN D DEFICIENCY: ICD-10-CM

## 2023-11-20 DIAGNOSIS — E66.01 CLASS 3 SEVERE OBESITY DUE TO EXCESS CALORIES WITH SERIOUS COMORBIDITY AND BODY MASS INDEX (BMI) OF 40.0 TO 44.9 IN ADULT (HCC): Primary | ICD-10-CM

## 2023-11-20 PROCEDURE — 99214 OFFICE O/P EST MOD 30 MIN: CPT | Performed by: FAMILY MEDICINE

## 2023-11-20 PROCEDURE — 3079F DIAST BP 80-89 MM HG: CPT | Performed by: FAMILY MEDICINE

## 2023-11-20 PROCEDURE — 3051F HG A1C>EQUAL 7.0%<8.0%: CPT | Performed by: FAMILY MEDICINE

## 2023-11-20 PROCEDURE — 3075F SYST BP GE 130 - 139MM HG: CPT | Performed by: FAMILY MEDICINE

## 2023-11-20 ASSESSMENT — PATIENT HEALTH QUESTIONNAIRE - PHQ9
1. LITTLE INTEREST OR PLEASURE IN DOING THINGS: 0
SUM OF ALL RESPONSES TO PHQ QUESTIONS 1-9: 0
SUM OF ALL RESPONSES TO PHQ QUESTIONS 1-9: 0
2. FEELING DOWN, DEPRESSED OR HOPELESS: 0
SUM OF ALL RESPONSES TO PHQ QUESTIONS 1-9: 0
SUM OF ALL RESPONSES TO PHQ QUESTIONS 1-9: 0
SUM OF ALL RESPONSES TO PHQ9 QUESTIONS 1 & 2: 0

## 2023-11-20 NOTE — PROGRESS NOTES
[facility] Progress Note:   F/up Physician Visit    CC: Weight Management      Bang Polina is a 34 y.o. female who is here for her  f/up physician visit for the  / LCD Program.    Oct 232  Now 229              11/20/2023     2:29 PM 11/20/2023     2:00 PM 11/9/2023     2:12 PM 10/23/2023     2:18 PM 10/23/2023     2:00 PM 9/27/2023    10:41 AM 9/21/2023     3:20 PM   Weight Metrics   Weight 229 lb  231 lb 232 lb 12.8 oz  229 lb 229 lb 1.6 oz   Neck (Inches)  14.25 in   14.25 in     Waist Measure Inches  38.25 in   40.25 in     Body Fat %  43.2 %   43.6 %     BMI (Calculated) 40.7 kg/m2  41 kg/m2 41.3 kg/m2  40.7 kg/m2 40.7 kg/m2          No data to display                   Current Outpatient Medications   Medication Sig Dispense Refill    triamcinolone (KENALOG) 0.1 % ointment Apply topically 2 times daily to affected after. Do not use for >2 weeks consecutively. 80 g 0    hydroCHLOROthiazide (HYDRODIURIL) 25 MG tablet TAKE 1 TABLET BY MOUTH EVERY DAY 90 tablet 5    valsartan (DIOVAN) 320 MG tablet Take 1 tablet by mouth daily Replaces lisinopril. For blood pressure and kidney protection 30 tablet 11    buPROPion (WELLBUTRIN SR) 150 MG extended release tablet Take 1 tablet by mouth 2 times daily 60 tablet 2    TRUE METRIX BLOOD GLUCOSE TEST strip USE TO CHECK BLOOD SUGAR FOUR TIMES A DAY. 100 strip 15    Multiple Vitamin (MULTIVITAMIN ADULT PO) Take 1 tablet by mouth daily      Lancets MISC Use to check blood sugar four times a day. vitamin D3 (CHOLECALCIFEROL) 125 MCG (5000 UT) TABS tablet Take 1 tablet by mouth daily      ibuprofen (ADVIL;MOTRIN) 600 MG tablet Take 1 tablet by mouth every 6 hours as needed      insulin aspart (NOVOLOG) 100 UNIT/ML injection vial USE AS DIRECTED IN INSULIN PUMP.  UNITS/DAY. No current facility-administered medications for this visit. Participation   Did you attend clinic and class last week?  yes    Review of Systems  Since
daily this week (not including shakes)? 33    (divide the weekly total by 7)    Did you eat any food outside of the program? Yes [x] No []    Physical Activity Over the Past Week:    Cardio exercise: 15 min  Strength exercise: 1 workouts / week  Number of steps walked per day: 6,000-10,000    How has patient mood overall been this week? Sad [], Happy [], Stressed [], Tired [], Content [x], NA [], other            Medications reconciled by nurse Yes [x]  No[]    Patient was given therapeutic recommendations for any noted side effects of their dietary approach based upon [facility] patient manual per providers recommendation.

## 2023-11-21 DIAGNOSIS — E66.01 MORBID (SEVERE) OBESITY DUE TO EXCESS CALORIES (HCC): ICD-10-CM

## 2023-11-22 DIAGNOSIS — L20.9 ATOPIC DERMATITIS, UNSPECIFIED TYPE: ICD-10-CM

## 2023-11-22 DIAGNOSIS — E66.01 MORBID (SEVERE) OBESITY DUE TO EXCESS CALORIES (HCC): ICD-10-CM

## 2023-11-23 RX ORDER — TRIAMCINOLONE ACETONIDE 1 MG/G
OINTMENT TOPICAL
Qty: 80 G | Refills: 0 | Status: SHIPPED | OUTPATIENT
Start: 2023-11-23

## 2023-11-27 RX ORDER — BUPROPION HYDROCHLORIDE 150 MG/1
150 TABLET, EXTENDED RELEASE ORAL 2 TIMES DAILY
Qty: 60 TABLET | Refills: 2 | Status: SHIPPED | OUTPATIENT
Start: 2023-11-27

## 2023-11-27 RX ORDER — BUPROPION HYDROCHLORIDE 150 MG/1
150 TABLET, EXTENDED RELEASE ORAL 2 TIMES DAILY
Qty: 60 TABLET | Refills: 2 | OUTPATIENT
Start: 2023-11-27

## 2023-11-29 ENCOUNTER — OFFICE VISIT (OUTPATIENT)
Age: 38
End: 2023-11-29

## 2023-11-29 DIAGNOSIS — E66.01 CLASS 3 SEVERE OBESITY DUE TO EXCESS CALORIES WITH SERIOUS COMORBIDITY AND BODY MASS INDEX (BMI) OF 40.0 TO 44.9 IN ADULT (HCC): Primary | ICD-10-CM

## 2023-12-07 ENCOUNTER — NURSE ONLY (OUTPATIENT)
Age: 38
End: 2023-12-07

## 2023-12-07 VITALS
BODY MASS INDEX: 40.57 KG/M2 | DIASTOLIC BLOOD PRESSURE: 75 MMHG | SYSTOLIC BLOOD PRESSURE: 116 MMHG | OXYGEN SATURATION: 98 % | RESPIRATION RATE: 14 BRPM | WEIGHT: 229 LBS | HEIGHT: 63 IN | HEART RATE: 74 BPM | TEMPERATURE: 98.7 F

## 2023-12-07 DIAGNOSIS — E66.01 CLASS 3 SEVERE OBESITY DUE TO EXCESS CALORIES WITH SERIOUS COMORBIDITY AND BODY MASS INDEX (BMI) OF 40.0 TO 44.9 IN ADULT (HCC): Primary | ICD-10-CM

## 2023-12-07 DIAGNOSIS — I10 ESSENTIAL HYPERTENSION, BENIGN: ICD-10-CM

## 2023-12-07 DIAGNOSIS — E10.9 TYPE 1 DIABETES MELLITUS WITHOUT COMPLICATION (HCC): ICD-10-CM

## 2023-12-07 DIAGNOSIS — E55.9 VITAMIN D DEFICIENCY: ICD-10-CM

## 2023-12-07 RX ORDER — INSULIN ASPART 100 [IU]/ML
INJECTION, SOLUTION INTRAVENOUS; SUBCUTANEOUS
COMMUNITY
Start: 2023-11-22

## 2023-12-07 ASSESSMENT — PATIENT HEALTH QUESTIONNAIRE - PHQ9
SUM OF ALL RESPONSES TO PHQ QUESTIONS 1-9: 0
1. LITTLE INTEREST OR PLEASURE IN DOING THINGS: 0
SUM OF ALL RESPONSES TO PHQ9 QUESTIONS 1 & 2: 0
2. FEELING DOWN, DEPRESSED OR HOPELESS: 0
SUM OF ALL RESPONSES TO PHQ QUESTIONS 1-9: 0

## 2023-12-29 ENCOUNTER — TELEMEDICINE (OUTPATIENT)
Facility: CLINIC | Age: 38
End: 2023-12-29
Payer: COMMERCIAL

## 2023-12-29 DIAGNOSIS — R51.9 NONINTRACTABLE EPISODIC HEADACHE, UNSPECIFIED HEADACHE TYPE: ICD-10-CM

## 2023-12-29 DIAGNOSIS — R09.89 CHEST CONGESTION: Primary | ICD-10-CM

## 2023-12-29 DIAGNOSIS — R09.81 NASAL CONGESTION: ICD-10-CM

## 2023-12-29 PROCEDURE — 99213 OFFICE O/P EST LOW 20 MIN: CPT | Performed by: NURSE PRACTITIONER

## 2024-01-04 ENCOUNTER — OFFICE VISIT (OUTPATIENT)
Age: 39
End: 2024-01-04
Payer: COMMERCIAL

## 2024-01-04 DIAGNOSIS — E66.01 CLASS 3 SEVERE OBESITY DUE TO EXCESS CALORIES WITH SERIOUS COMORBIDITY AND BODY MASS INDEX (BMI) OF 40.0 TO 44.9 IN ADULT (HCC): Primary | ICD-10-CM

## 2024-01-04 PROCEDURE — 97802 MEDICAL NUTRITION INDIV IN: CPT | Performed by: DIETITIAN, REGISTERED

## 2024-01-04 NOTE — PROGRESS NOTES
restarted MWL program in July 2023 at starting weight of 241#.  Stopped MWL meal replacement plan and starting today at 222# as a C patient.      Exercises: strength training, with crunches, and weights. Stopped treadmill due to injury.    Water intake is poor.  Drinks maybe one 16.9 oz bottle water daily.  Zero sugar peak tea occasionally.     DM I and has hard time regulating BG.  When BG drops, she eats sweets, often too much, sending it too high.    She is trying to incorporate more protein, read labels, choose lower sugar items such as Carb master ice cream.  Still has a big sweet tooth.    Wellbutrin prescribed BID but not taking at night, which is when she has the most cravings.  She forgets.       Current Eating Patterns:   Breakfast most days: Protein bar 12 grams protein, 10 grams carb, 3 pieces low sodium baumann. With hot tea (sometimes may have with decaf coffee with splenda and sf creamer).    Meal yesterday: chicken strips with salad: tomatoes, cucumbers.  Trying to have a salad once daily.     Snacks: [employer] tortilla chips, [employer] peanut butter cups, vanilla carb master yogurt with 1 T pb, pepperoni and cheese    Has been having 15 grams carbs per meal for last 3 weeks.         Estimate Needs   Calories:  1200 Protein: 91 Carbs: 91 Fat: 53   Kcal/day  g/day  g/day  g/day        percent: 30  30  40               Education & Recommendations provided: Pt educated on:  balanced plate ( ¼  plate lean protein, ¼ plate complex carb, ½ plate non-starchy vegetables)  proper hydration with mostly sugary free, calorie free, caffeine free beverages especially plain water  mindful eating stopping at first sign of fullness/limiting distractions/eating due to physical hunger and not as a coping strategy  routine meal patterns of eating or drinking a meal replacement shake approx. every 3-4 hours.  Snacks consisting of lean protein, fiber, and optional healthy fat       Handouts Provided: [x]  Carbohydrates  [x]

## 2024-01-15 ENCOUNTER — OFFICE VISIT (OUTPATIENT)
Age: 39
End: 2024-01-15
Payer: COMMERCIAL

## 2024-01-15 VITALS
BODY MASS INDEX: 39.41 KG/M2 | RESPIRATION RATE: 18 BRPM | DIASTOLIC BLOOD PRESSURE: 77 MMHG | SYSTOLIC BLOOD PRESSURE: 117 MMHG | HEART RATE: 88 BPM | HEIGHT: 63 IN | TEMPERATURE: 98.5 F | OXYGEN SATURATION: 96 % | WEIGHT: 222.4 LBS

## 2024-01-15 DIAGNOSIS — E55.9 VITAMIN D DEFICIENCY: ICD-10-CM

## 2024-01-15 DIAGNOSIS — E66.01 CLASS 3 SEVERE OBESITY DUE TO EXCESS CALORIES WITH SERIOUS COMORBIDITY AND BODY MASS INDEX (BMI) OF 40.0 TO 44.9 IN ADULT (HCC): Primary | ICD-10-CM

## 2024-01-15 DIAGNOSIS — E10.9 TYPE 1 DIABETES MELLITUS WITHOUT COMPLICATION (HCC): ICD-10-CM

## 2024-01-15 DIAGNOSIS — I10 ESSENTIAL HYPERTENSION, BENIGN: ICD-10-CM

## 2024-01-15 PROCEDURE — 99214 OFFICE O/P EST MOD 30 MIN: CPT | Performed by: FAMILY MEDICINE

## 2024-01-15 PROCEDURE — 3078F DIAST BP <80 MM HG: CPT | Performed by: FAMILY MEDICINE

## 2024-01-15 PROCEDURE — 3074F SYST BP LT 130 MM HG: CPT | Performed by: FAMILY MEDICINE

## 2024-01-15 ASSESSMENT — PATIENT HEALTH QUESTIONNAIRE - PHQ9
1. LITTLE INTEREST OR PLEASURE IN DOING THINGS: 0
SUM OF ALL RESPONSES TO PHQ QUESTIONS 1-9: 0
SUM OF ALL RESPONSES TO PHQ QUESTIONS 1-9: 0
SUM OF ALL RESPONSES TO PHQ9 QUESTIONS 1 & 2: 0
SUM OF ALL RESPONSES TO PHQ QUESTIONS 1-9: 0
SUM OF ALL RESPONSES TO PHQ QUESTIONS 1-9: 0
2. FEELING DOWN, DEPRESSED OR HOPELESS: 0

## 2024-01-15 NOTE — PROGRESS NOTES
Weight Loss Progress Note: follow up Physician Visit      Flower Church is a 38 y.o. female  who is here for her follow up  Evaluation for the medical bariatric care.      CC: I want to be heaqlthier  Still taking wellbutrin    She has met with the dietrician      Weight History  Current weight 222( 231)  and BMI is Body mass index is 39.4 kg/m².  Goal weight BMI 29,   Highest weight 241   (See weight gain time line scanned into media section of chart)    Hunger control: good    Significant Medical History    Update on sleep apnea and  CPAP 6    Ever had bariatric surgery: no    Pregnant or planning on becoming pregnant w/in 6 months: no         Significant Psychosocial History     Current Major Lifestyle Changes: no  Any potential unsupportive people: no          Social History  Social History     Tobacco Use    Smoking status: Never     Passive exposure: Never    Smokeless tobacco: Never   Substance Use Topics    Alcohol use: No     How many times a week do you eat out?  1-2    Do you drink any EtOH?  no   If so, how much?        Do you have upcoming any travel in the next 6 weeks?  no   If so, what do you have planned?          Exercise  How many days a week do you currently exercise?  5-20 min 5-7 days  Also floor ea am at home with hand weights  Have you ever been told by a physician not to exercise?  no      Objective  /77 (Site: Left Upper Arm, Position: Sitting, Cuff Size: Large Adult)   Pulse 88   Temp 98.5 °F (36.9 °C) (Oral)   Resp 18   Ht 1.6 m (5' 3\")   Wt 100.9 kg (222 lb 6.4 oz)   SpO2 96%   BMI 39.40 kg/m²   Current Outpatient Medications   Medication Sig Dispense Refill    NOVOLOG FLEXPEN 100 UNIT/ML injection pen PLEASE SEE ATTACHED FOR DETAILED DIRECTIONS      buPROPion (WELLBUTRIN SR) 150 MG extended release tablet TAKE 1 TABLET BY MOUTH TWICE A DAY 60 tablet 2    triamcinolone (KENALOG) 0.1 % ointment APPLY TOPICALLY 2 TIMES DAILY TO AFFECTED AFTER. DO NOT USE FOR >2 WEEKS

## 2024-01-15 NOTE — PROGRESS NOTES
Identified pt with two pt identifiers (name and ). Reviewed chart in preparation for visit and have obtained necessary documentation.    Flower Church is a 38 y.o. female  Chief Complaint   Patient presents with    Weight Management     WLC-Grocery meal plan    Medication Management     /77 (Site: Left Upper Arm, Position: Sitting, Cuff Size: Large Adult)   Pulse 88   Temp 98.5 °F (36.9 °C) (Oral)   Resp 18   Ht 1.6 m (5' 3\")   Wt 100.9 kg (222 lb 6.4 oz)   SpO2 96%   BMI 39.40 kg/m²     1. Have you been to the ER, urgent care clinic since your last visit?  Hospitalized since your last visit?yes - Urgent care for Resp issues    2. Have you seen or consulted any other health care providers outside of the [facility] since your last visit?  Include any pap smears or colon screening. yes -  as mentioned

## 2024-01-16 ENCOUNTER — TELEPHONE (OUTPATIENT)
Age: 39
End: 2024-01-16

## 2024-01-16 NOTE — TELEPHONE ENCOUNTER
We had the following IO Turbinet exchange:    I don't know and Josephine has been out sick this week and hopefully will be back later this week so I can have her look into this when she returns.  Thanks!  ===View-only below this line===      ----- Message -----       From:Himoojtaquilino Church \"Annette\"       Sent:1/16/2024 10:12 AM EST         To:Patient Medical Advice Request Message List    Subject:Dexcom     Hey Dr. Morales,    Thank you so much. I am actually still waiting for my dexcoms. They informed me they are waiting to receive the pre-authorization from my insurance. They had [employer] as my primary nsurance until I informed them that I only have [employer]. I'm not sure what else needs to be done. Does the insurance need anything from your end?  I can't use my pump because of this issue.  So I'm taking 36 units of Lantus and 1/7 carb ratio  -------------------------------------------------------------------------------------------------------------------    Josephine,  Can you look into whether something is needed on our end for a PA for her Dexcom?  Thanks.

## 2024-01-22 NOTE — TELEPHONE ENCOUNTER
Dexcom G6  approved 01/22/2024 - 01/22/2025 PA #: [employer] [place] [employer] [id number] AS     Dexcom G6 Transmitter approved 01/22/2024 - 01/22/2025 PA #:[employer] [place] [employer] [id number] KT    Dexcom G6 Sensors approved 01/22/2024 - 01/22/2025 PA #: [employer] [place] [id number] KT  Pt notified via [employer]

## 2024-01-22 NOTE — TELEPHONE ENCOUNTER
We did not receive a PA request electronically nor by fax    PA initiated through [email or web address] for Dexcom G6 system (, transmitter & sensors)

## 2024-01-30 DIAGNOSIS — E10.9 TYPE 1 DIABETES MELLITUS WITHOUT COMPLICATIONS (HCC): Primary | ICD-10-CM

## 2024-01-30 RX ORDER — PROCHLORPERAZINE 25 MG/1
SUPPOSITORY RECTAL
Qty: 1 EACH | Refills: 3 | Status: CANCELLED | OUTPATIENT
Start: 2024-01-30

## 2024-01-30 RX ORDER — PROCHLORPERAZINE 25 MG/1
SUPPOSITORY RECTAL
Qty: 9 EACH | Refills: 3 | Status: SHIPPED | OUTPATIENT
Start: 2024-01-30

## 2024-01-30 RX ORDER — PROCHLORPERAZINE 25 MG/1
SUPPOSITORY RECTAL
Qty: 9 EACH | Refills: 3 | Status: CANCELLED | OUTPATIENT
Start: 2024-01-30

## 2024-01-30 RX ORDER — PROCHLORPERAZINE 25 MG/1
SUPPOSITORY RECTAL
Qty: 1 EACH | Refills: 3 | Status: SHIPPED | OUTPATIENT
Start: 2024-01-30

## 2024-02-08 ENCOUNTER — OFFICE VISIT (OUTPATIENT)
Age: 39
End: 2024-02-08

## 2024-02-08 DIAGNOSIS — E66.01 CLASS 3 SEVERE OBESITY DUE TO EXCESS CALORIES WITH SERIOUS COMORBIDITY AND BODY MASS INDEX (BMI) OF 40.0 TO 44.9 IN ADULT (HCC): Primary | ICD-10-CM

## 2024-02-08 NOTE — PROGRESS NOTES
NUTRITION - FOLLOW-UP TREATMENT NOTE  Patient Name: Flower Church         Date: 2024  : 1985    YES Patient  Verified  Diagnosis: Obesity Class 3   In time:   2:30pm             Out time:   2:55pm   Total Treatment Time (min):   25 minutes     SUBJECTIVE/ASSESSMENT    Changes in medication or medical history? Any new allergies, surgeries or procedures?    YES/NO    If yes, update Summary List     2# loss x 30 days.    Changes made since last visit:  Focusing on 15-20 grams total carbs per meal  Increased water/hot tea, less other beverages.  Increasing vegetables, even at breakfast.  More salads  Reading labels.  Exercising daily at home, low resistance, abs, dumbbells. Increased steps at work.  [id number] steps or more a day.     Breakfasts: eggs, vegetables, baumann or protein bar  Lunches: tuna packets, chicken sandwich grilled, sandwiches on mini croissant  Dinners; tried zucchini noodles, spaghetti squash, no regular pasta.  Tried zucchini pizza.  Limiting portions, for example 3 chicken wings instead of several.    Snacks: carb master yogurt with 1 T pb. Dark chocolate peanut butter cups from [employer].  Bryers carb master ice cream pops.    [employer] will be fasting for Lent, no meat for one week.     Current Wt: 229# Starting Wt: 231# Wt Change: -2#     Nutrition Diagnosis        Diagnosis Status:   Obesity R/T excessive energy intake AEB BMI 40. Current BMI 40.        []  Improved [x]  No Change    []  Declined   []  Discontinued        Achievement of Goals: -Improve consistency of CHO intake w/goal to plan meals with 15-20 gm CHO/meal and 1-2 optional snack of 15-20 gm. meeting     -If BG too low, have 1/2 cup oj, or glucose pills totaling 15 gram carb. Wait 20 minutes, check BG, if still too low, have another 15 grams carb.  Do not eat entire packages of sweets for low BG. ongoing     -Have one to two premier protein shakes daily in place skipping or in place of craving sweets. ongoing     - Improve

## 2024-02-15 ENCOUNTER — HOSPITAL ENCOUNTER (EMERGENCY)
Facility: HOSPITAL | Age: 39
Discharge: HOME | End: 2024-02-15
Attending: EMERGENCY MEDICINE
Payer: COMMERCIAL

## 2024-02-15 VITALS
BODY MASS INDEX: 40.57 KG/M2 | TEMPERATURE: 97.7 F | HEIGHT: 63 IN | DIASTOLIC BLOOD PRESSURE: 76 MMHG | WEIGHT: 229 LBS | RESPIRATION RATE: 16 BRPM | HEART RATE: 70 BPM | OXYGEN SATURATION: 98 % | SYSTOLIC BLOOD PRESSURE: 127 MMHG

## 2024-02-15 DIAGNOSIS — U07.1 COVID-19: Primary | ICD-10-CM

## 2024-02-15 LAB
SARS-COV-2 RNA RESP QL NAA+PROBE: NOT DETECTED
SOURCE: NORMAL

## 2024-02-15 PROCEDURE — 99283 EMERGENCY DEPT VISIT LOW MDM: CPT

## 2024-02-15 PROCEDURE — 87635 SARS-COV-2 COVID-19 AMP PRB: CPT

## 2024-02-15 NOTE — ED PROVIDER NOTES
[facility] EMERGENCY DEPT  EMERGENCY DEPARTMENT ENCOUNTER      Pt Name: Flower Church  MRN: [id number]  Birthdate 1985  Date of evaluation: 2/15/2024  Provider: Hayden Redd MD    CHIEF COMPLAINT       Chief Complaint   Patient presents with    Concern For COVID-19         HISTORY OF PRESENT ILLNESS   (Location/Symptom, Timing/Onset, Context/Setting, Quality, Duration, Modifying Factors, Severity)  Note limiting factors.   Patient presents from home requesting a COVID test.  States she was exposed to COVID from one of her coworkers who tested positive.  Patient adds that she is a type I diabetic.  She does report some congestion and sneezing.  States she took a home test and it was negative but she wanted to confirm.  No shortness of breath, chest pain, vomiting, diarrhea.  No fevers at home.    The history is provided by the patient.         Review of External Medical Records:     Nursing Notes were reviewed.    REVIEW OF SYSTEMS    (2-9 systems for level 4, 10 or more for level 5)     Review of Systems   Constitutional:  Negative for fatigue.   HENT:  Negative for sore throat.    Eyes:  Negative for visual disturbance.   Respiratory:  Negative for cough.    Cardiovascular:  Negative for palpitations.   Gastrointestinal:  Negative for vomiting.   Genitourinary:  Negative for difficulty urinating.   Musculoskeletal:  Negative for myalgias.   Skin:  Negative for rash.   Neurological:  Negative for weakness.       Except as noted above the remainder of the review of systems was reviewed and negative.       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      Hypertension     Morbid obesity

## 2024-02-15 NOTE — ED TRIAGE NOTES
Patient arrives to ED ambulatory w/o difficulty. No acute distress noted in triage. A&O x 4. Skin is warm, dry & intact on obs. Pt reports one of her co-workers tested positive for COVID. Symptoms: nasal congestion/sneezing, itchy throat. Pt took at home COVID test this morning, it was negative.

## 2024-02-19 ENCOUNTER — ANESTHESIA (OUTPATIENT)
Facility: HOSPITAL | Age: 39
End: 2024-02-19
Payer: COMMERCIAL

## 2024-02-19 ENCOUNTER — ANESTHESIA EVENT (OUTPATIENT)
Facility: HOSPITAL | Age: 39
End: 2024-02-19
Payer: COMMERCIAL

## 2024-02-19 ENCOUNTER — HOSPITAL ENCOUNTER (OUTPATIENT)
Facility: HOSPITAL | Age: 39
Setting detail: OUTPATIENT SURGERY
Discharge: HOME | End: 2024-02-19
Attending: INTERNAL MEDICINE | Admitting: SPECIALIST
Payer: COMMERCIAL

## 2024-02-19 VITALS
DIASTOLIC BLOOD PRESSURE: 73 MMHG | SYSTOLIC BLOOD PRESSURE: 119 MMHG | TEMPERATURE: 99 F | RESPIRATION RATE: 14 BRPM | WEIGHT: 233.25 LBS | HEART RATE: 61 BPM | BODY MASS INDEX: 41.33 KG/M2 | HEIGHT: 63 IN | OXYGEN SATURATION: 100 %

## 2024-02-19 PROCEDURE — 7100000010 HC PHASE II RECOVERY - FIRST 15 MIN: Performed by: INTERNAL MEDICINE

## 2024-02-19 PROCEDURE — 3600007512: Performed by: INTERNAL MEDICINE

## 2024-02-19 PROCEDURE — 3600007502: Performed by: INTERNAL MEDICINE

## 2024-02-19 PROCEDURE — 3700000001 HC ADD 15 MINUTES (ANESTHESIA): Performed by: INTERNAL MEDICINE

## 2024-02-19 PROCEDURE — 3700000000 HC ANESTHESIA ATTENDED CARE: Performed by: INTERNAL MEDICINE

## 2024-02-19 PROCEDURE — 6360000002 HC RX W HCPCS: Performed by: NURSE ANESTHETIST, CERTIFIED REGISTERED

## 2024-02-19 PROCEDURE — 2500000003 HC RX 250 WO HCPCS: Performed by: NURSE ANESTHETIST, CERTIFIED REGISTERED

## 2024-02-19 PROCEDURE — 7100000011 HC PHASE II RECOVERY - ADDTL 15 MIN: Performed by: INTERNAL MEDICINE

## 2024-02-19 PROCEDURE — 2580000003 HC RX 258: Performed by: INTERNAL MEDICINE

## 2024-02-19 RX ORDER — SODIUM CHLORIDE 0.9 % (FLUSH) 0.9 %
5-40 SYRINGE (ML) INJECTION PRN
Status: DISCONTINUED | OUTPATIENT
Start: 2024-02-19 | End: 2024-02-19 | Stop reason: HOSPADM

## 2024-02-19 RX ORDER — PROPOFOL 10 MG/ML
INJECTION, EMULSION INTRAVENOUS PRN
Status: DISCONTINUED | OUTPATIENT
Start: 2024-02-19 | End: 2024-02-19 | Stop reason: SDUPTHER

## 2024-02-19 RX ORDER — SODIUM CHLORIDE 0.9 % (FLUSH) 0.9 %
5-40 SYRINGE (ML) INJECTION EVERY 12 HOURS SCHEDULED
Status: DISCONTINUED | OUTPATIENT
Start: 2024-02-19 | End: 2024-02-19

## 2024-02-19 RX ORDER — LIDOCAINE HYDROCHLORIDE 20 MG/ML
INJECTION, SOLUTION INTRAVENOUS PRN
Status: DISCONTINUED | OUTPATIENT
Start: 2024-02-19 | End: 2024-02-19 | Stop reason: SDUPTHER

## 2024-02-19 RX ORDER — DEXMEDETOMIDINE HYDROCHLORIDE 100 UG/ML
INJECTION, SOLUTION INTRAVENOUS PRN
Status: DISCONTINUED | OUTPATIENT
Start: 2024-02-19 | End: 2024-02-19 | Stop reason: SDUPTHER

## 2024-02-19 RX ORDER — SODIUM CHLORIDE 9 MG/ML
25 INJECTION, SOLUTION INTRAVENOUS PRN
Status: DISCONTINUED | OUTPATIENT
Start: 2024-02-19 | End: 2024-02-19 | Stop reason: HOSPADM

## 2024-02-19 RX ORDER — ONDANSETRON 2 MG/ML
4 INJECTION INTRAMUSCULAR; INTRAVENOUS EVERY 6 HOURS PRN
Status: DISCONTINUED | OUTPATIENT
Start: 2024-02-19 | End: 2024-02-19 | Stop reason: HOSPADM

## 2024-02-19 RX ORDER — ONDANSETRON 4 MG/1
4 TABLET, ORALLY DISINTEGRATING ORAL EVERY 8 HOURS PRN
Status: DISCONTINUED | OUTPATIENT
Start: 2024-02-19 | End: 2024-02-19 | Stop reason: HOSPADM

## 2024-02-19 RX ORDER — SODIUM CHLORIDE 9 MG/ML
INJECTION, SOLUTION INTRAVENOUS PRN
Status: DISCONTINUED | OUTPATIENT
Start: 2024-02-19 | End: 2024-02-19 | Stop reason: HOSPADM

## 2024-02-19 RX ORDER — SODIUM CHLORIDE 0.9 % (FLUSH) 0.9 %
5-40 SYRINGE (ML) INJECTION PRN
Status: DISCONTINUED | OUTPATIENT
Start: 2024-02-19 | End: 2024-02-19

## 2024-02-19 RX ORDER — SODIUM CHLORIDE 0.9 % (FLUSH) 0.9 %
5-40 SYRINGE (ML) INJECTION EVERY 12 HOURS SCHEDULED
Status: DISCONTINUED | OUTPATIENT
Start: 2024-02-19 | End: 2024-02-19 | Stop reason: HOSPADM

## 2024-02-19 RX ADMIN — SODIUM CHLORIDE: 9 INJECTION, SOLUTION INTRAVENOUS at 10:45

## 2024-02-19 RX ADMIN — PROPOFOL 50 MG: 10 INJECTION, EMULSION INTRAVENOUS at 10:49

## 2024-02-19 RX ADMIN — DEXMEDETOMIDINE 16 MCG: 100 INJECTION, SOLUTION INTRAVENOUS at 10:48

## 2024-02-19 RX ADMIN — PROPOFOL 20 MG: 10 INJECTION, EMULSION INTRAVENOUS at 10:51

## 2024-02-19 RX ADMIN — PROPOFOL 140 MCG/KG/MIN: 10 INJECTION, EMULSION INTRAVENOUS at 10:50

## 2024-02-19 RX ADMIN — LIDOCAINE HYDROCHLORIDE 40 MG: 20 INJECTION, SOLUTION INTRAVENOUS at 10:51

## 2024-02-19 RX ADMIN — LIDOCAINE HYDROCHLORIDE 60 MG: 20 INJECTION, SOLUTION INTRAVENOUS at 10:49

## 2024-02-19 ASSESSMENT — PAIN - FUNCTIONAL ASSESSMENT: PAIN_FUNCTIONAL_ASSESSMENT: 0-10

## 2024-02-19 NOTE — OP NOTE
.                       [facility]  [facility]  Chuck Finley MD  [phone]      2024    Colonoscopy Procedure Note  Flower Church  :  1985  [facility] Medical Record Number: [id number]    Indications:   polyp surveillance, 5 year interval   PCP:  Bruno Nguyen APRN - NP  Anesthesia/Sedation: Monitored anesthesia care, see separate note  Endoscopist:  Chuck Finley MD   Complications:  None  Estimated Blood Loss:  None    Permit:  The indications, risks, benefits and alternatives were reviewed with the patient or their decision maker who was provided an opportunity to ask questions and all questions were answered.  The specific risks of colonoscopy with conscious sedation were reviewed, including but not limited to anesthetic complication, bleeding, adverse drug reaction, missed lesion, infection, IV site reactions, and intestinal perforation which would lead to the need for surgical repair.  Alternatives to colonoscopy including radiographic imaging, observation without testing, or laboratory testing were reviewed including the limitations of those alternatives.  After considering the options and having all their questions answered, the patient or their decision maker provided both verbal and written consent to proceed.        Procedure in Detail:  After obtaining informed consent, positioning of the patient in the left lateral decubitus position, and conduction of a pre-procedure pause or \"time out\" the endoscope was introduced into the anus and advanced to the cerum.  The quality of the colonic preparation was fair.  A careful inspection was made as the colonoscope was withdrawn, findings and interventions are described below.  After cleaning the colon, the [place] Bowel Prep score:  Right colon- 2  Transverse colon-2  Left colon-2  Total score of 6    Findings:   Digital rectal exam-normal anal sphincter tone, normal perineal skin

## 2024-02-19 NOTE — PROGRESS NOTES
Flower ZEE Church  1985  [id number]    Situation:  Verbal report received from: TERESA Blevins  Procedure: Procedure(s):  COLONOSCOPY    Background:    Preoperative diagnosis: Personal history of colonic polyps [Z86.010]  Postoperative diagnosis: * No post-op diagnosis entered *    :  Dr. Finley  Assistant(s): Circulator Assist: Gen Pastrana RN    Specimens: * No specimens in log *  H. Pylori test: no    Assessment:    Anesthesia gave intra-procedure sedation and medications, see anesthesia flow sheet     Intravenous fluids: NS@ KVO     Vital signs stable yes    Abdominal assessment: round and soft yes    Recommendation:  Discharge patient per MD order yes.    Ride home with: Shaheen  Permission to share finding with family or friend yes    Endoscopy discharge instructions have been reviewed and given to patient.  The patient verbalized understanding and acceptance of instructions.      Dr. Finley discussed with spouse procedure findings and next steps.

## 2024-02-19 NOTE — ANESTHESIA POSTPROCEDURE EVALUATION
Department of Anesthesiology  Postprocedure Note    Patient: Flower Church  MRN: [id number]  YOB: 1985  Date of evaluation: 2/19/2024    Procedure Summary       Date: 02/19/24 Room / Location: [facility] 02 / [facility] ENDOSCOPY    Anesthesia Start: 1044 Anesthesia Stop: 1111    Procedure: COLONOSCOPY (Lower GI Region) Diagnosis:       Personal history of colonic polyps      (Personal history of colonic polyps [Z86.010])    Surgeons: Chuck Finley MD Responsible Provider: Prasanth Lr MD    Anesthesia Type: TIVA, MAC ASA Status: 3            Anesthesia Type: No value filed.    Tawny Phase I: Tawny Score: 10    Tawny Phase II: Tawny Score: 10    Anesthesia Post Evaluation    No notable events documented.

## 2024-02-19 NOTE — H&P
.Pre-Endoscopy H&P Update  Chief complaint/HPI/ROS:  The indication for the procedure, the patient's history and the patient's current medications are reviewed prior to the procedure and that data is reported on the H&P to which this document is attached.  Any significant complaints with regard to organ systems will be noted, and if not mentioned then a review of systems is not contributory.  Past Medical History:   Diagnosis Date    Breast cancer (HCC) 2012    Right breast infiltrating ductal carcinoma    Cardiomyopathy due to chemotherapy (HCC)     EF 20 %    Essential hypertension     Gestational hypertension     History of sepsis 2013    after chemo    Hx of difficult intubation     resulting from sepsis in 2013.      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    ORTHOPEDIC SURGERY Right     Carpal tunnel release, index finger trigger release.    OTHER SURGICAL HISTORY      port placement for chemo    OTHER SURGICAL HISTORY      cyst removed under left arm    WISDOM TOOTH EXTRACTION  08/15/2018     Social   Social History     Tobacco Use    Smoking status: Never     Passive exposure: Never    Smokeless tobacco: Never   Substance Use Topics    Alcohol use: No      Family History   Problem Relation Age of Onset    Diabetes Paternal Grandfather     Heart Disease Paternal Grandfather         MI in his 60s    Diabetes Paternal Uncle     Diabetes Brother         borderline Type 2    Heart Disease Father     Hypertension Mother     High Blood

## 2024-02-19 NOTE — ANESTHESIA PRE PROCEDURE
Department of Anesthesiology  Preprocedure Note       Name:  Flower Church   Age:  39 y.o.  :  1985                                          MRN:  [id number]         Date:  2024      Surgeon: Surgeon(s):  Chuck Finley MD    Procedure: Procedure(s):  COLONOSCOPY    Medications prior to admission:   Prior to Admission medications    Medication Sig Start Date End Date Taking? Authorizing Provider   Continuous Blood Gluc Transmit (DEXCOM G6 TRANSMITTER) MISC Use as directed every 90 days 24   Duran Morales MD   Continuous Blood Gluc Sensor (DEXCOM G6 SENSOR) MISC Use as directed every 10 days 24   Duran Morales MD   NOVOLOG FLEXPEN 100 UNIT/ML injection pen PLEASE SEE ATTACHED FOR DETAILED DIRECTIONS 23   Mary Coleman MD   buPROPion (WELLBUTRIN SR) 150 MG extended release tablet TAKE 1 TABLET BY MOUTH TWICE A DAY 23   Kelsey Chairez MD   triamcinolone (KENALOG) 0.1 % ointment APPLY TOPICALLY 2 TIMES DAILY TO AFFECTED AFTER. DO NOT USE FOR >2 WEEKS CONSECUTIVELY. 23   Bell Shelton MD   hydroCHLOROthiazide (HYDRODIURIL) 25 MG tablet TAKE 1 TABLET BY MOUTH EVERY DAY 23   Duran Morales MD   valsartan (DIOVAN) 320 MG tablet Take 1 tablet by mouth daily Replaces lisinopril.  For blood pressure and kidney protection 23   Duran Morales MD   TRUE METRIX BLOOD GLUCOSE TEST strip USE TO CHECK BLOOD SUGAR FOUR TIMES A DAY. 6/15/23   Duran Morales MD   Multiple Vitamin (MULTIVITAMIN ADULT PO) Take 1 tablet by mouth daily    Mary Coleman MD   Lancets MISC Use to check blood sugar four times a day. 19   Automatic Reconciliation, Ar   vitamin D3 (CHOLECALCIFEROL) 125 MCG (5000 UT) TABS tablet Take 1 tablet by mouth daily    Automatic Reconciliation, Ar   ibuprofen (ADVIL;MOTRIN) 600 MG tablet Take 1 tablet by mouth every 6 hours as needed  Patient not taking: Reported on 2023   Automatic Reconciliation, Ar

## 2024-02-19 NOTE — PERIOP NOTE
1045   Patient has been evaluated by anesthesia pre-procedure.    Patient alert and oriented.     Vital signs will not be charted by the Endoscopy nurse.     All vitals, airway, and loc are monitored by anesthesia staff, Esthela, throughout procedure.         1107   Endoscope was pre-cleaned at bedside immediately following procedure by Yaya ochoa.        1110   Patient tolerated procedure.   Abdomen soft and patient arousable and voices no complaints.   Patient transported to endoscopy recovery area.   Report given to post procedure RNDeneen.

## 2024-02-19 NOTE — DISCHARGE INSTRUCTIONS
.                       [facility]  [facility]  Chuck Villeda MD  [phone]      February 19, 2024    Flower Church  YOB: 1985    COLONOSCOPY DISCHARGE INSTRUCTIONS    If there is redness at IV site you should apply warm compress to area.  If redness or soreness persist contact Dr. Villeda's or your primary care doctor.    There may be a slight amount of blood passed from the rectum.  Gaseous discomfort may develop, but walking, belching will help relieve this.  You may not operate a vehicle for 12 hours  You may not operate machinery or dangerous appliances for rest of today  You may not drink alcoholic beverages for 12 hours  Avoid making any critical decisions for 24 hours    DIET:  You may resume your normal diet, but some patients find that heavy or large meals may lead to indigestion or vomiting.  I suggest a light meal as first food intake.    MEDICATIONS:  The use of some over-the-counter pain medication may lead to bleeding after colon biopsies or polyp removal.  Tylenol (also called acetaminophen) is safe to take even if you have had colonoscopy with polyp removal.  Based on the procedure you had today you may safely take aspirin or aspirin-like products for the next ten (10) days.  Remember that Tylenol (also called acetaminophen) is safe to take after colonoscopy even if you have had biopsies or polyps removed.    ACTIVITY:  You may resume your normal household activities, but it is recommended that you spend the remainder of the day resting -  avoid any strenuous activity.    CALL DR. VILLEDA'S OFFICE IF:  Increasing pain, nausea, vomiting  Abdominal distension (swelling)  Significant new or increased bleeding (oral or rectal)  Fever/Chills  Chest pain/shortness of breath                             Additional instructions:     Impression:  Full colonoscopy to the cecum, adequate prep and visualization    Recommendations:   Resume

## 2024-02-27 ENCOUNTER — TELEPHONE (OUTPATIENT)
Age: 39
End: 2024-02-27

## 2024-02-27 NOTE — TELEPHONE ENCOUNTER
Called patient to reschedule appointment for 2/28/24. Provider is out of office and needs her appointments to be rescheduled.

## 2024-03-13 ENCOUNTER — OFFICE VISIT (OUTPATIENT)
Age: 39
End: 2024-03-13
Payer: COMMERCIAL

## 2024-03-13 VITALS
WEIGHT: 233 LBS | OXYGEN SATURATION: 98 % | SYSTOLIC BLOOD PRESSURE: 138 MMHG | HEART RATE: 74 BPM | BODY MASS INDEX: 41.29 KG/M2 | HEIGHT: 63 IN | DIASTOLIC BLOOD PRESSURE: 76 MMHG | TEMPERATURE: 98.1 F | RESPIRATION RATE: 18 BRPM

## 2024-03-13 DIAGNOSIS — E10.9 TYPE 1 DIABETES MELLITUS WITHOUT COMPLICATION (HCC): ICD-10-CM

## 2024-03-13 DIAGNOSIS — E66.01 CLASS 3 SEVERE OBESITY DUE TO EXCESS CALORIES WITH SERIOUS COMORBIDITY AND BODY MASS INDEX (BMI) OF 40.0 TO 44.9 IN ADULT (HCC): Primary | ICD-10-CM

## 2024-03-13 DIAGNOSIS — R01.1 MURMUR: ICD-10-CM

## 2024-03-13 DIAGNOSIS — E55.9 VITAMIN D DEFICIENCY: ICD-10-CM

## 2024-03-13 DIAGNOSIS — E66.01 MORBID (SEVERE) OBESITY DUE TO EXCESS CALORIES (HCC): ICD-10-CM

## 2024-03-13 DIAGNOSIS — I10 ESSENTIAL HYPERTENSION, BENIGN: ICD-10-CM

## 2024-03-13 PROCEDURE — 99214 OFFICE O/P EST MOD 30 MIN: CPT | Performed by: FAMILY MEDICINE

## 2024-03-13 PROCEDURE — 3078F DIAST BP <80 MM HG: CPT | Performed by: FAMILY MEDICINE

## 2024-03-13 PROCEDURE — 3075F SYST BP GE 130 - 139MM HG: CPT | Performed by: FAMILY MEDICINE

## 2024-03-13 ASSESSMENT — PATIENT HEALTH QUESTIONNAIRE - PHQ9
SUM OF ALL RESPONSES TO PHQ QUESTIONS 1-9: 0
2. FEELING DOWN, DEPRESSED OR HOPELESS: NOT AT ALL
SUM OF ALL RESPONSES TO PHQ QUESTIONS 1-9: 0
1. LITTLE INTEREST OR PLEASURE IN DOING THINGS: NOT AT ALL
SUM OF ALL RESPONSES TO PHQ QUESTIONS 1-9: 0
SUM OF ALL RESPONSES TO PHQ QUESTIONS 1-9: 0
SUM OF ALL RESPONSES TO PHQ9 QUESTIONS 1 & 2: 0

## 2024-03-13 NOTE — PATIENT INSTRUCTIONS
Movement: aim for 30 min walk 5 days a week add 2 days resistance  Meds: continue the wellbutrin   mg bid, try to be consistent with 2 doses  Eating plan: start doing a weight check/nurse triage once a month  Continue with the grocery meal plan aiming for 1200 cals, protein with each meal-25-30 g is goal for each meal.     Try beano or eggplant to reduce gas    Sleep aim for 7-8 hrs a night. Go to bed early enough to allow 8 hrs

## 2024-03-13 NOTE — PROGRESS NOTES
Weight Loss Progress Note: follow up Physician Visit      Flower Church is a 39 y.o. female  who is here for her follow up  Evaluation for the medical bariatric care.      CC: I want to be healthier  Taking wellbutrin  Tried wegovy and caused N/V    At home her scale got up to 238    Weight History  Current weight 233(  222)and BMI is Body mass index is 41.27 kg/m².  Goal weight BMI 29,   Highest weight 241   (See weight gain time line scanned into media section of chart)    Hunger control: poor    Significant Medical History    Update on sleep apnea and  CPAP 6    Ever had bariatric surgery: no    Pregnant or planning on becoming pregnant w/in 6 months: no         Significant Psychosocial History     Current Major Lifestyle Changes: no  Any potential unsupportive people: no          Social History  Social History     Tobacco Use    Smoking status: Never     Passive exposure: Never    Smokeless tobacco: Never   Substance Use Topics    Alcohol use: No     How many times a week do you eat out?  1-2    Do you drink any EtOH?  no   If so, how much?        Do you have upcoming any travel in the next 6 weeks?  no   If so, what do you have planned?          Exercise  How many days a week do you currently exercise?  Not consistent days  Have you ever been told by a physician not to exercise?  no      Objective  /76 (Site: Right Upper Arm, Position: Sitting, Cuff Size: Large Adult) Comment (Cuff Size): Manual cuff  Pulse 74   Temp 98.1 °F (36.7 °C) (Oral)   Resp 18   Ht 1.6 m (5' 3\")   Wt 105.7 kg (233 lb)   LMP 01/21/2024 (Approximate)   SpO2 98%   BMI 41.27 kg/m²   Current Outpatient Medications   Medication Sig Dispense Refill    Continuous Blood Gluc Transmit (DEXCOM G6 TRANSMITTER) MISC Use as directed every 90 days 1 each 3    Continuous Blood Gluc Sensor (DEXCOM G6 SENSOR) MISC Use as directed every 10 days 9 each 3    NOVOLOG FLEXPEN 100 UNIT/ML injection pen PLEASE SEE ATTACHED FOR DETAILED

## 2024-03-13 NOTE — PROGRESS NOTES
Identified pt with two pt identifiers (name and ). Reviewed chart in preparation for visit and have obtained necessary documentation.    Flower Church is a 39 y.o. female  Chief Complaint   Patient presents with    Weight Management     1 month   Grocery meal plan    Medication Management     Wellbutrin     /76 (Site: Right Upper Arm, Position: Sitting, Cuff Size: Large Adult) Comment (Cuff Size): Manual cuff  Pulse 74   Temp 98.1 °F (36.7 °C) (Oral)   Resp 18   Ht 1.6 m (5' 3\")   Wt 105.7 kg (233 lb)   LMP 2024 (Approximate)   SpO2 98%   BMI 41.27 kg/m²     1. Have you been to the ER, urgent care clinic since your last visit?  Hospitalized since your last visit?YES     2. Have you seen or consulted any other health care providers outside of the [facility] since your last visit?  Include any pap smears or colon screening. no

## 2024-03-14 ENCOUNTER — OFFICE VISIT (OUTPATIENT)
Age: 39
End: 2024-03-14

## 2024-03-14 DIAGNOSIS — E66.01 CLASS 3 SEVERE OBESITY DUE TO EXCESS CALORIES WITH SERIOUS COMORBIDITY AND BODY MASS INDEX (BMI) OF 40.0 TO 44.9 IN ADULT (HCC): Primary | ICD-10-CM

## 2024-03-14 RX ORDER — INSULIN ASPART 100 [IU]/ML
INJECTION, SOLUTION INTRAVENOUS; SUBCUTANEOUS
Qty: 30 ML | Refills: 11 | Status: SHIPPED | OUTPATIENT
Start: 2024-03-14

## 2024-03-14 RX ORDER — INSULIN ASPART 100 [IU]/ML
INJECTION, SOLUTION INTRAVENOUS; SUBCUTANEOUS
Qty: 15 ML | Refills: 11 | Status: SHIPPED | OUTPATIENT
Start: 2024-03-14

## 2024-03-14 NOTE — PROGRESS NOTES
NUTRITION - FOLLOW-UP TREATMENT NOTE  Patient Name: Flower Church         Date: 3/14/2024  : 1985    YES Patient  Verified  Diagnosis: Obesity Class 3   In time:   2:33pm             Out time:   3:10pm   Total Treatment Time (min):   37 minutes     SUBJECTIVE/ASSESSMENT    Changes in medication or medical history? Any new allergies, surgeries or procedures?      229# last month for a 4# gain x 30 days, an overall gain of 2# x 2 months.     Barriers in last month:  2 years no period, but had one in last month.  Says this has thrown her off track, increased refine carbs because of cravings.    Mom sick, and in the hospital.  Eating out of control, comforting with food.     Walking during visit.  Increased movement to 30 minutes 5 days a week, 2 days with resistance training.      Wasn't taking Wellbutrin BID, only qd.  Plans to restart BID.    24 hour recall:  7am Low sodium baumann, 100 yuly mini croissant with 11 g CHO.  11:30am Hungry, went to [employer] hot dog no bun. Bought banana nut muffin and ate it at home.  2 pieces of fish in pan with lemon, broccoli for lunch.  Starbucks passion green tea with 8 splenda.  Baked chicken, collards, 1 T mash potatoes for dinner.  Quest pack of tortilla chips after dinner.     Current Wt: 233# Starting Wt: 231# Wt Change: +2#     Nutrition Diagnosis      Diagnosis Status:   Obesity R/T excessive energy intake AEB BMI 40. Current BMI 40.       []  Improved [x]  No Change    []  Declined   []  Discontinued        Achievement of Goals: -Improve consistency of CHO intake w/goal to plan meals with 15-20 gm CHO/meal and 1-2 optional snack of 15-20 gm. meeting     -If BG too low, have 1/2 cup oj, or glucose pills totaling 15 gram carb. Wait 20 minutes, check BG, if still too low, have another 15 grams carb.  Do not eat entire packages of sweets for low BG. ongoing     -Have one to two premier protein shakes daily in place skipping or in place of craving sweets. ongoing     -

## 2024-03-14 NOTE — TELEPHONE ENCOUNTER
How Severe Are Your Spot(S)?: mild Left Message for patient to return call to office. What Type Of Note Output Would You Prefer (Optional)?: Standard Output What Is The Reason For Today's Visit?: Full Body Skin Examination What Is The Reason For Today's Visit? (Being Monitored For X): concerning skin lesions on an annual basis

## 2024-03-19 RX ORDER — BUPROPION HYDROCHLORIDE 150 MG/1
150 TABLET, EXTENDED RELEASE ORAL 2 TIMES DAILY
Qty: 60 TABLET | Refills: 2 | Status: SHIPPED | OUTPATIENT
Start: 2024-03-19

## 2024-03-28 ENCOUNTER — NURSE ONLY (OUTPATIENT)
Age: 39
End: 2024-03-28

## 2024-03-28 VITALS
HEART RATE: 77 BPM | OXYGEN SATURATION: 98 % | WEIGHT: 230.6 LBS | BODY MASS INDEX: 40.86 KG/M2 | HEIGHT: 63 IN | SYSTOLIC BLOOD PRESSURE: 126 MMHG | RESPIRATION RATE: 16 BRPM | TEMPERATURE: 97.8 F | DIASTOLIC BLOOD PRESSURE: 72 MMHG

## 2024-03-28 DIAGNOSIS — E55.9 VITAMIN D DEFICIENCY: ICD-10-CM

## 2024-03-28 DIAGNOSIS — I10 ESSENTIAL HYPERTENSION, BENIGN: ICD-10-CM

## 2024-03-28 DIAGNOSIS — E10.9 TYPE 1 DIABETES MELLITUS WITHOUT COMPLICATION (HCC): ICD-10-CM

## 2024-03-28 DIAGNOSIS — E66.01 CLASS 3 SEVERE OBESITY DUE TO EXCESS CALORIES WITH SERIOUS COMORBIDITY AND BODY MASS INDEX (BMI) OF 40.0 TO 44.9 IN ADULT (HCC): Primary | ICD-10-CM

## 2024-03-28 ASSESSMENT — PATIENT HEALTH QUESTIONNAIRE - PHQ9
SUM OF ALL RESPONSES TO PHQ QUESTIONS 1-9: 0
2. FEELING DOWN, DEPRESSED OR HOPELESS: NOT AT ALL
SUM OF ALL RESPONSES TO PHQ QUESTIONS 1-9: 0

## 2024-03-28 NOTE — PROGRESS NOTES
Identified pt with two pt identifiers (name and ). Reviewed chart in preparation for visit and have obtained necessary documentation.    Flower Church is a 39 y.o. female  Chief Complaint   Patient presents with    Weight Management     Grocery meal plan    Medication Management     /72 (Site: Left Upper Arm, Position: Sitting, Cuff Size: Large Adult)   Pulse 77   Temp 97.8 °F (36.6 °C) (Oral)   Resp 16   Ht 1.6 m (5' 3\")   Wt 104.6 kg (230 lb 9.6 oz)   LMP 2024 (Approximate)   SpO2 98%   BMI 40.85 kg/m²     1. Have you been to the ER, urgent care clinic since your last visit?  Hospitalized since your last visit?no    2. Have you seen or consulted any other health care providers outside of the [facility] since your last visit?  Include any pap smears or colon screening. No

## 2024-04-02 ENCOUNTER — TELEPHONE (OUTPATIENT)
Facility: CLINIC | Age: 39
End: 2024-04-02

## 2024-04-02 NOTE — TELEPHONE ENCOUNTER
----- Message from [employer] sent at 4/2/2024  1:23 PM EDT -----  Subject: Appointment Request    Reason for Call: New Patient/New to Provider Appointment needed: Routine   Joint Pain    QUESTIONS    Reason for appointment request? No appointments available during search     Additional Information for Provider? Pt would like to establish with new   provider at you location and was a PT to Bruno Nguyen. Please give PT call   to schedule.   ---------------------------------------------------------------------------  --------------  CALL BACK INFO  [phone]; OK to leave message on voicemail  ---------------------------------------------------------------------------  --------------  SCRIPT ANSWERS

## 2024-04-04 RX ORDER — INSULIN GLARGINE 100 [IU]/ML
INJECTION, SOLUTION SUBCUTANEOUS
Qty: 15 ML | Refills: 11 | Status: SHIPPED | OUTPATIENT
Start: 2024-04-04

## 2024-04-05 NOTE — PROGRESS NOTES
Nurse note from patient's weekly / LCD / Maintenance class was reviewed.  Pertinent medical concerns were:   reviewed     BP Readings from Last 3 Encounters:   03/28/24 126/72   03/13/24 138/76   02/19/24 119/73       Failed to redirect to the [facility]eline [facility] of the [facility].    Current Outpatient Medications   Medication Sig Dispense Refill    buPROPion (WELLBUTRIN SR) 150 MG extended release tablet TAKE 1 TABLET BY MOUTH TWICE A DAY 60 tablet 2    NOVOLOG FLEXPEN 100 UNIT/ML injection pen INJECT 1 UNIT FOR 9 GRAMS OF CARBS + 1 UNITS FOR EVERY 75 MG/DL ABOVE 150 MG/DL--MAX DOSE 50 UNITS/DAY 15 mL 11    NOVOLOG 100 UNIT/ML injection vial USE AS DIRECTED IN INSULIN PUMP.  UNITS/DAY. 30 mL 11    Continuous Blood Gluc Transmit (DEXCOM G6 TRANSMITTER) MISC Use as directed every 90 days 1 each 3    Continuous Blood Gluc Sensor (DEXCOM G6 SENSOR) MISC Use as directed every 10 days 9 each 3    triamcinolone (KENALOG) 0.1 % ointment APPLY TOPICALLY 2 TIMES DAILY TO AFFECTED AFTER. DO NOT USE FOR >2 WEEKS CONSECUTIVELY. 80 g 0    hydroCHLOROthiazide (HYDRODIURIL) 25 MG tablet TAKE 1 TABLET BY MOUTH EVERY DAY 90 tablet 5    valsartan (DIOVAN) 320 MG tablet Take 1 tablet by mouth daily Replaces lisinopril.  For blood pressure and kidney protection 30 tablet 11    TRUE METRIX BLOOD GLUCOSE TEST strip USE TO CHECK BLOOD SUGAR FOUR TIMES A DAY. 100 strip 15    Multiple Vitamin (MULTIVITAMIN ADULT PO) Take 1 tablet by mouth daily      Lancets MISC Use to check blood sugar four times a day.      vitamin D3 (CHOLECALCIFEROL) 125 MCG (5000 UT) TABS tablet Take 1 tablet by mouth daily      ibuprofen (ADVIL;MOTRIN) 600 MG tablet Take 1 tablet by mouth every 6 hours as needed      LANTUS SOLOSTAR 100 UNIT/ML injection pen INJECT 36 UNITS DAILY IN THE EVENING 15 mL 11     No current facility-administered medications for this visit.

## 2024-04-15 ENCOUNTER — OFFICE VISIT (OUTPATIENT)
Age: 39
End: 2024-04-15
Payer: COMMERCIAL

## 2024-04-15 DIAGNOSIS — E66.01 CLASS 3 SEVERE OBESITY DUE TO EXCESS CALORIES WITH SERIOUS COMORBIDITY AND BODY MASS INDEX (BMI) OF 40.0 TO 44.9 IN ADULT (HCC): Primary | ICD-10-CM

## 2024-04-15 PROCEDURE — 97803 MED NUTRITION INDIV SUBSEQ: CPT | Performed by: DIETITIAN, REGISTERED

## 2024-04-15 NOTE — PROGRESS NOTES
skipping or in place of craving sweets. ongoing, not meeting     - Improve physical activity w/goal to increase to 300 minutes per week as tolerated with injury. Ongoing, not reported.     - Increase accountability and awareness of food choices by recording food and beverage intake by using a food journal at least 3 days per week.not reported     - Reduce empty calorie intake by limiting sugary beverage to one 8-oz serving every day. Increase water to at least 64 ounces daily.meeting              Patient Education:  [x]  Review current plan with patient   []  Other:    Handouts/  Information Provided: []  Carbohydrates  []  Protein  []  Fiber  []  Serving Sizes  []  Fluids  []  General guidelines []  Diabetes  []  Cholesterol  []  Sodium  []  SBGM  []  Food Journals  []  Others:      New Patient Goals: - atkins shakes as a snack are acceptable.      -continue current goals.     PLAN    [x]  Continue on current plan []  Follow-up PRN   []  Discharge due to :    []  Next appt:      Dietitian: Eve Reynolds RD    Date: 4/15/2024 Time: 2:43 PM

## 2024-04-17 ENCOUNTER — OFFICE VISIT (OUTPATIENT)
Age: 39
End: 2024-04-17
Payer: COMMERCIAL

## 2024-04-17 VITALS
HEART RATE: 71 BPM | TEMPERATURE: 97.8 F | WEIGHT: 238 LBS | SYSTOLIC BLOOD PRESSURE: 125 MMHG | RESPIRATION RATE: 16 BRPM | BODY MASS INDEX: 42.17 KG/M2 | DIASTOLIC BLOOD PRESSURE: 82 MMHG | HEIGHT: 63 IN | OXYGEN SATURATION: 97 %

## 2024-04-17 DIAGNOSIS — I10 ESSENTIAL HYPERTENSION, BENIGN: ICD-10-CM

## 2024-04-17 DIAGNOSIS — E55.9 VITAMIN D DEFICIENCY: ICD-10-CM

## 2024-04-17 DIAGNOSIS — E10.9 TYPE 1 DIABETES MELLITUS WITHOUT COMPLICATION (HCC): ICD-10-CM

## 2024-04-17 DIAGNOSIS — E66.01 CLASS 3 SEVERE OBESITY DUE TO EXCESS CALORIES WITH SERIOUS COMORBIDITY AND BODY MASS INDEX (BMI) OF 40.0 TO 44.9 IN ADULT (HCC): Primary | ICD-10-CM

## 2024-04-17 PROCEDURE — 99214 OFFICE O/P EST MOD 30 MIN: CPT | Performed by: FAMILY MEDICINE

## 2024-04-17 PROCEDURE — 3079F DIAST BP 80-89 MM HG: CPT | Performed by: FAMILY MEDICINE

## 2024-04-17 PROCEDURE — 3074F SYST BP LT 130 MM HG: CPT | Performed by: FAMILY MEDICINE

## 2024-04-17 RX ORDER — LIRAGLUTIDE 6 MG/ML
INJECTION, SOLUTION SUBCUTANEOUS
Qty: 5 ADJUSTABLE DOSE PRE-FILLED PEN SYRINGE | Refills: 2 | Status: SHIPPED | OUTPATIENT
Start: 2024-04-17

## 2024-04-17 NOTE — PROGRESS NOTES
Weight Loss Progress Note: follow up Physician Visit      Flower Church is a 39 y.o. female  who is here for her follow up  Evaluation for the medical bariatric care.      CC: I want to be healthier      Taking wellbutrin     Experiencing vertigo lately- having allergy issues  Has not been exercisng    Weight History  Current weight 238(233) and BMI is Body mass index is 42.16 kg/m².  Goal weight BMI 29,   Highest weight 241   (See weight gain time line scanned into media section of chart)    Hunger control: poor    Significant Medical History    Update on sleep apnea and  CPAP 6    Ever had bariatric surgery: no    Pregnant or planning on becoming pregnant w/in 6 months: no         Significant Psychosocial History     Current Major Lifestyle Changes: no  Any potential unsupportive people: no          Social History  Social History     Tobacco Use    Smoking status: Never     Passive exposure: Never    Smokeless tobacco: Never   Substance Use Topics    Alcohol use: No     How many times a week do you eat out?  1-2    Do you drink any EtOH?  no   If so, how much?        Do you have upcoming any travel in the next 6 weeks?  no   If so, what do you have planned?          Exercise  How many days a week do you currently exercise?  Not consistent days  Have you ever been told by a physician not to exercise?  no      Objective  /82 (Site: Left Upper Arm, Position: Sitting, Cuff Size: Large Adult)   Pulse 71   Temp 97.8 °F (36.6 °C) (Oral)   Resp 16   Ht 1.6 m (5' 3\")   Wt 108 kg (238 lb)   SpO2 97%   BMI 42.16 kg/m²   Current Outpatient Medications   Medication Sig Dispense Refill    liraglutide-weight management (SAXENDA) 18 MG/3ML SOPN 0.6 mg a day for 7 days, then 1.2 mg a day for 7 days, then 1.8 mg a day for 7 days, then 2.4 mg a day for 7 days then 3 mg a day ongoing 5 Adjustable Dose Pre-filled Pen Syringe 2    Insulin Pen Needle 32G X 5 MM MISC 1 each by Does not apply route daily Use daily with

## 2024-04-17 NOTE — PROGRESS NOTES
Identified pt with two pt identifiers (name and ). Reviewed chart in preparation for visit and have obtained necessary documentation.    Flower Church is a 39 y.o. female  Chief Complaint   Patient presents with    Weight Management      LCD -Grocery meal 1 month      /82 (Site: Left Upper Arm, Position: Sitting, Cuff Size: Large Adult)   Pulse 71   Temp 97.8 °F (36.6 °C) (Oral)   Resp 16   Ht 1.6 m (5' 3\")   Wt 108 kg (238 lb)   SpO2 97%   BMI 42.16 kg/m²     1. Have you been to the ER, urgent care clinic since your last visit?  Hospitalized since your last visit?no    2. Have you seen or consulted any other health care providers outside of the [facility] since your last visit?  Include any pap smears or colon screening. no

## 2024-04-22 ENCOUNTER — CLINICAL DOCUMENTATION (OUTPATIENT)
Age: 39
End: 2024-04-22

## 2024-04-22 NOTE — PROGRESS NOTES
Prior Authorization form completed and faxed to [facility] with confirmation page received. Waiting for determination.

## 2024-04-23 ENCOUNTER — TELEPHONE (OUTPATIENT)
Age: 39
End: 2024-04-23

## 2024-04-23 NOTE — TELEPHONE ENCOUNTER
Received a denial letter from [facility] stating that they did not see any records\" supporting that the condition obesity is disabling and life threatening.\" Notes were submitted along with the prior authorization form.    Call placed to [facility] spoke with Representative Amarilis ANTONIO verified patient using 2 patient identifiers and asked about the denial. She was able to review the chart and states that it was not one of the formulary medications. When asked what were the alternative medications because it was not listed on the denial letter. She stated that Wegovy was on the list but will also require a prior authorization. She states that a new order can be submitted for the Wegovy but if the Provider prefers the Saxenda then just call back and the peer to peer can be arranged.    Informed will touch base with the Provider to make her aware of this information and to see what her next step will be.

## 2024-04-23 NOTE — TELEPHONE ENCOUNTER
Spoke with patient and verified using 2 patient identifiers. Pt was informed of the denial and that I have touched base with Dr. Chairez regarding the alternative medication Wegovy that was mentioned and to see if she would prescribe.    Pt voiced understandings and appreciated the return call.

## 2024-04-25 ENCOUNTER — TELEPHONE (OUTPATIENT)
Age: 39
End: 2024-04-25

## 2024-04-25 ENCOUNTER — CLINICAL DOCUMENTATION (OUTPATIENT)
Age: 39
End: 2024-04-25

## 2024-04-25 NOTE — TELEPHONE ENCOUNTER
Pt was called verified using 2 patient identifiers and made aware of the Saxenda Approval as of 04/25/2024 through 08/25/2024.  Pt is to touch base with the pharmacy for when available for  due to there has been issues with the medication being on back order. Pt voiced understandings.

## 2024-04-25 NOTE — PROGRESS NOTES
Pt was denied Saxenda and Wegovy was suggested as an alternative. Per Provider the patient had tried Wegovy but that caused nausea. Reconsideration was faxed to [employer] regarding the denial for the Saxenda on yesterday.    Faxed approval letter was received this morning for the Saxenda as of 04/25/2024 throught 08/25/2024. Pt and pharmacy to be notified of the approval.

## 2024-05-02 ENCOUNTER — NURSE ONLY (OUTPATIENT)
Age: 39
End: 2024-05-02

## 2024-05-02 VITALS
HEART RATE: 88 BPM | SYSTOLIC BLOOD PRESSURE: 123 MMHG | OXYGEN SATURATION: 97 % | BODY MASS INDEX: 41.57 KG/M2 | DIASTOLIC BLOOD PRESSURE: 71 MMHG | TEMPERATURE: 97.8 F | WEIGHT: 234.6 LBS | HEIGHT: 63 IN | RESPIRATION RATE: 18 BRPM

## 2024-05-02 DIAGNOSIS — E10.9 TYPE 1 DIABETES MELLITUS WITHOUT COMPLICATION (HCC): ICD-10-CM

## 2024-05-02 DIAGNOSIS — I10 ESSENTIAL HYPERTENSION, BENIGN: ICD-10-CM

## 2024-05-02 DIAGNOSIS — E66.01 CLASS 3 SEVERE OBESITY DUE TO EXCESS CALORIES WITH SERIOUS COMORBIDITY AND BODY MASS INDEX (BMI) OF 40.0 TO 44.9 IN ADULT (HCC): Primary | ICD-10-CM

## 2024-05-02 RX ORDER — PEN NEEDLE, DIABETIC 32GX 5/32"
NEEDLE, DISPOSABLE MISCELLANEOUS
COMMUNITY
Start: 2024-04-17

## 2024-05-02 ASSESSMENT — PATIENT HEALTH QUESTIONNAIRE - PHQ9
SUM OF ALL RESPONSES TO PHQ9 QUESTIONS 1 & 2: 0
SUM OF ALL RESPONSES TO PHQ QUESTIONS 1-9: 0
2. FEELING DOWN, DEPRESSED OR HOPELESS: NOT AT ALL
SUM OF ALL RESPONSES TO PHQ QUESTIONS 1-9: 0
1. LITTLE INTEREST OR PLEASURE IN DOING THINGS: NOT AT ALL
SUM OF ALL RESPONSES TO PHQ QUESTIONS 1-9: 0
SUM OF ALL RESPONSES TO PHQ QUESTIONS 1-9: 0

## 2024-05-06 DIAGNOSIS — E78.2 MIXED HYPERLIPIDEMIA: ICD-10-CM

## 2024-05-06 DIAGNOSIS — I10 ESSENTIAL (PRIMARY) HYPERTENSION: ICD-10-CM

## 2024-05-06 DIAGNOSIS — E55.9 VITAMIN D DEFICIENCY, UNSPECIFIED: ICD-10-CM

## 2024-05-06 DIAGNOSIS — E10.9 TYPE 1 DIABETES MELLITUS WITHOUT COMPLICATIONS (HCC): ICD-10-CM

## 2024-05-16 LAB — HBA1C MFR BLD: 8 % (ref 4.8–5.6)

## 2024-05-17 LAB
ALBUMIN/CREAT UR: 3 MG/G CREAT (ref 0–29)
BUN SERPL-MCNC: 13 MG/DL (ref 6–24)
BUN/CREAT SERPL: 14 (ref 9–23)
CALCIUM SERPL-MCNC: 9.1 MG/DL (ref 8.7–10.2)
CHLORIDE SERPL-SCNC: 102 MMOL/L (ref 96–106)
CO2 SERPL-SCNC: 27 MMOL/L (ref 20–29)
CREAT SERPL-MCNC: 0.91 MG/DL (ref 0.57–1)
CREAT UR-MCNC: 336.8 MG/DL
EGFRCR SERPLBLD CKD-EPI 2021: 81 ML/MIN/1.73
GLUCOSE SERPL-MCNC: 153 MG/DL (ref 70–99)
MICROALBUMIN UR-MCNC: 10.6 UG/ML
POTASSIUM SERPL-SCNC: 4 MMOL/L (ref 3.5–5.2)
SODIUM SERPL-SCNC: 140 MMOL/L (ref 134–144)

## 2024-05-20 ENCOUNTER — OFFICE VISIT (OUTPATIENT)
Age: 39
End: 2024-05-20
Payer: COMMERCIAL

## 2024-05-20 VITALS
WEIGHT: 229.2 LBS | HEART RATE: 75 BPM | HEIGHT: 63 IN | SYSTOLIC BLOOD PRESSURE: 135 MMHG | DIASTOLIC BLOOD PRESSURE: 80 MMHG | BODY MASS INDEX: 40.61 KG/M2

## 2024-05-20 DIAGNOSIS — E55.9 VITAMIN D DEFICIENCY, UNSPECIFIED: ICD-10-CM

## 2024-05-20 DIAGNOSIS — E10.9 TYPE 1 DIABETES MELLITUS WITHOUT COMPLICATIONS (HCC): Primary | ICD-10-CM

## 2024-05-20 DIAGNOSIS — E78.2 MIXED HYPERLIPIDEMIA: ICD-10-CM

## 2024-05-20 DIAGNOSIS — I10 ESSENTIAL (PRIMARY) HYPERTENSION: ICD-10-CM

## 2024-05-20 PROCEDURE — 3052F HG A1C>EQUAL 8.0%<EQUAL 9.0%: CPT | Performed by: INTERNAL MEDICINE

## 2024-05-20 PROCEDURE — 99214 OFFICE O/P EST MOD 30 MIN: CPT | Performed by: INTERNAL MEDICINE

## 2024-05-20 PROCEDURE — 3075F SYST BP GE 130 - 139MM HG: CPT | Performed by: INTERNAL MEDICINE

## 2024-05-20 PROCEDURE — 3079F DIAST BP 80-89 MM HG: CPT | Performed by: INTERNAL MEDICINE

## 2024-05-20 NOTE — PROGRESS NOTES
Chief Complaint   Patient presents with    Diabetes     PCP and pharmacy confirmed     History of Present Illness: Flower Church is a 39 y.o. female here for follow up of diabetes.  Weight down 11 lbs since last visit in person in 5/23.   Met with Dr. Chairez on 4/17/24 and she started her on saxenda and had worked up to the 1.8 mg dose and took this for about 4 days and had too much nausea and upset stomach so she went back to the 1.2 mg dose and has stayed on this dose and so far has lost 9 lbs since that visit.  She had thought she had not made any further changes to her pump settings since last visit but when I reviewed her settings her basal rate was set at 0.8 and carb ratio at 8 and is having more post-meal spikes so put her carb ratio back at 7.  Compliant with BP regimen and vitamin D.  Will be seeing a new PCP later this week.    Current settings are as follows:   - basal: 12a: 0.8   - Carb ratio: 8   - sensitivity: 75   - target: 110   - active insulin time: 5 hours     she has the following indications to continue treatment with Dexcom:   1) she has type 1 diabetes (E10.9) and is on an intensive insulin regimen with an insulin pump   2) she tests her blood sugar 4 times per day and makes treatment decisions off her blood sugar readings and does the same off dexcom sensor readings   3) she requires frequent adjustments to her insulin pump settings based on her dexcom sensor readings   4) she has benefitted from therapeutic continuous glucose monitoring and I recommend that she continue this   5) she is seen in my office every 4-6 months    Current Outpatient Medications   Medication Sig    liraglutide-weight management (SAXENDA) 18 MG/3ML SOPN 0.6 mg a day for 7 days, then 1.2 mg a day for 7 days, then 1.8 mg a day for 7 days, then 2.4 mg a day for 7 days then 3 mg a day ongoing    Insulin Pen Needle 32G X 5 MM MISC 1 each by Does not apply route daily Use daily with liraglutide    LANTUS SOLOSTAR 100

## 2024-05-20 NOTE — PATIENT INSTRUCTIONS
1) Current settings are as follows:   - basal: 12a: 0.8   - Carb ratio: 7   - sensitivity: 75   - target: 110   - active insulin time: 5 hours    I made your carb ratio 7 when it had been set at 8 and previously had been at 7 so hopefully this will help with post-meal spikes.    2) BUN and creatinine are markers of kidney function.  Your values are normal.    3) Microalbumin/creatinine ratio is a marker of the amount of protein in your urine.  Goal is less than 30.  Your value is normal. This indicates that your kidneys are not being affected by your uncontrolled diabetes and/or blood pressure.    4) Your blood pressure is controlled.   I recommended patient to place pump on auto mode and to be more diligent with carb counting    Log blood sugars 4 times per day, before each meal and at bedtime  Blood sugar goals discussed:  before breakfast, 100-140 before lunch and dinner and 130-150 before bedtime  Follow up in 6 weeks with insulin pump and blood tests per orders

## 2024-05-22 ENCOUNTER — OFFICE VISIT (OUTPATIENT)
Age: 39
End: 2024-05-22
Payer: COMMERCIAL

## 2024-05-22 VITALS
WEIGHT: 229 LBS | OXYGEN SATURATION: 96 % | TEMPERATURE: 98 F | RESPIRATION RATE: 18 BRPM | HEIGHT: 63 IN | DIASTOLIC BLOOD PRESSURE: 78 MMHG | HEART RATE: 83 BPM | SYSTOLIC BLOOD PRESSURE: 126 MMHG | BODY MASS INDEX: 40.57 KG/M2

## 2024-05-22 DIAGNOSIS — E55.9 VITAMIN D DEFICIENCY: ICD-10-CM

## 2024-05-22 DIAGNOSIS — E66.01 CLASS 3 SEVERE OBESITY DUE TO EXCESS CALORIES WITH SERIOUS COMORBIDITY AND BODY MASS INDEX (BMI) OF 40.0 TO 44.9 IN ADULT (HCC): Primary | ICD-10-CM

## 2024-05-22 DIAGNOSIS — E10.9 TYPE 1 DIABETES MELLITUS WITHOUT COMPLICATION (HCC): ICD-10-CM

## 2024-05-22 DIAGNOSIS — I10 ESSENTIAL HYPERTENSION, BENIGN: ICD-10-CM

## 2024-05-22 PROCEDURE — 3074F SYST BP LT 130 MM HG: CPT | Performed by: FAMILY MEDICINE

## 2024-05-22 PROCEDURE — 3052F HG A1C>EQUAL 8.0%<EQUAL 9.0%: CPT | Performed by: FAMILY MEDICINE

## 2024-05-22 PROCEDURE — 3078F DIAST BP <80 MM HG: CPT | Performed by: FAMILY MEDICINE

## 2024-05-22 PROCEDURE — 99214 OFFICE O/P EST MOD 30 MIN: CPT | Performed by: FAMILY MEDICINE

## 2024-05-22 NOTE — PROGRESS NOTES
Identified pt with two pt identifiers (name and ). Reviewed chart in preparation for visit and have obtained necessary documentation.    Flower Church is a 39 y.o. female  Chief Complaint   Patient presents with    Weight Management     LCD grocery meal plan    Medication Management     /78 (Site: Left Upper Arm, Position: Sitting, Cuff Size: Large Adult)   Pulse 83   Temp 98 °F (36.7 °C)   Resp 18   Ht 1.6 m (5' 3\")   Wt 103.9 kg (229 lb)   SpO2 96%   BMI 40.57 kg/m²     1. Have you been to the ER, urgent care clinic since your last visit?  Hospitalized since your last visit?no    2. Have you seen or consulted any other health care providers outside of the [facility] since your last visit?  Include any pap smears or colon screening. no       
ongoing (Patient taking differently: Inject 1.2 mg into the skin daily  1.2 mg a day for 7 days, then 1.8 mg a day for 7 days, then 2.4 mg a day for 7 days then 3 mg a day ongoing) 5 Adjustable Dose Pre-filled Pen Syringe 2    Insulin Pen Needle 32G X 5 MM MISC 1 each by Does not apply route daily Use daily with liraglutide 100 each 3    LANTUS SOLOSTAR 100 UNIT/ML injection pen INJECT 36 UNITS DAILY IN THE EVENING (Patient taking differently: When needed) 15 mL 11    buPROPion (WELLBUTRIN SR) 150 MG extended release tablet TAKE 1 TABLET BY MOUTH TWICE A DAY 60 tablet 2    NOVOLOG FLEXPEN 100 UNIT/ML injection pen INJECT 1 UNIT FOR 9 GRAMS OF CARBS + 1 UNITS FOR EVERY 75 MG/DL ABOVE 150 MG/DL--MAX DOSE 50 UNITS/DAY (Patient taking differently: When needed) 15 mL 11    NOVOLOG 100 UNIT/ML injection vial USE AS DIRECTED IN INSULIN PUMP.  UNITS/DAY. 30 mL 11    Continuous Blood Gluc Transmit (DEXCOM G6 TRANSMITTER) MISC Use as directed every 90 days 1 each 3    Continuous Blood Gluc Sensor (DEXCOM G6 SENSOR) MISC Use as directed every 10 days 9 each 3    triamcinolone (KENALOG) 0.1 % ointment APPLY TOPICALLY 2 TIMES DAILY TO AFFECTED AFTER. DO NOT USE FOR >2 WEEKS CONSECUTIVELY. 80 g 0    hydroCHLOROthiazide (HYDRODIURIL) 25 MG tablet TAKE 1 TABLET BY MOUTH EVERY DAY 90 tablet 5    valsartan (DIOVAN) 320 MG tablet Take 1 tablet by mouth daily Replaces lisinopril.  For blood pressure and kidney protection 30 tablet 11    TRUE METRIX BLOOD GLUCOSE TEST strip USE TO CHECK BLOOD SUGAR FOUR TIMES A DAY. 100 strip 15    Multiple Vitamin (MULTIVITAMIN ADULT PO) Take 1 tablet by mouth daily      Lancets MISC Use to check blood sugar four times a day.      vitamin D3 (CHOLECALCIFEROL) 125 MCG (5000 UT) TABS tablet Take 1 tablet by mouth daily      ibuprofen (ADVIL;MOTRIN) 600 MG tablet Take 1 tablet by mouth every 6 hours as needed      BD PEN NEEDLE FLORENCE 2ND GEN 32G X 4 MM MISC USE DAILY WITH LIRAGLUTIDE (Patient not

## 2024-05-28 DIAGNOSIS — E10.9 TYPE 1 DIABETES MELLITUS WITHOUT COMPLICATION (HCC): Primary | ICD-10-CM

## 2024-05-28 DIAGNOSIS — R79.89 ABNORMAL THYROID BLOOD TEST: ICD-10-CM

## 2024-05-28 DIAGNOSIS — I10 ESSENTIAL HYPERTENSION, BENIGN: ICD-10-CM

## 2024-06-04 ENCOUNTER — NURSE ONLY (OUTPATIENT)
Age: 39
End: 2024-06-04

## 2024-06-04 VITALS
BODY MASS INDEX: 40.34 KG/M2 | OXYGEN SATURATION: 99 % | TEMPERATURE: 98.6 F | RESPIRATION RATE: 18 BRPM | HEART RATE: 84 BPM | WEIGHT: 227.7 LBS | SYSTOLIC BLOOD PRESSURE: 107 MMHG | DIASTOLIC BLOOD PRESSURE: 71 MMHG | HEIGHT: 63 IN

## 2024-06-04 DIAGNOSIS — E55.9 VITAMIN D DEFICIENCY: ICD-10-CM

## 2024-06-04 DIAGNOSIS — I10 ESSENTIAL HYPERTENSION, BENIGN: ICD-10-CM

## 2024-06-04 DIAGNOSIS — E66.01 CLASS 3 SEVERE OBESITY DUE TO EXCESS CALORIES WITH SERIOUS COMORBIDITY AND BODY MASS INDEX (BMI) OF 40.0 TO 44.9 IN ADULT (HCC): Primary | ICD-10-CM

## 2024-06-04 DIAGNOSIS — E10.9 TYPE 1 DIABETES MELLITUS WITHOUT COMPLICATION (HCC): ICD-10-CM

## 2024-06-04 ASSESSMENT — PATIENT HEALTH QUESTIONNAIRE - PHQ9
1. LITTLE INTEREST OR PLEASURE IN DOING THINGS: NOT AT ALL
SUM OF ALL RESPONSES TO PHQ QUESTIONS 1-9: 0
SUM OF ALL RESPONSES TO PHQ9 QUESTIONS 1 & 2: 0
2. FEELING DOWN, DEPRESSED OR HOPELESS: NOT AT ALL

## 2024-06-04 NOTE — PROGRESS NOTES
Identified pt with two pt identifiers (name and ). Reviewed chart in preparation for visit and have obtained necessary documentation.    Flower Church is a 39 y.o. female  Chief Complaint   Patient presents with    Weight Management     Grocery meal plan LCD    Medication Management     /71 (Site: Left Upper Arm, Position: Sitting, Cuff Size: Large Adult)   Pulse 84   Temp 98.6 °F (37 °C) (Oral)   Resp 18   Ht 1.6 m (5' 3\")   Wt 103.3 kg (227 lb 11.2 oz)   SpO2 99%   BMI 40.34 kg/m²     1. Have you been to the ER, urgent care clinic since your last visit?  Hospitalized since your last visit?no    2. Have you seen or consulted any other health care providers outside of the [facility] since your last visit?  Include any pap smears or colon screening. no

## 2024-06-15 ENCOUNTER — TELEPHONE (OUTPATIENT)
Age: 39
End: 2024-06-15

## 2024-06-15 NOTE — TELEPHONE ENCOUNTER
Josephine,    Please see the [employer] exchange below:    Ok.  I will have Josephine call this number next week to see what needs to be done on our end.  Thanks!  ===View-only below this line===      ----- Message -----       From:Flower Church \"Annette\"       Sent:6/15/2024 10:02 AM EDT         To:Dr. Duran Morales    Subject:Need authorization     Good morning Dr. Morales,    I have my new insurance now. I was informed that I need an authorization from you to receive my durable medical equipment for my tandem pump. The number you will need to call is [phone]. I was informed that my pump supplies aren't covered by pharmacy but through the medical side. Hopefully it will be approved. If not I will need to go back to omnipod or any other options that's covered by the pharmacy side. I believe because the tandem requires an infusion I can't get the supplies through a local pharmacy.  Please advise me if you have any other information.  Also this isn't an emergency. I have enough supplies right now. Just wanted to get the ball going since I know it's a process.    My new insurance is [employer] [facility]: [place] [id number]    Thanks,    Annette

## 2024-06-17 DIAGNOSIS — E10.9 TYPE 1 DIABETES MELLITUS WITHOUT COMPLICATIONS (HCC): Primary | ICD-10-CM

## 2024-06-17 RX ORDER — PROCHLORPERAZINE 25 MG/1
SUPPOSITORY RECTAL
Qty: 9 EACH | Refills: 3 | Status: SHIPPED | OUTPATIENT
Start: 2024-06-17

## 2024-06-17 RX ORDER — PROCHLORPERAZINE 25 MG/1
SUPPOSITORY RECTAL
Qty: 1 EACH | Refills: 3 | Status: SHIPPED | OUTPATIENT
Start: 2024-06-17

## 2024-06-20 NOTE — TELEPHONE ENCOUNTER
Spoke with Kaylah and as told their system is currently down. Kaylah asked that office call back tomorrow or Monday.

## 2024-06-21 ENCOUNTER — TELEPHONE (OUTPATIENT)
Age: 39
End: 2024-06-21

## 2024-06-21 NOTE — TELEPHONE ENCOUNTER
Called patient to confirm appointment for 6/24 with Dr. Chairez. Patient did not answer, left voicemail instructing patient to call back.

## 2024-06-24 ENCOUNTER — OFFICE VISIT (OUTPATIENT)
Age: 39
End: 2024-06-24
Payer: COMMERCIAL

## 2024-06-24 VITALS
DIASTOLIC BLOOD PRESSURE: 78 MMHG | TEMPERATURE: 98.4 F | OXYGEN SATURATION: 96 % | HEART RATE: 87 BPM | SYSTOLIC BLOOD PRESSURE: 129 MMHG | RESPIRATION RATE: 16 BRPM | HEIGHT: 63 IN | BODY MASS INDEX: 40.33 KG/M2 | WEIGHT: 227.6 LBS

## 2024-06-24 DIAGNOSIS — E66.01 CLASS 3 SEVERE OBESITY DUE TO EXCESS CALORIES WITH SERIOUS COMORBIDITY AND BODY MASS INDEX (BMI) OF 40.0 TO 44.9 IN ADULT (HCC): Primary | ICD-10-CM

## 2024-06-24 DIAGNOSIS — I10 ESSENTIAL HYPERTENSION, BENIGN: ICD-10-CM

## 2024-06-24 DIAGNOSIS — E10.9 TYPE 1 DIABETES MELLITUS WITHOUT COMPLICATION (HCC): ICD-10-CM

## 2024-06-24 DIAGNOSIS — E55.9 VITAMIN D DEFICIENCY: ICD-10-CM

## 2024-06-24 DIAGNOSIS — R11.0 NAUSEA: ICD-10-CM

## 2024-06-24 PROCEDURE — 3078F DIAST BP <80 MM HG: CPT | Performed by: FAMILY MEDICINE

## 2024-06-24 PROCEDURE — 3074F SYST BP LT 130 MM HG: CPT | Performed by: FAMILY MEDICINE

## 2024-06-24 PROCEDURE — 99214 OFFICE O/P EST MOD 30 MIN: CPT | Performed by: FAMILY MEDICINE

## 2024-06-24 PROCEDURE — 3052F HG A1C>EQUAL 8.0%<EQUAL 9.0%: CPT | Performed by: FAMILY MEDICINE

## 2024-06-24 RX ORDER — ONDANSETRON 4 MG/1
4 TABLET, FILM COATED ORAL DAILY PRN
Qty: 30 TABLET | Refills: 0 | Status: SHIPPED | OUTPATIENT
Start: 2024-06-24

## 2024-06-24 ASSESSMENT — PATIENT HEALTH QUESTIONNAIRE - PHQ9
SUM OF ALL RESPONSES TO PHQ QUESTIONS 1-9: 0
SUM OF ALL RESPONSES TO PHQ QUESTIONS 1-9: 0
SUM OF ALL RESPONSES TO PHQ9 QUESTIONS 1 & 2: 0
SUM OF ALL RESPONSES TO PHQ QUESTIONS 1-9: 0
SUM OF ALL RESPONSES TO PHQ QUESTIONS 1-9: 0
1. LITTLE INTEREST OR PLEASURE IN DOING THINGS: NOT AT ALL
2. FEELING DOWN, DEPRESSED OR HOPELESS: NOT AT ALL

## 2024-06-24 NOTE — PROGRESS NOTES
Identified pt with two pt identifiers (name and ). Reviewed chart in preparation for visit and have obtained necessary documentation.    Flower Church is a 39 y.o. female  Chief Complaint   Patient presents with    Weight Management     LCD Grocery meal plan     Medication Management     /78 (Site: Left Upper Arm, Position: Sitting, Cuff Size: Large Adult)   Pulse 87   Temp 98.4 °F (36.9 °C) (Oral)   Resp 16   Ht 1.6 m (5' 3\")   Wt 103.2 kg (227 lb 9.6 oz)   SpO2 96%   BMI 40.32 kg/m²     1. Have you been to the ER, urgent care clinic since your last visit?  Hospitalized since your last visit?no    2. Have you seen or consulted any other health care providers outside of the [facility] since your last visit?  Include any pap smears or colon screening. No    Pt states that she has been having nausea and vomiting on the higher doses of Saxenda so she decreased her dose back down to 0.6 mg. She will discuss with Dr. Chairez.

## 2024-06-24 NOTE — PROGRESS NOTES
Weight Loss Progress Note: follow up Physician Visit      Flower Church is a 39 y.o. female  who is here for her follow up  Evaluation for the medical bariatric care.      CC: I want to be healthier      She went back to 0.6 mg saxenda because she had stopped it a few days      Weight History  Current weight 227( 238) and BMI is Body mass index is 40.32 kg/m².  Goal weight BMI 29,   Highest weight bmi 241   (See weight gain time line scanned into media section of chart)    Hunger control: good    Significant Medical History    Update on sleep apnea and  CPAP 7    Ever had bariatric surgery: no    Pregnant or planning on becoming pregnant w/in 6 months: no         Significant Psychosocial History     Current Major Lifestyle Changes: no  Any potential unsupportive people: no          Social History  Social History     Tobacco Use    Smoking status: Never     Passive exposure: Never    Smokeless tobacco: Never   Substance Use Topics    Alcohol use: No     How many times a week do you eat out?  0-1    Do you drink any EtOH?  no   If so, how much?        Do you have upcoming any travel in the next 6 weeks?  no   If so, what do you have planned?          Exercise  How many days a week do you currently exercise?  3-4 days  Have you ever been told by a physician not to exercise?  no      Objective  /78 (Site: Left Upper Arm, Position: Sitting, Cuff Size: Large Adult)   Pulse 87   Temp 98.4 °F (36.9 °C) (Oral)   Resp 16   Ht 1.6 m (5' 3\")   Wt 103.2 kg (227 lb 9.6 oz)   SpO2 96%   BMI 40.32 kg/m²   Current Outpatient Medications   Medication Sig Dispense Refill    ondansetron (ZOFRAN) 4 MG tablet Take 1 tablet by mouth daily as needed for Nausea or Vomiting 30 tablet 0    Continuous Glucose Transmitter (DEXCOM G6 TRANSMITTER) MISC Use as directed every 90 days 1 each 3    Continuous Glucose Sensor (DEXCOM G6 SENSOR) MISC Use as directed every 10 days 9 each 3    liraglutide-weight management (SAXENDA) 18

## 2024-07-01 NOTE — TELEPHONE ENCOUNTER
Contacted [facility] and was transferred 4 times to [facility]. Spoke with Ericka who stated she has never had to do a PA for supplies and asked that nurse contact [phone]. Email sent to [employer] rep [employer] as I have not done a PA for Tandem pump supplies.

## 2024-07-02 NOTE — TELEPHONE ENCOUNTER
Spoke with Supriya via email and was informed that the pt's DME supplier is the one to do the PA for pump supplies as that Tandem pump supplies is not covered under pharmacy benefit at all. [employer]orm message sent to pt to clarify DME supplier. [employer] rep stated she left a voicemail for pt as well.

## 2024-07-10 NOTE — TELEPHONE ENCOUNTER
I received a fax from [employer] stating that they are unable to service this patient as she has moved to an [employer] commercial plan and per the plan's benefits, they are out of network. Can you call her and see if she knows what DME company her plan accepts and have them fax me any necessary forms so I can help her get her pump supplies?  Thanks.

## 2024-07-10 NOTE — TELEPHONE ENCOUNTER
Pt states she has been working with a [employer] rep and found out she has to use [employer]. Pt states all her information has been given to [employer] and Dr Morales should be receiving the order form once they have straightened out everything.

## 2024-07-11 ENCOUNTER — NURSE ONLY (OUTPATIENT)
Age: 39
End: 2024-07-11

## 2024-07-11 VITALS
HEART RATE: 71 BPM | OXYGEN SATURATION: 96 % | RESPIRATION RATE: 18 BRPM | TEMPERATURE: 97.5 F | SYSTOLIC BLOOD PRESSURE: 113 MMHG | BODY MASS INDEX: 40.01 KG/M2 | HEIGHT: 63 IN | WEIGHT: 225.8 LBS | DIASTOLIC BLOOD PRESSURE: 77 MMHG

## 2024-07-11 DIAGNOSIS — E10.9 TYPE 1 DIABETES MELLITUS WITHOUT COMPLICATION (HCC): ICD-10-CM

## 2024-07-11 DIAGNOSIS — I10 ESSENTIAL HYPERTENSION, BENIGN: ICD-10-CM

## 2024-07-11 DIAGNOSIS — E55.9 VITAMIN D DEFICIENCY: ICD-10-CM

## 2024-07-11 DIAGNOSIS — E66.01 CLASS 3 SEVERE OBESITY DUE TO EXCESS CALORIES WITH SERIOUS COMORBIDITY AND BODY MASS INDEX (BMI) OF 40.0 TO 44.9 IN ADULT (HCC): Primary | ICD-10-CM

## 2024-07-11 ASSESSMENT — PATIENT HEALTH QUESTIONNAIRE - PHQ9
2. FEELING DOWN, DEPRESSED OR HOPELESS: NOT AT ALL
SUM OF ALL RESPONSES TO PHQ QUESTIONS 1-9: 0
SUM OF ALL RESPONSES TO PHQ9 QUESTIONS 1 & 2: 0
1. LITTLE INTEREST OR PLEASURE IN DOING THINGS: NOT AT ALL
SUM OF ALL RESPONSES TO PHQ QUESTIONS 1-9: 0

## 2024-07-15 ENCOUNTER — OFFICE VISIT (OUTPATIENT)
Age: 39
End: 2024-07-15
Payer: COMMERCIAL

## 2024-07-15 VITALS
OXYGEN SATURATION: 96 % | HEART RATE: 75 BPM | DIASTOLIC BLOOD PRESSURE: 71 MMHG | HEIGHT: 63 IN | SYSTOLIC BLOOD PRESSURE: 117 MMHG | WEIGHT: 227.6 LBS | RESPIRATION RATE: 18 BRPM | TEMPERATURE: 98.4 F | BODY MASS INDEX: 40.33 KG/M2

## 2024-07-15 DIAGNOSIS — R11.0 NAUSEA: ICD-10-CM

## 2024-07-15 DIAGNOSIS — E10.9 TYPE 1 DIABETES MELLITUS WITHOUT COMPLICATION (HCC): ICD-10-CM

## 2024-07-15 DIAGNOSIS — E66.01 CLASS 3 SEVERE OBESITY DUE TO EXCESS CALORIES WITH SERIOUS COMORBIDITY AND BODY MASS INDEX (BMI) OF 40.0 TO 44.9 IN ADULT (HCC): Primary | ICD-10-CM

## 2024-07-15 DIAGNOSIS — I10 ESSENTIAL HYPERTENSION, BENIGN: ICD-10-CM

## 2024-07-15 DIAGNOSIS — E66.01 CLASS 3 SEVERE OBESITY DUE TO EXCESS CALORIES WITH SERIOUS COMORBIDITY AND BODY MASS INDEX (BMI) OF 40.0 TO 44.9 IN ADULT (HCC): ICD-10-CM

## 2024-07-15 DIAGNOSIS — E66.01 MORBID (SEVERE) OBESITY DUE TO EXCESS CALORIES (HCC): ICD-10-CM

## 2024-07-15 PROCEDURE — 3074F SYST BP LT 130 MM HG: CPT | Performed by: FAMILY MEDICINE

## 2024-07-15 PROCEDURE — 3078F DIAST BP <80 MM HG: CPT | Performed by: FAMILY MEDICINE

## 2024-07-15 PROCEDURE — 99214 OFFICE O/P EST MOD 30 MIN: CPT | Performed by: FAMILY MEDICINE

## 2024-07-15 PROCEDURE — 3052F HG A1C>EQUAL 8.0%<EQUAL 9.0%: CPT | Performed by: FAMILY MEDICINE

## 2024-07-15 RX ORDER — ONDANSETRON 4 MG/1
4 TABLET, FILM COATED ORAL DAILY PRN
Qty: 18 TABLET | Refills: 1 | Status: SHIPPED | OUTPATIENT
Start: 2024-07-15 | End: 2024-07-15 | Stop reason: SDUPTHER

## 2024-07-15 RX ORDER — ONDANSETRON 4 MG/1
4 TABLET, FILM COATED ORAL DAILY PRN
Qty: 30 TABLET | Refills: 1 | Status: SHIPPED | OUTPATIENT
Start: 2024-07-15

## 2024-07-15 RX ORDER — BUPROPION HYDROCHLORIDE 150 MG/1
150 TABLET, EXTENDED RELEASE ORAL 2 TIMES DAILY
Qty: 60 TABLET | Refills: 2 | Status: SHIPPED | OUTPATIENT
Start: 2024-07-15

## 2024-07-15 NOTE — TELEPHONE ENCOUNTER
Ondansetron last Rx on 6/24 for #30 with 0 RF  Bupropion last Rx on 3/19 for #60 with 2 RF    Last OV with Dr. Chairez on 6/24 - has appointment schedule for 7/29

## 2024-07-16 NOTE — TELEPHONE ENCOUNTER
We had the following [employer] exchange:    I did receive the fax from them and will complete it tomorrow and fax back.  Thanks!  ===View-only below this line===      ----- Message -----       From:Flower Church \"Annette\"       Sent:7/15/2024  7:37 PM EDT         To:Patient Medical Advice Request Message List    Subject:DME insurance Supplier     I will provide them this info tomorrow.  Thank you!      ----- Message -----       From:DANILO GOMEZ       Sent:7/15/2024  5:04 PM EDT         To:Flower Church \"Annette\"    Subject:DME insurance Supplier     Can you have them to fax their order form to [phone]?      ----- Message -----       From:Flower Church \"Annette\"       Sent:7/15/2024  3:19 PM EDT         To:Dr. Duran Morales    Subject:DME insurance Supplier     Good afternoon. I have my DME supplier for my insurance and have started the intake process. The name of the entity is [employer] and the diabetes supplier under them is '[employer].' The direct contact number is [phone]. They informed me the next step is they will be calling Dr. Morales for documentation and running everything through my insurance. So I wanted to give you a heads up so you can look out for the call or contact them.     If you have any questions please please reach out to me anytime.     ThanksAnnetet

## 2024-07-19 NOTE — PROGRESS NOTES
Weight Loss Progress Note: follow up Physician Visit      Flower Church is a 39 y.o. female  who is here for her follow up  Evaluation for the medical bariatric care.      CC: I want to be healthier    Taking saxenda and slowly advancing betrween 1.2 and 1.8 mg  She still gets nausea and taking zofran daily      Weight History  Current weight 227( 227) and BMI is Body mass index is 40.32 kg/m².  Goal weight BMI 29,   Highest weight 241   (See weight gain time line scanned into media section of chart)    Hunger control: good    Significant Medical History    Update on sleep apnea and  CPAP working on school assignments late at night 5-6    Ever had bariatric surgery: no    Pregnant or planning on becoming pregnant w/in 6 months: no         Significant Psychosocial History     Current Major Lifestyle Changes: no  Any potential unsupportive people: no          Social History  Social History     Tobacco Use    Smoking status: Never     Passive exposure: Never    Smokeless tobacco: Never   Substance Use Topics    Alcohol use: No     How many times a week do you eat out?  Has been eating out more due to celebrations and trips    Do you drink any EtOH?  no   If so, how much?        Do you have upcoming any travel in the next 6 weeks?  no   If so, what do you have planned?          Exercise  How many days a week do you currently exercise?  Not consistent  days  Have you ever been told by a physician not to exercise?  no      Objective  /71 (Site: Left Upper Arm, Position: Sitting, Cuff Size: Large Adult)   Pulse 75   Temp 98.4 °F (36.9 °C) (Oral)   Resp 18   Ht 1.6 m (5' 3\")   Wt 103.2 kg (227 lb 9.6 oz)   SpO2 96%   BMI 40.32 kg/m²   Current Outpatient Medications   Medication Sig Dispense Refill    buPROPion (WELLBUTRIN SR) 150 MG extended release tablet TAKE 1 TABLET BY MOUTH TWICE A DAY 60 tablet 2    ondansetron (ZOFRAN) 4 MG tablet Take 1 tablet by mouth daily as needed for Nausea or Vomiting 30 
(Patient not taking: Reported on 7/11/2024) 80 g 0     No current facility-administered medications for this visit.       Waist Circumference: See Weight Management Doc Flowsheet  Neck Circumference: See Weight Management Doc Flowsheet  Percent Body Fat: See Weight Management Doc Flowsheet      7/15/2024     1:10 PM 7/15/2024     1:00 PM 7/11/2024     3:02 PM 6/24/2024     3:19 PM 6/24/2024     3:00 PM 6/4/2024     3:20 PM 5/22/2024     3:17 PM   Weight Metrics   Weight 227 lb 9.6 oz  225 lb 12.8 oz 227 lb 9.6 oz  227 lb 11.2 oz 229 lb   Neck (Inches)  13.75 in   13.75 in     Waist Measure Inches  37.5 in   39 in     Body Fat %  43.1 %   44.1 %     BMI (Calculated) 40.4 kg/m2  40.1 kg/m2 40.4 kg/m2  40.4 kg/m2 40.7 kg/m2          No data to display                   Labs: repeat in aug    Review of Systems  Complete ROS negative except where noted above      Physical Exam    Vital Signs Reviewed  Weight Management Metrics Reviewed    Vital Signs Reviewed  Appearance: Obese, A&O x 3, NAD  HEENT:  NC/AT, EOMI, PERRL, No scleral icterus, malampatti score:  Skin:    Skin tags - no   Acanthosis Nigricans no   Neck:  No nodes, thyromegaly   Heart:  RRR with    2/6 HELDER     no /R/G  Lungs:  CTAB, no rhonchi, rales, or wheezes with good air exchange   Abdomen:  Non-tender, pos bowel sounds; hepatomegaly -   Ext:  No C/C/E        Assessment & Plan  Flower Church was seen today for Weight Management and Medication Management       {There are no diagnoses linked to this encounter. (Refresh or delete this SmartLink)}       Diet:***    Activity: ***    Medication:  ***    Based on his history and exam, Flower Church *** a good candidate for the  [facility]     There are no Patient Instructions on file for this visit.         There were no encounter diagnoses.  The patient verbalizes understanding and agrees with the plan of care

## 2024-07-25 ENCOUNTER — OFFICE VISIT (OUTPATIENT)
Facility: CLINIC | Age: 39
End: 2024-07-25

## 2024-07-25 VITALS
TEMPERATURE: 97 F | DIASTOLIC BLOOD PRESSURE: 84 MMHG | RESPIRATION RATE: 16 BRPM | SYSTOLIC BLOOD PRESSURE: 139 MMHG | BODY MASS INDEX: 40.22 KG/M2 | OXYGEN SATURATION: 99 % | WEIGHT: 227 LBS | HEIGHT: 63 IN | HEART RATE: 86 BPM

## 2024-07-25 DIAGNOSIS — Z85.3 HISTORY OF BREAST CANCER: ICD-10-CM

## 2024-07-25 DIAGNOSIS — E10.9 TYPE 1 DIABETES MELLITUS WITHOUT COMPLICATION (HCC): ICD-10-CM

## 2024-07-25 DIAGNOSIS — I10 ESSENTIAL HYPERTENSION, BENIGN: ICD-10-CM

## 2024-07-25 DIAGNOSIS — Z76.89 ENCOUNTER TO ESTABLISH CARE WITH NEW DOCTOR: Primary | ICD-10-CM

## 2024-07-25 DIAGNOSIS — E55.9 VITAMIN D DEFICIENCY: ICD-10-CM

## 2024-07-25 DIAGNOSIS — E66.01 MORBID OBESITY WITH BMI OF 40.0-44.9, ADULT (HCC): ICD-10-CM

## 2024-07-25 NOTE — PROGRESS NOTES
Chief Complaint   Patient presents with    Establish Care     Lives in [facility] - would be interested in VV      Eczema     Needs referral to dermatology

## 2024-07-25 NOTE — PATIENT INSTRUCTIONS
It was a pleasure to see you today.    1. Encounter to establish care with new doctor    2. Morbid obesity with BMI of 40.0-44.9, adult (HCC)    3. Type 1 diabetes mellitus without complication (HCC)    4. Vitamin D deficiency    5. History of breast cancer    6. Essential hypertension, benign         No follow-ups on file.     Thank you for choosing [facility].    CLEMENTINA Obregon - NP

## 2024-08-01 NOTE — PROGRESS NOTES
Identified pt with two pt identifiers (name and ). Reviewed chart in preparation for visit and have obtained necessary documentation.    Flower Church is a 39 y.o. female  Chief Complaint   Patient presents with    Weight Management     [facility]    Medication Management     /77 (Site: Left Upper Arm, Position: Sitting, Cuff Size: Large Adult)   Pulse 71   Temp 97.5 °F (36.4 °C) (Oral)   Resp 18   Ht 1.6 m (5' 3\")   Wt 102.4 kg (225 lb 12.8 oz)   SpO2 96%   BMI 40.00 kg/m²     1. Have you been to the ER, urgent care clinic since your last visit?  Hospitalized since your last visit?no    2. Have you seen or consulted any other health care providers outside of the [facility] since your last visit?  Include any pap smears or colon screening. no   
daily      ibuprofen (ADVIL;MOTRIN) 600 MG tablet Take 1 tablet by mouth every 6 hours as needed      buPROPion (WELLBUTRIN SR) 150 MG extended release tablet TAKE 1 TABLET BY MOUTH TWICE A DAY 60 tablet 2    ondansetron (ZOFRAN) 4 MG tablet Take 1 tablet by mouth daily as needed for Nausea or Vomiting 30 tablet 1    BD PEN NEEDLE FLORENCE 2ND GEN 32G X 4 MM MISC USE DAILY WITH LIRAGLUTIDE (Patient not taking: Reported on 7/11/2024)      triamcinolone (KENALOG) 0.1 % ointment APPLY TOPICALLY 2 TIMES DAILY TO AFFECTED AFTER. DO NOT USE FOR >2 WEEKS CONSECUTIVELY. (Patient not taking: Reported on 7/11/2024) 80 g 0     No current facility-administered medications for this visit.

## 2024-08-29 RX ORDER — VALSARTAN 320 MG/1
320 TABLET ORAL DAILY
Qty: 30 TABLET | Refills: 11 | Status: SHIPPED | OUTPATIENT
Start: 2024-08-29

## 2024-08-30 DIAGNOSIS — I10 ESSENTIAL (PRIMARY) HYPERTENSION: ICD-10-CM

## 2024-08-30 RX ORDER — HYDROCHLOROTHIAZIDE 25 MG/1
TABLET ORAL
Qty: 90 TABLET | Refills: 3 | Status: SHIPPED | OUTPATIENT
Start: 2024-08-30

## 2024-09-04 ENCOUNTER — OFFICE VISIT (OUTPATIENT)
Age: 39
End: 2024-09-04
Payer: MEDICAID

## 2024-09-04 VITALS
WEIGHT: 229.9 LBS | HEIGHT: 63 IN | HEART RATE: 78 BPM | RESPIRATION RATE: 16 BRPM | BODY MASS INDEX: 40.73 KG/M2 | TEMPERATURE: 99 F | OXYGEN SATURATION: 98 % | SYSTOLIC BLOOD PRESSURE: 125 MMHG | DIASTOLIC BLOOD PRESSURE: 81 MMHG

## 2024-09-04 DIAGNOSIS — E66.01 CLASS 3 SEVERE OBESITY DUE TO EXCESS CALORIES WITH SERIOUS COMORBIDITY AND BODY MASS INDEX (BMI) OF 40.0 TO 44.9 IN ADULT (HCC): Primary | ICD-10-CM

## 2024-09-04 DIAGNOSIS — E10.9 TYPE 1 DIABETES MELLITUS WITHOUT COMPLICATION (HCC): ICD-10-CM

## 2024-09-04 DIAGNOSIS — E55.9 VITAMIN D DEFICIENCY: ICD-10-CM

## 2024-09-04 DIAGNOSIS — I10 ESSENTIAL HYPERTENSION, BENIGN: ICD-10-CM

## 2024-09-04 PROCEDURE — 3052F HG A1C>EQUAL 8.0%<EQUAL 9.0%: CPT | Performed by: FAMILY MEDICINE

## 2024-09-04 PROCEDURE — 99214 OFFICE O/P EST MOD 30 MIN: CPT | Performed by: FAMILY MEDICINE

## 2024-09-04 PROCEDURE — 3079F DIAST BP 80-89 MM HG: CPT | Performed by: FAMILY MEDICINE

## 2024-09-04 PROCEDURE — 3074F SYST BP LT 130 MM HG: CPT | Performed by: FAMILY MEDICINE

## 2024-09-04 ASSESSMENT — PATIENT HEALTH QUESTIONNAIRE - PHQ9
SUM OF ALL RESPONSES TO PHQ QUESTIONS 1-9: 0
SUM OF ALL RESPONSES TO PHQ9 QUESTIONS 1 & 2: 0
SUM OF ALL RESPONSES TO PHQ QUESTIONS 1-9: 0
SUM OF ALL RESPONSES TO PHQ QUESTIONS 1-9: 0
1. LITTLE INTEREST OR PLEASURE IN DOING THINGS: NOT AT ALL
2. FEELING DOWN, DEPRESSED OR HOPELESS: NOT AT ALL
SUM OF ALL RESPONSES TO PHQ QUESTIONS 1-9: 0

## 2024-09-04 NOTE — PROGRESS NOTES
Identified pt with two pt identifiers (name and ). Reviewed chart in preparation for visit and have obtained necessary documentation.    Flower Church is a 39 y.o. female  Chief Complaint   Patient presents with    Weight Management     [facility]    Medication Management     /81 (Site: Left Upper Arm, Position: Sitting, Cuff Size: Large Adult)   Pulse 78   Temp 99 °F (37.2 °C) (Oral)   Resp 16   Ht 1.6 m (5' 3\")   Wt 104.3 kg (229 lb 14.4 oz)   SpO2 98%   BMI 40.72 kg/m²     1. Have you been to the ER, urgent care clinic since your last visit?  Hospitalized since your last visit?no    2. Have you seen or consulted any other health care providers outside of the [facility] since your last visit?  Include any pap smears or colon screening. No      Pt reports that she stopped the Saxenda about 2-3 weeks ago due to nausea and vomiting. She will discuss this with Dr. Chairez

## 2024-09-04 NOTE — PROGRESS NOTES
Weight Loss Progress Note: follow up Physician Visit      Flower Church is a 39 y.o. female  who is here for her follow up  Evaluation for the medical bariatric care.      CC: I wanrt to be healthier  She now has access to a free gym since she works for [employer]  She got up to 3 mg a day and had GI symptoms that were not tolerable  Started  at 241 lbs and got to 227 on saxenda , which is 5 % weight loss.   She is now at 229 after stopping the saxenda  She is type 1 diabetic. The GLP1 A  medication helps her get earlier satiety and eat less. Also eats /craves less sweets which leads to lower insulin dosing and avoids weight gain and actually started getting  weight loss      Weight History  Current weight 229 ( 227)  and BMI is Body mass index is 40.72 kg/m².  Goal weight BMI 29,   Highest weight 241   (See weight gain time line scanned into media section of chart)    Hunger control: good    Significant Medical History    Update on sleep apnea and  CPAP 7    Ever had bariatric surgery: no    Pregnant or planning on becoming pregnant w/in 6 months: no         Significant Psychosocial History     Current Major Lifestyle Changes: no  Any potential unsupportive people: no          Social History  Social History     Tobacco Use    Smoking status: Never     Passive exposure: Never    Smokeless tobacco: Never   Substance Use Topics    Alcohol use: No     How many times a week do you eat out?  [place]ating [employer] 2-3 times a week    Do you drink any EtOH?  no   If so, how much?        Do you have upcoming any travel in the next 6 weeks?  no   If so, what do you have planned?          Exercise  How many days a week do you currently exercise?  3 days walking or using the gym   Have you ever been told by a physician not to exercise?  no      Objective  /81 (Site: Left Upper Arm, Position: Sitting, Cuff Size: Large Adult)   Pulse 78   Temp 99 °F (37.2 °C) (Oral)   Resp 16   Ht 1.6 m (5' 3\")

## 2024-09-05 DIAGNOSIS — E66.01 CLASS 3 SEVERE OBESITY DUE TO EXCESS CALORIES WITH SERIOUS COMORBIDITY AND BODY MASS INDEX (BMI) OF 40.0 TO 44.9 IN ADULT (HCC): ICD-10-CM

## 2024-09-06 DIAGNOSIS — E66.813 CLASS 3 SEVERE OBESITY DUE TO EXCESS CALORIES WITH SERIOUS COMORBIDITY AND BODY MASS INDEX (BMI) OF 40.0 TO 44.9 IN ADULT: ICD-10-CM

## 2024-09-06 DIAGNOSIS — E66.01 CLASS 3 SEVERE OBESITY DUE TO EXCESS CALORIES WITH SERIOUS COMORBIDITY AND BODY MASS INDEX (BMI) OF 40.0 TO 44.9 IN ADULT: ICD-10-CM

## 2024-09-06 RX ORDER — LIRAGLUTIDE 6 MG/ML
INJECTION, SOLUTION SUBCUTANEOUS
Qty: 15 ADJUSTABLE DOSE PRE-FILLED PEN SYRINGE | Refills: 1 | Status: SHIPPED | OUTPATIENT
Start: 2024-09-06

## 2024-09-08 ENCOUNTER — PATIENT MESSAGE (OUTPATIENT)
Age: 39
End: 2024-09-08

## 2024-09-08 DIAGNOSIS — R11.2 NAUSEA AND VOMITING, UNSPECIFIED VOMITING TYPE: Primary | ICD-10-CM

## 2024-09-08 RX ORDER — LIRAGLUTIDE 6 MG/ML
INJECTION, SOLUTION SUBCUTANEOUS
Refills: 1 | OUTPATIENT
Start: 2024-09-08

## 2024-09-09 ENCOUNTER — TELEPHONE (OUTPATIENT)
Age: 39
End: 2024-09-09

## 2024-09-12 DIAGNOSIS — R11.2 NAUSEA AND VOMITING, UNSPECIFIED VOMITING TYPE: ICD-10-CM

## 2024-10-07 ENCOUNTER — TELEPHONE (OUTPATIENT)
Age: 39
End: 2024-10-07

## 2024-10-07 RX ORDER — INSULIN LISPRO 100 [IU]/ML
INJECTION, SOLUTION INTRAVENOUS; SUBCUTANEOUS
Qty: 30 ML | Refills: 11 | Status: SHIPPED | OUTPATIENT
Start: 2024-10-07

## 2024-10-07 NOTE — TELEPHONE ENCOUNTER
Josephine,    Please see [employer] exchange below.  Can you see about a prior auth for her dexcom G6 sensors?  Thanks!  -------------------------------------------------------------------------------------------------------------------    I will check with Josephine but I don't think we have received any request for prior authorization for the dexcom.    Can you confirm if we have your correct insurance?    [employer] [place] [employer] [place] [employer] [employer]   [place]     Primary Subscriber    ID Name SSN Address   [id number]          If this is not correct, please be sure to upload a copy of your current insurance cards so we can work on this.  I can send a prescription for humalog to your pharmacy now.  I do have one dexcom G6 sample sensor/transmitter that you can  at our [place] office tomorrow to give us 10 days to sort this all out.  ===View-only below this line===      ----- Message -----       From:Flower Church \"Annette\"       Sent:10/7/2024  5:07 PM EDT         To:Patient Medical Advice Request Message List    Subject:Insulin     Dr. Morales have you filled out the papers yet for mu preauthorization for dexcom?... the pharmacy still can't refill my prescription.    Thanks,   Annette      ----- Message -----       From:Flower Church \"Annette\"       Sent:10/7/2024  4:32 PM EDT         To:Patient Medical Advice Request Message List    Subject:Insulin     Humalog      ----- Message -----       From:Dr. Duran Morales MD       Sent:10/5/2024 10:14 AM EDT         To:Flower Church \"Annette\"    Subject:Insulin     Do you now if your new insurance prefers humalog over novolog?  I'm happy to send this to your pharmacy to check.  Let me know when you have a chance.      ----- Message -----       From:Flower Church \"Annette\"       Sent:10/3/2024  9:38 PM EDT         To:Patient Medical Advice Request Message List    Subject:Insulin     Thanks Dr. Morales. I will. :)      ----- Message

## 2024-10-08 NOTE — TELEPHONE ENCOUNTER
A fax was not received regarding PA     PA initiated through [email or web address] for  Dexcom G6 sensors    Dexcom G 6 sensors approved 10/08/2024 - 10/08/2025 PA #: [id number]. Pt notified via [employer].

## 2024-10-12 DIAGNOSIS — E66.01 MORBID (SEVERE) OBESITY DUE TO EXCESS CALORIES: ICD-10-CM

## 2024-10-14 RX ORDER — BUPROPION HYDROCHLORIDE 150 MG/1
150 TABLET, EXTENDED RELEASE ORAL 2 TIMES DAILY
Qty: 60 TABLET | Refills: 2 | OUTPATIENT
Start: 2024-10-14

## 2024-10-21 LAB — MAMMOGRAPHY, EXTERNAL: NORMAL

## 2024-10-24 ENCOUNTER — TELEPHONE (OUTPATIENT)
Age: 39
End: 2024-10-24

## 2024-10-25 NOTE — TELEPHONE ENCOUNTER
Informed pt Dr. Morales has sent a [employer] message that you have not yet read. Please read this as soon as possible. Pt verbalized understanding.

## 2024-10-25 NOTE — TELEPHONE ENCOUNTER
Please call pt to let her know she has an unread message in [employer]:    Ok. Just be in touch with how this is working.  Let me know if you have any trouble picking up the humalog (lispro) that I ordered for you.  Thanks!      Previous Messages    ----- Message -----       From:Flower Church       Sent:10/21/2024  6:34 PM EDT         To:Patient Medical Advice Request Message List    Subject:Insulin    Hey Dr. Morales.  No I haven't but I had to  insulin for my son. He also was on novolog and our insurance wouldn't cover it. So he has lispro. I will start using Lantus tonight after I finish this message. I will see how 20 goes.    Thanks,  Annette

## 2024-10-30 ENCOUNTER — OFFICE VISIT (OUTPATIENT)
Age: 39
End: 2024-10-30
Payer: MEDICAID

## 2024-10-30 VITALS
DIASTOLIC BLOOD PRESSURE: 86 MMHG | OXYGEN SATURATION: 95 % | HEIGHT: 63 IN | WEIGHT: 237.6 LBS | HEART RATE: 70 BPM | BODY MASS INDEX: 42.1 KG/M2 | SYSTOLIC BLOOD PRESSURE: 143 MMHG | RESPIRATION RATE: 16 BRPM | TEMPERATURE: 98 F

## 2024-10-30 DIAGNOSIS — E66.01 CLASS 3 SEVERE OBESITY DUE TO EXCESS CALORIES WITH SERIOUS COMORBIDITY AND BODY MASS INDEX (BMI) OF 40.0 TO 44.9 IN ADULT: Primary | ICD-10-CM

## 2024-10-30 DIAGNOSIS — E10.9 TYPE 1 DIABETES MELLITUS WITHOUT COMPLICATION (HCC): ICD-10-CM

## 2024-10-30 DIAGNOSIS — E66.813 CLASS 3 SEVERE OBESITY DUE TO EXCESS CALORIES WITH SERIOUS COMORBIDITY AND BODY MASS INDEX (BMI) OF 40.0 TO 44.9 IN ADULT: Primary | ICD-10-CM

## 2024-10-30 DIAGNOSIS — I10 ESSENTIAL HYPERTENSION, BENIGN: ICD-10-CM

## 2024-10-30 DIAGNOSIS — E66.01 MORBID (SEVERE) OBESITY DUE TO EXCESS CALORIES: ICD-10-CM

## 2024-10-30 PROCEDURE — 3077F SYST BP >= 140 MM HG: CPT | Performed by: FAMILY MEDICINE

## 2024-10-30 PROCEDURE — 3079F DIAST BP 80-89 MM HG: CPT | Performed by: FAMILY MEDICINE

## 2024-10-30 PROCEDURE — 99214 OFFICE O/P EST MOD 30 MIN: CPT | Performed by: FAMILY MEDICINE

## 2024-10-30 PROCEDURE — 3052F HG A1C>EQUAL 8.0%<EQUAL 9.0%: CPT | Performed by: FAMILY MEDICINE

## 2024-10-30 RX ORDER — MAGNESIUM 30 MG
30 TABLET ORAL DAILY
COMMUNITY

## 2024-10-30 ASSESSMENT — PATIENT HEALTH QUESTIONNAIRE - PHQ9
SUM OF ALL RESPONSES TO PHQ QUESTIONS 1-9: 0
SUM OF ALL RESPONSES TO PHQ9 QUESTIONS 1 & 2: 0
2. FEELING DOWN, DEPRESSED OR HOPELESS: NOT AT ALL
1. LITTLE INTEREST OR PLEASURE IN DOING THINGS: NOT AT ALL

## 2024-10-30 NOTE — PROGRESS NOTES
Weight Loss Progress Note: follow up Physician Visit      Flower Church is a 39 y.o. female  who is here for her follow up  Evaluation for the medical bariatric care.      CC: I want to be healthier    Working a new job and feeling overwhelmed    She is type 1 diabetic. The GLP1 A medication helps her get earlier satiety and eat less. But it was causing too many GI side effects    She admits to  eating to self sooth  She is taking the wellbutrin once a day, she forgets the dinner pill       Weight History  Current weight 237 and BMI is Body mass index is 42.09 kg/m².  Goal weight BMI 29,   Highest weight 241   (See weight gain time line scanned into media section of chart)    Hunger control: poor    Significant Medical History    Update on sleep apnea and  CPAP 7    Ever had bariatric surgery: no    Pregnant or planning on becoming pregnant w/in 6 months: no         Significant Psychosocial History     Current Major Lifestyle Changes: no  Any potential unsupportive people: no          Social History  Social History     Tobacco Use    Smoking status: Never     Passive exposure: Never    Smokeless tobacco: Never   Substance Use Topics    Alcohol use: No     How many times a week do you eat out?      Do you drink any EtOH?  no   If so, how much?        Do you have upcoming any travel in the next 6 weeks?  no   If so, what do you have planned?          Exercise  How many days a week do you currently exercise?  is days  Have you ever been told by a physician not to exercise?  no      Objective  BP (!) 143/86 (Site: Right Upper Arm, Position: Sitting, Cuff Size: Large Adult)   Pulse 70   Temp 98 °F (36.7 °C) (Oral)   Resp 16   Ht 1.6 m (5' 3\")   Wt 107.8 kg (237 lb 9.6 oz)   SpO2 95%   BMI 42.09 kg/m²   Current Outpatient Medications   Medication Sig Dispense Refill    magnesium 30 MG tablet Take 1 tablet by mouth daily      hydroCHLOROthiazide (HYDRODIURIL) 25 MG tablet TAKE 1 TABLET BY MOUTH EVERY DAY 90

## 2024-10-30 NOTE — PATIENT INSTRUCTIONS
Movement: start going  to [facility] 3 days a week    Meds set alarm on calendar for the secod dose of wellbutrrin    Eating plan  work with therapist look the philosophy for eating. Work on avoiding self soothing with food  Stick to low carb( especially since she has diabetes) low yuly eating plan    Sleep   aim for 7-8 hrs    Stress she will look for therapist

## 2024-10-30 NOTE — PROGRESS NOTES
Identified pt with two pt identifiers (name and ). Reviewed chart in preparation for visit and have obtained necessary documentation.    Flower Church is a 39 y.o. female  Chief Complaint   Patient presents with    Weight Management     [facility]    Medication Management     BP (!) 143/86 (Site: Right Upper Arm, Position: Sitting, Cuff Size: Large Adult)   Pulse 70   Temp 98 °F (36.7 °C) (Oral)   Resp 16   Ht 1.6 m (5' 3\")   Wt 107.8 kg (237 lb 9.6 oz)   SpO2 95%   BMI 42.09 kg/m²     1. Have you been to the ER, urgent care clinic since your last visit?  Hospitalized since your last visit?yes - [facility]    2. Have you seen or consulted any other health care providers outside of the [facility] since your last visit?  Include any pap smears or colon screening. yes - [facility]    Patient and provider made aware of elevated BP x2. Patient asymptomatic. Patient reminded to monitor BP, continue to take BP medications if prescribed, and follow up with PCP/Cardiologist.  Patient expressed understanding and agreement.    Patient and provider made aware of elevated BP x2. Patient asymptomatic. Patient reminded to monitor BP, continue to take BP medications if prescribed, and follow up with PCP/Cardiologist.  Patient expressed understanding and agreement.

## 2024-11-01 RX ORDER — BUPROPION HYDROCHLORIDE 150 MG/1
150 TABLET, EXTENDED RELEASE ORAL 2 TIMES DAILY
Qty: 60 TABLET | Refills: 2 | Status: SHIPPED | OUTPATIENT
Start: 2024-11-01

## 2024-11-02 DIAGNOSIS — E10.9 TYPE 1 DIABETES MELLITUS WITHOUT COMPLICATIONS (HCC): Primary | ICD-10-CM

## 2024-11-02 RX ORDER — INSULIN PMP CART,AUT,G6/7,CNTR
EACH SUBCUTANEOUS
Qty: 10 EACH | Refills: 11 | Status: SHIPPED | OUTPATIENT
Start: 2024-11-02

## 2024-11-02 RX ORDER — INSULIN PMP CART,AUT,G6/7,CNTR
EACH SUBCUTANEOUS
Qty: 1 KIT | Refills: 0 | Status: SHIPPED | OUTPATIENT
Start: 2024-11-02

## 2024-11-05 RX ORDER — INSULIN LISPRO 100 [IU]/ML
INJECTION, SOLUTION INTRAVENOUS; SUBCUTANEOUS
Qty: 30 ML | Refills: 11 | Status: SHIPPED | OUTPATIENT
Start: 2024-11-05

## 2024-11-11 ENCOUNTER — TELEPHONE (OUTPATIENT)
Age: 39
End: 2024-11-11

## 2024-11-11 NOTE — TELEPHONE ENCOUNTER
Have you received a request for PA for Omnipod as I'm not sure if one has been completed or not after I sent the order in on 11/2?

## 2024-11-11 NOTE — TELEPHONE ENCOUNTER
A notification had not been received via fax or Epic but PA was found on [email or web address].    PA initiated through covermymeds.com for  Omnipod 5 [id number] Pods Gen 5

## 2024-11-18 NOTE — TELEPHONE ENCOUNTER
Per [email or web address]    Omnipod 5 Dexcom G7 G6 pods approved 11/18/2024 - 11/18/2025 PA #: [id number]. Pt notified via [employer]

## 2024-11-18 NOTE — TELEPHONE ENCOUNTER
She reached out to me over [employer] about the status of this.  Are you able to check on this?  Thanks!

## 2024-11-20 DIAGNOSIS — R79.89 ABNORMAL THYROID BLOOD TEST: ICD-10-CM

## 2024-11-20 DIAGNOSIS — E10.9 TYPE 1 DIABETES MELLITUS WITHOUT COMPLICATION (HCC): ICD-10-CM

## 2024-11-20 DIAGNOSIS — I10 ESSENTIAL HYPERTENSION, BENIGN: ICD-10-CM

## 2024-11-21 NOTE — TELEPHONE ENCOUNTER
It doesn't appear that she read the message that she's been approved so please check with her on this and ask if she plans to pick these up and do the on-line training and then we can discuss getting started on this at her visit on 12/2/24.

## 2024-11-22 NOTE — TELEPHONE ENCOUNTER
Patient notified of message per Dr. Morales and voiced understanding of what was read to them.   Pt states she saw the message and will  the pods and do the online training.

## 2024-12-02 ENCOUNTER — OFFICE VISIT (OUTPATIENT)
Age: 39
End: 2024-12-02
Payer: COMMERCIAL

## 2024-12-02 VITALS
SYSTOLIC BLOOD PRESSURE: 137 MMHG | HEIGHT: 63 IN | DIASTOLIC BLOOD PRESSURE: 75 MMHG | WEIGHT: 237.8 LBS | HEART RATE: 72 BPM | BODY MASS INDEX: 42.13 KG/M2

## 2024-12-02 DIAGNOSIS — E10.9 TYPE 1 DIABETES MELLITUS WITHOUT COMPLICATIONS (HCC): Primary | ICD-10-CM

## 2024-12-02 DIAGNOSIS — R94.6 BORDERLINE ABNORMAL THYROID FUNCTION TEST: ICD-10-CM

## 2024-12-02 DIAGNOSIS — E78.2 MIXED HYPERLIPIDEMIA: ICD-10-CM

## 2024-12-02 DIAGNOSIS — E55.9 VITAMIN D DEFICIENCY, UNSPECIFIED: ICD-10-CM

## 2024-12-02 LAB — HBA1C MFR BLD: 7.8 %

## 2024-12-02 PROCEDURE — 3075F SYST BP GE 130 - 139MM HG: CPT | Performed by: INTERNAL MEDICINE

## 2024-12-02 PROCEDURE — 95251 CONT GLUC MNTR ANALYSIS I&R: CPT | Performed by: INTERNAL MEDICINE

## 2024-12-02 PROCEDURE — 3052F HG A1C>EQUAL 8.0%<EQUAL 9.0%: CPT | Performed by: INTERNAL MEDICINE

## 2024-12-02 PROCEDURE — 83036 HEMOGLOBIN GLYCOSYLATED A1C: CPT | Performed by: INTERNAL MEDICINE

## 2024-12-02 PROCEDURE — 99214 OFFICE O/P EST MOD 30 MIN: CPT | Performed by: INTERNAL MEDICINE

## 2024-12-02 PROCEDURE — 3078F DIAST BP <80 MM HG: CPT | Performed by: INTERNAL MEDICINE

## 2024-12-02 NOTE — PROGRESS NOTES
Chief Complaint   Patient presents with    Diabetes     Has appt with new PCP soon and pharmacy confirmed       History of Present Illness: Flower Church is a 39 y.o. female here for follow up of diabetes.  Weight up 8 lbs since last visit in 5/24.  Had been using the tandem pump until the beginning of October and with her current insurance plan, the deductible was going to be too high to stay on this pump so she changed to lantus and humalog until we were able to get the omnipod approved and did the online training and just started this about a week ago as she has done 3 pod changes so far and just her 3rd pod on yesterday.  Review of her dexcom data over 30 days shows 49% in range with 26% high, 22% very high, and 3% low but over the past 7 days her time in range is 68% with 21% high and 9% very high and 2% low.  She sometimes forgets to bolus before she eats that can lead to spikes and doesn't always bolus for snacks.  She does have evidence of spikes after meals so we agreed to make her carb ratio more aggressive.  She had an abnormal TSH value in 5/24 so will check her thyroid in more detail at this time.    Current settings are as follows:   - basal: 12a: 0.8, 8a: 0.9, 8p: 0.8   - Carb ratio: 7   - sensitivity: 75   - target: 120-120   - active insulin time: 4 hours     she has the following indications to continue treatment with Dexcom:   1) she has type 1 diabetes (E10.9) and is on an intensive insulin regimen with an insulin pump   2) she tests her blood sugar 4 times per day and makes treatment decisions off her blood sugar readings and does the same off dexcom sensor readings   3) she requires frequent adjustments to her insulin pump settings based on her dexcom sensor readings   4) she has benefitted from therapeutic continuous glucose monitoring and I recommend that she continue this   5) she is seen in my office every 4-6 months          Current Outpatient Medications   Medication Sig

## 2024-12-02 NOTE — PATIENT INSTRUCTIONS
1) Current settings are as follows:   - basal: 12a: 0.8, 8a: 0.9, 8p: 0.8   - Carb ratio: 12a: 6.5   - sensitivity: 12a: 75   - target: 12a: 120-120   - active insulin time: 4 hours    I made your carb ratio more aggressive at 6.5 instead of 7 to help with post-meal spikes.  Do your best to bolus 5-10 min before you eat and try to count carbs accurately and if you are still spiking over 180 2 hours after you eat, then change the carb ratio to 6.0.    2) Your Hemoglobin A1c (3 month test of blood sugar) was 7.8% down from 8% and should come down further next time.    3) I will send you a message through [employer] with your lab results and determine what I need for the next set of labs once these are back.

## 2024-12-03 LAB
BUN SERPL-MCNC: 13 MG/DL (ref 6–24)
BUN/CREAT SERPL: 16 (ref 9–23)
CALCIUM SERPL-MCNC: 9.2 MG/DL (ref 8.7–10.2)
CHLORIDE SERPL-SCNC: 101 MMOL/L (ref 96–106)
CO2 SERPL-SCNC: 28 MMOL/L (ref 20–29)
CREAT SERPL-MCNC: 0.81 MG/DL (ref 0.57–1)
EGFRCR SERPLBLD CKD-EPI 2021: 93 ML/MIN/1.73
GLUCOSE SERPL-MCNC: 140 MG/DL (ref 70–99)
POTASSIUM SERPL-SCNC: 4 MMOL/L (ref 3.5–5.2)
SODIUM SERPL-SCNC: 139 MMOL/L (ref 134–144)
T4 FREE SERPL-MCNC: 1.31 NG/DL (ref 0.82–1.77)
THYROGLOB AB SERPL-ACNC: <1 IU/ML (ref 0–0.9)
THYROPEROXIDASE AB SERPL-ACNC: 11 IU/ML (ref 0–34)
TSH RECEP AB SER-ACNC: 1.36 IU/L (ref 0–1.75)
TSH SERPL DL<=0.005 MIU/L-ACNC: 0.63 UIU/ML (ref 0.45–4.5)

## 2024-12-11 ENCOUNTER — OFFICE VISIT (OUTPATIENT)
Age: 39
End: 2024-12-11
Payer: COMMERCIAL

## 2024-12-11 VITALS
OXYGEN SATURATION: 97 % | SYSTOLIC BLOOD PRESSURE: 129 MMHG | TEMPERATURE: 98.2 F | WEIGHT: 239.7 LBS | BODY MASS INDEX: 42.47 KG/M2 | DIASTOLIC BLOOD PRESSURE: 84 MMHG | HEART RATE: 76 BPM | HEIGHT: 63 IN | RESPIRATION RATE: 16 BRPM

## 2024-12-11 DIAGNOSIS — E66.01 CLASS 3 SEVERE OBESITY DUE TO EXCESS CALORIES WITH SERIOUS COMORBIDITY AND BODY MASS INDEX (BMI) OF 40.0 TO 44.9 IN ADULT: Primary | ICD-10-CM

## 2024-12-11 DIAGNOSIS — E10.9 TYPE 1 DIABETES MELLITUS WITHOUT COMPLICATION (HCC): ICD-10-CM

## 2024-12-11 DIAGNOSIS — E66.813 CLASS 3 SEVERE OBESITY DUE TO EXCESS CALORIES WITH SERIOUS COMORBIDITY AND BODY MASS INDEX (BMI) OF 40.0 TO 44.9 IN ADULT: Primary | ICD-10-CM

## 2024-12-11 DIAGNOSIS — I10 ESSENTIAL HYPERTENSION, BENIGN: ICD-10-CM

## 2024-12-11 PROCEDURE — 99214 OFFICE O/P EST MOD 30 MIN: CPT | Performed by: FAMILY MEDICINE

## 2024-12-11 PROCEDURE — 3079F DIAST BP 80-89 MM HG: CPT | Performed by: FAMILY MEDICINE

## 2024-12-11 PROCEDURE — 3052F HG A1C>EQUAL 8.0%<EQUAL 9.0%: CPT | Performed by: FAMILY MEDICINE

## 2024-12-11 PROCEDURE — 3074F SYST BP LT 130 MM HG: CPT | Performed by: FAMILY MEDICINE

## 2024-12-11 RX ORDER — AMOXICILLIN 500 MG/1
500 CAPSULE ORAL 3 TIMES DAILY
COMMUNITY
Start: 2024-11-13

## 2024-12-11 RX ORDER — CHLORHEXIDINE GLUCONATE ORAL RINSE 1.2 MG/ML
15 SOLUTION DENTAL 2 TIMES DAILY
COMMUNITY
Start: 2024-11-13

## 2024-12-11 RX ORDER — INSULIN LISPRO 100 [IU]/ML
INJECTION, SOLUTION INTRAVENOUS; SUBCUTANEOUS
COMMUNITY
Start: 2024-11-18

## 2024-12-11 RX ORDER — PERPHENAZINE/AMITRIPTYLINE HCL 2 MG-25 MG
TABLET ORAL DAILY
COMMUNITY

## 2024-12-11 ASSESSMENT — PATIENT HEALTH QUESTIONNAIRE - PHQ9
SUM OF ALL RESPONSES TO PHQ QUESTIONS 1-9: 0
SUM OF ALL RESPONSES TO PHQ QUESTIONS 1-9: 0
1. LITTLE INTEREST OR PLEASURE IN DOING THINGS: NOT AT ALL
SUM OF ALL RESPONSES TO PHQ9 QUESTIONS 1 & 2: 0
SUM OF ALL RESPONSES TO PHQ QUESTIONS 1-9: 0
SUM OF ALL RESPONSES TO PHQ QUESTIONS 1-9: 0
2. FEELING DOWN, DEPRESSED OR HOPELESS: NOT AT ALL

## 2024-12-11 NOTE — PROGRESS NOTES
Weight Loss Progress Note: follow up Physician Visit      Flower Church is a 39 y.o. female  who is here for her follow up  Evaluation for the medical bariatric care.      CC:  want to be healthier    She has been eating a lot extra cals due to job stresses  She recognizes her need for a therapist to help with how she uses food but has not ,made the   She is typw 1 DM  She is on the insulin pump   Weight History  Current weight 239( 227) and BMI is Body mass index is 42.46 kg/m².  Goal weight BMI 29,   Highest weight 241     (See weight gain time line scanned into media section of chart)    Hunger control: poor    Significant Medical History    Update on sleep apnea and  CPAP no    Ever had bariatric surgery: no    Pregnant or planning on becoming pregnant w/in 6 months: no         Significant Psychosocial History     Current Major Lifestyle Changes: no  Any potential unsupportive people: no          Social History  Social History     Tobacco Use    Smoking status: Never     Passive exposure: Never    Smokeless tobacco: Never   Substance Use Topics    Alcohol use: No     How many times a week do you eat out?  0    Do you drink any EtOH?  no   If so, how much?        Do you have upcoming any travel in the next 6 weeks?  no   If so, what do you have planned?          Exercise  How many days a week do you currently exercise?  No consistent exercise lately days  Have you ever been told by a physician not to exercise?  no      Objective  /84 (Site: Right Upper Arm, Position: Sitting, Cuff Size: Large Adult)   Pulse 76   Temp 98.2 °F (36.8 °C) (Oral)   Resp 16   Ht 1.6 m (5' 3\")   Wt 108.7 kg (239 lb 11.2 oz)   SpO2 97%   BMI 42.46 kg/m²   Current Outpatient Medications   Medication Sig Dispense Refill    amoxicillin (AMOXIL) 500 MG capsule Take 1 capsule by mouth 3 times daily      chlorhexidine (PERIDEX) 0.12 % solution Swish and spit 15 mLs 2 times daily      insulin lispro, 1 Unit Dial,

## 2024-12-11 NOTE — PROGRESS NOTES
Identified pt with two pt identifiers (name and ). Reviewed chart in preparation for visit and have obtained necessary documentation.    Flower Church is a 39 y.o. female  Chief Complaint   Patient presents with    Weight Management     1 month/ [facility]      /84 (Site: Right Upper Arm, Position: Sitting, Cuff Size: Large Adult)   Pulse 76   Temp 98.2 °F (36.8 °C) (Oral)   Resp 16   Ht 1.6 m (5' 3\")   Wt 108.7 kg (239 lb 11.2 oz)   SpO2 97%   BMI 42.46 kg/m²     1. Have you been to the ER, urgent care clinic since your last visit?  Hospitalized since your last visit?no    2. Have you seen or consulted any other health care providers outside of the [facility] since your last visit?  Include any pap smears or colon screening. no     Patient attended triage but did not bring homework form. Patient instructed to email or fax completed homework form to us. Patient informed that not bringing the homework form can result in not being seen next time.     Patient and provider made aware of elevated BP x2. Patient asymptomatic. Patient reminded to monitor BP, continue to take BP medications if prescribed, and follow up with PCP/Cardiologist.  Patient expressed understanding and agreement.

## 2025-01-20 DIAGNOSIS — E10.9 TYPE 1 DIABETES MELLITUS WITHOUT COMPLICATIONS (HCC): ICD-10-CM

## 2025-01-20 RX ORDER — INSULIN PMP CART,AUT,G6/7,CNTR
EACH SUBCUTANEOUS
Qty: 1 KIT | Refills: 0 | Status: SHIPPED | OUTPATIENT
Start: 2025-01-20

## 2025-01-20 RX ORDER — INSULIN PMP CART,AUT,G6/7,CNTR
EACH SUBCUTANEOUS
Qty: 10 EACH | Refills: 11 | Status: SHIPPED | OUTPATIENT
Start: 2025-01-20

## 2025-01-24 ENCOUNTER — TELEPHONE (OUTPATIENT)
Age: 40
End: 2025-01-24

## 2025-01-24 NOTE — TELEPHONE ENCOUNTER
Called patient in regards to an appointment reminder. Patient did not answer, left voicemail instructing patient to call back.

## 2025-01-27 ENCOUNTER — OFFICE VISIT (OUTPATIENT)
Age: 40
End: 2025-01-27
Payer: COMMERCIAL

## 2025-01-27 VITALS
HEIGHT: 63 IN | HEART RATE: 70 BPM | OXYGEN SATURATION: 98 % | DIASTOLIC BLOOD PRESSURE: 79 MMHG | RESPIRATION RATE: 16 BRPM | TEMPERATURE: 97.8 F | WEIGHT: 238 LBS | SYSTOLIC BLOOD PRESSURE: 146 MMHG | BODY MASS INDEX: 42.17 KG/M2

## 2025-01-27 DIAGNOSIS — I10 ESSENTIAL HYPERTENSION, BENIGN: ICD-10-CM

## 2025-01-27 DIAGNOSIS — E66.813 CLASS 3 SEVERE OBESITY DUE TO EXCESS CALORIES WITH SERIOUS COMORBIDITY AND BODY MASS INDEX (BMI) OF 40.0 TO 44.9 IN ADULT: Primary | ICD-10-CM

## 2025-01-27 DIAGNOSIS — E66.01 CLASS 3 SEVERE OBESITY DUE TO EXCESS CALORIES WITH SERIOUS COMORBIDITY AND BODY MASS INDEX (BMI) OF 40.0 TO 44.9 IN ADULT: Primary | ICD-10-CM

## 2025-01-27 DIAGNOSIS — E10.9 TYPE 1 DIABETES MELLITUS WITHOUT COMPLICATION (HCC): ICD-10-CM

## 2025-01-27 PROCEDURE — 99214 OFFICE O/P EST MOD 30 MIN: CPT | Performed by: FAMILY MEDICINE

## 2025-01-27 PROCEDURE — 3078F DIAST BP <80 MM HG: CPT | Performed by: FAMILY MEDICINE

## 2025-01-27 PROCEDURE — 3077F SYST BP >= 140 MM HG: CPT | Performed by: FAMILY MEDICINE

## 2025-01-27 RX ORDER — TOPIRAMATE 25 MG/1
25 TABLET, FILM COATED ORAL
Qty: 60 TABLET | Refills: 2 | Status: SHIPPED | OUTPATIENT
Start: 2025-01-27

## 2025-01-27 ASSESSMENT — PATIENT HEALTH QUESTIONNAIRE - PHQ9
2. FEELING DOWN, DEPRESSED OR HOPELESS: NOT AT ALL
SUM OF ALL RESPONSES TO PHQ QUESTIONS 1-9: 0
1. LITTLE INTEREST OR PLEASURE IN DOING THINGS: NOT AT ALL
SUM OF ALL RESPONSES TO PHQ9 QUESTIONS 1 & 2: 0

## 2025-01-27 NOTE — PROGRESS NOTES
Identified pt with two pt identifiers (name and ). Reviewed chart in preparation for visit and have obtained necessary documentation.    Flower Church is a 40 y.o. female  Chief Complaint   Patient presents with    Weight Management     WLC/ 1 month      BP (!) 146/79 (Site: Left Lower Arm, Position: Sitting, Cuff Size: Large Adult)   Pulse 70   Temp 97.8 °F (36.6 °C) (Oral)   Resp 16   Ht 1.6 m (5' 3\")   Wt 108 kg (238 lb)   LMP  (LMP Unknown)   SpO2 98%   BMI 42.16 kg/m²     1. Have you been to the ER, urgent care clinic since your last visit?  Hospitalized since your last visit?no    2. Have you seen or consulted any other health care providers outside of the [facility] since your last visit?  Include any pap smears or colon screening. Dr. Llamas/ ENT. Dr. Menezes/ Oral surgery       Patient attended triage but did not bring homework form. Patient instructed to email or fax completed homework form to us. Patient informed that not bringing the homework form can result in not being seen next time.     Patient and provider made aware of elevated BP x2. Patient asymptomatic. Patient reminded to monitor BP, continue to take BP medications if prescribed, and follow up with PCP/Cardiologist.  Patient expressed understanding and agreement.

## 2025-01-27 NOTE — PROGRESS NOTES
Weight Loss Progress Note: follow up Physician Visit      Flower Church is a 40 y.o. female  who is here for her follow up  Evaluation for the medical bariatric care.      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  Goal weight BMI 29,   Highest weight 241   (See weight gain time line scanned into media section of chart)    Hunger control: poor    Significant Medical History    Update on sleep apnea and  CPAP no    Ever had bariatric surgery: no    Pregnant or planning on becoming pregnant w/in 6 months: no         Significant Psychosocial History     Current Major Lifestyle Changes: no  Any potential unsupportive people: no          Social History  Social History     Tobacco Use    Smoking status: Never     Passive exposure: Never    Smokeless tobacco: Never   Substance Use Topics    Alcohol use: No     How many times a week do you eat out?  0    Do you drink any EtOH?  no   If so, how much?        Do you have upcoming any travel in the next 6 weeks?  no   If so, what do you have planned?          Exercise  How many days a week do you currently exercise?  0 days  Have you ever been told by a physician not to exercise?  no      Objective  BP (!) 146/79 (Site: Left Lower Arm, Position: Sitting, Cuff Size: Large Adult)   Pulse 70   Temp 97.8 °F (36.6 °C) (Oral)   Resp 16   Ht 1.6 m (5' 3\")   Wt 108 kg (238 lb)

## 2025-02-13 NOTE — TELEPHONE ENCOUNTER
Called and spoke with patient day prior to beginning Omnipod insulin pump. Discussed holding Tresiba injection the morning of the pump start per Dr. Gareth Harrison order. Discussed how raine has a longer action period and it is safe to stop taking it the morning of starting the pump.     Stevan Moore RD, [facility] Attending Attestation (For Attendings USE Only)...

## 2025-03-03 ENCOUNTER — OFFICE VISIT (OUTPATIENT)
Age: 40
End: 2025-03-03
Payer: COMMERCIAL

## 2025-03-03 VITALS
TEMPERATURE: 98 F | WEIGHT: 232.1 LBS | DIASTOLIC BLOOD PRESSURE: 76 MMHG | SYSTOLIC BLOOD PRESSURE: 131 MMHG | RESPIRATION RATE: 14 BRPM | HEIGHT: 63 IN | HEART RATE: 69 BPM | BODY MASS INDEX: 41.12 KG/M2 | OXYGEN SATURATION: 97 %

## 2025-03-03 DIAGNOSIS — Z13.220 SCREENING FOR HYPERCHOLESTEROLEMIA: ICD-10-CM

## 2025-03-03 DIAGNOSIS — E10.9 TYPE 1 DIABETES MELLITUS WITHOUT COMPLICATION (HCC): ICD-10-CM

## 2025-03-03 DIAGNOSIS — E66.01 CLASS 3 SEVERE OBESITY DUE TO EXCESS CALORIES WITH SERIOUS COMORBIDITY AND BODY MASS INDEX (BMI) OF 40.0 TO 44.9 IN ADULT: Primary | ICD-10-CM

## 2025-03-03 DIAGNOSIS — I10 ESSENTIAL HYPERTENSION, BENIGN: ICD-10-CM

## 2025-03-03 DIAGNOSIS — E66.813 CLASS 3 SEVERE OBESITY DUE TO EXCESS CALORIES WITH SERIOUS COMORBIDITY AND BODY MASS INDEX (BMI) OF 40.0 TO 44.9 IN ADULT: Primary | ICD-10-CM

## 2025-03-03 PROCEDURE — 3078F DIAST BP <80 MM HG: CPT | Performed by: FAMILY MEDICINE

## 2025-03-03 PROCEDURE — 99214 OFFICE O/P EST MOD 30 MIN: CPT | Performed by: FAMILY MEDICINE

## 2025-03-03 PROCEDURE — 3075F SYST BP GE 130 - 139MM HG: CPT | Performed by: FAMILY MEDICINE

## 2025-03-03 ASSESSMENT — PATIENT HEALTH QUESTIONNAIRE - PHQ9
SUM OF ALL RESPONSES TO PHQ QUESTIONS 1-9: 0
SUM OF ALL RESPONSES TO PHQ QUESTIONS 1-9: 0
1. LITTLE INTEREST OR PLEASURE IN DOING THINGS: NOT AT ALL
SUM OF ALL RESPONSES TO PHQ QUESTIONS 1-9: 0
SUM OF ALL RESPONSES TO PHQ QUESTIONS 1-9: 0
2. FEELING DOWN, DEPRESSED OR HOPELESS: NOT AT ALL

## 2025-03-03 NOTE — PROGRESS NOTES
Identified pt with two pt identifiers (name and ). Reviewed chart in preparation for visit and have obtained necessary documentation.    Flower Church is a 40 y.o. female  Chief Complaint   Patient presents with    Weight Management     WLC 1 month     /76 (Site: Left Upper Arm, Position: Sitting, Cuff Size: Large Adult)   Pulse 69   Temp 98 °F (36.7 °C) (Oral)   Resp 14   Ht 1.6 m (5' 3\")   Wt 105.3 kg (232 lb 1.6 oz)   SpO2 97%   BMI 41.11 kg/m²     1. Have you been to the ER, urgent care clinic since your last visit?  Hospitalized since your last visit?no    2. Have you seen or consulted any other health care providers outside of the [facility] since your last visit?  Include any pap smears or colon screening. no

## 2025-03-03 NOTE — PROGRESS NOTES
Weight Loss Progress Note: follow up Physician Visit      Flower Church is a 40 y.o. female  who is here for her follow up  Evaluation for the medical bariatric care.      CC: I want to be healthier  The small cancer spot on her tongue is gone and no signs of recurrence  She has type 1 DM  She is on the insulin pump and has an alarm   She says after our talk the last time she had a revelation about her relationship with food and is doin better      Weight History  Current weight 232( 238) and BMI is Body mass index is 41.11 kg/m².  Goal weight BMI 29,   Highest weight 241   (See weight gain time line scanned into media section of chart)    Hunger control:  a lot better  Topamax has helped her added to the wellbutrin  Significant Medical History    Update on sleep apnea and  CPAP no    Ever had bariatric surgery: no    Pregnant or planning on becoming pregnant w/in 6 months: no         Significant Psychosocial History     Current Major Lifestyle Changes: no  Any potential unsupportive people: no          Social History  Social History     Tobacco Use    Smoking status: Never     Passive exposure: Never    Smokeless tobacco: Never   Substance Use Topics    Alcohol use: No     How many times a week do you eat out?  1    Do you drink any EtOH?  no   If so, how much?        Do you have upcoming any travel in the next 6 weeks?  no   If so, what do you have planned?          Exercise  How many days a week do you currently exercise?  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  no      Objective  /76 (Site: Left Upper Arm, Position: Sitting, Cuff Size: Large Adult)   Pulse 69   Temp 98 °F (36.7 °C) (Oral)   Resp 14   Ht 1.6 m (5' 3\")   Wt 105.3 kg (232 lb 1.6 oz)   SpO2 97%   BMI 41.11 kg/m²   Current Outpatient Medications   Medication Sig Dispense Refill    topiramate (TOPAMAX) 25 MG tablet Take 1 tablet by mouth Daily with supper 60 tablet 2

## 2025-04-06 ENCOUNTER — TELEPHONE (OUTPATIENT)
Age: 40
End: 2025-04-06

## 2025-04-14 ENCOUNTER — OFFICE VISIT (OUTPATIENT)
Age: 40
End: 2025-04-14
Payer: COMMERCIAL

## 2025-04-14 VITALS
DIASTOLIC BLOOD PRESSURE: 84 MMHG | RESPIRATION RATE: 16 BRPM | WEIGHT: 234 LBS | HEART RATE: 75 BPM | TEMPERATURE: 97.7 F | HEIGHT: 63 IN | SYSTOLIC BLOOD PRESSURE: 138 MMHG | BODY MASS INDEX: 41.46 KG/M2 | OXYGEN SATURATION: 99 %

## 2025-04-14 DIAGNOSIS — E10.9 TYPE 1 DIABETES MELLITUS WITHOUT COMPLICATION: ICD-10-CM

## 2025-04-14 DIAGNOSIS — I10 ESSENTIAL HYPERTENSION, BENIGN: ICD-10-CM

## 2025-04-14 DIAGNOSIS — F41.1 ANXIOUS REACTION: ICD-10-CM

## 2025-04-14 DIAGNOSIS — E66.813 CLASS 3 SEVERE OBESITY DUE TO EXCESS CALORIES WITH SERIOUS COMORBIDITY AND BODY MASS INDEX (BMI) OF 40.0 TO 44.9 IN ADULT: Primary | ICD-10-CM

## 2025-04-14 PROCEDURE — 3075F SYST BP GE 130 - 139MM HG: CPT | Performed by: FAMILY MEDICINE

## 2025-04-14 PROCEDURE — 99214 OFFICE O/P EST MOD 30 MIN: CPT | Performed by: FAMILY MEDICINE

## 2025-04-14 PROCEDURE — 3079F DIAST BP 80-89 MM HG: CPT | Performed by: FAMILY MEDICINE

## 2025-04-14 ASSESSMENT — PATIENT HEALTH QUESTIONNAIRE - PHQ9
SUM OF ALL RESPONSES TO PHQ QUESTIONS 1-9: 0
SUM OF ALL RESPONSES TO PHQ QUESTIONS 1-9: 0
2. FEELING DOWN, DEPRESSED OR HOPELESS: NOT AT ALL
SUM OF ALL RESPONSES TO PHQ QUESTIONS 1-9: 0
SUM OF ALL RESPONSES TO PHQ QUESTIONS 1-9: 0
1. LITTLE INTEREST OR PLEASURE IN DOING THINGS: NOT AT ALL

## 2025-04-14 NOTE — PROGRESS NOTES
Weight Loss Progress Note: follow up Physician Visit      Flower Church is a 40 y.o. female  who is here for her follow up  Evaluation for the medical bariatric care.      CC: I want to be healthier  She has type 1 DM  She is on the insulin pump and has an alarm    This month had more issues with low blood sugar  She got covid from her  and the blood sugars were in 300s and then  when the dose was increased she started having more low  Because of the lows she is snacking more    She was eating more junk foods while she was feeling poorly with covid    She has not started therapy for self soothing with food  Weight History  Current weight 234( 232) and BMI is Body mass index is 41.45 kg/m².  Goal weight BMI 29,   Highest weight 241   (See weight gain time line scanned into media section of chart)    Hunger control: poor    Significant Medical History    Update on sleep apnea and  CPAP no    Ever had bariatric surgery: no    Pregnant or planning on becoming pregnant w/in 6 months: no         Significant Psychosocial History     Current Major Lifestyle Changes: no  Any potential unsupportive people: no          Social History  Social History     Tobacco Use    Smoking status: Never     Passive exposure: Never    Smokeless tobacco: Never   Substance Use Topics    Alcohol use: No     How many times a week do you eat out?  Was eatng more restaurant food while she had covid    Do you drink any EtOH?  no   If so, how much?        Do you have upcoming any travel in the next 6 weeks?  no   If so, what do you have planned?          Exercise  How many days a week do you currently exercise?  0 days  Typical is a morning workout by video at home    Have you ever been told by a physician not to exercise?  no      Objective  /84 (BP Site: Right Upper Arm, Patient Position: Sitting, BP Cuff Size: Large Adult)   Pulse 75   Temp 97.7 °F (36.5 °C) (Oral)   Resp 16   Ht 1.6 m (5' 3\")   Wt 106.1 kg (234 lb)   LMP

## 2025-04-14 NOTE — PROGRESS NOTES
Identified pt with two pt identifiers (name and ). Reviewed chart in preparation for visit and have obtained necessary documentation.    Flower Church is a 40 y.o. female  Chief Complaint   Patient presents with    Weight Management     WLC/  1 month      /84 (BP Site: Right Upper Arm, Patient Position: Sitting, BP Cuff Size: Large Adult)   Pulse 75   Temp 97.7 °F (36.5 °C) (Oral)   Resp 16   Ht 1.6 m (5' 3\")   Wt 106.1 kg (234 lb)   LMP  (LMP Unknown)   SpO2 99%   BMI 41.45 kg/m²     1. Have you been to the ER, urgent care clinic since your last visit?  Hospitalized since your last visit? [facility] 2 weeks ago.     2. Have you seen or consulted any other health care providers outside of the [facility] since your last visit?  Include any pap smears or colon screening. no    Patient attended triage but did not bring homework form. Patient instructed to email or fax completed homework form to us. Patient informed that not bringing the homework form can result in not being seen next time.     Patient and provider made aware of elevated BP x2. Patient asymptomatic. Patient reminded to monitor BP, continue to take BP medications if prescribed, and follow up with PCP/Cardiologist.  Patient expressed understanding and agreement.

## 2025-04-28 LAB — HBA1C MFR BLD: 8.2 % (ref 4.8–5.6)

## 2025-04-29 LAB
ALBUMIN SERPL-MCNC: 4.1 G/DL (ref 3.9–4.9)
ALP SERPL-CCNC: 109 IU/L (ref 44–121)
ALT SERPL-CCNC: 16 IU/L (ref 0–32)
AST SERPL-CCNC: 18 IU/L (ref 0–40)
BILIRUB SERPL-MCNC: 0.3 MG/DL (ref 0–1.2)
BUN SERPL-MCNC: 16 MG/DL (ref 6–24)
BUN/CREAT SERPL: 18 (ref 9–23)
CALCIUM SERPL-MCNC: 9.3 MG/DL (ref 8.7–10.2)
CHLORIDE SERPL-SCNC: 105 MMOL/L (ref 96–106)
CHOLEST SERPL-MCNC: 190 MG/DL (ref 100–199)
CO2 SERPL-SCNC: 25 MMOL/L (ref 20–29)
CREAT SERPL-MCNC: 0.9 MG/DL (ref 0.57–1)
EGFRCR SERPLBLD CKD-EPI 2021: 82 ML/MIN/1.73
GLOBULIN SER CALC-MCNC: 3 G/DL (ref 1.5–4.5)
GLUCOSE SERPL-MCNC: 153 MG/DL (ref 70–99)
HDLC SERPL-MCNC: 78 MG/DL
LDLC SERPL CALC-MCNC: 104 MG/DL (ref 0–99)
POTASSIUM SERPL-SCNC: 4.4 MMOL/L (ref 3.5–5.2)
PROT SERPL-MCNC: 7.1 G/DL (ref 6–8.5)
SODIUM SERPL-SCNC: 141 MMOL/L (ref 134–144)
TRIGL SERPL-MCNC: 39 MG/DL (ref 0–149)
VLDLC SERPL CALC-MCNC: 8 MG/DL (ref 5–40)

## 2025-05-04 ENCOUNTER — RESULTS FOLLOW-UP (OUTPATIENT)
Age: 40
End: 2025-05-04

## 2025-05-22 DIAGNOSIS — E10.9 TYPE 1 DIABETES MELLITUS WITHOUT COMPLICATIONS (HCC): ICD-10-CM

## 2025-05-22 DIAGNOSIS — E55.9 VITAMIN D DEFICIENCY, UNSPECIFIED: ICD-10-CM

## 2025-05-22 DIAGNOSIS — E78.2 MIXED HYPERLIPIDEMIA: ICD-10-CM

## 2025-05-22 DIAGNOSIS — R94.6 BORDERLINE ABNORMAL THYROID FUNCTION TEST: ICD-10-CM

## 2025-05-25 DIAGNOSIS — E66.01 MORBID (SEVERE) OBESITY DUE TO EXCESS CALORIES (HCC): ICD-10-CM

## 2025-05-27 LAB — HBA1C MFR BLD: 8.1 % (ref 4.8–5.6)

## 2025-05-27 RX ORDER — BUPROPION HYDROCHLORIDE 150 MG/1
150 TABLET, EXTENDED RELEASE ORAL 2 TIMES DAILY
Qty: 60 TABLET | Refills: 2 | Status: SHIPPED | OUTPATIENT
Start: 2025-05-27

## 2025-05-27 NOTE — TELEPHONE ENCOUNTER
Last Rx on 11/1/2024 for #60 with 1 RF    Last seen by Dr. Chairez on 4/14    Next appointment scheduled for 6/4

## 2025-05-28 LAB
25(OH)D3+25(OH)D2 SERPL-MCNC: 69.8 NG/ML (ref 30–100)
ALBUMIN SERPL-MCNC: 3.9 G/DL (ref 3.9–4.9)
ALBUMIN/CREAT UR: 2 MG/G CREAT (ref 0–29)
ALP SERPL-CCNC: 111 IU/L (ref 44–121)
ALT SERPL-CCNC: 19 IU/L (ref 0–32)
AST SERPL-CCNC: 24 IU/L (ref 0–40)
BILIRUB SERPL-MCNC: 0.3 MG/DL (ref 0–1.2)
BUN SERPL-MCNC: 11 MG/DL (ref 6–24)
BUN/CREAT SERPL: 13 (ref 9–23)
CALCIUM SERPL-MCNC: 9 MG/DL (ref 8.7–10.2)
CHLORIDE SERPL-SCNC: 107 MMOL/L (ref 96–106)
CHOLEST SERPL-MCNC: 188 MG/DL (ref 100–199)
CO2 SERPL-SCNC: 23 MMOL/L (ref 20–29)
CREAT SERPL-MCNC: 0.86 MG/DL (ref 0.57–1)
CREAT UR-MCNC: 146.4 MG/DL
EGFRCR SERPLBLD CKD-EPI 2021: 86 ML/MIN/1.73
GLOBULIN SER CALC-MCNC: 2.8 G/DL (ref 1.5–4.5)
GLUCOSE SERPL-MCNC: 134 MG/DL (ref 70–99)
HDLC SERPL-MCNC: 86 MG/DL
IMP & REVIEW OF LAB RESULTS: NORMAL
LDLC SERPL CALC-MCNC: 94 MG/DL (ref 0–99)
Lab: NORMAL
MICROALBUMIN UR-MCNC: 3.3 UG/ML
POTASSIUM SERPL-SCNC: 4.8 MMOL/L (ref 3.5–5.2)
PROT SERPL-MCNC: 6.7 G/DL (ref 6–8.5)
SODIUM SERPL-SCNC: 142 MMOL/L (ref 134–144)
TRIGL SERPL-MCNC: 41 MG/DL (ref 0–149)
TSH SERPL DL<=0.005 MIU/L-ACNC: 0.76 UIU/ML (ref 0.45–4.5)
VLDLC SERPL CALC-MCNC: 8 MG/DL (ref 5–40)

## 2025-06-04 ENCOUNTER — OFFICE VISIT (OUTPATIENT)
Age: 40
End: 2025-06-04
Payer: COMMERCIAL

## 2025-06-04 VITALS
TEMPERATURE: 98.5 F | OXYGEN SATURATION: 99 % | HEIGHT: 63 IN | HEART RATE: 73 BPM | WEIGHT: 240.4 LBS | DIASTOLIC BLOOD PRESSURE: 76 MMHG | RESPIRATION RATE: 14 BRPM | BODY MASS INDEX: 42.59 KG/M2 | SYSTOLIC BLOOD PRESSURE: 131 MMHG

## 2025-06-04 DIAGNOSIS — E66.813 CLASS 3 SEVERE OBESITY DUE TO EXCESS CALORIES WITH SERIOUS COMORBIDITY AND BODY MASS INDEX (BMI) OF 40.0 TO 44.9 IN ADULT (HCC): Primary | ICD-10-CM

## 2025-06-04 DIAGNOSIS — F41.1 ANXIOUS REACTION: ICD-10-CM

## 2025-06-04 DIAGNOSIS — I10 ESSENTIAL HYPERTENSION, BENIGN: ICD-10-CM

## 2025-06-04 DIAGNOSIS — E10.9 TYPE 1 DIABETES MELLITUS WITHOUT COMPLICATION (HCC): ICD-10-CM

## 2025-06-04 PROCEDURE — 3052F HG A1C>EQUAL 8.0%<EQUAL 9.0%: CPT | Performed by: FAMILY MEDICINE

## 2025-06-04 PROCEDURE — 3078F DIAST BP <80 MM HG: CPT | Performed by: FAMILY MEDICINE

## 2025-06-04 PROCEDURE — 99214 OFFICE O/P EST MOD 30 MIN: CPT | Performed by: FAMILY MEDICINE

## 2025-06-04 PROCEDURE — 3075F SYST BP GE 130 - 139MM HG: CPT | Performed by: FAMILY MEDICINE

## 2025-06-04 NOTE — PROGRESS NOTES
Identified pt with two pt identifiers (name and ). Reviewed chart in preparation for visit and have obtained necessary documentation.    Flower Church is a 40 y.o. female  Chief Complaint   Patient presents with    Weight Management     WLC/ 1 month      /76 (BP Site: Left Upper Arm, Patient Position: Sitting, BP Cuff Size: Large Adult)   Pulse 73   Temp 98.5 °F (36.9 °C) (Oral)   Resp 14   Ht 1.6 m (5' 3\")   Wt 109 kg (240 lb 6.4 oz)   LMP  (LMP Unknown)   SpO2 99%   BMI 42.58 kg/m²     1. Have you been to the ER, urgent care clinic since your last visit?  Hospitalized since your last visit?no    2. Have you seen or consulted any other health care providers outside of the [facility] since your last visit?  Include any pap smears or colon screening. No    Patient attended triage but did not bring homework form. Patient instructed to email or fax completed homework form to us. Patient informed that not bringing the homework form can result in not being seen next time.       
USE TO CHECK BLOOD SUGAR FOUR TIMES A DAY. 100 strip 15    Multiple Vitamin (MULTIVITAMIN ADULT PO) Take 1 tablet by mouth daily      Lancets MISC Use to check blood sugar four times a day.      ibuprofen (ADVIL;MOTRIN) 600 MG tablet Take 1 tablet by mouth every 6 hours as needed      vitamin D 50 MCG (2000 UT) CAPS capsule Take 2 capsules by mouth daily      BD PEN NEEDLE FLORENCE 2ND GEN 32G X 4 MM MISC  (Patient not taking: Reported on 3/3/2025)       No current facility-administered medications for this visit.       Waist Circumference: See Weight Management Doc Flowsheet  Neck Circumference: See Weight Management Doc Flowsheet  Percent Body Fat: See Weight Management Doc Flowsheet      6/5/2025     8:55 AM 6/4/2025     8:23 AM 6/4/2025     8:00 AM 4/14/2025     8:21 AM 4/14/2025     8:00 AM 3/3/2025     8:19 AM 3/3/2025     8:00 AM   Weight Metrics   Weight 244 lb 240 lb 6.4 oz  234 lb  232 lb 1.6 oz    Neck (Inches)   13.75 in  13.5 in  13.75 in   Waist Measure Inches   39.25 in  37.75 in  40 in   Body Fat %   44.6 %  44 %  43.7 %   BMI (Calculated) 43.3 kg/m2 42.7 kg/m2  41.5 kg/m2  41.2 kg/m2           No data to display                   Labs: A1c 8.1  in may      Review of Systems  Complete ROS negative except where noted above      Physical Exam    Vital Signs Reviewed  Weight Management Metrics Reviewed    Vital Signs Reviewed  Appearance: Obese, A&O x 3, NAD  HEENT:  NC/AT, EOMI, PERRL, No scleral icterus, malampatti score:  Skin:    Skin tags - no   Acanthosis Nigricans no   Neck:  No nodes, thyromegaly   Heart:  RRR without M/R/G  Lungs:  CTAB, no rhonchi, rales, or wheezes with good air exchange   Abdomen:  Non-tender, pos bowel sounds; hepatomegaly -   Ext:  No C/C/E        Assessment & Plan  Flower Church was seen today for Weight Management (WLC/ 1 month )       1. Class 3 severe obesity due to excess calories with serious comorbidity and body mass index (BMI) of 40.0 to 44.9 in adult

## 2025-06-05 ENCOUNTER — OFFICE VISIT (OUTPATIENT)
Age: 40
End: 2025-06-05
Payer: COMMERCIAL

## 2025-06-05 ENCOUNTER — RESULTS FOLLOW-UP (OUTPATIENT)
Age: 40
End: 2025-06-05

## 2025-06-05 VITALS
WEIGHT: 244 LBS | HEART RATE: 79 BPM | HEIGHT: 63 IN | BODY MASS INDEX: 43.23 KG/M2 | SYSTOLIC BLOOD PRESSURE: 144 MMHG | DIASTOLIC BLOOD PRESSURE: 82 MMHG

## 2025-06-05 DIAGNOSIS — I10 ESSENTIAL (PRIMARY) HYPERTENSION: ICD-10-CM

## 2025-06-05 DIAGNOSIS — E55.9 VITAMIN D DEFICIENCY, UNSPECIFIED: ICD-10-CM

## 2025-06-05 DIAGNOSIS — E10.9 TYPE 1 DIABETES MELLITUS WITHOUT COMPLICATIONS (HCC): Primary | ICD-10-CM

## 2025-06-05 DIAGNOSIS — E78.2 MIXED HYPERLIPIDEMIA: ICD-10-CM

## 2025-06-05 DIAGNOSIS — R94.6 BORDERLINE ABNORMAL THYROID FUNCTION TEST: ICD-10-CM

## 2025-06-05 PROCEDURE — 3079F DIAST BP 80-89 MM HG: CPT | Performed by: INTERNAL MEDICINE

## 2025-06-05 PROCEDURE — 99214 OFFICE O/P EST MOD 30 MIN: CPT | Performed by: INTERNAL MEDICINE

## 2025-06-05 PROCEDURE — 95251 CONT GLUC MNTR ANALYSIS I&R: CPT | Performed by: INTERNAL MEDICINE

## 2025-06-05 PROCEDURE — 3052F HG A1C>EQUAL 8.0%<EQUAL 9.0%: CPT | Performed by: INTERNAL MEDICINE

## 2025-06-05 PROCEDURE — 3077F SYST BP >= 140 MM HG: CPT | Performed by: INTERNAL MEDICINE

## 2025-06-05 RX ORDER — MULTIVIT-MIN/IRON/FOLIC ACID/K 18-600-40
4000 CAPSULE ORAL DAILY
COMMUNITY

## 2025-06-05 NOTE — PROGRESS NOTES
Chief Complaint   Patient presents with    Diabetes     PCP and Pharmacy verified  Pt consents to [facility]     History of Present Illness: Flower Church is a 40 y.o. female here for follow up of diabetes.  Weight up 7 lbs since last visit in 12/24.  Review of her dexcom data over the past 90 days shows 52% in range, 26% high, 21% very high, 1% low and <1% very low.    Current settings are as follows:   - basal: 12a: 0.8, 8a: 0.9, 8p: 0.8   - Carb ratio: 6.5   - sensitivity: 75   - target: 120-120   - active insulin time: 4 hours     she has the following indications to continue treatment with Dexcom:   1) she has type 1 diabetes (E10.9) and is on an intensive insulin regimen with an insulin pump   2) she tests her blood sugar 4 times per day and makes treatment decisions off her blood sugar readings and does the same off dexcom sensor readings   3) she requires frequent adjustments to her insulin pump settings based on her dexcom sensor readings   4) she has benefitted from therapeutic continuous glucose monitoring and I recommend that she continue this   5) she is seen in my office every 4-6 months          History of Present Illness  The patient is a 40-year-old female here for follow-up of diabetes.    She has been managing her diabetes with the OmniPod for the past 6 months, which she reports as beneficial. However, she acknowledges the need to establish a consistent routine. She consulted Dr. Chairez yesterday regarding weight loss and was provided with a diabetic schedule. Her work as a  and  often disrupts her eating schedule, leading to skipped meals. She believes stress may be contributing to her elevated blood sugar levels. She also occasionally consumes more food than she boluses for or forgets to bolus altogether. Her diet includes coffee with sugar-free creamer and sugar-free sugar. She experienced a blood sugar level of 200 upon waking one morning, even though she had not consumed

## 2025-06-05 NOTE — PATIENT INSTRUCTIONS
1) Your Hemoglobin A1c (3 month test of blood sugar) david from 7.8% to 8.1% due to daytime spikes.    2) Current settings are as follows:   - basal: 12a: 0.8, 8a: 0.9, 8p: 0.8   - Carb ratio: 12a: 6.0   - sensitivity: 12a: 75   - target: 12a: 120-120   - active insulin time: 4 hours    I made your carb ratio more aggressive to help with post-meal spikes.  Do your best to bolus 5-10 min before you eat and try to count carbs accurately and if you are still spiking over 180 2 hours after you eat, then change the carb ratio to 5.5.      I would plan on bolusing for 10 g of carbs for coffee to help with post-breakfast spikes    3) Start monitoring blood pressure about 2-3 times per week at alternating times either in the morning or evening after resting for 5 minutes and sitting upright in a chair with your arm at heart level. Please let me know if you are having readings over 140 on the top number or 90 on the bottom number.    4) Your liver and kidney and urine and cholesterol and vitamin D are all normal.    5) Only take 2 of the 2000 units of vitamin D3 during the summer and can take [id number] starting when the weather is under 70 degree everyday.    6) If you are exercising, put your pump in activity mode to prevent low sugars.    7) TSH is a thyroid test.  Your level is normal so you don't have any problem with your thyroid function at this time that needs further evaluation or treatment.

## 2025-06-18 DIAGNOSIS — E10.9 TYPE 1 DIABETES MELLITUS WITHOUT COMPLICATIONS (HCC): ICD-10-CM

## 2025-06-18 RX ORDER — PROCHLORPERAZINE 25 MG/1
SUPPOSITORY RECTAL
Qty: 9 EACH | Refills: 3 | Status: SHIPPED | OUTPATIENT
Start: 2025-06-18

## 2025-06-24 ENCOUNTER — COMMUNITY OUTREACH (OUTPATIENT)
Facility: CLINIC | Age: 40
End: 2025-06-24

## 2025-07-02 ENCOUNTER — TELEPHONE (OUTPATIENT)
Age: 40
End: 2025-07-02

## 2025-07-02 NOTE — TELEPHONE ENCOUNTER
Called patient is on waitlist for cancellation, cancellation for 10 am today, patient did not answer moving on through waitlist

## 2025-07-08 DIAGNOSIS — E10.9 TYPE 1 DIABETES MELLITUS WITHOUT COMPLICATIONS (HCC): ICD-10-CM

## 2025-07-08 RX ORDER — PROCHLORPERAZINE 25 MG/1
SUPPOSITORY RECTAL
Qty: 1 EACH | Refills: 3 | Status: SHIPPED | OUTPATIENT
Start: 2025-07-08

## 2025-07-09 NOTE — TELEPHONE ENCOUNTER
Last Rx on 1/27 for #60 with 2 RF    Last seen by Dr. Chairez on 6/4    Next appointment scheduled for 8/4

## 2025-07-14 RX ORDER — TOPIRAMATE 25 MG/1
25 TABLET, FILM COATED ORAL
Qty: 60 TABLET | Refills: 2 | Status: SHIPPED | OUTPATIENT
Start: 2025-07-14

## 2025-08-04 ENCOUNTER — OFFICE VISIT (OUTPATIENT)
Age: 40
End: 2025-08-04
Payer: COMMERCIAL

## 2025-08-04 VITALS
DIASTOLIC BLOOD PRESSURE: 80 MMHG | BODY MASS INDEX: 42.74 KG/M2 | TEMPERATURE: 98.7 F | RESPIRATION RATE: 14 BRPM | OXYGEN SATURATION: 98 % | HEART RATE: 67 BPM | SYSTOLIC BLOOD PRESSURE: 136 MMHG | WEIGHT: 241.2 LBS | HEIGHT: 63 IN

## 2025-08-04 DIAGNOSIS — F41.1 ANXIOUS REACTION: ICD-10-CM

## 2025-08-04 DIAGNOSIS — I10 ESSENTIAL HYPERTENSION, BENIGN: ICD-10-CM

## 2025-08-04 DIAGNOSIS — E66.813 CLASS 3 SEVERE OBESITY DUE TO EXCESS CALORIES WITH SERIOUS COMORBIDITY AND BODY MASS INDEX (BMI) OF 40.0 TO 44.9 IN ADULT (HCC): Primary | ICD-10-CM

## 2025-08-04 DIAGNOSIS — R01.1 MURMUR: ICD-10-CM

## 2025-08-04 DIAGNOSIS — E10.9 TYPE 1 DIABETES MELLITUS WITHOUT COMPLICATION (HCC): ICD-10-CM

## 2025-08-04 PROCEDURE — 3075F SYST BP GE 130 - 139MM HG: CPT | Performed by: FAMILY MEDICINE

## 2025-08-04 PROCEDURE — 3079F DIAST BP 80-89 MM HG: CPT | Performed by: FAMILY MEDICINE

## 2025-08-04 PROCEDURE — 99214 OFFICE O/P EST MOD 30 MIN: CPT | Performed by: FAMILY MEDICINE

## 2025-08-04 PROCEDURE — 3052F HG A1C>EQUAL 8.0%<EQUAL 9.0%: CPT | Performed by: FAMILY MEDICINE

## 2025-08-04 RX ORDER — TOPIRAMATE 25 MG/1
25 TABLET, FILM COATED ORAL 2 TIMES DAILY
Qty: 60 TABLET | Refills: 2 | Status: SHIPPED | OUTPATIENT
Start: 2025-08-04

## 2025-08-04 RX ORDER — CLOTRIMAZOLE 10 MG/1
10 LOZENGE ORAL
COMMUNITY
Start: 2025-07-30 | End: 2025-08-09

## 2025-08-04 ASSESSMENT — PATIENT HEALTH QUESTIONNAIRE - PHQ9
2. FEELING DOWN, DEPRESSED OR HOPELESS: NOT AT ALL
SUM OF ALL RESPONSES TO PHQ QUESTIONS 1-9: 0
1. LITTLE INTEREST OR PLEASURE IN DOING THINGS: NOT AT ALL
SUM OF ALL RESPONSES TO PHQ QUESTIONS 1-9: 0

## 2025-09-03 ENCOUNTER — OFFICE VISIT (OUTPATIENT)
Age: 40
End: 2025-09-03
Payer: COMMERCIAL

## 2025-09-03 VITALS
DIASTOLIC BLOOD PRESSURE: 73 MMHG | RESPIRATION RATE: 18 BRPM | HEART RATE: 70 BPM | OXYGEN SATURATION: 97 % | HEIGHT: 63 IN | SYSTOLIC BLOOD PRESSURE: 122 MMHG | WEIGHT: 240.2 LBS | BODY MASS INDEX: 42.56 KG/M2 | TEMPERATURE: 97.8 F

## 2025-09-03 DIAGNOSIS — E55.9 VITAMIN D DEFICIENCY: ICD-10-CM

## 2025-09-03 DIAGNOSIS — E66.813 CLASS 3 SEVERE OBESITY DUE TO EXCESS CALORIES WITH SERIOUS COMORBIDITY AND BODY MASS INDEX (BMI) OF 40.0 TO 44.9 IN ADULT (HCC): Primary | ICD-10-CM

## 2025-09-03 DIAGNOSIS — I10 ESSENTIAL HYPERTENSION, BENIGN: ICD-10-CM

## 2025-09-03 DIAGNOSIS — E10.9 TYPE 1 DIABETES MELLITUS WITHOUT COMPLICATION (HCC): ICD-10-CM

## 2025-09-03 PROCEDURE — 3074F SYST BP LT 130 MM HG: CPT | Performed by: FAMILY MEDICINE

## 2025-09-03 PROCEDURE — 99213 OFFICE O/P EST LOW 20 MIN: CPT | Performed by: FAMILY MEDICINE

## 2025-09-03 PROCEDURE — 3052F HG A1C>EQUAL 8.0%<EQUAL 9.0%: CPT | Performed by: FAMILY MEDICINE

## 2025-09-03 PROCEDURE — 3078F DIAST BP <80 MM HG: CPT | Performed by: FAMILY MEDICINE

## 2025-09-03 ASSESSMENT — PATIENT HEALTH QUESTIONNAIRE - PHQ9
SUM OF ALL RESPONSES TO PHQ QUESTIONS 1-9: 0
2. FEELING DOWN, DEPRESSED OR HOPELESS: NOT AT ALL
SUM OF ALL RESPONSES TO PHQ QUESTIONS 1-9: 0
1. LITTLE INTEREST OR PLEASURE IN DOING THINGS: NOT AT ALL

## (undated) DEVICE — REM POLYHESIVE ADULT PATIENT RETURN ELECTRODE: Brand: VALLEYLAB

## (undated) DEVICE — STERILE POLYISOPRENE POWDER-FREE SURGICAL GLOVES: Brand: PROTEXIS

## (undated) DEVICE — KENDALL SCD EXPRESS SLEEVES, KNEE LENGTH, LARGE: Brand: KENDALL SCD

## (undated) DEVICE — SUTURE PLN GUT SZ 2-0 L27IN ABSRB YELLOWISH TAN L70MM XLH 53T

## (undated) DEVICE — SOLUTION IRRIG 1000ML H2O STRL BLT

## (undated) DEVICE — SPINAL TRAY EPIDURAL KT FLEXTIP +

## (undated) DEVICE — ABDOMINAL PAD: Brand: DERMACEA

## (undated) DEVICE — STAPLER SKIN SQ 30 ABSRB STPL DISP INSORB

## (undated) DEVICE — SOLUTION IV 1000ML 0.9% SOD CHL

## (undated) DEVICE — 3M™ TEGADERM™ TRANSPARENT FILM DRESSING FRAME STYLE, 1628, 6 IN X 8 IN (15 CM X 20 CM), 10/CT 8CT/CASE: Brand: 3M™ TEGADERM™

## (undated) DEVICE — SOLIDIFIER MEDC 1200ML -- CONVERT TO 356117

## (undated) DEVICE — PACK PROCEDURE SURG C SECT KT SMH

## (undated) DEVICE — COVERALL PREM SMS 2XL KNIT --

## (undated) DEVICE — SUTURE VCRL SZ 1 L36IN ABSRB VLT L36MM CT-1 1/2 CIR J347H

## (undated) DEVICE — SUTURE VCRL SZ 0 L36IN ABSRB VLT L40MM CT 1/2 CIR J358H

## (undated) DEVICE — DEVON™ KNEE AND BODY STRAP 60" X 3" (1.5 M X 7.6 CM): Brand: DEVON

## (undated) DEVICE — MEDI-VAC NON-CONDUCTIVE SUCTION TUBING: Brand: CARDINAL HEALTH

## (undated) DEVICE — DRAPE FLD WRM W44XL66IN C6L FOR INTRATEMP SYS THERMABASIN

## (undated) DEVICE — LIGHT HANDLE: Brand: DEVON

## (undated) DEVICE — SPONGE COUNTING BAG: Brand: DEVON

## (undated) DEVICE — 3000CC GUARDIAN II: Brand: GUARDIAN

## (undated) DEVICE — ROYALSILK SURGICAL GOWN, L: Brand: CONVERTORS